# Patient Record
Sex: MALE | Race: WHITE | NOT HISPANIC OR LATINO | Employment: OTHER | URBAN - METROPOLITAN AREA
[De-identification: names, ages, dates, MRNs, and addresses within clinical notes are randomized per-mention and may not be internally consistent; named-entity substitution may affect disease eponyms.]

---

## 2023-03-08 DIAGNOSIS — R60.9 EDEMA, UNSPECIFIED TYPE: Primary | ICD-10-CM

## 2023-03-08 DIAGNOSIS — I10 HYPERTENSION, UNSPECIFIED TYPE: Primary | ICD-10-CM

## 2023-03-08 RX ORDER — LOSARTAN POTASSIUM 100 MG/1
TABLET ORAL
Qty: 90 TABLET | Refills: 3 | Status: SHIPPED | OUTPATIENT
Start: 2023-03-08 | End: 2023-12-04 | Stop reason: HOSPADM

## 2023-03-10 RX ORDER — TORSEMIDE 20 MG/1
TABLET ORAL
Qty: 90 TABLET | Refills: 3 | Status: SHIPPED | OUTPATIENT
Start: 2023-03-10 | End: 2023-05-30

## 2023-03-21 DIAGNOSIS — I10 HYPERTENSION, UNSPECIFIED TYPE: ICD-10-CM

## 2023-03-21 PROBLEM — N41.9 PROSTATITIS: Status: RESOLVED | Noted: 2023-03-21 | Resolved: 2023-03-21

## 2023-03-21 PROBLEM — I89.0 LYMPHEDEMA: Status: ACTIVE | Noted: 2023-03-21

## 2023-03-21 PROBLEM — R60.0 BILATERAL LEG EDEMA: Status: ACTIVE | Noted: 2023-03-21

## 2023-03-21 PROBLEM — R60.0 PEDAL EDEMA: Status: ACTIVE | Noted: 2023-03-21

## 2023-03-21 PROBLEM — I49.5 SINOATRIAL NODE DYSFUNCTION (MULTI): Status: ACTIVE | Noted: 2023-03-21

## 2023-03-21 PROBLEM — G47.00 INSOMNIA: Status: ACTIVE | Noted: 2023-03-21

## 2023-03-21 PROBLEM — E78.00 PURE HYPERCHOLESTEROLEMIA: Status: ACTIVE | Noted: 2023-03-21

## 2023-03-21 PROBLEM — J30.9 ALLERGIC RHINITIS: Status: ACTIVE | Noted: 2023-03-21

## 2023-03-21 PROBLEM — G51.0 BELL'S PALSY: Status: ACTIVE | Noted: 2023-03-21

## 2023-03-21 PROBLEM — R97.20 ELEVATED PROSTATE SPECIFIC ANTIGEN (PSA): Status: ACTIVE | Noted: 2023-03-21

## 2023-03-21 PROBLEM — Z95.0 PACEMAKER: Status: ACTIVE | Noted: 2023-03-21

## 2023-03-21 PROBLEM — M10.9 GOUT: Status: ACTIVE | Noted: 2023-03-21

## 2023-03-21 PROBLEM — N40.1 ENLARGED PROSTATE WITH LOWER URINARY TRACT SYMPTOMS (LUTS): Status: ACTIVE | Noted: 2023-03-21

## 2023-03-21 PROBLEM — I73.9 PVD (PERIPHERAL VASCULAR DISEASE) (CMS-HCC): Status: ACTIVE | Noted: 2023-03-21

## 2023-03-21 PROBLEM — B35.1 MYCOTIC TOENAILS: Status: ACTIVE | Noted: 2023-03-21

## 2023-03-21 PROBLEM — R63.2 POLYPHAGIA: Status: ACTIVE | Noted: 2023-03-21

## 2023-03-21 PROBLEM — E11.65 TYPE 2 DIABETES MELLITUS WITH HYPERGLYCEMIA, WITHOUT LONG-TERM CURRENT USE OF INSULIN (MULTI): Status: ACTIVE | Noted: 2023-03-21

## 2023-03-21 PROBLEM — M17.12 OSTEOARTHRITIS OF LEFT KNEE: Status: ACTIVE | Noted: 2023-03-21

## 2023-03-21 PROBLEM — R51.9 HEADACHE: Status: ACTIVE | Noted: 2023-03-21

## 2023-03-21 RX ORDER — FINASTERIDE 5 MG/1
5 TABLET, FILM COATED ORAL DAILY
COMMUNITY
End: 2024-02-11 | Stop reason: ALTCHOICE

## 2023-03-21 RX ORDER — TAMSULOSIN HYDROCHLORIDE 0.4 MG/1
0.8 CAPSULE ORAL NIGHTLY
COMMUNITY

## 2023-03-21 RX ORDER — ROSUVASTATIN CALCIUM 10 MG/1
10 TABLET, COATED ORAL NIGHTLY
COMMUNITY
End: 2023-04-14

## 2023-03-21 RX ORDER — COLCHICINE 0.6 MG/1
0.6 TABLET ORAL 2 TIMES DAILY PRN
COMMUNITY
Start: 2015-05-15 | End: 2023-10-07 | Stop reason: HOSPADM

## 2023-03-21 RX ORDER — METOPROLOL SUCCINATE 100 MG/1
100 TABLET, EXTENDED RELEASE ORAL DAILY
COMMUNITY
End: 2023-06-15 | Stop reason: ALTCHOICE

## 2023-03-21 RX ORDER — ALLOPURINOL 100 MG/1
1 TABLET ORAL DAILY
COMMUNITY
Start: 2016-11-01 | End: 2023-06-15 | Stop reason: SDUPTHER

## 2023-03-21 RX ORDER — FLUTICASONE PROPIONATE 50 MCG
1 SPRAY, SUSPENSION (ML) NASAL DAILY
COMMUNITY
Start: 2021-09-16 | End: 2023-10-07 | Stop reason: HOSPADM

## 2023-03-21 RX ORDER — METOPROLOL SUCCINATE 100 MG/1
TABLET, EXTENDED RELEASE ORAL
Qty: 90 TABLET | Refills: 3 | Status: ON HOLD | OUTPATIENT
Start: 2023-03-21 | End: 2023-12-03 | Stop reason: SDUPTHER

## 2023-04-13 DIAGNOSIS — E78.00 PURE HYPERCHOLESTEROLEMIA, UNSPECIFIED: ICD-10-CM

## 2023-04-14 RX ORDER — ROSUVASTATIN CALCIUM 10 MG/1
TABLET, COATED ORAL
Qty: 90 TABLET | Refills: 3 | Status: SHIPPED | OUTPATIENT
Start: 2023-04-14 | End: 2024-02-11 | Stop reason: ALTCHOICE

## 2023-05-01 LAB — PROSTATE SPECIFIC AG (NG/ML) IN SER/PLAS: 8.52 NG/ML (ref 0–4)

## 2023-05-27 DIAGNOSIS — R60.9 EDEMA, UNSPECIFIED TYPE: ICD-10-CM

## 2023-05-30 RX ORDER — FUROSEMIDE 40 MG/1
40 TABLET ORAL DAILY
Qty: 90 TABLET | Refills: 3 | Status: ON HOLD | OUTPATIENT
Start: 2023-05-30 | End: 2023-10-07 | Stop reason: SDUPTHER

## 2023-06-15 DIAGNOSIS — M10.9 GOUT, UNSPECIFIED CAUSE, UNSPECIFIED CHRONICITY, UNSPECIFIED SITE: ICD-10-CM

## 2023-06-15 PROBLEM — M25.512 LEFT SHOULDER PAIN: Status: ACTIVE | Noted: 2023-06-15

## 2023-06-15 PROBLEM — M62.830 BACK MUSCLE SPASM: Status: ACTIVE | Noted: 2023-06-15

## 2023-06-15 RX ORDER — ALLOPURINOL 100 MG/1
100 TABLET ORAL DAILY
Qty: 90 TABLET | Refills: 3 | Status: SHIPPED | OUTPATIENT
Start: 2023-06-15 | End: 2023-11-16 | Stop reason: HOSPADM

## 2023-08-11 LAB
ALANINE AMINOTRANSFERASE (SGPT) (U/L) IN SER/PLAS: 28 U/L (ref 10–52)
ALBUMIN (G/DL) IN SER/PLAS: 3.8 G/DL (ref 3.4–5)
ALKALINE PHOSPHATASE (U/L) IN SER/PLAS: 58 U/L (ref 33–136)
ANION GAP IN SER/PLAS: 11 MMOL/L (ref 10–20)
ASPARTATE AMINOTRANSFERASE (SGOT) (U/L) IN SER/PLAS: 33 U/L (ref 9–39)
BILIRUBIN TOTAL (MG/DL) IN SER/PLAS: 1 MG/DL (ref 0–1.2)
CALCIUM (MG/DL) IN SER/PLAS: 9.2 MG/DL (ref 8.6–10.3)
CARBON DIOXIDE, TOTAL (MMOL/L) IN SER/PLAS: 27 MMOL/L (ref 21–32)
CHLORIDE (MMOL/L) IN SER/PLAS: 99 MMOL/L (ref 98–107)
CHOLESTEROL (MG/DL) IN SER/PLAS: 109 MG/DL (ref 0–199)
CHOLESTEROL IN HDL (MG/DL) IN SER/PLAS: 27.7 MG/DL
CHOLESTEROL/HDL RATIO: 3.9
CREATININE (MG/DL) IN SER/PLAS: 1 MG/DL (ref 0.5–1.3)
ERYTHROCYTE DISTRIBUTION WIDTH (RATIO) BY AUTOMATED COUNT: 13.3 % (ref 11.5–14.5)
ERYTHROCYTE MEAN CORPUSCULAR HEMOGLOBIN CONCENTRATION (G/DL) BY AUTOMATED: 32.8 G/DL (ref 32–36)
ERYTHROCYTE MEAN CORPUSCULAR VOLUME (FL) BY AUTOMATED COUNT: 88 FL (ref 80–100)
ERYTHROCYTES (10*6/UL) IN BLOOD BY AUTOMATED COUNT: 5.03 X10E12/L (ref 4.5–5.9)
GFR MALE: 80 ML/MIN/1.73M2
GLUCOSE (MG/DL) IN SER/PLAS: 180 MG/DL (ref 74–99)
HEMATOCRIT (%) IN BLOOD BY AUTOMATED COUNT: 44.2 % (ref 41–52)
HEMOGLOBIN (G/DL) IN BLOOD: 14.5 G/DL (ref 13.5–17.5)
LDL: 25 MG/DL (ref 0–99)
LEUKOCYTES (10*3/UL) IN BLOOD BY AUTOMATED COUNT: 6.9 X10E9/L (ref 4.4–11.3)
NON HDL CHOLESTEROL: 81 MG/DL
PLATELETS (10*3/UL) IN BLOOD AUTOMATED COUNT: 123 X10E9/L (ref 150–450)
POTASSIUM (MMOL/L) IN SER/PLAS: 3.9 MMOL/L (ref 3.5–5.3)
PROTEIN TOTAL: 7.4 G/DL (ref 6.4–8.2)
SODIUM (MMOL/L) IN SER/PLAS: 133 MMOL/L (ref 136–145)
TRIGLYCERIDE (MG/DL) IN SER/PLAS: 283 MG/DL (ref 0–149)
URATE (MG/DL) IN SER/PLAS: 3.6 MG/DL (ref 4–7.5)
UREA NITROGEN (MG/DL) IN SER/PLAS: 13 MG/DL (ref 6–23)
VLDL: 57 MG/DL (ref 0–40)

## 2023-08-12 LAB
ESTIMATED AVERAGE GLUCOSE FOR HBA1C: 237 MG/DL
HEMOGLOBIN A1C/HEMOGLOBIN TOTAL IN BLOOD: 9.9 %
PROSTATE SPECIFIC ANTIGEN,SCREEN: 6.84 NG/ML (ref 0–4)

## 2023-08-16 ENCOUNTER — OFFICE VISIT (OUTPATIENT)
Dept: PRIMARY CARE | Facility: CLINIC | Age: 73
End: 2023-08-16
Payer: MEDICARE

## 2023-08-16 VITALS
OXYGEN SATURATION: 98 % | TEMPERATURE: 97.9 F | DIASTOLIC BLOOD PRESSURE: 80 MMHG | WEIGHT: 285.6 LBS | HEART RATE: 70 BPM | RESPIRATION RATE: 16 BRPM | SYSTOLIC BLOOD PRESSURE: 170 MMHG | BODY MASS INDEX: 36.65 KG/M2 | HEIGHT: 74 IN

## 2023-08-16 DIAGNOSIS — Z78.9 NEVER SMOKED CIGARETTES: ICD-10-CM

## 2023-08-16 DIAGNOSIS — E11.40 TYPE 2 DIABETES MELLITUS WITH DIABETIC NEUROPATHY, WITH LONG-TERM CURRENT USE OF INSULIN (MULTI): Primary | ICD-10-CM

## 2023-08-16 DIAGNOSIS — E11.65 TYPE 2 DIABETES MELLITUS WITH HYPERGLYCEMIA, WITHOUT LONG-TERM CURRENT USE OF INSULIN (MULTI): ICD-10-CM

## 2023-08-16 DIAGNOSIS — E66.9 CLASS 2 OBESITY WITH BODY MASS INDEX (BMI) OF 36.0 TO 36.9 IN ADULT, UNSPECIFIED OBESITY TYPE, UNSPECIFIED WHETHER SERIOUS COMORBIDITY PRESENT: ICD-10-CM

## 2023-08-16 DIAGNOSIS — E11.40 TYPE 2 DIABETES MELLITUS WITH DIABETIC NEUROPATHY, WITH LONG-TERM CURRENT USE OF INSULIN (MULTI): ICD-10-CM

## 2023-08-16 DIAGNOSIS — I89.0 LYMPHEDEMA: ICD-10-CM

## 2023-08-16 DIAGNOSIS — N40.1 BENIGN PROSTATIC HYPERPLASIA WITH LOWER URINARY TRACT SYMPTOMS, SYMPTOM DETAILS UNSPECIFIED: ICD-10-CM

## 2023-08-16 DIAGNOSIS — Z79.4 TYPE 2 DIABETES MELLITUS WITH DIABETIC NEUROPATHY, WITH LONG-TERM CURRENT USE OF INSULIN (MULTI): ICD-10-CM

## 2023-08-16 DIAGNOSIS — Z00.00 MEDICARE ANNUAL WELLNESS VISIT, SUBSEQUENT: ICD-10-CM

## 2023-08-16 DIAGNOSIS — Z79.4 TYPE 2 DIABETES MELLITUS WITH DIABETIC NEUROPATHY, WITH LONG-TERM CURRENT USE OF INSULIN (MULTI): Primary | ICD-10-CM

## 2023-08-16 PROBLEM — K63.5 COLON POLYP: Status: ACTIVE | Noted: 2023-08-16

## 2023-08-16 PROCEDURE — 1036F TOBACCO NON-USER: CPT | Performed by: FAMILY MEDICINE

## 2023-08-16 PROCEDURE — 3046F HEMOGLOBIN A1C LEVEL >9.0%: CPT | Performed by: FAMILY MEDICINE

## 2023-08-16 PROCEDURE — 3077F SYST BP >= 140 MM HG: CPT | Performed by: FAMILY MEDICINE

## 2023-08-16 PROCEDURE — 1159F MED LIST DOCD IN RCRD: CPT | Performed by: FAMILY MEDICINE

## 2023-08-16 PROCEDURE — 1160F RVW MEDS BY RX/DR IN RCRD: CPT | Performed by: FAMILY MEDICINE

## 2023-08-16 PROCEDURE — 4010F ACE/ARB THERAPY RXD/TAKEN: CPT | Performed by: FAMILY MEDICINE

## 2023-08-16 PROCEDURE — 1157F ADVNC CARE PLAN IN RCRD: CPT | Performed by: FAMILY MEDICINE

## 2023-08-16 PROCEDURE — 99214 OFFICE O/P EST MOD 30 MIN: CPT | Performed by: FAMILY MEDICINE

## 2023-08-16 PROCEDURE — G0439 PPPS, SUBSEQ VISIT: HCPCS | Performed by: FAMILY MEDICINE

## 2023-08-16 PROCEDURE — 1170F FXNL STATUS ASSESSED: CPT | Performed by: FAMILY MEDICINE

## 2023-08-16 PROCEDURE — 3079F DIAST BP 80-89 MM HG: CPT | Performed by: FAMILY MEDICINE

## 2023-08-16 PROCEDURE — G0446 INTENS BEHAVE THER CARDIO DX: HCPCS | Performed by: FAMILY MEDICINE

## 2023-08-16 PROCEDURE — 3008F BODY MASS INDEX DOCD: CPT | Performed by: FAMILY MEDICINE

## 2023-08-16 RX ORDER — INSULIN GLARGINE 100 [IU]/ML
10 INJECTION, SOLUTION SUBCUTANEOUS NIGHTLY
Qty: 10 ML | Refills: 3 | Status: SHIPPED | OUTPATIENT
Start: 2023-08-16 | End: 2023-08-16

## 2023-08-16 RX ORDER — INSULIN DEGLUDEC 100 U/ML
10 INJECTION, SOLUTION SUBCUTANEOUS NIGHTLY
Qty: 10 ML | Refills: 2 | Status: SHIPPED | OUTPATIENT
Start: 2023-08-16 | End: 2023-11-16 | Stop reason: HOSPADM

## 2023-08-16 ASSESSMENT — ENCOUNTER SYMPTOMS
OCCASIONAL FEELINGS OF UNSTEADINESS: 0
LOSS OF SENSATION IN FEET: 0
DEPRESSION: 0

## 2023-08-16 ASSESSMENT — PATIENT HEALTH QUESTIONNAIRE - PHQ9
1. LITTLE INTEREST OR PLEASURE IN DOING THINGS: NOT AT ALL
2. FEELING DOWN, DEPRESSED OR HOPELESS: NOT AT ALL
SUM OF ALL RESPONSES TO PHQ9 QUESTIONS 1 AND 2: 0

## 2023-08-16 ASSESSMENT — ACTIVITIES OF DAILY LIVING (ADL)
MANAGING_FINANCES: INDEPENDENT
DRESSING: INDEPENDENT
DOING_HOUSEWORK: INDEPENDENT
BATHING: INDEPENDENT
GROCERY_SHOPPING: INDEPENDENT
TAKING_MEDICATION: INDEPENDENT

## 2023-08-16 NOTE — PATIENT INSTRUCTIONS
For weight loss I recommend a healthy diet low in refined carbohydrates and saturated fats and regular exercise (150 minutes of cardio/week and resistance exercise at least twice a week).  MP

## 2023-08-16 NOTE — PROGRESS NOTES
"Subjective   Reason for Visit: Chester Verduzco is an 73 y.o. male here for a Medicare Wellness visit.  And recheck on DM,     Past Medical, Surgical, and Family History reviewed and updated in chart.    Reviewed all medications by prescribing practitioner or clinical pharmacist (such as prescriptions, OTCs, herbal therapies and supplements) and documented in the medical record.        Patient Care Team:  Jeffry De Leon MD as PCP - General       Objective   Vitals:  /80 (BP Location: Right arm, Patient Position: Sitting, BP Cuff Size: Adult)   Pulse 70   Temp 36.6 °C (97.9 °F) (Temporal)   Resp 16   Ht 1.88 m (6' 2\")   Wt 130 kg (285 lb 9.6 oz)   SpO2 98%   BMI 36.67 kg/m²     O: VSS AFEB Awake, Alert, NAD.  No intoxication, withdrawal, or sedation.  Chest CTA.  Heart RRR.  Ext no 2+ lymphedema.      Assessment/Plan   Problem List Items Addressed This Visit       Enlarged prostate with lower urinary tract symptoms (LUTS)    Overview     # BPH -- Proscar & Flomax per Dr Delaney -- planning cysto and surgical debulking (>10 cm prostate).               Lymphedema    Overview     # Edema with venous stasis -- continue seeing Dr. Mohseni. Stopped TZD 7/2021.    # Lymphedema, and varicose veins -- s/p vein therapy with Dr Salas. No longer pursuing this. Stable with maxzide. Doing well with treatment from Dr Mohseni.          Type 2 diabetes mellitus with diabetic neuropathy, with long-term current use of insulin (CMS/[facility]) - Primary    Overview     DX'd 12/2017   A1c up to 9.9% since off GLP-1 and Metformin.  STOPPED TZD due to edema 7/2021.   Off METFORMIN d/t diarrhea.   Cannot afford trulicity.  Sees podiatry for neuropathy  See opto Dr March at [facility] in [place].   Micral normal 8/2019, 8/19/2020.   Start Lantus 8/16/23.         Current Assessment & Plan     Start lantus 10 units daily.          Relevant Medications    insulin glargine (Lantus) 100 unit/mL injection    Medicare annual " wellness visit, subsequent    Overview     8/16/23 Subsequent Medicare wellness exam -- no dementia, depression, anxiety, incontinence, falls. Prevnar utd, pneumovax 6/19/18. Has Living Will/POA Roselyn Pardo. Colonoscopy ordered scheduled for 9/18/23 - due since 2018 with Dr Jauregui (polyp in 2015).      8/16/23  I spent 15 minutes face-to-face with this individual discussing their cardiovascular risk and behavioral therapies of nutritional choices, exercise, and elimination of habits contributing to risk. We agreed on a plan addressing reduction of their current cardiovascular risk. For patients with risk calculated >10%, aspirin use was discussed and encouraged unless known allergy or increased risk of bleeding contraindicates use. Patient's 10 year CV risk estimate calculates to: 17.3%       # Moderate cardiac risk for low risk ophtho procedures coming up -- clear for surgery.  Should hold lantus the day before surgery and can restart once able to eat.     # Eustachian tube dysfunction -- due to early sinusitis -- re-treat with augmentin 875 bid x 10 days. continue nasal lavage, mucinex, flonase spray and ear popping exercises. To ENT if no better.   # Acute viral hepatitis B infection 2015 -- resolved -- immune to Hep B per AB level 2017. US shows obesity NOT ascites.   # hx Ventral Hernia -- repaired per DR Capone 2015  # Morbid Obesity -- plans to work out independently-- no longer interested in bariatric -- rx diabetic diet. Encouraged with [facility]. Wellbutrin to help with cravings for sweets.           Other Visit Diagnoses       Class 2 obesity with body mass index (BMI) of 36.0 to 36.9 in adult, unspecified obesity type, unspecified whether serious comorbidity present        Never smoked cigarettes        Type 2 diabetes mellitus with hyperglycemia, without long-term current use of insulin (CMS/[facility])

## 2023-09-13 DIAGNOSIS — E66.01 CLASS 2 SEVERE OBESITY WITH SERIOUS COMORBIDITY AND BODY MASS INDEX (BMI) OF 36.0 TO 36.9 IN ADULT, UNSPECIFIED OBESITY TYPE (MULTI): ICD-10-CM

## 2023-09-13 DIAGNOSIS — Z79.4 TYPE 2 DIABETES MELLITUS WITH DIABETIC NEUROPATHY, WITH LONG-TERM CURRENT USE OF INSULIN (MULTI): ICD-10-CM

## 2023-09-13 DIAGNOSIS — E11.40 TYPE 2 DIABETES MELLITUS WITH DIABETIC NEUROPATHY, WITH LONG-TERM CURRENT USE OF INSULIN (MULTI): ICD-10-CM

## 2023-09-13 NOTE — TELEPHONE ENCOUNTER
Patient asking if he should receive the booster covid vaccine and what does Dr. De Leon think about the RSV vaccine-should he get it?     Also- he is having a terrible time with his sugar cravings-can not tolerate Wellbutrin-and is already on insulin (Tresiba flex U-100 10 units nightly)   Is there anything else he can take to help control the sugar cravings?

## 2023-09-14 RX ORDER — NALTREXONE HYDROCHLORIDE 50 MG/1
50 TABLET, FILM COATED ORAL DAILY
Qty: 30 TABLET | Refills: 11 | Status: ON HOLD | OUTPATIENT
Start: 2023-09-14 | End: 2023-10-07 | Stop reason: SDUPTHER

## 2023-09-14 NOTE — TELEPHONE ENCOUNTER
Spoke with patient and informed about vaccines.   He is not on any type of Opiods- Queued up Naltrexone script for sign off.

## 2023-09-25 ENCOUNTER — HOSPITAL ENCOUNTER (OUTPATIENT)
Facility: HOSPITAL | Age: 73
Setting detail: SURGERY ADMIT
End: 2023-09-25
Attending: STUDENT IN AN ORGANIZED HEALTH CARE EDUCATION/TRAINING PROGRAM | Admitting: STUDENT IN AN ORGANIZED HEALTH CARE EDUCATION/TRAINING PROGRAM
Payer: MEDICARE

## 2023-09-25 ENCOUNTER — TELEPHONE (OUTPATIENT)
Dept: PRIMARY CARE | Facility: CLINIC | Age: 73
End: 2023-09-25
Payer: COMMERCIAL

## 2023-09-25 NOTE — TELEPHONE ENCOUNTER
Chester Verduzco,  1950, called to speak to Dr. De Leon or his nurse to list everything that has happened in the past week. Per Chester, there have been too many items to try and write up, but to sum it up, he has been to the hospital twice. He is requesting for someone to please call him. He can be reached at [phone]. Thanks

## 2023-09-28 NOTE — TELEPHONE ENCOUNTER
Spoke with Mr Luba he has been feeling N/V/D but is not dizzy, he thinks it might be the Keflex and asked I put this on his allergy list, seeing none of this started until he started the Keflex, also per MP I called in Zofran 4mg odt q6h and advised the pt to push fluids. Pt understood.

## 2023-10-02 ENCOUNTER — APPOINTMENT (OUTPATIENT)
Dept: RADIOLOGY | Facility: HOSPITAL | Age: 73
DRG: 392 | End: 2023-10-02
Payer: MEDICARE

## 2023-10-02 ENCOUNTER — HOSPITAL ENCOUNTER (INPATIENT)
Facility: HOSPITAL | Age: 73
LOS: 5 days | Discharge: NURSING, REHAB OR LONG-TERM CARE | DRG: 392 | End: 2023-10-07
Attending: EMERGENCY MEDICINE | Admitting: INTERNAL MEDICINE
Payer: MEDICARE

## 2023-10-02 DIAGNOSIS — I10 PRIMARY HYPERTENSION: ICD-10-CM

## 2023-10-02 DIAGNOSIS — Z79.4 TYPE 2 DIABETES MELLITUS WITH DIABETIC NEUROPATHY, WITH LONG-TERM CURRENT USE OF INSULIN (MULTI): ICD-10-CM

## 2023-10-02 DIAGNOSIS — E11.40 TYPE 2 DIABETES MELLITUS WITH DIABETIC NEUROPATHY, WITH LONG-TERM CURRENT USE OF INSULIN (MULTI): ICD-10-CM

## 2023-10-02 DIAGNOSIS — N17.9 AKI (ACUTE KIDNEY INJURY) (CMS-HCC): ICD-10-CM

## 2023-10-02 DIAGNOSIS — M62.830 BACK MUSCLE SPASM: ICD-10-CM

## 2023-10-02 DIAGNOSIS — E66.01 CLASS 2 SEVERE OBESITY WITH SERIOUS COMORBIDITY AND BODY MASS INDEX (BMI) OF 36.0 TO 36.9 IN ADULT, UNSPECIFIED OBESITY TYPE (MULTI): ICD-10-CM

## 2023-10-02 DIAGNOSIS — R19.7 DIARRHEA, UNSPECIFIED TYPE: Primary | ICD-10-CM

## 2023-10-02 DIAGNOSIS — D50.9 IRON DEFICIENCY ANEMIA, UNSPECIFIED IRON DEFICIENCY ANEMIA TYPE: ICD-10-CM

## 2023-10-02 DIAGNOSIS — K59.1 FUNCTIONAL DIARRHEA: ICD-10-CM

## 2023-10-02 DIAGNOSIS — N39.0 UTI (URINARY TRACT INFECTION): ICD-10-CM

## 2023-10-02 DIAGNOSIS — R26.2 DIFFICULTY WALKING: ICD-10-CM

## 2023-10-02 DIAGNOSIS — R60.9 EDEMA, UNSPECIFIED TYPE: ICD-10-CM

## 2023-10-02 DIAGNOSIS — B35.1 MYCOTIC TOENAILS: ICD-10-CM

## 2023-10-02 LAB
ALBUMIN SERPL BCP-MCNC: 2.7 G/DL (ref 3.4–5)
ALP SERPL-CCNC: 156 U/L (ref 33–136)
ALT SERPL W P-5'-P-CCNC: 42 U/L (ref 10–52)
ANION GAP BLDV CALCULATED.4IONS-SCNC: 10 MMOL/L (ref 10–25)
ANION GAP SERPL CALC-SCNC: 17 MMOL/L (ref 10–20)
APPEARANCE UR: ABNORMAL
AST SERPL W P-5'-P-CCNC: 43 U/L (ref 9–39)
BACTERIA #/AREA URNS AUTO: ABNORMAL /HPF
BASE EXCESS BLDV CALC-SCNC: 0.1 MMOL/L (ref -2–3)
BASOPHILS # BLD AUTO: 0.01 X10*3/UL (ref 0–0.1)
BASOPHILS NFR BLD AUTO: 0.1 %
BILIRUB SERPL-MCNC: 1 MG/DL (ref 0–1.2)
BILIRUB UR STRIP.AUTO-MCNC: NEGATIVE MG/DL
BNP SERPL-MCNC: 97 PG/ML (ref 0–99)
BODY TEMPERATURE: 37 DEGREES CELSIUS
BUN SERPL-MCNC: 68 MG/DL (ref 6–23)
CA-I BLDV-SCNC: 1.03 MMOL/L (ref 1.1–1.33)
CALCIUM SERPL-MCNC: 7.7 MG/DL (ref 8.6–10.3)
CHLORIDE BLDV-SCNC: 91 MMOL/L (ref 98–107)
CHLORIDE SERPL-SCNC: 88 MMOL/L (ref 98–107)
CO2 SERPL-SCNC: 23 MMOL/L (ref 21–32)
COLOR UR: ABNORMAL
CREAT SERPL-MCNC: 2.25 MG/DL (ref 0.5–1.3)
EOSINOPHIL # BLD AUTO: 0.01 X10*3/UL (ref 0–0.4)
EOSINOPHIL NFR BLD AUTO: 0.1 %
ERYTHROCYTE [DISTWIDTH] IN BLOOD BY AUTOMATED COUNT: 13.8 % (ref 11.5–14.5)
FLUAV RNA RESP QL NAA+PROBE: NOT DETECTED
FLUBV RNA RESP QL NAA+PROBE: NOT DETECTED
GFR SERPL CREATININE-BSD FRML MDRD: 30 ML/MIN/1.73M*2
GLUCOSE BLDV-MCNC: 158 MG/DL (ref 74–99)
GLUCOSE SERPL-MCNC: 144 MG/DL (ref 74–99)
GLUCOSE UR STRIP.AUTO-MCNC: NEGATIVE MG/DL
HCO3 BLDV-SCNC: 23.3 MMOL/L (ref 22–26)
HCT VFR BLD AUTO: 33.4 % (ref 41–52)
HCT VFR BLD EST: 33 % (ref 41–52)
HGB BLD-MCNC: 11.1 G/DL (ref 13.5–17.5)
HGB BLDV-MCNC: 11.1 G/DL (ref 13.5–17.5)
IMM GRANULOCYTES # BLD AUTO: 0.44 X10*3/UL (ref 0–0.5)
IMM GRANULOCYTES NFR BLD AUTO: 3.7 % (ref 0–0.9)
KETONES UR STRIP.AUTO-MCNC: NEGATIVE MG/DL
LACTATE BLDV-SCNC: 1.6 MMOL/L (ref 0.4–2)
LEUKOCYTE ESTERASE UR QL STRIP.AUTO: ABNORMAL
LYMPHOCYTES # BLD AUTO: 0.63 X10*3/UL (ref 0.8–3)
LYMPHOCYTES NFR BLD AUTO: 5.3 %
MAGNESIUM SERPL-MCNC: 2.01 MG/DL (ref 1.6–2.4)
MCH RBC QN AUTO: 29.1 PG (ref 26–34)
MCHC RBC AUTO-ENTMCNC: 33.2 G/DL (ref 32–36)
MCV RBC AUTO: 87 FL (ref 80–100)
MONOCYTES # BLD AUTO: 1.4 X10*3/UL (ref 0.05–0.8)
MONOCYTES NFR BLD AUTO: 11.7 %
NEUTROPHILS # BLD AUTO: 9.44 X10*3/UL (ref 1.6–5.5)
NEUTROPHILS NFR BLD AUTO: 79.1 %
NITRITE UR QL STRIP.AUTO: NEGATIVE
NRBC BLD-RTO: 0 /100 WBCS (ref 0–0)
OXYHGB MFR BLDV: 38 % (ref 45–75)
PCO2 BLDV: 32 MM HG (ref 41–51)
PH BLDV: 7.47 PH (ref 7.33–7.43)
PH UR STRIP.AUTO: 5 [PH]
PLATELET # BLD AUTO: 169 X10*3/UL (ref 150–450)
PMV BLD AUTO: 11.1 FL (ref 7.5–11.5)
PO2 BLDV: 26 MM HG (ref 35–45)
POTASSIUM BLDV-SCNC: 3.2 MMOL/L (ref 3.5–5.3)
POTASSIUM SERPL-SCNC: 3.5 MMOL/L (ref 3.5–5.3)
PROT SERPL-MCNC: 6.7 G/DL (ref 6.4–8.2)
PROT UR STRIP.AUTO-MCNC: ABNORMAL MG/DL
RBC # BLD AUTO: 3.82 X10*6/UL (ref 4.5–5.9)
RBC # UR STRIP.AUTO: ABNORMAL /UL
RBC #/AREA URNS AUTO: >20 /HPF
SAO2 % BLDV: 39 % (ref 45–75)
SARS-COV-2 RNA RESP QL NAA+PROBE: NOT DETECTED
SODIUM BLDV-SCNC: 121 MMOL/L (ref 136–145)
SODIUM SERPL-SCNC: 124 MMOL/L (ref 136–145)
SP GR UR STRIP.AUTO: 1.01
UROBILINOGEN UR STRIP.AUTO-MCNC: 2 MG/DL
WBC # BLD AUTO: 11.9 X10*3/UL (ref 4.4–11.3)
WBC #/AREA URNS AUTO: >50 /HPF
WBC CLUMPS #/AREA URNS AUTO: ABNORMAL /HPF
YEAST HYPHAE #/AREA UR COMP ASSIST: PRESENT /HPF

## 2023-10-02 PROCEDURE — 2500000004 HC RX 250 GENERAL PHARMACY W/ HCPCS (ALT 636 FOR OP/ED): Performed by: NURSE PRACTITIONER

## 2023-10-02 PROCEDURE — 36415 COLL VENOUS BLD VENIPUNCTURE: CPT | Performed by: STUDENT IN AN ORGANIZED HEALTH CARE EDUCATION/TRAINING PROGRAM

## 2023-10-02 PROCEDURE — 96376 TX/PRO/DX INJ SAME DRUG ADON: CPT

## 2023-10-02 PROCEDURE — 2500000001 HC RX 250 WO HCPCS SELF ADMINISTERED DRUGS (ALT 637 FOR MEDICARE OP): Performed by: NURSE PRACTITIONER

## 2023-10-02 PROCEDURE — 2500000001 HC RX 250 WO HCPCS SELF ADMINISTERED DRUGS (ALT 637 FOR MEDICARE OP)

## 2023-10-02 PROCEDURE — 83735 ASSAY OF MAGNESIUM: CPT | Performed by: EMERGENCY MEDICINE

## 2023-10-02 PROCEDURE — 1100000001 HC PRIVATE ROOM DAILY

## 2023-10-02 PROCEDURE — 96365 THER/PROPH/DIAG IV INF INIT: CPT

## 2023-10-02 PROCEDURE — 84295 ASSAY OF SERUM SODIUM: CPT | Performed by: STUDENT IN AN ORGANIZED HEALTH CARE EDUCATION/TRAINING PROGRAM

## 2023-10-02 PROCEDURE — 81001 URINALYSIS AUTO W/SCOPE: CPT | Performed by: STUDENT IN AN ORGANIZED HEALTH CARE EDUCATION/TRAINING PROGRAM

## 2023-10-02 PROCEDURE — 87636 SARSCOV2 & INF A&B AMP PRB: CPT | Performed by: STUDENT IN AN ORGANIZED HEALTH CARE EDUCATION/TRAINING PROGRAM

## 2023-10-02 PROCEDURE — 2580000001 HC RX 258 IV SOLUTIONS: Performed by: STUDENT IN AN ORGANIZED HEALTH CARE EDUCATION/TRAINING PROGRAM

## 2023-10-02 PROCEDURE — 96375 TX/PRO/DX INJ NEW DRUG ADDON: CPT

## 2023-10-02 PROCEDURE — 85018 HEMOGLOBIN: CPT

## 2023-10-02 PROCEDURE — 96361 HYDRATE IV INFUSION ADD-ON: CPT

## 2023-10-02 PROCEDURE — 85025 COMPLETE CBC W/AUTO DIFF WBC: CPT | Performed by: STUDENT IN AN ORGANIZED HEALTH CARE EDUCATION/TRAINING PROGRAM

## 2023-10-02 PROCEDURE — 74176 CT ABD & PELVIS W/O CONTRAST: CPT | Mod: ME

## 2023-10-02 PROCEDURE — 2500000004 HC RX 250 GENERAL PHARMACY W/ HCPCS (ALT 636 FOR OP/ED): Performed by: STUDENT IN AN ORGANIZED HEALTH CARE EDUCATION/TRAINING PROGRAM

## 2023-10-02 PROCEDURE — 71045 X-RAY EXAM CHEST 1 VIEW: CPT | Performed by: STUDENT IN AN ORGANIZED HEALTH CARE EDUCATION/TRAINING PROGRAM

## 2023-10-02 PROCEDURE — 2500000004 HC RX 250 GENERAL PHARMACY W/ HCPCS (ALT 636 FOR OP/ED)

## 2023-10-02 PROCEDURE — 99222 1ST HOSP IP/OBS MODERATE 55: CPT | Performed by: NURSE PRACTITIONER

## 2023-10-02 PROCEDURE — 82435 ASSAY OF BLOOD CHLORIDE: CPT | Performed by: STUDENT IN AN ORGANIZED HEALTH CARE EDUCATION/TRAINING PROGRAM

## 2023-10-02 PROCEDURE — 71045 X-RAY EXAM CHEST 1 VIEW: CPT | Mod: FY

## 2023-10-02 PROCEDURE — 83880 ASSAY OF NATRIURETIC PEPTIDE: CPT | Performed by: STUDENT IN AN ORGANIZED HEALTH CARE EDUCATION/TRAINING PROGRAM

## 2023-10-02 PROCEDURE — 74176 CT ABD & PELVIS W/O CONTRAST: CPT | Performed by: RADIOLOGY

## 2023-10-02 PROCEDURE — 99285 EMERGENCY DEPT VISIT HI MDM: CPT | Performed by: EMERGENCY MEDICINE

## 2023-10-02 RX ORDER — PANTOPRAZOLE SODIUM 40 MG/1
40 TABLET, DELAYED RELEASE ORAL
Status: DISCONTINUED | OUTPATIENT
Start: 2023-10-03 | End: 2023-10-07 | Stop reason: HOSPADM

## 2023-10-02 RX ORDER — CYCLOBENZAPRINE HCL 5 MG
5 TABLET ORAL ONCE
Status: COMPLETED | OUTPATIENT
Start: 2023-10-02 | End: 2023-10-02

## 2023-10-02 RX ORDER — ACETAMINOPHEN 160 MG/5ML
650 SOLUTION ORAL EVERY 4 HOURS PRN
Status: DISCONTINUED | OUTPATIENT
Start: 2023-10-02 | End: 2023-10-07 | Stop reason: HOSPADM

## 2023-10-02 RX ORDER — ONDANSETRON 4 MG/1
4 TABLET, ORALLY DISINTEGRATING ORAL EVERY 8 HOURS PRN
Status: DISCONTINUED | OUTPATIENT
Start: 2023-10-02 | End: 2023-10-07 | Stop reason: HOSPADM

## 2023-10-02 RX ORDER — ACETAMINOPHEN 650 MG/1
650 SUPPOSITORY RECTAL EVERY 4 HOURS PRN
Status: DISCONTINUED | OUTPATIENT
Start: 2023-10-02 | End: 2023-10-07 | Stop reason: HOSPADM

## 2023-10-02 RX ORDER — CEFTRIAXONE 1 G/50ML
1 INJECTION, SOLUTION INTRAVENOUS ONCE
Status: COMPLETED | OUTPATIENT
Start: 2023-10-02 | End: 2023-10-02

## 2023-10-02 RX ORDER — TALC
3 POWDER (GRAM) TOPICAL DAILY
Status: DISCONTINUED | OUTPATIENT
Start: 2023-10-02 | End: 2023-10-07 | Stop reason: HOSPADM

## 2023-10-02 RX ORDER — SODIUM CHLORIDE 9 MG/ML
100 INJECTION, SOLUTION INTRAVENOUS CONTINUOUS
Status: DISCONTINUED | OUTPATIENT
Start: 2023-10-02 | End: 2023-10-04

## 2023-10-02 RX ORDER — CYCLOBENZAPRINE HCL 10 MG
TABLET ORAL
Status: COMPLETED
Start: 2023-10-02 | End: 2023-10-02

## 2023-10-02 RX ORDER — ONDANSETRON HYDROCHLORIDE 2 MG/ML
4 INJECTION, SOLUTION INTRAVENOUS EVERY 8 HOURS PRN
Status: DISCONTINUED | OUTPATIENT
Start: 2023-10-02 | End: 2023-10-07 | Stop reason: HOSPADM

## 2023-10-02 RX ORDER — ACETAMINOPHEN 325 MG/1
650 TABLET ORAL EVERY 4 HOURS PRN
Status: DISCONTINUED | OUTPATIENT
Start: 2023-10-02 | End: 2023-10-07 | Stop reason: HOSPADM

## 2023-10-02 RX ORDER — PANTOPRAZOLE SODIUM 40 MG/10ML
40 INJECTION, POWDER, LYOPHILIZED, FOR SOLUTION INTRAVENOUS
Status: DISCONTINUED | OUTPATIENT
Start: 2023-10-03 | End: 2023-10-07 | Stop reason: HOSPADM

## 2023-10-02 RX ADMIN — CYCLOBENZAPRINE 5 MG: 10 TABLET, FILM COATED ORAL at 16:02

## 2023-10-02 RX ADMIN — SODIUM CHLORIDE 100 ML/HR: 9 INJECTION, SOLUTION INTRAVENOUS at 18:25

## 2023-10-02 RX ADMIN — SODIUM CHLORIDE, POTASSIUM CHLORIDE, SODIUM LACTATE AND CALCIUM CHLORIDE 1000 ML: 600; 310; 30; 20 INJECTION, SOLUTION INTRAVENOUS at 13:03

## 2023-10-02 RX ADMIN — Medication 5 MG: at 16:02

## 2023-10-02 RX ADMIN — HYDROMORPHONE HYDROCHLORIDE 0.5 MG: 1 INJECTION, SOLUTION INTRAMUSCULAR; INTRAVENOUS; SUBCUTANEOUS at 16:05

## 2023-10-02 RX ADMIN — MELATONIN TAB 3 MG 3 MG: 3 TAB at 20:26

## 2023-10-02 RX ADMIN — CEFTRIAXONE SODIUM 1 G: 1 INJECTION, SOLUTION INTRAVENOUS at 15:10

## 2023-10-02 RX ADMIN — HYDROMORPHONE HYDROCHLORIDE 0.5 MG: 1 INJECTION, SOLUTION INTRAMUSCULAR; INTRAVENOUS; SUBCUTANEOUS at 13:04

## 2023-10-02 SDOH — SOCIAL STABILITY: SOCIAL INSECURITY: DO YOU FEEL UNSAFE GOING BACK TO THE PLACE WHERE YOU ARE LIVING?: NO

## 2023-10-02 SDOH — HEALTH STABILITY: PHYSICAL HEALTH: ON AVERAGE, HOW MANY DAYS PER WEEK DO YOU ENGAGE IN MODERATE TO STRENUOUS EXERCISE (LIKE A BRISK WALK)?: 0 DAYS

## 2023-10-02 SDOH — ECONOMIC STABILITY: HOUSING INSECURITY
IN THE LAST 12 MONTHS, WAS THERE A TIME WHEN YOU DID NOT HAVE A STEADY PLACE TO SLEEP OR SLEPT IN A SHELTER (INCLUDING NOW)?: NO

## 2023-10-02 SDOH — SOCIAL STABILITY: SOCIAL INSECURITY: HAS ANYONE EVER THREATENED TO HURT YOUR FAMILY OR YOUR PETS?: NO

## 2023-10-02 SDOH — ECONOMIC STABILITY: INCOME INSECURITY: IN THE LAST 12 MONTHS, WAS THERE A TIME WHEN YOU WERE NOT ABLE TO PAY THE MORTGAGE OR RENT ON TIME?: NO

## 2023-10-02 SDOH — ECONOMIC STABILITY: INCOME INSECURITY: HOW HARD IS IT FOR YOU TO PAY FOR THE VERY BASICS LIKE FOOD, HOUSING, MEDICAL CARE, AND HEATING?: SOMEWHAT HARD

## 2023-10-02 SDOH — SOCIAL STABILITY: SOCIAL INSECURITY: ARE YOU OR HAVE YOU BEEN THREATENED OR ABUSED PHYSICALLY, EMOTIONALLY, OR SEXUALLY BY ANYONE?: NO

## 2023-10-02 SDOH — SOCIAL STABILITY: SOCIAL INSECURITY: DOES ANYONE TRY TO KEEP YOU FROM HAVING/CONTACTING OTHER FRIENDS OR DOING THINGS OUTSIDE YOUR HOME?: NO

## 2023-10-02 SDOH — ECONOMIC STABILITY: TRANSPORTATION INSECURITY
IN THE PAST 12 MONTHS, HAS THE LACK OF TRANSPORTATION KEPT YOU FROM MEDICAL APPOINTMENTS OR FROM GETTING MEDICATIONS?: NO

## 2023-10-02 SDOH — SOCIAL STABILITY: SOCIAL INSECURITY: DO YOU FEEL ANYONE HAS EXPLOITED OR TAKEN ADVANTAGE OF YOU FINANCIALLY OR OF YOUR PERSONAL PROPERTY?: NO

## 2023-10-02 SDOH — HEALTH STABILITY: PHYSICAL HEALTH: ON AVERAGE, HOW MANY MINUTES DO YOU ENGAGE IN EXERCISE AT THIS LEVEL?: 0 MIN

## 2023-10-02 SDOH — ECONOMIC STABILITY: HOUSING INSECURITY: IN THE LAST 12 MONTHS, HOW MANY PLACES HAVE YOU LIVED?: 1

## 2023-10-02 SDOH — SOCIAL STABILITY: SOCIAL INSECURITY: HAVE YOU HAD THOUGHTS OF HARMING ANYONE ELSE?: NO

## 2023-10-02 SDOH — SOCIAL STABILITY: SOCIAL INSECURITY: ARE THERE ANY APPARENT SIGNS OF INJURIES/BEHAVIORS THAT COULD BE RELATED TO ABUSE/NEGLECT?: NO

## 2023-10-02 ASSESSMENT — PAIN - FUNCTIONAL ASSESSMENT: PAIN_FUNCTIONAL_ASSESSMENT: 0-10

## 2023-10-02 ASSESSMENT — COGNITIVE AND FUNCTIONAL STATUS - GENERAL
MOVING TO AND FROM BED TO CHAIR: A LITTLE
TOILETING: A LITTLE
DAILY ACTIVITIY SCORE: 9
MOBILITY SCORE: 16
DRESSING REGULAR LOWER BODY CLOTHING: A LITTLE
MOVING FROM LYING ON BACK TO SITTING ON SIDE OF FLAT BED WITH BEDRAILS: TOTAL
WALKING IN HOSPITAL ROOM: A LOT
HELP NEEDED FOR BATHING: TOTAL
EATING MEALS: A LITTLE
CLIMB 3 TO 5 STEPS WITH RAILING: A LOT
PERSONAL GROOMING: A LOT
TURNING FROM BACK TO SIDE WHILE IN FLAT BAD: A LITTLE
DRESSING REGULAR UPPER BODY CLOTHING: A LITTLE
MOVING TO AND FROM BED TO CHAIR: TOTAL
EATING MEALS: TOTAL
MOVING FROM LYING ON BACK TO SITTING ON SIDE OF FLAT BED WITH BEDRAILS: A LITTLE
HELP NEEDED FOR BATHING: A LITTLE
PERSONAL GROOMING: A LITTLE
TOILETING: A LITTLE
PATIENT BASELINE BEDBOUND: NO
WALKING IN HOSPITAL ROOM: TOTAL
DRESSING REGULAR UPPER BODY CLOTHING: TOTAL
CLIMB 3 TO 5 STEPS WITH RAILING: TOTAL
MOBILITY SCORE: 6
DRESSING REGULAR LOWER BODY CLOTHING: TOTAL
DAILY ACTIVITIY SCORE: 18
STANDING UP FROM CHAIR USING ARMS: TOTAL
TURNING FROM BACK TO SIDE WHILE IN FLAT BAD: TOTAL
STANDING UP FROM CHAIR USING ARMS: A LITTLE

## 2023-10-02 ASSESSMENT — ENCOUNTER SYMPTOMS
ENDOCRINE NEGATIVE: 1
RESPIRATORY NEGATIVE: 1
CARDIOVASCULAR NEGATIVE: 1
NEUROLOGICAL NEGATIVE: 1
DIARRHEA: 1
NAUSEA: 1
EYES NEGATIVE: 1
HEMATOLOGIC/LYMPHATIC NEGATIVE: 1
ALLERGIC/IMMUNOLOGIC NEGATIVE: 1
FATIGUE: 1
PSYCHIATRIC NEGATIVE: 1
ARTHRALGIAS: 1

## 2023-10-02 ASSESSMENT — PATIENT HEALTH QUESTIONNAIRE - PHQ9
SUM OF ALL RESPONSES TO PHQ9 QUESTIONS 1 & 2: 4
1. LITTLE INTEREST OR PLEASURE IN DOING THINGS: MORE THAN HALF THE DAYS
2. FEELING DOWN, DEPRESSED OR HOPELESS: MORE THAN HALF THE DAYS

## 2023-10-02 ASSESSMENT — PAIN SCALES - GENERAL
PAINLEVEL_OUTOF10: 8
PAINLEVEL_OUTOF10: 8

## 2023-10-02 ASSESSMENT — ACTIVITIES OF DAILY LIVING (ADL)
ADEQUATE_TO_COMPLETE_ADL: YES
JUDGMENT_ADEQUATE_SAFELY_COMPLETE_DAILY_ACTIVITIES: YES
ASSISTIVE_DEVICE: WALKER
WALKS IN HOME: INDEPENDENT
PATIENT'S MEMORY ADEQUATE TO SAFELY COMPLETE DAILY ACTIVITIES?: YES
BATHING: NEEDS ASSISTANCE
HEARING - RIGHT EAR: FUNCTIONAL
DRESSING YOURSELF: NEEDS ASSISTANCE
GROOMING: NEEDS ASSISTANCE
HEARING - LEFT EAR: FUNCTIONAL
FEEDING YOURSELF: NEEDS ASSISTANCE
TOILETING: NEEDS ASSISTANCE

## 2023-10-02 ASSESSMENT — COLUMBIA-SUICIDE SEVERITY RATING SCALE - C-SSRS
6. HAVE YOU EVER DONE ANYTHING, STARTED TO DO ANYTHING, OR PREPARED TO DO ANYTHING TO END YOUR LIFE?: NO
1. IN THE PAST MONTH, HAVE YOU WISHED YOU WERE DEAD OR WISHED YOU COULD GO TO SLEEP AND NOT WAKE UP?: NO
2. HAVE YOU ACTUALLY HAD ANY THOUGHTS OF KILLING YOURSELF?: NO

## 2023-10-02 ASSESSMENT — LIFESTYLE VARIABLES
EVER FELT BAD OR GUILTY ABOUT YOUR DRINKING: NO
HOW OFTEN DO YOU HAVE A DRINK CONTAINING ALCOHOL: MONTHLY OR LESS
HOW MANY STANDARD DRINKS CONTAINING ALCOHOL DO YOU HAVE ON A TYPICAL DAY: 1 OR 2
SKIP TO QUESTIONS 9-10: 0
EVER HAD A DRINK FIRST THING IN THE MORNING TO STEADY YOUR NERVES TO GET RID OF A HANGOVER: NO
HAVE YOU EVER FELT YOU SHOULD CUT DOWN ON YOUR DRINKING: NO
HAVE PEOPLE ANNOYED YOU BY CRITICIZING YOUR DRINKING: NO
HOW OFTEN DO YOU HAVE 6 OR MORE DRINKS ON ONE OCCASION: LESS THAN MONTHLY
AUDIT-C TOTAL SCORE: 2
AUDIT-C TOTAL SCORE: 2

## 2023-10-02 ASSESSMENT — PAIN DESCRIPTION - LOCATION: LOCATION: SHOULDER

## 2023-10-02 ASSESSMENT — PAIN DESCRIPTION - PAIN TYPE: TYPE: CHRONIC PAIN

## 2023-10-02 NOTE — H&P
History Of Present Illness  Chester Verduzco is a 73 y.o. male with history of arthritis, obesity, lymphedema, ventral hernia repair, hypertension, hyperlipidemia and BPH, who presents with a 3-week history of diarrhea.  The diarrhea is associated with intermittent nausea.  He adds that he has obese 3 loose stools daily.  The patient was treated with Keflex for UTI for 2 weeks ago.  He denies abdominal pain, urinary frequency, urinary retention and dysuria.  CT abdomen/pelvis shows no acute pathology.  The blood work shows hyponatremia with a sodium of 124, BUN 68/creatinine 2.25.     Past Medical History  He has a past medical history of Abnormal weight gain (10/27/2016), Achilles tendinitis, unspecified leg (08/16/2016), Acute upper respiratory infection, unspecified, Allergic contact dermatitis due to plants, except food (08/22/2013), Cellulitis of right lower limb (07/30/2021), Cough, unspecified (03/21/2016), Encounter for screening for human immunodeficiency virus (HIV) (06/14/2016), Hordeolum externum unspecified eye, unspecified eyelid (08/14/2019), Hyperglycemia, unspecified (12/13/2017), Incisional hernia without obstruction or gangrene (06/15/2015), Muscle spasm of calf (08/16/2016), Personal history of other diseases of the digestive system (06/30/2015), Personal history of other diseases of the digestive system (02/05/2016), Personal history of other diseases of the respiratory system (12/23/2014), Personal history of other diseases of the respiratory system (01/28/2016), Personal history of other infectious and parasitic diseases (12/02/2015), Personal history of other infectious and parasitic diseases, Personal history of other specified conditions (02/04/2015), Personal history of other specified conditions (08/16/2016), Personal history of other specified conditions (01/26/2015), Personal history of pneumonia (recurrent) (01/29/2016), Personal history of urinary (tract) infections (02/19/2013),  Prostatitis (03/21/2023), Pruritus, unspecified (02/10/2015), Strain of muscle, fascia and tendon of the posterior muscle group at thigh level, right thigh, initial encounter (05/10/2016), and Unspecified symptoms and signs involving the genitourinary system (12/20/2016).    Surgical History  He has a past surgical history that includes Varicose vein surgery (08/22/2013); Cardiac pacemaker placement (08/22/2013); Other surgical history (06/15/2015); Ventral hernia repair (06/15/2015); and Other surgical history (09/16/2019).     Social History  He reports that he has never smoked. He has never been exposed to tobacco smoke. He has never used smokeless tobacco. He reports that he does not currently use alcohol. He reports that he does not use drugs.    Family History  No family history on file.     Allergies  Keflex [cephalexin] and Bupropion    Review of Systems   Constitutional:  Positive for fatigue.   HENT: Negative.     Eyes: Negative.    Respiratory: Negative.     Cardiovascular: Negative.    Gastrointestinal:  Positive for diarrhea and nausea.   Endocrine: Negative.    Genitourinary: Negative.    Musculoskeletal:  Positive for arthralgias.   Skin: Negative.    Allergic/Immunologic: Negative.    Neurological: Negative.    Hematological: Negative.    Psychiatric/Behavioral: Negative.          Physical Exam  Constitutional:       Appearance: Normal appearance. He is obese.   HENT:      Head: Normocephalic and atraumatic.      Nose: Nose normal.      Mouth/Throat:      Mouth: Mucous membranes are moist.      Pharynx: Oropharynx is clear.   Eyes:      Extraocular Movements: Extraocular movements intact.      Conjunctiva/sclera: Conjunctivae normal.      Pupils: Pupils are equal, round, and reactive to light.      Comments: Right lid ptosis   Cardiovascular:      Rate and Rhythm: Normal rate and regular rhythm.      Pulses: Normal pulses.      Heart sounds: Normal heart sounds.   Pulmonary:      Effort: Pulmonary  effort is normal.      Breath sounds: Normal breath sounds.   Abdominal:      General: Abdomen is flat. Bowel sounds are normal.      Palpations: Abdomen is soft.      Comments: Softly distended   Musculoskeletal:         General: Normal range of motion.      Right lower leg: 3+ Edema present.      Left lower leg: 3+ Edema present.   Skin:     General: Skin is warm and dry.      Capillary Refill: Capillary refill takes less than 2 seconds.      Comments: Chronic venous changes BLE   Neurological:      General: No focal deficit present.      Mental Status: He is alert.   Psychiatric:         Mood and Affect: Mood normal.         Behavior: Behavior normal.         Thought Content: Thought content normal.         Judgment: Judgment normal.          Last Recorded Vitals  /62   Pulse 83   Temp 36.8 °C (98.2 °F)   Resp 16   Wt 128 kg (282 lb 6.6 oz)   SpO2 95%     No current facility-administered medications on file prior to encounter.     Current Outpatient Medications on File Prior to Encounter   Medication Sig Dispense Refill    allopurinol (Zyloprim) 100 mg tablet Take 1 tablet (100 mg) by mouth once daily. 90 tablet 3    colchicine 0.6 mg tablet Take 1 tablet (0.6 mg) by mouth 2 times a day as needed for muscle/joint pain (Gout flare).      finasteride (Proscar) 5 mg tablet Take 1 tablet (5 mg) by mouth once daily.      fluticasone (Flonase) 50 mcg/actuation nasal spray Administer 1 spray into each nostril once daily.      furosemide (Lasix) 40 mg tablet Take 1 tablet (40 mg) by mouth once daily. 90 tablet 3    insulin degludec (Tresiba FlexTouch U-100) 100 unit/mL (3 mL) injection Inject 10 Units under the skin once daily at bedtime. 10 mL 2    losartan (Cozaar) 100 mg tablet TAKE 1 TABLET BY MOUTH EVERY DAY (Patient taking differently: Take 1 tablet (100 mg) by mouth once daily.) 90 tablet 3    metoprolol succinate XL (Toprol-XL) 100 mg 24 hr tablet TAKE 1 TABLET BY MOUTH EVERY DAY (Patient taking  differently: Take 1 tablet (100 mg) by mouth once daily.) 90 tablet 3    naltrexone (Depade) 50 mg tablet Take 1 tablet (50 mg) by mouth once daily. (Patient taking differently: Take 1 tablet (50 mg) by mouth once daily. Pt has not started this medication yet) 30 tablet 11    rosuvastatin (Crestor) 10 mg tablet TAKE 1 TABLET BY MOUTH EVERYDAY AT BEDTIME (Patient taking differently: Take 1 tablet (10 mg) by mouth once daily.) 90 tablet 3    tamsulosin (Flomax) 0.4 mg 24 hr capsule Take 2 capsules (0.8 mg) by mouth once daily at bedtime.        Results for orders placed or performed during the hospital encounter of 10/02/23 (from the past 24 hour(s))   B-type natriuretic peptide   Result Value Ref Range    BNP 97 0 - 99 pg/mL   Sars-CoV-2 PCR, Symptomatic   Result Value Ref Range    Coronavirus 2019, PCR Not Detected Not Detected   Influenza A, and B PCR   Result Value Ref Range    Flu A Result Not Detected Not Detected    Flu B Result Not Detected Not Detected   Blood Gas Venous Full Panel Unsolicited   Result Value Ref Range    POCT pH, Venous 7.47 (H) 7.33 - 7.43 pH    POCT pCO2, Venous 32 (L) 41 - 51 mm Hg    POCT pO2, Venous 26 (L) 35 - 45 mm Hg    POCT SO2, Venous 39 (L) 45 - 75 %    POCT Oxy Hemoglobin, Venous 38.0 (L) 45.0 - 75.0 %    POCT Hematocrit Calculated, Venous 33.0 (L) 41.0 - 52.0 %    POCT Sodium, Venous 121 (L) 136 - 145 mmol/L    POCT Potassium, Venous 3.2 (L) 3.5 - 5.3 mmol/L    POCT Chloride, Venous 91 (L) 98 - 107 mmol/L    POCT Ionized Calicum, Venous 1.03 (L) 1.10 - 1.33 mmol/L    POCT Glucose, Venous 158 (H) 74 - 99 mg/dL    POCT Lactate, Venous 1.6 0.4 - 2.0 mmol/L    POCT Base Excess, Venous 0.1 -2.0 - 3.0 mmol/L    POCT HCO3 Calculated, Venous 23.3 22.0 - 26.0 mmol/L    POCT Hemoglobin, Venous 11.1 (L) 13.5 - 17.5 g/dL    POCT Anion Gap, Venous 10.0 10.0 - 25.0 mmol/L    Patient Temperature 37.0 degrees Celsius   CBC and Auto Differential   Result Value Ref Range    WBC 11.9 (H) 4.4 - 11.3  x10*3/uL    nRBC 0.0 0.0 - 0.0 /100 WBCs    RBC 3.82 (L) 4.50 - 5.90 x10*6/uL    Hemoglobin 11.1 (L) 13.5 - 17.5 g/dL    Hematocrit 33.4 (L) 41.0 - 52.0 %    MCV 87 80 - 100 fL    MCH 29.1 26.0 - 34.0 pg    MCHC 33.2 32.0 - 36.0 g/dL    RDW 13.8 11.5 - 14.5 %    Platelets 169 150 - 450 x10*3/uL    MPV 11.1 7.5 - 11.5 fL    Neutrophils % 79.1 40.0 - 80.0 %    Immature Granulocytes %, Automated 3.7 (H) 0.0 - 0.9 %    Lymphocytes % 5.3 13.0 - 44.0 %    Monocytes % 11.7 2.0 - 10.0 %    Eosinophils % 0.1 0.0 - 6.0 %    Basophils % 0.1 0.0 - 2.0 %    Neutrophils Absolute 9.44 (H) 1.60 - 5.50 x10*3/uL    Immature Granulocytes Absolute, Automated 0.44 0.00 - 0.50 x10*3/uL    Lymphocytes Absolute 0.63 (L) 0.80 - 3.00 x10*3/uL    Monocytes Absolute 1.40 (H) 0.05 - 0.80 x10*3/uL    Eosinophils Absolute 0.01 0.00 - 0.40 x10*3/uL    Basophils Absolute 0.01 0.00 - 0.10 x10*3/uL   Comprehensive metabolic panel   Result Value Ref Range    Glucose 144 (H) 74 - 99 mg/dL    Sodium 124 (L) 136 - 145 mmol/L    Potassium 3.5 3.5 - 5.3 mmol/L    Chloride 88 (L) 98 - 107 mmol/L    Bicarbonate 23 21 - 32 mmol/L    Anion Gap 17 10 - 20 mmol/L    Urea Nitrogen 68 (H) 6 - 23 mg/dL    Creatinine 2.25 (H) 0.50 - 1.30 mg/dL    eGFR 30 (L) >60 mL/min/1.73m*2    Calcium 7.7 (L) 8.6 - 10.3 mg/dL    Albumin 2.7 (L) 3.4 - 5.0 g/dL    Alkaline Phosphatase 156 (H) 33 - 136 U/L    Total Protein 6.7 6.4 - 8.2 g/dL    AST 43 (H) 9 - 39 U/L    Bilirubin, Total 1.0 0.0 - 1.2 mg/dL    ALT 42 10 - 52 U/L   Magnesium   Result Value Ref Range    Magnesium 2.01 1.60 - 2.40 mg/dL   Urinalysis with Reflex Microscopic and Culture   Result Value Ref Range    Color, Urine Brit (N) Straw, Yellow    Appearance, Urine Hazy (N) Clear    Specific Gravity, Urine 1.014 1.005 - 1.035    pH, Urine 5.0 5.0, 5.5, 6.0, 6.5, 7.0, 7.5, 8.0    Protein, Urine 30 (1+) (N) NEGATIVE mg/dL    Glucose, Urine NEGATIVE NEGATIVE mg/dL    Blood, Urine LARGE (3+) (A) NEGATIVE    Ketones,  Urine NEGATIVE NEGATIVE mg/dL    Bilirubin, Urine NEGATIVE NEGATIVE    Urobilinogen, Urine 2.0 (N) <2.0 mg/dL    Nitrite, Urine NEGATIVE NEGATIVE    Leukocyte Esterase, Urine SMALL (1+) (A) NEGATIVE   Urinalysis Microscopic Only   Result Value Ref Range    WBC, Urine >50 (A) 1-5, NONE /HPF    WBC Clumps, Urine MANY Reference range not established. /HPF    RBC, Urine >20 (A) NONE, 1-2, 3-5 /HPF    Bacteria, Urine 1+ (A) NONE SEEN /HPF    Yeast Hyphae, Urine PRESENT (A) NONE /HPF         Relevant Results             Assessment/Plan   Chester Verduzco is a 73 y.o. male with history of arthritis, obesity, lymphedema, ventral hernia repair, hypertension, hyperlipidemia and BPH, who presents with a 3-week history of diarrhea.     #Diarrhea  #Generalized weakness  - PCR, pathogens and C. Diff  -  ml/hr  -Isolation  -ID consult    #NOHELIA  -Likely pre renal  -Will treat with fluids and follow up with repeat RFP    #DVT prophylaxis   -Heparin subq       JOSE Pritchard-CNP

## 2023-10-02 NOTE — PROGRESS NOTES
Pharmacy Medication History Review    Chester Verduzco is a 73 y.o. male admitted for No Principal Problem: There is no principal problem currently on the Problem List. Please update the Problem List and refresh.. Pharmacy reviewed the patient's [id number] medications and allergies for accuracy.    The list below reflectives the updated PTA list. Please review each medication in order reconciliation for additional clarification and justification.  (Not in a hospital admission)       The list below reflectives the updated allergy list. Please review each documented allergy for additional clarification and justification.  Allergies  Reviewed by Erna Muñoz CPhT on 10/2/2023        Severity Reactions Comments    Keflex [cephalexin] Medium Headache, GI Upset, Nausea/vomiting     Bupropion Not Specified Other             Below are additional concerns with the patient's PTA list.      Erna Muñoz CPhT

## 2023-10-02 NOTE — ED NOTES
HPI:  Patient is a 73-year-old male with history of arthritis, obesity, Chronic bilateral lower extremity lymphedema and ventral hernia status postrepair x3, gout, hypertension, hyperlipidemia, GERD, CHF, BPH who presents with diarrhea and generalized weakness.  Patient states he was diagnosed with a UTI in the outpatient setting approximately 2 weeks ago but did not finish his course of Keflex secondary to GI upset.  He has had nonbloody diarrhea for the past 2 weeks and states he has had bowel incontinence secondary to inability to make it to the bathroom due to generalized weakness.  She denies recent falls.  Patient additionally reports worsening shortness of breath and orthopnea, having to sleep in the recliner.  He denies chest pain or cough.  No abdominal pain, however he does report increased urinary urgency/frequency.  No back pain.  No nausea or vomiting.    ROS: A 10-system ROS was performed and was negative except as documented in the HPI.    PMH/PSH: Reviewed in EMR. As above in HPI.  SH: Denies EtOH, tobacco or illicit drug use.  Allergies:   Allergies   Allergen Reactions    Keflex [Cephalexin] Headache, GI Upset and Nausea/vomiting    Bupropion Other      Medications: See prescription writer for full medication list.    General: no acute distress, appropriate conversation  HEENT:  No rhinorrhea. MMM.  Cardiac: regular rate rhythm, no murmurs  Pulm: mildly increased respiratory effort on room air, equal chest expansion, diminished but clear bilaterally, no wheeze or crackles  GI: soft, nontender, moderately distended, +BS  Extremities:  moves all extremities freely, chronic non-pitting BLE edema noted with pretibial venous stasis changes  Skin: warm, well-perfused, as above otherwise no lesions noted on exposed skin.  Neuro:  AOx3, moves all 4 extremities freely and independently     Assessment/Plan/MDM  Patient is a 73-year-old male with history of arthritis, obesity, Chronic bilateral lower  extremity lymphedema and ventral hernia status postrepair x3, gout, hypertension, hyperlipidemia, GERD, CHF, BPH who presents with diarrhea and generalized weakness.  Patient with evidence of UTI on UA. Magnesium within normal limits, patient has a significant NOHELIA and elevated BUN from baseline, most likely prerenal in the setting of GI losses.  Patient additionally with elevated alk phos, however he does not have right upper quadrant or epigastric tenderness to palpation on examination.  Leukocytosis improved from prior, hemoglobin stable.  COVID/influenza negative.  BNP within normal limits.  Stool studies pending.  Given NOHELIA, UTI 1 g ceftriaxone ordered.  Urine culture pending.  Patient accepted for admission to medicine additionally for PT/OT and possible SNF placement as he states he has been unable to care for himself at home independently.  CT without evidence of colitis Or bowel obstruction.    Clinical Impression: diarrhea, UTI, generalized weakness  Dispo: admit to medicine    Pt seen and discussed with attending physician, Dr. Sea Gray MD  PGY3, Emergency Medicine    Disclaimer: This note was dictated by speech recognition. An attempt at proof reading was made to minimize errors. Errors in transcription may be present.  Please call if questions.       Luciana Gray MD  Resident  10/02/23 [id number]

## 2023-10-02 NOTE — TELEPHONE ENCOUNTER
Spoke with patient, c/o not eating/drinking, urine dark brown, low grade fever. Wanting PT and OT therapy due to not being able to walk. Has had diarrhea past 2 weeks. Patient aware to go to ER.

## 2023-10-02 NOTE — TELEPHONE ENCOUNTER
Chester Verduzco,  1950, called and left a voice message to provide his weekend update with Dr. De Leon's nurse. He can be reached at [phone]. Thanks

## 2023-10-02 NOTE — CARE PLAN
The patient's goals for the shift include      The clinical goals for the shift include plan of care    Over the shift, the patient did not make progress toward the following goals. Barriers to progression include ***. Recommendations to address these barriers include ***.

## 2023-10-03 LAB
ANION GAP SERPL CALC-SCNC: 15 MMOL/L (ref 10–20)
BUN SERPL-MCNC: 72 MG/DL (ref 6–23)
CALCIUM SERPL-MCNC: 7.1 MG/DL (ref 8.6–10.3)
CHLORIDE SERPL-SCNC: 90 MMOL/L (ref 98–107)
CO2 SERPL-SCNC: 21 MMOL/L (ref 21–32)
CREAT SERPL-MCNC: 2.6 MG/DL (ref 0.5–1.3)
ERYTHROCYTE [DISTWIDTH] IN BLOOD BY AUTOMATED COUNT: 13.8 % (ref 11.5–14.5)
GFR SERPL CREATININE-BSD FRML MDRD: 25 ML/MIN/1.73M*2
GLUCOSE BLD MANUAL STRIP-MCNC: 170 MG/DL (ref 74–99)
GLUCOSE SERPL-MCNC: 160 MG/DL (ref 74–99)
HCT VFR BLD AUTO: 29.1 % (ref 41–52)
HGB BLD-MCNC: 9.7 G/DL (ref 13.5–17.5)
MCH RBC QN AUTO: 28.6 PG (ref 26–34)
MCHC RBC AUTO-ENTMCNC: 33.3 G/DL (ref 32–36)
MCV RBC AUTO: 86 FL (ref 80–100)
NRBC BLD-RTO: 0 /100 WBCS (ref 0–0)
PLATELET # BLD AUTO: 144 X10*3/UL (ref 150–450)
PMV BLD AUTO: 10.5 FL (ref 7.5–11.5)
POTASSIUM SERPL-SCNC: 3.4 MMOL/L (ref 3.5–5.3)
RBC # BLD AUTO: 3.39 X10*6/UL (ref 4.5–5.9)
SODIUM SERPL-SCNC: 123 MMOL/L (ref 136–145)
WBC # BLD AUTO: 9.7 X10*3/UL (ref 4.4–11.3)

## 2023-10-03 PROCEDURE — 82947 ASSAY GLUCOSE BLOOD QUANT: CPT

## 2023-10-03 PROCEDURE — 2500000004 HC RX 250 GENERAL PHARMACY W/ HCPCS (ALT 636 FOR OP/ED): Performed by: NURSE PRACTITIONER

## 2023-10-03 PROCEDURE — 85027 COMPLETE CBC AUTOMATED: CPT | Performed by: NURSE PRACTITIONER

## 2023-10-03 PROCEDURE — 97165 OT EVAL LOW COMPLEX 30 MIN: CPT | Mod: GO

## 2023-10-03 PROCEDURE — 2500000001 HC RX 250 WO HCPCS SELF ADMINISTERED DRUGS (ALT 637 FOR MEDICARE OP): Performed by: INTERNAL MEDICINE

## 2023-10-03 PROCEDURE — 80051 ELECTROLYTE PANEL: CPT | Performed by: NURSE PRACTITIONER

## 2023-10-03 PROCEDURE — 97530 THERAPEUTIC ACTIVITIES: CPT | Mod: GP

## 2023-10-03 PROCEDURE — 99232 SBSQ HOSP IP/OBS MODERATE 35: CPT | Performed by: NURSE PRACTITIONER

## 2023-10-03 PROCEDURE — 2500000001 HC RX 250 WO HCPCS SELF ADMINISTERED DRUGS (ALT 637 FOR MEDICARE OP): Performed by: NURSE PRACTITIONER

## 2023-10-03 PROCEDURE — 36415 COLL VENOUS BLD VENIPUNCTURE: CPT | Performed by: NURSE PRACTITIONER

## 2023-10-03 PROCEDURE — 97161 PT EVAL LOW COMPLEX 20 MIN: CPT | Mod: GP

## 2023-10-03 PROCEDURE — 97530 THERAPEUTIC ACTIVITIES: CPT | Mod: GO

## 2023-10-03 PROCEDURE — 2500000002 HC RX 250 W HCPCS SELF ADMINISTERED DRUGS (ALT 637 FOR MEDICARE OP, ALT 636 FOR OP/ED): Performed by: NURSE PRACTITIONER

## 2023-10-03 PROCEDURE — 1100000001 HC PRIVATE ROOM DAILY

## 2023-10-03 RX ORDER — METOPROLOL SUCCINATE 50 MG/1
100 TABLET, EXTENDED RELEASE ORAL DAILY
Status: DISCONTINUED | OUTPATIENT
Start: 2023-10-03 | End: 2023-10-07 | Stop reason: HOSPADM

## 2023-10-03 RX ORDER — POTASSIUM CHLORIDE 20 MEQ/1
20 TABLET, EXTENDED RELEASE ORAL DAILY
Status: DISCONTINUED | OUTPATIENT
Start: 2023-10-03 | End: 2023-10-07 | Stop reason: HOSPADM

## 2023-10-03 RX ORDER — DEXTROSE MONOHYDRATE 100 MG/ML
0.3 INJECTION, SOLUTION INTRAVENOUS ONCE AS NEEDED
Status: DISCONTINUED | OUTPATIENT
Start: 2023-10-03 | End: 2023-10-07 | Stop reason: HOSPADM

## 2023-10-03 RX ORDER — LOSARTAN POTASSIUM 50 MG/1
100 TABLET ORAL DAILY
Status: DISCONTINUED | OUTPATIENT
Start: 2023-10-03 | End: 2023-10-07 | Stop reason: HOSPADM

## 2023-10-03 RX ORDER — TAMSULOSIN HYDROCHLORIDE 0.4 MG/1
0.8 CAPSULE ORAL NIGHTLY
Status: DISCONTINUED | OUTPATIENT
Start: 2023-10-03 | End: 2023-10-07 | Stop reason: HOSPADM

## 2023-10-03 RX ORDER — ACETAMINOPHEN 325 MG/1
650 TABLET ORAL EVERY 6 HOURS PRN
Status: DISCONTINUED | OUTPATIENT
Start: 2023-10-03 | End: 2023-10-07 | Stop reason: HOSPADM

## 2023-10-03 RX ORDER — INSULIN DEGLUDEC 100 U/ML
10 INJECTION, SOLUTION SUBCUTANEOUS NIGHTLY
Status: DISCONTINUED | OUTPATIENT
Start: 2023-10-03 | End: 2023-10-07 | Stop reason: HOSPADM

## 2023-10-03 RX ORDER — FINASTERIDE 5 MG/1
5 TABLET, FILM COATED ORAL DAILY
Status: DISCONTINUED | OUTPATIENT
Start: 2023-10-04 | End: 2023-10-07 | Stop reason: HOSPADM

## 2023-10-03 RX ORDER — DEXTROSE 50 % IN WATER (D50W) INTRAVENOUS SYRINGE
25
Status: DISCONTINUED | OUTPATIENT
Start: 2023-10-03 | End: 2023-10-07 | Stop reason: HOSPADM

## 2023-10-03 RX ORDER — CYCLOBENZAPRINE HCL 5 MG
5 TABLET ORAL 3 TIMES DAILY
Status: DISCONTINUED | OUTPATIENT
Start: 2023-10-03 | End: 2023-10-07 | Stop reason: HOSPADM

## 2023-10-03 RX ORDER — ROSUVASTATIN CALCIUM 10 MG/1
10 TABLET, COATED ORAL NIGHTLY
Status: DISCONTINUED | OUTPATIENT
Start: 2023-10-03 | End: 2023-10-07 | Stop reason: HOSPADM

## 2023-10-03 RX ADMIN — TAMSULOSIN HYDROCHLORIDE 0.8 MG: 0.4 CAPSULE ORAL at 20:11

## 2023-10-03 RX ADMIN — MELATONIN TAB 3 MG 3 MG: 3 TAB at 20:02

## 2023-10-03 RX ADMIN — ROSUVASTATIN 10 MG: 10 TABLET, FILM COATED ORAL at 20:10

## 2023-10-03 RX ADMIN — CYCLOBENZAPRINE HYDROCHLORIDE 5 MG: 5 TABLET, FILM COATED ORAL at 20:02

## 2023-10-03 RX ADMIN — INSULIN DEGLUDEC INJECTION 10 UNITS: 100 INJECTION, SOLUTION SUBCUTANEOUS at 20:11

## 2023-10-03 RX ADMIN — CYCLOBENZAPRINE HYDROCHLORIDE 5 MG: 5 TABLET, FILM COATED ORAL at 16:30

## 2023-10-03 RX ADMIN — ACETAMINOPHEN 650 MG: 325 TABLET, FILM COATED ORAL at 23:55

## 2023-10-03 RX ADMIN — SODIUM CHLORIDE 100 ML/HR: 9 INJECTION, SOLUTION INTRAVENOUS at 16:43

## 2023-10-03 RX ADMIN — POTASSIUM CHLORIDE 20 MEQ: 1500 TABLET, EXTENDED RELEASE ORAL at 17:04

## 2023-10-03 RX ADMIN — SODIUM CHLORIDE 100 ML/HR: 9 INJECTION, SOLUTION INTRAVENOUS at 07:17

## 2023-10-03 RX ADMIN — ACETAMINOPHEN 650 MG: 325 TABLET, FILM COATED ORAL at 12:02

## 2023-10-03 RX ADMIN — PANTOPRAZOLE SODIUM 40 MG: 40 TABLET, DELAYED RELEASE ORAL at 08:25

## 2023-10-03 SDOH — ECONOMIC STABILITY: TRANSPORTATION INSECURITY
IN THE PAST 12 MONTHS, HAS LACK OF TRANSPORTATION KEPT YOU FROM MEETINGS, WORK, OR FROM GETTING THINGS NEEDED FOR DAILY LIVING?: NO

## 2023-10-03 SDOH — ECONOMIC STABILITY: INCOME INSECURITY: IN THE PAST 12 MONTHS, HAS THE ELECTRIC, GAS, OIL, OR WATER COMPANY THREATENED TO SHUT OFF SERVICE IN YOUR HOME?: NO

## 2023-10-03 SDOH — ECONOMIC STABILITY: INCOME INSECURITY: IN THE LAST 12 MONTHS, WAS THERE A TIME WHEN YOU WERE NOT ABLE TO PAY THE MORTGAGE OR RENT ON TIME?: NO

## 2023-10-03 SDOH — ECONOMIC STABILITY: FOOD INSECURITY: WITHIN THE PAST 12 MONTHS, YOU WORRIED THAT YOUR FOOD WOULD RUN OUT BEFORE YOU GOT MONEY TO BUY MORE.: NEVER TRUE

## 2023-10-03 SDOH — ECONOMIC STABILITY: FOOD INSECURITY: WITHIN THE PAST 12 MONTHS, THE FOOD YOU BOUGHT JUST DIDN'T LAST AND YOU DIDN'T HAVE MONEY TO GET MORE.: NEVER TRUE

## 2023-10-03 SDOH — ECONOMIC STABILITY: INCOME INSECURITY: HOW HARD IS IT FOR YOU TO PAY FOR THE VERY BASICS LIKE FOOD, HOUSING, MEDICAL CARE, AND HEATING?: NOT HARD AT ALL

## 2023-10-03 ASSESSMENT — COGNITIVE AND FUNCTIONAL STATUS - GENERAL
STANDING UP FROM CHAIR USING ARMS: A LOT
WALKING IN HOSPITAL ROOM: TOTAL
TOILETING: A LOT
DRESSING REGULAR UPPER BODY CLOTHING: A LOT
DAILY ACTIVITIY SCORE: 14
DAILY ACTIVITIY SCORE: 14
DRESSING REGULAR UPPER BODY CLOTHING: A LOT
EATING MEALS: A LITTLE
HELP NEEDED FOR BATHING: A LOT
DRESSING REGULAR LOWER BODY CLOTHING: A LOT
MOVING TO AND FROM BED TO CHAIR: A LOT
MOVING FROM LYING ON BACK TO SITTING ON SIDE OF FLAT BED WITH BEDRAILS: A LOT
TOILETING: A LOT
MOVING FROM LYING ON BACK TO SITTING ON SIDE OF FLAT BED WITH BEDRAILS: A LOT
PERSONAL GROOMING: A LITTLE
CLIMB 3 TO 5 STEPS WITH RAILING: TOTAL
WALKING IN HOSPITAL ROOM: TOTAL
STANDING UP FROM CHAIR USING ARMS: A LOT
TURNING FROM BACK TO SIDE WHILE IN FLAT BAD: A LOT
PERSONAL GROOMING: A LITTLE
TURNING FROM BACK TO SIDE WHILE IN FLAT BAD: A LOT
CLIMB 3 TO 5 STEPS WITH RAILING: TOTAL
MOVING TO AND FROM BED TO CHAIR: A LOT
EATING MEALS: A LITTLE
MOBILITY SCORE: 10
DRESSING REGULAR LOWER BODY CLOTHING: A LOT
MOBILITY SCORE: 10
HELP NEEDED FOR BATHING: A LOT

## 2023-10-03 ASSESSMENT — PAIN SCALES - GENERAL
PAINLEVEL_OUTOF10: 10 - WORST POSSIBLE PAIN
PAINLEVEL_OUTOF10: 9
PAINLEVEL_OUTOF10: 3
PAINLEVEL_OUTOF10: 9
PAINLEVEL_OUTOF10: 2

## 2023-10-03 ASSESSMENT — PAIN - FUNCTIONAL ASSESSMENT
PAIN_FUNCTIONAL_ASSESSMENT: 0-10
PAIN_FUNCTIONAL_ASSESSMENT: 0-10
PAIN_FUNCTIONAL_ASSESSMENT: WONG-BAKER FACES
PAIN_FUNCTIONAL_ASSESSMENT: 0-10

## 2023-10-03 ASSESSMENT — PAIN SCALES - WONG BAKER: WONGBAKER_NUMERICALRESPONSE: HURTS EVEN MORE

## 2023-10-03 NOTE — CONSULTS
Consults  Referred by Alonzo Morris MD: Jeffry De Leon MD    Reason For Consult  Diarrhea    History Of Present Illness  Chester Verduzco is a 73 y.o. male with a hx of obesity, hx of DM, hx of HTN, hx of BPH, hx of lymphedema with stasis, he was treated with oral antibiotics for UTI, he did not complete the antibiotics secondary to GI side effects, he was admitted for diarrhea for 2 weeks, no fever, no abdominal pain, no emesis, no bleeding, his Cr was elevated, WBC were slightly up, the ct without acute changes, the ua with pyuria     Past Medical History  He has a past medical history of Abnormal weight gain (10/27/2016), Achilles tendinitis, unspecified leg (08/16/2016), Acute upper respiratory infection, unspecified, Allergic contact dermatitis due to plants, except food (08/22/2013), Cellulitis of right lower limb (07/30/2021), Cough, unspecified (03/21/2016), Encounter for screening for human immunodeficiency virus (HIV) (06/14/2016), Hordeolum externum unspecified eye, unspecified eyelid (08/14/2019), Hyperglycemia, unspecified (12/13/2017), Incisional hernia without obstruction or gangrene (06/15/2015), Muscle spasm of calf (08/16/2016), Personal history of other diseases of the digestive system (06/30/2015), Personal history of other diseases of the digestive system (02/05/2016), Personal history of other diseases of the respiratory system (12/23/2014), Personal history of other diseases of the respiratory system (01/28/2016), Personal history of other infectious and parasitic diseases (12/02/2015), Personal history of other infectious and parasitic diseases, Personal history of other specified conditions (02/04/2015), Personal history of other specified conditions (08/16/2016), Personal history of other specified conditions (01/26/2015), Personal history of pneumonia (recurrent) (01/29/2016), Personal history of urinary (tract) infections (02/19/2013), Prostatitis (03/21/2023),  Pruritus, unspecified (02/10/2015), Strain of muscle, fascia and tendon of the posterior muscle group at thigh level, right thigh, initial encounter (05/10/2016), and Unspecified symptoms and signs involving the genitourinary system (12/20/2016).    Surgical History  He has a past surgical history that includes Varicose vein surgery (08/22/2013); Cardiac pacemaker placement (08/22/2013); Other surgical history (06/15/2015); Ventral hernia repair (06/15/2015); and Other surgical history (09/16/2019).     Social History     Occupational History    Not on file   Tobacco Use    Smoking status: Never     Passive exposure: Never    Smokeless tobacco: Never   Vaping Use    Vaping Use: Never used   Substance and Sexual Activity    Alcohol use: Not Currently    Drug use: Never    Sexual activity: Not Currently     Travel History   Travel since 09/02/23    No documented travel since 09/02/23         Family History  No family history on file.  Allergies  Keflex [cephalexin] and Bupropion     Immunization History   Administered Date(s) Administered    Flu vaccine, quadrivalent, high-dose, preservative free, age 65y+ (FLUZONE) 11/28/2020    Influenza, High Dose Seasonal, Preservative Free 10/01/2021    Influenza, seasonal, injectable 09/08/2023    Moderna COVID-19 vaccine, bivalent, blue cap/gray label *Check age/dose* 05/02/2023    Moderna SARS-CoV-2 Vaccination 01/04/2021, 02/01/2021, 10/23/2021, 08/13/2022    Novel influenza-H1N1-09, preservative-free 10/15/2009    Pneumococcal conjugate vaccine, 13-valent (PREVNAR 13) 10/27/2016    Pneumococcal conjugate vaccine, 20-valent (PREVNAR 20) 08/10/2022    Pneumococcal polysaccharide vaccine, 23-valent, age 2 years and older (PNEUMOVAX 23) 06/19/2018    Td vaccine, age 7 years and older (TDVAX) 09/16/2013    Zoster vaccine, recombinant, adult (SHINGRIX) 11/04/2022    Zoster, live 09/08/2023     Medications  Home medications:  Medications Prior to Admission   Medication Sig Dispense  Refill Last Dose    allopurinol (Zyloprim) 100 mg tablet Take 1 tablet (100 mg) by mouth once daily. 90 tablet 3 10/1/2023    colchicine 0.6 mg tablet Take 1 tablet (0.6 mg) by mouth 2 times a day as needed for muscle/joint pain (Gout flare).       finasteride (Proscar) 5 mg tablet Take 1 tablet (5 mg) by mouth once daily.   10/1/2023    fluticasone (Flonase) 50 mcg/actuation nasal spray Administer 1 spray into each nostril once daily.       furosemide (Lasix) 40 mg tablet Take 1 tablet (40 mg) by mouth once daily. 90 tablet 3 10/1/2023    insulin degludec (Tresiba FlexTouch U-100) 100 unit/mL (3 mL) injection Inject 10 Units under the skin once daily at bedtime. 10 mL 2 10/1/2023 at pm    losartan (Cozaar) 100 mg tablet TAKE 1 TABLET BY MOUTH EVERY DAY (Patient taking differently: Take 1 tablet (100 mg) by mouth once daily.) 90 tablet 3 10/1/2023    metoprolol succinate XL (Toprol-XL) 100 mg 24 hr tablet TAKE 1 TABLET BY MOUTH EVERY DAY (Patient taking differently: Take 1 tablet (100 mg) by mouth once daily.) 90 tablet 3 10/1/2023    naltrexone (Depade) 50 mg tablet Take 1 tablet (50 mg) by mouth once daily. (Patient taking differently: Take 1 tablet (50 mg) by mouth once daily. Pt has not started this medication yet) 30 tablet 11     rosuvastatin (Crestor) 10 mg tablet TAKE 1 TABLET BY MOUTH EVERYDAY AT BEDTIME (Patient taking differently: Take 1 tablet (10 mg) by mouth once daily.) 90 tablet 3 10/1/2023    tamsulosin (Flomax) 0.4 mg 24 hr capsule Take 2 capsules (0.8 mg) by mouth once daily at bedtime.   10/1/2023     Current medications:  Scheduled medications  melatonin, 3 mg, oral, Daily  [START ON 10/3/2023] pantoprazole, 40 mg, oral, Daily before breakfast   Or  [START ON 10/3/2023] pantoprazole, 40 mg, intravenous, Daily before breakfast      Continuous medications  sodium chloride 0.9%, 100 mL/hr, Last Rate: 100 mL/hr (10/02/23 6596)      PRN medications  PRN medications: acetaminophen **OR** acetaminophen  "**OR** acetaminophen, ondansetron ODT **OR** ondansetron    Review of Systems   All other systems reviewed and are negative.       Objective  Range of Vitals (last 24 hours)  Heart Rate:  [77-92]   Temp:  [36.8 °C (98.2 °F)]   Resp:  [15-25]   BP: (147-158)/(62-71)   Height:  [185.4 cm (6' 1\")]   Weight:  [128 kg (282 lb 6.6 oz)]   SpO2:  [90 %-100 %]   Daily Weight  10/02/23 : 128 kg (282 lb 6.6 oz)    Body mass index is 37.26 kg/m².     Physical Exam  Constitutional:       Appearance: Normal appearance.   HENT:      Head: Normocephalic and atraumatic.      Mouth/Throat:      Mouth: Mucous membranes are moist.      Pharynx: Oropharynx is clear.   Eyes:      Pupils: Pupils are equal, round, and reactive to light.   Cardiovascular:      Rate and Rhythm: Normal rate and regular rhythm.      Heart sounds: Normal heart sounds.   Pulmonary:      Effort: Pulmonary effort is normal.      Breath sounds: Normal breath sounds.   Abdominal:      General: Abdomen is flat. Bowel sounds are normal.      Palpations: Abdomen is soft.   Musculoskeletal:      Cervical back: Normal range of motion.      Comments: Lymphedema with stasis   Neurological:      Mental Status: He is alert.            Labs  Results from last 72 hours   Lab Units 10/02/23  1336   WBC AUTO x10*3/uL 11.9*   HEMOGLOBIN g/dL 11.1*   HEMATOCRIT % 33.4*   PLATELETS AUTO x10*3/uL 169   NEUTROS PCT AUTO % 79.1   LYMPHS PCT AUTO % 5.3   MONOS PCT AUTO % 11.7   EOS PCT AUTO % 0.1     Results from last 72 hours   Lab Units 10/02/23  1336   SODIUM mmol/L 124*   POTASSIUM mmol/L 3.5   CHLORIDE mmol/L 88*   CO2 mmol/L 23   BUN mg/dL 68*   CREATININE mg/dL 2.25*   GLUCOSE mg/dL 144*   CALCIUM mg/dL 7.7*   ANION GAP mmol/L 17   EGFR mL/min/1.73m*2 30*     Results from last 72 hours   Lab Units 10/02/23  1336   ALK PHOS U/L 156*   BILIRUBIN TOTAL mg/dL 1.0   PROTEIN TOTAL g/dL 6.7   ALT U/L 42   AST U/L 43*   ALBUMIN g/dL 2.7*     Estimated Creatinine Clearance: 41.6 mL/min " "(A) (by C-G formula based on SCr of 2.25 mg/dL (H)).  CRP   Date Value Ref Range Status   09/18/2023 22.55 (A) mg/dL Final     Comment:     REF VALUE  < 1.00     06/23/2021 0.50 mg/dL Final     Comment:     REF VALUE  < 1.00     06/16/2021 15.58 (A) mg/dL Final     Comment:     REF VALUE  < 1.00       Sedimentation Rate   Date Value Ref Range Status   09/18/2023 10 0 - 20 mm/h Final     No results found for: \"HIV1X2\", \"HIVCONF\", \"NVXZFQ5LX\"  No results found for: \"HEPCABINIT\", \"HEPCAB\", \"HCVPCRQUANT\"  Microbiology  No results found for the last 14 days.     Assessment/Plan     Diarrhea  Pyuria  Lymphedema / stasis    Recommendations :    Avoid antibiotics  Stool studies  Hydration  Follow the WBC and renal function    I spent  minutes in the professional and overall care of this patient.      Payton Raines MD  "

## 2023-10-03 NOTE — PROGRESS NOTES
Chester Verduzco is a 73 y.o. male with a medical history of arthritis, HTN, Ventral Hernia repair who presented with diarrhea and intermittent nausea.     Subjective   Seen and examined in his room this afternoon. Awake and alert. He has not had any diarrhea today. Still very weak. He denies chest pain, breathing difficulties, abdominal pain, N/V/C, fever, or chills.       Objective     Last Recorded Vitals  /62 (BP Location: Right arm, Patient Position: Lying)   Pulse 70   Temp 36.2 °C (97.2 °F) (Temporal)   Resp 16   Wt 130 kg (287 lb 3.2 oz)   SpO2 92%   Intake/Output last 3 Shifts:    Intake/Output Summary (Last 24 hours) at 10/3/2023 1837  Last data filed at 10/3/2023 1800  Gross per 24 hour   Intake 531.67 ml   Output 300 ml   Net 231.67 ml       Admission Weight  Weight: 128 kg (282 lb 6.6 oz) (10/02/23 1208)    Daily Weight  10/03/23 : 130 kg (287 lb 3.2 oz)    Image Results  CT abdomen pelvis wo IV contrast  Narrative: Interpreted By:  Javon El,   STUDY:  CT ABDOMEN PELVIS WO IV CONTRAST; 10/2/2023 2:48 pm      INDICATION:  Signs/Symptoms:2w diarrhea, abdominal distention, r/o SBO or colitis.      COMPARISON:  None.      ACCESSION NUMBER(S):  [id number]      ORDERING CLINICIAN:  GOVIND HOPKINS      TECHNIQUE:  Contiguous axial images were obtained at 3mm slice thickness through  the abdomen and pelvis without intravenous contrast administration.  Coronal and sagittal reconstructions at 3 mm slice thickness were  performed. Intravenous contrast was not given per the referring  physician's request.      FINDINGS:  The study is limited by the lack of intravenous contrast.      LOWER CHEST:  Evaluation of the visualized lung bases demonstrates mild scarring  and/or atelectasis bilaterally. The heart is within normal limits for  size.      ABDOMEN:      LIVER:  The liver is within normal limits for appearance, without evidence of  focal masses.      BILE DUCTS:  No definite intra or  extrahepatic biliary dilatation is identified.      GALLBLADDER:  The gallbladder is nondilated. No definite calcified gallstones are  seen.      PANCREAS:  The pancreas is within normal limits for appearance, without evidence  of focal masses.      SPLEEN:  The spleen is within normal limits for size. No focal splenic mass is  seen.      ADRENAL GLANDS:  No definite adrenal nodules or masses are seen bilaterally.      KIDNEYS AND URETERS:  There is no hydronephrosis, hydroureter or renal/ ureteral calculus  identified bilaterally. A rounded hypodensity is seen from the upper  pole of the left kidney, measuring at up to 7.9 cm in diameter. This  is incompletely evaluated without contrast, but most likely  represents a cyst.      PELVIS:      BLADDER:  The urinary bladder is grossly unremarkable for CT appearance.      REPRODUCTIVE ORGANS:  The prostate is enlarged, measuring at up to 8.0 x 7.6 cm in diameter.      BOWEL:  Multiple diverticuli are seen in the proximal sigmoid colon. The  colon and small bowel are within normal limits for course, caliber  and appearance, without evidence of wall thickening or obstruction.  The appendix is decompressed. No CT evidence of acute diverticulitis  or appendicitis is seen.      VESSELS:  Scattered atherosclerotic calcifications are seen throughout the  infrarenal abdominal aorta and iliac arteries. The abdominal aorta is  within normal limits for course, caliber and appearance, without  evidence of aneurysm.      PERITONEUM/RETROPERITONEUM/LYMPH NODES:  There is no free intraperitoneal air or free fluid identified.  Enlarged lymph nodes are seen in the left external iliac chain,  measuring at up to 2.4 x 2.2 cm in diameter. No additional mesenteric  or retroperitoneal lymphadenopathy is identified.      BONE AND SOFT TISSUE:  There is no evidence of acute fracture identified. Enlarged lymph  nodes are seen in the left groin, measuring at up to 6.0 x 3.5 cm in  diameter. A  prominent lymph node is seen in the right groin,  measuring at up to 2.7 x 1.6 cm no evidence of abdominal wall mass or  hernia is identified.      Impression: 1. Colonic diverticulosis, without evidence of acute diverticulitis.  2. Enlarged lymph nodes in the left external iliac chain, and  bilateral groin.  3. Enlargement of the prostate.  4. Hypodensity from the left kidney, incompletely evaluated without  contrast, but most likely representing is cyst.      MACRO:  None      Signed by: Javon El 10/2/2023 2:58 PM  Dictation workstation:   [id number]  XR chest 1 view  Narrative: Interpreted By:  Shakeel Rogers,   STUDY:  XR CHEST 1 VIEW; 10/2/2023 1:19 pm      INDICATION:  Signs/Symptoms:SOB, Hx of CHF, r/p worsening vascular congestion or  focal infiltrate      COMPARISON:  Radiographs 12/06/2022      ACCESSION NUMBER(S):  [id number]      ORDERING CLINICIAN:  GOVIND HOPKINS      TECHNIQUE:  Single frontal view of the chest performed.      FINDINGS:  LINES AND DEVICES:  Stable right cardiac pacer with leads terminating in the right atrium  and right ventricle.      LUNGS:  Patchy retrocardiac airspace opacities and haziness in the left lung  base, suggesting small left pleural effusion with adjacent  atelectasis. No additional focal consolidation, pulmonary edema or  pneumothorax is present.      CARDIOMEDIASTINAL SILHOUETTE:  The cardiomediastinal silhouette is within normal limits.      Impression: Suspected small left pleural effusion with adjacent atelectasis.  Superimposed infection is possible in the appropriate clinical  setting. No pulmonary edema.      MACRO  None      Signed by: Shakeel Rogers 10/2/2023 1:33 PM  Dictation workstation:   [id number]      Physical Exam  Constitutional: A&O x 3; NAD; calm and cooperative  Eyes: EOM's intact  HEENT: Normocephalic, Atraumatic. Oral mucosa moist.   Neck: Supple. No JVD, lymphadenopathy.   Lungs: CTAB with fair air movement. Diminished in the bases.  Respirations even and unlabored on room air.   Heart: RRR  Abdomen: Softly distended; nontender; + BS.   MS/Extremities: JOHNS equally x 4. Peripheral pulses intact bilaterally. BLE edema with stasis/vascular changes, R>L.   Neuro: A&O x3; no focal deficits; gross motor and sensation intact.   Skin: Warm and dry.   Psych: Normal affect.      Relevant Results    Scheduled medications  cyclobenzaprine, 5 mg, oral, TID  finasteride, 5 mg, oral, Daily  insulin degludec, 10 Units, subcutaneous, Nightly  losartan, 100 mg, oral, Daily  melatonin, 3 mg, oral, Daily  metoprolol succinate XL, 100 mg, oral, Daily  pantoprazole, 40 mg, oral, Daily before breakfast   Or  pantoprazole, 40 mg, intravenous, Daily before breakfast  potassium chloride CR, 20 mEq, oral, Daily  rosuvastatin, 10 mg, oral, Nightly  tamsulosin, 0.8 mg, oral, Nightly      Continuous medications  sodium chloride 0.9%, 100 mL/hr, Last Rate: 100 mL/hr (10/03/23 1643)      PRN medications  PRN medications: acetaminophen **OR** acetaminophen **OR** acetaminophen, acetaminophen, dextrose, dextrose, glucagon, ondansetron ODT **OR** ondansetron     Results for orders placed or performed during the hospital encounter of 10/02/23 (from the past 24 hour(s))   CBC   Result Value Ref Range    WBC 9.7 4.4 - 11.3 x10*3/uL    nRBC 0.0 0.0 - 0.0 /100 WBCs    RBC 3.39 (L) 4.50 - 5.90 x10*6/uL    Hemoglobin 9.7 (L) 13.5 - 17.5 g/dL    Hematocrit 29.1 (L) 41.0 - 52.0 %    MCV 86 80 - 100 fL    MCH 28.6 26.0 - 34.0 pg    MCHC 33.3 32.0 - 36.0 g/dL    RDW 13.8 11.5 - 14.5 %    Platelets 144 (L) 150 - 450 x10*3/uL    MPV 10.5 7.5 - 11.5 fL   Basic metabolic panel   Result Value Ref Range    Glucose 160 (H) 74 - 99 mg/dL    Sodium 123 (L) 136 - 145 mmol/L    Potassium 3.4 (L) 3.5 - 5.3 mmol/L    Chloride 90 (L) 98 - 107 mmol/L    Bicarbonate 21 21 - 32 mmol/L    Anion Gap 15 10 - 20 mmol/L    Urea Nitrogen 72 (H) 6 - 23 mg/dL    Creatinine 2.60 (H) 0.50 - 1.30 mg/dL    eGFR 25 (L)  >60 mL/min/1.73m*2    Calcium 7.1 (L) 8.6 - 10.3 mg/dL        Assessment/Plan    73 y.o. male with history of arthritis, obesity, lymphedema, ventral hernia repair, hypertension, hyperlipidemia and BPH, who presents with a 3-week history of diarrhea.     Generalized Weakness with Diarrhea  -CT A/P: No diverticulitis  -Recent antibiotic use (Keflex) for UTI  -ID is consulted  -Holding on antibiotics  -Stool studies, including PCR, C.Diff and pathogens pending collection  -No diarrhea since admission  -Will maintain contact precautions till C.Diff ruled out.   -On IVF  -Replete electrolytes  -PT/OT following     Acute Kidney Injury with Hyponatremia  -Sodium downtrendin>123  -On IVF with NS  -Creatinine: 2.25>2.60  -Holding nephrotoxic agents  -Nephrology consulted  -Urine studies are pending  -RFP in AM.     Diabetes Mellitus Type II  -Resumed Tresiba at bedtime  -Maintain hypoglycemic protocol.     Hypertension  -Medical therapy: Metoprolol  -Hold Lasix. Losartan due to NOHELIA     DVT prophylaxis   -Heparin subcutaneous    Fluids/Electrolytes/Nutrition  -Labs reviewed.   -Hypokalemia - replaced. Hyponatremia. NOHELIA.   -No nutritional concerns.     Disposition  -Plan of care discussed with attending.   -Home with spouse when medically cleared.        JOSE Pastrana-CNP

## 2023-10-03 NOTE — CARE PLAN
Problem: ADLs  Goal: Patient with complete lower body dressing with minimal assist  level of assistance donning and doffing all LE clothes  with PRN adaptive equipment.  Outcome: Progressing  Goal: Patient will complete daily grooming tasks with set-up and supervision level of assistance and PRN adaptive equipment.  Outcome: Progressing  Goal: Patient will complete toileting including hygiene clothing management/hygiene with minimal assist  level of assistance.  Outcome: Progressing     Problem: EXERCISE/STRENGTHENING  Goal: Pt will increase B shld AROM to WFL in order to increase functional task participation.  Outcome: Progressing  Goal: Pt will increase distal BUE strength to 4+/5 in order to increase functional task participation.  Outcome: Progressing     Problem: TRANSFERS  Goal: Patient will perform bed mobility contact guard assist level of assistance in order to improve safety and independence with mobility  Outcome: Progressing  Goal: Patient will complete functional transfers with least restrictive device with contact guard assist level of assistance.  Outcome: Progressing     Problem: BALANCE  Goal: Pt will maintain dynamic standing balance during ADL task with stand by assist level of assistance in order to demonstrate decreased risk of falling and improved postural control.  Outcome: Progressing

## 2023-10-03 NOTE — CARE PLAN
Problem: Balance  Goal: balance   Description: Pt will demo static/dynamic standing balance x5+minutes B to UUE support CGA to progress activity tolerance and functional strength  Outcome: Progressing     Problem: Mobility  Goal: Gait  Description: Pt will ambulate 3x25 feet with directional changes and use of DME Ryan necessary to initiate return to PLOF   Outcome: Progressing  Goal: bed  Description: Pt will complete bed mobility with supervision for return to PLOF   Outcome: Progressing  Goal: strength  Description: X15+ reps there ex to increase general strength needed for function  Outcome: Progressing     Problem: Transfers  Goal: transfer  Description: Pt will complete sit<>Stand, bed <> chair transfer with Ryan x1 to improve function   Outcome: Progressing

## 2023-10-03 NOTE — PROGRESS NOTES
"Chester Verduzco is a 73 y.o. male on day 1 of admission presenting with Diarrhea.    Subjective   Interval History:  no fever, feels week, no emesis, less diarrhea, no abdominal         Review of Systems    Objective   Range of Vitals (last 24 hours)  Heart Rate:  [77-92]   Temp:  [36.3 °C (97.3 °F)-36.8 °C (98.2 °F)]   Resp:  [15-25]   BP: (111-158)/(55-71)   Height:  [185.4 cm (6' 1\")]   Weight:  [128 kg (282 lb 6.6 oz)-130 kg (287 lb 3.2 oz)]   SpO2:  [90 %-100 %]   Daily Weight  10/03/23 : 130 kg (287 lb 3.2 oz)    Body mass index is 37.89 kg/m².    Physical Exam  Constitutional:       Appearance: Normal appearance.   HENT:      Head: Normocephalic and atraumatic.      Mouth/Throat:      Mouth: Mucous membranes are moist.      Pharynx: Oropharynx is clear.   Eyes:      Pupils: Pupils are equal, round, and reactive to light.   Cardiovascular:      Rate and Rhythm: Normal rate and regular rhythm.      Heart sounds: Normal heart sounds.   Pulmonary:      Effort: Pulmonary effort is normal.      Breath sounds: Normal breath sounds.   Abdominal:      General: Abdomen is flat. Bowel sounds are normal.      Palpations: Abdomen is soft.   Musculoskeletal:      Cervical back: Normal range of motion.      Comments: stasis   Neurological:      Mental Status: He is alert.         Antibiotics  lactated Ringer's bolus 1,000 mL  HYDROmorphone (Dilaudid) injection 0.5 mg  HYDROmorphone (Dilaudid) 0.5 mg/0.5 mL injection  - Omnicell Override Pull  cefTRIAXone (Rocephin) IVPB 1 g  HYDROmorphone (Dilaudid) injection 0.5 mg  cyclobenzaprine (Flexeril) tablet 5 mg  cyclobenzaprine (Flexeril) 10 mg tablet  - Omnicell Override Pull  sodium chloride 0.9% infusion  pantoprazole (ProtoNix) EC tablet 40 mg  pantoprazole (ProtoNix) injection 40 mg  acetaminophen (Tylenol) tablet 650 mg  acetaminophen (Tylenol) oral liquid 650 mg  acetaminophen (Tylenol) suppository 650 mg  ondansetron ODT (Zofran-ODT) disintegrating tablet 4 " mg  ondansetron (Zofran) injection 4 mg  melatonin tablet 3 mg  acetaminophen (Tylenol) tablet 650 mg      Relevant Results  Labs  Results from last 72 hours   Lab Units 10/03/23  0536 10/02/23  1336   WBC AUTO x10*3/uL 9.7 11.9*   HEMOGLOBIN g/dL 9.7* 11.1*   HEMATOCRIT % 29.1* 33.4*   PLATELETS AUTO x10*3/uL 144* 169   NEUTROS PCT AUTO %  --  79.1   LYMPHS PCT AUTO %  --  5.3   MONOS PCT AUTO %  --  11.7   EOS PCT AUTO %  --  0.1     Results from last 72 hours   Lab Units 10/03/23  0536 10/02/23  1336   SODIUM mmol/L 123* 124*   POTASSIUM mmol/L 3.4* 3.5   CHLORIDE mmol/L 90* 88*   CO2 mmol/L 21 23   BUN mg/dL 72* 68*   CREATININE mg/dL 2.60* 2.25*   GLUCOSE mg/dL 160* 144*   CALCIUM mg/dL 7.1* 7.7*   ANION GAP mmol/L 15 17   EGFR mL/min/1.73m*2 25* 30*     Results from last 72 hours   Lab Units 10/02/23  1336   ALK PHOS U/L 156*   BILIRUBIN TOTAL mg/dL 1.0   PROTEIN TOTAL g/dL 6.7   ALT U/L 42   AST U/L 43*   ALBUMIN g/dL 2.7*     Estimated Creatinine Clearance: 36.3 mL/min (A) (by C-G formula based on SCr of 2.6 mg/dL (H)).  CRP   Date Value Ref Range Status   09/18/2023 22.55 (A) mg/dL Final     Comment:     REF VALUE  < 1.00     06/23/2021 0.50 mg/dL Final     Comment:     REF VALUE  < 1.00     06/16/2021 15.58 (A) mg/dL Final     Comment:     REF VALUE  < 1.00       Microbiology  No results found for the last 14 days.        Assessment/Plan   Diarrhea, stool studies are pending  Pyuria  Lymphedema / stasis     Recommendations :     Avoid antibiotics     I spent  minutes in the professional and overall care of this patient.      Payton Raines MD

## 2023-10-03 NOTE — PROGRESS NOTES
10/03/23 1008   Discharge Planning   Living Arrangements Alone   Support Systems Family members;Friends/neighbors   Assistance Needed Patrient ambulates with a walker and uses a chair left in his home. Per patient he needs a right total knee replacement and has difficulty ambulating.   Type of Residence Private residence   Who is requesting discharge planning? Patient   Home or Post Acute Services Other (Comment)  (TBD by therapy evaluations HHC VS SNF)   Type of Post Acute Facility Services Other (Comment)  (TBD by therapy evaluations HHC VS SNF)   Patient expects to be discharged to: C VS SNF   Does the patient need discharge transport arranged? Yes   RoundTrip coordination needed? Yes   Has discharge transport been arranged? No   What day is the transport expected? 10/06/23

## 2023-10-03 NOTE — PROGRESS NOTES
Physical Therapy    Physical Therapy Evaluation & Treatment    Patient Name: Chester Verduzco  MRN: [id number]  Today's Date: 10/3/2023   Time Calculation  Start Time: 1032  Stop Time: 1109  Time Calculation (min): 37 min    Assessment/Plan   PT Assessment  PT Assessment Results: Decreased strength, Decreased range of motion, Decreased endurance, Impaired balance, Decreased mobility  Rehab Prognosis: Good  Evaluation/Treatment Tolerance: Patient limited by pain  Medical Staff Made Aware: Yes  End of Session Communication: Bedside nurse  End of Session Patient Position: Up in chair  IP OR SWING BED PT PLAN  Inpatient or Swing Bed: Inpatient  PT Plan  Treatment/Interventions: Bed mobility, Transfer training, Gait training, Balance training, Strengthening, Therapeutic exercise  PT Plan: Skilled PT  PT Frequency: 3 times per week  PT Discharge Recommendations: Moderate intensity level of continued care  PT Recommended Transfer Status: Assist x2      Subjective     General Visit Information:  General  Reason for Referral: 73 YOM admitted with weakness/diarrhea  Referred By: Shannan Alejandro CNP  Past Medical History Relevant to Rehab: PMH: achilles tendinitis, R LE cellulitis, pacemaker, lymphedema, HTN, Covid, DM  Family/Caregiver Present: No  Co-Treatment: OT  Co-Treatment Reason: necessary to maximize pt function and maintain overall safety  Prior to Session Communication: Bedside nurse  Patient Position Received: Bed, 3 rail up  Preferred Learning Style: verbal, visual  General Comment: pt supine in bed, contact precautions d/t Cdiff r/o. Cleared for session per RN, IV in place. Pt reports generalized complaints: R knee pain, LLE sciatica, B shoulder weakness with limited AROM. Reportx 3 week functional decline, requiring assist of friends. Independent prior.  Home Living:  Home Living  Type of Home: House  Lives With: Alone  Home Adaptive Equipment: Walker rolling or standard  Home Layout: One level (with basement; pt  reports friends have been assisting)  Home Access: Stairs to enter with rails  Entrance Stairs-Rails:  (one)  Entrance Stairs-Number of Steps: 3  Prior Level of Function:  Prior Function Per Pt/Caregiver Report  Level of [place]: Independent with ADLs and functional transfers, Independent with homemaking with ambulation (until 3 weeks prior; limited ambulation, needing assist for IADLs and self care/LENCHO  hose)  Receives Help From: Friends  Ambulatory Assistance:  (use of FWW)  Precautions:  Precautions  Medical Precautions: Fall precautions, Infection precautions (r/o Cdiff)  Vital Signs:       Objective   Pain:  Pain Assessment  Pain Assessment: 0-10  Pain Score: 10 - Worst possible pain (identifies when moving. Positioned for comfort at end of session with icepack applied to R knee)  Pain Type: Acute pain, Chronic pain (pt reports recent cortisone injection R knee, admits to needing TKA, scheduled for 1/2024)  Effect of Pain on Daily Activities: recent functional decline x3 weeks  Pain Interventions: Cold applied  Cognition:  Cognition  Overall Cognitive Status: Within Functional Limits    General Assessments:  General Observation  General Observation: pt demo's poor strength, functional independence, limited transfers and gait tolerance.         Activity Tolerance  Endurance: Other (Comment) (pain limits tolerance)         Strength  Strength Comments: gross generalized weakness as assessed through functional transfers/AROM B LEs. R LE 3 to 3-/5, LLE >=3+/5. B shoulders limited AROM/functional weakness.      Dynamic Sitting Balance  Dynamic Sitting-Balance Support: Bilateral upper extremity supported  Dynamic Sitting-Balance:  (LE AROM)  Dynamic Sitting-Comments: mild retrolean in sitting, SB to CGA to maintain stability    Static Standing Balance  Static Standing-Balance Support: Bilateral upper extremity supported  Static Standing-Level of Assistance: Minimum assistance  Static Standing-Comment/Number of  Minutes: 1 minut x2 trials  Dynamic Standing Balance  Dynamic Standing-Balance Support: Bilateral upper extremity supported  Dynamic Standing-Balance:  (lateral weight shift, heel raises and attempt to march R/L. Min to modA to maintain stability)  Functional Assessments:       Bed Mobility  Bed Mobility: Yes  Bed Mobility 1  Bed Mobility 1: Supine to sitting  Level of Assistance 1: Moderate assistance (x2)  Bed Mobility Comments 1: use of draw sheet and bed rail; HOB elevated    Transfers  Transfer: Yes  Transfer 1  Transfer From 1: Sit to  Transfer to 1: Stand  Technique 1: Sit to stand, Stand to sit  Transfer Level of Assistance 1:  (multiple trials completed from varying surface heights: modA (+) Ryan from elevated surface, modA x2 from low surface. Cues for hand placement and LE positioning)    Ambulation/Gait Training  Ambulation/Gait Training Performed: Yes  Ambulation/Gait Training 1  Surface 1: Level tile  Device 1: Rolling walker  Assistance 1: Moderate assistance (x1)  Quality of Gait 1: Inconsistent stride length (assist to weight shift and offload necessary to advance LLE. Decreased foot clearance LLE d/t pain. slides LLE on floor to advance with minimal advancement)  Comments/Distance (ft) 1: 3 feet bed to chair; 3 feet lateral along EOB (limited by significant pain)          Treatments:         Therapeutic Activity  Therapeutic Activity Performed: Yes  Therapeutic Activity 1: transfer trials, balance training/pre-gait tasks, activity tolerance as documented within note           Ambulation/Gait Training  Ambulation/Gait Training Performed: Yes  Ambulation/Gait Training 1  Surface 1: Level tile  Device 1: Rolling walker  Assistance 1: Moderate assistance (x1)  Quality of Gait 1: Inconsistent stride length (assist to weight shift and offload necessary to advance LLE. Decreased foot clearance LLE d/t pain. slides LLE on floor to advance with minimal advancement)  Comments/Distance (ft) 1: 3 feet bed to  chair; 3 feet lateral along EOB (limited by significant pain)  Transfers  Transfer: Yes  Transfer 1  Transfer From 1: Sit to  Transfer to 1: Stand  Technique 1: Sit to stand, Stand to sit  Transfer Level of Assistance 1:  (multiple trials completed from varying surface heights: modA (+) Ryan from elevated surface, modA x2 from low surface. Cues for hand placement and LE positioning)       Outcome Measures:  [facility] Basic Mobility  Turning from your back to your side while in a flat bed without using bedrails: A lot  Moving from lying on your back to sitting on the side of a flat bed without using bedrails: A lot  Moving to and from bed to chair (including a wheelchair): A lot  Standing up from a chair using your arms (e.g. wheelchair or bedside chair): A lot  To walk in hospital room: Total  Climbing 3-5 steps with railing: Total  Basic Mobility - Total Score: 10    Encounter Problems       Encounter Problems (Active)       Balance       balance  (Progressing)       Start:  10/03/23    Expected End:  10/17/23       Pt will demo static/dynamic standing balance x5+minutes B to UUE support CGA to progress activity tolerance and functional strength            Mobility       Gait (Progressing)       Start:  10/03/23    Expected End:  10/17/23       Pt will ambulate 3x25 feet with directional changes and use of DME Ryan necessary to initiate return to PLOF          bed (Progressing)       Start:  10/03/23    Expected End:  10/17/23       Pt will complete bed mobility with supervision for return to PLOF          strength (Progressing)       Start:  10/03/23       X15+ reps there ex to increase general strength needed for function            Transfers       transfer (Progressing)       Start:  10/03/23    Expected End:  10/17/23       Pt will complete sit<>Stand, bed <> chair transfer with Ryan x1 to improve function                 Education Documentation  Body Mechanics, taught by Sana Lilly, PT at 10/3/2023 11:35  AM.  Learner: Patient  Readiness: Acceptance  Method: Explanation  Response: Verbalizes Understanding    Mobility Training, taught by Sana Lilly PT at 10/3/2023 11:35 AM.  Learner: Patient  Readiness: Acceptance  Method: Explanation  Response: Verbalizes Understanding    Education Comments  No comments found.

## 2023-10-03 NOTE — PROGRESS NOTES
Occupational Therapy    Evaluation    Patient Name: Chester Verduzco  MRN: [id number]  Today's Date: 10/3/2023  Time Calculation  Start Time: 1033  Stop Time: 1107  Time Calculation (min): 34 min    Assessment  IP OT Assessment  OT Assessment: Decreased strength and endurance limiting Indep with functional task completion. Would benefit from skilled services to maximize functional potential.  Prognosis: Good  Evaluation/Treatment Tolerance: Patient limited by fatigue, Patient limited by pain  Medical Staff Made Aware: Yes  Plan:  Treatment Interventions: ADL retraining, Functional transfer training, UE strengthening/ROM, Endurance training  OT Frequency: 3 times per week  OT Discharge Recommendations: Moderate intensity level of continued care    Subjective   Current Problem:  1. UTI (urinary tract infection)        2. Difficulty walking          General:  General  Reason for Referral: 73 YOM admitted with weakness/diarrhea  Referred By: Dr. Singer  Past Medical History Relevant to Rehab: PMH: arthritis, obesity, lymphedema, ventral hernia repair, hypertension, hyperlipidemia and BPH  Co-Treatment: PT  Co-Treatment Reason: Co-evaluation with PT to maximize pt outcomes  Prior to Session Communication: Bedside nurse  Patient Position Received: Bed, 3 rail up, Alarm off, not on at start of session  General Comment: Pt reports pain and stiffness in BLE and B shlds. Needs increased assist for all functional mobility and self-care tasks.  Precautions:   fall          Pain:  Pain Assessment  Pain Assessment: Ayon-Baker FACES  Ayon-Baker FACES Pain Rating: Hurts even more  Pain Type: Chronic pain, Referred pain  Pain Location:  (R knee, L hip, B shlds)  Pain Frequency:  (Increasing with movement)  Pain Onset: Progressive    Objective   Cognition:  Overall Cognitive Status: Within Functional Limits           Home Living:  Type of Home: House  Lives With: Alone  Home Adaptive Equipment: Walker rolling or standard  Home  Layout: One level, Laundry in basement  Home Access: Stairs to enter without rails  Entrance Stairs-Number of Steps: 3  Home Living Comments: Reports narrow bathroom, FWW cannot fit through doorway.   Prior Function:  Level of [place]: Needs assistance with ADLs, Needs assistance with homemaking, Needs assistance with functional transfers (Reports Indep with all I/ADL tasks until a few weeks ago.)  Receives Help From: Friends (Friends have been assisting as needed over the past few weeks. He has needed increased assist fro self-care and assist for all IADLs.)  IADL History:     ADL:  ADL Comments: MAX A to manage socks while seated EOB. Anticipate MOD-MAX A at this time for LB ADLs and MOD A for UB.  Activity Tolerance:  Endurance: Tolerates 10 - 20 min exercise with multiple rests  Bed Mobility/Transfers: Bed Mobility  Bed Mobility: Yes  Bed Mobility 1  Bed Mobility Comments 1: Supine->sit w MOD A x2, HOB raised, and increased time.   and Transfer 1  Transfer From 1: Bed to  Transfer to 1: Stand  Technique 1: Sit to stand  Transfer Device 1: Walker  Transfer Level of Assistance 1: Moderate assistance, +2  Trials/Comments 1: Sit<>stand from chair with MOD A x2. Cues for hand placement and technique. Pt able to take a few steps to transfer bed<>chair using FWW with MOD A and slow pace.       Strength:  Strength Comments: B shld AROM limited to approx 30o, PROM to 90o. All other AROM WFL. B shld strength: 3-/5. All others: 3+/5.      Outcome Measures: [facility] Daily Activity  Putting on and taking off regular lower body clothing: A lot  Bathing (including washing, rinsing, drying): A lot  Putting on and taking off regular upper body clothing: A lot  Toileting, which includes using toilet, bedpan or urinal: A lot  Taking care of personal grooming such as brushing teeth: A little  Eating Meals: A little  Daily Activity - Total Score: 14      Education Documentation  Body Mechanics, taught by Rhina Romero, OT at  10/3/2023 11:46 AM.  Learner: Patient  Readiness: Acceptance  Method: Explanation, Demonstration  Response: Verbalizes Understanding, Needs Reinforcement    ADL Training, taught by Rhina Romero OT at 10/3/2023 11:46 AM.  Learner: Patient  Readiness: Acceptance  Method: Explanation, Demonstration  Response: Verbalizes Understanding, Needs Reinforcement    Education Comments  No comments found.      Goals:   Encounter Problems       Encounter Problems (Active)       ADLs       Patient with complete lower body dressing with minimal assist  level of assistance donning and doffing all LE clothes  with PRN adaptive equipment. (Progressing)       Start:  10/03/23    Expected End:  10/17/23            Patient will complete daily grooming tasks with set-up and supervision level of assistance and PRN adaptive equipment. (Progressing)       Start:  10/03/23    Expected End:  10/17/23            Patient will complete toileting including hygiene clothing management/hygiene with minimal assist  level of assistance. (Progressing)       Start:  10/03/23    Expected End:  10/17/23               BALANCE       Pt will maintain dynamic standing balance during ADL task with stand by assist level of assistance in order to demonstrate decreased risk of falling and improved postural control. (Progressing)       Start:  10/03/23    Expected End:  10/17/23               Balance       balance  (Progressing)       Start:  10/03/23    Expected End:  10/17/23       Pt will demo static/dynamic standing balance x5+minutes B to UUE support CGA to progress activity tolerance and functional strength            EXERCISE/STRENGTHENING       Pt will increase B shld AROM to WFL in order to increase functional task participation. (Progressing)       Start:  10/03/23    Expected End:  10/17/23            Pt will increase distal BUE strength to 4+/5 in order to increase functional task participation. (Progressing)       Start:  10/03/23    Expected End:   10/17/23               Mobility       Gait (Progressing)       Start:  10/03/23    Expected End:  10/17/23       Pt will ambulate 3x25 feet with directional changes and use of DME Ryan necessary to initiate return to PLOF          bed (Progressing)       Start:  10/03/23    Expected End:  10/17/23       Pt will complete bed mobility with supervision for return to PLOF          strength (Progressing)       Start:  10/03/23       X15+ reps there ex to increase general strength needed for function            TRANSFERS       Patient will perform bed mobility contact guard assist level of assistance in order to improve safety and independence with mobility (Progressing)       Start:  10/03/23    Expected End:  10/17/23            Patient will complete functional transfers with least restrictive device with contact guard assist level of assistance. (Progressing)       Start:  10/03/23    Expected End:  10/17/23               Transfers       transfer (Progressing)       Start:  10/03/23    Expected End:  10/17/23       Pt will complete sit<>Stand, bed <> chair transfer with Ryan x1 to improve function

## 2023-10-04 LAB
ALBUMIN SERPL BCP-MCNC: 2.1 G/DL (ref 3.4–5)
ANION GAP SERPL CALC-SCNC: 13 MMOL/L (ref 10–20)
BACTERIA UR CULT: NORMAL
BUN SERPL-MCNC: 81 MG/DL (ref 6–23)
CALCIUM SERPL-MCNC: 7.3 MG/DL (ref 8.6–10.3)
CHLORIDE SERPL-SCNC: 95 MMOL/L (ref 98–107)
CHLORIDE UR-SCNC: <15 MMOL/L
CHLORIDE/CREATININE (MMOL/G) IN URINE: NORMAL
CO2 SERPL-SCNC: 20 MMOL/L (ref 21–32)
CREAT SERPL-MCNC: 2.9 MG/DL (ref 0.5–1.3)
CREAT UR-MCNC: 112.4 MG/DL (ref 20–370)
ERYTHROCYTE [DISTWIDTH] IN BLOOD BY AUTOMATED COUNT: 13.9 % (ref 11.5–14.5)
GFR SERPL CREATININE-BSD FRML MDRD: 22 ML/MIN/1.73M*2
GLUCOSE BLD MANUAL STRIP-MCNC: 115 MG/DL (ref 74–99)
GLUCOSE BLD MANUAL STRIP-MCNC: 136 MG/DL (ref 74–99)
GLUCOSE BLD MANUAL STRIP-MCNC: 142 MG/DL (ref 74–99)
GLUCOSE BLD MANUAL STRIP-MCNC: 168 MG/DL (ref 74–99)
GLUCOSE SERPL-MCNC: 127 MG/DL (ref 74–99)
HCT VFR BLD AUTO: 29.5 % (ref 41–52)
HGB BLD-MCNC: 9.5 G/DL (ref 13.5–17.5)
IRON SATN MFR SERPL: 15 % (ref 25–45)
IRON SERPL-MCNC: 19 UG/DL (ref 35–150)
MCH RBC QN AUTO: 28.1 PG (ref 26–34)
MCHC RBC AUTO-ENTMCNC: 32.2 G/DL (ref 32–36)
MCV RBC AUTO: 87 FL (ref 80–100)
NRBC BLD-RTO: 0 /100 WBCS (ref 0–0)
OSMOLALITY SERPL: 289 MOSM/KG (ref 280–300)
PHOSPHATE SERPL-MCNC: 5 MG/DL (ref 2.5–4.9)
PLATELET # BLD AUTO: 153 X10*3/UL (ref 150–450)
PMV BLD AUTO: 10.1 FL (ref 7.5–11.5)
POTASSIUM SERPL-SCNC: 3.3 MMOL/L (ref 3.5–5.3)
POTASSIUM UR-SCNC: 18 MMOL/L
POTASSIUM/CREAT UR-RTO: 16 MMOL/G CREAT
RBC # BLD AUTO: 3.38 X10*6/UL (ref 4.5–5.9)
SODIUM SERPL-SCNC: 125 MMOL/L (ref 136–145)
SODIUM UR-SCNC: 11 MMOL/L
SODIUM/CREAT UR-RTO: 10 MMOL/G CREAT
TIBC SERPL-MCNC: 129 UG/DL (ref 240–445)
UIBC SERPL-MCNC: 110 UG/DL (ref 110–370)
WBC # BLD AUTO: 7.9 X10*3/UL (ref 4.4–11.3)

## 2023-10-04 PROCEDURE — 2500000002 HC RX 250 W HCPCS SELF ADMINISTERED DRUGS (ALT 637 FOR MEDICARE OP, ALT 636 FOR OP/ED): Performed by: NURSE PRACTITIONER

## 2023-10-04 PROCEDURE — 99232 SBSQ HOSP IP/OBS MODERATE 35: CPT | Performed by: NURSE PRACTITIONER

## 2023-10-04 PROCEDURE — 2500000001 HC RX 250 WO HCPCS SELF ADMINISTERED DRUGS (ALT 637 FOR MEDICARE OP): Performed by: NURSE PRACTITIONER

## 2023-10-04 PROCEDURE — 85027 COMPLETE CBC AUTOMATED: CPT | Performed by: NURSE PRACTITIONER

## 2023-10-04 PROCEDURE — 36415 COLL VENOUS BLD VENIPUNCTURE: CPT | Performed by: NURSE PRACTITIONER

## 2023-10-04 PROCEDURE — 83540 ASSAY OF IRON: CPT

## 2023-10-04 PROCEDURE — 80069 RENAL FUNCTION PANEL: CPT | Performed by: NURSE PRACTITIONER

## 2023-10-04 PROCEDURE — 2500000004 HC RX 250 GENERAL PHARMACY W/ HCPCS (ALT 636 FOR OP/ED): Performed by: NURSE PRACTITIONER

## 2023-10-04 PROCEDURE — 99255 IP/OBS CONSLTJ NEW/EST HI 80: CPT | Performed by: INTERNAL MEDICINE

## 2023-10-04 PROCEDURE — 83930 ASSAY OF BLOOD OSMOLALITY: CPT | Mod: CMCLAB,GEALAB | Performed by: INTERNAL MEDICINE

## 2023-10-04 PROCEDURE — C9113 INJ PANTOPRAZOLE SODIUM, VIA: HCPCS | Performed by: NURSE PRACTITIONER

## 2023-10-04 PROCEDURE — 1100000001 HC PRIVATE ROOM DAILY

## 2023-10-04 PROCEDURE — 2580000001 HC RX 258 IV SOLUTIONS

## 2023-10-04 PROCEDURE — 82570 ASSAY OF URINE CREATININE: CPT | Mod: CMCLAB,GEALAB

## 2023-10-04 PROCEDURE — 82947 ASSAY GLUCOSE BLOOD QUANT: CPT

## 2023-10-04 RX ORDER — SODIUM CHLORIDE, SODIUM LACTATE, POTASSIUM CHLORIDE, CALCIUM CHLORIDE 600; 310; 30; 20 MG/100ML; MG/100ML; MG/100ML; MG/100ML
100 INJECTION, SOLUTION INTRAVENOUS CONTINUOUS
Status: DISCONTINUED | OUTPATIENT
Start: 2023-10-04 | End: 2023-10-07 | Stop reason: HOSPADM

## 2023-10-04 RX ORDER — POTASSIUM CHLORIDE 20 MEQ/1
20 TABLET, EXTENDED RELEASE ORAL ONCE
Status: COMPLETED | OUTPATIENT
Start: 2023-10-04 | End: 2023-10-04

## 2023-10-04 RX ORDER — DOCUSATE SODIUM 100 MG/1
100 CAPSULE, LIQUID FILLED ORAL 2 TIMES DAILY
Status: DISCONTINUED | OUTPATIENT
Start: 2023-10-04 | End: 2023-10-07 | Stop reason: HOSPADM

## 2023-10-04 RX ADMIN — FINASTERIDE 5 MG: 5 TABLET, FILM COATED ORAL at 11:15

## 2023-10-04 RX ADMIN — MELATONIN TAB 3 MG 3 MG: 3 TAB at 21:31

## 2023-10-04 RX ADMIN — POTASSIUM CHLORIDE 20 MEQ: 1500 TABLET, EXTENDED RELEASE ORAL at 09:00

## 2023-10-04 RX ADMIN — INSULIN DEGLUDEC INJECTION 10 UNITS: 100 INJECTION, SOLUTION SUBCUTANEOUS at 21:38

## 2023-10-04 RX ADMIN — PANTOPRAZOLE SODIUM 40 MG: 40 INJECTION, POWDER, FOR SOLUTION INTRAVENOUS at 06:04

## 2023-10-04 RX ADMIN — SODIUM CHLORIDE 100 ML/HR: 9 INJECTION, SOLUTION INTRAVENOUS at 02:58

## 2023-10-04 RX ADMIN — TAMSULOSIN HYDROCHLORIDE 0.8 MG: 0.4 CAPSULE ORAL at 21:31

## 2023-10-04 RX ADMIN — METOPROLOL SUCCINATE 100 MG: 50 TABLET, EXTENDED RELEASE ORAL at 11:15

## 2023-10-04 RX ADMIN — ROSUVASTATIN 10 MG: 10 TABLET, FILM COATED ORAL at 21:31

## 2023-10-04 RX ADMIN — DOCUSATE SODIUM 100 MG: 100 CAPSULE, LIQUID FILLED ORAL at 15:30

## 2023-10-04 RX ADMIN — SODIUM CHLORIDE, POTASSIUM CHLORIDE, SODIUM LACTATE AND CALCIUM CHLORIDE 100 ML/HR: 600; 310; 30; 20 INJECTION, SOLUTION INTRAVENOUS at 11:17

## 2023-10-04 RX ADMIN — CYCLOBENZAPRINE HYDROCHLORIDE 5 MG: 5 TABLET, FILM COATED ORAL at 11:17

## 2023-10-04 RX ADMIN — SODIUM CHLORIDE, POTASSIUM CHLORIDE, SODIUM LACTATE AND CALCIUM CHLORIDE 100 ML/HR: 600; 310; 30; 20 INJECTION, SOLUTION INTRAVENOUS at 22:22

## 2023-10-04 RX ADMIN — CYCLOBENZAPRINE HYDROCHLORIDE 5 MG: 5 TABLET, FILM COATED ORAL at 21:31

## 2023-10-04 RX ADMIN — CYCLOBENZAPRINE HYDROCHLORIDE 5 MG: 5 TABLET, FILM COATED ORAL at 15:30

## 2023-10-04 RX ADMIN — POTASSIUM CHLORIDE 20 MEQ: 1500 TABLET, EXTENDED RELEASE ORAL at 11:16

## 2023-10-04 ASSESSMENT — COGNITIVE AND FUNCTIONAL STATUS - GENERAL
DRESSING REGULAR LOWER BODY CLOTHING: A LOT
DAILY ACTIVITIY SCORE: 14
CLIMB 3 TO 5 STEPS WITH RAILING: TOTAL
WALKING IN HOSPITAL ROOM: TOTAL
STANDING UP FROM CHAIR USING ARMS: A LOT
TOILETING: A LOT
MOVING FROM LYING ON BACK TO SITTING ON SIDE OF FLAT BED WITH BEDRAILS: A LOT
EATING MEALS: A LITTLE
MOVING TO AND FROM BED TO CHAIR: A LOT
TURNING FROM BACK TO SIDE WHILE IN FLAT BAD: A LOT
PERSONAL GROOMING: A LITTLE
MOBILITY SCORE: 10
DRESSING REGULAR UPPER BODY CLOTHING: A LOT
HELP NEEDED FOR BATHING: A LOT

## 2023-10-04 NOTE — CARE PLAN
The patient's goals for the shift include      The clinical goals for the shift include plan of care    Over the shift, the patient did not make progress toward the following goals. Barriers to progression include   Problem: Psychosocial Needs  Goal: Demonstrates ability to cope with hospitalization/illness  10/3/2023 2229 by Parminder Salas RN  Outcome: Progressing  10/3/2023 2228 by Parmidner Salas RN  Outcome: Progressing  10/3/2023 2217 by Parminder Salas RN  Outcome: Progressing   . Recommendations to address these barriers include   Problem: Skin  Goal: Participates in plan/prevention/treatment measures  10/3/2023 2229 by Parminder Salas RN  Outcome: Progressing  10/3/2023 2228 by Parminder Salas RN  Outcome: Progressing  10/3/2023 2217 by Parminder Salas RN  Outcome: Progressing   .

## 2023-10-04 NOTE — PROGRESS NOTES
Chester Verduzco is a 73 y.o. male on day 2 of admission presenting with Diarrhea.222      Subjective   Seen and examined in his room this AM. Awake and alert. Uncomfortable and having generalized discomfort. No recurrence of diarrhea. No BM since admission. Abdominal distention is normal for him. He denies chest pain, breathing difficulties, abdominal pain, N/V/D/C, fever, or chills.    Objective     Last Recorded Vitals  /72   Pulse 70   Temp 36.5 °C (97.7 °F)   Resp 16   Wt 130 kg (287 lb 3.2 oz)   SpO2 93%   Intake/Output last 3 Shifts:    Intake/Output Summary (Last 24 hours) at 10/4/2023 1436  Last data filed at 10/4/2023 1300  Gross per 24 hour   Intake 4151 ml   Output 850 ml   Net 3301 ml       Admission Weight  Weight: 128 kg (282 lb 6.6 oz) (10/02/23 1208)    Daily Weight  10/03/23 : 130 kg (287 lb 3.2 oz)    Image Results  CT abdomen pelvis wo IV contrast  Narrative: Interpreted By:  Javon El,   STUDY:  CT ABDOMEN PELVIS WO IV CONTRAST; 10/2/2023 2:48 pm      INDICATION:  Signs/Symptoms:2w diarrhea, abdominal distention, r/o SBO or colitis.      COMPARISON:  None.      ACCESSION NUMBER(S):  [id number]      ORDERING CLINICIAN:  GOVIND HOPKINS      TECHNIQUE:  Contiguous axial images were obtained at 3mm slice thickness through  the abdomen and pelvis without intravenous contrast administration.  Coronal and sagittal reconstructions at 3 mm slice thickness were  performed. Intravenous contrast was not given per the referring  physician's request.      FINDINGS:  The study is limited by the lack of intravenous contrast.      LOWER CHEST:  Evaluation of the visualized lung bases demonstrates mild scarring  and/or atelectasis bilaterally. The heart is within normal limits for  size.      ABDOMEN:      LIVER:  The liver is within normal limits for appearance, without evidence of  focal masses.      BILE DUCTS:  No definite intra or extrahepatic biliary dilatation is identified.       GALLBLADDER:  The gallbladder is nondilated. No definite calcified gallstones are  seen.      PANCREAS:  The pancreas is within normal limits for appearance, without evidence  of focal masses.      SPLEEN:  The spleen is within normal limits for size. No focal splenic mass is  seen.      ADRENAL GLANDS:  No definite adrenal nodules or masses are seen bilaterally.      KIDNEYS AND URETERS:  There is no hydronephrosis, hydroureter or renal/ ureteral calculus  identified bilaterally. A rounded hypodensity is seen from the upper  pole of the left kidney, measuring at up to 7.9 cm in diameter. This  is incompletely evaluated without contrast, but most likely  represents a cyst.      PELVIS:      BLADDER:  The urinary bladder is grossly unremarkable for CT appearance.      REPRODUCTIVE ORGANS:  The prostate is enlarged, measuring at up to 8.0 x 7.6 cm in diameter.      BOWEL:  Multiple diverticuli are seen in the proximal sigmoid colon. The  colon and small bowel are within normal limits for course, caliber  and appearance, without evidence of wall thickening or obstruction.  The appendix is decompressed. No CT evidence of acute diverticulitis  or appendicitis is seen.      VESSELS:  Scattered atherosclerotic calcifications are seen throughout the  infrarenal abdominal aorta and iliac arteries. The abdominal aorta is  within normal limits for course, caliber and appearance, without  evidence of aneurysm.      PERITONEUM/RETROPERITONEUM/LYMPH NODES:  There is no free intraperitoneal air or free fluid identified.  Enlarged lymph nodes are seen in the left external iliac chain,  measuring at up to 2.4 x 2.2 cm in diameter. No additional mesenteric  or retroperitoneal lymphadenopathy is identified.      BONE AND SOFT TISSUE:  There is no evidence of acute fracture identified. Enlarged lymph  nodes are seen in the left groin, measuring at up to 6.0 x 3.5 cm in  diameter. A prominent lymph node is seen in the right  groin,  measuring at up to 2.7 x 1.6 cm no evidence of abdominal wall mass or  hernia is identified.      Impression: 1. Colonic diverticulosis, without evidence of acute diverticulitis.  2. Enlarged lymph nodes in the left external iliac chain, and  bilateral groin.  3. Enlargement of the prostate.  4. Hypodensity from the left kidney, incompletely evaluated without  contrast, but most likely representing is cyst.      MACRO:  None      Signed by: Javon El 10/2/2023 2:58 PM  Dictation workstation:   [id number]  XR chest 1 view  Narrative: Interpreted By:  Shakeel Rogers,   STUDY:  XR CHEST 1 VIEW; 10/2/2023 1:19 pm      INDICATION:  Signs/Symptoms:SOB, Hx of CHF, r/p worsening vascular congestion or  focal infiltrate      COMPARISON:  Radiographs 12/06/2022      ACCESSION NUMBER(S):  [id number]      ORDERING CLINICIAN:  GOVIND HOPKINS      TECHNIQUE:  Single frontal view of the chest performed.      FINDINGS:  LINES AND DEVICES:  Stable right cardiac pacer with leads terminating in the right atrium  and right ventricle.      LUNGS:  Patchy retrocardiac airspace opacities and haziness in the left lung  base, suggesting small left pleural effusion with adjacent  atelectasis. No additional focal consolidation, pulmonary edema or  pneumothorax is present.      CARDIOMEDIASTINAL SILHOUETTE:  The cardiomediastinal silhouette is within normal limits.      Impression: Suspected small left pleural effusion with adjacent atelectasis.  Superimposed infection is possible in the appropriate clinical  setting. No pulmonary edema.      MACRO  None      Signed by: Shakeel Rogers 10/2/2023 1:33 PM  Dictation workstation:   [id number]      Physical Exam  Constitutional: A&O x 3; NAD; calm and cooperative  Eyes: EOM's intact  HEENT: Normocephalic, Atraumatic. Oral mucosa moist.   Neck: Supple. No JVD, lymphadenopathy.   Lungs: CTAB with fair air movement. Diminished in the bases. Respirations even and unlabored on room air.    Heart: RRR  Abdomen: Softly distended; nontender; + BS.   MS/Extremities: JOHNS equally x 4. Peripheral pulses intact bilaterally. BLE edema with stasis/vascular changes, R>L.   Neuro: A&O x3; no focal deficits; gross motor and sensation intact.   Skin: Warm and dry.   Psych: Normal affect.      Relevant Results  Scheduled medications  cyclobenzaprine, 5 mg, oral, TID  docusate sodium, 100 mg, oral, BID  finasteride, 5 mg, oral, Daily  insulin degludec, 10 Units, subcutaneous, Nightly  [Held by provider] losartan, 100 mg, oral, Daily  melatonin, 3 mg, oral, Daily  metoprolol succinate XL, 100 mg, oral, Daily  pantoprazole, 40 mg, oral, Daily before breakfast   Or  pantoprazole, 40 mg, intravenous, Daily before breakfast  potassium chloride CR, 20 mEq, oral, Daily  rosuvastatin, 10 mg, oral, Nightly  tamsulosin, 0.8 mg, oral, Nightly      Continuous medications  lactated Ringer's, 100 mL/hr, Last Rate: 100 mL/hr (10/04/23 1117)      PRN medications  PRN medications: acetaminophen **OR** acetaminophen **OR** acetaminophen, acetaminophen, dextrose 10 % in water (D10W), dextrose, glucagon, ondansetron ODT **OR** ondansetron     Results for orders placed or performed during the hospital encounter of 10/02/23 (from the past 24 hour(s))   Urine electrolytes   Result Value Ref Range    Sodium, Urine Random 11 mmol/L    Sodium/Creatinine Ratio 10 Not established. mmol/g Creat    Potassium, Urine Random 18 mmol/L    Potassium/Creatinine Ratio 16 Not established mmol/g Creat    Chloride, Urine Random <15 mmol/L    Chloride/Creatinine Ratio      Creatinine, Urine Random 112.4 20.0 - 370.0 mg/dL   POCT GLUCOSE   Result Value Ref Range    POCT Glucose 170 (H) 74 - 99 mg/dL   CBC   Result Value Ref Range    WBC 7.9 4.4 - 11.3 x10*3/uL    nRBC 0.0 0.0 - 0.0 /100 WBCs    RBC 3.38 (L) 4.50 - 5.90 x10*6/uL    Hemoglobin 9.5 (L) 13.5 - 17.5 g/dL    Hematocrit 29.5 (L) 41.0 - 52.0 %    MCV 87 80 - 100 fL    MCH 28.1 26.0 - 34.0 pg     MCHC 32.2 32.0 - 36.0 g/dL    RDW 13.9 11.5 - 14.5 %    Platelets 153 150 - 450 x10*3/uL    MPV 10.1 7.5 - 11.5 fL   Renal function panel   Result Value Ref Range    Glucose 127 (H) 74 - 99 mg/dL    Sodium 125 (L) 136 - 145 mmol/L    Potassium 3.3 (L) 3.5 - 5.3 mmol/L    Chloride 95 (L) 98 - 107 mmol/L    Bicarbonate 20 (L) 21 - 32 mmol/L    Anion Gap 13 10 - 20 mmol/L    Urea Nitrogen 81 (H) 6 - 23 mg/dL    Creatinine 2.90 (H) 0.50 - 1.30 mg/dL    eGFR 22 (L) >60 mL/min/1.73m*2    Calcium 7.3 (L) 8.6 - 10.3 mg/dL    Phosphorus 5.0 (H) 2.5 - 4.9 mg/dL    Albumin 2.1 (L) 3.4 - 5.0 g/dL   Iron and TIBC   Result Value Ref Range    Iron 19 (L) 35 - 150 ug/dL    UIBC 110 110 - 370 ug/dL    TIBC 129 (L) 240 - 445 ug/dL    % Saturation 15 (L) 25 - 45 %   POCT GLUCOSE   Result Value Ref Range    POCT Glucose 115 (H) 74 - 99 mg/dL   POCT GLUCOSE   Result Value Ref Range    POCT Glucose 168 (H) 74 - 99 mg/dL       Assessment/Plan   73 y.o. male with history of arthritis, obesity, lymphedema, ventral hernia repair, hypertension, hyperlipidemia and BPH, who presents with a 3-week history of diarrhea.      Generalized Weakness with Diarrhea  -CT A/P: No diverticulitis or acute process  -Recent antibiotic use (Keflex) for UTI  -ID is consulted  -Holding on antibiotics pending results of cultures, stool studies etc.   -Stool studies, including PCR, C.Diff and pathogens pending collection  -No diarrhea since admission  -Follow cultures.   -Will maintain contact precautions till C.Diff ruled out.   -On IVF as appears to be hypovolemic from diarrhea and poor oral intake.   -Replete electrolytes  -PT/OT following  -Afebrile. No leukocytosis.      Acute Kidney Injury with Hyponatremia  -Sodium downtrendin>123>125  -On IVF with LR  -Creatinine: 2.25>2.60>2.98  -Holding nephrotoxic agents - Losartan, Furosemide  -Nephrology consulted - feels this is prerenal in nature  -Urine studies are pending  -RFP in AM.      Diabetes Mellitus  Type II  -Resumed Tresiba at bedtime  -Maintain hypoglycemic protocol.      Hypertension  -Medical therapy: Metoprolol  -Hold Lasix and Losartan due to NOHELIA    Anemia  -Iron studies pending  -Monitor and trend Hgb  -Transfuse to keep Hgb>7  -CBC in AM     DVT prophylaxis   -Heparin subcutaneous     Fluids/Electrolytes/Nutrition  -Labs reviewed.   -Hypokalemia - replaced. Hyponatremia. NOHELIA.   -No nutritional concerns.      Disposition  -Plan of care discussed with attending and nephrology.   -Await PT/OT recommendations for discharge planning.      JOSE Pastrana-CNP

## 2023-10-04 NOTE — CARE PLAN
The patient's goals for the shift include      The clinical goals for the shift include plan of care    Over the shift, the patient did not make progress toward the following goals. Barriers to progression include   Problem: Fall/Injury  Goal: Use assistive devices by end of the shift  Outcome: Progressing   . Recommendations to address these barriers include   Problem: Skin  Goal: Prevent/minimize sheer/friction injuries  Outcome: Progressing   .

## 2023-10-04 NOTE — PROGRESS NOTES
Chester Verduzco is a 73 y.o. male on day 2 of admission presenting with Diarrhea.    Subjective   Interval History: no fever, less diarrhea, no emesis        Review of Systems    Objective   Range of Vitals (last 24 hours)  Heart Rate:  [70-72]   Temp:  [36.2 °C (97.2 °F)-36.4 °C (97.5 °F)]   Resp:  [16]   BP: (137-154)/(62-67)   SpO2:  [92 %-93 %]   Daily Weight  10/03/23 : 130 kg (287 lb 3.2 oz)    Body mass index is 37.89 kg/m².    Physical Exam  Constitutional:       Appearance: Normal appearance.   HENT:      Head: Normocephalic and atraumatic.      Mouth/Throat:      Mouth: Mucous membranes are moist.      Pharynx: Oropharynx is clear.   Eyes:      Pupils: Pupils are equal, round, and reactive to light.   Cardiovascular:      Rate and Rhythm: Normal rate and regular rhythm.      Heart sounds: Normal heart sounds.   Pulmonary:      Effort: Pulmonary effort is normal.      Breath sounds: Normal breath sounds.   Abdominal:      General: Abdomen is flat. Bowel sounds are normal.      Palpations: Abdomen is soft.   Musculoskeletal:      Cervical back: Normal range of motion.      Comments: stasis   Neurological:      Mental Status: He is alert.           Antibiotics  lactated Ringer's bolus 1,000 mL  HYDROmorphone (Dilaudid) injection 0.5 mg  HYDROmorphone (Dilaudid) 0.5 mg/0.5 mL injection  - Omnicell Override Pull  cefTRIAXone (Rocephin) IVPB 1 g  HYDROmorphone (Dilaudid) injection 0.5 mg  cyclobenzaprine (Flexeril) tablet 5 mg  cyclobenzaprine (Flexeril) 10 mg tablet  - Omnicell Override Pull  sodium chloride 0.9% infusion  pantoprazole (ProtoNix) EC tablet 40 mg  pantoprazole (ProtoNix) injection 40 mg  acetaminophen (Tylenol) tablet 650 mg  acetaminophen (Tylenol) oral liquid 650 mg  acetaminophen (Tylenol) suppository 650 mg  ondansetron ODT (Zofran-ODT) disintegrating tablet 4 mg  ondansetron (Zofran) injection 4 mg  melatonin tablet 3 mg  acetaminophen (Tylenol) tablet 650 mg  cyclobenzaprine (Flexeril)  tablet 5 mg  potassium chloride CR (Klor-Con M20) ER tablet 20 mEq  finasteride (Proscar) tablet 5 mg  losartan (Cozaar) tablet 100 mg  metoprolol succinate XL (Toprol-XL) 24 hr tablet 100 mg  rosuvastatin (Crestor) tablet 10 mg  tamsulosin (Flomax) 24 hr capsule 0.8 mg  dextrose 50 % injection 25 g  glucagon (Glucagen) injection 1 mg  dextrose 10 % infusion  insulin degludec (Tresiba) injection 10 Units  potassium chloride CR (Klor-Con M20) ER tablet 20 mEq  lactated Ringer's infusion      Relevant Results  Labs  Results from last 72 hours   Lab Units 10/04/23  0622 10/03/23  0536 10/02/23  1336   WBC AUTO x10*3/uL 7.9 9.7 11.9*   HEMOGLOBIN g/dL 9.5* 9.7* 11.1*   HEMATOCRIT % 29.5* 29.1* 33.4*   PLATELETS AUTO x10*3/uL 153 144* 169   NEUTROS PCT AUTO %  --   --  79.1   LYMPHS PCT AUTO %  --   --  5.3   MONOS PCT AUTO %  --   --  11.7   EOS PCT AUTO %  --   --  0.1     Results from last 72 hours   Lab Units 10/04/23  0622 10/03/23  0536 10/02/23  1336   SODIUM mmol/L 125* 123* 124*   POTASSIUM mmol/L 3.3* 3.4* 3.5   CHLORIDE mmol/L 95* 90* 88*   CO2 mmol/L 20* 21 23   BUN mg/dL 81* 72* 68*   CREATININE mg/dL 2.90* 2.60* 2.25*   GLUCOSE mg/dL 127* 160* 144*   CALCIUM mg/dL 7.3* 7.1* 7.7*   ANION GAP mmol/L 13 15 17   EGFR mL/min/1.73m*2 22* 25* 30*   PHOSPHORUS mg/dL 5.0*  --   --      Results from last 72 hours   Lab Units 10/04/23  0622 10/02/23  1336   ALK PHOS U/L  --  156*   BILIRUBIN TOTAL mg/dL  --  1.0   PROTEIN TOTAL g/dL  --  6.7   ALT U/L  --  42   AST U/L  --  43*   ALBUMIN g/dL 2.1* 2.7*     Estimated Creatinine Clearance: 32.5 mL/min (A) (by C-G formula based on SCr of 2.9 mg/dL (H)).  CRP   Date Value Ref Range Status   09/18/2023 22.55 (A) mg/dL Final     Comment:     REF VALUE  < 1.00     06/23/2021 0.50 mg/dL Final     Comment:     REF VALUE  < 1.00     06/16/2021 15.58 (A) mg/dL Final     Comment:     REF VALUE  < 1.00       Microbiology  No results found for the last 14 days.         Assessment/Plan   Diarrhea, stool studies are pending  Pyuria,  the culture with N joy  Lymphedema / stasis     Recommendations :     Avoid antibiotics     I spent  minutes in the professional and overall care of this patient.      Payton Raines MD

## 2023-10-04 NOTE — CARE PLAN
The patient's goals for the shift include      The clinical goals for the shift include control patients n/v throughout shift    Over the shift, the patient did not make progress toward the following goals. Barriers to progression include   Problem: Skin  Goal: Promote/optimize nutrition  10/3/2023 2229 by Parminder Salas RN  Outcome: Progressing  10/3/2023 2228 by Parminder Salas RN  Outcome: Progressing  10/3/2023 2217 by Parminder Salas RN  Outcome: Progressing   . Recommendations to address these barriers include   Problem: Skin  Goal: Prevent/minimize sheer/friction injuries  10/3/2023 2229 by Parminder Salas RN  Outcome: Progressing  10/3/2023 2228 by Parminder Salas RN  Outcome: Progressing  10/3/2023 2217 by Parminder Salas RN  Outcome: Progressing   .

## 2023-10-04 NOTE — PROGRESS NOTES
10/04/23 1043   Discharge Planning   Living Arrangements Family members   Support Systems Family members;Friends/neighbors   Assistance Needed walker   Type of Residence Private residence   Home or Post Acute Services Post acute facilities (Rehab/SNF/etc)   Type of Post Acute Facility Services Skilled nursing   Patient expects to be discharged to: new SNF   Does the patient need discharge transport arranged? Yes     10/04/2023 1043: TCC spoke with patient about SNF choices. Patient stated his friend is coming in today to assist him with SNF choices. TCC to follow up.     10/04/2023 1540: Followed up with patient for SNF choices. Patient's friend at the bedside and will review the list to assist the patient make choices. TCC to follow up,       10/06/23 1457   Discharge Planning   Living Arrangements Family members   Support Systems Family members;Friends/neighbors   Assistance Needed independent with a walker   Type of Residence Private residence   Home or Post Acute Services Post acute facilities (Rehab/SNF/etc)   Type of Post Acute Facility Services Skilled nursing   Patient expects to be discharged to: [facility]   Does the patient need discharge transport arranged? Yes   RoundTrip coordination needed? Yes   Has discharge transport been arranged? No   What day is the transport expected? 10/07/23     10/06/2023 1459: Anticipate discharge on 10/7 to [facility].

## 2023-10-04 NOTE — CONSULTS
Reason For Consult  Nephrology is being consulted for NOHELIA and hyponatremia.    History Of Present Illness  Chester Verduzco is a 73 y.o. male history of arthritis, obesity, Chronic bilateral lower extremity lymphedema and ventral hernia status postrepair x3, gout, hypertension, hyperlipidemia, GERD, CHF, BPH who presents with non-bloody diarrhea and generalized weakness for the past 2 weeks. Nephrology is being consulted for NOHELIA and hyponatremia.     Patient recently was diagnosed with a UTI that was being managed as outpatient with Keflex, however he was not able to complete his course d/t GI upset. He has not had any changes or decrease in urine output, but he does report decreased intake overall. He was having diarrhea 3-4 episodes a day over the past 2-3 weeks. He also had very low appetite. Denies fever, vomiting, urinary symptoms.      Past Medical History  He has a past medical history of Abnormal weight gain (10/27/2016), Achilles tendinitis, unspecified leg (08/16/2016), Acute upper respiratory infection, unspecified, Allergic contact dermatitis due to plants, except food (08/22/2013), Cellulitis of right lower limb (07/30/2021), Cough, unspecified (03/21/2016), Encounter for screening for human immunodeficiency virus (HIV) (06/14/2016), Hordeolum externum unspecified eye, unspecified eyelid (08/14/2019), Hyperglycemia, unspecified (12/13/2017), Incisional hernia without obstruction or gangrene (06/15/2015), Muscle spasm of calf (08/16/2016), Personal history of other diseases of the digestive system (06/30/2015), Personal history of other diseases of the digestive system (02/05/2016), Personal history of other diseases of the respiratory system (12/23/2014), Personal history of other diseases of the respiratory system (01/28/2016), Personal history of other infectious and parasitic diseases (12/02/2015), Personal history of other infectious and parasitic diseases, Personal history of other specified  conditions (02/04/2015), Personal history of other specified conditions (08/16/2016), Personal history of other specified conditions (01/26/2015), Personal history of pneumonia (recurrent) (01/29/2016), Personal history of urinary (tract) infections (02/19/2013), Prostatitis (03/21/2023), Pruritus, unspecified (02/10/2015), Strain of muscle, fascia and tendon of the posterior muscle group at thigh level, right thigh, initial encounter (05/10/2016), and Unspecified symptoms and signs involving the genitourinary system (12/20/2016).    Surgical History  He has a past surgical history that includes Varicose vein surgery (08/22/2013); Cardiac pacemaker placement (08/22/2013); Other surgical history (06/15/2015); Ventral hernia repair (06/15/2015); and Other surgical history (09/16/2019).     Social History  He reports that he has never smoked. He has never been exposed to tobacco smoke. He has never used smokeless tobacco. He reports that he does not currently use alcohol. He reports that he does not use drugs.    Family History  No family history on file.     Allergies  Keflex [cephalexin] and Bupropion    Review of Systems  - CONSTITUTIONAL: Denies weight loss, fever and chills.  - HEENT: Denies changes in vision and hearing.  - RESPIRATORY: Denies SOB and cough.  - CV: Denies palpitations and CP.   - GI: Denies abdominal pain, nausea, vomiting and diarrhea.  - : Denies dysuria and urinary frequency.  - MSK: Denies myalgia and  joint pain.  - SKIN: Denies rash and pruritus.  - NEUROLOGICAL: Denies headache and syncope.  - PSYCHIATRIC: Denies recent changes in mood. Denies anxiety and depression.      Physical Exam  Constitutional: patient is alert and cooperative with exam  skin: dry and warm  Eyes: EOMI and clear sclera  ENMT: moist mucous membranes  Head/Neck: normal neck, no JVD and trachea midline  Respiratory/Thorax: Clear to auscultation bilaterally, no wheezing or crackles appreciated  Cardiovascular: regular  rate and rhythm, S1 and S2 present, no murmurs heard  Gastrointestinal: distended abdomen, bowel sounds present, no pain or tenderness upon palpation, soft  Extremities: +2 radial, posterior tibial, and pedal pulse, no lower extremity edema noted  Neurological: A&Ox3 no focal deficit         I&O 24HR    Intake/Output Summary (Last 24 hours) at 10/4/2023 1047  Last data filed at 10/4/2023 0640  Gross per 24 hour   Intake 4151 ml   Output 900 ml   Net 3251 ml       Vitals 24HR  Heart Rate:  [70-72]   Temp:  [36.2 °C (97.2 °F)-36.4 °C (97.5 °F)]   Resp:  [16]   BP: (137-154)/(62-67)   SpO2:  [92 %-93 %]       Relevant Results  Results for orders placed or performed during the hospital encounter of 10/02/23 (from the past 96 hour(s))   B-type natriuretic peptide   Result Value Ref Range    BNP 97 0 - 99 pg/mL   Sars-CoV-2 PCR, Symptomatic   Result Value Ref Range    Coronavirus 2019, PCR Not Detected Not Detected   Influenza A, and B PCR   Result Value Ref Range    Flu A Result Not Detected Not Detected    Flu B Result Not Detected Not Detected   Blood Gas Venous Full Panel Unsolicited   Result Value Ref Range    POCT pH, Venous 7.47 (H) 7.33 - 7.43 pH    POCT pCO2, Venous 32 (L) 41 - 51 mm Hg    POCT pO2, Venous 26 (L) 35 - 45 mm Hg    POCT SO2, Venous 39 (L) 45 - 75 %    POCT Oxy Hemoglobin, Venous 38.0 (L) 45.0 - 75.0 %    POCT Hematocrit Calculated, Venous 33.0 (L) 41.0 - 52.0 %    POCT Sodium, Venous 121 (L) 136 - 145 mmol/L    POCT Potassium, Venous 3.2 (L) 3.5 - 5.3 mmol/L    POCT Chloride, Venous 91 (L) 98 - 107 mmol/L    POCT Ionized Calicum, Venous 1.03 (L) 1.10 - 1.33 mmol/L    POCT Glucose, Venous 158 (H) 74 - 99 mg/dL    POCT Lactate, Venous 1.6 0.4 - 2.0 mmol/L    POCT Base Excess, Venous 0.1 -2.0 - 3.0 mmol/L    POCT HCO3 Calculated, Venous 23.3 22.0 - 26.0 mmol/L    POCT Hemoglobin, Venous 11.1 (L) 13.5 - 17.5 g/dL    POCT Anion Gap, Venous 10.0 10.0 - 25.0 mmol/L    Patient Temperature 37.0 degrees  Celsius   CBC and Auto Differential   Result Value Ref Range    WBC 11.9 (H) 4.4 - 11.3 x10*3/uL    nRBC 0.0 0.0 - 0.0 /100 WBCs    RBC 3.82 (L) 4.50 - 5.90 x10*6/uL    Hemoglobin 11.1 (L) 13.5 - 17.5 g/dL    Hematocrit 33.4 (L) 41.0 - 52.0 %    MCV 87 80 - 100 fL    MCH 29.1 26.0 - 34.0 pg    MCHC 33.2 32.0 - 36.0 g/dL    RDW 13.8 11.5 - 14.5 %    Platelets 169 150 - 450 x10*3/uL    MPV 11.1 7.5 - 11.5 fL    Neutrophils % 79.1 40.0 - 80.0 %    Immature Granulocytes %, Automated 3.7 (H) 0.0 - 0.9 %    Lymphocytes % 5.3 13.0 - 44.0 %    Monocytes % 11.7 2.0 - 10.0 %    Eosinophils % 0.1 0.0 - 6.0 %    Basophils % 0.1 0.0 - 2.0 %    Neutrophils Absolute 9.44 (H) 1.60 - 5.50 x10*3/uL    Immature Granulocytes Absolute, Automated 0.44 0.00 - 0.50 x10*3/uL    Lymphocytes Absolute 0.63 (L) 0.80 - 3.00 x10*3/uL    Monocytes Absolute 1.40 (H) 0.05 - 0.80 x10*3/uL    Eosinophils Absolute 0.01 0.00 - 0.40 x10*3/uL    Basophils Absolute 0.01 0.00 - 0.10 x10*3/uL   Comprehensive metabolic panel   Result Value Ref Range    Glucose 144 (H) 74 - 99 mg/dL    Sodium 124 (L) 136 - 145 mmol/L    Potassium 3.5 3.5 - 5.3 mmol/L    Chloride 88 (L) 98 - 107 mmol/L    Bicarbonate 23 21 - 32 mmol/L    Anion Gap 17 10 - 20 mmol/L    Urea Nitrogen 68 (H) 6 - 23 mg/dL    Creatinine 2.25 (H) 0.50 - 1.30 mg/dL    eGFR 30 (L) >60 mL/min/1.73m*2    Calcium 7.7 (L) 8.6 - 10.3 mg/dL    Albumin 2.7 (L) 3.4 - 5.0 g/dL    Alkaline Phosphatase 156 (H) 33 - 136 U/L    Total Protein 6.7 6.4 - 8.2 g/dL    AST 43 (H) 9 - 39 U/L    Bilirubin, Total 1.0 0.0 - 1.2 mg/dL    ALT 42 10 - 52 U/L   Magnesium   Result Value Ref Range    Magnesium 2.01 1.60 - 2.40 mg/dL   Urinalysis with Reflex Microscopic and Culture   Result Value Ref Range    Color, Urine Brit (N) Straw, Yellow    Appearance, Urine Hazy (N) Clear    Specific Gravity, Urine 1.014 1.005 - 1.035    pH, Urine 5.0 5.0, 5.5, 6.0, 6.5, 7.0, 7.5, 8.0    Protein, Urine 30 (1+) (N) NEGATIVE mg/dL     Glucose, Urine NEGATIVE NEGATIVE mg/dL    Blood, Urine LARGE (3+) (A) NEGATIVE    Ketones, Urine NEGATIVE NEGATIVE mg/dL    Bilirubin, Urine NEGATIVE NEGATIVE    Urobilinogen, Urine 2.0 (N) <2.0 mg/dL    Nitrite, Urine NEGATIVE NEGATIVE    Leukocyte Esterase, Urine SMALL (1+) (A) NEGATIVE   Urinalysis Microscopic Only   Result Value Ref Range    WBC, Urine >50 (A) 1-5, NONE /HPF    WBC Clumps, Urine MANY Reference range not established. /HPF    RBC, Urine >20 (A) NONE, 1-2, 3-5 /HPF    Bacteria, Urine 1+ (A) NONE SEEN /HPF    Yeast Hyphae, Urine PRESENT (A) NONE /HPF   Urine Culture    Specimen: Clean Catch/Voided; Urine   Result Value Ref Range    Urine Culture Normal genitourinary joy    CBC   Result Value Ref Range    WBC 9.7 4.4 - 11.3 x10*3/uL    nRBC 0.0 0.0 - 0.0 /100 WBCs    RBC 3.39 (L) 4.50 - 5.90 x10*6/uL    Hemoglobin 9.7 (L) 13.5 - 17.5 g/dL    Hematocrit 29.1 (L) 41.0 - 52.0 %    MCV 86 80 - 100 fL    MCH 28.6 26.0 - 34.0 pg    MCHC 33.3 32.0 - 36.0 g/dL    RDW 13.8 11.5 - 14.5 %    Platelets 144 (L) 150 - 450 x10*3/uL    MPV 10.5 7.5 - 11.5 fL   Basic metabolic panel   Result Value Ref Range    Glucose 160 (H) 74 - 99 mg/dL    Sodium 123 (L) 136 - 145 mmol/L    Potassium 3.4 (L) 3.5 - 5.3 mmol/L    Chloride 90 (L) 98 - 107 mmol/L    Bicarbonate 21 21 - 32 mmol/L    Anion Gap 15 10 - 20 mmol/L    Urea Nitrogen 72 (H) 6 - 23 mg/dL    Creatinine 2.60 (H) 0.50 - 1.30 mg/dL    eGFR 25 (L) >60 mL/min/1.73m*2    Calcium 7.1 (L) 8.6 - 10.3 mg/dL   Urine electrolytes   Result Value Ref Range    Sodium, Urine Random 11 mmol/L    Sodium/Creatinine Ratio 10 Not established. mmol/g Creat    Potassium, Urine Random 18 mmol/L    Potassium/Creatinine Ratio 16 Not established mmol/g Creat    Chloride, Urine Random <15 mmol/L    Chloride/Creatinine Ratio      Creatinine, Urine Random 112.4 20.0 - 370.0 mg/dL   POCT GLUCOSE   Result Value Ref Range    POCT Glucose 170 (H) 74 - 99 mg/dL   CBC   Result Value Ref Range     WBC 7.9 4.4 - 11.3 x10*3/uL    nRBC 0.0 0.0 - 0.0 /100 WBCs    RBC 3.38 (L) 4.50 - 5.90 x10*6/uL    Hemoglobin 9.5 (L) 13.5 - 17.5 g/dL    Hematocrit 29.5 (L) 41.0 - 52.0 %    MCV 87 80 - 100 fL    MCH 28.1 26.0 - 34.0 pg    MCHC 32.2 32.0 - 36.0 g/dL    RDW 13.9 11.5 - 14.5 %    Platelets 153 150 - 450 x10*3/uL    MPV 10.1 7.5 - 11.5 fL   Renal function panel   Result Value Ref Range    Glucose 127 (H) 74 - 99 mg/dL    Sodium 125 (L) 136 - 145 mmol/L    Potassium 3.3 (L) 3.5 - 5.3 mmol/L    Chloride 95 (L) 98 - 107 mmol/L    Bicarbonate 20 (L) 21 - 32 mmol/L    Anion Gap 13 10 - 20 mmol/L    Urea Nitrogen 81 (H) 6 - 23 mg/dL    Creatinine 2.90 (H) 0.50 - 1.30 mg/dL    eGFR 22 (L) >60 mL/min/1.73m*2    Calcium 7.3 (L) 8.6 - 10.3 mg/dL    Phosphorus 5.0 (H) 2.5 - 4.9 mg/dL    Albumin 2.1 (L) 3.4 - 5.0 g/dL   Iron and TIBC   Result Value Ref Range    Iron 19 (L) 35 - 150 ug/dL    UIBC 110 110 - 370 ug/dL    TIBC 129 (L) 240 - 445 ug/dL    % Saturation 15 (L) 25 - 45 %   POCT GLUCOSE   Result Value Ref Range    POCT Glucose 115 (H) 74 - 99 mg/dL        CT abdomen pelvis wo IV contrast   Final Result   1. Colonic diverticulosis, without evidence of acute diverticulitis.   2. Enlarged lymph nodes in the left external iliac chain, and   bilateral groin.   3. Enlargement of the prostate.   4. Hypodensity from the left kidney, incompletely evaluated without   contrast, but most likely representing is cyst.        MACRO:   None        Signed by: Javon El 10/2/2023 2:58 PM   Dictation workstation:   [id number]      XR chest 1 view   Final Result   Suspected small left pleural effusion with adjacent atelectasis.   Superimposed infection is possible in the appropriate clinical   setting. No pulmonary edema.        MACRO   None        Signed by: Shakeel Rogers 10/2/2023 1:33 PM   Dictation workstation:   [id number]            Assessment and Plan   Chester Verduzco is a 73 y.o. male history of arthritis, obesity,  Chronic bilateral lower extremity lymphedema and ventral hernia status postrepair x3, gout, hypertension, hyperlipidemia, GERD, CHF, BPH who presents with non-bloody diarrhea and generalized weakness for the past 2 weeks. Nephrology is being consulted for NOHELIA and hyponatremia.     #non-oliguric NOHELIA  - upon chart review, unable to discern a documented prior h/o CKD   - CT abdomen pelvis shows likely uncomplicated renal cyst in left upper pole, post-renal cause was r/o  given no signs of obstruction  - Scr trending 2.25-> 2.6-> 2.9 and GFR 30-> 25-> 22  - Urine lytes: Arash 11, UCl <15, low FeNa of 0.2%; urine lytes are consistent with pre-renal NOHELIA  - most likely pre-renal injury given volume depletion in setting of decreased intake and diarrhea  - BNP within normal limits  - UA with +1 protein, lots of WBC & RBC, will check albumin spot test  - hold Losartan for GFR preservation  - no indications for dialysis at this time    #hypoosmotic hypovolemic asymptomatic hyponatremia  - baseline of low 130's in 02/23, 124 on admission  - latest Na of 124   - likely in the setting of volume dehydration  - received 1L LR bolus in ED, 36 hours of 100mL/hr continuous infusions of 0.9% NS  - continue gentle correction with IVF    #hypokalemia in the setting of diarrhea  - K of 3.5 on admission, baseline of 3.7-4.0  - latest K of 3.3  - continue PO potassium 20 mEq/day  - switch from NS to LR at 100cc/hr for additional potassium supplementation    #BP-stable  - continue holding Losartan in the setting of NOHELIA    #worsening anemia  - drop in Hgb from 11.1 on admission to 9.5, prior baseline of 13.6 in 09/18/23  - less likely dilutional given dehydration and low solutes in urine  - in the setting of high BUN, recommend considering r/o GI bleed if further worsening of anemia  - check serum iron and TIBC  - defer to primary     Recommendations:  - Continue aggressive fluids, switching from NS to LR at 100cc/hr  - Continue PO potassium    - Albumin spot test   - Check serum iron and TIBC    Nephrology will continue to follow     GRACE Santos-III

## 2023-10-04 NOTE — CARE PLAN
The patient's goals for the shift include      The clinical goals for the shift include control patients n/v throughout shift

## 2023-10-05 LAB
ANION GAP SERPL CALC-SCNC: 14 MMOL/L (ref 10–20)
BUN SERPL-MCNC: 76 MG/DL (ref 6–23)
C DIF TOX TCDA+TCDB STL QL NAA+PROBE: NOT DETECTED
CALCIUM SERPL-MCNC: 7.6 MG/DL (ref 8.6–10.3)
CHLORIDE SERPL-SCNC: 97 MMOL/L (ref 98–107)
CO2 SERPL-SCNC: 20 MMOL/L (ref 21–32)
CREAT SERPL-MCNC: 2.88 MG/DL (ref 0.5–1.3)
CREAT UR-MCNC: 73.8 MG/DL (ref 20–370)
ERYTHROCYTE [DISTWIDTH] IN BLOOD BY AUTOMATED COUNT: 14.1 % (ref 11.5–14.5)
GFR SERPL CREATININE-BSD FRML MDRD: 22 ML/MIN/1.73M*2
GLUCOSE BLD MANUAL STRIP-MCNC: 111 MG/DL (ref 74–99)
GLUCOSE BLD MANUAL STRIP-MCNC: 142 MG/DL (ref 74–99)
GLUCOSE BLD MANUAL STRIP-MCNC: 153 MG/DL (ref 74–99)
GLUCOSE BLD MANUAL STRIP-MCNC: 187 MG/DL (ref 74–99)
GLUCOSE SERPL-MCNC: 102 MG/DL (ref 74–99)
HCT VFR BLD AUTO: 31.3 % (ref 41–52)
HGB BLD-MCNC: 9.9 G/DL (ref 13.5–17.5)
MCH RBC QN AUTO: 28.2 PG (ref 26–34)
MCHC RBC AUTO-ENTMCNC: 31.6 G/DL (ref 32–36)
MCV RBC AUTO: 89 FL (ref 80–100)
MICROALBUMIN UR-MCNC: 105.8 MG/L
MICROALBUMIN/CREAT UR: 143.4 UG/MG CREAT
NRBC BLD-RTO: 0 /100 WBCS (ref 0–0)
PLATELET # BLD AUTO: 163 X10*3/UL (ref 150–450)
PMV BLD AUTO: 10.2 FL (ref 7.5–11.5)
POTASSIUM SERPL-SCNC: 3.8 MMOL/L (ref 3.5–5.3)
RBC # BLD AUTO: 3.51 X10*6/UL (ref 4.5–5.9)
SODIUM SERPL-SCNC: 127 MMOL/L (ref 136–145)
WBC # BLD AUTO: 7.6 X10*3/UL (ref 4.4–11.3)

## 2023-10-05 PROCEDURE — 2500000001 HC RX 250 WO HCPCS SELF ADMINISTERED DRUGS (ALT 637 FOR MEDICARE OP)

## 2023-10-05 PROCEDURE — 80048 BASIC METABOLIC PNL TOTAL CA: CPT | Performed by: NURSE PRACTITIONER

## 2023-10-05 PROCEDURE — 36415 COLL VENOUS BLD VENIPUNCTURE: CPT | Performed by: NURSE PRACTITIONER

## 2023-10-05 PROCEDURE — 2500000002 HC RX 250 W HCPCS SELF ADMINISTERED DRUGS (ALT 637 FOR MEDICARE OP, ALT 636 FOR OP/ED): Performed by: NURSE PRACTITIONER

## 2023-10-05 PROCEDURE — 97535 SELF CARE MNGMENT TRAINING: CPT | Mod: GO

## 2023-10-05 PROCEDURE — 99232 SBSQ HOSP IP/OBS MODERATE 35: CPT | Performed by: NURSE PRACTITIONER

## 2023-10-05 PROCEDURE — 87506 IADNA-DNA/RNA PROBE TQ 6-11: CPT | Mod: CMCLAB,GEALAB | Performed by: NURSE PRACTITIONER

## 2023-10-05 PROCEDURE — 2500000001 HC RX 250 WO HCPCS SELF ADMINISTERED DRUGS (ALT 637 FOR MEDICARE OP): Performed by: INTERNAL MEDICINE

## 2023-10-05 PROCEDURE — 2500000004 HC RX 250 GENERAL PHARMACY W/ HCPCS (ALT 636 FOR OP/ED): Performed by: NURSE PRACTITIONER

## 2023-10-05 PROCEDURE — 2500000001 HC RX 250 WO HCPCS SELF ADMINISTERED DRUGS (ALT 637 FOR MEDICARE OP): Performed by: NURSE PRACTITIONER

## 2023-10-05 PROCEDURE — 96372 THER/PROPH/DIAG INJ SC/IM: CPT | Performed by: NURSE PRACTITIONER

## 2023-10-05 PROCEDURE — 1100000001 HC PRIVATE ROOM DAILY

## 2023-10-05 PROCEDURE — 2580000001 HC RX 258 IV SOLUTIONS

## 2023-10-05 PROCEDURE — 82947 ASSAY GLUCOSE BLOOD QUANT: CPT

## 2023-10-05 PROCEDURE — 85027 COMPLETE CBC AUTOMATED: CPT | Performed by: NURSE PRACTITIONER

## 2023-10-05 PROCEDURE — 99233 SBSQ HOSP IP/OBS HIGH 50: CPT | Performed by: INTERNAL MEDICINE

## 2023-10-05 PROCEDURE — 97110 THERAPEUTIC EXERCISES: CPT | Mod: GO

## 2023-10-05 RX ORDER — AMLODIPINE BESYLATE 10 MG/1
10 TABLET ORAL EVERY EVENING
Status: DISCONTINUED | OUTPATIENT
Start: 2023-10-05 | End: 2023-10-07 | Stop reason: HOSPADM

## 2023-10-05 RX ORDER — FERROUS GLUCONATE 325 MG
38 TABLET ORAL
Status: DISCONTINUED | OUTPATIENT
Start: 2023-10-06 | End: 2023-10-07 | Stop reason: HOSPADM

## 2023-10-05 RX ADMIN — CYCLOBENZAPRINE HYDROCHLORIDE 5 MG: 5 TABLET, FILM COATED ORAL at 21:12

## 2023-10-05 RX ADMIN — TAMSULOSIN HYDROCHLORIDE 0.8 MG: 0.4 CAPSULE ORAL at 21:12

## 2023-10-05 RX ADMIN — CYCLOBENZAPRINE HYDROCHLORIDE 5 MG: 5 TABLET, FILM COATED ORAL at 15:06

## 2023-10-05 RX ADMIN — INSULIN DEGLUDEC INJECTION 10 UNITS: 100 INJECTION, SOLUTION SUBCUTANEOUS at 21:14

## 2023-10-05 RX ADMIN — AMLODIPINE BESYLATE 10 MG: 10 TABLET ORAL at 21:12

## 2023-10-05 RX ADMIN — ACETAMINOPHEN 650 MG: 325 TABLET, FILM COATED ORAL at 00:50

## 2023-10-05 RX ADMIN — MELATONIN TAB 3 MG 3 MG: 3 TAB at 21:12

## 2023-10-05 RX ADMIN — ACETAMINOPHEN 650 MG: 325 TABLET, FILM COATED ORAL at 15:06

## 2023-10-05 RX ADMIN — CYCLOBENZAPRINE HYDROCHLORIDE 5 MG: 5 TABLET, FILM COATED ORAL at 10:17

## 2023-10-05 RX ADMIN — SODIUM CHLORIDE, POTASSIUM CHLORIDE, SODIUM LACTATE AND CALCIUM CHLORIDE 100 ML/HR: 600; 310; 30; 20 INJECTION, SOLUTION INTRAVENOUS at 10:20

## 2023-10-05 RX ADMIN — POTASSIUM CHLORIDE 20 MEQ: 1500 TABLET, EXTENDED RELEASE ORAL at 10:16

## 2023-10-05 RX ADMIN — ROSUVASTATIN 10 MG: 10 TABLET, FILM COATED ORAL at 21:12

## 2023-10-05 RX ADMIN — PANTOPRAZOLE SODIUM 40 MG: 40 TABLET, DELAYED RELEASE ORAL at 06:53

## 2023-10-05 RX ADMIN — FINASTERIDE 5 MG: 5 TABLET, FILM COATED ORAL at 10:17

## 2023-10-05 RX ADMIN — METOPROLOL SUCCINATE 100 MG: 50 TABLET, EXTENDED RELEASE ORAL at 10:20

## 2023-10-05 RX ADMIN — SODIUM CHLORIDE, POTASSIUM CHLORIDE, SODIUM LACTATE AND CALCIUM CHLORIDE 100 ML/HR: 600; 310; 30; 20 INJECTION, SOLUTION INTRAVENOUS at 21:14

## 2023-10-05 RX ADMIN — INSULIN HUMAN 1 UNITS: 100 INJECTION, SOLUTION PARENTERAL at 16:48

## 2023-10-05 ASSESSMENT — COGNITIVE AND FUNCTIONAL STATUS - GENERAL
TOILETING: TOTAL
PERSONAL GROOMING: A LOT
MOVING TO AND FROM BED TO CHAIR: A LOT
DAILY ACTIVITIY SCORE: 13
HELP NEEDED FOR BATHING: A LOT
WALKING IN HOSPITAL ROOM: TOTAL
MOVING FROM LYING ON BACK TO SITTING ON SIDE OF FLAT BED WITH BEDRAILS: A LOT
TURNING FROM BACK TO SIDE WHILE IN FLAT BAD: A LOT
EATING MEALS: A LITTLE
HELP NEEDED FOR BATHING: A LOT
DRESSING REGULAR LOWER BODY CLOTHING: A LOT
DRESSING REGULAR LOWER BODY CLOTHING: A LOT
DAILY ACTIVITIY SCORE: 12
PERSONAL GROOMING: A LOT
EATING MEALS: A LITTLE
DRESSING REGULAR UPPER BODY CLOTHING: A LOT
STANDING UP FROM CHAIR USING ARMS: A LOT
MOBILITY SCORE: 10
CLIMB 3 TO 5 STEPS WITH RAILING: TOTAL
TOILETING: A LOT
DRESSING REGULAR UPPER BODY CLOTHING: A LOT

## 2023-10-05 ASSESSMENT — PAIN - FUNCTIONAL ASSESSMENT
PAIN_FUNCTIONAL_ASSESSMENT: WONG-BAKER FACES
PAIN_FUNCTIONAL_ASSESSMENT: 0-10

## 2023-10-05 ASSESSMENT — PAIN SCALES - GENERAL
PAINLEVEL_OUTOF10: 4
PAINLEVEL_OUTOF10: 3
PAINLEVEL_OUTOF10: 0 - NO PAIN

## 2023-10-05 ASSESSMENT — ACTIVITIES OF DAILY LIVING (ADL): HOME_MANAGEMENT_TIME_ENTRY: 20

## 2023-10-05 ASSESSMENT — PAIN SCALES - WONG BAKER: WONGBAKER_NUMERICALRESPONSE: HURTS LITTLE MORE

## 2023-10-05 NOTE — NURSING NOTE
0900: morning meds given, hygiene care done. Pt had loose/semi-formed stool and got stool pathogen panel sent to lab    1200: pt worked w/ OT, went on bedside commode, and sat in chair for awhile    1600: pt and friend of patient, notified me that their SNF of choice is [facility] and to let care coordinator know in the morning

## 2023-10-05 NOTE — PROGRESS NOTES
Occupational Therapy    Occupational Therapy Treatment    Name: Chester Verduzco  MRN: [id number]  : 1950  Date: 10/05/23  Time Calculation  Start Time: 1125  Stop Time: 1200  Time Calculation (min): 35 min    Assessment:  OT Assessment: Pt continue to demo overall decreased strnegth and endurance limiting safe and Indep completrion of ADLs and functional mobility tasks.  Prognosis: Good  Evaluation/Treatment Tolerance: Patient limited by fatigue, Patient limited by pain  Medical Staff Made Aware: Yes  Plan:  Treatment Interventions: ADL retraining, UE strengthening/ROM, Endurance training, Functional transfer training  OT Frequency: 3 times per week  OT Discharge Recommendations: Moderate intensity level of continued care    Subjective   General:  OT Last Visit  OT Received On: 10/05/23  General  Reason for Referral: Impaired ADL    Prior to Session Communication: Bedside nurse, PCT/NA/CTA  Patient Position Received: Bed, 3 rail up  Preferred Learning Style: verbal, visual  General Comment: Seated in chair at end of session with call light in reach.       Pain Assessment:  Pain Assessment  Pain Assessment: Ayon-Baker FACES  Ayon-Baker FACES Pain Rating: Hurts little more  Pain Type: Chronic pain  Pain Location:  (B knees, B shlds)  Pain Frequency:  (Increasing with movement)  Pain Onset: Progressive         Toileting  Toileting Adaptive Equipment:  (BSC)  Toileting Comments: Able to stand and was Dep for shireen care and clean up after toileting.    Functional Standing Tolerance:  Functional Standing Tolerance  Activity:  (Static stand with FWW)  Functional Standing Tolerance Comments: For approx 2min with CGA-MIN A for shireen care and gown change.  Bed Mobility/Transfers: Bed Mobility  Bed Mobility: Yes  Bed Mobility 1  Bed Mobility 1: Supine to sitting  Level of Assistance 1: Maximum assistance  Bed Mobility Comments 1: Use of bed rails and draw sheet.    Transfers  Transfer: Yes  Transfer 1  Transfer From 1:  Bed to  Transfer to 1: Stand  Technique 1: Sit to stand  Transfer Device 1: Walker  Transfer Level of Assistance 1: Minimum assistance  Trials/Comments 1: Bed height raised.  Transfers 2  Transfer From 2: Commode-standard to  Transfer to 2: Stand  Technique 2: Sit to stand  Transfer Device 2: Walker  Transfer Level of Assistance 2: Moderate assistance  Trials/Comments 2: Cues for hand placement and technique.  Transfers 3  Transfer From 3: Stand to  Transfer to 3: Chair with arms  Technique 3: Stand pivot  Transfer Device 3: Walker  Transfer Level of Assistance 3: Minimum assistance, Moderate assistance       Therapy/Activity: Therapeutic Exercise  Therapeutic Exercise Performed: Yes (B scap mobs; all motions x5. P/AAROM for B shld flex/ext, ABD, IR/ER x5reps each.)        Outcome Measures:  [facility] Daily Activity  Putting on and taking off regular lower body clothing: A lot  Bathing (including washing, rinsing, drying): A lot  Putting on and taking off regular upper body clothing: A lot  Toileting, which includes using toilet, bedpan or urinal: Total  Taking care of personal grooming such as brushing teeth: A lot  Eating Meals: A little  Daily Activity - Total Score: 12        Education Documentation  No documentation found.  Education Comments  No comments found.      Goals:  Encounter Problems       Encounter Problems (Active)       ADLs       Patient with complete lower body dressing with minimal assist  level of assistance donning and doffing all LE clothes  with PRN adaptive equipment. (Progressing)       Start:  10/03/23    Expected End:  10/17/23            Patient will complete daily grooming tasks with set-up and supervision level of assistance and PRN adaptive equipment. (Progressing)       Start:  10/03/23    Expected End:  10/17/23            Patient will complete toileting including hygiene clothing management/hygiene with minimal assist  level of assistance. (Progressing)       Start:  10/03/23     Expected End:  10/17/23               BALANCE       Pt will maintain dynamic standing balance during ADL task with stand by assist level of assistance in order to demonstrate decreased risk of falling and improved postural control. (Progressing)       Start:  10/03/23    Expected End:  10/17/23               Balance       balance  (Progressing)       Start:  10/03/23    Expected End:  10/17/23       Pt will demo static/dynamic standing balance x5+minutes B to UUE support CGA to progress activity tolerance and functional strength            EXERCISE/STRENGTHENING       Pt will increase B shld AROM to WFL in order to increase functional task participation. (Progressing)       Start:  10/03/23    Expected End:  10/17/23            Pt will increase distal BUE strength to 4+/5 in order to increase functional task participation. (Progressing)       Start:  10/03/23    Expected End:  10/17/23               Mobility       Gait (Progressing)       Start:  10/03/23    Expected End:  10/17/23       Pt will ambulate 3x25 feet with directional changes and use of DME Ryan necessary to initiate return to PLOF          bed (Progressing)       Start:  10/03/23    Expected End:  10/17/23       Pt will complete bed mobility with supervision for return to PLOF          strength (Progressing)       Start:  10/03/23       X15+ reps there ex to increase general strength needed for function            TRANSFERS       Patient will perform bed mobility contact guard assist level of assistance in order to improve safety and independence with mobility (Progressing)       Start:  10/03/23    Expected End:  10/17/23            Patient will complete functional transfers with least restrictive device with contact guard assist level of assistance. (Progressing)       Start:  10/03/23    Expected End:  10/17/23               Transfers       transfer (Progressing)       Start:  10/03/23    Expected End:  10/17/23       Pt will complete sit<>Stand,  bed <> chair transfer with Ryan x1 to improve function

## 2023-10-05 NOTE — PROGRESS NOTES
Physical Therapy                 Therapy Communication Note    Patient Name: Chester Verduzco  MRN: [id number]  Today's Date: 10/5/2023     Discipline: Physical Therapy    Missed Visit Reason: Missed Visit Reason: Patient refused (pt sitting up in bed, reports just returning to bed form chair/BSC and pollitely declines participation in session at this time. No tx completed)    Missed Time: Attempt    Comment: Pt declined session at 1526. No tx completed at this time at pt request.

## 2023-10-05 NOTE — PROGRESS NOTES
Chester Verduzco is a 73 y.o. male on day 3 of admission presenting with Diarrhea.      Subjective   Seen and examined in his room this AM. Awake and alert. Today has been having increased diarrhea again. Asking for BSC. He feels mildy improved today. He denies chest pain, breathing difficulties, abdominal pain, N/V/C, fever, or chills.         Objective     Last Recorded Vitals  /72   Pulse 86   Temp 36.2 °C (97.2 °F)   Resp 18   Wt 130 kg (287 lb 3.2 oz)   SpO2 91%   Intake/Output last 3 Shifts:    Intake/Output Summary (Last 24 hours) at 10/5/2023 1231  Last data filed at 10/5/2023 1000  Gross per 24 hour   Intake 1588.33 ml   Output 1559 ml   Net 29.33 ml       Admission Weight  Weight: 128 kg (282 lb 6.6 oz) (10/02/23 1208)    Daily Weight  10/03/23 : 130 kg (287 lb 3.2 oz)    Physical Exam  Constitutional: A&O x 3; NAD; calm and cooperative  Eyes: EOM's intact  HEENT: Normocephalic, Atraumatic. Oral mucosa moist.   Neck: Supple. No JVD, lymphadenopathy.   Lungs: CTAB with fair air movement. Diminished in the bases. Respirations even and unlabored on room air.   Heart: RRR  Abdomen: Softly distended; nontender; + BS.   MS/Extremities: JOHNS equally x 4. Peripheral pulses intact bilaterally. BLE edema with stasis/vascular changes, R>L.   Neuro: A&O x3; no focal deficits; gross motor and sensation intact.   Skin: Warm and dry.   Psych: Normal affect.      Relevant Results    Scheduled medications  amLODIPine, 10 mg, oral, q PM  cyclobenzaprine, 5 mg, oral, TID  docusate sodium, 100 mg, oral, BID  [START ON 10/6/2023] ferrous gluconate, 38 mg of iron, oral, Daily with breakfast  finasteride, 5 mg, oral, Daily  insulin degludec, 10 Units, subcutaneous, Nightly  [Held by provider] losartan, 100 mg, oral, Daily  melatonin, 3 mg, oral, Daily  metoprolol succinate XL, 100 mg, oral, Daily  pantoprazole, 40 mg, oral, Daily before breakfast   Or  pantoprazole, 40 mg, intravenous, Daily before  breakfast  potassium chloride CR, 20 mEq, oral, Daily  rosuvastatin, 10 mg, oral, Nightly  tamsulosin, 0.8 mg, oral, Nightly      Continuous medications  lactated Ringer's, 100 mL/hr, Last Rate: 100 mL/hr (10/05/23 1020)      PRN medications  PRN medications: acetaminophen **OR** acetaminophen **OR** acetaminophen, acetaminophen, dextrose 10 % in water (D10W), dextrose, glucagon, ondansetron ODT **OR** ondansetron    Results for orders placed or performed during the hospital encounter of 10/02/23 (from the past 24 hour(s))   Albumin, Urine Random   Result Value Ref Range    Albumin, Urine Random 105.8 Not established mg/L    Creatinine, Urine Random 73.8 20.0 - 370.0 mg/dL    Albumin/Creatine Ratio 143.4 (H) <30.0 ug/mg Creat   POCT GLUCOSE   Result Value Ref Range    POCT Glucose 136 (H) 74 - 99 mg/dL   POCT GLUCOSE   Result Value Ref Range    POCT Glucose 142 (H) 74 - 99 mg/dL   CBC   Result Value Ref Range    WBC 7.6 4.4 - 11.3 x10*3/uL    nRBC 0.0 0.0 - 0.0 /100 WBCs    RBC 3.51 (L) 4.50 - 5.90 x10*6/uL    Hemoglobin 9.9 (L) 13.5 - 17.5 g/dL    Hematocrit 31.3 (L) 41.0 - 52.0 %    MCV 89 80 - 100 fL    MCH 28.2 26.0 - 34.0 pg    MCHC 31.6 (L) 32.0 - 36.0 g/dL    RDW 14.1 11.5 - 14.5 %    Platelets 163 150 - 450 x10*3/uL    MPV 10.2 7.5 - 11.5 fL   Basic Metabolic Panel   Result Value Ref Range    Glucose 102 (H) 74 - 99 mg/dL    Sodium 127 (L) 136 - 145 mmol/L    Potassium 3.8 3.5 - 5.3 mmol/L    Chloride 97 (L) 98 - 107 mmol/L    Bicarbonate 20 (L) 21 - 32 mmol/L    Anion Gap 14 10 - 20 mmol/L    Urea Nitrogen 76 (H) 6 - 23 mg/dL    Creatinine 2.88 (H) 0.50 - 1.30 mg/dL    eGFR 22 (L) >60 mL/min/1.73m*2    Calcium 7.6 (L) 8.6 - 10.3 mg/dL   POCT GLUCOSE   Result Value Ref Range    POCT Glucose 111 (H) 74 - 99 mg/dL   POCT GLUCOSE   Result Value Ref Range    POCT Glucose 142 (H) 74 - 99 mg/dL     Assessment/Plan   73 y.o. male with history of arthritis, obesity, lymphedema, ventral hernia repair, hypertension,  hyperlipidemia and BPH, who presents with a 3-week history of diarrhea.      Generalized Weakness with Diarrhea  -CT A/P: No diverticulitis or acute process  -Recent antibiotic use (Keflex) for UTI  -Stool negative for C.Diff Colitis  -ID is consulted  -Holding on antibiotics pending results of cultures: Urine culture is negative.   -On IVF as appears to be hypovolemic from diarrhea and poor oral intake.   -Monitor and replete electrolytes  -PT/OT following  -Afebrile. No leukocytosis.      Acute Kidney Injury with Hyponatremia  -Sodium downtrendin>123>125>127  -On IVF with LR  -Creatinine: 2.25>2.60>2.98>2.88  -Holding nephrotoxic agents - Losartan, Furosemide  -Nephrology consulted - feels this is prerenal in nature  -RFP in AM.      Diabetes Mellitus Type II  -Resumed Tresiba at bedtime and added AC Insulin Sliding Scale.   -Maintain hypoglycemic protocol.      Hypertension  -Medical therapy: Metoprolol  -Hold Lasix and Losartan due to NOHELIA     Anemia  -Iron studies consistent with VIVI  -Monitor and trend Hgb. Hgb 9.9.   -Transfuse to keep Hgb>7  -Started on oral iron supplementation  -CBC in AM     DVT prophylaxis   -Heparin subcutaneous     Fluids/Electrolytes/Nutrition  -Labs reviewed.   -Electrolytes stable. Hyponatremia. NOHELIA.   -No nutritional concerns.      Disposition  -Plan of care discussed with attending and nephrology.   -Await PT/OT recommendations for discharge planning.        JOSE Pastrana-CNP

## 2023-10-05 NOTE — PROGRESS NOTES
Chester Verduzco is a 73 y.o. male on day 3 of admission presenting with Diarrhea.    Subjective   Interval History: no fever, no new complaints         Review of Systems    Objective   Range of Vitals (last 24 hours)  Heart Rate:  [66-86]   Temp:  [36.2 °C (97.2 °F)-36.5 °C (97.7 °F)]   Resp:  [16-18]   BP: (142-172)/(69-72)   SpO2:  [91 %-94 %]   Daily Weight  10/03/23 : 130 kg (287 lb 3.2 oz)    Body mass index is 37.89 kg/m².    Physical Exam  Constitutional:       Appearance: Normal appearance.   HENT:      Head: Normocephalic and atraumatic.      Mouth/Throat:      Mouth: Mucous membranes are moist.      Pharynx: Oropharynx is clear.   Eyes:      Pupils: Pupils are equal, round, and reactive to light.   Cardiovascular:      Rate and Rhythm: Normal rate and regular rhythm.      Heart sounds: Normal heart sounds.   Pulmonary:      Effort: Pulmonary effort is normal.      Breath sounds: Normal breath sounds.   Abdominal:      General: Abdomen is flat. Bowel sounds are normal.      Palpations: Abdomen is soft.   Musculoskeletal:      Cervical back: Normal range of motion.   Neurological:      Mental Status: He is alert.         Antibiotics  lactated Ringer's bolus 1,000 mL  HYDROmorphone (Dilaudid) injection 0.5 mg  HYDROmorphone (Dilaudid) 0.5 mg/0.5 mL injection  - Omnicell Override Pull  cefTRIAXone (Rocephin) IVPB 1 g  HYDROmorphone (Dilaudid) injection 0.5 mg  cyclobenzaprine (Flexeril) tablet 5 mg  cyclobenzaprine (Flexeril) 10 mg tablet  - Omnicell Override Pull  sodium chloride 0.9% infusion  pantoprazole (ProtoNix) EC tablet 40 mg  pantoprazole (ProtoNix) injection 40 mg  acetaminophen (Tylenol) tablet 650 mg  acetaminophen (Tylenol) oral liquid 650 mg  acetaminophen (Tylenol) suppository 650 mg  ondansetron ODT (Zofran-ODT) disintegrating tablet 4 mg  ondansetron (Zofran) injection 4 mg  melatonin tablet 3 mg  acetaminophen (Tylenol) tablet 650 mg  cyclobenzaprine (Flexeril) tablet 5 mg  potassium  chloride CR (Klor-Con M20) ER tablet 20 mEq  finasteride (Proscar) tablet 5 mg  losartan (Cozaar) tablet 100 mg  metoprolol succinate XL (Toprol-XL) 24 hr tablet 100 mg  rosuvastatin (Crestor) tablet 10 mg  tamsulosin (Flomax) 24 hr capsule 0.8 mg  dextrose 50 % injection 25 g  glucagon (Glucagen) injection 1 mg  dextrose 10 % infusion  insulin degludec (Tresiba) injection 10 Units  potassium chloride CR (Klor-Con M20) ER tablet 20 mEq  lactated Ringer's infusion  docusate sodium (Colace) capsule 100 mg  amLODIPine (Norvasc) tablet 10 mg  ferrous gluconate (Fergon) 324 (38 Fe) mg tablet 38 mg of iron      Relevant Results  Labs  Results from last 72 hours   Lab Units 10/05/23  0558 10/04/23  0622 10/03/23  0536 10/02/23  1336   WBC AUTO x10*3/uL 7.6 7.9 9.7 11.9*   HEMOGLOBIN g/dL 9.9* 9.5* 9.7* 11.1*   HEMATOCRIT % 31.3* 29.5* 29.1* 33.4*   PLATELETS AUTO x10*3/uL 163 153 144* 169   NEUTROS PCT AUTO %  --   --   --  79.1   LYMPHS PCT AUTO %  --   --   --  5.3   MONOS PCT AUTO %  --   --   --  11.7   EOS PCT AUTO %  --   --   --  0.1     Results from last 72 hours   Lab Units 10/05/23  0558 10/04/23  0622 10/03/23  0536   SODIUM mmol/L 127* 125* 123*   POTASSIUM mmol/L 3.8 3.3* 3.4*   CHLORIDE mmol/L 97* 95* 90*   CO2 mmol/L 20* 20* 21   BUN mg/dL 76* 81* 72*   CREATININE mg/dL 2.88* 2.90* 2.60*   GLUCOSE mg/dL 102* 127* 160*   CALCIUM mg/dL 7.6* 7.3* 7.1*   ANION GAP mmol/L 14 13 15   EGFR mL/min/1.73m*2 22* 22* 25*   PHOSPHORUS mg/dL  --  5.0*  --      Results from last 72 hours   Lab Units 10/04/23  0622 10/02/23  1336   ALK PHOS U/L  --  156*   BILIRUBIN TOTAL mg/dL  --  1.0   PROTEIN TOTAL g/dL  --  6.7   ALT U/L  --  42   AST U/L  --  43*   ALBUMIN g/dL 2.1* 2.7*     Estimated Creatinine Clearance: 32.8 mL/min (A) (by C-G formula based on SCr of 2.88 mg/dL (H)).  CRP   Date Value Ref Range Status   09/18/2023 22.55 (A) mg/dL Final     Comment:     REF VALUE  < 1.00     06/23/2021 0.50 mg/dL Final     Comment:      REF VALUE  < 1.00     06/16/2021 15.58 (A) mg/dL Final     Comment:     REF VALUE  < 1.00       Microbiology  No results found for the last 14 days.        Assessment/Plan   Diarrhea, C. Diff is negative  Pyuria,  the culture with N joy  Lymphedema / stasis     Recommendations :     No need for antibiotics     I spent  minutes in the professional and overall care of this patient.      Payton Raines MD

## 2023-10-05 NOTE — PROGRESS NOTES
Reason For Consult  Chester Verduzco is a 73 y.o. male history of arthritis, obesity, Chronic bilateral lower extremity lymphedema and ventral hernia status postrepair x3, gout, hypertension, hyperlipidemia, GERD, CHF, BPH who presents with non-bloody diarrhea and generalized weakness for the past 2 weeks. Nephrology is being consulted for NOHELIA and hyponatremia.     Subjective  Patient has had additional bowel movements but denies any significant kidney related symptoms or problems urinating.     Past Medical History  He has a past medical history of Abnormal weight gain (10/27/2016), Achilles tendinitis, unspecified leg (08/16/2016), Acute upper respiratory infection, unspecified, Allergic contact dermatitis due to plants, except food (08/22/2013), Cellulitis of right lower limb (07/30/2021), Cough, unspecified (03/21/2016), Encounter for screening for human immunodeficiency virus (HIV) (06/14/2016), Hordeolum externum unspecified eye, unspecified eyelid (08/14/2019), Hyperglycemia, unspecified (12/13/2017), Incisional hernia without obstruction or gangrene (06/15/2015), Muscle spasm of calf (08/16/2016), Personal history of other diseases of the digestive system (06/30/2015), Personal history of other diseases of the digestive system (02/05/2016), Personal history of other diseases of the respiratory system (12/23/2014), Personal history of other diseases of the respiratory system (01/28/2016), Personal history of other infectious and parasitic diseases (12/02/2015), Personal history of other infectious and parasitic diseases, Personal history of other specified conditions (02/04/2015), Personal history of other specified conditions (08/16/2016), Personal history of other specified conditions (01/26/2015), Personal history of pneumonia (recurrent) (01/29/2016), Personal history of urinary (tract) infections (02/19/2013), Prostatitis (03/21/2023), Pruritus, unspecified (02/10/2015), Strain of muscle, fascia and  tendon of the posterior muscle group at thigh level, right thigh, initial encounter (05/10/2016), and Unspecified symptoms and signs involving the genitourinary system (12/20/2016).    Surgical History  He has a past surgical history that includes Varicose vein surgery (08/22/2013); Cardiac pacemaker placement (08/22/2013); Other surgical history (06/15/2015); Ventral hernia repair (06/15/2015); and Other surgical history (09/16/2019).     Social History  He reports that he has never smoked. He has never been exposed to tobacco smoke. He has never used smokeless tobacco. He reports that he does not currently use alcohol. He reports that he does not use drugs.    Family History  No family history on file.     Allergies  Keflex [cephalexin] and Bupropion    Review of Systems  - CONSTITUTIONAL: Denies weight loss, fever and chills.  - HEENT: Denies changes in vision and hearing.  - RESPIRATORY: Denies SOB and cough.  - CV: Denies palpitations and CP.   - GI: Denies abdominal pain, nausea, vomiting and diarrhea.  - : Denies dysuria and urinary frequency.  - MSK: Denies myalgia and  joint pain.  - SKIN: Denies rash and pruritus.  - NEUROLOGICAL: Denies headache and syncope.  - PSYCHIATRIC: Denies recent changes in mood. Denies anxiety and depression.      Physical Exam  Constitutional: patient is sitting upright in chair, alert and cooperative with exam  skin: dry and warm  Eyes: EOMI and clear sclera  ENMT: moist mucous membranes  Head/Neck: normal neck, no JVD and trachea midline  Respiratory/Thorax: Clear to auscultation bilaterally, no wheezing or crackles appreciated  Cardiovascular: regular rate and rhythm, S1 and S2 present, no murmurs heard  Gastrointestinal: distended abdomen, bowel sounds present, no pain or tenderness upon palpation, soft  Extremities: +2 radial, posterior tibial, and pedal pulse, no lower extremity edema noted  Neurological: A&Ox3 no focal deficit         I&O 24HR    Intake/Output Summary  (Last 24 hours) at 10/5/2023 1252  Last data filed at 10/5/2023 1000  Gross per 24 hour   Intake 1588.33 ml   Output 1559 ml   Net 29.33 ml         Vitals 24HR  Heart Rate:  [66-86]   Temp:  [36.2 °C (97.2 °F)-36.5 °C (97.7 °F)]   Resp:  [16-18]   BP: (142-172)/(69-72)   SpO2:  [91 %-94 %]       Relevant Results  Results for orders placed or performed during the hospital encounter of 10/02/23 (from the past 96 hour(s))   B-type natriuretic peptide   Result Value Ref Range    BNP 97 0 - 99 pg/mL   Sars-CoV-2 PCR, Symptomatic   Result Value Ref Range    Coronavirus 2019, PCR Not Detected Not Detected   Influenza A, and B PCR   Result Value Ref Range    Flu A Result Not Detected Not Detected    Flu B Result Not Detected Not Detected   Blood Gas Venous Full Panel Unsolicited   Result Value Ref Range    POCT pH, Venous 7.47 (H) 7.33 - 7.43 pH    POCT pCO2, Venous 32 (L) 41 - 51 mm Hg    POCT pO2, Venous 26 (L) 35 - 45 mm Hg    POCT SO2, Venous 39 (L) 45 - 75 %    POCT Oxy Hemoglobin, Venous 38.0 (L) 45.0 - 75.0 %    POCT Hematocrit Calculated, Venous 33.0 (L) 41.0 - 52.0 %    POCT Sodium, Venous 121 (L) 136 - 145 mmol/L    POCT Potassium, Venous 3.2 (L) 3.5 - 5.3 mmol/L    POCT Chloride, Venous 91 (L) 98 - 107 mmol/L    POCT Ionized Calicum, Venous 1.03 (L) 1.10 - 1.33 mmol/L    POCT Glucose, Venous 158 (H) 74 - 99 mg/dL    POCT Lactate, Venous 1.6 0.4 - 2.0 mmol/L    POCT Base Excess, Venous 0.1 -2.0 - 3.0 mmol/L    POCT HCO3 Calculated, Venous 23.3 22.0 - 26.0 mmol/L    POCT Hemoglobin, Venous 11.1 (L) 13.5 - 17.5 g/dL    POCT Anion Gap, Venous 10.0 10.0 - 25.0 mmol/L    Patient Temperature 37.0 degrees Celsius   CBC and Auto Differential   Result Value Ref Range    WBC 11.9 (H) 4.4 - 11.3 x10*3/uL    nRBC 0.0 0.0 - 0.0 /100 WBCs    RBC 3.82 (L) 4.50 - 5.90 x10*6/uL    Hemoglobin 11.1 (L) 13.5 - 17.5 g/dL    Hematocrit 33.4 (L) 41.0 - 52.0 %    MCV 87 80 - 100 fL    MCH 29.1 26.0 - 34.0 pg    MCHC 33.2 32.0 - 36.0 g/dL     RDW 13.8 11.5 - 14.5 %    Platelets 169 150 - 450 x10*3/uL    MPV 11.1 7.5 - 11.5 fL    Neutrophils % 79.1 40.0 - 80.0 %    Immature Granulocytes %, Automated 3.7 (H) 0.0 - 0.9 %    Lymphocytes % 5.3 13.0 - 44.0 %    Monocytes % 11.7 2.0 - 10.0 %    Eosinophils % 0.1 0.0 - 6.0 %    Basophils % 0.1 0.0 - 2.0 %    Neutrophils Absolute 9.44 (H) 1.60 - 5.50 x10*3/uL    Immature Granulocytes Absolute, Automated 0.44 0.00 - 0.50 x10*3/uL    Lymphocytes Absolute 0.63 (L) 0.80 - 3.00 x10*3/uL    Monocytes Absolute 1.40 (H) 0.05 - 0.80 x10*3/uL    Eosinophils Absolute 0.01 0.00 - 0.40 x10*3/uL    Basophils Absolute 0.01 0.00 - 0.10 x10*3/uL   Comprehensive metabolic panel   Result Value Ref Range    Glucose 144 (H) 74 - 99 mg/dL    Sodium 124 (L) 136 - 145 mmol/L    Potassium 3.5 3.5 - 5.3 mmol/L    Chloride 88 (L) 98 - 107 mmol/L    Bicarbonate 23 21 - 32 mmol/L    Anion Gap 17 10 - 20 mmol/L    Urea Nitrogen 68 (H) 6 - 23 mg/dL    Creatinine 2.25 (H) 0.50 - 1.30 mg/dL    eGFR 30 (L) >60 mL/min/1.73m*2    Calcium 7.7 (L) 8.6 - 10.3 mg/dL    Albumin 2.7 (L) 3.4 - 5.0 g/dL    Alkaline Phosphatase 156 (H) 33 - 136 U/L    Total Protein 6.7 6.4 - 8.2 g/dL    AST 43 (H) 9 - 39 U/L    Bilirubin, Total 1.0 0.0 - 1.2 mg/dL    ALT 42 10 - 52 U/L   Magnesium   Result Value Ref Range    Magnesium 2.01 1.60 - 2.40 mg/dL   Urinalysis with Reflex Microscopic and Culture   Result Value Ref Range    Color, Urine Brit (N) Straw, Yellow    Appearance, Urine Hazy (N) Clear    Specific Gravity, Urine 1.014 1.005 - 1.035    pH, Urine 5.0 5.0, 5.5, 6.0, 6.5, 7.0, 7.5, 8.0    Protein, Urine 30 (1+) (N) NEGATIVE mg/dL    Glucose, Urine NEGATIVE NEGATIVE mg/dL    Blood, Urine LARGE (3+) (A) NEGATIVE    Ketones, Urine NEGATIVE NEGATIVE mg/dL    Bilirubin, Urine NEGATIVE NEGATIVE    Urobilinogen, Urine 2.0 (N) <2.0 mg/dL    Nitrite, Urine NEGATIVE NEGATIVE    Leukocyte Esterase, Urine SMALL (1+) (A) NEGATIVE   Urinalysis Microscopic Only   Result  Value Ref Range    WBC, Urine >50 (A) 1-5, NONE /HPF    WBC Clumps, Urine MANY Reference range not established. /HPF    RBC, Urine >20 (A) NONE, 1-2, 3-5 /HPF    Bacteria, Urine 1+ (A) NONE SEEN /HPF    Yeast Hyphae, Urine PRESENT (A) NONE /HPF   Urine Culture    Specimen: Clean Catch/Voided; Urine   Result Value Ref Range    Urine Culture Normal genitourinary joy    CBC   Result Value Ref Range    WBC 9.7 4.4 - 11.3 x10*3/uL    nRBC 0.0 0.0 - 0.0 /100 WBCs    RBC 3.39 (L) 4.50 - 5.90 x10*6/uL    Hemoglobin 9.7 (L) 13.5 - 17.5 g/dL    Hematocrit 29.1 (L) 41.0 - 52.0 %    MCV 86 80 - 100 fL    MCH 28.6 26.0 - 34.0 pg    MCHC 33.3 32.0 - 36.0 g/dL    RDW 13.8 11.5 - 14.5 %    Platelets 144 (L) 150 - 450 x10*3/uL    MPV 10.5 7.5 - 11.5 fL   Basic metabolic panel   Result Value Ref Range    Glucose 160 (H) 74 - 99 mg/dL    Sodium 123 (L) 136 - 145 mmol/L    Potassium 3.4 (L) 3.5 - 5.3 mmol/L    Chloride 90 (L) 98 - 107 mmol/L    Bicarbonate 21 21 - 32 mmol/L    Anion Gap 15 10 - 20 mmol/L    Urea Nitrogen 72 (H) 6 - 23 mg/dL    Creatinine 2.60 (H) 0.50 - 1.30 mg/dL    eGFR 25 (L) >60 mL/min/1.73m*2    Calcium 7.1 (L) 8.6 - 10.3 mg/dL   Urine electrolytes   Result Value Ref Range    Sodium, Urine Random 11 mmol/L    Sodium/Creatinine Ratio 10 Not established. mmol/g Creat    Potassium, Urine Random 18 mmol/L    Potassium/Creatinine Ratio 16 Not established mmol/g Creat    Chloride, Urine Random <15 mmol/L    Chloride/Creatinine Ratio      Creatinine, Urine Random 112.4 20.0 - 370.0 mg/dL   POCT GLUCOSE   Result Value Ref Range    POCT Glucose 170 (H) 74 - 99 mg/dL   Osmolality   Result Value Ref Range    Osmolality, Serum 289 280 - 300 mOsm/kg   CBC   Result Value Ref Range    WBC 7.9 4.4 - 11.3 x10*3/uL    nRBC 0.0 0.0 - 0.0 /100 WBCs    RBC 3.38 (L) 4.50 - 5.90 x10*6/uL    Hemoglobin 9.5 (L) 13.5 - 17.5 g/dL    Hematocrit 29.5 (L) 41.0 - 52.0 %    MCV 87 80 - 100 fL    MCH 28.1 26.0 - 34.0 pg    MCHC 32.2 32.0 - 36.0  g/dL    RDW 13.9 11.5 - 14.5 %    Platelets 153 150 - 450 x10*3/uL    MPV 10.1 7.5 - 11.5 fL   Renal function panel   Result Value Ref Range    Glucose 127 (H) 74 - 99 mg/dL    Sodium 125 (L) 136 - 145 mmol/L    Potassium 3.3 (L) 3.5 - 5.3 mmol/L    Chloride 95 (L) 98 - 107 mmol/L    Bicarbonate 20 (L) 21 - 32 mmol/L    Anion Gap 13 10 - 20 mmol/L    Urea Nitrogen 81 (H) 6 - 23 mg/dL    Creatinine 2.90 (H) 0.50 - 1.30 mg/dL    eGFR 22 (L) >60 mL/min/1.73m*2    Calcium 7.3 (L) 8.6 - 10.3 mg/dL    Phosphorus 5.0 (H) 2.5 - 4.9 mg/dL    Albumin 2.1 (L) 3.4 - 5.0 g/dL   Iron and TIBC   Result Value Ref Range    Iron 19 (L) 35 - 150 ug/dL    UIBC 110 110 - 370 ug/dL    TIBC 129 (L) 240 - 445 ug/dL    % Saturation 15 (L) 25 - 45 %   POCT GLUCOSE   Result Value Ref Range    POCT Glucose 115 (H) 74 - 99 mg/dL   C. difficile, PCR    Specimen: Stool   Result Value Ref Range    C. difficile, PCR Not Detected Not Detected   POCT GLUCOSE   Result Value Ref Range    POCT Glucose 168 (H) 74 - 99 mg/dL   Albumin, Urine Random   Result Value Ref Range    Albumin, Urine Random 105.8 Not established mg/L    Creatinine, Urine Random 73.8 20.0 - 370.0 mg/dL    Albumin/Creatine Ratio 143.4 (H) <30.0 ug/mg Creat   POCT GLUCOSE   Result Value Ref Range    POCT Glucose 136 (H) 74 - 99 mg/dL   POCT GLUCOSE   Result Value Ref Range    POCT Glucose 142 (H) 74 - 99 mg/dL   CBC   Result Value Ref Range    WBC 7.6 4.4 - 11.3 x10*3/uL    nRBC 0.0 0.0 - 0.0 /100 WBCs    RBC 3.51 (L) 4.50 - 5.90 x10*6/uL    Hemoglobin 9.9 (L) 13.5 - 17.5 g/dL    Hematocrit 31.3 (L) 41.0 - 52.0 %    MCV 89 80 - 100 fL    MCH 28.2 26.0 - 34.0 pg    MCHC 31.6 (L) 32.0 - 36.0 g/dL    RDW 14.1 11.5 - 14.5 %    Platelets 163 150 - 450 x10*3/uL    MPV 10.2 7.5 - 11.5 fL   Basic Metabolic Panel   Result Value Ref Range    Glucose 102 (H) 74 - 99 mg/dL    Sodium 127 (L) 136 - 145 mmol/L    Potassium 3.8 3.5 - 5.3 mmol/L    Chloride 97 (L) 98 - 107 mmol/L    Bicarbonate 20 (L)  21 - 32 mmol/L    Anion Gap 14 10 - 20 mmol/L    Urea Nitrogen 76 (H) 6 - 23 mg/dL    Creatinine 2.88 (H) 0.50 - 1.30 mg/dL    eGFR 22 (L) >60 mL/min/1.73m*2    Calcium 7.6 (L) 8.6 - 10.3 mg/dL   POCT GLUCOSE   Result Value Ref Range    POCT Glucose 111 (H) 74 - 99 mg/dL   POCT GLUCOSE   Result Value Ref Range    POCT Glucose 142 (H) 74 - 99 mg/dL        CT abdomen pelvis wo IV contrast   Final Result   1. Colonic diverticulosis, without evidence of acute diverticulitis.   2. Enlarged lymph nodes in the left external iliac chain, and   bilateral groin.   3. Enlargement of the prostate.   4. Hypodensity from the left kidney, incompletely evaluated without   contrast, but most likely representing is cyst.        MACRO:   None        Signed by: Javon El 10/2/2023 2:58 PM   Dictation workstation:   [id number]      XR chest 1 view   Final Result   Suspected small left pleural effusion with adjacent atelectasis.   Superimposed infection is possible in the appropriate clinical   setting. No pulmonary edema.        MACRO   None        Signed by: Shakeel Rogers 10/2/2023 1:33 PM   Dictation workstation:   [id number]            Assessment and Plan   Chester Verduzco is a 73 y.o. male history of arthritis, obesity, Chronic bilateral lower extremity lymphedema and ventral hernia status postrepair x3, gout, hypertension, hyperlipidemia, GERD, CHF, BPH who presents with non-bloody diarrhea and generalized weakness for the past 2 weeks. Nephrology is being consulted for NOHELIA and hyponatremia.     #non-oliguric NOHELIA  - upon chart review, unable to discern a documented prior h/o CKD   - CT abdomen pelvis shows likely uncomplicated renal cyst in left upper pole, post-renal cause was r/o given no signs of obstruction  - Scr trending 2.25-> 2.6-> 2.9 and GFR 30-> 25-> 22  - latest GFR 22 and Scr 2.88  - Urine lytes: Arash 11, UCl <15, low FeNa of 0.2%; urine lytes are consistent with pre-renal NOHELIA  - most likely pre-renal injury  given volume depletion in setting of decreased intake and diarrhea  - BNP within normal limits  - UA with +1 protein, lots of WBC & RBC  - albumin spot test shows 143 alb/cr   - continue to monitor  - no indications for dialysis at this time    #hypoosmotic hypovolemic asymptomatic hyponatremia-improving  - baseline of low 130's in 02/23, 124 on admission  - latest Na improved to 127  - likely in the setting of volume dehydration, improving with IVF  - continue gentle correction with LR at 100cc/hr    #hypokalemia in the setting of diarrhea  - K of 3.5 on admission, baseline of 3.7-4.0  - latest K of 3.8  - continue PO potassium 20 mEq/day  - continue LR at 100cc/hr for additional potassium supplementation    #BP-stable  - BP is in 150's systolic and spiked to 172/72   - ok to restart Losartan given stable kidney function  - start 10 mg Amlodipine for additional BP management    #worsening anemia  - drop in Hgb from 11.1 on admission to 9.5, prior baseline of 13.6 in 09/18/23  - less likely dilutional given dehydration and low solutes in urine  - in the setting of high BUN, recommend considering r/o GI bleed if further worsening of anemia  - serum iron is 19, Tsat is 15%, and TIBC is 129  - start PO iron and continue to monitor    Recommendations:  - Continue LR at 100cc/hr  - Continue PO potassium   - Restart home Losartan dose  - Start 10mg Amlodipine daily  - Start PO iron     Nephrology will continue to follow     Ania Roberts OMS-III

## 2023-10-06 LAB
ANION GAP SERPL CALC-SCNC: 14 MMOL/L (ref 10–20)
BUN SERPL-MCNC: 63 MG/DL (ref 6–23)
C COLI+JEJ+UPSA DNA STL QL NAA+PROBE: NOT DETECTED
CALCIUM SERPL-MCNC: 7.7 MG/DL (ref 8.6–10.3)
CHLORIDE SERPL-SCNC: 98 MMOL/L (ref 98–107)
CO2 SERPL-SCNC: 20 MMOL/L (ref 21–32)
CREAT SERPL-MCNC: 2.54 MG/DL (ref 0.5–1.3)
EC STX1 GENE STL QL NAA+PROBE: NOT DETECTED
EC STX2 GENE STL QL NAA+PROBE: NOT DETECTED
ERYTHROCYTE [DISTWIDTH] IN BLOOD BY AUTOMATED COUNT: 14.1 % (ref 11.5–14.5)
GFR SERPL CREATININE-BSD FRML MDRD: 26 ML/MIN/1.73M*2
GLUCOSE BLD MANUAL STRIP-MCNC: 115 MG/DL (ref 74–99)
GLUCOSE BLD MANUAL STRIP-MCNC: 116 MG/DL (ref 74–99)
GLUCOSE BLD MANUAL STRIP-MCNC: 157 MG/DL (ref 74–99)
GLUCOSE BLD MANUAL STRIP-MCNC: 193 MG/DL (ref 74–99)
GLUCOSE SERPL-MCNC: 132 MG/DL (ref 74–99)
HCT VFR BLD AUTO: 33.5 % (ref 41–52)
HGB BLD-MCNC: 10.3 G/DL (ref 13.5–17.5)
MCH RBC QN AUTO: 28.2 PG (ref 26–34)
MCHC RBC AUTO-ENTMCNC: 30.7 G/DL (ref 32–36)
MCV RBC AUTO: 92 FL (ref 80–100)
NOROVIRUS GI + GII RNA STL NAA+PROBE: NOT DETECTED
NRBC BLD-RTO: 0 /100 WBCS (ref 0–0)
PLATELET # BLD AUTO: 155 X10*3/UL (ref 150–450)
PMV BLD AUTO: 9.7 FL (ref 7.5–11.5)
POTASSIUM SERPL-SCNC: 4 MMOL/L (ref 3.5–5.3)
RBC # BLD AUTO: 3.65 X10*6/UL (ref 4.5–5.9)
RV RNA STL NAA+PROBE: NOT DETECTED
SALMONELLA DNA STL QL NAA+PROBE: NOT DETECTED
SHIGELLA DNA SPEC QL NAA+PROBE: NOT DETECTED
SODIUM SERPL-SCNC: 128 MMOL/L (ref 136–145)
V CHOLERAE DNA STL QL NAA+PROBE: NOT DETECTED
WBC # BLD AUTO: 7.9 X10*3/UL (ref 4.4–11.3)
Y ENTEROCOL DNA STL QL NAA+PROBE: NOT DETECTED

## 2023-10-06 PROCEDURE — 2580000001 HC RX 258 IV SOLUTIONS

## 2023-10-06 PROCEDURE — 2500000001 HC RX 250 WO HCPCS SELF ADMINISTERED DRUGS (ALT 637 FOR MEDICARE OP): Performed by: NURSE PRACTITIONER

## 2023-10-06 PROCEDURE — 99232 SBSQ HOSP IP/OBS MODERATE 35: CPT | Performed by: NURSE PRACTITIONER

## 2023-10-06 PROCEDURE — 1100000001 HC PRIVATE ROOM DAILY

## 2023-10-06 PROCEDURE — 2500000004 HC RX 250 GENERAL PHARMACY W/ HCPCS (ALT 636 FOR OP/ED): Performed by: NURSE PRACTITIONER

## 2023-10-06 PROCEDURE — 2500000001 HC RX 250 WO HCPCS SELF ADMINISTERED DRUGS (ALT 637 FOR MEDICARE OP): Performed by: INTERNAL MEDICINE

## 2023-10-06 PROCEDURE — 97110 THERAPEUTIC EXERCISES: CPT | Mod: GP

## 2023-10-06 PROCEDURE — 2500000002 HC RX 250 W HCPCS SELF ADMINISTERED DRUGS (ALT 637 FOR MEDICARE OP, ALT 636 FOR OP/ED): Performed by: NURSE PRACTITIONER

## 2023-10-06 PROCEDURE — 99233 SBSQ HOSP IP/OBS HIGH 50: CPT | Performed by: INTERNAL MEDICINE

## 2023-10-06 PROCEDURE — 36415 COLL VENOUS BLD VENIPUNCTURE: CPT | Performed by: NURSE PRACTITIONER

## 2023-10-06 PROCEDURE — 80048 BASIC METABOLIC PNL TOTAL CA: CPT | Performed by: NURSE PRACTITIONER

## 2023-10-06 PROCEDURE — 2500000001 HC RX 250 WO HCPCS SELF ADMINISTERED DRUGS (ALT 637 FOR MEDICARE OP)

## 2023-10-06 PROCEDURE — 85027 COMPLETE CBC AUTOMATED: CPT | Performed by: NURSE PRACTITIONER

## 2023-10-06 PROCEDURE — 96372 THER/PROPH/DIAG INJ SC/IM: CPT | Performed by: NURSE PRACTITIONER

## 2023-10-06 PROCEDURE — 82947 ASSAY GLUCOSE BLOOD QUANT: CPT

## 2023-10-06 PROCEDURE — 97530 THERAPEUTIC ACTIVITIES: CPT | Mod: GP

## 2023-10-06 RX ADMIN — FINASTERIDE 5 MG: 5 TABLET, FILM COATED ORAL at 08:41

## 2023-10-06 RX ADMIN — SODIUM CHLORIDE, POTASSIUM CHLORIDE, SODIUM LACTATE AND CALCIUM CHLORIDE 100 ML/HR: 600; 310; 30; 20 INJECTION, SOLUTION INTRAVENOUS at 06:44

## 2023-10-06 RX ADMIN — CYCLOBENZAPRINE HYDROCHLORIDE 5 MG: 5 TABLET, FILM COATED ORAL at 08:42

## 2023-10-06 RX ADMIN — ACETAMINOPHEN 650 MG: 325 TABLET, FILM COATED ORAL at 00:32

## 2023-10-06 RX ADMIN — ROSUVASTATIN 10 MG: 10 TABLET, FILM COATED ORAL at 20:43

## 2023-10-06 RX ADMIN — PANTOPRAZOLE SODIUM 40 MG: 40 TABLET, DELAYED RELEASE ORAL at 06:44

## 2023-10-06 RX ADMIN — SODIUM CHLORIDE, POTASSIUM CHLORIDE, SODIUM LACTATE AND CALCIUM CHLORIDE 100 ML/HR: 600; 310; 30; 20 INJECTION, SOLUTION INTRAVENOUS at 17:17

## 2023-10-06 RX ADMIN — MELATONIN TAB 3 MG 3 MG: 3 TAB at 20:42

## 2023-10-06 RX ADMIN — CYCLOBENZAPRINE HYDROCHLORIDE 5 MG: 5 TABLET, FILM COATED ORAL at 20:43

## 2023-10-06 RX ADMIN — CYCLOBENZAPRINE HYDROCHLORIDE 5 MG: 5 TABLET, FILM COATED ORAL at 15:41

## 2023-10-06 RX ADMIN — INSULIN DEGLUDEC INJECTION 10 UNITS: 100 INJECTION, SOLUTION SUBCUTANEOUS at 20:41

## 2023-10-06 RX ADMIN — METOPROLOL SUCCINATE 100 MG: 50 TABLET, EXTENDED RELEASE ORAL at 08:42

## 2023-10-06 RX ADMIN — DOCUSATE SODIUM 100 MG: 100 CAPSULE, LIQUID FILLED ORAL at 20:42

## 2023-10-06 RX ADMIN — FERROUS GLUCONATE 38 MG OF IRON: 324 TABLET ORAL at 08:42

## 2023-10-06 RX ADMIN — INSULIN HUMAN 1 UNITS: 100 INJECTION, SOLUTION PARENTERAL at 12:07

## 2023-10-06 RX ADMIN — POTASSIUM CHLORIDE 20 MEQ: 1500 TABLET, EXTENDED RELEASE ORAL at 08:41

## 2023-10-06 RX ADMIN — AMLODIPINE BESYLATE 10 MG: 10 TABLET ORAL at 20:43

## 2023-10-06 RX ADMIN — DOCUSATE SODIUM 100 MG: 100 CAPSULE, LIQUID FILLED ORAL at 08:42

## 2023-10-06 RX ADMIN — TAMSULOSIN HYDROCHLORIDE 0.8 MG: 0.4 CAPSULE ORAL at 20:43

## 2023-10-06 ASSESSMENT — COGNITIVE AND FUNCTIONAL STATUS - GENERAL
TOILETING: A LOT
MOVING FROM LYING ON BACK TO SITTING ON SIDE OF FLAT BED WITH BEDRAILS: A LOT
STANDING UP FROM CHAIR USING ARMS: A LOT
WALKING IN HOSPITAL ROOM: A LOT
MOVING FROM LYING ON BACK TO SITTING ON SIDE OF FLAT BED WITH BEDRAILS: A LOT
MOBILITY SCORE: 9
DAILY ACTIVITIY SCORE: 13
DRESSING REGULAR UPPER BODY CLOTHING: A LOT
STANDING UP FROM CHAIR USING ARMS: A LOT
TURNING FROM BACK TO SIDE WHILE IN FLAT BAD: A LOT
DRESSING REGULAR LOWER BODY CLOTHING: TOTAL
TOILETING: A LOT
WALKING IN HOSPITAL ROOM: A LOT
MOBILITY SCORE: 12
EATING MEALS: A LITTLE
MOVING TO AND FROM BED TO CHAIR: A LOT
CLIMB 3 TO 5 STEPS WITH RAILING: TOTAL
PERSONAL GROOMING: A LOT
DRESSING REGULAR LOWER BODY CLOTHING: A LOT
DAILY ACTIVITIY SCORE: 12
STANDING UP FROM CHAIR USING ARMS: A LOT
HELP NEEDED FOR BATHING: A LOT
TURNING FROM BACK TO SIDE WHILE IN FLAT BAD: A LOT
WALKING IN HOSPITAL ROOM: TOTAL
EATING MEALS: A LITTLE
CLIMB 3 TO 5 STEPS WITH RAILING: A LOT
DRESSING REGULAR UPPER BODY CLOTHING: A LOT
MOVING TO AND FROM BED TO CHAIR: TOTAL
TURNING FROM BACK TO SIDE WHILE IN FLAT BAD: A LOT
MOBILITY SCORE: 12
MOVING TO AND FROM BED TO CHAIR: A LOT
MOVING FROM LYING ON BACK TO SITTING ON SIDE OF FLAT BED WITH BEDRAILS: A LITTLE
HELP NEEDED FOR BATHING: A LOT
PERSONAL GROOMING: A LOT
CLIMB 3 TO 5 STEPS WITH RAILING: TOTAL

## 2023-10-06 ASSESSMENT — PAIN SCALES - GENERAL
PAINLEVEL_OUTOF10: 6
PAINLEVEL_OUTOF10: 3
PAINLEVEL_OUTOF10: 5 - MODERATE PAIN
PAINLEVEL_OUTOF10: 4

## 2023-10-06 ASSESSMENT — PAIN - FUNCTIONAL ASSESSMENT
PAIN_FUNCTIONAL_ASSESSMENT: 0-10
PAIN_FUNCTIONAL_ASSESSMENT: 0-10

## 2023-10-06 ASSESSMENT — PAIN DESCRIPTION - DESCRIPTORS: DESCRIPTORS: ACHING

## 2023-10-06 NOTE — PROGRESS NOTES
Reason For Consult  Chester Verduzco is a 73 y.o. male history of arthritis, obesity, Chronic bilateral lower extremity lymphedema and ventral hernia status postrepair x3, gout, hypertension, hyperlipidemia, GERD, CHF, BPH who presents with non-bloody diarrhea and generalized weakness for the past 2 weeks. Nephrology is being consulted for NOHELIA and hyponatremia.     Subjective  Patient reports feeling a lot better. He is able to keep fluids and soft foods down.    Past Medical History  He has a past medical history of Abnormal weight gain (10/27/2016), Achilles tendinitis, unspecified leg (08/16/2016), Acute upper respiratory infection, unspecified, Allergic contact dermatitis due to plants, except food (08/22/2013), Cellulitis of right lower limb (07/30/2021), Cough, unspecified (03/21/2016), Encounter for screening for human immunodeficiency virus (HIV) (06/14/2016), Hordeolum externum unspecified eye, unspecified eyelid (08/14/2019), Hyperglycemia, unspecified (12/13/2017), Incisional hernia without obstruction or gangrene (06/15/2015), Muscle spasm of calf (08/16/2016), Personal history of other diseases of the digestive system (06/30/2015), Personal history of other diseases of the digestive system (02/05/2016), Personal history of other diseases of the respiratory system (12/23/2014), Personal history of other diseases of the respiratory system (01/28/2016), Personal history of other infectious and parasitic diseases (12/02/2015), Personal history of other infectious and parasitic diseases, Personal history of other specified conditions (02/04/2015), Personal history of other specified conditions (08/16/2016), Personal history of other specified conditions (01/26/2015), Personal history of pneumonia (recurrent) (01/29/2016), Personal history of urinary (tract) infections (02/19/2013), Prostatitis (03/21/2023), Pruritus, unspecified (02/10/2015), Strain of muscle, fascia and tendon of the posterior muscle  group at thigh level, right thigh, initial encounter (05/10/2016), and Unspecified symptoms and signs involving the genitourinary system (12/20/2016).    Surgical History  He has a past surgical history that includes Varicose vein surgery (08/22/2013); Cardiac pacemaker placement (08/22/2013); Other surgical history (06/15/2015); Ventral hernia repair (06/15/2015); and Other surgical history (09/16/2019).     Social History  He reports that he has never smoked. He has never been exposed to tobacco smoke. He has never used smokeless tobacco. He reports that he does not currently use alcohol. He reports that he does not use drugs.    Family History  No family history on file.     Allergies  Keflex [cephalexin] and Bupropion    Review of Systems  - CONSTITUTIONAL: Denies weight loss, fever and chills.  - HEENT: Denies changes in vision and hearing.  - RESPIRATORY: Denies SOB and cough.  - CV: Denies palpitations and CP.   - GI: Denies abdominal pain, nausea, vomiting and diarrhea.  - : Denies dysuria and urinary frequency.  - MSK: Denies myalgia and  joint pain.  - SKIN: Denies rash and pruritus.  - NEUROLOGICAL: Denies headache and syncope.  - PSYCHIATRIC: Denies recent changes in mood. Denies anxiety and depression.      Physical Exam  Constitutional: patient is sitting upright in chair, alert and cooperative with exam  skin: dry and warm  Eyes: EOMI and clear sclera  ENMT: moist mucous membranes  Head/Neck: normal neck, no JVD and trachea midline  Respiratory/Thorax: Clear to auscultation bilaterally, no wheezing or crackles appreciated  Cardiovascular: regular rate and rhythm, S1 and S2 present, no murmurs heard  Gastrointestinal: distended abdomen, bowel sounds present, no pain or tenderness upon palpation, soft  Extremities: +2 radial, posterior tibial, and pedal pulse, no lower extremity edema noted  Neurological: A&Ox3 no focal deficit         I&O 24HR    Intake/Output Summary (Last 24 hours) at 10/6/2023  1049  Last data filed at 10/6/2023 0035  Gross per 24 hour   Intake 2861.67 ml   Output 500 ml   Net 2361.67 ml       Vitals 24HR  Heart Rate:  [76]   Temp:  [35.4 °C (95.8 °F)-36.5 °C (97.7 °F)]   Resp:  [18]   BP: (150-162)/(61-73)   SpO2:  [93 %-97 %]       Relevant Results  Results for orders placed or performed during the hospital encounter of 10/02/23 (from the past 96 hour(s))   B-type natriuretic peptide   Result Value Ref Range    BNP 97 0 - 99 pg/mL   Sars-CoV-2 PCR, Symptomatic   Result Value Ref Range    Coronavirus 2019, PCR Not Detected Not Detected   Influenza A, and B PCR   Result Value Ref Range    Flu A Result Not Detected Not Detected    Flu B Result Not Detected Not Detected   Blood Gas Venous Full Panel Unsolicited   Result Value Ref Range    POCT pH, Venous 7.47 (H) 7.33 - 7.43 pH    POCT pCO2, Venous 32 (L) 41 - 51 mm Hg    POCT pO2, Venous 26 (L) 35 - 45 mm Hg    POCT SO2, Venous 39 (L) 45 - 75 %    POCT Oxy Hemoglobin, Venous 38.0 (L) 45.0 - 75.0 %    POCT Hematocrit Calculated, Venous 33.0 (L) 41.0 - 52.0 %    POCT Sodium, Venous 121 (L) 136 - 145 mmol/L    POCT Potassium, Venous 3.2 (L) 3.5 - 5.3 mmol/L    POCT Chloride, Venous 91 (L) 98 - 107 mmol/L    POCT Ionized Calicum, Venous 1.03 (L) 1.10 - 1.33 mmol/L    POCT Glucose, Venous 158 (H) 74 - 99 mg/dL    POCT Lactate, Venous 1.6 0.4 - 2.0 mmol/L    POCT Base Excess, Venous 0.1 -2.0 - 3.0 mmol/L    POCT HCO3 Calculated, Venous 23.3 22.0 - 26.0 mmol/L    POCT Hemoglobin, Venous 11.1 (L) 13.5 - 17.5 g/dL    POCT Anion Gap, Venous 10.0 10.0 - 25.0 mmol/L    Patient Temperature 37.0 degrees Celsius   CBC and Auto Differential   Result Value Ref Range    WBC 11.9 (H) 4.4 - 11.3 x10*3/uL    nRBC 0.0 0.0 - 0.0 /100 WBCs    RBC 3.82 (L) 4.50 - 5.90 x10*6/uL    Hemoglobin 11.1 (L) 13.5 - 17.5 g/dL    Hematocrit 33.4 (L) 41.0 - 52.0 %    MCV 87 80 - 100 fL    MCH 29.1 26.0 - 34.0 pg    MCHC 33.2 32.0 - 36.0 g/dL    RDW 13.8 11.5 - 14.5 %     Platelets 169 150 - 450 x10*3/uL    MPV 11.1 7.5 - 11.5 fL    Neutrophils % 79.1 40.0 - 80.0 %    Immature Granulocytes %, Automated 3.7 (H) 0.0 - 0.9 %    Lymphocytes % 5.3 13.0 - 44.0 %    Monocytes % 11.7 2.0 - 10.0 %    Eosinophils % 0.1 0.0 - 6.0 %    Basophils % 0.1 0.0 - 2.0 %    Neutrophils Absolute 9.44 (H) 1.60 - 5.50 x10*3/uL    Immature Granulocytes Absolute, Automated 0.44 0.00 - 0.50 x10*3/uL    Lymphocytes Absolute 0.63 (L) 0.80 - 3.00 x10*3/uL    Monocytes Absolute 1.40 (H) 0.05 - 0.80 x10*3/uL    Eosinophils Absolute 0.01 0.00 - 0.40 x10*3/uL    Basophils Absolute 0.01 0.00 - 0.10 x10*3/uL   Comprehensive metabolic panel   Result Value Ref Range    Glucose 144 (H) 74 - 99 mg/dL    Sodium 124 (L) 136 - 145 mmol/L    Potassium 3.5 3.5 - 5.3 mmol/L    Chloride 88 (L) 98 - 107 mmol/L    Bicarbonate 23 21 - 32 mmol/L    Anion Gap 17 10 - 20 mmol/L    Urea Nitrogen 68 (H) 6 - 23 mg/dL    Creatinine 2.25 (H) 0.50 - 1.30 mg/dL    eGFR 30 (L) >60 mL/min/1.73m*2    Calcium 7.7 (L) 8.6 - 10.3 mg/dL    Albumin 2.7 (L) 3.4 - 5.0 g/dL    Alkaline Phosphatase 156 (H) 33 - 136 U/L    Total Protein 6.7 6.4 - 8.2 g/dL    AST 43 (H) 9 - 39 U/L    Bilirubin, Total 1.0 0.0 - 1.2 mg/dL    ALT 42 10 - 52 U/L   Magnesium   Result Value Ref Range    Magnesium 2.01 1.60 - 2.40 mg/dL   Urinalysis with Reflex Microscopic and Culture   Result Value Ref Range    Color, Urine Brit (N) Straw, Yellow    Appearance, Urine Hazy (N) Clear    Specific Gravity, Urine 1.014 1.005 - 1.035    pH, Urine 5.0 5.0, 5.5, 6.0, 6.5, 7.0, 7.5, 8.0    Protein, Urine 30 (1+) (N) NEGATIVE mg/dL    Glucose, Urine NEGATIVE NEGATIVE mg/dL    Blood, Urine LARGE (3+) (A) NEGATIVE    Ketones, Urine NEGATIVE NEGATIVE mg/dL    Bilirubin, Urine NEGATIVE NEGATIVE    Urobilinogen, Urine 2.0 (N) <2.0 mg/dL    Nitrite, Urine NEGATIVE NEGATIVE    Leukocyte Esterase, Urine SMALL (1+) (A) NEGATIVE   Urinalysis Microscopic Only   Result Value Ref Range    WBC, Urine  >50 (A) 1-5, NONE /HPF    WBC Clumps, Urine MANY Reference range not established. /HPF    RBC, Urine >20 (A) NONE, 1-2, 3-5 /HPF    Bacteria, Urine 1+ (A) NONE SEEN /HPF    Yeast Hyphae, Urine PRESENT (A) NONE /HPF   Urine Culture    Specimen: Clean Catch/Voided; Urine   Result Value Ref Range    Urine Culture Normal genitourinary joy    CBC   Result Value Ref Range    WBC 9.7 4.4 - 11.3 x10*3/uL    nRBC 0.0 0.0 - 0.0 /100 WBCs    RBC 3.39 (L) 4.50 - 5.90 x10*6/uL    Hemoglobin 9.7 (L) 13.5 - 17.5 g/dL    Hematocrit 29.1 (L) 41.0 - 52.0 %    MCV 86 80 - 100 fL    MCH 28.6 26.0 - 34.0 pg    MCHC 33.3 32.0 - 36.0 g/dL    RDW 13.8 11.5 - 14.5 %    Platelets 144 (L) 150 - 450 x10*3/uL    MPV 10.5 7.5 - 11.5 fL   Basic metabolic panel   Result Value Ref Range    Glucose 160 (H) 74 - 99 mg/dL    Sodium 123 (L) 136 - 145 mmol/L    Potassium 3.4 (L) 3.5 - 5.3 mmol/L    Chloride 90 (L) 98 - 107 mmol/L    Bicarbonate 21 21 - 32 mmol/L    Anion Gap 15 10 - 20 mmol/L    Urea Nitrogen 72 (H) 6 - 23 mg/dL    Creatinine 2.60 (H) 0.50 - 1.30 mg/dL    eGFR 25 (L) >60 mL/min/1.73m*2    Calcium 7.1 (L) 8.6 - 10.3 mg/dL   Urine electrolytes   Result Value Ref Range    Sodium, Urine Random 11 mmol/L    Sodium/Creatinine Ratio 10 Not established. mmol/g Creat    Potassium, Urine Random 18 mmol/L    Potassium/Creatinine Ratio 16 Not established mmol/g Creat    Chloride, Urine Random <15 mmol/L    Chloride/Creatinine Ratio      Creatinine, Urine Random 112.4 20.0 - 370.0 mg/dL   POCT GLUCOSE   Result Value Ref Range    POCT Glucose 170 (H) 74 - 99 mg/dL   Osmolality   Result Value Ref Range    Osmolality, Serum 289 280 - 300 mOsm/kg   CBC   Result Value Ref Range    WBC 7.9 4.4 - 11.3 x10*3/uL    nRBC 0.0 0.0 - 0.0 /100 WBCs    RBC 3.38 (L) 4.50 - 5.90 x10*6/uL    Hemoglobin 9.5 (L) 13.5 - 17.5 g/dL    Hematocrit 29.5 (L) 41.0 - 52.0 %    MCV 87 80 - 100 fL    MCH 28.1 26.0 - 34.0 pg    MCHC 32.2 32.0 - 36.0 g/dL    RDW 13.9 11.5 - 14.5  %    Platelets 153 150 - 450 x10*3/uL    MPV 10.1 7.5 - 11.5 fL   Renal function panel   Result Value Ref Range    Glucose 127 (H) 74 - 99 mg/dL    Sodium 125 (L) 136 - 145 mmol/L    Potassium 3.3 (L) 3.5 - 5.3 mmol/L    Chloride 95 (L) 98 - 107 mmol/L    Bicarbonate 20 (L) 21 - 32 mmol/L    Anion Gap 13 10 - 20 mmol/L    Urea Nitrogen 81 (H) 6 - 23 mg/dL    Creatinine 2.90 (H) 0.50 - 1.30 mg/dL    eGFR 22 (L) >60 mL/min/1.73m*2    Calcium 7.3 (L) 8.6 - 10.3 mg/dL    Phosphorus 5.0 (H) 2.5 - 4.9 mg/dL    Albumin 2.1 (L) 3.4 - 5.0 g/dL   Iron and TIBC   Result Value Ref Range    Iron 19 (L) 35 - 150 ug/dL    UIBC 110 110 - 370 ug/dL    TIBC 129 (L) 240 - 445 ug/dL    % Saturation 15 (L) 25 - 45 %   POCT GLUCOSE   Result Value Ref Range    POCT Glucose 115 (H) 74 - 99 mg/dL   C. difficile, PCR    Specimen: Stool   Result Value Ref Range    C. difficile, PCR Not Detected Not Detected   POCT GLUCOSE   Result Value Ref Range    POCT Glucose 168 (H) 74 - 99 mg/dL   Albumin, Urine Random   Result Value Ref Range    Albumin, Urine Random 105.8 Not established mg/L    Creatinine, Urine Random 73.8 20.0 - 370.0 mg/dL    Albumin/Creatine Ratio 143.4 (H) <30.0 ug/mg Creat   POCT GLUCOSE   Result Value Ref Range    POCT Glucose 136 (H) 74 - 99 mg/dL   POCT GLUCOSE   Result Value Ref Range    POCT Glucose 142 (H) 74 - 99 mg/dL   CBC   Result Value Ref Range    WBC 7.6 4.4 - 11.3 x10*3/uL    nRBC 0.0 0.0 - 0.0 /100 WBCs    RBC 3.51 (L) 4.50 - 5.90 x10*6/uL    Hemoglobin 9.9 (L) 13.5 - 17.5 g/dL    Hematocrit 31.3 (L) 41.0 - 52.0 %    MCV 89 80 - 100 fL    MCH 28.2 26.0 - 34.0 pg    MCHC 31.6 (L) 32.0 - 36.0 g/dL    RDW 14.1 11.5 - 14.5 %    Platelets 163 150 - 450 x10*3/uL    MPV 10.2 7.5 - 11.5 fL   Basic Metabolic Panel   Result Value Ref Range    Glucose 102 (H) 74 - 99 mg/dL    Sodium 127 (L) 136 - 145 mmol/L    Potassium 3.8 3.5 - 5.3 mmol/L    Chloride 97 (L) 98 - 107 mmol/L    Bicarbonate 20 (L) 21 - 32 mmol/L    Anion Gap  14 10 - 20 mmol/L    Urea Nitrogen 76 (H) 6 - 23 mg/dL    Creatinine 2.88 (H) 0.50 - 1.30 mg/dL    eGFR 22 (L) >60 mL/min/1.73m*2    Calcium 7.6 (L) 8.6 - 10.3 mg/dL   POCT GLUCOSE   Result Value Ref Range    POCT Glucose 111 (H) 74 - 99 mg/dL   Stool Pathogen Panel, PCR    Specimen: Stool   Result Value Ref Range    Campylobacter Group Not Detected Not Detected    Salmonella species Not Detected Not Detected    Shigella species Not Detected Not Detected    Vibrio Group Not Detected Not Detected    Yersinia Enterocolitica Not Detected Not Detected    Shiga Toxin 1 Not Detected Not Detected    Shiga Toxin 2 Not Detected Not Detected    Norovirus GI/GII Not Detected Not Detected    Rotavirus A Not Detected Not Detected   POCT GLUCOSE   Result Value Ref Range    POCT Glucose 142 (H) 74 - 99 mg/dL   POCT GLUCOSE   Result Value Ref Range    POCT Glucose 187 (H) 74 - 99 mg/dL   POCT GLUCOSE   Result Value Ref Range    POCT Glucose 153 (H) 74 - 99 mg/dL   CBC   Result Value Ref Range    WBC 7.9 4.4 - 11.3 x10*3/uL    nRBC 0.0 0.0 - 0.0 /100 WBCs    RBC 3.65 (L) 4.50 - 5.90 x10*6/uL    Hemoglobin 10.3 (L) 13.5 - 17.5 g/dL    Hematocrit 33.5 (L) 41.0 - 52.0 %    MCV 92 80 - 100 fL    MCH 28.2 26.0 - 34.0 pg    MCHC 30.7 (L) 32.0 - 36.0 g/dL    RDW 14.1 11.5 - 14.5 %    Platelets 155 150 - 450 x10*3/uL    MPV 9.7 7.5 - 11.5 fL   Basic Metabolic Panel   Result Value Ref Range    Glucose 132 (H) 74 - 99 mg/dL    Sodium 128 (L) 136 - 145 mmol/L    Potassium 4.0 3.5 - 5.3 mmol/L    Chloride 98 98 - 107 mmol/L    Bicarbonate 20 (L) 21 - 32 mmol/L    Anion Gap 14 10 - 20 mmol/L    Urea Nitrogen 63 (H) 6 - 23 mg/dL    Creatinine 2.54 (H) 0.50 - 1.30 mg/dL    eGFR 26 (L) >60 mL/min/1.73m*2    Calcium 7.7 (L) 8.6 - 10.3 mg/dL   POCT GLUCOSE   Result Value Ref Range    POCT Glucose 116 (H) 74 - 99 mg/dL        CT abdomen pelvis wo IV contrast   Final Result   1. Colonic diverticulosis, without evidence of acute diverticulitis.   2.  Enlarged lymph nodes in the left external iliac chain, and   bilateral groin.   3. Enlargement of the prostate.   4. Hypodensity from the left kidney, incompletely evaluated without   contrast, but most likely representing is cyst.        MACRO:   None        Signed by: Javon El 10/2/2023 2:58 PM   Dictation workstation:   [id number]      XR chest 1 view   Final Result   Suspected small left pleural effusion with adjacent atelectasis.   Superimposed infection is possible in the appropriate clinical   setting. No pulmonary edema.        MACRO   None        Signed by: Shakeel Rogers 10/2/2023 1:33 PM   Dictation workstation:   [id number]            Assessment and Plan   Chester Verduzco is a 73 y.o. male history of arthritis, obesity, Chronic bilateral lower extremity lymphedema and ventral hernia status postrepair x3, gout, hypertension, hyperlipidemia, GERD, CHF, BPH who presents with non-bloody diarrhea and generalized weakness for the past 2 weeks. Nephrology is being consulted for NOHELIA and hyponatremia.     #non-oliguric NOHELIA  - upon chart review, unable to discern a documented prior h/o CKD   - baseline BUN/Cr of 20/1.2  - CT abdomen pelvis shows likely uncomplicated renal cyst in left upper pole, post-renal cause was r/o given no signs of obstruction  - Scr trending 2.25-> 2.6-> 2.9 and GFR 30-> 25-> 22  - latest GFR 26 and Scr 2.54- improving   - Urine lytes: Arash 11, UCl <15, low FeNa of 0.2%; urine lytes are consistent with pre-renal NOHELIA  - most likely pre-renal injury given volume depletion in setting of decreased intake and diarrhea  - BNP within normal limits  - UA with +1 protein, lots of WBC & RBC  - albumin spot test shows 143 alb/cr   - continue to monitor for renal recovery  - no indications for dialysis at this time    #hypoosmotic hypovolemic asymptomatic hyponatremia-improving  - baseline of low 130's in 02/23, 124 on admission  - latest Na improved to 128  - likely in the setting of volume  dehydration, improving with IVF  - continue gentle correction with LR at 100cc/hr  - consider holding IV fluids to transition to PO fluids as tolerated     #hypokalemia in the setting of diarrhea  - K of 3.5 on admission, baseline of 3.7-4.0  - latest K of 4.0  - continue PO potassium 20 mEq/day  - continue LR at 100cc/hr for additional potassium supplementation  - consider holding IV fluids to transition to PO fluid intake as tolerated     #BP-stable  - BP is in 150's systolic and spiked to 172/72   - restart Losartan given stable kidney function  - continue 10 mg Amlodipine    #worsening anemia  - drop in Hgb from 11.1 on admission to 9.5, prior baseline of 13.6 in 09/18/23  - less likely dilutional given dehydration and low solutes in urine  - in the setting of high BUN, recommend considering GI bleed r/o if further worsening of anemia  - serum iron is 19, Tsat is 15%, and TIBC is 129  - Hgb improved to 10.3  - continue PO iron and continue to monitor    Recommendations:  - Continue PO potassium   - Continue LR at 100cc/hr, transition to oral fluid intake as tolerated   - Restart Losartan 100 mg  - Continue 10mg Amlodipine daily  - Continue PO iron   - Defer to PCP's discretion to determine follow up with nephrology as outpatient    Ok to DC from renal standpoint. Nephrology will continue to follow     Ania Roberts OMS-III

## 2023-10-06 NOTE — PROGRESS NOTES
Chester Verduzco is a 73 y.o. male on day 4 of admission presenting with Diarrhea.      Subjective   Seen and examined in his room today. Awake and alert. No new complaints. Feels better. Still concerned about his weakness and diarrhea. He denies chest pain, breathing difficulties, abdominal pain, N/V/C, fever, or chills.     Objective     Last Recorded Vitals  /76 (BP Location: Right arm, Patient Position: Lying)   Pulse 76   Temp 36.4 °C (97.5 °F) (Temporal)   Resp 20   Wt 130 kg (287 lb 3.2 oz)   SpO2 97%   Intake/Output last 3 Shifts:    Intake/Output Summary (Last 24 hours) at 10/6/2023 1628  Last data filed at 10/6/2023 0035  Gross per 24 hour   Intake 2621.67 ml   Output 200 ml   Net 2421.67 ml       Admission Weight  Weight: 128 kg (282 lb 6.6 oz) (10/02/23 1208)    Daily Weight  10/03/23 : 130 kg (287 lb 3.2 oz)    Image Results      Physical Exam  Constitutional: A&O x 3; NAD; calm and cooperative  Eyes: EOM's intact  HEENT: Normocephalic, Atraumatic. Oral mucosa moist.   Neck: Supple. No JVD, lymphadenopathy.   Lungs: CTAB with fair air movement. Diminished in the bases. Respirations even and unlabored on room air.   Heart: RRR  Abdomen: Softly distended; nontender; + BS.   MS/Extremities: JOHNS equally x 4. Peripheral pulses intact bilaterally. BLE edema with stasis/vascular changes, R>L.   Neuro: A&O x3; no focal deficits; gross motor and sensation intact.   Skin: Warm and dry.   Psych: Normal affect.      Relevant Results  Scheduled medications  amLODIPine, 10 mg, oral, q PM  cyclobenzaprine, 5 mg, oral, TID  docusate sodium, 100 mg, oral, BID  ferrous gluconate, 38 mg of iron, oral, Daily with breakfast  finasteride, 5 mg, oral, Daily  insulin degludec, 10 Units, subcutaneous, Nightly  insulin regular, 0-5 Units, subcutaneous, TID with meals  [Held by provider] losartan, 100 mg, oral, Daily  melatonin, 3 mg, oral, Daily  metoprolol succinate XL, 100 mg, oral, Daily  pantoprazole, 40 mg,  oral, Daily before breakfast   Or  pantoprazole, 40 mg, intravenous, Daily before breakfast  potassium chloride CR, 20 mEq, oral, Daily  rosuvastatin, 10 mg, oral, Nightly  tamsulosin, 0.8 mg, oral, Nightly      Continuous medications  lactated Ringer's, 100 mL/hr, Last Rate: 100 mL/hr (10/06/23 0644)      PRN medications  PRN medications: acetaminophen **OR** acetaminophen **OR** acetaminophen, acetaminophen, dextrose 10 % in water (D10W), dextrose, glucagon, ondansetron ODT **OR** ondansetron   Results for orders placed or performed during the hospital encounter of 10/02/23 (from the past 24 hour(s))   POCT GLUCOSE   Result Value Ref Range    POCT Glucose 153 (H) 74 - 99 mg/dL   CBC   Result Value Ref Range    WBC 7.9 4.4 - 11.3 x10*3/uL    nRBC 0.0 0.0 - 0.0 /100 WBCs    RBC 3.65 (L) 4.50 - 5.90 x10*6/uL    Hemoglobin 10.3 (L) 13.5 - 17.5 g/dL    Hematocrit 33.5 (L) 41.0 - 52.0 %    MCV 92 80 - 100 fL    MCH 28.2 26.0 - 34.0 pg    MCHC 30.7 (L) 32.0 - 36.0 g/dL    RDW 14.1 11.5 - 14.5 %    Platelets 155 150 - 450 x10*3/uL    MPV 9.7 7.5 - 11.5 fL   Basic Metabolic Panel   Result Value Ref Range    Glucose 132 (H) 74 - 99 mg/dL    Sodium 128 (L) 136 - 145 mmol/L    Potassium 4.0 3.5 - 5.3 mmol/L    Chloride 98 98 - 107 mmol/L    Bicarbonate 20 (L) 21 - 32 mmol/L    Anion Gap 14 10 - 20 mmol/L    Urea Nitrogen 63 (H) 6 - 23 mg/dL    Creatinine 2.54 (H) 0.50 - 1.30 mg/dL    eGFR 26 (L) >60 mL/min/1.73m*2    Calcium 7.7 (L) 8.6 - 10.3 mg/dL   POCT GLUCOSE   Result Value Ref Range    POCT Glucose 116 (H) 74 - 99 mg/dL   POCT GLUCOSE   Result Value Ref Range    POCT Glucose 157 (H) 74 - 99 mg/dL   POCT GLUCOSE   Result Value Ref Range    POCT Glucose 115 (H) 74 - 99 mg/dL       Assessment/Plan   73 y.o. male with history of arthritis, obesity, lymphedema, ventral hernia repair, hypertension, hyperlipidemia and BPH, who presents with a 3-week history of diarrhea.      Generalized Weakness with Diarrhea  -CT A/P: No  diverticulitis or acute process  -Recent antibiotic use (Keflex) for UTI  -Stool negative for C.Diff Colitis, PCR noninfectious  -ID is consulted  -Holding on antibiotics pending results of cultures: Urine culture is negative.   -On IVF as appears to be hypovolemic from diarrhea and poor oral intake.   -Monitor and replete electrolytes  -PT/OT following  -Afebrile. No leukocytosis.   -Today is still having loose stools, frequently incontinent     Acute Kidney Injury with Hyponatremia  -Sodium downtrendin>123>125>127>129  -On IVF with LR  -Creatinine: 2.25>2.60>2.98>2.88>2.54  -Holding nephrotoxic agents - Furosemide  -Nephrology consulted - feels this is prerenal in nature  -RFP in AM.      Diabetes Mellitus Type II  -Resumed Tresiba at bedtime and added AC Insulin Sliding Scale.   -Maintain hypoglycemic protocol.      Hypertension  -Medical therapy: Metoprolol, Amlodipine  -Hold Lasix due to NOHELIA  -Resumed Losartan today     Anemia  -Iron studies consistent with VIVI  -Monitor and trend Hgb. Hgb 9.9>10.3.   -Transfuse to keep Hgb>7  -Started on oral iron supplementation  -CBC in AM     DVT prophylaxis   -Heparin subcutaneous     Fluids/Electrolytes/Nutrition  -Labs reviewed.   -Electrolytes stable. Hyponatremia. NOHELIA.   -No nutritional concerns.      Disposition  -Plan of care discussed with attending and nephrology.   -Planning discharge to [facility] ([place] in next 1-2 days if stable.     JOSE Pastrana-CNP

## 2023-10-06 NOTE — PROGRESS NOTES
Physical Therapy    Physical Therapy Treatment    Patient Name: Chester Verduzco  MRN: [id number]  Today's Date: 10/6/2023  Time Calculation  Start Time: 1430  Stop Time: 1531  Time Calculation (min): 61 min       Assessment/Plan   PT Assessment  PT Assessment Results: Decreased strength, Decreased range of motion, Decreased endurance, Impaired balance, Decreased mobility  Rehab Prognosis: Good  Barriers to Discharge:  (Focused on knee pain.Self limiting.)  Evaluation/Treatment Tolerance: Patient tolerated treatment well  Medical Staff Made Aware: Yes  End of Session Communication: Bedside nurse  End of Session Patient Position: Bed, 3 rail up (Nurse present at EOS)     PT Plan  Treatment/Interventions: Bed mobility, Transfer training, Balance training, Strengthening, Endurance training  PT Plan: Skilled PT  PT Frequency: 3 times per week  PT Discharge Recommendations: Moderate intensity level of continued care  PT Recommended Transfer Status: Assist x2      General Visit Information:   PT  Visit  PT Received On: 10/06/23  General  Reason for Referral: Impaired mobility  Prior to Session Communication: Bedside nurse  Patient Position Received: Bed, 3 rail up, Alarm on  Preferred Learning Style: auditory  General Comment: Fatigue (Patient cooperative and motivated. Patient c/o R side knee pain which limits patient abilty to ambulate)    Subjective   Precautions:  Precautions  Medical Precautions: Fall precautions, Infection precautions (r/o Cdiff)  Vital Signs:       Objective   Pain:  Pain Assessment  Pain Score: 5 - Moderate pain  Pain Type: Chronic pain  Pain Location: Knee  Pain Orientation: Right  Pain Frequency: Constant/continuous  Effect of Pain on Daily Activities:  (Pain limits patient's activity tolerance)  Cognition:  Cognition  Overall Cognitive Status: Within Functional Limits  Orientation Level: Oriented X4  Postural Control:     Extremity/Trunk Assessments:    Activity Tolerance:  Activity  Tolerance  Endurance: Tolerates 10 - 20 min exercise with multiple rests  Activity Tolerance Comments:  (Patient tolerated tx well but requires many seated and static standing rest breaks.)  Treatments:  Therapeutic Exercise  Therapeutic Exercise Performed: Yes  Therapeutic Exercise Activity 1:  (R LE only: Hip flex, LAQ, Abd x 10 each    L side only x 5 each with standing rest breaks b/t each trial.)  Therapeutic Exercise Activity 2: Seated ther ex x 10-15 each B LE (HR/TR, HS, Abd/add, Hip flex, LAQ,  Rest breaks b/t sets.Extended time to complete all exercises. VC/TC to keep patient on task.)    Therapeutic Activity  Therapeutic Activity Performed: Yes  Therapeutic Activity 1: Sit <> Stand trials MODA x1 (multiple sit<>stand trials. Patient with static standing rest breaks using FWW. Extended time required to complete each trial.)         Outcome Measures:  [facility] Basic Mobility  Turning from your back to your side while in a flat bed without using bedrails: A lot  Moving from lying on your back to sitting on the side of a flat bed without using bedrails: A lot  Moving to and from bed to chair (including a wheelchair): Total  Standing up from a chair using your arms (e.g. wheelchair or bedside chair): A lot  To walk in hospital room: Total  Climbing 3-5 steps with railing: Total  Basic Mobility - Total Score: 9    Education Documentation  No documentation found.  Education Comments  No comments found.        OP EDUCATION:  Education  Individual(s) Educated: Patient  Education Provided: Body Mechanics, Fall Risk, Home Exercise Program  Risk and Benefits Discussed with Patient/Caregiver/Other: yes  Plan of Care Discussed and Agreed Upon: yes  Patient Response to Education: Patient/Caregiver Verbalized Understanding of Information  Education Comment:  (Much education provided for benefits of participatin in therapy and techniques used to decrease fear of pain.)    Encounter Problems       Encounter Problems (Active)        Balance       balance  (Progressing)       Start:  10/03/23    Expected End:  10/17/23       Pt will demo static/dynamic standing balance x5+minutes B to UUE support CGA to progress activity tolerance and functional strength            Mobility       Gait (Progressing)       Start:  10/03/23    Expected End:  10/17/23       Pt will ambulate 3x25 feet with directional changes and use of DME Ryan necessary to initiate return to PLOF          bed (Progressing)       Start:  10/03/23    Expected End:  10/17/23       Pt will complete bed mobility with supervision for return to PLOF          strength (Progressing)       Start:  10/03/23       X15+ reps there ex to increase general strength needed for function            Transfers       transfer (Progressing)       Start:  10/03/23    Expected End:  10/17/23       Pt will complete sit<>Stand, bed <> chair transfer with Ryan x1 to improve function

## 2023-10-06 NOTE — PROGRESS NOTES
Chester Verduzco is a 73 y.o. male on day 4 of admission presenting with Diarrhea.    Subjective   Interval History: no fever, no new complaints        Review of Systems    Objective   Range of Vitals (last 24 hours)  Heart Rate:  [76]   Temp:  [35.4 °C (95.8 °F)-36.5 °C (97.7 °F)]   Resp:  [18]   BP: (150-162)/(61-73)   SpO2:  [93 %-97 %]   Daily Weight  10/03/23 : 130 kg (287 lb 3.2 oz)    Body mass index is 37.89 kg/m².    Physical Exam  Constitutional:       Appearance: Normal appearance.   HENT:      Head: Normocephalic and atraumatic.      Mouth/Throat:      Mouth: Mucous membranes are moist.      Pharynx: Oropharynx is clear.   Eyes:      Pupils: Pupils are equal, round, and reactive to light.   Cardiovascular:      Rate and Rhythm: Normal rate and regular rhythm.      Heart sounds: Normal heart sounds.   Pulmonary:      Effort: Pulmonary effort is normal.      Breath sounds: Normal breath sounds.   Abdominal:      General: Abdomen is flat. Bowel sounds are normal.      Palpations: Abdomen is soft.   Musculoskeletal:      Cervical back: Normal range of motion.      Comments: stasis   Neurological:      Mental Status: He is alert.         Antibiotics  lactated Ringer's bolus 1,000 mL  HYDROmorphone (Dilaudid) injection 0.5 mg  HYDROmorphone (Dilaudid) 0.5 mg/0.5 mL injection  - Omnicell Override Pull  cefTRIAXone (Rocephin) IVPB 1 g  HYDROmorphone (Dilaudid) injection 0.5 mg  cyclobenzaprine (Flexeril) tablet 5 mg  cyclobenzaprine (Flexeril) 10 mg tablet  - Omnicell Override Pull  sodium chloride 0.9% infusion  pantoprazole (ProtoNix) EC tablet 40 mg  pantoprazole (ProtoNix) injection 40 mg  acetaminophen (Tylenol) tablet 650 mg  acetaminophen (Tylenol) oral liquid 650 mg  acetaminophen (Tylenol) suppository 650 mg  ondansetron ODT (Zofran-ODT) disintegrating tablet 4 mg  ondansetron (Zofran) injection 4 mg  melatonin tablet 3 mg  acetaminophen (Tylenol) tablet 650 mg  cyclobenzaprine (Flexeril) tablet 5  mg  potassium chloride CR (Klor-Con M20) ER tablet 20 mEq  finasteride (Proscar) tablet 5 mg  losartan (Cozaar) tablet 100 mg  metoprolol succinate XL (Toprol-XL) 24 hr tablet 100 mg  rosuvastatin (Crestor) tablet 10 mg  tamsulosin (Flomax) 24 hr capsule 0.8 mg  dextrose 50 % injection 25 g  glucagon (Glucagen) injection 1 mg  dextrose 10 % infusion  insulin degludec (Tresiba) injection 10 Units  potassium chloride CR (Klor-Con M20) ER tablet 20 mEq  lactated Ringer's infusion  docusate sodium (Colace) capsule 100 mg  amLODIPine (Norvasc) tablet 10 mg  ferrous gluconate (Fergon) 324 (38 Fe) mg tablet 38 mg of iron  insulin regular (HumuLIN R) injection 0-5 Units      Relevant Results  Labs  Results from last 72 hours   Lab Units 10/06/23  0547 10/05/23  0558 10/04/23  0622   WBC AUTO x10*3/uL 7.9 7.6 7.9   HEMOGLOBIN g/dL 10.3* 9.9* 9.5*   HEMATOCRIT % 33.5* 31.3* 29.5*   PLATELETS AUTO x10*3/uL 155 163 153     Results from last 72 hours   Lab Units 10/06/23  0547 10/05/23  0558 10/04/23  0622   SODIUM mmol/L 128* 127* 125*   POTASSIUM mmol/L 4.0 3.8 3.3*   CHLORIDE mmol/L 98 97* 95*   CO2 mmol/L 20* 20* 20*   BUN mg/dL 63* 76* 81*   CREATININE mg/dL 2.54* 2.88* 2.90*   GLUCOSE mg/dL 132* 102* 127*   CALCIUM mg/dL 7.7* 7.6* 7.3*   ANION GAP mmol/L 14 14 13   EGFR mL/min/1.73m*2 26* 22* 22*   PHOSPHORUS mg/dL  --   --  5.0*     Results from last 72 hours   Lab Units 10/04/23  0622   ALBUMIN g/dL 2.1*     Estimated Creatinine Clearance: 37.1 mL/min (A) (by C-G formula based on SCr of 2.54 mg/dL (H)).  CRP   Date Value Ref Range Status   09/18/2023 22.55 (A) mg/dL Final     Comment:     REF VALUE  < 1.00     06/23/2021 0.50 mg/dL Final     Comment:     REF VALUE  < 1.00     06/16/2021 15.58 (A) mg/dL Final     Comment:     REF VALUE  < 1.00       Microbiology  Reviewed  Imaging  reviewed      Assessment/Plan      Diarrhea, C. Diff is negative, pathogen PCR negative  Pyuria,  the culture with N joy  Lymphedema /  stasis     Recommendations :     No need for antibiotics  Discussed with the medical team    I spent  minutes in the professional and overall care of this patient.      Payton Raines MD

## 2023-10-07 VITALS
SYSTOLIC BLOOD PRESSURE: 150 MMHG | BODY MASS INDEX: 38.06 KG/M2 | HEIGHT: 73 IN | RESPIRATION RATE: 16 BRPM | HEART RATE: 75 BPM | TEMPERATURE: 97.3 F | WEIGHT: 287.2 LBS | OXYGEN SATURATION: 97 % | DIASTOLIC BLOOD PRESSURE: 67 MMHG

## 2023-10-07 LAB
ANION GAP SERPL CALC-SCNC: 12 MMOL/L (ref 10–20)
BUN SERPL-MCNC: 48 MG/DL (ref 6–23)
CALCIUM SERPL-MCNC: 7.8 MG/DL (ref 8.6–10.3)
CHLORIDE SERPL-SCNC: 98 MMOL/L (ref 98–107)
CO2 SERPL-SCNC: 24 MMOL/L (ref 21–32)
CREAT SERPL-MCNC: 2.24 MG/DL (ref 0.5–1.3)
ERYTHROCYTE [DISTWIDTH] IN BLOOD BY AUTOMATED COUNT: 13.9 % (ref 11.5–14.5)
GFR SERPL CREATININE-BSD FRML MDRD: 30 ML/MIN/1.73M*2
GLUCOSE BLD MANUAL STRIP-MCNC: 129 MG/DL (ref 74–99)
GLUCOSE BLD MANUAL STRIP-MCNC: 157 MG/DL (ref 74–99)
GLUCOSE SERPL-MCNC: 121 MG/DL (ref 74–99)
HCT VFR BLD AUTO: 34 % (ref 41–52)
HGB BLD-MCNC: 10.9 G/DL (ref 13.5–17.5)
MAGNESIUM SERPL-MCNC: 2.08 MG/DL (ref 1.6–2.4)
MCH RBC QN AUTO: 29.1 PG (ref 26–34)
MCHC RBC AUTO-ENTMCNC: 32.1 G/DL (ref 32–36)
MCV RBC AUTO: 91 FL (ref 80–100)
NRBC BLD-RTO: 0 /100 WBCS (ref 0–0)
PLATELET # BLD AUTO: 176 X10*3/UL (ref 150–450)
PMV BLD AUTO: 9.2 FL (ref 7.5–11.5)
POTASSIUM SERPL-SCNC: 4 MMOL/L (ref 3.5–5.3)
RBC # BLD AUTO: 3.75 X10*6/UL (ref 4.5–5.9)
SODIUM SERPL-SCNC: 130 MMOL/L (ref 136–145)
WBC # BLD AUTO: 9.5 X10*3/UL (ref 4.4–11.3)

## 2023-10-07 PROCEDURE — 82947 ASSAY GLUCOSE BLOOD QUANT: CPT

## 2023-10-07 PROCEDURE — 85027 COMPLETE CBC AUTOMATED: CPT | Performed by: NURSE PRACTITIONER

## 2023-10-07 PROCEDURE — 2500000004 HC RX 250 GENERAL PHARMACY W/ HCPCS (ALT 636 FOR OP/ED): Performed by: NURSE PRACTITIONER

## 2023-10-07 PROCEDURE — 83735 ASSAY OF MAGNESIUM: CPT | Performed by: NURSE PRACTITIONER

## 2023-10-07 PROCEDURE — 36415 COLL VENOUS BLD VENIPUNCTURE: CPT | Performed by: NURSE PRACTITIONER

## 2023-10-07 PROCEDURE — 2500000002 HC RX 250 W HCPCS SELF ADMINISTERED DRUGS (ALT 637 FOR MEDICARE OP, ALT 636 FOR OP/ED): Performed by: NURSE PRACTITIONER

## 2023-10-07 PROCEDURE — 99239 HOSP IP/OBS DSCHRG MGMT >30: CPT | Performed by: NURSE PRACTITIONER

## 2023-10-07 PROCEDURE — 2500000001 HC RX 250 WO HCPCS SELF ADMINISTERED DRUGS (ALT 637 FOR MEDICARE OP): Performed by: NURSE PRACTITIONER

## 2023-10-07 PROCEDURE — 96372 THER/PROPH/DIAG INJ SC/IM: CPT | Performed by: NURSE PRACTITIONER

## 2023-10-07 PROCEDURE — 2500000001 HC RX 250 WO HCPCS SELF ADMINISTERED DRUGS (ALT 637 FOR MEDICARE OP)

## 2023-10-07 PROCEDURE — 2580000001 HC RX 258 IV SOLUTIONS

## 2023-10-07 PROCEDURE — 80048 BASIC METABOLIC PNL TOTAL CA: CPT | Performed by: NURSE PRACTITIONER

## 2023-10-07 RX ORDER — NALTREXONE HYDROCHLORIDE 50 MG/1
50 TABLET, FILM COATED ORAL DAILY
Start: 2023-10-07 | End: 2023-11-16 | Stop reason: HOSPADM

## 2023-10-07 RX ORDER — POTASSIUM CHLORIDE 20 MEQ/1
20 TABLET, EXTENDED RELEASE ORAL DAILY
Start: 2023-10-08 | End: 2023-11-16 | Stop reason: HOSPADM

## 2023-10-07 RX ORDER — FERROUS GLUCONATE 325 MG
38 TABLET ORAL
Start: 2023-10-08 | End: 2023-11-16 | Stop reason: HOSPADM

## 2023-10-07 RX ORDER — CYCLOBENZAPRINE HCL 5 MG
5 TABLET ORAL 3 TIMES DAILY PRN
Start: 2023-10-07 | End: 2023-11-07 | Stop reason: SINTOL

## 2023-10-07 RX ORDER — PANTOPRAZOLE SODIUM 40 MG/1
40 TABLET, DELAYED RELEASE ORAL
Start: 2023-10-08 | End: 2023-11-16 | Stop reason: HOSPADM

## 2023-10-07 RX ORDER — AMLODIPINE BESYLATE 10 MG/1
10 TABLET ORAL EVERY EVENING
Start: 2023-10-07 | End: 2024-03-05

## 2023-10-07 RX ORDER — FUROSEMIDE 40 MG/1
40 TABLET ORAL DAILY
Start: 2023-10-09 | End: 2023-11-16 | Stop reason: HOSPADM

## 2023-10-07 RX ADMIN — FINASTERIDE 5 MG: 5 TABLET, FILM COATED ORAL at 08:24

## 2023-10-07 RX ADMIN — CYCLOBENZAPRINE HYDROCHLORIDE 5 MG: 5 TABLET, FILM COATED ORAL at 08:24

## 2023-10-07 RX ADMIN — FERROUS GLUCONATE 38 MG OF IRON: 324 TABLET ORAL at 08:24

## 2023-10-07 RX ADMIN — INSULIN HUMAN 1 UNITS: 100 INJECTION, SOLUTION PARENTERAL at 17:16

## 2023-10-07 RX ADMIN — SODIUM CHLORIDE, POTASSIUM CHLORIDE, SODIUM LACTATE AND CALCIUM CHLORIDE 100 ML/HR: 600; 310; 30; 20 INJECTION, SOLUTION INTRAVENOUS at 02:38

## 2023-10-07 RX ADMIN — DOCUSATE SODIUM 100 MG: 100 CAPSULE, LIQUID FILLED ORAL at 08:24

## 2023-10-07 RX ADMIN — CYCLOBENZAPRINE HYDROCHLORIDE 5 MG: 5 TABLET, FILM COATED ORAL at 15:25

## 2023-10-07 RX ADMIN — PANTOPRAZOLE SODIUM 40 MG: 40 TABLET, DELAYED RELEASE ORAL at 08:25

## 2023-10-07 RX ADMIN — METOPROLOL SUCCINATE 100 MG: 50 TABLET, EXTENDED RELEASE ORAL at 08:24

## 2023-10-07 RX ADMIN — POTASSIUM CHLORIDE 20 MEQ: 1500 TABLET, EXTENDED RELEASE ORAL at 08:24

## 2023-10-07 ASSESSMENT — COGNITIVE AND FUNCTIONAL STATUS - GENERAL
MOVING TO AND FROM BED TO CHAIR: A LOT
PERSONAL GROOMING: A LITTLE
MOVING FROM LYING ON BACK TO SITTING ON SIDE OF FLAT BED WITH BEDRAILS: A LITTLE
CLIMB 3 TO 5 STEPS WITH RAILING: A LOT
DRESSING REGULAR UPPER BODY CLOTHING: A LITTLE
TOILETING: A LITTLE
STANDING UP FROM CHAIR USING ARMS: A LOT
WALKING IN HOSPITAL ROOM: A LOT
DAILY ACTIVITIY SCORE: 18
MOBILITY SCORE: 14
TURNING FROM BACK TO SIDE WHILE IN FLAT BAD: A LITTLE
HELP NEEDED FOR BATHING: A LITTLE
EATING MEALS: A LITTLE
DRESSING REGULAR LOWER BODY CLOTHING: A LITTLE

## 2023-10-07 ASSESSMENT — PAIN - FUNCTIONAL ASSESSMENT: PAIN_FUNCTIONAL_ASSESSMENT: 0-10

## 2023-10-07 ASSESSMENT — PAIN SCALES - GENERAL: PAINLEVEL_OUTOF10: 5 - MODERATE PAIN

## 2023-10-07 NOTE — DISCHARGE SUMMARY
Discharge Diagnosis  Diarrhea    Issues Requiring Follow-Up  NOHELIA/Hyponatremia    Discharge Meds     Your medication list        START taking these medications        Instructions Last Dose Given Next Dose Due   amLODIPine 10 mg tablet  Commonly known as: Norvasc      Take 1 tablet (10 mg) by mouth once daily in the evening.       cyclobenzaprine 5 mg tablet  Commonly known as: Flexeril      Take 1 tablet (5 mg) by mouth 3 times a day as needed for muscle spasms.       ferrous gluconate 324 (38 Fe) mg tablet  Commonly known as: Fergon  Start taking on: October 8, 2023      Take 1 tablet (38 mg of iron) by mouth once daily with a meal. Do not start before October 8, 2023.       insulin regular 100 unit/mL injection  Commonly known as: HumuLIN R      Inject 0-5 Units under the skin 3 times a day with meals. Take as directed per insulin instructions.       pantoprazole 40 mg EC tablet  Commonly known as: ProtoNix  Start taking on: October 8, 2023      Take 1 tablet (40 mg) by mouth once daily in the morning. Take before meals. Do not crush, chew, or split. Do not start before October 8, 2023.       potassium chloride CR 20 mEq ER tablet  Commonly known as: Klor-Con M20  Start taking on: October 8, 2023      Take 1 tablet (20 mEq) by mouth once daily. Do not crush or chew. Do not start before October 8, 2023.              CHANGE how you take these medications        Instructions Last Dose Given Next Dose Due   furosemide 40 mg tablet  Commonly known as: Lasix  Start taking on: October 9, 2023  What changed: These instructions start on October 9, 2023. If you are unsure what to do until then, ask your doctor or other care provider.      Take 1 tablet (40 mg) by mouth once daily. Do not start before October 9, 2023.       rosuvastatin 10 mg tablet  Commonly known as: Crestor  What changed: when to take this      TAKE 1 TABLET BY MOUTH EVERYDAY AT BEDTIME              CONTINUE taking these medications        Instructions  Last Dose Given Next Dose Due   allopurinol 100 mg tablet  Commonly known as: Zyloprim      Take 1 tablet (100 mg) by mouth once daily.       finasteride 5 mg tablet  Commonly known as: Proscar           insulin degludec 100 unit/mL (3 mL) injection  Commonly known as: Tresiba FlexTouch U-100      Inject 10 Units under the skin once daily at bedtime.       losartan 100 mg tablet  Commonly known as: Cozaar      TAKE 1 TABLET BY MOUTH EVERY DAY       metoprolol succinate  mg 24 hr tablet  Commonly known as: Toprol-XL      TAKE 1 TABLET BY MOUTH EVERY DAY       naltrexone 50 mg tablet  Commonly known as: Depade      Take 1 tablet (50 mg) by mouth once daily. Pt has not started this medication yet       tamsulosin 0.4 mg 24 hr capsule  Commonly known as: Flomax                  STOP taking these medications      colchicine (gout) 0.6 mg tablet        fluticasone 50 mcg/actuation nasal spray  Commonly known as: Flonase                  Where to Get Your Medications        Information about where to get these medications is not yet available    Ask your nurse or doctor about these medications  amLODIPine 10 mg tablet  cyclobenzaprine 5 mg tablet  ferrous gluconate 324 (38 Fe) mg tablet  furosemide 40 mg tablet  insulin regular 100 unit/mL injection  naltrexone 50 mg tablet  pantoprazole 40 mg EC tablet  potassium chloride CR 20 mEq ER tablet         Test Results Pending At Discharge  Pending Labs       Order Current Status    BLOOD GAS VENOUS FULL PANEL In process    Extra Tubes In process    Extra Urine Gray Tube In process    Light Blue Top In process    SST TOP In process    Urinalysis with Reflex Microscopic and Culture In process            Hospital Course   73 y.o. male with history of arthritis, obesity, lymphedema, ventral hernia repair, hypertension, hyperlipidemia and BPH, who presents with a 3-week history of diarrhea with generalized weakness. CT A/P: No acute diverticulitis or acute process. Recent  antibiotic use (Keflex) for UTI. Stool negative for C.Diff Colitis, PCR noninfectious. ID is consulted - no need for antibiotics. Urine culture is negative.  Given IVF for hypovolemia from diarrhea and poor oral intake. Monitored and replete electrolytes as needed. PT/OT following. Afebrile. No leukocytosis. Stools more soft and less frequent or watery. Nephrology consulted for NOHELIA with hyponatremia. Sodium uptrended: 123>130. On IVF with LR as likely pre-renal in nature. Nephrotoxic agents held to allow for recovery. Urine output satisfactory. Hemodynamically stable for discharge to SNF. Very weak and debilitated and could benefit from intensive therapy.     Medical Management:      Diabetes Mellitus Type II  -Resumed Tresiba at bedtime and added AC Insulin Sliding Scale.   -Maintained hypoglycemic protocol.      Hypertension  -Medical therapy: Metoprolol, Amlodipine  -Held Lasix due to NOHELIA  -Resumed Losartan but recommend to hold on Lasix until diarrhea resolves.      Anemia  -Iron studies consistent with VIVI  -Monitor and trend Hgb. Hgb 10.9   -Started on oral iron supplementation     DVT prophylaxis   -Heparin subcutaneous     Fluids/Electrolytes/Nutrition  -Labs reviewed daily   -Electrolytes stable. Hyponatremia. NOHELIA.   -No nutritional concerns.      Disposition  Plan of care discussed with attending, Dr. Chairez. Seen and examined in his room this AM. Doing better. Stools soft, not watery. He denies chest pain, breathing difficulties, abdominal pain, N/V/C, fever, or chills.  Medications reviewed and reconciled. Greater than 35 minutes spent in coordination of care, including physical examination, medication reconciliation, and collaboration with staff.      Pertinent Physical Exam At Time of Discharge  Physical Exam  Constitutional: A&O x 3; NAD; calm and cooperative  Eyes: EOM's intact  HEENT: Normocephalic, Atraumatic. Oral mucosa moist.   Neck: Supple. No JVD, lymphadenopathy.   Lungs: CTAB with fair air  movement. Diminished in the bases. Respirations even and unlabored on room air.   Heart: RRR  Abdomen: Softly distended; nontender; + BS. Obese.   MS/Extremities: JOHNS equally x 4. Peripheral pulses intact bilaterally. BLE edema with stasis/vascular changes, R>L.   Neuro: A&O x3; no focal deficits; gross motor and sensation intact.   Skin: Warm and dry.   Psych: Normal affect.    Outpatient Follow-Up  Future Appointments   Date Time Provider Department Center   11/22/2023 11:20 AM MD Keenan Tran [facility]   12/6/2023  9:00 AM GEA FLUORO 5 GEADR [facility]    12/6/2023 10:40 AM MD Keenan Tran [facility]   12/13/2023  2:20 PM MD Erinn [id number] [facility]   2/14/2024 10:20 AM Jeffry De Leon MD [id number] [facility]   8/30/2024 10:40 AM Jeffry De Leon MD [id number] [facility]   9/23/2024  9:20 AM Yaz Salinas, APRN-CNP [id number] [facility]         JOSE Pastrana-CNP

## 2023-10-07 NOTE — ED PROVIDER NOTES
Luciana Gray MD  Resident  Specialty: Emergency Medicine     ED Notes     Cosign Needed     Date of Service: 10/2/2023 11:46 AM     Cosign Needed         HPI:  Patient is a 73-year-old male with history of arthritis, obesity, Chronic bilateral lower extremity lymphedema and ventral hernia status postrepair x3, gout, hypertension, hyperlipidemia, GERD, CHF, BPH who presents with diarrhea and generalized weakness.  Patient states he was diagnosed with a UTI in the outpatient setting approximately 2 weeks ago but did not finish his course of Keflex secondary to GI upset.  He has had nonbloody diarrhea for the past 2 weeks and states he has had bowel incontinence secondary to inability to make it to the bathroom due to generalized weakness.  She denies recent falls.  Patient additionally reports worsening shortness of breath and orthopnea, having to sleep in the recliner.  He denies chest pain or cough.  No abdominal pain, however he does report increased urinary urgency/frequency.  No back pain.  No nausea or vomiting.     ROS: A 10-system ROS was performed and was negative except as documented in the HPI.     PMH/PSH: Reviewed in EMR. As above in HPI.  SH: Denies EtOH, tobacco or illicit drug use.  Allergies:        Allergies   Allergen Reactions    Keflex [Cephalexin] Headache, GI Upset and Nausea/vomiting    Bupropion Other      Medications: See prescription writer for full medication list.     General: no acute distress, appropriate conversation  HEENT:  No rhinorrhea. MMM.  Cardiac: regular rate rhythm, no murmurs  Pulm: mildly increased respiratory effort on room air, equal chest expansion, diminished but clear bilaterally, no wheeze or crackles  GI: soft, nontender, moderately distended, +BS  Extremities:  moves all extremities freely, chronic non-pitting BLE edema noted with pretibial venous stasis changes  Skin: warm, well-perfused, as above otherwise no lesions noted on exposed skin.  Neuro:  AOx3,  moves all 4 extremities freely and independently      Assessment/Plan/MDM  Patient is a 73-year-old male with history of arthritis, obesity, Chronic bilateral lower extremity lymphedema and ventral hernia status postrepair x3, gout, hypertension, hyperlipidemia, GERD, CHF, BPH who presents with diarrhea and generalized weakness.  Patient with evidence of UTI on UA. Magnesium within normal limits, patient has a significant NOHELIA and elevated BUN from baseline, most likely prerenal in the setting of GI losses.  Patient additionally with elevated alk phos, however he does not have right upper quadrant or epigastric tenderness to palpation on examination.  Leukocytosis improved from prior, hemoglobin stable.  COVID/influenza negative.  BNP within normal limits.  Stool studies pending.  Given NOHELIA, UTI 1 g ceftriaxone ordered.  Urine culture pending.  Patient accepted for admission to medicine additionally for PT/OT and possible SNF placement as he states he has been unable to care for himself at home independently.  CT without evidence of colitis Or bowel obstruction.     Clinical Impression: diarrhea, UTI, generalized weakness  Dispo: admit to medicine     Pt seen and discussed with attending physician, Dr. Sea Gray MD  PGY3, Emergency Medicine     Disclaimer: This note was dictated by speech recognition. An attempt at proof reading was made to minimize errors. Errors in transcription may be present.  Please call if questions.        Luciana Gray MD  Resident  10/02/23 1532      -------------------------------------------  This patient was seen by the resident physician.  I have seen and examined the patient, agree with the workup, evaluation, management and diagnosis. I reviewed and edited the above documentation where necessary.     Bill Osei MD  EM Attending Physician                 Electronically signed by Luciana Gray MD at 10/2/2023  3:32 PM              Luciana Gray MD  Resident  10/07/23  1333       Bill Osei MD MPH  10/07/23 2412

## 2023-10-07 NOTE — CARE PLAN
The patient's goals for the shift include  patient will utilize the call light when needing assistance.

## 2023-10-07 NOTE — PROGRESS NOTES
10/07/23 1308   Discharge Planning   Living Arrangements Alone   Support Systems Friends/neighbors   Assistance Needed see previous   Type of Residence Private residence   Home or Post Acute Services Post acute facilities (Rehab/SNF/etc)   Type of Post Acute Facility Services Skilled nursing   Patient expects to be discharged to: d/c today to [facility] at 1630, patient, friend Janine (voicemail left), Rn notified of d/c time. Number for nurse to nurse report provided to Ralph PÉREZ   Does the patient need discharge transport arranged? Yes   RoundTrip coordination needed? Yes   Has discharge transport been arranged? Yes   What day is the transport expected? 10/07/23   What time is the transport expected? 1630

## 2023-10-07 NOTE — PROGRESS NOTES
Chester Verduzco is a 73 y.o. male on day 5 of admission presenting with Diarrhea.    Subjective   Interval History: no fever, no new complaints         Review of Systems    Objective   Range of Vitals (last 24 hours)  Heart Rate:  [76-82]   Temp:  [36.4 °C (97.5 °F)-37.5 °C (99.5 °F)]   Resp:  [16-20]   BP: (149-169)/(70-76)   SpO2:  [94 %-97 %]   Daily Weight  10/03/23 : 130 kg (287 lb 3.2 oz)    Body mass index is 37.89 kg/m².    Physical Exam  Constitutional:       Appearance: Normal appearance.   HENT:      Head: Normocephalic and atraumatic.      Mouth/Throat:      Mouth: Mucous membranes are moist.      Pharynx: Oropharynx is clear.   Eyes:      Pupils: Pupils are equal, round, and reactive to light.   Cardiovascular:      Rate and Rhythm: Normal rate and regular rhythm.      Heart sounds: Normal heart sounds.   Pulmonary:      Effort: Pulmonary effort is normal.      Breath sounds: Normal breath sounds.   Abdominal:      General: Abdomen is flat. Bowel sounds are normal.      Palpations: Abdomen is soft.   Musculoskeletal:      Cervical back: Normal range of motion.      Comments: stasis   Neurological:      Mental Status: He is alert.         Antibiotics  lactated Ringer's bolus 1,000 mL  HYDROmorphone (Dilaudid) injection 0.5 mg  HYDROmorphone (Dilaudid) 0.5 mg/0.5 mL injection  - Omnicell Override Pull  cefTRIAXone (Rocephin) IVPB 1 g  HYDROmorphone (Dilaudid) injection 0.5 mg  cyclobenzaprine (Flexeril) tablet 5 mg  cyclobenzaprine (Flexeril) 10 mg tablet  - Omnicell Override Pull  sodium chloride 0.9% infusion  pantoprazole (ProtoNix) EC tablet 40 mg  pantoprazole (ProtoNix) injection 40 mg  acetaminophen (Tylenol) tablet 650 mg  acetaminophen (Tylenol) oral liquid 650 mg  acetaminophen (Tylenol) suppository 650 mg  ondansetron ODT (Zofran-ODT) disintegrating tablet 4 mg  ondansetron (Zofran) injection 4 mg  melatonin tablet 3 mg  acetaminophen (Tylenol) tablet 650 mg  cyclobenzaprine (Flexeril)  tablet 5 mg  potassium chloride CR (Klor-Con M20) ER tablet 20 mEq  finasteride (Proscar) tablet 5 mg  losartan (Cozaar) tablet 100 mg  metoprolol succinate XL (Toprol-XL) 24 hr tablet 100 mg  rosuvastatin (Crestor) tablet 10 mg  tamsulosin (Flomax) 24 hr capsule 0.8 mg  dextrose 50 % injection 25 g  glucagon (Glucagen) injection 1 mg  dextrose 10 % infusion  insulin degludec (Tresiba) injection 10 Units  potassium chloride CR (Klor-Con M20) ER tablet 20 mEq  lactated Ringer's infusion  docusate sodium (Colace) capsule 100 mg  amLODIPine (Norvasc) tablet 10 mg  ferrous gluconate (Fergon) 324 (38 Fe) mg tablet 38 mg of iron  insulin regular (HumuLIN R) injection 0-5 Units  amLODIPine (Norvasc) tablet  cyclobenzaprine (Flexeril) tablet      pantoprazole (ProtoNix) EC tablet  potassium chloride SA (Klor-Con M20) ER tablet  naltrexone (Depade) tablet  furosemide (Lasix) tablet      Relevant Results  Labs  Results from last 72 hours   Lab Units 10/07/23  0538 10/06/23  0547 10/05/23  0558   WBC AUTO x10*3/uL 9.5 7.9 7.6   HEMOGLOBIN g/dL 10.9* 10.3* 9.9*   HEMATOCRIT % 34.0* 33.5* 31.3*   PLATELETS AUTO x10*3/uL 176 155 163     Results from last 72 hours   Lab Units 10/07/23  0538 10/06/23  0547 10/05/23  0558   SODIUM mmol/L 130* 128* 127*   POTASSIUM mmol/L 4.0 4.0 3.8   CHLORIDE mmol/L 98 98 97*   CO2 mmol/L 24 20* 20*   BUN mg/dL 48* 63* 76*   CREATININE mg/dL 2.24* 2.54* 2.88*   GLUCOSE mg/dL 121* 132* 102*   CALCIUM mg/dL 7.8* 7.7* 7.6*   ANION GAP mmol/L 12 14 14   EGFR mL/min/1.73m*2 30* 26* 22*         Estimated Creatinine Clearance: 42.1 mL/min (A) (by C-G formula based on SCr of 2.24 mg/dL (H)).  CRP   Date Value Ref Range Status   09/18/2023 22.55 (A) mg/dL Final     Comment:     REF VALUE  < 1.00     06/23/2021 0.50 mg/dL Final     Comment:     REF VALUE  < 1.00     06/16/2021 15.58 (A) mg/dL Final     Comment:     REF VALUE  < 1.00       Microbiology  Reviewed  Imaging  reviewed      Assessment/Plan       Diarrhea, C. Diff is negative, pathogen PCR negative  Pyuria,  the culture with N joy  Lymphedema / stasis     Recommendations :     No need for antibiotics    I spent  minutes in the professional and overall care of this patient.      Payton Raines MD

## 2023-10-08 LAB — GLUCOSE BLD MANUAL STRIP-MCNC: 149 MG/DL (ref 74–99)

## 2023-10-09 ENCOUNTER — NURSING HOME VISIT (OUTPATIENT)
Dept: POST ACUTE CARE | Facility: EXTERNAL LOCATION | Age: 73
End: 2023-10-09
Payer: MEDICARE

## 2023-10-09 DIAGNOSIS — Z79.4 TYPE 2 DIABETES MELLITUS WITH DIABETIC NEUROPATHY, WITH LONG-TERM CURRENT USE OF INSULIN (MULTI): ICD-10-CM

## 2023-10-09 DIAGNOSIS — N40.1 BENIGN PROSTATIC HYPERPLASIA WITH LOWER URINARY TRACT SYMPTOMS, SYMPTOM DETAILS UNSPECIFIED: ICD-10-CM

## 2023-10-09 DIAGNOSIS — E11.40 TYPE 2 DIABETES MELLITUS WITH DIABETIC NEUROPATHY, WITH LONG-TERM CURRENT USE OF INSULIN (MULTI): ICD-10-CM

## 2023-10-09 DIAGNOSIS — R50.9 FEVER, UNSPECIFIED FEVER CAUSE: ICD-10-CM

## 2023-10-09 PROCEDURE — 99305 1ST NF CARE MODERATE MDM 35: CPT | Performed by: FAMILY MEDICINE

## 2023-10-09 NOTE — LETTER
Patient: Chester Verduzco  : 1950    Encounter Date: 10/09/2023    Chester Verduzco is a 73 y.o. 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 Improved with IVF but left with profound weakness and inability to walk. Recent Hung placed for 900 cc PVR/obstructive uropathy. Over night with rigors and fever.    O: VSS AFEB Tmax 102.6 (10/9/23 0400) Awake, alert, NAD. Chest cta. Heart rrr. HUNG draining clear. Ext no c/c/e.    LAB (10/9/23) Hgb 9.1, Plt 132, Alb 2.2, Alk phos 214, AST 58, Cr 2.92, BUN 44, GFR 22    A/P:  # Weakness: SNF PT/OT  # Fever: UA and blood cx pending. Start levaquin 500 mg daily x 7 days.  # BPH w/ LUTS & Obstructive Uropathy: Flomax 0.8 mg qhs. Finasteride 5 mg. Hung for 900 cc PVR 10/8/23.  # OA: norco prn pain  # HTN: amlodpine 10, Losartan 100, Toprol 100.  # DM: Tresiba 10  # Gout: allopurinol  # Anemia: Ferrous sulfate  # GERD: PPI    Past Surgical History:   Procedure Laterality Date   • CARDIAC PACEMAKER PLACEMENT  2013    Pacemaker Permanent Placement   • OTHER SURGICAL HISTORY  06/15/2015    Ventral Hernia Repair - Recurrent   • OTHER SURGICAL HISTORY  2019    Prostate biopsy   • VARICOSE VEIN SURGERY  2013    Varicose Vein Ligation   • VENTRAL HERNIA REPAIR  06/15/2015    Ventral Hernia Repair      Social History     Socioeconomic History   • Marital status: Single     Spouse name: Not on file   • Number of children: Not on file   • Years of education: Not on file   • Highest education level: Not on file   Occupational History   • Not on file   Tobacco Use   • Smoking status: Never     Passive exposure: Never   • Smokeless tobacco: Never    Vaping Use   • Vaping Use: Never used   Substance and Sexual Activity   • Alcohol use: Not Currently   • Drug use: Never   • Sexual activity: Not Currently   Other Topics Concern   • Not on file   Social History Narrative   • Not on file     Social Determinants of Health     Financial Resource Strain: Low Risk  (10/3/2023)    Overall Financial Resource Strain (CARDIA)    • Difficulty of Paying Living Expenses: Not hard at all   Recent Concern: Financial Resource Strain - Medium Risk (10/2/2023)    Overall Financial Resource Strain (CARDIA)    • Difficulty of Paying Living Expenses: Somewhat hard   Food Insecurity: No Food Insecurity (10/3/2023)    Hunger Vital Sign    • Worried About Running Out of Food in the Last Year: Never true    • Ran Out of Food in the Last Year: Never true   Transportation Needs: No Transportation Needs (10/3/2023)    PRAPARE - Transportation    • Lack of Transportation (Medical): No    • Lack of Transportation (Non-Medical): No   Physical Activity: Inactive (10/2/2023)    Exercise Vital Sign    • Days of Exercise per Week: 0 days    • Minutes of Exercise per Session: 0 min   Stress: Not on file   Social Connections: Not on file   Intimate Partner Violence: Not on file   Housing Stability: Low Risk  (10/3/2023)    Housing Stability Vital Sign    • Unable to Pay for Housing in the Last Year: No    • Number of Places Lived in the Last Year: 1    • Unstable Housing in the Last Year: No     Past Medical History:   Diagnosis Date   • Abnormal weight gain 10/27/2016    Abnormal weight gain   • Achilles tendinitis, unspecified leg 08/16/2016    Achilles tendinitis   • Acute upper respiratory infection, unspecified     Acute URI   • Allergic contact dermatitis due to plants, except food 08/22/2013    Contact dermatitis due to poison ivy   • Cellulitis of right lower limb 07/30/2021    Cellulitis of right lower extremity without foot   • Cough, unspecified 03/21/2016    Cough   • Encounter for  screening for human immunodeficiency virus (HIV) 06/14/2016    Screening for HIV (human immunodeficiency virus)   • Hordeolum externum unspecified eye, unspecified eyelid 08/14/2019    Stye   • Hyperglycemia, unspecified 12/13/2017    Chronic hyperglycemia   • Incisional hernia without obstruction or gangrene 06/15/2015    Recurrent ventral hernia   • Muscle spasm of calf 08/16/2016    Muscle spasm of calf   • Personal history of other diseases of the digestive system 06/30/2015    History of umbilical hernia   • Personal history of other diseases of the digestive system 02/05/2016    History of ventral hernia   • Personal history of other diseases of the respiratory system 12/23/2014    History of acute sinusitis   • Personal history of other diseases of the respiratory system 01/28/2016    History of upper respiratory infection   • Personal history of other infectious and parasitic diseases 12/02/2015    History of hepatitis B virus infection   • Personal history of other infectious and parasitic diseases     History of hepatitis B virus infection   • Personal history of other specified conditions 02/04/2015    History of nausea and vomiting   • Personal history of other specified conditions 08/16/2016    History of edema   • Personal history of other specified conditions 01/26/2015    History of jaundice   • Personal history of pneumonia (recurrent) 01/29/2016    History of pneumonia   • Personal history of urinary (tract) infections 02/19/2013    History of urinary tract infection   • Prostatitis 03/21/2023   • Pruritus, unspecified 02/10/2015    Pruritus   • Strain of muscle, fascia and tendon of the posterior muscle group at thigh level, right thigh, initial encounter 05/10/2016    Tear of right hamstring   • Unspecified symptoms and signs involving the genitourinary system 12/20/2016    UTI symptoms      No family history on file.     Review of Systems   Constitutional:  Negative for chills, fatigue and  fever.   HENT:  Negative for rhinorrhea and sore throat.    Eyes:  Negative for pain and redness.   Respiratory:  Negative for cough and shortness of breath.    Cardiovascular:  Negative for chest pain and palpitations.   Gastrointestinal:  Negative for abdominal pain and blood in stool.   Endocrine: Negative for polydipsia and polyuria.   Genitourinary:  Negative for dysuria and hematuria.   Musculoskeletal:  Negative for back pain and neck stiffness.   Skin:  Negative for rash and wound.   Allergic/Immunologic: Negative for environmental allergies and food allergies.   Neurological:  Positive for weakness. Negative for headaches.   Hematological:  Negative for adenopathy. Does not bruise/bleed easily.   Psychiatric/Behavioral:  Negative for hallucinations and suicidal ideas.       There were no vitals taken for this visit.  Physical Exam  Vitals reviewed.   Constitutional:       General: He is not in acute distress.     Appearance: He is not ill-appearing.   HENT:      Head: Normocephalic and atraumatic.      Right Ear: Tympanic membrane normal.      Left Ear: Tympanic membrane normal.      Nose: No congestion or rhinorrhea.      Mouth/Throat:      Pharynx: No oropharyngeal exudate or posterior oropharyngeal erythema.   Eyes:      Extraocular Movements: Extraocular movements intact.      Conjunctiva/sclera: Conjunctivae normal.      Pupils: Pupils are equal, round, and reactive to light.   Cardiovascular:      Rate and Rhythm: Normal rate and regular rhythm.      Heart sounds: No murmur heard.     No friction rub. No gallop.   Pulmonary:      Effort: Pulmonary effort is normal.      Breath sounds: Normal breath sounds. No wheezing, rhonchi or rales.   Abdominal:      General: There is no distension.      Palpations: Abdomen is soft.      Tenderness: There is no abdominal tenderness. There is no guarding or rebound.   Genitourinary:     Comments: ANABELL  Musculoskeletal:         General: No swelling or deformity.     "  Cervical back: Normal range of motion and neck supple.      Right lower leg: No edema.      Left lower leg: No edema.   Skin:     Capillary Refill: Capillary refill takes less than 2 seconds.      Coloration: Skin is not jaundiced.      Findings: No rash.   Neurological:      General: No focal deficit present.      Mental Status: He is alert.      Motor: Weakness present.   Psychiatric:         Mood and Affect: Mood normal.         Behavior: Behavior normal.       Lab Results   Component Value Date    WBC 9.5 10/07/2023    HGB 10.9 (L) 10/07/2023    HCT 34.0 (L) 10/07/2023    MCV 91 10/07/2023     10/07/2023     Lab Results   Component Value Date    CHOL 109 08/11/2023    CHOL 120 08/10/2022    CHOL 202 (H) 08/19/2020     Lab Results   Component Value Date    HDL 27.7 (A) 08/11/2023    HDL 30.7 (A) 08/10/2022    HDL 33.7 (A) 08/19/2020     No results found for: \"LDLCALC\"  Lab Results   Component Value Date    TRIG 283 (H) 08/11/2023    TRIG 163 (H) 08/10/2022    TRIG 311 (H) 08/19/2020     No components found for: \"CHOLHDL\"  Lab Results   Component Value Date    HGBA1C 9.9 (A) 08/11/2023       Assessment/Plan  Problem List Items Addressed This Visit       Enlarged prostate with lower urinary tract symptoms (LUTS)    Overview     Proscar & Flomax per Dr Delaeny  planning cysto and surgical debulking (>10 cm prostate).    HUNG placed 10/8/23 for obstructive uropathy (PVR 900cc)               Type 2 diabetes mellitus with diabetic neuropathy, with long-term current use of insulin (CMS/[facility])    Overview     DX'd 12/2017   A1c up to 9.9% since off GLP-1 and Metformin.  STOPPED TZD due to edema 7/2021.   Off METFORMIN d/t diarrhea.   Cannot afford trulicity.  Sees podiatry for neuropathy  See opto Dr March at hospitals in [place].   Micral normal 8/2019, 8/19/2020.   Start Lantus 8/16/23.         Current Assessment & Plan     On Tresiba 10 units daily.          Fever         Electronically Signed By: Jeffry" LELIA De Leon MD   10/21/23  1:44 PM

## 2023-10-10 ENCOUNTER — TELEPHONE (OUTPATIENT)
Dept: PRIMARY CARE | Facility: CLINIC | Age: 73
End: 2023-10-10
Payer: COMMERCIAL

## 2023-10-10 NOTE — TELEPHONE ENCOUNTER
[facility] Gle[facility]- Blood cultures came back as Gram positive Cocci and clusters-   She is supposed to fax over the results-gave her our new fax number- nothing has come over yet.

## 2023-10-12 ENCOUNTER — NURSING HOME VISIT (OUTPATIENT)
Dept: POST ACUTE CARE | Facility: EXTERNAL LOCATION | Age: 73
End: 2023-10-12
Payer: MEDICARE

## 2023-10-12 DIAGNOSIS — D64.9 ANEMIA, UNSPECIFIED TYPE: ICD-10-CM

## 2023-10-12 DIAGNOSIS — N13.8 BPH WITH OBSTRUCTION/LOWER URINARY TRACT SYMPTOMS: ICD-10-CM

## 2023-10-12 DIAGNOSIS — N17.9 AKI (ACUTE KIDNEY INJURY) (CMS-HCC): ICD-10-CM

## 2023-10-12 DIAGNOSIS — M10.9 GOUT, UNSPECIFIED CAUSE, UNSPECIFIED CHRONICITY, UNSPECIFIED SITE: ICD-10-CM

## 2023-10-12 DIAGNOSIS — I10 ESSENTIAL HYPERTENSION: ICD-10-CM

## 2023-10-12 DIAGNOSIS — M62.81 MUSCLE WEAKNESS (GENERALIZED): Primary | ICD-10-CM

## 2023-10-12 DIAGNOSIS — N30.00 ACUTE CYSTITIS WITHOUT HEMATURIA: ICD-10-CM

## 2023-10-12 DIAGNOSIS — R78.81 BACTEREMIA: ICD-10-CM

## 2023-10-12 DIAGNOSIS — E11.9 TYPE 2 DIABETES MELLITUS WITHOUT COMPLICATION, WITH LONG-TERM CURRENT USE OF INSULIN (MULTI): ICD-10-CM

## 2023-10-12 DIAGNOSIS — E78.5 DYSLIPIDEMIA: ICD-10-CM

## 2023-10-12 DIAGNOSIS — Z79.4 TYPE 2 DIABETES MELLITUS WITHOUT COMPLICATION, WITH LONG-TERM CURRENT USE OF INSULIN (MULTI): ICD-10-CM

## 2023-10-12 DIAGNOSIS — K21.9 GASTROESOPHAGEAL REFLUX DISEASE, UNSPECIFIED WHETHER ESOPHAGITIS PRESENT: ICD-10-CM

## 2023-10-12 DIAGNOSIS — E87.1 HYPONATREMIA: ICD-10-CM

## 2023-10-12 DIAGNOSIS — N40.1 BPH WITH OBSTRUCTION/LOWER URINARY TRACT SYMPTOMS: ICD-10-CM

## 2023-10-12 PROCEDURE — 99310 SBSQ NF CARE HIGH MDM 45: CPT | Performed by: NURSE PRACTITIONER

## 2023-10-16 ENCOUNTER — NURSING HOME VISIT (OUTPATIENT)
Dept: POST ACUTE CARE | Facility: EXTERNAL LOCATION | Age: 73
End: 2023-10-16
Payer: MEDICARE

## 2023-10-16 DIAGNOSIS — R78.81 MSSA BACTEREMIA: ICD-10-CM

## 2023-10-16 DIAGNOSIS — B95.61 MSSA BACTEREMIA: ICD-10-CM

## 2023-10-16 DIAGNOSIS — N17.9 AKI (ACUTE KIDNEY INJURY) (CMS-HCC): ICD-10-CM

## 2023-10-16 PROCEDURE — 99309 SBSQ NF CARE MODERATE MDM 30: CPT | Performed by: FAMILY MEDICINE

## 2023-10-16 NOTE — LETTER
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 Feels good today. -800 cc/q8h.    O: VSS AFEB 284# Awake, alert, NAD. Chest cta. Heart rrr. HUNG draining clear. Ext no c/c/e.    LAB (10/16/23) Hgb 8.5, Alb 2.5, Plt 166, Na 139, Cr 4.71->4.43, k 4.3, alk phos 155, CRP 3.8  (10/14/23) UCr 34, UUrea 261, Arash 90    A/P:  # Weakness: continue SNF PT/OT. He requires wheeled walker, chair lift mechanism, and raised toilet seat to allow him independence with ADLs.  # MSSA Bacteremia: off vanco/zosyn. Continue oral doxycycline.  # BPH w/ LUTS & Obstructive Uropathy: Flomax 0.8 mg qhs. Finasteride 5 mg. HUNG in for 900 cc PVR 10/8/23.  # NOHELIA: slowly improving off lasix and arb. Continue to follow. Appreciate Dr Isaac's help.  # OA: norco prn pain  # HTN: amlodpine 10, Toprol 100. OFF ARB.  # DM: Tresiba 10  # Gout: DC allopurinol  # Anemia: Ferrous sulfate  # GERD: OFF PPI d/t NOHELIA.      Electronically Signed By: Jeffry De Leon MD   10/21/23  1:46 PM

## 2023-10-16 NOTE — PROGRESS NOTES
*Provider Impression*    Patient is a 73 year old male who is seen today for management of multiple medical problems     #Weakness / Gout - PT/OT, allopurinol 100mg daily, acetaminphen 650mg q4h PRN, Norco 5/325mg q6h PRN, flexeril 5mg q8h PRN  #NOHELIA / Hyponatremia - NS @ 125mL/hr - finish 2nd liter, check renal panel, urine sodium and creatinine; add torsemide 40mg x1, check renal US,   #Bacteremia / UTI - diflucan 100mg daily until 10/25, d/c vanc, zosyn 2.25mg q6h until 10/18, add doxycycline 100mg BID until 10/22  #T2DM - Humalog SSI, Tresiba 10units daily  #HTN / HLD - amlodipine 10mg daily, metoprolol XL 100mg daily, atorvastatin 20mg daily, hold losartan until 10/17, hold lasix until 10/17, hold KCl until 10/17  #BPH w/ obstruction - maldonado, finasteride 5mg daily, tamsulosin 0.8mg daily  #GERD - pantoprazole 40mg daily  #Anemia - ferrous sulfate 324mg daily  #ACP - Full Code  Follow up as needed      *Chief Complaint*       *History of Present Illness*    Patient is a 74 y/o male w/ PMH as below who presents with a 3-week history of diarrhea with generalized weakness. CT A/P: No acute diverticulitis or acute process. Recent antibiotic use (Keflex) for UTI. Stool negative for C.Diff Colitis, PCR noninfectious. ID was consulted - no need for antibiotics. Urine culture was negative. Given IVF for hypovolemia from diarrhea and poor oral intake. Monitored and repleted electrolytes as needed. Nephrology consulted for NOHELIA with hyponatremia. Sodium uptrended: 123>130. Nephrotoxic agents held to allow for recovery. Urine output satisfactory. He was seen by PT/OT who recommended SNF and was d/c to [facility] [place].     At [place] he has had worsening renal function. Had urinary retention over the weekend w/ approx 900mL PVR, maldonado placed, initially w/ some hematuria which cleared spontaneously, likely 2/2 trauma given his prostatomegaly. Blood cultures drawn early this week were positive 2/2 GPC in clusters, PICC placed,  started vanc 500mg x1 and zosyn. He received 1 dose of vanc after labs were drawn, showing Cr worsening from 2.95 on 10/9 to 4.25 on 10/11. Vanc was d/c. Zosyn continued renally dosed. Held lasix, losartan and Kcl, but not responsive to 2L NS w/ Cr 4.63 BUN 52 on 10/12. Consulted w/ Dr. De Leon in agreement w/ 1x dose of torsemide given fluid overload and edema.     He is seen today visiting with a couple of friends. He reports feeling better overall, some abd tenderness and gas, no more diarrhea, no flank pain, CP, SOB, or any other new c/o presently.     [place] - buPROPion, Cephalexin   PMH - arthritis, obesity, lymphedema, ventral hernia repair, hypertension, hyperlipidemia, BPH, PVD, T2DM, Bell's palsy  PSH - pacemaker, ventral hernia repair, prostate biopsy, varicose vein ligation  FH - Brother had heart disease; Mother had lung cancer  SocHx - Never smoker, Social EtOH    *Review of Systems*  All other systems reviewed are negative except as noted in the HPI     *Vital Signs*   Date: 10/12/23  - T: 97.8  P: 84  R: 16  BP: 131/78  SpO2: 97% on RA     *Results / Data*  CBC - Date: 10/11/23  WBC: 10.64  HGB: 8.6  HCT: 26.0  PLT: 120  ;   BMP - Date: 10/12/23  Na: 133  K: 4.8  Cl: 102  CO2: 21  BUN: 52  Cr: 4.63  Glu: 93  Ca: 7.5  ;   LFT - Date: 10/11/23  AST: 42  ALT: 34  ALP: 178  TBili: 0.6  ;   Coags - Date:   INR:   PT:    *Physical Exam*  Gen: (+) NAD, (+) well-appearing  HEENT: (+) normocephalic, (+) MMM  Neck: (+) supple  Lungs: (+) CTAB, (-) wheezes, (-) rales, (-) rhonchi  Heart: (+) RRR, (+) S1 S2, (-) murmurs  Pulses: (+) palpable  Abd: (+) soft, (+) NT, (+) ND, (+) BS+  : (+) maldonado  Ext: (+) edema, (-) deformity  MSK: (-) joint swelling  Skin: (+) warm, (+) dry, (-) rash  Neuro: (+) follows commands, (-) tremor, (+) alert

## 2023-10-19 ENCOUNTER — NURSING HOME VISIT (OUTPATIENT)
Dept: POST ACUTE CARE | Facility: EXTERNAL LOCATION | Age: 73
End: 2023-10-19
Payer: MEDICARE

## 2023-10-19 DIAGNOSIS — E78.5 DYSLIPIDEMIA: ICD-10-CM

## 2023-10-19 DIAGNOSIS — M10.9 GOUT, UNSPECIFIED CAUSE, UNSPECIFIED CHRONICITY, UNSPECIFIED SITE: ICD-10-CM

## 2023-10-19 DIAGNOSIS — Z79.4 TYPE 2 DIABETES MELLITUS WITHOUT COMPLICATION, WITH LONG-TERM CURRENT USE OF INSULIN (MULTI): ICD-10-CM

## 2023-10-19 DIAGNOSIS — K21.9 GASTROESOPHAGEAL REFLUX DISEASE, UNSPECIFIED WHETHER ESOPHAGITIS PRESENT: ICD-10-CM

## 2023-10-19 DIAGNOSIS — D64.9 ANEMIA, UNSPECIFIED TYPE: ICD-10-CM

## 2023-10-19 DIAGNOSIS — E87.1 HYPONATREMIA: ICD-10-CM

## 2023-10-19 DIAGNOSIS — E11.9 TYPE 2 DIABETES MELLITUS WITHOUT COMPLICATION, WITH LONG-TERM CURRENT USE OF INSULIN (MULTI): ICD-10-CM

## 2023-10-19 DIAGNOSIS — M13.0 POLYARTHRITIS: ICD-10-CM

## 2023-10-19 DIAGNOSIS — N13.8 BPH WITH OBSTRUCTION/LOWER URINARY TRACT SYMPTOMS: ICD-10-CM

## 2023-10-19 DIAGNOSIS — M62.81 MUSCLE WEAKNESS (GENERALIZED): Primary | ICD-10-CM

## 2023-10-19 DIAGNOSIS — N17.9 AKI (ACUTE KIDNEY INJURY) (CMS-HCC): ICD-10-CM

## 2023-10-19 DIAGNOSIS — N30.00 ACUTE CYSTITIS WITHOUT HEMATURIA: ICD-10-CM

## 2023-10-19 DIAGNOSIS — I10 ESSENTIAL HYPERTENSION: ICD-10-CM

## 2023-10-19 DIAGNOSIS — R78.81 MSSA BACTEREMIA: ICD-10-CM

## 2023-10-19 DIAGNOSIS — N40.1 BPH WITH OBSTRUCTION/LOWER URINARY TRACT SYMPTOMS: ICD-10-CM

## 2023-10-19 DIAGNOSIS — B95.61 MSSA BACTEREMIA: ICD-10-CM

## 2023-10-19 PROBLEM — R50.9 FEVER: Status: ACTIVE | Noted: 2023-10-19

## 2023-10-19 PROCEDURE — 99309 SBSQ NF CARE MODERATE MDM 30: CPT | Performed by: NURSE PRACTITIONER

## 2023-10-19 NOTE — PROGRESS NOTES
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 Feels good today. -800 cc/q8h.    O: VSS AFEB 284# Awake, alert, NAD. Chest cta. Heart rrr. HUNG draining clear. Ext no c/c/e.    LAB (10/16/23) Hgb 8.5, Alb 2.5, Plt 166, Na 139, Cr 4.71->4.43, k 4.3, alk phos 155, CRP 3.8  (10/14/23) UCr 34, UUrea 261, Arash 90    A/P:  # Weakness: continue SNF PT/OT. He requires wheeled walker, chair lift mechanism, and raised toilet seat to allow him independence with ADLs.  # MSSA Bacteremia: off vanco/zosyn. Continue oral doxycycline.  # BPH w/ LUTS & Obstructive Uropathy: Flomax 0.8 mg qhs. Finasteride 5 mg. HUNG in for 900 cc PVR 10/8/23.  # NOHELIA: slowly improving off lasix and arb. Continue to follow. Appreciate Dr Isaac's help.  # OA: norco prn pain  # HTN: amlodpine 10, Toprol 100. OFF ARB.  # DM: Tresiba 10  # Gout: DC allopurinol  # Anemia: Ferrous sulfate  # GERD: OFF PPI d/t NOHELIA.

## 2023-10-19 NOTE — PROGRESS NOTES
Chester Verduzco is a 73 y.o. 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 Improved with IVF but left with profound weakness and inability to walk. Recent Hung placed for 900 cc PVR/obstructive uropathy. Over night with rigors and fever.    O: VSS AFEB Tmax 102.6 (10/9/23 0400) Awake, alert, NAD. Chest cta. Heart rrr. HUNG draining clear. Ext no c/c/e.    LAB (10/9/23) Hgb 9.1, Plt 132, Alb 2.2, Alk phos 214, AST 58, Cr 2.92, BUN 44, GFR 22    A/P:  # Weakness: SNF PT/OT  # Fever: UA and blood cx pending. Start levaquin 500 mg daily x 7 days.  # BPH w/ LUTS & Obstructive Uropathy: Flomax 0.8 mg qhs. Finasteride 5 mg. Hung for 900 cc PVR 10/8/23.  # OA: norco prn pain  # HTN: amlodpine 10, Losartan 100, Toprol 100.  # DM: Tresiba 10  # Gout: allopurinol  # Anemia: Ferrous sulfate  # GERD: PPI    Past Surgical History:   Procedure Laterality Date    CARDIAC PACEMAKER PLACEMENT  2013    Pacemaker Permanent Placement    OTHER SURGICAL HISTORY  06/15/2015    Ventral Hernia Repair - Recurrent    OTHER SURGICAL HISTORY  2019    Prostate biopsy    VARICOSE VEIN SURGERY  2013    Varicose Vein Ligation    VENTRAL HERNIA REPAIR  06/15/2015    Ventral Hernia Repair      Social History     Socioeconomic History    Marital status: Single     Spouse name: Not on file    Number of children: Not on file    Years of education: Not on file    Highest education level: Not on file   Occupational History    Not on file   Tobacco Use    Smoking status: Never     Passive exposure: Never    Smokeless tobacco: Never   Vaping Use    Vaping Use: Never used   Substance and Sexual Activity    Alcohol use: Not  Currently    Drug use: Never    Sexual activity: Not Currently   Other Topics Concern    Not on file   Social History Narrative    Not on file     Social Determinants of Health     Financial Resource Strain: Low Risk  (10/3/2023)    Overall Financial Resource Strain (CARDIA)     Difficulty of Paying Living Expenses: Not hard at all   Recent Concern: Financial Resource Strain - Medium Risk (10/2/2023)    Overall Financial Resource Strain (CARDIA)     Difficulty of Paying Living Expenses: Somewhat hard   Food Insecurity: No Food Insecurity (10/3/2023)    Hunger Vital Sign     Worried About Running Out of Food in the Last Year: Never true     Ran Out of Food in the Last Year: Never true   Transportation Needs: No Transportation Needs (10/3/2023)    PRAPARE - Transportation     Lack of Transportation (Medical): No     Lack of Transportation (Non-Medical): No   Physical Activity: Inactive (10/2/2023)    Exercise Vital Sign     Days of Exercise per Week: 0 days     Minutes of Exercise per Session: 0 min   Stress: Not on file   Social Connections: Not on file   Intimate Partner Violence: Not on file   Housing Stability: Low Risk  (10/3/2023)    Housing Stability Vital Sign     Unable to Pay for Housing in the Last Year: No     Number of Places Lived in the Last Year: 1     Unstable Housing in the Last Year: No     Past Medical History:   Diagnosis Date    Abnormal weight gain 10/27/2016    Abnormal weight gain    Achilles tendinitis, unspecified leg 08/16/2016    Achilles tendinitis    Acute upper respiratory infection, unspecified     Acute URI    Allergic contact dermatitis due to plants, except food 08/22/2013    Contact dermatitis due to poison ivy    Cellulitis of right lower limb 07/30/2021    Cellulitis of right lower extremity without foot    Cough, unspecified 03/21/2016    Cough    Encounter for screening for human immunodeficiency virus (HIV) 06/14/2016    Screening for HIV (human immunodeficiency virus)     Hordeolum externum unspecified eye, unspecified eyelid 08/14/2019    Stye    Hyperglycemia, unspecified 12/13/2017    Chronic hyperglycemia    Incisional hernia without obstruction or gangrene 06/15/2015    Recurrent ventral hernia    Muscle spasm of calf 08/16/2016    Muscle spasm of calf    Personal history of other diseases of the digestive system 06/30/2015    History of umbilical hernia    Personal history of other diseases of the digestive system 02/05/2016    History of ventral hernia    Personal history of other diseases of the respiratory system 12/23/2014    History of acute sinusitis    Personal history of other diseases of the respiratory system 01/28/2016    History of upper respiratory infection    Personal history of other infectious and parasitic diseases 12/02/2015    History of hepatitis B virus infection    Personal history of other infectious and parasitic diseases     History of hepatitis B virus infection    Personal history of other specified conditions 02/04/2015    History of nausea and vomiting    Personal history of other specified conditions 08/16/2016    History of edema    Personal history of other specified conditions 01/26/2015    History of jaundice    Personal history of pneumonia (recurrent) 01/29/2016    History of pneumonia    Personal history of urinary (tract) infections 02/19/2013    History of urinary tract infection    Prostatitis 03/21/2023    Pruritus, unspecified 02/10/2015    Pruritus    Strain of muscle, fascia and tendon of the posterior muscle group at thigh level, right thigh, initial encounter 05/10/2016    Tear of right hamstring    Unspecified symptoms and signs involving the genitourinary system 12/20/2016    UTI symptoms      No family history on file.     Review of Systems   Constitutional:  Negative for chills, fatigue and fever.   HENT:  Negative for rhinorrhea and sore throat.    Eyes:  Negative for pain and redness.   Respiratory:  Negative for cough and  shortness of breath.    Cardiovascular:  Negative for chest pain and palpitations.   Gastrointestinal:  Negative for abdominal pain and blood in stool.   Endocrine: Negative for polydipsia and polyuria.   Genitourinary:  Negative for dysuria and hematuria.   Musculoskeletal:  Negative for back pain and neck stiffness.   Skin:  Negative for rash and wound.   Allergic/Immunologic: Negative for environmental allergies and food allergies.   Neurological:  Positive for weakness. Negative for headaches.   Hematological:  Negative for adenopathy. Does not bruise/bleed easily.   Psychiatric/Behavioral:  Negative for hallucinations and suicidal ideas.       There were no vitals taken for this visit.  Physical Exam  Vitals reviewed.   Constitutional:       General: He is not in acute distress.     Appearance: He is not ill-appearing.   HENT:      Head: Normocephalic and atraumatic.      Right Ear: Tympanic membrane normal.      Left Ear: Tympanic membrane normal.      Nose: No congestion or rhinorrhea.      Mouth/Throat:      Pharynx: No oropharyngeal exudate or posterior oropharyngeal erythema.   Eyes:      Extraocular Movements: Extraocular movements intact.      Conjunctiva/sclera: Conjunctivae normal.      Pupils: Pupils are equal, round, and reactive to light.   Cardiovascular:      Rate and Rhythm: Normal rate and regular rhythm.      Heart sounds: No murmur heard.     No friction rub. No gallop.   Pulmonary:      Effort: Pulmonary effort is normal.      Breath sounds: Normal breath sounds. No wheezing, rhonchi or rales.   Abdominal:      General: There is no distension.      Palpations: Abdomen is soft.      Tenderness: There is no abdominal tenderness. There is no guarding or rebound.   Genitourinary:     Comments: ANABELL  Musculoskeletal:         General: No swelling or deformity.      Cervical back: Normal range of motion and neck supple.      Right lower leg: No edema.      Left lower leg: No edema.   Skin:      "Capillary Refill: Capillary refill takes less than 2 seconds.      Coloration: Skin is not jaundiced.      Findings: No rash.   Neurological:      General: No focal deficit present.      Mental Status: He is alert.      Motor: Weakness present.   Psychiatric:         Mood and Affect: Mood normal.         Behavior: Behavior normal.       Lab Results   Component Value Date    WBC 9.5 10/07/2023    HGB 10.9 (L) 10/07/2023    HCT 34.0 (L) 10/07/2023    MCV 91 10/07/2023     10/07/2023     Lab Results   Component Value Date    CHOL 109 08/11/2023    CHOL 120 08/10/2022    CHOL 202 (H) 08/19/2020     Lab Results   Component Value Date    HDL 27.7 (A) 08/11/2023    HDL 30.7 (A) 08/10/2022    HDL 33.7 (A) 08/19/2020     No results found for: \"LDLCALC\"  Lab Results   Component Value Date    TRIG 283 (H) 08/11/2023    TRIG 163 (H) 08/10/2022    TRIG 311 (H) 08/19/2020     No components found for: \"CHOLHDL\"  Lab Results   Component Value Date    HGBA1C 9.9 (A) 08/11/2023       Assessment/Plan   Problem List Items Addressed This Visit       Enlarged prostate with lower urinary tract symptoms (LUTS)    Overview     Proscar & Flomax per Dr Delaney  planning cysto and surgical debulking (>10 cm prostate).    HUNG placed 10/8/23 for obstructive uropathy (PVR 900cc)               Type 2 diabetes mellitus with diabetic neuropathy, with long-term current use of insulin (CMS/[facility])    Overview     DX'd 12/2017   A1c up to 9.9% since off GLP-1 and Metformin.  STOPPED TZD due to edema 7/2021.   Off METFORMIN d/t diarrhea.   Cannot afford trulicity.  Sees podiatry for neuropathy  See opto Dr Marhc at [facility] in [place].   Micral normal 8/2019, 8/19/2020.   Start Lantus 8/16/23.         Current Assessment & Plan     On Tresiba 10 units daily.          Fever       "

## 2023-10-21 ASSESSMENT — ENCOUNTER SYMPTOMS
BLOOD IN STOOL: 0
NECK STIFFNESS: 0
BACK PAIN: 0
FEVER: 0
HALLUCINATIONS: 0
FATIGUE: 0
COUGH: 0
BRUISES/BLEEDS EASILY: 0
RHINORRHEA: 0
HEADACHES: 0
WOUND: 0
PALPITATIONS: 0
DYSURIA: 0
SHORTNESS OF BREATH: 0
POLYDIPSIA: 0
CHILLS: 0
EYE PAIN: 0
ADENOPATHY: 0
SORE THROAT: 0
HEMATURIA: 0
ABDOMINAL PAIN: 0
EYE REDNESS: 0
WEAKNESS: 1

## 2023-10-21 NOTE — PROGRESS NOTES
*Provider Impression*    Patient is a 73 year old male who is seen today for management of multiple medical problems     #Weakness / OA / Gout - PT/OT, allopurinol 100mg daily, acetaminphen 650mg q4h PRN, Norco 5/325mg q6h PRN, flexeril 5mg q8h PRN  #NOHELIA / Hyponatremia - improving, adequate UOP based on available records  #Bacteremia / UTI - diflucan 100mg daily until 10/25, add doxycycline 100mg BID until 10/22  #T2DM - Humalog SSI, Tresiba 10units daily  #HTN / HLD - amlodipine 10mg daily, metoprolol XL 100mg daily, atorvastatin 20mg daily  #BPH w/ obstruction - maldonado, finasteride 5mg daily, tamsulosin 0.8mg daily  #GERD - pantoprazole 40mg daily  #Anemia - ferrous sulfate 324mg daily  #ACP - Full Code  Follow up as needed      *Chief Complaint*     NOHELIA, bacteremia    *History of Present Illness*    Patient is a 74 y/o male w/ PMH as below who presents with a 3-week history of diarrhea with generalized weakness. CT A/P: No acute diverticulitis or acute process. Recent antibiotic use (Keflex) for UTI. Stool negative for C.Diff Colitis, PCR noninfectious. ID was consulted - no need for antibiotics. Urine culture was negative. Given IVF for hypovolemia from diarrhea and poor oral intake. Monitored and repleted electrolytes as needed. Nephrology consulted for NOHEILA with hyponatremia. Sodium uptrended: 123>130. Nephrotoxic agents held to allow for recovery. Urine output satisfactory. He was seen by PT/OT who recommended SNF and was d/c to [facility].     His losartan, lasix and Kcl all discontinued. Antibiotic therapy narrowed. He has had adequate urine output based on available records, although unable to locate records of UOP for the past few days.    He is seen sitting up in his room today and reports some R knee pain, did see Dr. Kothari and has surgery dates. He reports the norco helps. His urine output is better, going to therapy, same neuropathy, up to 8/10, down to 5/10, using Norco about 3 times a day, no f/c,  sweats, CP, SOB, cough, n/v, using the IS, has maldonado w/ excellent output noted clear yellow, no new edema, or any other new c/o presently.     Alleriges - buPROPion, Cephalexin   PMH - arthritis, obesity, lymphedema, ventral hernia repair, hypertension, hyperlipidemia, BPH, PVD, T2DM, Bell's palsy  PSH - pacemaker, ventral hernia repair, prostate biopsy, varicose vein ligation  FH - Brother had heart disease; Mother had lung cancer  SocHx - Never smoker, Social EtOH    *Review of Systems*  All other systems reviewed are negative except as noted in the HPI     *Vital Signs*   Date: 10/19/23  - T: 97.8  P: 74  R: 18  BP: 147/66  SpO2: 96% on RA     *Results / Data*  CBC - Date: 10/16/23  WBC: 6.48  HGB: 8.5  HCT: 27.1  PLT: 166  ;   BMP - Date: 10/18/23  Na: 139  K: 4.2  Cl: 104  CO2: 22  BUN: 32  Cr: 3.94  Glu: 80  Ca: 8.1  ;   LFT - Date: 10/11/23  AST: 42  ALT: 34  ALP: 178  TBili: 0.6  ;   Coags - Date:   INR:   PT:  Other - Date: 10/16/23  CRP: 3.8  ESR: 69    *Physical Exam*  Gen: (+) NAD, (+) well-appearing  HEENT: (+) normocephalic, (+) MMM  Neck: (+) supple  Lungs: (+) CTAB, (-) wheezes, (-) rales, (-) rhonchi  Heart: (+) RRR, (+) S1 S2, (-) murmurs  Pulses: (+) palpable  Abd: (+) soft, (+) NT, (+) ND, (+) BS+  : (+) maldonado  Ext: (+) edema, (-) deformity  MSK: (-) joint swelling  Skin: (+) warm, (+) dry, (-) rash  Neuro: (+) follows commands, (-) tremor, (+) alert

## 2023-10-23 ENCOUNTER — NURSING HOME VISIT (OUTPATIENT)
Dept: POST ACUTE CARE | Facility: EXTERNAL LOCATION | Age: 73
End: 2023-10-23
Payer: MEDICARE

## 2023-10-23 DIAGNOSIS — E11.40 TYPE 2 DIABETES MELLITUS WITH DIABETIC NEUROPATHY, WITH LONG-TERM CURRENT USE OF INSULIN (MULTI): ICD-10-CM

## 2023-10-23 DIAGNOSIS — N17.9 AKI (ACUTE KIDNEY INJURY) (CMS-HCC): ICD-10-CM

## 2023-10-23 DIAGNOSIS — Z79.4 TYPE 2 DIABETES MELLITUS WITH DIABETIC NEUROPATHY, WITH LONG-TERM CURRENT USE OF INSULIN (MULTI): ICD-10-CM

## 2023-10-23 DIAGNOSIS — N40.1 BENIGN PROSTATIC HYPERPLASIA WITH LOWER URINARY TRACT SYMPTOMS, SYMPTOM DETAILS UNSPECIFIED: ICD-10-CM

## 2023-10-23 PROCEDURE — 99309 SBSQ NF CARE MODERATE MDM 30: CPT | Performed by: FAMILY MEDICINE

## 2023-10-23 NOTE — LETTER
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 Feels better day to day.    O: VSS AFEB 271# (down 13#) Awake, alert, NAD. Chest cta. Heart rrr. HUNG draining clear. Ext no c/c/e.    LAB (10/23/23) Hgb 8.4, Alb 2.8, Plt 141, Na 137, Cr 4.71->4.43->3.74->3.55, k 4.3, alk phos 155->120, CRP 4.5->2.9, ESR 69->98  (10/14/23) UCr 34, UUrea 261, Arash 90    A/P:  # Weakness: continues to make progress with SNF PT/OT. He requires wheeled walker, chair lift mechanism, and raised toilet seat to allow him independence with ADLs.  # Hx MSSA Bacteremia: off vanco/zosyn. Completed oral doxycycline.  # BPH w/ LUTS & Obstructive Uropathy: Flomax 0.8 mg qhs. Finasteride 5 mg. HUNG in for 900 cc PVR 10/8/23. Encouraged to keep plan for prostate surgery with Dr Delaney for November.  # NOHELIA: slowly improving off lasix and arb. Continue to follow. Appreciate Dr Isaac's help.  # OA: norco prn pain. Scheduled for Rt TKA Dr Kothari 2024.  # HTN: amlodipine 10, Toprol 100. OFF ARB.  # DM w nephropathy: Tresiba 10. Sticking to new diabetic diet.  # Gout: stable OFF allopurinol  # Anemia: Ferrous sulfate  # GERD: OFF PPI d/t NOHELIA.      Electronically Signed By: Jeffry De Leon MD   10/29/23  2:41 PM

## 2023-10-24 NOTE — PROGRESS NOTES
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 Feels better day to day.    O: VSS AFEB 271# (down 13#) Awake, alert, NAD. Chest cta. Heart rrr. HUNG draining clear. Ext no c/c/e.    LAB (10/23/23) Hgb 8.4, Alb 2.8, Plt 141, Na 137, Cr 4.71->4.43->3.74->3.55, k 4.3, alk phos 155->120, CRP 4.5->2.9, ESR 69->98  (10/14/23) UCr 34, UUrea 261, Arash 90    A/P:  # Weakness: continues to make progress with SNF PT/OT. He requires wheeled walker, chair lift mechanism, and raised toilet seat to allow him independence with ADLs.  # Hx MSSA Bacteremia: off vanco/zosyn. Completed oral doxycycline.  # BPH w/ LUTS & Obstructive Uropathy: Flomax 0.8 mg qhs. Finasteride 5 mg. HUNG in for 900 cc PVR 10/8/23. Encouraged to keep plan for prostate surgery with Dr Delaney for November.  # NOHELIA: slowly improving off lasix and arb. Continue to follow. Appreciate Dr Isaac's help.  # OA: norco prn pain. Scheduled for Rt TKA Dr Kothari 2024.  # HTN: amlodipine 10, Toprol 100. OFF ARB.  # DM w nephropathy: Tresiba 10. Sticking to new diabetic diet.  # Gout: stable OFF allopurinol  # Anemia: Ferrous sulfate  # GERD: OFF PPI d/t NOHELIA.

## 2023-10-26 ENCOUNTER — NURSING HOME VISIT (OUTPATIENT)
Dept: POST ACUTE CARE | Facility: EXTERNAL LOCATION | Age: 73
End: 2023-10-26
Payer: MEDICARE

## 2023-10-26 DIAGNOSIS — I10 ESSENTIAL HYPERTENSION: ICD-10-CM

## 2023-10-26 DIAGNOSIS — N17.9 AKI (ACUTE KIDNEY INJURY) (CMS-HCC): ICD-10-CM

## 2023-10-26 DIAGNOSIS — N30.00 ACUTE CYSTITIS WITHOUT HEMATURIA: ICD-10-CM

## 2023-10-26 DIAGNOSIS — N40.1 BPH WITH OBSTRUCTION/LOWER URINARY TRACT SYMPTOMS: ICD-10-CM

## 2023-10-26 DIAGNOSIS — K59.00 CONSTIPATION, UNSPECIFIED CONSTIPATION TYPE: ICD-10-CM

## 2023-10-26 DIAGNOSIS — M13.0 POLYARTHRITIS: ICD-10-CM

## 2023-10-26 DIAGNOSIS — Z79.4 TYPE 2 DIABETES MELLITUS WITHOUT COMPLICATION, WITH LONG-TERM CURRENT USE OF INSULIN (MULTI): ICD-10-CM

## 2023-10-26 DIAGNOSIS — M62.81 MUSCLE WEAKNESS (GENERALIZED): Primary | ICD-10-CM

## 2023-10-26 DIAGNOSIS — E78.5 DYSLIPIDEMIA: ICD-10-CM

## 2023-10-26 DIAGNOSIS — D64.9 ANEMIA, UNSPECIFIED TYPE: ICD-10-CM

## 2023-10-26 DIAGNOSIS — R78.81 MSSA BACTEREMIA: ICD-10-CM

## 2023-10-26 DIAGNOSIS — E87.1 HYPONATREMIA: ICD-10-CM

## 2023-10-26 DIAGNOSIS — E11.9 TYPE 2 DIABETES MELLITUS WITHOUT COMPLICATION, WITH LONG-TERM CURRENT USE OF INSULIN (MULTI): ICD-10-CM

## 2023-10-26 DIAGNOSIS — N13.8 BPH WITH OBSTRUCTION/LOWER URINARY TRACT SYMPTOMS: ICD-10-CM

## 2023-10-26 DIAGNOSIS — B95.61 MSSA BACTEREMIA: ICD-10-CM

## 2023-10-26 DIAGNOSIS — M10.9 GOUT, UNSPECIFIED CAUSE, UNSPECIFIED CHRONICITY, UNSPECIFIED SITE: ICD-10-CM

## 2023-10-26 DIAGNOSIS — K21.9 GASTROESOPHAGEAL REFLUX DISEASE, UNSPECIFIED WHETHER ESOPHAGITIS PRESENT: ICD-10-CM

## 2023-10-26 LAB — HOLD SPECIMEN: NORMAL

## 2023-10-26 PROCEDURE — 99309 SBSQ NF CARE MODERATE MDM 30: CPT | Performed by: NURSE PRACTITIONER

## 2023-10-29 NOTE — PROGRESS NOTES
*Provider Impression*    Patient is a 73 year old male who is seen today for management of multiple medical problems     #Weakness / OA / Gout - PT/OT, allopurinol 100mg daily, acetaminphen 650mg q4h PRN, Norco 5/325mg q6h PRN, flexeril 5mg q8h PRN, biofreeze 4x/day PRN  #NOHELIA - improving, adequate UOP based on available records  #Bacteremia / UTI - completed diflucan and doxycycline  #GERD / Constipation - pantoprazole 40mg daily, zofran 4mg q6h PRN, dulcolax 10mg daily PRN - add PO route, senna-S 8.6/50mg 2 tabs BID, miralax 17grams daily PRN  #T2DM - Humalog SSI, Tresiba 10units daily  #HTN / HLD - amlodipine 10mg daily, metoprolol XL 100mg daily, atorvastatin 20mg daily, hydralazine 10mg TID  #BPH w/ obstruction - maldonado, finasteride 5mg daily, tamsulosin 0.8mg daily  #Anemia - ferrous sulfate 324mg daily  #ACP - Full Code  Follow up as needed      *Chief Complaint*     constipation    *History of Present Illness*    Patient is a 72 y/o male w/ PMH as below who presents with a 3-week history of diarrhea with generalized weakness. CT A/P: No acute diverticulitis or acute process. Recent antibiotic use (Keflex) for UTI. Stool negative for C.Diff Colitis, PCR noninfectious. ID was consulted - no need for antibiotics. Urine culture was negative. Given IVF for hypovolemia from diarrhea and poor oral intake. Monitored and repleted electrolytes as needed. Nephrology consulted for NOHELIA with hyponatremia. Sodium uptrended: 123>130. Nephrotoxic agents held to allow for recovery. Urine output satisfactory. He was seen by PT/OT who recommended SNF and was d/c to [facility].     Nursing staff report he had some constipation, minimal response to enema, KUB showed non obstructive pattern w/ minimal stool. Labs appreciated as below w/ renal function improving.     He is seen sitting up in his room today and denies any f/c, sweats, CP, SOB, cough, n/v, still constipation, no abd pain, but feels full, making progress w/ therapy,  his knee pain has been good today. No other new c/o presently.     Alleriges - buPROPion, Cephalexin   PMH - arthritis, obesity, lymphedema, ventral hernia repair, hypertension, hyperlipidemia, BPH, PVD, T2DM, Bell's palsy  PSH - pacemaker, ventral hernia repair, prostate biopsy, varicose vein ligation  FH - Brother had heart disease; Mother had lung cancer  SocHx - Never smoker, Social EtOH    *Review of Systems*  All other systems reviewed are negative except as noted in the HPI     *Vital Signs*   Date: 10/25/23  - T: 98.0  P: 78  R: 14  BP: 140/80  SpO2: 98% on RA     *Results / Data*  CBC - Date: 10/23/23  WBC: 6.07  HGB: 8.4  HCT: 26.6  PLT: 141 ;   BMP - Date: 10/23/23  Na: 137  K: 4.3  Cl: 102  CO2: 24  BUN: 26  Cr: 3.55  Glu: 72  Ca: 8.4  ;   LFT - Date: 10/23/23  AST: 21  ALT: 9  ALP: 113  TBili: 0.4  ;   Coags - Date:   INR:   PT:  Other - Date: 10/16/23  CRP: 3.8  ESR: 69  ; 10/23/23  CRP: 2.9  ESR: 79    *Physical Exam*  Gen: (+) NAD, (+) well-appearing  HEENT: (+) normocephalic, (+) MMM  Neck: (+) supple  Lungs: (+) CTAB, (-) wheezes, (-) rales, (-) rhonchi  Heart: (+) RRR, (+) S1 S2, (-) murmurs  Pulses: (+) palpable  Abd: (+) soft, (+) NT, (+) ND, (+) BS+  : (+) maldonado  Ext: (+) edema, (-) deformity  MSK: (-) joint swelling  Skin: (+) warm, (+) dry, (-) rash  Neuro: (+) follows commands, (-) tremor, (+) alert

## 2023-10-30 ENCOUNTER — NURSING HOME VISIT (OUTPATIENT)
Dept: POST ACUTE CARE | Facility: EXTERNAL LOCATION | Age: 73
End: 2023-10-30
Payer: MEDICARE

## 2023-10-30 DIAGNOSIS — E11.40 TYPE 2 DIABETES MELLITUS WITH DIABETIC NEUROPATHY, WITH LONG-TERM CURRENT USE OF INSULIN (MULTI): ICD-10-CM

## 2023-10-30 DIAGNOSIS — I10 PRIMARY HYPERTENSION: ICD-10-CM

## 2023-10-30 DIAGNOSIS — Z79.4 TYPE 2 DIABETES MELLITUS WITH DIABETIC NEUROPATHY, WITH LONG-TERM CURRENT USE OF INSULIN (MULTI): ICD-10-CM

## 2023-10-30 DIAGNOSIS — N40.1 BENIGN PROSTATIC HYPERPLASIA WITH LOWER URINARY TRACT SYMPTOMS, SYMPTOM DETAILS UNSPECIFIED: ICD-10-CM

## 2023-10-30 PROCEDURE — 99309 SBSQ NF CARE MODERATE MDM 30: CPT | Performed by: FAMILY MEDICINE

## 2023-10-30 NOTE — LETTER
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 Feels better day to day. No CP/SOB.    O: VSS AFEB 271->256# (down 15# in 1 week, 23# in last 2 weeks) Awake, alert, NAD. Chest cta. Heart rrr. HUNG draining clear. Ext no c/c/e.    LAB (10/30/23) PENDING (10/23/23) Hgb 8.4, Alb 2.8, Plt 141, Na 137, Cr 4.71->4.43->3.74->3.55, k 4.3, alk phos 155->120, CRP 4.5->2.9, ESR 69->98  (10/14/23) UCr 34, UUrea 261, Arash 90    A/P:  # Weakness: continues to make progress with SNF PT/OT. He requires wheeled walker, chair lift mechanism, and raised toilet seat to allow him independence with ADLs.  # Hx MSSA Bacteremia: off vanco/zosyn. Completed oral doxycycline.  # BPH w/ LUTS & Obstructive Uropathy: Flomax 0.8 mg qhs. Finasteride 5 mg. HUNG in for 900 cc PVR 10/8/23. Encouraged to keep plan for prostate surgery with Dr Delaney for November.  # NOHELIA: slowly improving off lasix and arb. Continue to follow. Appreciate Dr Isaac's help.  # OA: norco prn pain. Scheduled for Rt TKA Dr Kothari 2024.  # HTN: amlodipine 10, Toprol 100. OFF ARB.  # DM w nephropathy: BS running low on Tresiba 10 -- cut down to 5 units daily and if still under 120 in mornings can discontinue completely. Sticking to new diabetic diet.  # Gout: stable OFF allopurinol  # Anemia: Ferrous sulfate  # GERD: OFF PPI d/t NOHELIA.      Electronically Signed By: Jeffry De Leon MD   23  7:55 PM

## 2023-10-30 NOTE — PROGRESS NOTES
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 Feels better day to day. No CP/SOB.    O: VSS AFEB 271->256# (down 15# in 1 week, 23# in last 2 weeks) Awake, alert, NAD. Chest cta. Heart rrr. HUNG draining clear. Ext no c/c/e.    LAB (10/30/23) PENDING (10/23/23) Hgb 8.4, Alb 2.8, Plt 141, Na 137, Cr 4.71->4.43->3.74->3.55, k 4.3, alk phos 155->120, CRP 4.5->2.9, ESR 69->98  (10/14/23) UCr 34, UUrea 261, Arash 90    A/P:  # Weakness: continues to make progress with SNF PT/OT. He requires wheeled walker, chair lift mechanism, and raised toilet seat to allow him independence with ADLs.  # Hx MSSA Bacteremia: off vanco/zosyn. Completed oral doxycycline.  # BPH w/ LUTS & Obstructive Uropathy: Flomax 0.8 mg qhs. Finasteride 5 mg. HUNG in for 900 cc PVR 10/8/23. Encouraged to keep plan for prostate surgery with Dr Delaney for November.  # NOHELIA: slowly improving off lasix and arb. Continue to follow. Appreciate Dr Isaac's help.  # OA: norco prn pain. Scheduled for Rt TKA Dr Kothari 2024.  # HTN: amlodipine 10, Toprol 100. OFF ARB.  # DM w nephropathy: BS running low on Tresiba 10 -- cut down to 5 units daily and if still under 120 in mornings can discontinue completely. Sticking to new diabetic diet.  # Gout: stable OFF allopurinol  # Anemia: Ferrous sulfate  # GERD: OFF PPI d/t NOHELIA.

## 2023-11-02 ENCOUNTER — NURSING HOME VISIT (OUTPATIENT)
Dept: POST ACUTE CARE | Facility: EXTERNAL LOCATION | Age: 73
End: 2023-11-02
Payer: MEDICARE

## 2023-11-02 DIAGNOSIS — N17.9 AKI (ACUTE KIDNEY INJURY) (CMS-HCC): ICD-10-CM

## 2023-11-02 DIAGNOSIS — M62.81 MUSCLE WEAKNESS (GENERALIZED): Primary | ICD-10-CM

## 2023-11-02 DIAGNOSIS — M13.0 POLYARTHRITIS: ICD-10-CM

## 2023-11-02 DIAGNOSIS — N40.1 BPH WITH OBSTRUCTION/LOWER URINARY TRACT SYMPTOMS: ICD-10-CM

## 2023-11-02 DIAGNOSIS — M10.9 GOUT, UNSPECIFIED CAUSE, UNSPECIFIED CHRONICITY, UNSPECIFIED SITE: ICD-10-CM

## 2023-11-02 DIAGNOSIS — K59.00 CONSTIPATION, UNSPECIFIED CONSTIPATION TYPE: ICD-10-CM

## 2023-11-02 DIAGNOSIS — N13.8 BPH WITH OBSTRUCTION/LOWER URINARY TRACT SYMPTOMS: ICD-10-CM

## 2023-11-02 DIAGNOSIS — K21.9 GASTROESOPHAGEAL REFLUX DISEASE, UNSPECIFIED WHETHER ESOPHAGITIS PRESENT: ICD-10-CM

## 2023-11-02 DIAGNOSIS — Z79.4 TYPE 2 DIABETES MELLITUS WITHOUT COMPLICATION, WITH LONG-TERM CURRENT USE OF INSULIN (MULTI): ICD-10-CM

## 2023-11-02 DIAGNOSIS — E11.9 TYPE 2 DIABETES MELLITUS WITHOUT COMPLICATION, WITH LONG-TERM CURRENT USE OF INSULIN (MULTI): ICD-10-CM

## 2023-11-02 DIAGNOSIS — I10 ESSENTIAL HYPERTENSION: ICD-10-CM

## 2023-11-02 DIAGNOSIS — E78.5 DYSLIPIDEMIA: ICD-10-CM

## 2023-11-02 DIAGNOSIS — H11.32 SUBCONJUNCTIVAL HEMORRHAGE OF LEFT EYE: ICD-10-CM

## 2023-11-02 DIAGNOSIS — D64.9 ANEMIA, UNSPECIFIED TYPE: ICD-10-CM

## 2023-11-02 PROCEDURE — 99309 SBSQ NF CARE MODERATE MDM 30: CPT | Performed by: NURSE PRACTITIONER

## 2023-11-06 ENCOUNTER — NURSING HOME VISIT (OUTPATIENT)
Dept: POST ACUTE CARE | Facility: EXTERNAL LOCATION | Age: 73
End: 2023-11-06
Payer: MEDICARE

## 2023-11-06 DIAGNOSIS — N18.4 CKD (CHRONIC KIDNEY DISEASE) STAGE 4, GFR 15-29 ML/MIN (MULTI): ICD-10-CM

## 2023-11-06 DIAGNOSIS — D64.9 ANEMIA, UNSPECIFIED TYPE: ICD-10-CM

## 2023-11-06 DIAGNOSIS — E11.21: ICD-10-CM

## 2023-11-06 DIAGNOSIS — I10 PRIMARY HYPERTENSION: ICD-10-CM

## 2023-11-06 PROCEDURE — 99309 SBSQ NF CARE MODERATE MDM 30: CPT | Performed by: FAMILY MEDICINE

## 2023-11-06 NOTE — LETTER
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 Overnight with rhinorrhea, sinus congestion, PND and low grade fever Tmax 100.6. No CP/SOB. COVID tested neg x2.    O: VSS AFEB 271->256#->284#? (down 15# previous week, now up 28# rechecking wt.) Awake, alert, NAD. Chest cta. Heart rrr. HUNG draining clear. Ext no c/c/e.    LAB (23) PENDING (10/30/23) CRP 1.9 Alb 2.8->3.1, Na 137, Cr 4.71->3.03, k 4.1,  (10/23/23) Hgb 8.4, Plt 141,  (10/14/23) UCr 34, UUrea 261, Arash 90    A/P:  # Weakness: continues to make progress with SNF PT/OT. He requires wheeled walker, chair lift mechanism, and raised toilet seat to allow him independence with ADLs.  # Viral URI: continue to monitor for covid. Start mucinex and flonase spray.  # BPH w/ LUTS & Obstructive Uropathy: Flomax 0.8 mg qhs. Finasteride 5 mg. HUNG in for 900 cc PVR 10/8/23. Encouraged to keep plan for prostate surgery with Dr Delaney for November.  # NOHELIA: slowly improving off lasix and arb. Continue to follow. Appreciate Dr Isaac's help.  # OA: norco prn pain. Scheduled for Rt TKA Dr Kothari 2024. Order new right knee brace due to feeling of patellar instability with PT.  # HTN: amlodipine 10, Toprol 100. Consider resume ARB as renal function improves.  # DM w nephropathy: am BS running low on Tresiba 5  under 100 in past week -- DC completely.  When renal function improves can start farxiga. Sticking to new diabetic diet -- doubt accuracy of recent weight gain -- recheck weight.  # Gout: stable OFF allopurinol  # Anemia: stop ferrous gluconate d/t n/v. Recheck labs/indices pending 23.  # GERD: OFF PPI d/t  NOHELIA.  # Hx MSSA Bacteremia: off vanco/zosyn. Completed oral doxycycline.      Electronically Signed By: Jeffry De Leon MD   11/12/23  7:44 PM

## 2023-11-06 NOTE — PROGRESS NOTES
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 Overnight with rhinorrhea, sinus congestion, PND and low grade fever Tmax 100.6. No CP/SOB. COVID tested neg x2.    O: VSS AFEB 271->256#->284#? (down 15# previous week, now up 28# rechecking wt.) Awake, alert, NAD. Chest cta. Heart rrr. HUNG draining clear. Ext no c/c/e.    LAB (23) PENDING (10/30/23) CRP 1.9 Alb 2.8->3.1, Na 137, Cr 4.71->3.03, k 4.1,  (10/23/23) Hgb 8.4, Plt 141,  (10/14/23) UCr 34, UUrea 261, Arash 90    A/P:  # Weakness: continues to make progress with SNF PT/OT. He requires wheeled walker, chair lift mechanism, and raised toilet seat to allow him independence with ADLs.  # Viral URI: continue to monitor for covid. Start mucinex and flonase spray.  # BPH w/ LUTS & Obstructive Uropathy: Flomax 0.8 mg qhs. Finasteride 5 mg. HUNG in for 900 cc PVR 10/8/23. Encouraged to keep plan for prostate surgery with Dr Delaney for November.  # NOHELIA: slowly improving off lasix and arb. Continue to follow. Appreciate Dr Isaac's help.  # OA: norco prn pain. Scheduled for Rt TKA Dr Kothari 2024. Order new right knee brace due to feeling of patellar instability with PT.  # HTN: amlodipine 10, Toprol 100. Consider resume ARB as renal function improves.  # DM w nephropathy: am BS running low on Tresiba 5 4/7 under 100 in past week -- DC completely.  When renal function improves can start farxiga. Sticking to new diabetic diet -- doubt accuracy of recent weight gain -- recheck weight.  # Gout: stable OFF allopurinol  # Anemia: stop ferrous gluconate d/t n/v. Recheck labs/indices pending 23.  # GERD: OFF PPI d/t NOHELIA.  # Hx MSSA Bacteremia: off vanco/zosyn. Completed oral doxycycline.

## 2023-11-07 NOTE — PROGRESS NOTES
*Provider Impression*    Patient is a 73 year old male who is seen today for management of multiple medical problems     #Weakness / OA / Gout - PT/OT, allopurinol 100mg daily, acetaminophen 650mg q4h PRN, Norco 5/325mg q6h PRN, flexeril 5mg q8h PRN, biofreeze 4x/day PRN  #NOHELIA - improving, adequate UOP based on available records  #Subconjunctival hemorrhage - add artificial tears 4x/day PRN, cool compresses PRN  #GERD / Constipation - pantoprazole 40mg daily, zofran 4mg q6h PRN, dulcolax 10mg daily PRN - add PO route, senna-S 8.6/50mg 2 tabs BID, miralax 17grams daily PRN  #T2DM - Humalog SSI, Tresiba 5units daily  #HTN / HLD - amlodipine 10mg daily, metoprolol XL 100mg daily, atorvastatin 20mg daily, hydralazine 10mg TID  #BPH w/ obstruction - maldonado, finasteride 5mg daily, tamsulosin 0.8mg daily  #Anemia - ferrous sulfate 324mg daily  #ACP - Full Code  Follow up as needed      *Chief Complaint*     Subconjunctival hemorrhage    *History of Present Illness*    Patient is a 72 y/o male w/ PMH as below who presents with a 3-week history of diarrhea with generalized weakness. CT A/P: No acute diverticulitis or acute process. Recent antibiotic use (Keflex) for UTI. Stool negative for C.Diff Colitis, PCR noninfectious. ID was consulted - no need for antibiotics. Urine culture was negative. Given IVF for hypovolemia from diarrhea and poor oral intake. Monitored and repleted electrolytes as needed. Nephrology consulted for NOHELIA with hyponatremia. Sodium uptrended: 123>130. Nephrotoxic agents held to allow for recovery. Urine output satisfactory. He was seen by PT/OT who recommended SNF and was d/c to [facility] @ [facility].     His tresiba was decreased, has subconjunctival hemorrhage. There was a question about a void trial.    He is seen sitting up in his room. Based on records, maldonado is to be kept in place until f/u w/ Dr. El. He reports he still gets fatigued, bowels are moving normally now, some knee pain, no CP, SOB,  cough, n/v, or any other new c/o presently.     Alleriges - buPROPion, Cephalexin   PMH - arthritis, obesity, lymphedema, ventral hernia repair, hypertension, hyperlipidemia, BPH, PVD, T2DM, Bell's palsy  PSH - pacemaker, ventral hernia repair, prostate biopsy, varicose vein ligation  FH - Brother had heart disease; Mother had lung cancer  SocHx - Never smoker, Social EtOH    *Review of Systems*  All other systems reviewed are negative except as noted in the HPI     *Vital Signs*   Date: 11/2/23  - T: 97.7  P: 78  R: 16  BP: 158/75  SpO2: 95% on RA ; Date: 11/2/23  Wt: 250.6    *Results / Data*  CBC - Date: 10/27/23  WBC: 6.07  HGB: 8.7  HCT: 27.1  PLT: 154 ;   BMP - Date: 10/30/23  Na: 137  K: 4.1  Cl: 102  CO2: 23  BUN: 22  Cr: 3.03  Glu: 86  Ca: 8.7  ;   LFT - Date: 10/30/23  AST: 16  ALT: 5  ALP: 109  TBili: 0.5  ;   Coags - Date:   INR:   PT:  Other - Date: 10/16/23  CRP: 3.8  ESR: 69  ; 10/23/23  CRP: 2.9  ESR: 79 ; 10/30/23  CRP: 1.9    *Physical Exam*  Gen: (+) NAD, (+) well-appearing  HEENT: (+) normocephalic, (+) MMM  Neck: (+) supple  Lungs: (+) CTAB, (-) wheezes, (-) rales, (-) rhonchi  Heart: (+) RRR, (+) S1 S2, (-) murmurs  Pulses: (+) palpable  Abd: (+) soft, (+) NT, (+) ND, (+) BS+  : (+) maldonado  Ext: (+) edema, (-) deformity  MSK: (-) joint swelling  Skin: (+) warm, (+) dry, (-) rash  Neuro: (+) follows commands, (-) tremor, (+) alert

## 2023-11-07 NOTE — PREPROCEDURE INSTRUCTIONS
Current Medications   Medication Instructions    allopurinol (Zyloprim) 100 mg tablet Take morning of surgery with sip of water, no other fluids    amLODIPine (Norvasc) 10 mg tablet Take morning of surgery with sip of water, no other fluids    ferrous gluconate (Fergon) 324 (38 Fe) mg tablet Stop 7 days before surgery    finasteride (Proscar) 5 mg tablet Take morning of surgery with sip of water, no other fluids    furosemide (Lasix) 40 mg tablet Stop 1 day before surgery    insulin degludec (Tresiba FlexTouch U-100) 100 unit/mL (3 mL) injection Consult with prescribing physician re: dose 11/27    losartan (Cozaar) 100 mg tablet Stop 1 day before surgery    metoprolol succinate XL (Toprol-XL) 100 mg 24 hr tablet Take morning of surgery with sip of water, no other fluids    naltrexone (Depade) 50 mg tablet Stop 7 days before surgery    pantoprazole (ProtoNix) 40 mg EC tablet Take morning of surgery with sip of water, no other fluids    potassium chloride CR (Klor-Con M20) 20 mEq ER tablet Stop 1 day before surgery    rosuvastatin (Crestor) 10 mg tablet Continue until night before surgery    tamsulosin (Flomax) 0.4 mg 24 hr capsule Continue until night before surgery                       Instructions:    SURGERY PRE-OPERATIVE INSTRUCTIONS    *You will receive a phone call the day before your procedure  after 2pm, (or the Friday before your surgery if scheduled on a Monday.) Generally the hospital will be calling you with this information after that time.    *You are not to eat after midnight the night before the surgery. You may have 8oz of a clear liquid up until 2 hours prior to arriving to the hospital. The exception is with medications you were instructed to take day of surgery.    *You may take tylenol for pain/discomfort as needed.     *Stop taking all aspirins, ibuprofen (motrin/advil), naproxen (aleve/naprosyn) for one week prior to surgery.    *Stop taking all vitamins and supplements one week prior to  surgery.     *You should not have alcoholic beverages for 24 hours before surgery.     *You should not smoke 24 hours prior to surgery.     *To help prevent surgical infections bathe/shower with dial soap the evening before surgery.    *You can wear deodorant but no lotion, powder, or perfume/cologne. You should remove all make-up and nail polish at home.    *If you wear glasses, please bring a case for the glasses with you.    *You will be asked to remove dentures and contacts.     *Please leave all valuables at home.    *You should wear loose, comfortable clothing that will accommodate bandages and/or casts.    *You should notify your doctor of any change in your condition (fever, cold, rash, etc). Surgery may need to be re-scheduled until a time you are in better health.    *A responsible adult is required to accompany you to and from the hospital if you are receiving anesthesia or a sedative. Patients are not permitted to drive for 24 hours after anesthesia.     *You can use the [facility] parking which is free for our patients.      *If you have any further questions please call [phone].

## 2023-11-09 ENCOUNTER — NURSING HOME VISIT (OUTPATIENT)
Dept: POST ACUTE CARE | Facility: EXTERNAL LOCATION | Age: 73
End: 2023-11-09
Payer: MEDICARE

## 2023-11-09 DIAGNOSIS — M13.0 POLYARTHRITIS: ICD-10-CM

## 2023-11-09 DIAGNOSIS — M62.81 MUSCLE WEAKNESS (GENERALIZED): Primary | ICD-10-CM

## 2023-11-09 DIAGNOSIS — D64.9 ANEMIA, UNSPECIFIED TYPE: ICD-10-CM

## 2023-11-09 DIAGNOSIS — K59.00 CONSTIPATION, UNSPECIFIED CONSTIPATION TYPE: ICD-10-CM

## 2023-11-09 DIAGNOSIS — K21.9 GASTROESOPHAGEAL REFLUX DISEASE, UNSPECIFIED WHETHER ESOPHAGITIS PRESENT: ICD-10-CM

## 2023-11-09 DIAGNOSIS — E11.21 TYPE 2 DIABETES MELLITUS WITH DIABETIC NEPHROPATHY, WITHOUT LONG-TERM CURRENT USE OF INSULIN (MULTI): ICD-10-CM

## 2023-11-09 DIAGNOSIS — E78.5 DYSLIPIDEMIA: ICD-10-CM

## 2023-11-09 DIAGNOSIS — N17.9 AKI (ACUTE KIDNEY INJURY) (CMS-HCC): ICD-10-CM

## 2023-11-09 DIAGNOSIS — N13.8 BPH WITH OBSTRUCTION/LOWER URINARY TRACT SYMPTOMS: ICD-10-CM

## 2023-11-09 DIAGNOSIS — N40.1 BPH WITH OBSTRUCTION/LOWER URINARY TRACT SYMPTOMS: ICD-10-CM

## 2023-11-09 DIAGNOSIS — M10.9 GOUT, UNSPECIFIED CAUSE, UNSPECIFIED CHRONICITY, UNSPECIFIED SITE: ICD-10-CM

## 2023-11-09 DIAGNOSIS — I10 ESSENTIAL HYPERTENSION: ICD-10-CM

## 2023-11-09 PROCEDURE — 99309 SBSQ NF CARE MODERATE MDM 30: CPT | Performed by: NURSE PRACTITIONER

## 2023-11-13 ENCOUNTER — NURSING HOME VISIT (OUTPATIENT)
Dept: POST ACUTE CARE | Facility: EXTERNAL LOCATION | Age: 73
End: 2023-11-13
Payer: MEDICARE

## 2023-11-13 ENCOUNTER — ANCILLARY PROCEDURE (OUTPATIENT)
Dept: RADIOLOGY | Facility: HOSPITAL | Age: 73
DRG: 314 | End: 2023-11-13
Payer: MEDICARE

## 2023-11-13 ENCOUNTER — HOSPITAL ENCOUNTER (INPATIENT)
Facility: HOSPITAL | Age: 73
LOS: 3 days | Discharge: HOSPICE | DRG: 314 | End: 2023-11-16
Attending: STUDENT IN AN ORGANIZED HEALTH CARE EDUCATION/TRAINING PROGRAM | Admitting: STUDENT IN AN ORGANIZED HEALTH CARE EDUCATION/TRAINING PROGRAM
Payer: MEDICARE

## 2023-11-13 ENCOUNTER — APPOINTMENT (OUTPATIENT)
Dept: RADIOLOGY | Facility: HOSPITAL | Age: 73
DRG: 314 | End: 2023-11-13
Payer: MEDICARE

## 2023-11-13 DIAGNOSIS — Z95.0 PACEMAKER: ICD-10-CM

## 2023-11-13 DIAGNOSIS — I38 ENDOCARDITIS, VALVE UNSPECIFIED: ICD-10-CM

## 2023-11-13 DIAGNOSIS — N17.9 ACUTE RENAL FAILURE, UNSPECIFIED ACUTE RENAL FAILURE TYPE (CMS-HCC): Primary | ICD-10-CM

## 2023-11-13 DIAGNOSIS — B95.61 MSSA BACTEREMIA: ICD-10-CM

## 2023-11-13 DIAGNOSIS — R78.81 MSSA BACTEREMIA: ICD-10-CM

## 2023-11-13 DIAGNOSIS — E11.21: ICD-10-CM

## 2023-11-13 DIAGNOSIS — I33.0 ACUTE BACTERIAL ENDOCARDITIS (HHS-HCC): ICD-10-CM

## 2023-11-13 DIAGNOSIS — E11.21 DIABETIC NEPHROPATHY ASSOCIATED WITH TYPE 2 DIABETES MELLITUS (MULTI): ICD-10-CM

## 2023-11-13 DIAGNOSIS — N40.1 BENIGN PROSTATIC HYPERPLASIA WITH LOWER URINARY TRACT SYMPTOMS, SYMPTOM DETAILS UNSPECIFIED: ICD-10-CM

## 2023-11-13 LAB
ALBUMIN SERPL BCP-MCNC: 2.7 G/DL (ref 3.4–5)
ALP SERPL-CCNC: 117 U/L (ref 33–136)
ALT SERPL W P-5'-P-CCNC: 31 U/L (ref 10–52)
ANION GAP SERPL CALC-SCNC: 18 MMOL/L (ref 10–20)
APPEARANCE UR: ABNORMAL
AST SERPL W P-5'-P-CCNC: 51 U/L (ref 9–39)
BACTERIA #/AREA URNS AUTO: ABNORMAL /HPF
BASOPHILS # BLD AUTO: 0.01 X10*3/UL (ref 0–0.1)
BASOPHILS NFR BLD AUTO: 0.1 %
BILIRUB SERPL-MCNC: 0.5 MG/DL (ref 0–1.2)
BILIRUB UR STRIP.AUTO-MCNC: NEGATIVE MG/DL
BUN SERPL-MCNC: 55 MG/DL (ref 6–23)
CALCIUM SERPL-MCNC: 7.6 MG/DL (ref 8.6–10.3)
CAOX CRY #/AREA UR COMP ASSIST: ABNORMAL /HPF
CHLORIDE SERPL-SCNC: 98 MMOL/L (ref 98–107)
CO2 SERPL-SCNC: 20 MMOL/L (ref 21–32)
COLOR UR: YELLOW
CREAT SERPL-MCNC: 5.43 MG/DL (ref 0.5–1.3)
EOSINOPHIL # BLD AUTO: 0.25 X10*3/UL (ref 0–0.4)
EOSINOPHIL NFR BLD AUTO: 2.6 %
ERYTHROCYTE [DISTWIDTH] IN BLOOD BY AUTOMATED COUNT: 14.6 % (ref 11.5–14.5)
GFR SERPL CREATININE-BSD FRML MDRD: 10 ML/MIN/1.73M*2
GIANT PLATELETS BLD QL SMEAR: NORMAL
GLUCOSE BLD MANUAL STRIP-MCNC: 95 MG/DL (ref 74–99)
GLUCOSE SERPL-MCNC: 94 MG/DL (ref 74–99)
GLUCOSE UR STRIP.AUTO-MCNC: NEGATIVE MG/DL
HCT VFR BLD AUTO: 26 % (ref 41–52)
HGB BLD-MCNC: 8.4 G/DL (ref 13.5–17.5)
HOLD SPECIMEN: NORMAL
IMM GRANULOCYTES # BLD AUTO: 0.18 X10*3/UL (ref 0–0.5)
IMM GRANULOCYTES NFR BLD AUTO: 1.8 % (ref 0–0.9)
KETONES UR STRIP.AUTO-MCNC: NEGATIVE MG/DL
LEUKOCYTE ESTERASE UR QL STRIP.AUTO: ABNORMAL
LYMPHOCYTES # BLD AUTO: 0.99 X10*3/UL (ref 0.8–3)
LYMPHOCYTES NFR BLD AUTO: 10.1 %
MCH RBC QN AUTO: 28 PG (ref 26–34)
MCHC RBC AUTO-ENTMCNC: 32.3 G/DL (ref 32–36)
MCV RBC AUTO: 87 FL (ref 80–100)
MONOCYTES # BLD AUTO: 0.63 X10*3/UL (ref 0.05–0.8)
MONOCYTES NFR BLD AUTO: 6.4 %
NEUTROPHILS # BLD AUTO: 7.74 X10*3/UL (ref 1.6–5.5)
NEUTROPHILS NFR BLD AUTO: 79 %
NITRITE UR QL STRIP.AUTO: NEGATIVE
NRBC BLD-RTO: 0 /100 WBCS (ref 0–0)
PH UR STRIP.AUTO: 5 [PH]
PLATELET # BLD AUTO: 91 X10*3/UL (ref 150–450)
POTASSIUM SERPL-SCNC: 4 MMOL/L (ref 3.5–5.3)
PROT SERPL-MCNC: 6 G/DL (ref 6.4–8.2)
PROT UR STRIP.AUTO-MCNC: ABNORMAL MG/DL
RBC # BLD AUTO: 3 X10*6/UL (ref 4.5–5.9)
RBC # UR STRIP.AUTO: ABNORMAL /UL
RBC #/AREA URNS AUTO: >20 /HPF
RBC MORPH BLD: NORMAL
SODIUM SERPL-SCNC: 132 MMOL/L (ref 136–145)
SP GR UR STRIP.AUTO: 1.01
UROBILINOGEN UR STRIP.AUTO-MCNC: <2 MG/DL
WBC # BLD AUTO: 9.8 X10*3/UL (ref 4.4–11.3)
WBC #/AREA URNS AUTO: ABNORMAL /HPF
YEAST BUDDING #/AREA UR COMP ASSIST: PRESENT /HPF

## 2023-11-13 PROCEDURE — 99285 EMERGENCY DEPT VISIT HI MDM: CPT | Mod: 25 | Performed by: STUDENT IN AN ORGANIZED HEALTH CARE EDUCATION/TRAINING PROGRAM

## 2023-11-13 PROCEDURE — 84075 ASSAY ALKALINE PHOSPHATASE: CPT | Performed by: PHYSICIAN ASSISTANT

## 2023-11-13 PROCEDURE — 81003 URINALYSIS AUTO W/O SCOPE: CPT

## 2023-11-13 PROCEDURE — 2500000004 HC RX 250 GENERAL PHARMACY W/ HCPCS (ALT 636 FOR OP/ED): Performed by: PHYSICIAN ASSISTANT

## 2023-11-13 PROCEDURE — 87086 URINE CULTURE/COLONY COUNT: CPT | Mod: GEALAB

## 2023-11-13 PROCEDURE — 82436 ASSAY OF URINE CHLORIDE: CPT | Mod: GEALAB

## 2023-11-13 PROCEDURE — 83935 ASSAY OF URINE OSMOLALITY: CPT | Mod: GEALAB

## 2023-11-13 PROCEDURE — 76770 US EXAM ABDO BACK WALL COMP: CPT | Mod: FOREIGN READ | Performed by: RADIOLOGY

## 2023-11-13 PROCEDURE — 96360 HYDRATION IV INFUSION INIT: CPT

## 2023-11-13 PROCEDURE — 73562 X-RAY EXAM OF KNEE 3: CPT | Mod: RIGHT SIDE | Performed by: RADIOLOGY

## 2023-11-13 PROCEDURE — 82947 ASSAY GLUCOSE BLOOD QUANT: CPT

## 2023-11-13 PROCEDURE — 85025 COMPLETE CBC W/AUTO DIFF WBC: CPT | Performed by: PHYSICIAN ASSISTANT

## 2023-11-13 PROCEDURE — 99309 SBSQ NF CARE MODERATE MDM 30: CPT | Performed by: FAMILY MEDICINE

## 2023-11-13 PROCEDURE — 2500000004 HC RX 250 GENERAL PHARMACY W/ HCPCS (ALT 636 FOR OP/ED)

## 2023-11-13 PROCEDURE — 76770 US EXAM ABDO BACK WALL COMP: CPT

## 2023-11-13 PROCEDURE — 73562 X-RAY EXAM OF KNEE 3: CPT | Mod: RT,FR

## 2023-11-13 PROCEDURE — 2500000001 HC RX 250 WO HCPCS SELF ADMINISTERED DRUGS (ALT 637 FOR MEDICARE OP)

## 2023-11-13 PROCEDURE — 1200000002 HC GENERAL ROOM WITH TELEMETRY DAILY

## 2023-11-13 RX ORDER — INSULIN LISPRO 100 [IU]/ML
0-5 INJECTION, SOLUTION INTRAVENOUS; SUBCUTANEOUS
Status: DISCONTINUED | OUTPATIENT
Start: 2023-11-14 | End: 2023-11-16 | Stop reason: HOSPADM

## 2023-11-13 RX ORDER — TAMSULOSIN HYDROCHLORIDE 0.4 MG/1
0.8 CAPSULE ORAL NIGHTLY
Status: DISCONTINUED | OUTPATIENT
Start: 2023-11-13 | End: 2023-11-16 | Stop reason: HOSPADM

## 2023-11-13 RX ORDER — SODIUM CHLORIDE 0.9 % (FLUSH) 0.9 %
10 SYRINGE (ML) INJECTION 2 TIMES DAILY
COMMUNITY
End: 2023-11-16 | Stop reason: HOSPADM

## 2023-11-13 RX ORDER — CYCLOBENZAPRINE HCL 5 MG
5 TABLET ORAL EVERY 8 HOURS PRN
Status: ON HOLD | COMMUNITY
End: 2023-11-13 | Stop reason: SINTOL

## 2023-11-13 RX ORDER — ONDANSETRON 4 MG/1
4 TABLET, FILM COATED ORAL EVERY 6 HOURS PRN
COMMUNITY
End: 2024-02-11 | Stop reason: ALTCHOICE

## 2023-11-13 RX ORDER — ACETAMINOPHEN 325 MG/1
650 TABLET ORAL EVERY 4 HOURS PRN
COMMUNITY
End: 2023-11-16 | Stop reason: HOSPADM

## 2023-11-13 RX ORDER — ATORVASTATIN CALCIUM 20 MG/1
20 TABLET, FILM COATED ORAL DAILY
Status: ON HOLD | COMMUNITY
End: 2023-11-13

## 2023-11-13 RX ORDER — INSULIN GLARGINE 100 [IU]/ML
10 INJECTION, SOLUTION SUBCUTANEOUS EVERY 24 HOURS
Status: DISCONTINUED | OUTPATIENT
Start: 2023-11-13 | End: 2023-11-16 | Stop reason: HOSPADM

## 2023-11-13 RX ORDER — MENTHOL 40 MG/ML
1 GEL TOPICAL EVERY 6 HOURS PRN
COMMUNITY
End: 2023-12-14 | Stop reason: ALTCHOICE

## 2023-11-13 RX ORDER — BISACODYL 10 MG/1
10 SUPPOSITORY RECTAL DAILY PRN
COMMUNITY
End: 2023-11-16 | Stop reason: HOSPADM

## 2023-11-13 RX ORDER — GUAIFENESIN 600 MG/1
1200 TABLET, EXTENDED RELEASE ORAL 2 TIMES DAILY
Status: ON HOLD | COMMUNITY
End: 2023-11-13

## 2023-11-13 RX ORDER — BISACODYL 5 MG
10 TABLET, DELAYED RELEASE (ENTERIC COATED) ORAL DAILY PRN
COMMUNITY
End: 2023-11-16 | Stop reason: HOSPADM

## 2023-11-13 RX ORDER — FINASTERIDE 5 MG/1
5 TABLET, FILM COATED ORAL DAILY
Status: DISCONTINUED | OUTPATIENT
Start: 2023-11-13 | End: 2023-11-16 | Stop reason: HOSPADM

## 2023-11-13 RX ORDER — POLYETHYLENE GLYCOL 3350 17 G/17G
17 POWDER, FOR SOLUTION ORAL DAILY PRN
Status: ON HOLD | COMMUNITY
End: 2023-11-13

## 2023-11-13 RX ORDER — AMLODIPINE BESYLATE 10 MG/1
10 TABLET ORAL EVERY EVENING
Status: DISCONTINUED | OUTPATIENT
Start: 2023-11-13 | End: 2023-11-16 | Stop reason: HOSPADM

## 2023-11-13 RX ORDER — LINEZOLID 2 MG/ML
600 INJECTION, SOLUTION INTRAVENOUS EVERY 12 HOURS
Status: DISCONTINUED | OUTPATIENT
Start: 2023-11-13 | End: 2023-11-14

## 2023-11-13 RX ORDER — DEXTROMETHORPHAN POLISTIREX 30 MG/5 ML
1 SUSPENSION, EXTENDED RELEASE 12 HR ORAL DAILY PRN
COMMUNITY
End: 2023-11-16 | Stop reason: HOSPADM

## 2023-11-13 RX ORDER — SENNOSIDES 8.6 MG/1
2 TABLET ORAL DAILY PRN
Status: ON HOLD | COMMUNITY
End: 2023-11-13

## 2023-11-13 RX ORDER — AMOXICILLIN 250 MG
2 CAPSULE ORAL 2 TIMES DAILY
COMMUNITY
End: 2023-11-16 | Stop reason: HOSPADM

## 2023-11-13 RX ORDER — HYDRALAZINE HYDROCHLORIDE 10 MG/1
10 TABLET, FILM COATED ORAL 3 TIMES DAILY
COMMUNITY
End: 2024-03-11

## 2023-11-13 RX ORDER — HYDROCODONE BITARTRATE AND ACETAMINOPHEN 5; 325 MG/1; MG/1
1 TABLET ORAL EVERY 6 HOURS PRN
COMMUNITY
End: 2023-12-04 | Stop reason: HOSPADM

## 2023-11-13 RX ORDER — METOPROLOL SUCCINATE 50 MG/1
100 TABLET, EXTENDED RELEASE ORAL DAILY
Status: DISCONTINUED | OUTPATIENT
Start: 2023-11-13 | End: 2023-11-16 | Stop reason: HOSPADM

## 2023-11-13 RX ORDER — DEXTROSE MONOHYDRATE 100 MG/ML
0.3 INJECTION, SOLUTION INTRAVENOUS ONCE AS NEEDED
Status: DISCONTINUED | OUTPATIENT
Start: 2023-11-13 | End: 2023-11-16 | Stop reason: HOSPADM

## 2023-11-13 RX ORDER — FLUTICASONE PROPIONATE 50 MCG
1 SPRAY, SUSPENSION (ML) NASAL EVERY 12 HOURS PRN
COMMUNITY
End: 2023-11-16 | Stop reason: HOSPADM

## 2023-11-13 RX ORDER — ROSUVASTATIN CALCIUM 10 MG/1
10 TABLET, COATED ORAL NIGHTLY
Status: DISCONTINUED | OUTPATIENT
Start: 2023-11-13 | End: 2023-11-16 | Stop reason: HOSPADM

## 2023-11-13 RX ORDER — DEXTROSE 50 % IN WATER (D50W) INTRAVENOUS SYRINGE
25
Status: DISCONTINUED | OUTPATIENT
Start: 2023-11-13 | End: 2023-11-16 | Stop reason: HOSPADM

## 2023-11-13 RX ORDER — HEPARIN SODIUM 5000 [USP'U]/ML
5000 INJECTION, SOLUTION INTRAVENOUS; SUBCUTANEOUS EVERY 8 HOURS
Status: DISCONTINUED | OUTPATIENT
Start: 2023-11-13 | End: 2023-11-16 | Stop reason: HOSPADM

## 2023-11-13 RX ADMIN — TAMSULOSIN HYDROCHLORIDE 0.8 MG: 0.4 CAPSULE ORAL at 22:42

## 2023-11-13 RX ADMIN — AMLODIPINE BESYLATE 10 MG: 10 TABLET ORAL at 22:42

## 2023-11-13 RX ADMIN — ROSUVASTATIN 10 MG: 10 TABLET, FILM COATED ORAL at 22:43

## 2023-11-13 RX ADMIN — SODIUM CHLORIDE 1000 ML: 9 INJECTION, SOLUTION INTRAVENOUS at 17:51

## 2023-11-13 SDOH — SOCIAL STABILITY: SOCIAL INSECURITY: ABUSE: ADULT

## 2023-11-13 SDOH — SOCIAL STABILITY: SOCIAL INSECURITY: DOES ANYONE TRY TO KEEP YOU FROM HAVING/CONTACTING OTHER FRIENDS OR DOING THINGS OUTSIDE YOUR HOME?: NO

## 2023-11-13 SDOH — SOCIAL STABILITY: SOCIAL INSECURITY: DO YOU FEEL UNSAFE GOING BACK TO THE PLACE WHERE YOU ARE LIVING?: NO

## 2023-11-13 SDOH — SOCIAL STABILITY: SOCIAL INSECURITY: WERE YOU ABLE TO COMPLETE ALL THE BEHAVIORAL HEALTH SCREENINGS?: YES

## 2023-11-13 SDOH — SOCIAL STABILITY: SOCIAL INSECURITY: HAVE YOU HAD THOUGHTS OF HARMING ANYONE ELSE?: NO

## 2023-11-13 SDOH — SOCIAL STABILITY: SOCIAL INSECURITY: ARE YOU OR HAVE YOU BEEN THREATENED OR ABUSED PHYSICALLY, EMOTIONALLY, OR SEXUALLY BY ANYONE?: NO

## 2023-11-13 SDOH — SOCIAL STABILITY: SOCIAL INSECURITY: ARE THERE ANY APPARENT SIGNS OF INJURIES/BEHAVIORS THAT COULD BE RELATED TO ABUSE/NEGLECT?: NO

## 2023-11-13 SDOH — SOCIAL STABILITY: SOCIAL INSECURITY: DO YOU FEEL ANYONE HAS EXPLOITED OR TAKEN ADVANTAGE OF YOU FINANCIALLY OR OF YOUR PERSONAL PROPERTY?: NO

## 2023-11-13 SDOH — SOCIAL STABILITY: SOCIAL INSECURITY: HAS ANYONE EVER THREATENED TO HURT YOUR FAMILY OR YOUR PETS?: NO

## 2023-11-13 ASSESSMENT — PAIN - FUNCTIONAL ASSESSMENT
PAIN_FUNCTIONAL_ASSESSMENT: 0-10
PAIN_FUNCTIONAL_ASSESSMENT: 0-10

## 2023-11-13 ASSESSMENT — COGNITIVE AND FUNCTIONAL STATUS - GENERAL
MOVING FROM LYING ON BACK TO SITTING ON SIDE OF FLAT BED WITH BEDRAILS: A LOT
DRESSING REGULAR LOWER BODY CLOTHING: A LOT
STANDING UP FROM CHAIR USING ARMS: A LOT
MOBILITY SCORE: 12
DRESSING REGULAR UPPER BODY CLOTHING: A LOT
PERSONAL GROOMING: A LITTLE
TOILETING: A LOT
TURNING FROM BACK TO SIDE WHILE IN FLAT BAD: A LOT
EATING MEALS: A LITTLE
HELP NEEDED FOR BATHING: A LOT
DAILY ACTIVITIY SCORE: 14
MOVING TO AND FROM BED TO CHAIR: A LOT
PATIENT BASELINE BEDBOUND: NO
WALKING IN HOSPITAL ROOM: A LOT
CLIMB 3 TO 5 STEPS WITH RAILING: A LOT

## 2023-11-13 ASSESSMENT — LIFESTYLE VARIABLES
HAVE YOU EVER FELT YOU SHOULD CUT DOWN ON YOUR DRINKING: NO
EVER FELT BAD OR GUILTY ABOUT YOUR DRINKING: NO
HOW OFTEN DO YOU HAVE 6 OR MORE DRINKS ON ONE OCCASION: NEVER
SKIP TO QUESTIONS 9-10: 1
REASON UNABLE TO ASSESS: YES
AUDIT-C TOTAL SCORE: 0
EVER HAD A DRINK FIRST THING IN THE MORNING TO STEADY YOUR NERVES TO GET RID OF A HANGOVER: NO
HAVE PEOPLE ANNOYED YOU BY CRITICIZING YOUR DRINKING: NO
HOW OFTEN DO YOU HAVE A DRINK CONTAINING ALCOHOL: NEVER
HOW MANY STANDARD DRINKS CONTAINING ALCOHOL DO YOU HAVE ON A TYPICAL DAY: PATIENT DOES NOT DRINK
AUDIT-C TOTAL SCORE: 0

## 2023-11-13 ASSESSMENT — PAIN SCALES - GENERAL
PAINLEVEL_OUTOF10: 0 - NO PAIN
PAINLEVEL_OUTOF10: 0 - NO PAIN

## 2023-11-13 ASSESSMENT — ACTIVITIES OF DAILY LIVING (ADL)
GROOMING: NEEDS ASSISTANCE
ADEQUATE_TO_COMPLETE_ADL: YES
ASSISTIVE_DEVICE: WALKER
LACK_OF_TRANSPORTATION: NO
TOILETING: NEEDS ASSISTANCE
WALKS IN HOME: NEEDS ASSISTANCE
BATHING: NEEDS ASSISTANCE
HEARING - LEFT EAR: FUNCTIONAL
HEARING - RIGHT EAR: FUNCTIONAL
JUDGMENT_ADEQUATE_SAFELY_COMPLETE_DAILY_ACTIVITIES: YES
FEEDING YOURSELF: NEEDS ASSISTANCE
DRESSING YOURSELF: NEEDS ASSISTANCE
PATIENT'S MEMORY ADEQUATE TO SAFELY COMPLETE DAILY ACTIVITIES?: YES

## 2023-11-13 ASSESSMENT — COLUMBIA-SUICIDE SEVERITY RATING SCALE - C-SSRS
2. HAVE YOU ACTUALLY HAD ANY THOUGHTS OF KILLING YOURSELF?: NO
6. HAVE YOU EVER DONE ANYTHING, STARTED TO DO ANYTHING, OR PREPARED TO DO ANYTHING TO END YOUR LIFE?: NO
1. IN THE PAST MONTH, HAVE YOU WISHED YOU WERE DEAD OR WISHED YOU COULD GO TO SLEEP AND NOT WAKE UP?: NO

## 2023-11-13 ASSESSMENT — PATIENT HEALTH QUESTIONNAIRE - PHQ9
2. FEELING DOWN, DEPRESSED OR HOPELESS: NOT AT ALL
SUM OF ALL RESPONSES TO PHQ9 QUESTIONS 1 & 2: 0
1. LITTLE INTEREST OR PLEASURE IN DOING THINGS: NOT AT ALL

## 2023-11-13 NOTE — ED TRIAGE NOTES
Pt presented to the ED with c/o abnormal labs. Per lab work today pt creatinine and BUN was elevated. Pt has a hx of kidney failure but todays lab work showed significant increase. Pt denies pain at this time. Pt has a maldonado and PICC line. From [facility].

## 2023-11-13 NOTE — H&P
History Of Present Illness  Chester Verduzco is a 73 y.o. male presenting with with PMH lymphedema, ventral hernia repair, HTN, HLD, BPH, obesity, recent hospitalization for generalized weakness, NOHELIA with hyponatremia discharged to SNF presenting for evaluation of uptrending BUN/creatinine and positive blood cultures for Staph aureus (11/10, also previously 10/9). Patient endorses generalized weakness, however it is not new since being placed in the SNF in middle of October. Denies any fever, chills, night sweats, changes in vision, dysuria, hematuria, abdominal pain, chest pain, shortness of breath. Reports that he has a healthy intake of water, noting it's about 40-60 oz per day, though notes that the SNF told him for the past two days he's had low urine output and was given IV fluids yesterday (unknown how much). Also states this past Thursday or Friday they put in a maldonado as they were concerned he was retaining urine even prior. Patient follows with Dr. Rivers.      Past Medical History  He has a past medical history of Abnormal weight gain (10/27/2016), Achilles tendinitis, unspecified leg (08/16/2016), Acute upper respiratory infection, unspecified, Allergic contact dermatitis due to plants, except food (08/22/2013), BPH with obstruction/lower urinary tract symptoms, Cellulitis of right lower limb (07/30/2021), Cough, unspecified (03/21/2016), Diabetes (CMS/[facility]), Encounter for screening for human immunodeficiency virus (HIV) (06/14/2016), Hordeolum externum unspecified eye, unspecified eyelid (08/14/2019), Hyperglycemia, unspecified (12/13/2017), Incisional hernia without obstruction or gangrene (06/15/2015), Knee joint pain, MSSA bacteremia (10/2023), Muscle spasm of calf (08/16/2016), Obstructive uropathy, Personal history of other diseases of the digestive system (06/30/2015), Personal history of other diseases of the digestive system (02/05/2016), Personal history of other diseases of the respiratory  system (12/23/2014), Personal history of other diseases of the respiratory system (01/28/2016), Personal history of other infectious and parasitic diseases (12/02/2015), Personal history of other infectious and parasitic diseases, Personal history of other specified conditions (02/04/2015), Personal history of other specified conditions (08/16/2016), Personal history of other specified conditions (01/26/2015), Personal history of pneumonia (recurrent) (01/29/2016), Personal history of urinary (tract) infections (02/19/2013), Prostatitis (03/21/2023), Pruritus, unspecified (02/10/2015), Sinoatrial node dysfunction (CMS/HCC), Strain of muscle, fascia and tendon of the posterior muscle group at thigh level, right thigh, initial encounter (05/10/2016), and Unspecified symptoms and signs involving the genitourinary system (12/20/2016).    Surgical History  He has a past surgical history that includes Varicose vein surgery (08/22/2013); Cardiac pacemaker placement (08/22/2013); Other surgical history (06/15/2015); Ventral hernia repair (06/15/2015); Other surgical history (09/16/2019); and Peripherally inserted central catheter insertion.     Social History  He reports that he has never smoked. He has never been exposed to tobacco smoke. He has never used smokeless tobacco. He reports that he does not currently use alcohol. He reports that he does not use drugs.    Family History  No family history on file.     Allergies  Keflex [cephalexin] and Bupropion    Review of Systems   All other systems reviewed and are negative.       Physical Exam  Constitutional:       General: He is not in acute distress.  HENT:      Head: Atraumatic.   Cardiovascular:      Rate and Rhythm: Normal rate and regular rhythm.      Heart sounds: No murmur heard.     No friction rub. No gallop.   Pulmonary:      Effort: Pulmonary effort is normal. No respiratory distress.      Breath sounds: No wheezing, rhonchi or rales.   Abdominal:      General:  There is distension.      Tenderness: There is abdominal tenderness (diffuse tenderness 2/2 history of hernia repair / recent weight loss per patient).   Musculoskeletal:         General: Tenderness (R. knee tenderness (due for knee replacement)) present.      Comments: Chronic venous stasis bilaterally in LEs     Skin:     General: Skin is warm and dry.   Neurological:      General: No focal deficit present.      Mental Status: He is alert and oriented to person, place, and time.   Psychiatric:         Mood and Affect: Mood normal.         Behavior: Behavior normal.          Last Recorded Vitals  /66 (Patient Position: Sitting)   Pulse 68   Temp 36 °C (96.8 °F) (Temporal)   Resp 18   Wt 115 kg (253 lb 4.9 oz)   SpO2 99%     Relevant Results    Scheduled medications  heparin (porcine), 5,000 Units, subcutaneous, q8h      Continuous medications     PRN medications     Results for orders placed or performed during the hospital encounter of 11/13/23 (from the past 24 hour(s))   CBC and Auto Differential   Result Value Ref Range    WBC 9.8 4.4 - 11.3 x10*3/uL    nRBC 0.0 0.0 - 0.0 /100 WBCs    RBC 3.00 (L) 4.50 - 5.90 x10*6/uL    Hemoglobin 8.4 (L) 13.5 - 17.5 g/dL    Hematocrit 26.0 (L) 41.0 - 52.0 %    MCV 87 80 - 100 fL    MCH 28.0 26.0 - 34.0 pg    MCHC 32.3 32.0 - 36.0 g/dL    RDW 14.6 (H) 11.5 - 14.5 %    Platelets 91 (L) 150 - 450 x10*3/uL    Neutrophils % 79.0 40.0 - 80.0 %    Immature Granulocytes %, Automated 1.8 (H) 0.0 - 0.9 %    Lymphocytes % 10.1 13.0 - 44.0 %    Monocytes % 6.4 2.0 - 10.0 %    Eosinophils % 2.6 0.0 - 6.0 %    Basophils % 0.1 0.0 - 2.0 %    Neutrophils Absolute 7.74 (H) 1.60 - 5.50 x10*3/uL    Immature Granulocytes Absolute, Automated 0.18 0.00 - 0.50 x10*3/uL    Lymphocytes Absolute 0.99 0.80 - 3.00 x10*3/uL    Monocytes Absolute 0.63 0.05 - 0.80 x10*3/uL    Eosinophils Absolute 0.25 0.00 - 0.40 x10*3/uL    Basophils Absolute 0.01 0.00 - 0.10 x10*3/uL   Comprehensive metabolic  panel   Result Value Ref Range    Glucose 94 74 - 99 mg/dL    Sodium 132 (L) 136 - 145 mmol/L    Potassium 4.0 3.5 - 5.3 mmol/L    Chloride 98 98 - 107 mmol/L    Bicarbonate 20 (L) 21 - 32 mmol/L    Anion Gap 18 10 - 20 mmol/L    Urea Nitrogen 55 (H) 6 - 23 mg/dL    Creatinine 5.43 (H) 0.50 - 1.30 mg/dL    eGFR 10 (L) >60 mL/min/1.73m*2    Calcium 7.6 (L) 8.6 - 10.3 mg/dL    Albumin 2.7 (L) 3.4 - 5.0 g/dL    Alkaline Phosphatase 117 33 - 136 U/L    Total Protein 6.0 (L) 6.4 - 8.2 g/dL    AST 51 (H) 9 - 39 U/L    Bilirubin, Total 0.5 0.0 - 1.2 mg/dL    ALT 31 10 - 52 U/L   Morphology   Result Value Ref Range    RBC Morphology See Below     Giant Platelets Few              Assessment/Plan   Principal Problem:    Acute renal failure, unspecified acute renal failure type (CMS/HCC)    Acute Medical Conditions     #NOHELIA  - Patient's Cr 5.43 in ED (3.46 on 11/9)  - Received 1 L NS in ED   - Patient states he has had good PO fluid intake at [facility] and even received IV fluids yesterday as there was concern for low urine output   Plan  - Urinalysis, Urine electrolytes, Osmolality, Urine Osmolality   - Given that presentation is not prerenal and already recevied 1L fluids in ED, hold off on more fluids   - Bladder scans qshift ordered   - Nephrology consulted     #Concern for Endocarditis in setting of Pacemaker    #Positive Blood Culture for Staph aureus   - Patient had positive blood culture for Staph aureus on 11/10  - Has a history of pacemaker and was planned to have TTE per his PCP   Plan  - TTE ordered   - repeat blood cultures ordered   - started Linezolid (holding Vanc given poor kidney function and past history on it and subsequent increased Cr)  - Rheumatoid factor, CRP ordered   - Infectious Disease consulted     Chronic Medical Conditions   #Diabetes - sliding scale + home Lantus 10 units  #HTN - c/w Amlodipine, Metoprolol, hold Losartan   #BPH - c/w Finasteride, Tamulosin   #HLD - c/w Crestor         Joe Foreman,  MD  Transitional Year, PGY-1

## 2023-11-13 NOTE — ED PROVIDER NOTES
HPI   Chief Complaint   Patient presents with    Abnormal labs       This is a 73-year-old male with PMH lymphedema, ventral hernia repair, HTN, HLD, BPH, obesity, recent hospitalization for generalized weakness, NOHELIA with hyponatremia discharged to SNF presenting for evaluation of uptrending BUN/creatinine.  Patient reports generalized weakness which is largely unchanged since being placed in the SNF.  Denies any other complaints.                          [place]son Coma Scale Score: 15                  Patient History   Past Medical History:   Diagnosis Date    Abnormal weight gain 10/27/2016    Abnormal weight gain    Achilles tendinitis, unspecified leg 08/16/2016    Achilles tendinitis    Acute upper respiratory infection, unspecified     Acute URI    Allergic contact dermatitis due to plants, except food 08/22/2013    Contact dermatitis due to poison ivy    BPH with obstruction/lower urinary tract symptoms     Cellulitis of right lower limb 07/30/2021    Cellulitis of right lower extremity without foot    Cough, unspecified 03/21/2016    Cough    Diabetes (CMS/[facility])     Encounter for screening for human immunodeficiency virus (HIV) 06/14/2016    Screening for HIV (human immunodeficiency virus)    Hordeolum externum unspecified eye, unspecified eyelid 08/14/2019    Stye    Hyperglycemia, unspecified 12/13/2017    Chronic hyperglycemia    Incisional hernia without obstruction or gangrene 06/15/2015    Recurrent ventral hernia    Knee joint pain     Right knee- bone on bone    MSSA bacteremia 10/2023    Muscle spasm of calf 08/16/2016    Muscle spasm of calf    Obstructive uropathy     Personal history of other diseases of the digestive system 06/30/2015    History of umbilical hernia    Personal history of other diseases of the digestive system 02/05/2016    History of ventral hernia    Personal history of other diseases of the respiratory system 12/23/2014    History of acute sinusitis    Personal history of other  diseases of the respiratory system 01/28/2016    History of upper respiratory infection    Personal history of other infectious and parasitic diseases 12/02/2015    History of hepatitis B virus infection    Personal history of other infectious and parasitic diseases     History of hepatitis B virus infection    Personal history of other specified conditions 02/04/2015    History of nausea and vomiting    Personal history of other specified conditions 08/16/2016    History of edema    Personal history of other specified conditions 01/26/2015    History of jaundice    Personal history of pneumonia (recurrent) 01/29/2016    History of pneumonia    Personal history of urinary (tract) infections 02/19/2013    History of urinary tract infection    Prostatitis 03/21/2023    Pruritus, unspecified 02/10/2015    Pruritus    Sinoatrial node dysfunction (CMS/HCC)     Strain of muscle, fascia and tendon of the posterior muscle group at thigh level, right thigh, initial encounter 05/10/2016    Tear of right hamstring    Unspecified symptoms and signs involving the genitourinary system 12/20/2016    UTI symptoms     Past Surgical History:   Procedure Laterality Date    CARDIAC PACEMAKER PLACEMENT  08/22/2013    Pacemaker Permanent Placement    OTHER SURGICAL HISTORY  06/15/2015    Ventral Hernia Repair - Recurrent    OTHER SURGICAL HISTORY  09/16/2019    Prostate biopsy    PERIPHERALLY INSERTED CENTRAL CATHETER INSERTION      VARICOSE VEIN SURGERY  08/22/2013    Varicose Vein Ligation    VENTRAL HERNIA REPAIR  06/15/2015    Ventral Hernia Repair     No family history on file.  Social History     Tobacco Use    Smoking status: Never     Passive exposure: Never    Smokeless tobacco: Never   Vaping Use    Vaping Use: Never used   Substance Use Topics    Alcohol use: Not Currently    Drug use: Never       Physical Exam   ED Triage Vitals [11/13/23 1554]   Temp Heart Rate Resp BP   36 °C (96.8 °F) 68 18 125/66      SpO2 Temp Source  Heart Rate Source Patient Position   99 % Temporal Monitor Sitting      BP Location FiO2 (%)     -- --       Physical Exam  Constitutional:       Appearance: Normal appearance.   HENT:      Mouth/Throat:      Mouth: Mucous membranes are moist.      Pharynx: Oropharynx is clear.   Cardiovascular:      Rate and Rhythm: Normal rate and regular rhythm.      Pulses: Normal pulses.      Heart sounds: Normal heart sounds.   Pulmonary:      Effort: Pulmonary effort is normal.      Breath sounds: Normal breath sounds.   Abdominal:      General: There is distension.      Palpations: Abdomen is soft.      Tenderness: There is no abdominal tenderness. There is no guarding.      Comments: Patient states current level of distention is baseline for him   Genitourinary:     Comments: Rojo in place with gross hematuria, cloudy  Musculoskeletal:      Cervical back: Normal range of motion and neck supple.   Skin:     General: Skin is warm and dry.   Neurological:      General: No focal deficit present.      Mental Status: He is alert and oriented to person, place, and time.         ED Course & MDM   Diagnoses as of 11/13/23 1822   Acute renal failure, unspecified acute renal failure type (CMS/HCC)       Medical Decision Making  Patient is stable hemodynamically and essentially has no complaints.  Laboratory evaluation reveals that the patient's creatinine has doubled since previously.  Baseline is around 2.5 and today is at about 5.5 with baseline BUN.  Potassium normal.  Patient was given IV normal saline.  He has been maintaining his usual level of fluid intake per the patient.  Could be obstructive.  Patient will require hospitalization for further care.  Discussed with the hospitalist team who accepted.  This visit was staffed with the attending physician Dr. Kulkarni.      Disclaimer: This note was dictated using speech recognition software. An attempt at proofreading was made to minimize errors. Minor errors in transcription may  be present. Please call if questions.        Procedure  Procedures     Agustin Sherman PA-C  11/13/23 1130

## 2023-11-13 NOTE — LETTER
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 On zosyn for recurrent MSSA bacteremia, with worsening oliguric kidney function over the weekend -- 2 L NS given with no sign of fluid overload. No CP/SOB.    O: VSS AFEB 271->256#->250# (23)? Awake, alert, NAD. Chest cta. Heart rrr. Indurated/non-fluctuant, non-tender [id number] left inguinal mass. HUNG draining clear. Ext no c/c/e.    LAB (23) Urine (Cl 20, K 15, Na 31, neg nitrates) WBC 9.5, Hgb 8.1->7.3, Cr 3.46->5.51, Alb 2.8, Na 132->128, K 3.5, Prealbumin 10  (23) Iron 21, TIBC 134, Ferritin 849, Transferrin sat 15.7%  (10/30/23) CRP 1.9 K 4.1,    (10/14/23) UCr 34, UUrea 261, Arash 90    (11/10/23) Renal US: no obstruction, 8 cm Left renal cyst. 180 cc Prostate. HUNG.    A/P:  # Weakness: continues to make progress with SNF PT/OT. He requires wheeled walker, chair lift mechanism, and raised toilet seat to allow him independence with ADLs.  # BPH w/ LUTS & Obstructive Uropathy: Flomax 0.8 mg qhs. Finasteride 5 mg. HUNG in for 900 cc PVR 10/8/23. Will need to delay plan for prostate surgery with Dr Delaney for November due to recurrent MSSA bacteremia.  # NOHELIA: acute worsening following initially improving off lasix and arb. Remains oliguric despite 2L NS over weekend. Sent to ER for prepping for HD. Appreciate Dr Isaac's help.  # OA: norco prn pain. Scheduled for Rt TKA Dr Kothari 2024. Order new right knee brace due to feeling of patellar instability with PT -- to see Dr Kothari for this.  # HTN: amlodipine 10, Toprol 100. Consider resume ARB as renal function improves.  # DM w nephropathy: OFF  Tresiba.  When renal function improves can start farxiga. Sticking to new diabetic diet.  # Gout: stable OFF allopurinol  # Anemia: OFF ferrous gluconate d/t n/v. Iron replete with normal Ferritin and low TIBC 11/6/23. Consider EPO if no improvement by 11/13/23.  # GERD: OFF PPI d/t NOHELIA.  # Recurrent MSSA Bacteremia: Resumed zoxyn x 2 weeks. Inadequate response to initial course of oral doxycycline. Echocardiogram pending to rule out endocarditis.  # Left groin mass -- abscess vs lymph node. US pending.      Electronically Signed By: Jeffry De Leon MD   12/17/23  2:41 PM

## 2023-11-14 ENCOUNTER — APPOINTMENT (OUTPATIENT)
Dept: CARDIOLOGY | Facility: HOSPITAL | Age: 73
DRG: 314 | End: 2023-11-14
Payer: MEDICARE

## 2023-11-14 LAB
ALBUMIN SERPL BCP-MCNC: 2.5 G/DL (ref 3.4–5)
ANION GAP SERPL CALC-SCNC: 15 MMOL/L (ref 10–20)
BACTERIA UR CULT: NO GROWTH
BUN SERPL-MCNC: 52 MG/DL (ref 6–23)
CALCIUM SERPL-MCNC: 7.3 MG/DL (ref 8.6–10.3)
CHLORIDE SERPL-SCNC: 101 MMOL/L (ref 98–107)
CHLORIDE UR-SCNC: 32 MMOL/L
CHLORIDE/CREATININE (MMOL/G) IN URINE: 51 MMOL/G CREAT (ref 23–275)
CO2 SERPL-SCNC: 19 MMOL/L (ref 21–32)
CREAT SERPL-MCNC: 5.35 MG/DL (ref 0.5–1.3)
CREAT UR-MCNC: 63.1 MG/DL (ref 20–370)
CRP SERPL-MCNC: 9.21 MG/DL
ERYTHROCYTE [DISTWIDTH] IN BLOOD BY AUTOMATED COUNT: 14.6 % (ref 11.5–14.5)
GFR SERPL CREATININE-BSD FRML MDRD: 11 ML/MIN/1.73M*2
GLUCOSE BLD MANUAL STRIP-MCNC: 112 MG/DL (ref 74–99)
GLUCOSE BLD MANUAL STRIP-MCNC: 139 MG/DL (ref 74–99)
GLUCOSE BLD MANUAL STRIP-MCNC: 140 MG/DL (ref 74–99)
GLUCOSE BLD MANUAL STRIP-MCNC: 157 MG/DL (ref 74–99)
GLUCOSE SERPL-MCNC: 111 MG/DL (ref 74–99)
HCT VFR BLD AUTO: 24.4 % (ref 41–52)
HGB BLD-MCNC: 7.9 G/DL (ref 13.5–17.5)
MAGNESIUM SERPL-MCNC: 1.71 MG/DL (ref 1.6–2.4)
MCH RBC QN AUTO: 27.8 PG (ref 26–34)
MCHC RBC AUTO-ENTMCNC: 32.4 G/DL (ref 32–36)
MCV RBC AUTO: 86 FL (ref 80–100)
NRBC BLD-RTO: 0 /100 WBCS (ref 0–0)
OSMOLALITY SERPL: 291 MOSM/KG (ref 280–300)
OSMOLALITY UR: 271 MOSM/KG (ref 200–1200)
PHOSPHATE SERPL-MCNC: 4.7 MG/DL (ref 2.5–4.9)
PLATELET # BLD AUTO: 91 X10*3/UL (ref 150–450)
POTASSIUM SERPL-SCNC: 3.4 MMOL/L (ref 3.5–5.3)
POTASSIUM UR-SCNC: 18 MMOL/L
POTASSIUM/CREAT UR-RTO: 29 MMOL/G CREAT
RBC # BLD AUTO: 2.84 X10*6/UL (ref 4.5–5.9)
RHEUMATOID FACT SER NEPH-ACNC: <10 IU/ML (ref 0–15)
SODIUM SERPL-SCNC: 132 MMOL/L (ref 136–145)
SODIUM UR-SCNC: 38 MMOL/L
SODIUM/CREAT UR-RTO: 60 MMOL/G CREAT
WBC # BLD AUTO: 8.7 X10*3/UL (ref 4.4–11.3)

## 2023-11-14 PROCEDURE — 1200000002 HC GENERAL ROOM WITH TELEMETRY DAILY

## 2023-11-14 PROCEDURE — 2500000004 HC RX 250 GENERAL PHARMACY W/ HCPCS (ALT 636 FOR OP/ED)

## 2023-11-14 PROCEDURE — 93306 TTE W/DOPPLER COMPLETE: CPT

## 2023-11-14 PROCEDURE — 86140 C-REACTIVE PROTEIN: CPT

## 2023-11-14 PROCEDURE — 86431 RHEUMATOID FACTOR QUANT: CPT | Mod: GEALAB

## 2023-11-14 PROCEDURE — 85027 COMPLETE CBC AUTOMATED: CPT

## 2023-11-14 PROCEDURE — 82947 ASSAY GLUCOSE BLOOD QUANT: CPT

## 2023-11-14 PROCEDURE — 86160 COMPLEMENT ANTIGEN: CPT | Mod: GEALAB

## 2023-11-14 PROCEDURE — 83930 ASSAY OF BLOOD OSMOLALITY: CPT | Mod: GEALAB

## 2023-11-14 PROCEDURE — 87081 CULTURE SCREEN ONLY: CPT | Mod: GEALAB

## 2023-11-14 PROCEDURE — 99223 1ST HOSP IP/OBS HIGH 75: CPT

## 2023-11-14 PROCEDURE — 80069 RENAL FUNCTION PANEL: CPT

## 2023-11-14 PROCEDURE — 2500000001 HC RX 250 WO HCPCS SELF ADMINISTERED DRUGS (ALT 637 FOR MEDICARE OP)

## 2023-11-14 PROCEDURE — 36415 COLL VENOUS BLD VENIPUNCTURE: CPT | Mod: GEALAB

## 2023-11-14 PROCEDURE — 93306 TTE W/DOPPLER COMPLETE: CPT | Performed by: INTERNAL MEDICINE

## 2023-11-14 PROCEDURE — 83735 ASSAY OF MAGNESIUM: CPT

## 2023-11-14 PROCEDURE — 36415 COLL VENOUS BLD VENIPUNCTURE: CPT

## 2023-11-14 PROCEDURE — 2500000002 HC RX 250 W HCPCS SELF ADMINISTERED DRUGS (ALT 637 FOR MEDICARE OP, ALT 636 FOR OP/ED)

## 2023-11-14 PROCEDURE — 87040 BLOOD CULTURE FOR BACTERIA: CPT | Mod: GEALAB

## 2023-11-14 PROCEDURE — 87077 CULTURE AEROBIC IDENTIFY: CPT | Mod: GEALAB

## 2023-11-14 RX ORDER — POTASSIUM CHLORIDE 20 MEQ/1
20 TABLET, EXTENDED RELEASE ORAL ONCE
Status: COMPLETED | OUTPATIENT
Start: 2023-11-14 | End: 2023-11-14

## 2023-11-14 RX ORDER — HYDROCODONE BITARTRATE AND ACETAMINOPHEN 5; 325 MG/1; MG/1
1 TABLET ORAL EVERY 6 HOURS PRN
Status: DISCONTINUED | OUTPATIENT
Start: 2023-11-14 | End: 2023-11-16 | Stop reason: HOSPADM

## 2023-11-14 RX ORDER — CEFAZOLIN SODIUM 2 G/50ML
2 SOLUTION INTRAVENOUS EVERY 12 HOURS
Status: DISCONTINUED | OUTPATIENT
Start: 2023-11-14 | End: 2023-11-14

## 2023-11-14 RX ORDER — HYDROCODONE BITARTRATE AND ACETAMINOPHEN 5; 325 MG/1; MG/1
0.5 TABLET ORAL EVERY 6 HOURS PRN
Status: DISCONTINUED | OUTPATIENT
Start: 2023-11-14 | End: 2023-11-16 | Stop reason: HOSPADM

## 2023-11-14 RX ORDER — ACETAMINOPHEN 325 MG/1
650 TABLET ORAL EVERY 6 HOURS PRN
Status: DISCONTINUED | OUTPATIENT
Start: 2023-11-14 | End: 2023-11-15 | Stop reason: ENTERED-IN-ERROR

## 2023-11-14 RX ORDER — ACETAMINOPHEN 325 MG/1
650 TABLET ORAL EVERY 6 HOURS
Status: DISCONTINUED | OUTPATIENT
Start: 2023-11-14 | End: 2023-11-14

## 2023-11-14 RX ORDER — CEFAZOLIN SODIUM 2 G/100ML
2 INJECTION, SOLUTION INTRAVENOUS EVERY 12 HOURS
Status: DISCONTINUED | OUTPATIENT
Start: 2023-11-14 | End: 2023-11-16 | Stop reason: HOSPADM

## 2023-11-14 RX ADMIN — PERFLUTREN 2.5 ML OF DILUTION: 6.52 INJECTION, SUSPENSION INTRAVENOUS at 15:05

## 2023-11-14 RX ADMIN — POTASSIUM CHLORIDE 20 MEQ: 1500 TABLET, EXTENDED RELEASE ORAL at 12:50

## 2023-11-14 RX ADMIN — ROSUVASTATIN 10 MG: 10 TABLET, FILM COATED ORAL at 20:34

## 2023-11-14 RX ADMIN — LINEZOLID 600 MG: 600 INJECTION, SOLUTION INTRAVENOUS at 05:10

## 2023-11-14 RX ADMIN — FINASTERIDE 5 MG: 5 TABLET, FILM COATED ORAL at 09:09

## 2023-11-14 RX ADMIN — CEFAZOLIN SODIUM 2 G: 2 INJECTION, SOLUTION INTRAVENOUS at 15:23

## 2023-11-14 RX ADMIN — HYDROMORPHONE HYDROCHLORIDE 0.2 MG: 1 INJECTION, SOLUTION INTRAMUSCULAR; INTRAVENOUS; SUBCUTANEOUS at 05:10

## 2023-11-14 RX ADMIN — ACETAMINOPHEN 650 MG: 325 TABLET ORAL at 12:50

## 2023-11-14 RX ADMIN — TAMSULOSIN HYDROCHLORIDE 0.8 MG: 0.4 CAPSULE ORAL at 20:31

## 2023-11-14 RX ADMIN — HYDROCODONE BITARTRATE AND ACETAMINOPHEN 1 TABLET: 5; 325 TABLET ORAL at 20:31

## 2023-11-14 RX ADMIN — AMLODIPINE BESYLATE 10 MG: 10 TABLET ORAL at 20:32

## 2023-11-14 RX ADMIN — METOPROLOL SUCCINATE 100 MG: 50 TABLET, EXTENDED RELEASE ORAL at 09:09

## 2023-11-14 RX ADMIN — INSULIN GLARGINE 10 UNITS: 100 INJECTION, SOLUTION SUBCUTANEOUS at 20:30

## 2023-11-14 ASSESSMENT — COGNITIVE AND FUNCTIONAL STATUS - GENERAL
CLIMB 3 TO 5 STEPS WITH RAILING: A LOT
DAILY ACTIVITIY SCORE: 18
PERSONAL GROOMING: A LITTLE
CLIMB 3 TO 5 STEPS WITH RAILING: A LOT
STANDING UP FROM CHAIR USING ARMS: A LOT
TURNING FROM BACK TO SIDE WHILE IN FLAT BAD: A LITTLE
TOILETING: A LITTLE
STANDING UP FROM CHAIR USING ARMS: A LITTLE
WALKING IN HOSPITAL ROOM: A LOT
HELP NEEDED FOR BATHING: A LOT
MOVING FROM LYING ON BACK TO SITTING ON SIDE OF FLAT BED WITH BEDRAILS: A LITTLE
DRESSING REGULAR LOWER BODY CLOTHING: A LOT
TURNING FROM BACK TO SIDE WHILE IN FLAT BAD: A LITTLE
MOVING TO AND FROM BED TO CHAIR: A LITTLE
DAILY ACTIVITIY SCORE: 16
MOBILITY SCORE: 17
DRESSING REGULAR UPPER BODY CLOTHING: A LOT
DRESSING REGULAR UPPER BODY CLOTHING: A LITTLE
WALKING IN HOSPITAL ROOM: A LITTLE
TOILETING: A LITTLE
MOBILITY SCORE: 15
DRESSING REGULAR LOWER BODY CLOTHING: A LOT
MOVING TO AND FROM BED TO CHAIR: A LITTLE
HELP NEEDED FOR BATHING: A LOT
MOVING FROM LYING ON BACK TO SITTING ON SIDE OF FLAT BED WITH BEDRAILS: A LITTLE

## 2023-11-14 ASSESSMENT — PAIN SCALES - GENERAL
PAINLEVEL_OUTOF10: 6
PAINLEVEL_OUTOF10: 7
PAINLEVEL_OUTOF10: 0 - NO PAIN

## 2023-11-14 ASSESSMENT — PAIN - FUNCTIONAL ASSESSMENT
PAIN_FUNCTIONAL_ASSESSMENT: 0-10
PAIN_FUNCTIONAL_ASSESSMENT: 0-10

## 2023-11-14 ASSESSMENT — ACTIVITIES OF DAILY LIVING (ADL): LACK_OF_TRANSPORTATION: NO

## 2023-11-14 NOTE — CARE PLAN
The clinical goals for the shift include Kidney function will improve by end of shift    Patient kidney function still abnormal. ECHO performed. Antibiotic switched to ancef. BG remained WDL. Continue to monitor urine output. Labs in AM.

## 2023-11-14 NOTE — H&P (VIEW-ONLY)
Consults  Referred by CONNIE Whelan MD: Jeffry De Leon MD    Reason For Consult  bacteremia    History Of Present Illness  Chester Verduzco is a 73 y.o. male with a hx of obesity, hx of DM, hx of HTN, hx of BPH, hx of lymphedema with stasis, hx of a Rojo, [place] [facility] [place], he has been started on antibiotics for UTI, he had BC done that has been positive for MSSA, he was sent to the hospital for renal failure, no fever, no abdominal pain, no emesis, no diarrhea, no cough or sob, no dysuria, has Rt knee pain and swelling     Past Medical History  He has a past medical history of Abnormal weight gain (10/27/2016), Achilles tendinitis, unspecified leg (08/16/2016), Acute upper respiratory infection, unspecified, Allergic contact dermatitis due to plants, except food (08/22/2013), BPH with obstruction/lower urinary tract symptoms, Cellulitis of right lower limb (07/30/2021), Cough, unspecified (03/21/2016), Diabetes (CMS/[facility]), Encounter for screening for human immunodeficiency virus (HIV) (06/14/2016), Hordeolum externum unspecified eye, unspecified eyelid (08/14/2019), Hyperglycemia, unspecified (12/13/2017), Incisional hernia without obstruction or gangrene (06/15/2015), Knee joint pain, MSSA bacteremia (10/2023), Muscle spasm of calf (08/16/2016), Obstructive uropathy, Personal history of other diseases of the digestive system (06/30/2015), Personal history of other diseases of the digestive system (02/05/2016), Personal history of other diseases of the respiratory system (12/23/2014), Personal history of other diseases of the respiratory system (01/28/2016), Personal history of other infectious and parasitic diseases (12/02/2015), Personal history of other infectious and parasitic diseases, Personal history of other specified conditions (02/04/2015), Personal history of other specified conditions (08/16/2016), Personal history of other specified conditions (01/26/2015),  Personal history of pneumonia (recurrent) (01/29/2016), Personal history of urinary (tract) infections (02/19/2013), Prostatitis (03/21/2023), Pruritus, unspecified (02/10/2015), Sinoatrial node dysfunction (CMS/HCC), Strain of muscle, fascia and tendon of the posterior muscle group at thigh level, right thigh, initial encounter (05/10/2016), and Unspecified symptoms and signs involving the genitourinary system (12/20/2016).    Surgical History  He has a past surgical history that includes Varicose vein surgery (08/22/2013); Cardiac pacemaker placement (08/22/2013); Other surgical history (06/15/2015); Ventral hernia repair (06/15/2015); Other surgical history (09/16/2019); and Peripherally inserted central catheter insertion.     Social History     Occupational History    Not on file   Tobacco Use    Smoking status: Never     Passive exposure: Never    Smokeless tobacco: Never   Vaping Use    Vaping Use: Never used   Substance and Sexual Activity    Alcohol use: Not Currently    Drug use: Never    Sexual activity: Not Currently     Travel History   Travel since 10/14/23    No documented travel since 10/14/23         Family History  No family history on file.  Allergies  Keflex [cephalexin] and Bupropion     Immunization History   Administered Date(s) Administered    Flu vaccine, quadrivalent, high-dose, preservative free, age 65y+ (FLUZONE) 11/28/2020    Influenza, High Dose Seasonal, Preservative Free 10/01/2021    Influenza, seasonal, injectable 09/08/2023    Moderna COVID-19 vaccine, bivalent, blue cap/gray label *Check age/dose* 05/02/2023    Moderna SARS-CoV-2 Vaccination 01/04/2021, 02/01/2021, 10/23/2021, 08/13/2022    Novel influenza-H1N1-09, preservative-free 10/15/2009    Pneumococcal conjugate vaccine, 13-valent (PREVNAR 13) 10/27/2016    Pneumococcal conjugate vaccine, 20-valent (PREVNAR 20) 08/10/2022    Pneumococcal polysaccharide vaccine, 23-valent, age 2 years and older (PNEUMOVAX 23) 06/19/2018     Td vaccine, age 7 years and older (TDVAX) 09/16/2013    Zoster vaccine, recombinant, adult (SHINGRIX) 11/04/2022    Zoster, live 09/08/2023   Pneumonia and influenza vaccines are up to date  Vera fall risk 75, preventive protocol was implemented  Depression screen is negative    Medications  Home medications:  Medications Prior to Admission   Medication Sig Dispense Refill Last Dose    amLODIPine (Norvasc) 10 mg tablet Take 1 tablet (10 mg) by mouth once daily in the evening.   11/12/2023    ferrous gluconate (Fergon) 324 (38 Fe) mg tablet Take 1 tablet (38 mg of iron) by mouth once daily with a meal. Do not start before October 8, 2023.   11/13/2023    finasteride (Proscar) 5 mg tablet Take 1 tablet (5 mg) by mouth once daily.   11/13/2023    hydrALAZINE (Apresoline) 10 mg tablet Take 1 tablet (10 mg) by mouth 3 times a day. Hold for SBP <120   11/12/2023    HYDROcodone-acetaminophen (Norco) 5-325 mg tablet Take 1 tablet by mouth every 6 hours if needed for severe pain (7 - 10).   11/11/2023    lactobacillus acidophilus & bulgar (Lactinex) 1 million cell chewable tablet Chew 1 tablet once daily. For 28 days   11/12/2023    losartan (Cozaar) 100 mg tablet TAKE 1 TABLET BY MOUTH EVERY DAY (Patient taking differently: Take 1 tablet (100 mg) by mouth once daily.) 90 tablet 3 11/13/2023    lubricating eye drops ophthalmic solution Administer 1 drop into the left eye every 8 hours if needed for dry eyes.   Past Week    menthol (Biofreeze, menthol,) 4 % gel Apply 1 Application topically every 6 hours if needed. To right knee   Past Week    metoprolol succinate XL (Toprol-XL) 100 mg 24 hr tablet TAKE 1 TABLET BY MOUTH EVERY DAY (Patient taking differently: Take 1 tablet (100 mg) by mouth once daily.) 90 tablet 3 11/13/2023    ondansetron (Zofran) 4 mg tablet Take 1 tablet (4 mg) by mouth every 6 hours if needed for nausea or vomiting.   11/13/2023    potassium chloride CR (Klor-Con M20) 20 mEq ER tablet Take 1 tablet (20  mEq) by mouth once daily. Do not crush or chew. Do not start before October 8, 2023.   11/13/2023    rosuvastatin (Crestor) 10 mg tablet TAKE 1 TABLET BY MOUTH EVERYDAY AT BEDTIME 90 tablet 3 11/12/2023    acetaminophen (Tylenol) 325 mg tablet Take 2 tablets (650 mg) by mouth every 4 hours if needed for mild pain (1 - 3).   More than a month    allopurinol (Zyloprim) 100 mg tablet Take 1 tablet (100 mg) by mouth once daily. (Patient not taking: Reported on 11/13/2023) 90 tablet 3 Unknown    bisacodyl (C-Lax Laxative, bisacodyl,) 5 mg EC tablet Take 2 tablets (10 mg) by mouth once daily as needed for constipation. Do not crush, chew, or split. For no results from MOM and/or no BM in 3 days   Unknown    bisacodyl (Dulcolax, bisacodyl,) 10 mg suppository Insert 1 suppository (10 mg) into the rectum once daily as needed for constipation.   Unknown    fluticasone (Flonase) 50 mcg/actuation nasal spray Administer 1 spray into each nostril every 12 hours if needed for rhinitis. Shake gently. Before first use, prime pump. After use, clean tip and replace cap.   Unknown    furosemide (Lasix) 40 mg tablet Take 1 tablet (40 mg) by mouth once daily. Do not start before October 9, 2023. (Patient not taking: Reported on 11/13/2023)   Not Taking    insulin degludec (Tresiba FlexTouch U-100) 100 unit/mL (3 mL) injection Inject 10 Units under the skin once daily at bedtime. (Patient not taking: Reported on 11/13/2023) 10 mL 2 Not Taking    insulin regular (HumuLIN R) 100 unit/mL injection Inject 0-5 Units under the skin 3 times a day with meals. Take as directed per insulin instructions. (Patient not taking: Reported on 11/7/2023)       mineral oil enema Insert 133 mL (1 enema) into the rectum once daily as needed for constipation. If no BM or results from MOM in 3 days   Unknown    naltrexone (Depade) 50 mg tablet Take 1 tablet (50 mg) by mouth once daily. Pt has not started this medication yet (Patient not taking: Reported on  "11/13/2023)   Not Taking    pantoprazole (ProtoNix) 40 mg EC tablet Take 1 tablet (40 mg) by mouth once daily in the morning. Take before meals. Do not crush, chew, or split. Do not start before October 8, 2023. (Patient not taking: Reported on 11/13/2023)   Not Taking    sennosides-docusate sodium (Kita-Colace) 8.6-50 mg tablet Take 2 tablets by mouth 2 times a day.       sodium chloride 0.9% (sodium chloride) flush Infuse 10 mL into a venous catheter 2 times a day. Every shift       tamsulosin (Flomax) 0.4 mg 24 hr capsule Take 2 capsules (0.8 mg) by mouth once daily at bedtime.        Current medications:  Scheduled medications  amLODIPine, 10 mg, oral, q PM  finasteride, 5 mg, oral, Daily  heparin (porcine), 5,000 Units, subcutaneous, q8h  insulin glargine, 10 Units, subcutaneous, q24h  insulin lispro, 0-5 Units, subcutaneous, TID with meals  linezolid, 600 mg, intravenous, q12h  metoprolol succinate XL, 100 mg, oral, Daily  rosuvastatin, 10 mg, oral, Nightly  tamsulosin, 0.8 mg, oral, Nightly      Continuous medications       PRN medications  PRN medications: dextrose 10 % in water (D10W), dextrose, glucagon, HYDROmorphone    Review of Systems   All other systems reviewed and are negative.       Objective  Range of Vitals (last 24 hours)  Heart Rate:  [67-69]   Temp:  [36 °C (96.8 °F)-37.1 °C (98.8 °F)]   Resp:  [16-18]   BP: (118-143)/(66-70)   Height:  [188 cm (6' 2\")]   Weight:  [115 kg (253 lb 4.9 oz)-120 kg (263 lb 7.2 oz)]   SpO2:  [94 %-99 %]   Daily Weight  11/13/23 : 120 kg (263 lb 7.2 oz)    Body mass index is 33.82 kg/m².   Nutritional consult     Physical Exam  Constitutional:       Appearance: Normal appearance.   HENT:      Head: Normocephalic and atraumatic.      Mouth/Throat:      Mouth: Mucous membranes are moist.      Pharynx: Oropharynx is clear.   Eyes:      Pupils: Pupils are equal, round, and reactive to light.   Cardiovascular:      Rate and Rhythm: Normal rate and regular rhythm.      " "Heart sounds: Normal heart sounds.   Pulmonary:      Effort: Pulmonary effort is normal.      Breath sounds: Normal breath sounds.   Abdominal:      General: Abdomen is flat. Bowel sounds are normal.      Palpations: Abdomen is soft.   Musculoskeletal:      Cervical back: Normal range of motion.      Comments: Lymphedema with stasis  Rt knee effusion, no cellulitis    Neurological:      Mental Status: He is alert.         Labs  Results from last 72 hours   Lab Units 11/14/23  0702 11/13/23  1637   WBC AUTO x10*3/uL 8.7 9.8   HEMOGLOBIN g/dL 7.9* 8.4*   HEMATOCRIT % 24.4* 26.0*   PLATELETS AUTO x10*3/uL 91* 91*   NEUTROS PCT AUTO %  --  79.0   LYMPHS PCT AUTO %  --  10.1   MONOS PCT AUTO %  --  6.4   EOS PCT AUTO %  --  2.6       Results from last 72 hours   Lab Units 11/14/23  0702 11/13/23  1637   SODIUM mmol/L 132* 132*   POTASSIUM mmol/L 3.4* 4.0   CHLORIDE mmol/L 101 98   CO2 mmol/L 19* 20*   BUN mg/dL 52* 55*   CREATININE mg/dL 5.35* 5.43*   GLUCOSE mg/dL 111* 94   CALCIUM mg/dL 7.3* 7.6*   ANION GAP mmol/L 15 18   EGFR mL/min/1.73m*2 11* 10*   PHOSPHORUS mg/dL 4.7  --        Results from last 72 hours   Lab Units 11/14/23  0702 11/13/23  1637   ALK PHOS U/L  --  117   BILIRUBIN TOTAL mg/dL  --  0.5   PROTEIN TOTAL g/dL  --  6.0*   ALT U/L  --  31   AST U/L  --  51*   ALBUMIN g/dL 2.5* 2.7*       Estimated Creatinine Clearance: 17.2 mL/min (A) (by C-G formula based on SCr of 5.35 mg/dL (H)).  C-Reactive Protein   Date Value Ref Range Status   11/14/2023 9.21 (H) <1.00 mg/dL Final     CRP   Date Value Ref Range Status   09/18/2023 22.55 (A) mg/dL Final     Comment:     REF VALUE  < 1.00     06/23/2021 0.50 mg/dL Final     Comment:     REF VALUE  < 1.00       Sedimentation Rate   Date Value Ref Range Status   09/18/2023 10 0 - 20 mm/h Final     No results found for: \"HIV1X2\", \"HIVCONF\", \"EKAQRO7KC\"  No results found for: \"HEPCABINIT\", \"HEPCAB\", \"HCVPCRQUANT\"  Microbiology  Reviewed  Xray reviewed     "   Assessment/Plan     MSSA bacteremia  Right kne pain  Pyuria  Lymphedema / stasis    Recommendations :  Cefazolin 2 gm iv once daily   Repeat the BC  Echo  Knee sampling    I spent  minutes in the professional and overall care of this patient.      Payton Raines MD

## 2023-11-14 NOTE — PROGRESS NOTES
*Provider Impression*    Patient is a 73 year old male who is seen today for management of multiple medical problems     #Weakness / OA / Gout - PT/OT, allopurinol 100mg daily, acetaminophen 650mg q4h PRN, Norco 5/325mg q6h PRN, flexeril 5mg q8h PRN, biofreeze 4x/day PRN  #NOHELIA - check blood cultures x2, KCl 40mEq x1, check renal US, check renal prealbumin tsh in am  #GERD / Constipation - pantoprazole 40mg daily, zofran 4mg q6h PRN, dulcolax 10mg daily PRN - add PO route, miralax 17grams daily PRN, change senna-S to 2 tabs qhs and 2 tabs daily prn  #T2DM - Humalog SSI, Tresiba 5units daily  #HTN / HLD - amlodipine 10mg daily, metoprolol XL 100mg daily, atorvastatin 20mg daily, hydralazine 10mg TID  #BPH w/ obstruction - maldonado, finasteride 5mg daily, tamsulosin 0.8mg daily  #Anemia - ferrous sulfate 324mg daily  #ACP - Full Code  Follow up as needed      *Chief Complaint*     NOHELIA    *History of Present Illness*    Patient is a 72 y/o male w/ PMH as below who presents with a 3-week history of diarrhea with generalized weakness. CT A/P: No acute diverticulitis or acute process. Recent antibiotic use (Keflex) for UTI. Stool negative for C.Diff Colitis, PCR noninfectious. ID was consulted - no need for antibiotics. Urine culture was negative. Given IVF for hypovolemia from diarrhea and poor oral intake. Monitored and repleted electrolytes as needed. Nephrology consulted for NOHELIA with hyponatremia. Sodium uptrended: 123>130. Nephrotoxic agents held to allow for recovery. Urine output satisfactory. He was seen by PT/OT who recommended SNF and was d/c to [facility].     Labs appreciated today as below w/ worsening renal function.     He reportedly had several large bowel movementthis this morning. Reports having some dipahoresis, concenred about his catheter possibly being kinked. He was having some dry heaves this morning, no CP, SOB, cough, or any other new c/o presently.     Alleriges - buPROPion, Cephalexin   PMH -  arthritis, obesity, lymphedema, ventral hernia repair, hypertension, hyperlipidemia, BPH, PVD, T2DM, Bell's palsy  PSH - pacemaker, ventral hernia repair, prostate biopsy, varicose vein ligation  FH - Brother had heart disease; Mother had lung cancer  SocHx - Never smoker, Social EtOH    *Review of Systems*  All other systems reviewed are negative except as noted in the HPI     *Vital Signs*   Date: 11/9/23  - T: 98.0  P: 91  R: 18  BP: 122/61  SpO2: 97% on RA ; Date: 11/9/23  Wt: 246.0    *Results / Data*  CBC - Date: 11/9/23  WBC: 9.51  HGB: 8.1  HCT: 24.4  PLT: 89 ;   BMP - Date: 11/9/23  Na: 132  K: 3.4  Cl: 98  CO2: 21  BUN: 28  Cr: 3.46  Glu: 134  Ca: 8.1  ;   LFT - Date: 11/9/23  AST: 19  ALT: 12  ALP: 120  TBili: 0.5  ;   Coags - Date:   INR:   PT:  Other - Date: 10/16/23  CRP: 3.8  ESR: 69  ; 10/23/23  CRP: 2.9  ESR: 79 ; 10/30/23  CRP: 1.9    *Physical Exam*  Gen: (+) NAD, (+) well-appearing  HEENT: (+) normocephalic, (+) MMM  Neck: (+) supple  Lungs: (+) CTAB, (-) wheezes, (-) rales, (-) rhonchi  Heart: (+) RRR, (+) S1 S2, (-) murmurs  Pulses: (+) palpable  Abd: (+) soft, (+) NT, (+) ND, (+) BS+  : (+) maldonado  Ext: (+) edema, (-) deformity  MSK: (-) joint swelling  Skin: (+) warm, (+) dry, (-) rash  Neuro: (+) follows commands, (-) tremor, (+) alert

## 2023-11-14 NOTE — PROGRESS NOTES
Chester Verduzco is a 73 y.o. male on day 1 of admission presenting with Acute renal failure, unspecified acute renal failure type (CMS/HCC).      Subjective   States he is feeling improved after starting antibiotics overnight/IV fluids. Denies f/c, n/v, abdominal pain. Mild pain in R knee but states this is chronic with his osteoarthritis. Denies erythema of the R knee. Still able to move the joint without considerable pain. Pt has a newly placed PICC line (placed on Saturday) for long term IV antibiotics due to MSSA bacteremia of unknown source. Recently on Zosyn.       Objective     Last Recorded Vitals  /68   Pulse 69   Temp 37.1 °C (98.8 °F)   Resp 16   Wt 120 kg (263 lb 7.2 oz)   SpO2 94%   Intake/Output last 3 Shifts:    Intake/Output Summary (Last 24 hours) at 11/14/2023 1410  Last data filed at 11/14/2023 0910  Gross per 24 hour   Intake 960 ml   Output 850 ml   Net 110 ml       Admission Weight  Weight: 115 kg (253 lb 4.9 oz) (11/13/23 1554)    Daily Weight  11/13/23 : 120 kg (263 lb 7.2 oz)    Image Results  XR knee right 3 views  Narrative: STUDY:  Knee Radiographs; 11/13/2023 7:19 PM   INDICATION:  Right knee as possible source of infection in setting of  Staphylococcus-positive blood culture.  COMPARISON:  None available.  ACCESSION NUMBER(S):  [id number]  ORDERING CLINICIAN:  YFN YOUNG  TECHNIQUE:  Three view(s) of the right knee.  FINDINGS:    Degenerative changes are present with joint space narrowing and  osteophytes along the medial and lateral compartment.  Moderate knee  joint effusion and anterior knee soft tissue swelling.  Joint space  narrowing along the patellofemoral articulation.  The notch view shows  osteophytes along the medial and lateral tibial spines.  Impression: Anterior knee soft tissue swelling with moderate knee joint effusion.  Tricompartmental osteoarthritis.  Signed by Max Marroquin MD  US renal complete  Narrative: STUDY:  Renal Ultrasound; Completed  Time: 11/13/2023 7:27 PM   INDICATION:  Urinary retention.  COMPARISON:  CT A&P 10/2/2023.  ACCESSION NUMBER(S):  [id number]  ORDERING CLINICIAN:  YFN YOUNG  FINDINGS:   The right kidney measures 13.4 cm in length.  Renal cortical  echotexture is normal.  There is no hydronephrosis.  There are no  stones.  There are no cysts.  The left kidney measures 16.8 cm in length.  Renal cortical  echotexture is normal.  There is no hydronephrosis.  There are no  stones.  There is a simple cyst within the upper pole measuring 6.8 x  7.9 x 6.8 cm.  The bladder is decompressed by a Rojo catheter   Prostate volume is 269 ml.   Impression: Left simple cyst measuring 6.8 x 7.9 x 6.8 cm.  No hydronephrosis bilaterally.   Enlarged prostate.  Signed by Efra Benito MD      Physical Exam  Constitutional:       Appearance: Normal appearance. He is obese. He is not ill-appearing.   HENT:      Head: Normocephalic and atraumatic.   Eyes:      Extraocular Movements: Extraocular movements intact.      Comments: L eye with a conjunctival hemorrhage   Cardiovascular:      Rate and Rhythm: Normal rate and regular rhythm.      Heart sounds: Normal heart sounds. No murmur heard.     No friction rub. No gallop.   Pulmonary:      Effort: Pulmonary effort is normal. No respiratory distress.      Breath sounds: No wheezing, rhonchi or rales.   Abdominal:      General: Abdomen is flat. There is distension.      Palpations: There is no mass.      Tenderness: There is no abdominal tenderness. There is no guarding.      Hernia: A hernia is present.   Musculoskeletal:         General: No tenderness, deformity or signs of injury. Normal range of motion.      Comments: Mild swelling of R knee. No erythema or pain with palpation. Intact ROM of bilateral knees.   Skin:     General: Skin is warm and dry.      Findings: No rash.      Comments: No splinter hemorrhages or Osler/Janeway lesions   Neurological:      General: No focal deficit present.       Mental Status: He is alert. Mental status is at baseline.   Psychiatric:         Mood and Affect: Mood normal.         Relevant Results  Scheduled medications  amLODIPine, 10 mg, oral, q PM  ceFAZolin in dextrose (iso-os), 2 g, intravenous, q12h  finasteride, 5 mg, oral, Daily  heparin (porcine), 5,000 Units, subcutaneous, q8h  insulin glargine, 10 Units, subcutaneous, q24h  insulin lispro, 0-5 Units, subcutaneous, TID with meals  metoprolol succinate XL, 100 mg, oral, Daily  rosuvastatin, 10 mg, oral, Nightly  tamsulosin, 0.8 mg, oral, Nightly      Continuous medications     PRN medications  PRN medications: acetaminophen, dextrose 10 % in water (D10W), dextrose, glucagon, HYDROcodone-acetaminophen, HYDROcodone-acetaminophen    Results for orders placed or performed during the hospital encounter of 11/13/23 (from the past 24 hour(s))   CBC and Auto Differential   Result Value Ref Range    WBC 9.8 4.4 - 11.3 x10*3/uL    nRBC 0.0 0.0 - 0.0 /100 WBCs    RBC 3.00 (L) 4.50 - 5.90 x10*6/uL    Hemoglobin 8.4 (L) 13.5 - 17.5 g/dL    Hematocrit 26.0 (L) 41.0 - 52.0 %    MCV 87 80 - 100 fL    MCH 28.0 26.0 - 34.0 pg    MCHC 32.3 32.0 - 36.0 g/dL    RDW 14.6 (H) 11.5 - 14.5 %    Platelets 91 (L) 150 - 450 x10*3/uL    Neutrophils % 79.0 40.0 - 80.0 %    Immature Granulocytes %, Automated 1.8 (H) 0.0 - 0.9 %    Lymphocytes % 10.1 13.0 - 44.0 %    Monocytes % 6.4 2.0 - 10.0 %    Eosinophils % 2.6 0.0 - 6.0 %    Basophils % 0.1 0.0 - 2.0 %    Neutrophils Absolute 7.74 (H) 1.60 - 5.50 x10*3/uL    Immature Granulocytes Absolute, Automated 0.18 0.00 - 0.50 x10*3/uL    Lymphocytes Absolute 0.99 0.80 - 3.00 x10*3/uL    Monocytes Absolute 0.63 0.05 - 0.80 x10*3/uL    Eosinophils Absolute 0.25 0.00 - 0.40 x10*3/uL    Basophils Absolute 0.01 0.00 - 0.10 x10*3/uL   Comprehensive metabolic panel   Result Value Ref Range    Glucose 94 74 - 99 mg/dL    Sodium 132 (L) 136 - 145 mmol/L    Potassium 4.0 3.5 - 5.3 mmol/L    Chloride 98 98 - 107  mmol/L    Bicarbonate 20 (L) 21 - 32 mmol/L    Anion Gap 18 10 - 20 mmol/L    Urea Nitrogen 55 (H) 6 - 23 mg/dL    Creatinine 5.43 (H) 0.50 - 1.30 mg/dL    eGFR 10 (L) >60 mL/min/1.73m*2    Calcium 7.6 (L) 8.6 - 10.3 mg/dL    Albumin 2.7 (L) 3.4 - 5.0 g/dL    Alkaline Phosphatase 117 33 - 136 U/L    Total Protein 6.0 (L) 6.4 - 8.2 g/dL    AST 51 (H) 9 - 39 U/L    Bilirubin, Total 0.5 0.0 - 1.2 mg/dL    ALT 31 10 - 52 U/L   Morphology   Result Value Ref Range    RBC Morphology See Below     Giant Platelets Few    Urine electrolytes   Result Value Ref Range    Sodium, Urine Random 38 mmol/L    Sodium/Creatinine Ratio 60 Not established. mmol/g Creat    Potassium, Urine Random 18 mmol/L    Potassium/Creatinine Ratio 29 Not established mmol/g Creat    Chloride, Urine Random 32 mmol/L    Chloride/Creatinine Ratio 51 23 - 275 mmol/g creat    Creatinine, Urine Random 63.1 20.0 - 370.0 mg/dL   Osmolality, urine   Result Value Ref Range    Osmolality, Urine Random 271 200 - 1,200 mOsm/kg   Urinalysis with Reflex Microscopic and Culture   Result Value Ref Range    Color, Urine Yellow Straw, Yellow    Appearance, Urine Hazy (N) Clear    Specific Gravity, Urine 1.011 1.005 - 1.035    pH, Urine 5.0 5.0, 5.5, 6.0, 6.5, 7.0, 7.5, 8.0    Protein, Urine 100 (2+) (N) NEGATIVE mg/dL    Glucose, Urine NEGATIVE NEGATIVE mg/dL    Blood, Urine LARGE (3+) (A) NEGATIVE    Ketones, Urine NEGATIVE NEGATIVE mg/dL    Bilirubin, Urine NEGATIVE NEGATIVE    Urobilinogen, Urine <2.0 <2.0 mg/dL    Nitrite, Urine NEGATIVE NEGATIVE    Leukocyte Esterase, Urine MODERATE (2+) (A) NEGATIVE   Extra Urine Gray Tube   Result Value Ref Range    Extra Tube Hold for add-ons.    Microscopic Only, Urine   Result Value Ref Range    WBC, Urine 11-20 (A) 1-5, NONE /HPF    RBC, Urine >20 (A) NONE, 1-2, 3-5 /HPF    Bacteria, Urine 1+ (A) NONE SEEN /HPF    Budding Yeast, Urine PRESENT (A) NONE /HPF    Calcium Oxalate Crystals, Urine 1+ NONE, 1+ /HPF   POCT GLUCOSE    Result Value Ref Range    POCT Glucose 95 74 - 99 mg/dL   CBC   Result Value Ref Range    WBC 8.7 4.4 - 11.3 x10*3/uL    nRBC 0.0 0.0 - 0.0 /100 WBCs    RBC 2.84 (L) 4.50 - 5.90 x10*6/uL    Hemoglobin 7.9 (L) 13.5 - 17.5 g/dL    Hematocrit 24.4 (L) 41.0 - 52.0 %    MCV 86 80 - 100 fL    MCH 27.8 26.0 - 34.0 pg    MCHC 32.4 32.0 - 36.0 g/dL    RDW 14.6 (H) 11.5 - 14.5 %    Platelets 91 (L) 150 - 450 x10*3/uL   Magnesium   Result Value Ref Range    Magnesium 1.71 1.60 - 2.40 mg/dL   Renal Function Panel   Result Value Ref Range    Glucose 111 (H) 74 - 99 mg/dL    Sodium 132 (L) 136 - 145 mmol/L    Potassium 3.4 (L) 3.5 - 5.3 mmol/L    Chloride 101 98 - 107 mmol/L    Bicarbonate 19 (L) 21 - 32 mmol/L    Anion Gap 15 10 - 20 mmol/L    Urea Nitrogen 52 (H) 6 - 23 mg/dL    Creatinine 5.35 (H) 0.50 - 1.30 mg/dL    eGFR 11 (L) >60 mL/min/1.73m*2    Calcium 7.3 (L) 8.6 - 10.3 mg/dL    Phosphorus 4.7 2.5 - 4.9 mg/dL    Albumin 2.5 (L) 3.4 - 5.0 g/dL   C-reactive protein   Result Value Ref Range    C-Reactive Protein 9.21 (H) <1.00 mg/dL   POCT GLUCOSE   Result Value Ref Range    POCT Glucose 112 (H) 74 - 99 mg/dL   POCT GLUCOSE   Result Value Ref Range    POCT Glucose 139 (H) 74 - 99 mg/dL         Assessment/Plan        This patient has a central line   Reason for the central line remaining today? Long-term abx therapy    This patient has a urinary catheter   Reason for the urinary catheter remaining today? urinary retention/bladder outlet obstruction, acute or chronic      Principal Problem:    Acute renal failure, unspecified acute renal failure type (CMS/HCC)    73 y.o. male with PMHx of lymphedema, ventral hernia repair, HTN, HLD, BPH, obesity, recent hospitalization for generalized weakness, NOHELIA with hyponatremia discharged to SNF presenting for evaluation of uptrending BUN/creatinine and positive blood cultures for MSSA (11/10, also previously 10/9). Worsening renal function is concerning for glomerulonephritis in the  setting of possible subacute endocarditis. Pt has had persistent MSSA bacteremia, subjective hx of fevers, a new conjunctival hemorrhage and decline in renal function since 10/2023 which is concerning for an indolent endocarditis. Possible source for original MSSA is unclear, but pt has a PM which if infected could be causing persistent infections.    Acute Medical Conditions      #NOHELIA  Possibly secondary to glomerulonephritis  - Patient's Cr 5.43 in ED (3.46 on 11/9)  - Received 1 L NS in ED   - Patient states he has had good PO fluid intake at SNF and even received IV fluids yesterday as there was concern for low urine output   -UA, urine chemistry obtained  Plan  - Given that presentation is not prerenal and already recevied 1L fluids in ED, hold off on more fluids   - Bladder scans qshift ordered   - Nephrology consulted, appreciate recs  - Concern for possible glomerulonephritis in setting of MSSA bacteremia/endocarditis  - C3, C4 and RF pending  - Will dose medications for GFR of 15  - Supportive care, will manage infection     #Concern for Endocarditis in setting of Pacemaker    #Positive Blood Culture for Staph aureus   - Patient had positive blood culture for Staph aureus on 11/10 and 10/9  - PICC line placed 11/10  - Has a history of pacemaker and was planned to have TTE per his PCP   - low suspicion for septic arthritis in R knee due to known hx of OA, limited swelling and no pain with palpation, no erythema, and intact ROM. Also a native joint.  -Previously on Vanc at OSH but stopped due to worsening Cr after 1 dosage, then changed to Zosyn    Plan  - TTE ordered   - repeat blood cultures ordered   - Started Cefazolin  - Infectious Disease consulted, appreciate recs         Chronic Medical Conditions   #Diabetes - sliding scale + home Lantus 10 units  #HTN - c/w Amlodipine, Metoprolol, hold Losartan   #BPH - c/w Finasteride, Tamulosin, has chronic maldonado (last changed a few days ago per pt)  #HLD - c/w  Crestor   #Osteoarthritis R Knee  -pain control with tylenol 650mg [id number] mild, norco 0.5 tablet for moderate pain, norco 1 tablet for severe pain       Courtney Campbell MD

## 2023-11-14 NOTE — PROGRESS NOTES
Pharmacy Medication History Review    Chester Verduzco is a 73 y.o. male admitted for Acute renal failure, unspecified acute renal failure type (CMS/[facility]). Pharmacy reviewed the patient's [id number] medications and allergies for accuracy.    The list below reflectives the updated PTA list. Please review each medication in order reconciliation for additional clarification and justification.  (Not in a hospital admission)       The list below reflectives the updated allergy list. Please review each documented allergy for additional clarification and justification.  Allergies  Reviewed by Katina Dickson RN on 11/13/2023        Severity Reactions Comments    Keflex [cephalexin] Medium Headache, GI Upset, Nausea/vomiting     Bupropion Not Specified Other             Below are additional concerns with the patient's PTA list.      April Andersen CPhT

## 2023-11-14 NOTE — CONSULTS
Consults  Referred by CONNIE Whelan MD: Jeffry De Leon MD    Reason For Consult  bacteremia    History Of Present Illness  Chester Verduzco is a 73 y.o. male with a hx of obesity, hx of DM, hx of HTN, hx of BPH, hx of lymphedema with stasis, hx of a Rojo, he was recently [place] [facility] [place]F, he has been started on antibiotics for UTI, he had BC done that has been positive for MSSA, he was sent to the hospital for renal failure, no fever, no abdominal pain, no emesis, no diarrhea, no cough or sob, no dysuria, has Rt knee pain and swelling     Past Medical History  He has a past medical history of Abnormal weight gain (10/27/2016), Achilles tendinitis, unspecified leg (08/16/2016), Acute upper respiratory infection, unspecified, Allergic contact dermatitis due to plants, except food (08/22/2013), BPH with obstruction/lower urinary tract symptoms, Cellulitis of right lower limb (07/30/2021), Cough, unspecified (03/21/2016), Diabetes (CMS/[facility], [facility]), Encounter for screening for human immunodeficiency virus (HIV) (06/14/2016), Hordeolum externum unspecified eye, unspecified eyelid (08/14/2019), Hyperglycemia, unspecified (12/13/2017), Incisional hernia without obstruction or gangrene (06/15/2015), Knee joint pain, MSSA bacteremia (10/2023), Muscle spasm of calf (08/16/2016), Obstructive uropathy, Personal history of other diseases of the digestive system (06/30/2015), Personal history of other diseases of the digestive system (02/05/2016), Personal history of other diseases of the respiratory system (12/23/2014), Personal history of other diseases of the respiratory system (01/28/2016), Personal history of other infectious and parasitic diseases (12/02/2015), Personal history of other infectious and parasitic diseases, Personal history of other specified conditions (02/04/2015), Personal history of other specified conditions (08/16/2016), Personal history of other specified conditions (01/26/2015),  Personal history of pneumonia (recurrent) (01/29/2016), Personal history of urinary (tract) infections (02/19/2013), Prostatitis (03/21/2023), Pruritus, unspecified (02/10/2015), Sinoatrial node dysfunction (CMS/HCC), Strain of muscle, fascia and tendon of the posterior muscle group at thigh level, right thigh, initial encounter (05/10/2016), and Unspecified symptoms and signs involving the genitourinary system (12/20/2016).    Surgical History  He has a past surgical history that includes Varicose vein surgery (08/22/2013); Cardiac pacemaker placement (08/22/2013); Other surgical history (06/15/2015); Ventral hernia repair (06/15/2015); Other surgical history (09/16/2019); and Peripherally inserted central catheter insertion.     Social History     Occupational History    Not on file   Tobacco Use    Smoking status: Never     Passive exposure: Never    Smokeless tobacco: Never   Vaping Use    Vaping Use: Never used   Substance and Sexual Activity    Alcohol use: Not Currently    Drug use: Never    Sexual activity: Not Currently     Travel History   Travel since 10/14/23    No documented travel since 10/14/23         Family History  No family history on file.  Allergies  Keflex [cephalexin] and Bupropion     Immunization History   Administered Date(s) Administered    Flu vaccine, quadrivalent, high-dose, preservative free, age 65y+ (FLUZONE) 11/28/2020    Influenza, High Dose Seasonal, Preservative Free 10/01/2021    Influenza, seasonal, injectable 09/08/2023    Moderna COVID-19 vaccine, bivalent, blue cap/gray label *Check age/dose* 05/02/2023    Moderna SARS-CoV-2 Vaccination 01/04/2021, 02/01/2021, 10/23/2021, 08/13/2022    Novel influenza-H1N1-09, preservative-free 10/15/2009    Pneumococcal conjugate vaccine, 13-valent (PREVNAR 13) 10/27/2016    Pneumococcal conjugate vaccine, 20-valent (PREVNAR 20) 08/10/2022    Pneumococcal polysaccharide vaccine, 23-valent, age 2 years and older (PNEUMOVAX 23) 06/19/2018     Td vaccine, age 7 years and older (TDVAX) 09/16/2013    Zoster vaccine, recombinant, adult (SHINGRIX) 11/04/2022    Zoster, live 09/08/2023   Pneumonia and influenza vaccines are up to date  Vera fall risk 75, preventive protocol was implemented  Depression screen is negative    Medications  Home medications:  Medications Prior to Admission   Medication Sig Dispense Refill Last Dose    amLODIPine (Norvasc) 10 mg tablet Take 1 tablet (10 mg) by mouth once daily in the evening.   11/12/2023    ferrous gluconate (Fergon) 324 (38 Fe) mg tablet Take 1 tablet (38 mg of iron) by mouth once daily with a meal. Do not start before October 8, 2023.   11/13/2023    finasteride (Proscar) 5 mg tablet Take 1 tablet (5 mg) by mouth once daily.   11/13/2023    hydrALAZINE (Apresoline) 10 mg tablet Take 1 tablet (10 mg) by mouth 3 times a day. Hold for SBP <120   11/12/2023    HYDROcodone-acetaminophen (Norco) 5-325 mg tablet Take 1 tablet by mouth every 6 hours if needed for severe pain (7 - 10).   11/11/2023    lactobacillus acidophilus & bulgar (Lactinex) 1 million cell chewable tablet Chew 1 tablet once daily. For 28 days   11/12/2023    losartan (Cozaar) 100 mg tablet TAKE 1 TABLET BY MOUTH EVERY DAY (Patient taking differently: Take 1 tablet (100 mg) by mouth once daily.) 90 tablet 3 11/13/2023    lubricating eye drops ophthalmic solution Administer 1 drop into the left eye every 8 hours if needed for dry eyes.   Past Week    menthol (Biofreeze, menthol,) 4 % gel Apply 1 Application topically every 6 hours if needed. To right knee   Past Week    metoprolol succinate XL (Toprol-XL) 100 mg 24 hr tablet TAKE 1 TABLET BY MOUTH EVERY DAY (Patient taking differently: Take 1 tablet (100 mg) by mouth once daily.) 90 tablet 3 11/13/2023    ondansetron (Zofran) 4 mg tablet Take 1 tablet (4 mg) by mouth every 6 hours if needed for nausea or vomiting.   11/13/2023    potassium chloride CR (Klor-Con M20) 20 mEq ER tablet Take 1 tablet (20  mEq) by mouth once daily. Do not crush or chew. Do not start before October 8, 2023.   11/13/2023    rosuvastatin (Crestor) 10 mg tablet TAKE 1 TABLET BY MOUTH EVERYDAY AT BEDTIME 90 tablet 3 11/12/2023    acetaminophen (Tylenol) 325 mg tablet Take 2 tablets (650 mg) by mouth every 4 hours if needed for mild pain (1 - 3).   More than a month    allopurinol (Zyloprim) 100 mg tablet Take 1 tablet (100 mg) by mouth once daily. (Patient not taking: Reported on 11/13/2023) 90 tablet 3 Unknown    bisacodyl (C-Lax Laxative, bisacodyl,) 5 mg EC tablet Take 2 tablets (10 mg) by mouth once daily as needed for constipation. Do not crush, chew, or split. For no results from MOM and/or no BM in 3 days   Unknown    bisacodyl (Dulcolax, bisacodyl,) 10 mg suppository Insert 1 suppository (10 mg) into the rectum once daily as needed for constipation.   Unknown    fluticasone (Flonase) 50 mcg/actuation nasal spray Administer 1 spray into each nostril every 12 hours if needed for rhinitis. Shake gently. Before first use, prime pump. After use, clean tip and replace cap.   Unknown    furosemide (Lasix) 40 mg tablet Take 1 tablet (40 mg) by mouth once daily. Do not start before October 9, 2023. (Patient not taking: Reported on 11/13/2023)   Not Taking    insulin degludec (Tresiba FlexTouch U-100) 100 unit/mL (3 mL) injection Inject 10 Units under the skin once daily at bedtime. (Patient not taking: Reported on 11/13/2023) 10 mL 2 Not Taking    insulin regular (HumuLIN R) 100 unit/mL injection Inject 0-5 Units under the skin 3 times a day with meals. Take as directed per insulin instructions. (Patient not taking: Reported on 11/7/2023)       mineral oil enema Insert 133 mL (1 enema) into the rectum once daily as needed for constipation. If no BM or results from MOM in 3 days   Unknown    naltrexone (Depade) 50 mg tablet Take 1 tablet (50 mg) by mouth once daily. Pt has not started this medication yet (Patient not taking: Reported on  "11/13/2023)   Not Taking    pantoprazole (ProtoNix) 40 mg EC tablet Take 1 tablet (40 mg) by mouth once daily in the morning. Take before meals. Do not crush, chew, or split. Do not start before October 8, 2023. (Patient not taking: Reported on 11/13/2023)   Not Taking    sennosides-docusate sodium (Kita-Colace) 8.6-50 mg tablet Take 2 tablets by mouth 2 times a day.       sodium chloride 0.9% (sodium chloride) flush Infuse 10 mL into a venous catheter 2 times a day. Every shift       tamsulosin (Flomax) 0.4 mg 24 hr capsule Take 2 capsules (0.8 mg) by mouth once daily at bedtime.        Current medications:  Scheduled medications  amLODIPine, 10 mg, oral, q PM  finasteride, 5 mg, oral, Daily  heparin (porcine), 5,000 Units, subcutaneous, q8h  insulin glargine, 10 Units, subcutaneous, q24h  insulin lispro, 0-5 Units, subcutaneous, TID with meals  linezolid, 600 mg, intravenous, q12h  metoprolol succinate XL, 100 mg, oral, Daily  rosuvastatin, 10 mg, oral, Nightly  tamsulosin, 0.8 mg, oral, Nightly      Continuous medications       PRN medications  PRN medications: dextrose 10 % in water (D10W), dextrose, glucagon, HYDROmorphone    Review of Systems   All other systems reviewed and are negative.       Objective  Range of Vitals (last 24 hours)  Heart Rate:  [67-69]   Temp:  [36 °C (96.8 °F)-37.1 °C (98.8 °F)]   Resp:  [16-18]   BP: (118-143)/(66-70)   Height:  [188 cm (6' 2\")]   Weight:  [115 kg (253 lb 4.9 oz)-120 kg (263 lb 7.2 oz)]   SpO2:  [94 %-99 %]   Daily Weight  11/13/23 : 120 kg (263 lb 7.2 oz)    Body mass index is 33.82 kg/m².   Nutritional consult     Physical Exam  Constitutional:       Appearance: Normal appearance.   HENT:      Head: Normocephalic and atraumatic.      Mouth/Throat:      Mouth: Mucous membranes are moist.      Pharynx: Oropharynx is clear.   Eyes:      Pupils: Pupils are equal, round, and reactive to light.   Cardiovascular:      Rate and Rhythm: Normal rate and regular rhythm.      " "Heart sounds: Normal heart sounds.   Pulmonary:      Effort: Pulmonary effort is normal.      Breath sounds: Normal breath sounds.   Abdominal:      General: Abdomen is flat. Bowel sounds are normal.      Palpations: Abdomen is soft.   Musculoskeletal:      Cervical back: Normal range of motion.      Comments: Lymphedema with stasis  Rt knee effusion, no cellulitis    Neurological:      Mental Status: He is alert.         Labs  Results from last 72 hours   Lab Units 11/14/23  0702 11/13/23  1637   WBC AUTO x10*3/uL 8.7 9.8   HEMOGLOBIN g/dL 7.9* 8.4*   HEMATOCRIT % 24.4* 26.0*   PLATELETS AUTO x10*3/uL 91* 91*   NEUTROS PCT AUTO %  --  79.0   LYMPHS PCT AUTO %  --  10.1   MONOS PCT AUTO %  --  6.4   EOS PCT AUTO %  --  2.6       Results from last 72 hours   Lab Units 11/14/23  0702 11/13/23  1637   SODIUM mmol/L 132* 132*   POTASSIUM mmol/L 3.4* 4.0   CHLORIDE mmol/L 101 98   CO2 mmol/L 19* 20*   BUN mg/dL 52* 55*   CREATININE mg/dL 5.35* 5.43*   GLUCOSE mg/dL 111* 94   CALCIUM mg/dL 7.3* 7.6*   ANION GAP mmol/L 15 18   EGFR mL/min/1.73m*2 11* 10*   PHOSPHORUS mg/dL 4.7  --        Results from last 72 hours   Lab Units 11/14/23  0702 11/13/23  1637   ALK PHOS U/L  --  117   BILIRUBIN TOTAL mg/dL  --  0.5   PROTEIN TOTAL g/dL  --  6.0*   ALT U/L  --  31   AST U/L  --  51*   ALBUMIN g/dL 2.5* 2.7*       Estimated Creatinine Clearance: 17.2 mL/min (A) (by C-G formula based on SCr of 5.35 mg/dL (H)).  C-Reactive Protein   Date Value Ref Range Status   11/14/2023 9.21 (H) <1.00 mg/dL Final     CRP   Date Value Ref Range Status   09/18/2023 22.55 (A) mg/dL Final     Comment:     REF VALUE  < 1.00     06/23/2021 0.50 mg/dL Final     Comment:     REF VALUE  < 1.00       Sedimentation Rate   Date Value Ref Range Status   09/18/2023 10 0 - 20 mm/h Final     No results found for: \"HIV1X2\", \"HIVCONF\", \"FQAHOM0MX\"  No results found for: \"HEPCABINIT\", \"HEPCAB\", \"HCVPCRQUANT\"  Microbiology  Reviewed  Xray reviewed     "   Assessment/Plan     MSSA bacteremia  Right kne pain  Pyuria  Lymphedema / stasis    Recommendations :  Cefazolin 2 gm iv once daily   Repeat the BC  Echo  Knee sampling    I spent  minutes in the professional and overall care of this patient.      Payton Raines MD

## 2023-11-14 NOTE — CONSULTS
Reason For Consult  NOHELIA    History Of Present Illness  Chester Verduzco is a 73 y.o. male presenting with positive blood culture.  Patient has been at a skilled nursing facility for rehab following recent hospitalization for NOHELIA with urinary tract infection.  Apparently blood cultures were drawn there that after abnormal so he was referred to the emergency room.  Also notices rising BUN and creatinine for which we are asked to see him.  Review of EMR shows fairly steady decline in renal parameters over the past couple of months with a serum creatinine of 1.3 in September and 2.2 in October and now greater than 5.  Renal ultrasound shows cyst but no other structural issues.  Urinalysis showed blood and protein.  The H&P notes a positive blood culture but I am unable to find that    Chronic medical issues include diabetes and hypertension     Past Medical History  He has a past medical history of Abnormal weight gain (10/27/2016), Achilles tendinitis, unspecified leg (08/16/2016), Acute upper respiratory infection, unspecified, Allergic contact dermatitis due to plants, except food (08/22/2013), BPH with obstruction/lower urinary tract symptoms, Cellulitis of right lower limb (07/30/2021), Cough, unspecified (03/21/2016), Diabetes (CMS/[facility]), Encounter for screening for human immunodeficiency virus (HIV) (06/14/2016), Hordeolum externum unspecified eye, unspecified eyelid (08/14/2019), Hyperglycemia, unspecified (12/13/2017), Incisional hernia without obstruction or gangrene (06/15/2015), Knee joint pain, MSSA bacteremia (10/2023), Muscle spasm of calf (08/16/2016), Obstructive uropathy, Personal history of other diseases of the digestive system (06/30/2015), Personal history of other diseases of the digestive system (02/05/2016), Personal history of other diseases of the respiratory system (12/23/2014), Personal history of other diseases of the respiratory system (01/28/2016), Personal history of other infectious  "and parasitic diseases (12/02/2015), Personal history of other infectious and parasitic diseases, Personal history of other specified conditions (02/04/2015), Personal history of other specified conditions (08/16/2016), Personal history of other specified conditions (01/26/2015), Personal history of pneumonia (recurrent) (01/29/2016), Personal history of urinary (tract) infections (02/19/2013), Prostatitis (03/21/2023), Pruritus, unspecified (02/10/2015), Sinoatrial node dysfunction (CMS/HCC), Strain of muscle, fascia and tendon of the posterior muscle group at thigh level, right thigh, initial encounter (05/10/2016), and Unspecified symptoms and signs involving the genitourinary system (12/20/2016).    Surgical History  He has a past surgical history that includes Varicose vein surgery (08/22/2013); Cardiac pacemaker placement (08/22/2013); Other surgical history (06/15/2015); Ventral hernia repair (06/15/2015); Other surgical history (09/16/2019); and Peripherally inserted central catheter insertion.     Social History  He reports that he has never smoked. He has never been exposed to tobacco smoke. He has never used smokeless tobacco. He reports that he does not currently use alcohol. He reports that he does not use drugs.    Family History  No family history on file.     Allergies  Keflex [cephalexin] and Bupropion    Physical Exam  NAD  Alert and oriented  No rashes  Normal respiratory effort         I&O 24HR    Intake/Output Summary (Last 24 hours) at 11/14/2023 1244  Last data filed at 11/14/2023 0910  Gross per 24 hour   Intake 960 ml   Output 850 ml   Net 110 ml       Vitals 24HR  Heart Rate:  [67-69]   Temp:  [36 °C (96.8 °F)-37.1 °C (98.8 °F)]   Resp:  [16-18]   BP: (118-143)/(66-70)   Height:  [188 cm (6' 2\")]   Weight:  [115 kg (253 lb 4.9 oz)-120 kg (263 lb 7.2 oz)]   SpO2:  [94 %-99 %]        Assessment/Plan   73-year-old with progressive decline in renal function over the past few months.  Most " recent issue is bacteremia.  Reportedly has a positive blood culture in October but I am unable to find that.  NOHELIA.  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  The recurrent bacteremia suggested as an intravascular infection  Staph aureus bacteremia  Hypertension with controlled blood pressure    -For now supportive care.  In all likelihood he has a GN but at this point the main concern is good to be that this is infection related.  -Check C3-C4  -Dose medications for GFR 15  -We will continue to follow  -Discussed with Dr. Whelan and primary team        Meenu Edmond MD

## 2023-11-15 LAB
ALBUMIN SERPL BCP-MCNC: 2.6 G/DL (ref 3.4–5)
ANION GAP SERPL CALC-SCNC: 13 MMOL/L (ref 10–20)
AORTIC VALVE MEAN GRADIENT: 2.8
AORTIC VALVE PEAK VELOCITY: 1.12
AV PEAK GRADIENT: 5
AVA (PEAK VEL): 2.66
AVA (VTI): 2.81
BUN SERPL-MCNC: 51 MG/DL (ref 6–23)
C3 SERPL-MCNC: 137 MG/DL (ref 87–200)
C4 SERPL-MCNC: 45 MG/DL (ref 10–50)
CALCIUM SERPL-MCNC: 7.1 MG/DL (ref 8.6–10.3)
CHLORIDE SERPL-SCNC: 103 MMOL/L (ref 98–107)
CO2 SERPL-SCNC: 20 MMOL/L (ref 21–32)
CREAT SERPL-MCNC: 5.2 MG/DL (ref 0.5–1.3)
EJECTION FRACTION APICAL 4 CHAMBER: 64.6
EJECTION FRACTION: 63
ERYTHROCYTE [DISTWIDTH] IN BLOOD BY AUTOMATED COUNT: 14.5 % (ref 11.5–14.5)
GFR SERPL CREATININE-BSD FRML MDRD: 11 ML/MIN/1.73M*2
GLUCOSE BLD MANUAL STRIP-MCNC: 127 MG/DL (ref 74–99)
GLUCOSE BLD MANUAL STRIP-MCNC: 129 MG/DL (ref 74–99)
GLUCOSE BLD MANUAL STRIP-MCNC: 130 MG/DL (ref 74–99)
GLUCOSE BLD MANUAL STRIP-MCNC: 144 MG/DL (ref 74–99)
GLUCOSE SERPL-MCNC: 133 MG/DL (ref 74–99)
HCT VFR BLD AUTO: 24.7 % (ref 41–52)
HGB BLD-MCNC: 7.8 G/DL (ref 13.5–17.5)
LEFT ATRIUM VOLUME AREA LENGTH INDEX BSA: 26.7
LEFT VENTRICLE INTERNAL DIMENSION DIASTOLE: 4.56 (ref 3.5–6)
LEFT VENTRICULAR OUTFLOW TRACT DIAMETER: 2.06
MAGNESIUM SERPL-MCNC: 1.57 MG/DL (ref 1.6–2.4)
MCH RBC QN AUTO: 27.5 PG (ref 26–34)
MCHC RBC AUTO-ENTMCNC: 31.6 G/DL (ref 32–36)
MCV RBC AUTO: 87 FL (ref 80–100)
MITRAL VALVE E/A RATIO: 0.71
MITRAL VALVE E/E' RATIO: 6.48
NRBC BLD-RTO: 0 /100 WBCS (ref 0–0)
PHOSPHATE SERPL-MCNC: 4.1 MG/DL (ref 2.5–4.9)
PLATELET # BLD AUTO: 102 X10*3/UL (ref 150–450)
POTASSIUM SERPL-SCNC: 3.3 MMOL/L (ref 3.5–5.3)
RBC # BLD AUTO: 2.84 X10*6/UL (ref 4.5–5.9)
RHEUMATOID FACT SER NEPH-ACNC: <10 IU/ML (ref 0–15)
RIGHT VENTRICLE FREE WALL PEAK S': 8
RIGHT VENTRICLE PEAK SYSTOLIC PRESSURE: 39.2
SODIUM SERPL-SCNC: 133 MMOL/L (ref 136–145)
TRICUSPID ANNULAR PLANE SYSTOLIC EXCURSION: 2.1
WBC # BLD AUTO: 7.5 X10*3/UL (ref 4.4–11.3)

## 2023-11-15 PROCEDURE — 96372 THER/PROPH/DIAG INJ SC/IM: CPT

## 2023-11-15 PROCEDURE — 82947 ASSAY GLUCOSE BLOOD QUANT: CPT

## 2023-11-15 PROCEDURE — 1200000002 HC GENERAL ROOM WITH TELEMETRY DAILY

## 2023-11-15 PROCEDURE — 80069 RENAL FUNCTION PANEL: CPT

## 2023-11-15 PROCEDURE — 2500000004 HC RX 250 GENERAL PHARMACY W/ HCPCS (ALT 636 FOR OP/ED)

## 2023-11-15 PROCEDURE — 85027 COMPLETE CBC AUTOMATED: CPT

## 2023-11-15 PROCEDURE — 2500000002 HC RX 250 W HCPCS SELF ADMINISTERED DRUGS (ALT 637 FOR MEDICARE OP, ALT 636 FOR OP/ED)

## 2023-11-15 PROCEDURE — 2500000001 HC RX 250 WO HCPCS SELF ADMINISTERED DRUGS (ALT 637 FOR MEDICARE OP)

## 2023-11-15 PROCEDURE — 83735 ASSAY OF MAGNESIUM: CPT

## 2023-11-15 PROCEDURE — 99233 SBSQ HOSP IP/OBS HIGH 50: CPT

## 2023-11-15 RX ORDER — DIPHENHYDRAMINE HYDROCHLORIDE 50 MG/ML
50 INJECTION INTRAMUSCULAR; INTRAVENOUS DAILY
Status: DISCONTINUED | OUTPATIENT
Start: 2023-11-15 | End: 2023-11-15 | Stop reason: ENTERED-IN-ERROR

## 2023-11-15 RX ORDER — ACETAMINOPHEN 500 MG
5 TABLET ORAL NIGHTLY
Status: DISCONTINUED | OUTPATIENT
Start: 2023-11-15 | End: 2023-11-16 | Stop reason: HOSPADM

## 2023-11-15 RX ORDER — ACETAMINOPHEN 325 MG/1
TABLET ORAL
Status: COMPLETED
Start: 2023-11-15 | End: 2023-11-15

## 2023-11-15 RX ORDER — ACETAMINOPHEN 650 MG/1
650 SUPPOSITORY RECTAL EVERY 4 HOURS PRN
Status: DISCONTINUED | OUTPATIENT
Start: 2023-11-15 | End: 2023-11-16 | Stop reason: HOSPADM

## 2023-11-15 RX ORDER — ACETAMINOPHEN 325 MG/1
650 TABLET ORAL DAILY
Status: DISCONTINUED | OUTPATIENT
Start: 2023-11-15 | End: 2023-11-15 | Stop reason: ENTERED-IN-ERROR

## 2023-11-15 RX ORDER — ACETAMINOPHEN 160 MG/5ML
650 SOLUTION ORAL EVERY 4 HOURS PRN
Status: DISCONTINUED | OUTPATIENT
Start: 2023-11-15 | End: 2023-11-16 | Stop reason: HOSPADM

## 2023-11-15 RX ORDER — ACETAMINOPHEN 325 MG/1
650 TABLET ORAL EVERY 4 HOURS PRN
Status: DISCONTINUED | OUTPATIENT
Start: 2023-11-15 | End: 2023-11-16 | Stop reason: HOSPADM

## 2023-11-15 RX ADMIN — CEFAZOLIN SODIUM 2 G: 2 INJECTION, SOLUTION INTRAVENOUS at 13:45

## 2023-11-15 RX ADMIN — AMLODIPINE BESYLATE 10 MG: 10 TABLET ORAL at 21:25

## 2023-11-15 RX ADMIN — Medication 5 MG: at 21:45

## 2023-11-15 RX ADMIN — HYDROCODONE BITARTRATE AND ACETAMINOPHEN 1 TABLET: 5; 325 TABLET ORAL at 08:18

## 2023-11-15 RX ADMIN — FINASTERIDE 5 MG: 5 TABLET, FILM COATED ORAL at 08:14

## 2023-11-15 RX ADMIN — METOPROLOL SUCCINATE 100 MG: 50 TABLET, EXTENDED RELEASE ORAL at 08:14

## 2023-11-15 RX ADMIN — HEPARIN SODIUM 5000 UNITS: 5000 INJECTION INTRAVENOUS; SUBCUTANEOUS at 10:14

## 2023-11-15 RX ADMIN — ACETAMINOPHEN 650 MG: 325 TABLET ORAL at 14:54

## 2023-11-15 RX ADMIN — HEPARIN SODIUM 5000 UNITS: 5000 INJECTION INTRAVENOUS; SUBCUTANEOUS at 18:13

## 2023-11-15 RX ADMIN — INSULIN GLARGINE 10 UNITS: 100 INJECTION, SOLUTION SUBCUTANEOUS at 21:26

## 2023-11-15 RX ADMIN — CEFAZOLIN SODIUM 2 G: 2 INJECTION, SOLUTION INTRAVENOUS at 03:41

## 2023-11-15 RX ADMIN — TAMSULOSIN HYDROCHLORIDE 0.8 MG: 0.4 CAPSULE ORAL at 21:26

## 2023-11-15 RX ADMIN — ROSUVASTATIN 10 MG: 10 TABLET, FILM COATED ORAL at 21:25

## 2023-11-15 ASSESSMENT — COGNITIVE AND FUNCTIONAL STATUS - GENERAL
DRESSING REGULAR UPPER BODY CLOTHING: A LOT
WALKING IN HOSPITAL ROOM: A LITTLE
DAILY ACTIVITIY SCORE: 16
TOILETING: A LITTLE
DRESSING REGULAR LOWER BODY CLOTHING: A LOT
STANDING UP FROM CHAIR USING ARMS: A LITTLE
DAILY ACTIVITIY SCORE: 16
WALKING IN HOSPITAL ROOM: A LITTLE
DRESSING REGULAR LOWER BODY CLOTHING: A LOT
MOVING TO AND FROM BED TO CHAIR: A LITTLE
TOILETING: A LITTLE
MOVING TO AND FROM BED TO CHAIR: A LITTLE
DRESSING REGULAR UPPER BODY CLOTHING: A LOT
CLIMB 3 TO 5 STEPS WITH RAILING: A LOT
MOVING FROM LYING ON BACK TO SITTING ON SIDE OF FLAT BED WITH BEDRAILS: A LITTLE
PERSONAL GROOMING: A LITTLE
TURNING FROM BACK TO SIDE WHILE IN FLAT BAD: A LITTLE
STANDING UP FROM CHAIR USING ARMS: A LITTLE
HELP NEEDED FOR BATHING: A LOT
CLIMB 3 TO 5 STEPS WITH RAILING: A LOT
MOBILITY SCORE: 17
MOVING FROM LYING ON BACK TO SITTING ON SIDE OF FLAT BED WITH BEDRAILS: A LITTLE
PERSONAL GROOMING: A LITTLE
HELP NEEDED FOR BATHING: A LOT
MOBILITY SCORE: 17
TURNING FROM BACK TO SIDE WHILE IN FLAT BAD: A LITTLE

## 2023-11-15 ASSESSMENT — PAIN SCALES - GENERAL
PAINLEVEL_OUTOF10: 3
PAINLEVEL_OUTOF10: 4
PAINLEVEL_OUTOF10: 0 - NO PAIN
PAINLEVEL_OUTOF10: 7

## 2023-11-15 ASSESSMENT — PAIN DESCRIPTION - DESCRIPTORS: DESCRIPTORS: ACHING;BURNING

## 2023-11-15 ASSESSMENT — PAIN DESCRIPTION - LOCATION
LOCATION: BUTTOCKS
LOCATION: KNEE

## 2023-11-15 ASSESSMENT — PAIN DESCRIPTION - ORIENTATION: ORIENTATION: RIGHT

## 2023-11-15 ASSESSMENT — PAIN SCALES - WONG BAKER: WONGBAKER_NUMERICALRESPONSE: NO HURT

## 2023-11-15 ASSESSMENT — PAIN - FUNCTIONAL ASSESSMENT
PAIN_FUNCTIONAL_ASSESSMENT: 0-10

## 2023-11-15 NOTE — CARE PLAN
The patient's goals for the shift include      The clinical goals for the shift include Kidney function will improve by end of shift    Over the shift, the patient did not make progress toward the following goals. Barriers to progression include BUN monitoring. Recommendations to address these barriers include fluid .

## 2023-11-15 NOTE — PROGRESS NOTES
"S. No new issues. Cr stable. C3/C4 nl. TTTE neg for veggies    O  /62   Pulse 68   Temp 36.1 °C (97 °F)   Resp 20   Ht 1.88 m (6' 2\")   Wt 120 kg (263 lb 7.2 oz)   SpO2 96%   BMI 33.82 kg/m²   Exam unchanged    A/P 72 yo w/ progressive decline in renal function past few months of unclear etiology. Initial concern for infection related. C3/C4 nl and the MSSA noted previously was colonization not active infection  NOHELIA - Infectious GN remains main concern but need to broaden the net a bit I think  HTN - BP low  MSSA bacteremia - line related?    - Check ANCA, SPEP/UPEP with IF. Free kappa/lambda  - Cut amlodipine to 5mg  - Does meds for GFR ~15  "

## 2023-11-15 NOTE — PROGRESS NOTES
Moe Ho is a 73 y.o. male on day 2 of admission presenting with Acute renal failure, unspecified acute renal failure type (CMS/HCC).      Subjective     States he feels improved from yesterday. Denies fever, chills, nausea, vomiting, abdominal pain. Continues to endorse R knee pain, but this is chronic c/w his pmhx of osteoarthritis. Able to move the joint with full ROM. Patient indicates he is anxious to find answers to the cause of his bacteremia.        Objective     Last Recorded Vitals  /62   Pulse 68   Temp 36.1 °C (97 °F)   Resp 20   Wt 120 kg (263 lb 7.2 oz)   SpO2 96%   Intake/Output last 3 Shifts:    Intake/Output Summary (Last 24 hours) at 11/15/2023 1451  Last data filed at 11/15/2023 1000  Gross per 24 hour   Intake 1800 ml   Output 2400 ml   Net -600 ml         Admission Weight  Weight: 115 kg (253 lb 4.9 oz) (11/13/23 1554)    Daily Weight  11/13/23 : 120 kg (263 lb 7.2 oz)    Image Results  Transthoracic Echo (TTE) [facility], [address] [place] [address]                Tel [phone] and Fax [phone]    TRANSTHORACIC ECHOCARDIOGRAM REPORT       Patient Name:      MOE HO   Reading Physician:    [id number] Joshua Donis MD  Study Date:        11/14/2023           Ordering Provider:    [id number] JENNIFER ZURITA  MRN/PID:           [id number]             Fellow:  Accession#:        [id number]         Nurse:                Rhina Henao RN  Date of Birth/Age: 1950 / 73 years Sonographer:          Laurence Trujillo RDCS  Gender:            M                    Additional Staff:  Height:            187.96 cm            Admit Date:           11/13/2023  Weight:            119.29 kg            Admission Status:     Inpatient -                                                                 Routine  BSA:               2.44 m2              Encounter#:           [id number]                                          Department Location:  [facility] Non                                                                Invasive  Blood Pressure: 118 /68 mmHg    Study Type:    TRANSTHORACIC ECHO (TTE) COMPLETE  Diagnosis/ICD: Endocarditis, valve unspecified-I38  Indication:    Endocarditis  CPT Code:      Echo Complete w Full Doppler-72811    Patient History:  Pacer/Defib:       Permanent pacemaker  Pertinent History: HTN and Hyperlipidemia.    Study Detail: The following Echo studies were performed: 2D, M-Mode, Doppler and                color flow. Technically challenging study due to body habitus.                Definity used as a contrast agent for endocardial border                definition. Total contrast used for this procedure was 2.5 mL via                IV push. The patient was awake.       PHYSICIAN INTERPRETATION:  Left Ventricle: The left ventricular systolic function is normal, with an estimated ejection fraction of 55-60%. There are no regional wall motion abnormalities. The left ventricular cavity size is normal. Spectral Doppler shows an impaired relaxation pattern of left ventricular diastolic filling.  Left Atrium: The left atrium is mildly dilated.  Right Ventricle: The right ventricle is normal in size. There is normal right ventricular global systolic function. A device is visualized in the right ventricle.  Right Atrium: The right atrium is mildly dilated.  Aortic Valve: The aortic valve appears structurally normal. There is no evidence of aortic valve stenosis.  There is no evidence of aortic valve regurgitation. The peak instantaneous gradient of the aortic valve is 5.0 mmHg. The mean gradient of the aortic valve is 2.8 mmHg.  Mitral Valve: The mitral valve is normal in structure. There is mild mitral valve regurgitation.  Tricuspid Valve: The  tricuspid valve is structurally normal. There is mild tricuspid regurgitation.  Pulmonic Valve: The pulmonic valve is structurally normal. There is no indication of pulmonic valve regurgitation.  Pericardium: There is no pericardial effusion noted.  Aorta: The aortic root is normal.  Pulmonary Artery: The tricuspid regurgitant velocity is 2.70 m/s, and with an estimated right atrial pressure of 10 mmHg, the estimated pulmonary artery pressure is mildly elevated with the RVSP at 39.2 mmHg.  Systemic Veins: The inferior vena cava appears to be of normal size.       CONCLUSIONS:   1. Left ventricular systolic function is normal with a 55-60% estimated ejection fraction.   2. Spectral Doppler shows an impaired relaxation pattern of left ventricular diastolic filling.   3. There is mild mitral and tricuspid regurgitation.   4. The estimated pulmonary artery pressure is mildly elevated with the RVSP at 39.2 mmHg.   5. No definite valvular vegetations were visualized.    QUANTITATIVE DATA SUMMARY:  2D MEASUREMENTS:                           Normal Ranges:  IVSd:          0.84 cm   (0.6-1.1cm)  LVPWd:         0.90 cm   (0.6-1.1cm)  LVIDd:         4.56 cm   (3.9-5.9cm)  LVIDs:         3.09 cm  LV Mass Index: 53.2 g/m2  LV % FS        32.3 %    LA VOLUME:                                Normal Ranges:  LA Vol A4C:        67.7 ml    (22+/-6mL/m2)  LA Vol A2C:        62.9 ml  LA Vol BP:         65.3 ml  LA Vol Index A4C:  27.7 ml/m2  LA Vol Index A2C:  25.8 ml/m2  LA Vol Index BP:   26.7 ml/m2  LA Area A4C:       22.4 cm2  LA Area A2C:       21.6 cm2  LA Major Axis A4C: 6.3 cm  LA Major Axis A2C: 6.3 cm  LA Volume Index:   26.2 ml/m2  LA Vol A4C:        63.3 ml  LA Vol A2C:        61.9 ml    RA VOLUME BY A/L METHOD:                        Normal Ranges:  RA Area A4C: 24.4 cm2    M-MODE MEASUREMENTS:                   Normal Ranges:  Ao Root: 3.60 cm (2.0-3.7cm)  LAs:     3.54 cm (2.7-4.0cm)    AORTA MEASUREMENTS:                        Normal Ranges:  Ao Sinus, d: 3.50 cm (2.1-3.5cm)  Asc Ao, d:   3.20 cm (2.1-3.4cm)    LV SYSTOLIC FUNCTION BY 2D PLANIMETRY (MOD):                      Normal Ranges:  EF-A4C View: 64.6 % (>=55%)  EF-A2C View: 60.8 %  EF-Biplane:  63.3 %    LV DIASTOLIC FUNCTION:                                Normal Ranges:  MV Peak E:        0.39 m/s    (0.7-1.2 m/s)  MV Peak A:        0.55 m/s    (0.42-0.7 m/s)  E/A Ratio:        0.71        (1.0-2.2)  MV e'             0.06 m/s    (>8.0)  MV lateral e'     0.06 m/s  MV medial e'      0.06 m/s  MV A Dur:         163.78 msec  E/e' Ratio:       6.48        (<8.0)  PulmV Sys Altaf:    51.01 cm/s  PulmV Rhodes Altaf:   36.45 cm/s  PulmV S/D Altaf:    1.40  PulmV A Revs Altaf: 26.64 cm/s  PulmV A Revs Dur: 122.26 msec    MITRAL VALVE:                  Normal Ranges:  MV DT: 186 msec (150-240msec)    AORTIC VALVE:                                    Normal Ranges:  AoV Vmax:                1.12 m/s (<=1.7m/s)  AoV Peak P.0 mmHg (<20mmHg)  AoV Mean P.8 mmHg (1.7-11.5mmHg)  LVOT Max Altaf:            0.89 m/s (<=1.1m/s)  AoV VTI:                 21.14 cm (18-25cm)  LVOT VTI:                17.86 cm  LVOT Diameter:           2.06 cm  (1.8-2.4cm)  AoV Area, VTI:           2.81 cm2 (2.5-5.5cm2)  AoV Area,Vmax:           2.66 cm2 (2.5-4.5cm2)  AoV Dimensionless Index: 0.85       RIGHT VENTRICLE:  RV Basal 3.90 cm  RV Mid   2.50 cm  RV Major 8.6 cm  TAPSE:   21.0 mm  RV s'    0.08 m/s    TRICUSPID VALVE/RVSP:                              Normal Ranges:  Peak TR Velocity: 2.70 m/s  RV Syst Pressure: 39.2 mmHg (< 30mmHg)  IVC Diam:         2.14 cm    PULMONIC VALVE:                       Normal Ranges:  PV Max Altaf: 0.8 m/s  (0.6-0.9m/s)  PV Max P.4 mmHg    Pulmonary Veins:  PulmV A Revs Dur: 122.26 msec  PulmV A Revs Altaf: 26.64 cm/s  PulmV Rhodes Altaf:   36.45 cm/s  PulmV S/D Altaf:    1.40  PulmV Sys Altaf:    51.01 cm/s    AORTA:  Asc Ao Diam 3.15 cm       [id number] Joshua  Gagandeep CARRASCO  Electronically signed on 11/15/2023 at 11:30:18 AM       ** Final **      Physical Exam  Constitutional:       Appearance: Normal appearance. He is obese. He is not ill-appearing.   HENT:      Head: Normocephalic and atraumatic.   Eyes:      Extraocular Movements: Extraocular movements intact.   Cardiovascular:      Rate and Rhythm: Normal rate and regular rhythm.      Heart sounds: Normal heart sounds. No murmur heard.     No friction rub. No gallop.   Pulmonary:      Effort: Pulmonary effort is normal. No respiratory distress.      Breath sounds: No wheezing, rhonchi or rales.   Abdominal:      General: Abdomen is flat. There is distension.      Palpations: There is no mass.      Tenderness: There is no abdominal tenderness. There is no guarding.      Hernia: A hernia is present.   Musculoskeletal:         General: No tenderness, deformity or signs of injury. Normal range of motion.      Comments: Mild swelling of R knee. No erythema or pain with palpation. Intact ROM of bilateral knees.   Skin:     General: Skin is warm and dry.      Findings: No rash.      Comments: No splinter hemorrhages or Osler/Janeway lesions   Neurological:      General: No focal deficit present.      Mental Status: He is alert. Mental status is at baseline.   Psychiatric:         Mood and Affect: Mood normal.         Relevant Results  Scheduled medications  amLODIPine, 10 mg, oral, q PM  ceFAZolin in dextrose (iso-os), 2 g, intravenous, q12h  finasteride, 5 mg, oral, Daily  heparin (porcine), 5,000 Units, subcutaneous, q8h  insulin glargine, 10 Units, subcutaneous, q24h  insulin lispro, 0-5 Units, subcutaneous, TID with meals  metoprolol succinate XL, 100 mg, oral, Daily  rosuvastatin, 10 mg, oral, Nightly  tamsulosin, 0.8 mg, oral, Nightly      Continuous medications     PRN medications  PRN medications: dextrose 10 % in water (D10W), dextrose, glucagon, HYDROcodone-acetaminophen, HYDROcodone-acetaminophen    Results for  orders placed or performed during the hospital encounter of 11/13/23 (from the past 24 hour(s))   Transthoracic Echo (TTE) Complete   Result Value Ref Range    AV pk alec 1.12     AV mn grad 2.8     LVOT diam 2.06     LV biplane EF 63     MV avg E/e' ratio 6.48     MV E/A ratio 0.71     LA vol index A/L 26.7     Tricuspid annular plane systolic excursion 2.1     RV free wall pk S' 8.00     LVIDd 4.56     RVSP 39.2     Aortic Valve Area by Continuity of VTI 2.81     Aortic Valve Area by Continuity of Peak Velocity 2.66     AV pk grad 5.0     LV A4C EF 64.6    C3 complement   Result Value Ref Range    C3 Complement 137 87 - 200 mg/dL   C4 complement   Result Value Ref Range    C4 Complement 45 10 - 50 mg/dL   POCT GLUCOSE   Result Value Ref Range    POCT Glucose 140 (H) 74 - 99 mg/dL   POCT GLUCOSE   Result Value Ref Range    POCT Glucose 157 (H) 74 - 99 mg/dL   CBC   Result Value Ref Range    WBC 7.5 4.4 - 11.3 x10*3/uL    nRBC 0.0 0.0 - 0.0 /100 WBCs    RBC 2.84 (L) 4.50 - 5.90 x10*6/uL    Hemoglobin 7.8 (L) 13.5 - 17.5 g/dL    Hematocrit 24.7 (L) 41.0 - 52.0 %    MCV 87 80 - 100 fL    MCH 27.5 26.0 - 34.0 pg    MCHC 31.6 (L) 32.0 - 36.0 g/dL    RDW 14.5 11.5 - 14.5 %    Platelets 102 (L) 150 - 450 x10*3/uL   Renal Function Panel   Result Value Ref Range    Glucose 133 (H) 74 - 99 mg/dL    Sodium 133 (L) 136 - 145 mmol/L    Potassium 3.3 (L) 3.5 - 5.3 mmol/L    Chloride 103 98 - 107 mmol/L    Bicarbonate 20 (L) 21 - 32 mmol/L    Anion Gap 13 10 - 20 mmol/L    Urea Nitrogen 51 (H) 6 - 23 mg/dL    Creatinine 5.20 (H) 0.50 - 1.30 mg/dL    eGFR 11 (L) >60 mL/min/1.73m*2    Calcium 7.1 (L) 8.6 - 10.3 mg/dL    Phosphorus 4.1 2.5 - 4.9 mg/dL    Albumin 2.6 (L) 3.4 - 5.0 g/dL   Magnesium   Result Value Ref Range    Magnesium 1.57 (L) 1.60 - 2.40 mg/dL   POCT GLUCOSE   Result Value Ref Range    POCT Glucose 129 (H) 74 - 99 mg/dL   POCT GLUCOSE   Result Value Ref Range    POCT Glucose 130 (H) 74 - 99 mg/dL          Assessment/Plan        This patient has a central line   Reason for the central line remaining today? Long-term abx therapy    This patient has a urinary catheter   Reason for the urinary catheter remaining today? urinary retention/bladder outlet obstruction, acute or chronic      Principal Problem:    Acute renal failure, unspecified acute renal failure type (CMS/[facility])  Active Problems:    Enlarged prostate with lower urinary tract symptoms (LUTS)    HTN (hypertension)    Lymphedema    Type 2 diabetes mellitus with diabetic neuropathy, with long-term current use of insulin (CMS/[facility])    MSSA bacteremia    Diabetic glomerulopathy (CMS/[facility])    CKD (chronic kidney disease) stage 4, GFR 15-29 ml/min (CMS/[facility])    73 y.o. male with PMHx of lymphedema, ventral hernia repair, HTN, HLD, BPH, obesity, recent hospitalization for generalized weakness, NOHELIA with hyponatremia discharged to SNF presenting for evaluation of uptrending BUN/creatinine and positive blood cultures for MSSA (11/10, also previously 10/9). Worsening renal function is concerning for glomerulonephritis in the setting of possible subacute endocarditis. Pt has had persistent MSSA bacteremia, subjective hx of fevers, a new conjunctival hemorrhage and decline in renal function since 10/2023 which is concerning for an indolent endocarditis. Possible source for original MSSA is unclear, but pt has a PM which if infected could be causing persistent infections.    Acute Medical Conditions      #NOHELIA  Possibly secondary to glomerulonephritis  - Patient's Cr 5.43 in ED (3.46 on 11/9)  - Received 1 L NS in ED   - Patient states he has had good PO fluid intake at SNF and even received IV fluids yesterday as there was concern for low urine output   Plan  - Given that presentation is not prerenal and already recevied 1L fluids in ED, hold off on more fluids   - Bladder scans qshift ordered   - Nephrology consulted, appreciate recs  - Concern for possible  glomerulonephritis in setting of MSSA bacteremia/endocarditis  - C3, C4 and RF negative  - Will dose medications for GFR of 15  - Supportive care, will manage infection     #Concern for Endocarditis in setting of Pacemaker    #Positive Blood Culture for Staph aureus   - Patient had positive blood culture for Staph aureus on 11/10 and 10/9  - PICC line placed 11/10  - Has a history of pacemaker and was planned to have TTE per his PCP   - low suspicion for septic arthritis in R knee due to known hx of OA, limited swelling and no pain with palpation, no erythema, and intact ROM. Also a native joint.  -Previously on Vanc at OSH but stopped due to worsening Cr after 1 dosage, then changed to Zosyn  Plan  - TTE inconclusive for vegetations  - Cardiology consulted for possible FILIPE given TTE    - repeat blood cultures to be ordered 11/16   - C/w Cefazolin  - Infectious Disease consulted, appreciate recs      Chronic Medical Conditions   #Diabetes - sliding scale + home Lantus 10 units  #HTN - c/w Amlodipine, Metoprolol, hold Losartan   #BPH - c/w Finasteride, Tamulosin, has chronic maldonado (last changed a few days ago per pt)  #HLD - c/w Crestor   #Osteoarthritis R Knee  -pain control with tylenol 650mg [id number] mild, norco 0.5 tablet for moderate pain, norco 1 tablet for severe pain       Joe Foreman MD

## 2023-11-15 NOTE — PROGRESS NOTES
Chester Verduzco is a 73 y.o. male on day 2 of admission presenting with Acute renal failure, unspecified acute renal failure type (CMS/HCC).    Subjective   Interval History: no fever, no new complaints        Review of Systems    Objective   Range of Vitals (last 24 hours)  Heart Rate:  [66-69]   Temp:  [35.9 °C (96.6 °F)-36.5 °C (97.7 °F)]   Resp:  [18]   BP: (115-120)/(60-64)   SpO2:  [96 %-97 %]   Daily Weight  11/13/23 : 120 kg (263 lb 7.2 oz)    Body mass index is 33.82 kg/m².    Physical Exam  Constitutional:       Appearance: Normal appearance.   HENT:      Head: Normocephalic and atraumatic.      Mouth/Throat:      Mouth: Mucous membranes are moist.      Pharynx: Oropharynx is clear.   Eyes:      Pupils: Pupils are equal, round, and reactive to light.   Cardiovascular:      Rate and Rhythm: Normal rate and regular rhythm.      Heart sounds: Normal heart sounds.   Pulmonary:      Effort: Pulmonary effort is normal.      Breath sounds: Normal breath sounds.   Abdominal:      General: Abdomen is flat. Bowel sounds are normal.      Palpations: Abdomen is soft.   Musculoskeletal:      Cervical back: Normal range of motion.      Comments: Rt knee effusion, no cellulitis   Neurological:      Mental Status: He is alert.         Antibiotics  sodium chloride 0.9 % bolus 1,000 mL  heparin (porcine) injection 5,000 Units  perflutren lipid microspheres (Definity) injection 0.5-10 mL of dilution  sulfur hexafluoride microsphr (Lumason) injection 24.28 mg  perflutren protein A microsphere (Optison) injection 0.5 mL  linezolid (Zyvox)  mg in 300 mL  amLODIPine (Norvasc) tablet 10 mg  finasteride (Proscar) tablet 5 mg  metoprolol succinate XL (Toprol-XL) 24 hr tablet 100 mg  rosuvastatin (Crestor) tablet 10 mg  tamsulosin (Flomax) 24 hr capsule 0.8 mg  dextrose 50 % injection 25 g  glucagon (Glucagen) injection 1 mg  dextrose 10 % in water (D10W) infusion  insulin lispro (HumaLOG) injection 0-5 Units  insulin  glargine (Lantus) injection 10 Units      atorvastatin (Lipitor) tablet    bisacodyl (Dulcolax) EC tablet  cyclobenzaprine (Flexeril) tablet        guaiFENesin (Mucinex) 12 hr tablet  hydrALAZINE (Apresoline) tablet  polyethylene glycol (Glycolax, Miralax) packet      ondansetron (Zofran) tablet        acetaminophen (Tylenol) tablet  HYDROmorphone (Dilaudid) injection 0.2 mg  ceFAZolin (Ancef) 2 g IV in dextrose 5% 50 mL  ceFAZolin (Ancef) 2 g in dextrose 5 % in water (D5W) 100 mL IV  ceFAZolin in dextrose (iso-os) (Ancef) IVPB 2 g  acetaminophen (Tylenol) tablet 650 mg  HYDROcodone-acetaminophen (Norco) 5-325 mg per tablet 1 tablet  HYDROcodone-acetaminophen (Norco) 5-325 mg per tablet 0.5 tablet  acetaminophen (Tylenol) tablet 650 mg  potassium chloride CR (Klor-Con M20) ER tablet 20 mEq  immune globulin (human) (Gammagard Liquid 10%) infusion 30 g  acetaminophen (Tylenol) tablet 650 mg  diphenhydrAMINE (BENADryl) injection 50 mg      Relevant Results  Labs  Results from last 72 hours   Lab Units 11/15/23  0658 11/14/23  0702 11/13/23  1637   WBC AUTO x10*3/uL 7.5 8.7 9.8   HEMOGLOBIN g/dL 7.8* 7.9* 8.4*   HEMATOCRIT % 24.7* 24.4* 26.0*   PLATELETS AUTO x10*3/uL 102* 91* 91*   NEUTROS PCT AUTO %  --   --  79.0   LYMPHS PCT AUTO %  --   --  10.1   MONOS PCT AUTO %  --   --  6.4   EOS PCT AUTO %  --   --  2.6     Results from last 72 hours   Lab Units 11/15/23  0658 11/14/23  0702 11/13/23  1637   SODIUM mmol/L 133* 132* 132*   POTASSIUM mmol/L 3.3* 3.4* 4.0   CHLORIDE mmol/L 103 101 98   CO2 mmol/L 20* 19* 20*   BUN mg/dL 51* 52* 55*   CREATININE mg/dL 5.20* 5.35* 5.43*   GLUCOSE mg/dL 133* 111* 94   CALCIUM mg/dL 7.1* 7.3* 7.6*   ANION GAP mmol/L 13 15 18   EGFR mL/min/1.73m*2 11* 11* 10*   PHOSPHORUS mg/dL 4.1 4.7  --      Results from last 72 hours   Lab Units 11/15/23  0658 11/14/23  0702 11/13/23  1637   ALK PHOS U/L  --   --  117   BILIRUBIN TOTAL mg/dL  --   --  0.5   PROTEIN TOTAL g/dL  --   --  6.0*   ALT  U/L  --   --  31   AST U/L  --   --  51*   ALBUMIN g/dL 2.6* 2.5* 2.7*     Estimated Creatinine Clearance: 17.7 mL/min (A) (by C-G formula based on SCr of 5.2 mg/dL (H)).  C-Reactive Protein   Date Value Ref Range Status   11/14/2023 9.21 (H) <1.00 mg/dL Final     CRP   Date Value Ref Range Status   09/18/2023 22.55 (A) mg/dL Final     Comment:     REF VALUE  < 1.00     06/23/2021 0.50 mg/dL Final     Comment:     REF VALUE  < 1.00       Microbiology  Reviewed  Imaging  reviewed        Assessment/Plan   MSSA bacteremia, the repeat BC is positive  Right kne pain  Pyuria  Lymphedema / stasis     Recommendations :  Continue Cefazolin   May need the line removed     I spent minutes in the professional and overall care of this patient.      Payton Raines MD

## 2023-11-15 NOTE — NURSING NOTE
0700 Care assumed vss.  Patient resting in bed NAD.  VSS  patient reports being promised a privite bed yestarday and inquiring as to status of that.  Charge nurse notified plan to move to room 230 once clean.

## 2023-11-16 ENCOUNTER — APPOINTMENT (OUTPATIENT)
Dept: CARDIOLOGY | Facility: HOSPITAL | Age: 73
DRG: 314 | End: 2023-11-16
Payer: MEDICARE

## 2023-11-16 ENCOUNTER — HOSPITAL ENCOUNTER (INPATIENT)
Facility: HOSPITAL | Age: 73
LOS: 18 days | Discharge: NURSING, REHAB OR LONG-TERM CARE | DRG: 260 | End: 2023-12-04
Attending: INTERNAL MEDICINE | Admitting: INTERNAL MEDICINE
Payer: MEDICARE

## 2023-11-16 VITALS
DIASTOLIC BLOOD PRESSURE: 70 MMHG | RESPIRATION RATE: 18 BRPM | SYSTOLIC BLOOD PRESSURE: 138 MMHG | OXYGEN SATURATION: 99 % | HEART RATE: 70 BPM | HEIGHT: 74 IN | BODY MASS INDEX: 33.81 KG/M2 | WEIGHT: 263.45 LBS | TEMPERATURE: 97.2 F

## 2023-11-16 DIAGNOSIS — M62.830 BACK MUSCLE SPASM: ICD-10-CM

## 2023-11-16 DIAGNOSIS — I38 ENDOCARDITIS: ICD-10-CM

## 2023-11-16 DIAGNOSIS — R63.2 POLYPHAGIA: ICD-10-CM

## 2023-11-16 DIAGNOSIS — I49.5 SICK SINUS SYNDROME (MULTI): ICD-10-CM

## 2023-11-16 DIAGNOSIS — M10.9 ACUTE GOUT INVOLVING TOE OF RIGHT FOOT, UNSPECIFIED CAUSE: ICD-10-CM

## 2023-11-16 DIAGNOSIS — I49.5 SINOATRIAL NODE DYSFUNCTION (MULTI): ICD-10-CM

## 2023-11-16 DIAGNOSIS — N40.1 BENIGN PROSTATIC HYPERPLASIA WITH URINARY RETENTION: ICD-10-CM

## 2023-11-16 DIAGNOSIS — N17.9 AKI (ACUTE KIDNEY INJURY) (CMS-HCC): ICD-10-CM

## 2023-11-16 DIAGNOSIS — Z95.0 PACEMAKER: ICD-10-CM

## 2023-11-16 DIAGNOSIS — I44.2 INTERMITTENT COMPLETE ATRIOVENTRICULAR BLOCK (MULTI): ICD-10-CM

## 2023-11-16 DIAGNOSIS — N18.4 CKD (CHRONIC KIDNEY DISEASE) STAGE 4, GFR 15-29 ML/MIN (MULTI): ICD-10-CM

## 2023-11-16 DIAGNOSIS — K59.00 CONSTIPATION, UNSPECIFIED CONSTIPATION TYPE: ICD-10-CM

## 2023-11-16 DIAGNOSIS — Z95.0 PRESENCE OF CARDIAC PACEMAKER: ICD-10-CM

## 2023-11-16 DIAGNOSIS — R78.81 MSSA BACTEREMIA: ICD-10-CM

## 2023-11-16 DIAGNOSIS — N17.9 ACUTE RENAL FAILURE, UNSPECIFIED ACUTE RENAL FAILURE TYPE (CMS-HCC): ICD-10-CM

## 2023-11-16 DIAGNOSIS — E11.40 TYPE 2 DIABETES MELLITUS WITH DIABETIC NEUROPATHY, WITH LONG-TERM CURRENT USE OF INSULIN (MULTI): ICD-10-CM

## 2023-11-16 DIAGNOSIS — I89.0 LYMPHEDEMA: ICD-10-CM

## 2023-11-16 DIAGNOSIS — B95.61 MSSA BACTEREMIA: ICD-10-CM

## 2023-11-16 DIAGNOSIS — I10 HYPERTENSION, UNSPECIFIED TYPE: ICD-10-CM

## 2023-11-16 DIAGNOSIS — I44.2 COMPLETE ATRIOVENTRICULAR BLOCK (MULTI): ICD-10-CM

## 2023-11-16 DIAGNOSIS — R33.8 BENIGN PROSTATIC HYPERPLASIA WITH URINARY RETENTION: ICD-10-CM

## 2023-11-16 DIAGNOSIS — Z79.4 TYPE 2 DIABETES MELLITUS WITH DIABETIC NEUROPATHY, WITH LONG-TERM CURRENT USE OF INSULIN (MULTI): ICD-10-CM

## 2023-11-16 DIAGNOSIS — R59.0 LYMPHADENOPATHY, INGUINAL: Primary | ICD-10-CM

## 2023-11-16 DIAGNOSIS — Z86.79: ICD-10-CM

## 2023-11-16 DIAGNOSIS — B35.1 MYCOTIC TOENAILS: ICD-10-CM

## 2023-11-16 LAB
ALBUMIN SERPL BCP-MCNC: 2.6 G/DL (ref 3.4–5)
ANION GAP SERPL CALC-SCNC: 14 MMOL/L (ref 10–20)
BUN SERPL-MCNC: 46 MG/DL (ref 6–23)
CALCIUM SERPL-MCNC: 7.3 MG/DL (ref 8.6–10.3)
CHLORIDE SERPL-SCNC: 106 MMOL/L (ref 98–107)
CO2 SERPL-SCNC: 19 MMOL/L (ref 21–32)
CREAT SERPL-MCNC: 4.92 MG/DL (ref 0.5–1.3)
ERYTHROCYTE [DISTWIDTH] IN BLOOD BY AUTOMATED COUNT: 14.5 % (ref 11.5–14.5)
GFR SERPL CREATININE-BSD FRML MDRD: 12 ML/MIN/1.73M*2
GLUCOSE BLD MANUAL STRIP-MCNC: 86 MG/DL (ref 74–99)
GLUCOSE BLD MANUAL STRIP-MCNC: 94 MG/DL (ref 74–99)
GLUCOSE BLD MANUAL STRIP-MCNC: 94 MG/DL (ref 74–99)
GLUCOSE SERPL-MCNC: 92 MG/DL (ref 74–99)
HCT VFR BLD AUTO: 23.8 % (ref 41–52)
HGB BLD-MCNC: 7.6 G/DL (ref 13.5–17.5)
MAGNESIUM SERPL-MCNC: 1.63 MG/DL (ref 1.6–2.4)
MCH RBC QN AUTO: 27.4 PG (ref 26–34)
MCHC RBC AUTO-ENTMCNC: 31.9 G/DL (ref 32–36)
MCV RBC AUTO: 86 FL (ref 80–100)
NRBC BLD-RTO: 0 /100 WBCS (ref 0–0)
PHOSPHATE SERPL-MCNC: 4.4 MG/DL (ref 2.5–4.9)
PLATELET # BLD AUTO: 99 X10*3/UL (ref 150–450)
POTASSIUM SERPL-SCNC: 3.4 MMOL/L (ref 3.5–5.3)
RBC # BLD AUTO: 2.77 X10*6/UL (ref 4.5–5.9)
SODIUM SERPL-SCNC: 136 MMOL/L (ref 136–145)
STAPHYLOCOCCUS SPEC CULT: ABNORMAL
WBC # BLD AUTO: 8.2 X10*3/UL (ref 4.4–11.3)

## 2023-11-16 PROCEDURE — 7100000009 HC PHASE TWO TIME - INITIAL BASE CHARGE: Performed by: INTERNAL MEDICINE

## 2023-11-16 PROCEDURE — 80069 RENAL FUNCTION PANEL: CPT

## 2023-11-16 PROCEDURE — 1200000002 HC GENERAL ROOM WITH TELEMETRY DAILY

## 2023-11-16 PROCEDURE — 87040 BLOOD CULTURE FOR BACTERIA: CPT | Mod: GEALAB

## 2023-11-16 PROCEDURE — 2500000001 HC RX 250 WO HCPCS SELF ADMINISTERED DRUGS (ALT 637 FOR MEDICARE OP): Performed by: INTERNAL MEDICINE

## 2023-11-16 PROCEDURE — 36415 COLL VENOUS BLD VENIPUNCTURE: CPT

## 2023-11-16 PROCEDURE — 2500000002 HC RX 250 W HCPCS SELF ADMINISTERED DRUGS (ALT 637 FOR MEDICARE OP, ALT 636 FOR OP/ED): Performed by: INTERNAL MEDICINE

## 2023-11-16 PROCEDURE — 93312 ECHO TRANSESOPHAGEAL: CPT | Performed by: INTERNAL MEDICINE

## 2023-11-16 PROCEDURE — 2500000001 HC RX 250 WO HCPCS SELF ADMINISTERED DRUGS (ALT 637 FOR MEDICARE OP)

## 2023-11-16 PROCEDURE — 93320 DOPPLER ECHO COMPLETE: CPT

## 2023-11-16 PROCEDURE — 2500000004 HC RX 250 GENERAL PHARMACY W/ HCPCS (ALT 636 FOR OP/ED)

## 2023-11-16 PROCEDURE — 96372 THER/PROPH/DIAG INJ SC/IM: CPT | Performed by: INTERNAL MEDICINE

## 2023-11-16 PROCEDURE — 82947 ASSAY GLUCOSE BLOOD QUANT: CPT

## 2023-11-16 PROCEDURE — 36415 COLL VENOUS BLD VENIPUNCTURE: CPT | Mod: GEALAB

## 2023-11-16 PROCEDURE — 99223 1ST HOSP IP/OBS HIGH 75: CPT | Performed by: STUDENT IN AN ORGANIZED HEALTH CARE EDUCATION/TRAINING PROGRAM

## 2023-11-16 PROCEDURE — B24BZZ4 ULTRASONOGRAPHY OF HEART WITH AORTA, TRANSESOPHAGEAL: ICD-10-PCS | Performed by: INTERNAL MEDICINE

## 2023-11-16 PROCEDURE — 2500000004 HC RX 250 GENERAL PHARMACY W/ HCPCS (ALT 636 FOR OP/ED): Performed by: INTERNAL MEDICINE

## 2023-11-16 PROCEDURE — 99233 SBSQ HOSP IP/OBS HIGH 50: CPT

## 2023-11-16 PROCEDURE — 99222 1ST HOSP IP/OBS MODERATE 55: CPT | Performed by: NURSE PRACTITIONER

## 2023-11-16 PROCEDURE — 83735 ASSAY OF MAGNESIUM: CPT

## 2023-11-16 PROCEDURE — 93320 DOPPLER ECHO COMPLETE: CPT | Performed by: INTERNAL MEDICINE

## 2023-11-16 PROCEDURE — 85027 COMPLETE CBC AUTOMATED: CPT

## 2023-11-16 PROCEDURE — 7100000010 HC PHASE TWO TIME - EACH INCREMENTAL 1 MINUTE: Performed by: INTERNAL MEDICINE

## 2023-11-16 PROCEDURE — 93325 DOPPLER ECHO COLOR FLOW MAPG: CPT | Performed by: INTERNAL MEDICINE

## 2023-11-16 PROCEDURE — 99223 1ST HOSP IP/OBS HIGH 75: CPT | Performed by: NURSE PRACTITIONER

## 2023-11-16 PROCEDURE — 96372 THER/PROPH/DIAG INJ SC/IM: CPT

## 2023-11-16 RX ORDER — DEXTROSE 50 % IN WATER (D50W) INTRAVENOUS SYRINGE
25
Status: DISCONTINUED | OUTPATIENT
Start: 2023-11-16 | End: 2023-11-17

## 2023-11-16 RX ORDER — INSULIN LISPRO 100 [IU]/ML
0-5 INJECTION, SOLUTION INTRAVENOUS; SUBCUTANEOUS
Status: DISCONTINUED | OUTPATIENT
Start: 2023-11-17 | End: 2023-11-17

## 2023-11-16 RX ORDER — MUPIROCIN 20 MG/G
OINTMENT TOPICAL 2 TIMES DAILY
Status: DISCONTINUED | OUTPATIENT
Start: 2023-11-16 | End: 2023-11-16 | Stop reason: HOSPADM

## 2023-11-16 RX ORDER — CEFAZOLIN SODIUM 2 G/100ML
2 INJECTION, SOLUTION INTRAVENOUS EVERY 12 HOURS
Status: DISCONTINUED | OUTPATIENT
Start: 2023-11-17 | End: 2023-12-04 | Stop reason: HOSPADM

## 2023-11-16 RX ORDER — DEXTROSE MONOHYDRATE 100 MG/ML
0.3 INJECTION, SOLUTION INTRAVENOUS ONCE AS NEEDED
Status: DISCONTINUED | OUTPATIENT
Start: 2023-11-16 | End: 2023-11-17

## 2023-11-16 RX ORDER — FENTANYL CITRATE 50 UG/ML
INJECTION, SOLUTION INTRAMUSCULAR; INTRAVENOUS AS NEEDED
Status: DISCONTINUED | OUTPATIENT
Start: 2023-11-16 | End: 2023-11-16 | Stop reason: HOSPADM

## 2023-11-16 RX ORDER — ACETAMINOPHEN 325 MG/1
650 TABLET ORAL EVERY 4 HOURS PRN
Status: DISCONTINUED | OUTPATIENT
Start: 2023-11-16 | End: 2023-11-17

## 2023-11-16 RX ORDER — LIDOCAINE HYDROCHLORIDE 20 MG/ML
SOLUTION OROPHARYNGEAL AS NEEDED
Status: DISCONTINUED | OUTPATIENT
Start: 2023-11-16 | End: 2023-11-16 | Stop reason: HOSPADM

## 2023-11-16 RX ORDER — MUPIROCIN 20 MG/G
OINTMENT TOPICAL 2 TIMES DAILY
Status: COMPLETED | OUTPATIENT
Start: 2023-11-17 | End: 2023-11-20

## 2023-11-16 RX ORDER — ROSUVASTATIN CALCIUM 10 MG/1
10 TABLET, COATED ORAL NIGHTLY
Status: DISCONTINUED | OUTPATIENT
Start: 2023-11-17 | End: 2023-12-04 | Stop reason: HOSPADM

## 2023-11-16 RX ORDER — HEPARIN SODIUM 5000 [USP'U]/ML
5000 INJECTION, SOLUTION INTRAVENOUS; SUBCUTANEOUS EVERY 8 HOURS
Qty: 5 ML | Refills: 0 | Status: SHIPPED | OUTPATIENT
Start: 2023-11-17 | End: 2023-12-04 | Stop reason: HOSPADM

## 2023-11-16 RX ORDER — ACETAMINOPHEN 160 MG/5ML
650 SOLUTION ORAL EVERY 4 HOURS PRN
Status: DISCONTINUED | OUTPATIENT
Start: 2023-11-16 | End: 2023-11-17

## 2023-11-16 RX ORDER — DEXTROSE MONOHYDRATE 100 MG/ML
0.3 INJECTION, SOLUTION INTRAVENOUS ONCE AS NEEDED
Qty: 500 ML | Refills: 1 | Status: SHIPPED | OUTPATIENT
Start: 2023-11-16 | End: 2023-12-04 | Stop reason: HOSPADM

## 2023-11-16 RX ORDER — TAMSULOSIN HYDROCHLORIDE 0.4 MG/1
0.8 CAPSULE ORAL NIGHTLY
Status: DISCONTINUED | OUTPATIENT
Start: 2023-11-17 | End: 2023-12-04 | Stop reason: HOSPADM

## 2023-11-16 RX ORDER — HEPARIN SODIUM 5000 [USP'U]/ML
5000 INJECTION, SOLUTION INTRAVENOUS; SUBCUTANEOUS EVERY 8 HOURS
Status: DISCONTINUED | OUTPATIENT
Start: 2023-11-17 | End: 2023-12-04 | Stop reason: HOSPADM

## 2023-11-16 RX ORDER — FINASTERIDE 5 MG/1
5 TABLET, FILM COATED ORAL DAILY
Status: DISCONTINUED | OUTPATIENT
Start: 2023-11-17 | End: 2023-12-04 | Stop reason: HOSPADM

## 2023-11-16 RX ORDER — ACETAMINOPHEN 500 MG
5 TABLET ORAL NIGHTLY
Status: DISCONTINUED | OUTPATIENT
Start: 2023-11-17 | End: 2023-12-04 | Stop reason: HOSPADM

## 2023-11-16 RX ORDER — INSULIN GLARGINE 100 [IU]/ML
10 INJECTION, SOLUTION SUBCUTANEOUS EVERY 24 HOURS
Status: DISCONTINUED | OUTPATIENT
Start: 2023-11-17 | End: 2023-11-17

## 2023-11-16 RX ORDER — MIDAZOLAM HYDROCHLORIDE 1 MG/ML
INJECTION, SOLUTION INTRAMUSCULAR; INTRAVENOUS AS NEEDED
Status: DISCONTINUED | OUTPATIENT
Start: 2023-11-16 | End: 2023-11-16 | Stop reason: HOSPADM

## 2023-11-16 RX ORDER — METOPROLOL SUCCINATE 100 MG/1
100 TABLET, EXTENDED RELEASE ORAL DAILY
Status: DISCONTINUED | OUTPATIENT
Start: 2023-11-17 | End: 2023-11-24

## 2023-11-16 RX ORDER — ACETAMINOPHEN 650 MG/1
650 SUPPOSITORY RECTAL EVERY 4 HOURS PRN
Status: DISCONTINUED | OUTPATIENT
Start: 2023-11-16 | End: 2023-11-17

## 2023-11-16 RX ORDER — AMLODIPINE BESYLATE 2.5 MG/1
10 TABLET ORAL EVERY EVENING
Status: DISCONTINUED | OUTPATIENT
Start: 2023-11-17 | End: 2023-12-04 | Stop reason: HOSPADM

## 2023-11-16 RX ORDER — HEPARIN SODIUM 5000 [USP'U]/ML
5000 INJECTION, SOLUTION INTRAVENOUS; SUBCUTANEOUS EVERY 8 HOURS
Status: DISCONTINUED | OUTPATIENT
Start: 2023-11-17 | End: 2023-11-16

## 2023-11-16 RX ADMIN — TAMSULOSIN HYDROCHLORIDE 0.8 MG: 0.4 CAPSULE ORAL at 20:21

## 2023-11-16 RX ADMIN — HYDROCODONE BITARTRATE AND ACETAMINOPHEN 1 TABLET: 5; 325 TABLET ORAL at 08:30

## 2023-11-16 RX ADMIN — Medication 5 MG: at 20:21

## 2023-11-16 RX ADMIN — MUPIROCIN: 20 OINTMENT TOPICAL at 12:07

## 2023-11-16 RX ADMIN — MIDAZOLAM 2 MG: 1 INJECTION INTRAMUSCULAR; INTRAVENOUS at 14:02

## 2023-11-16 RX ADMIN — INSULIN GLARGINE 10 UNITS: 100 INJECTION, SOLUTION SUBCUTANEOUS at 20:22

## 2023-11-16 RX ADMIN — HEPARIN SODIUM 5000 UNITS: 5000 INJECTION INTRAVENOUS; SUBCUTANEOUS at 01:50

## 2023-11-16 RX ADMIN — CEFAZOLIN SODIUM 2 G: 2 INJECTION, SOLUTION INTRAVENOUS at 01:43

## 2023-11-16 RX ADMIN — FENTANYL CITRATE 50 MCG: 50 INJECTION, SOLUTION INTRAMUSCULAR; INTRAVENOUS at 14:00

## 2023-11-16 RX ADMIN — METOPROLOL SUCCINATE 100 MG: 50 TABLET, EXTENDED RELEASE ORAL at 08:30

## 2023-11-16 RX ADMIN — MIDAZOLAM 2 MG: 1 INJECTION INTRAMUSCULAR; INTRAVENOUS at 13:59

## 2023-11-16 RX ADMIN — CEFAZOLIN SODIUM 2 G: 2 INJECTION, SOLUTION INTRAVENOUS at 16:04

## 2023-11-16 RX ADMIN — HEPARIN SODIUM 5000 UNITS: 5000 INJECTION INTRAVENOUS; SUBCUTANEOUS at 18:25

## 2023-11-16 RX ADMIN — HEPARIN SODIUM 5000 UNITS: 5000 INJECTION INTRAVENOUS; SUBCUTANEOUS at 10:41

## 2023-11-16 RX ADMIN — FINASTERIDE 5 MG: 5 TABLET, FILM COATED ORAL at 08:30

## 2023-11-16 RX ADMIN — ROSUVASTATIN 10 MG: 10 TABLET, FILM COATED ORAL at 20:21

## 2023-11-16 RX ADMIN — AMLODIPINE BESYLATE 10 MG: 10 TABLET ORAL at 20:21

## 2023-11-16 RX ADMIN — ACETAMINOPHEN 650 MG: 325 TABLET ORAL at 01:48

## 2023-11-16 RX ADMIN — LIDOCAINE HYDROCHLORIDE 15 ML: 20 SOLUTION ORAL at 13:57

## 2023-11-16 RX ADMIN — MUPIROCIN: 20 OINTMENT TOPICAL at 20:22

## 2023-11-16 ASSESSMENT — COGNITIVE AND FUNCTIONAL STATUS - GENERAL
CLIMB 3 TO 5 STEPS WITH RAILING: A LOT
DAILY ACTIVITIY SCORE: 19
HELP NEEDED FOR BATHING: A LITTLE
MOVING TO AND FROM BED TO CHAIR: A LITTLE
WALKING IN HOSPITAL ROOM: A LITTLE
DRESSING REGULAR UPPER BODY CLOTHING: A LITTLE
TOILETING: A LITTLE
PERSONAL GROOMING: A LITTLE
TURNING FROM BACK TO SIDE WHILE IN FLAT BAD: A LITTLE
STANDING UP FROM CHAIR USING ARMS: A LITTLE
MOBILITY SCORE: 18
DRESSING REGULAR LOWER BODY CLOTHING: A LITTLE

## 2023-11-16 ASSESSMENT — PAIN SCALES - GENERAL
PAINLEVEL_OUTOF10: 0 - NO PAIN
PAINLEVEL_OUTOF10: 3
PAINLEVEL_OUTOF10: 7
PAINLEVEL_OUTOF10: 0 - NO PAIN
PAINLEVEL_OUTOF10: 0 - NO PAIN
PAINLEVEL_OUTOF10: 3
PAINLEVEL_OUTOF10: 0 - NO PAIN

## 2023-11-16 ASSESSMENT — PAIN - FUNCTIONAL ASSESSMENT
PAIN_FUNCTIONAL_ASSESSMENT: 0-10

## 2023-11-16 ASSESSMENT — PAIN DESCRIPTION - LOCATION: LOCATION: HEAD

## 2023-11-16 NOTE — Clinical Note
Sheath was inserted in the left femoral vein. Arrowg+sammi Blue MAC two-lumen central venous access kit inserted for anesthesia access

## 2023-11-16 NOTE — CARE PLAN
The clinical goals for the shift include remain afebrile.    Pt had FILIPE today. Pt remains on ATB. Pt to be transferred to [facility] when bed available.

## 2023-11-16 NOTE — CONSULTS
Inpatient consult to Cardiology  Consult performed by: Sharyn Nixon MD  Consult ordered by: Jimmie Hall MD      History Of Present Illness:    Chester Verduzco is a 73 y.o. male with PMH of dual chamber pacemaker implanted on 11/26/1996 for SSS (RV is passive) with most recent generator replacement on 11/03/2020 ([facility]), type 2 DM, hernia repair 2015, HTN, gout, and dyslipidemia. Blood culture +MSSA bacteremia. Today, BUN/creat 46/4.92 (creat 5.3 on admit with baseline creat 1-1.1 in 8/2023). Had recent hospitalization for generalized weakness, NOHELIA with hyponatremia discharged to SNF. Came in with uptrending BUN/creatinine and positive blood cultures for MSSA (11/10, also previously 10/9). Pt has had persistent MSSA bacteremia, subjective hx of fevers, a new conjunctival hemorrhage and decline in renal function since 10/2023. FILIPE today is pending full report, however, reviewed with Dr. Hall-- shows tricuspid valve vegetation (0.9 cm circumferential) as well as right atrial lead (0.9 cm linear) vegetation. TTE (11/13/2023) showing normal LVEF, mild MR and mild TR.      Last Recorded Vitals:  Vitals:    11/16/23 1442 11/16/23 1445 11/16/23 1500 11/16/23 1507   BP: 122/62 120/64 122/68 128/66   BP Location:    Right arm   Patient Position:    Lying   Pulse: 66 65 66 62   Resp: 18 18 17 16   Temp:    36.2 °C (97.2 °F)   TempSrc:    Temporal   SpO2: 96% 98% 97% 95%   Weight:       Height:           Last Labs:  CBC - 11/16/2023:  6:30 AM  8.2 7.6 99    23.8      CMP - 11/16/2023:  6:30 AM  7.3 6.0 51 --- 0.5   4.4 2.6 31 117      PTT - No results in last year.  _   _ _     Troponin I   Date/Time Value Ref Range Status   09/18/2023 05:52 PM 15 0 - 20 ng/L Final     Comment:     .  Less than 99th percentile of normal range cutoff-  Female and children under 18 years old <14 ng/L; Male <21 ng/L: Negative  Repeat testing should be performed if clinically indicated.   .  Female and children under  18 years old 14-50 ng/L; Male 21-50 ng/L:  Consistent with possible cardiac damage and possible increased clinical   risk. Serial measurements may help to assess extent of myocardial damage.   .  >50 ng/L: Consistent with cardiac damage, increased clinical risk and  myocardial infarction. Serial measurements may help assess extent of   myocardial damage.   .   NOTE: Children less than 1 year old may have higher baseline troponin   levels and results should be interpreted in conjunction with the overall   clinical context.   .  NOTE: Troponin I testing is performed using a different   testing methodology at [facility] than at other   [facility]. Direct result comparisons should only   be made within the same method.       BNP   Date/Time Value Ref Range Status   10/02/2023 01:07 PM 97 0 - 99 pg/mL Final     Hemoglobin A1C   Date/Time Value Ref Range Status   08/11/2023 12:59 PM 9.9 (A) % Final     Comment:          Diagnosis of Diabetes-Adults   Non-Diabetic: < or = 5.6%   Increased risk for developing diabetes: 5.7-6.4%   Diagnostic of diabetes: > or = 6.5%  .       Monitoring of Diabetes                Age (y)     Therapeutic Goal (%)   Adults:          >18           <7.0   Pediatrics:    13-18           <7.5                   7-12           <8.0                   0- 6            7.5-8.5   American Diabetes Association. Diabetes Care 33(S1), Jan 2010.     02/07/2023 09:07 AM 8.4 (A) % Final     Comment:          Diagnosis of Diabetes-Adults   Non-Diabetic: < or = 5.6%   Increased risk for developing diabetes: 5.7-6.4%   Diagnostic of diabetes: > or = 6.5%  .       Monitoring of Diabetes                Age (y)     Therapeutic Goal (%)   Adults:          >18           <7.0   Pediatrics:    13-18           <7.5                   7-12           <8.0                   0- 6            7.5-8.5   American Diabetes Association. Diabetes Care 33(S1), Jan 2010.       VLDL   Date/Time Value Ref Range Status    08/11/2023 12:59 PM 57 (H) 0 - 40 mg/dL Final   08/10/2022 11:09 AM 33 0 - 40 mg/dL Final   08/19/2020 12:17 PM 62 (H) 0 - 40 mg/dL Final      Last I/O:  I/O last 3 completed shifts:  In: 2660 (22.3 mL/kg) [P.O.:2460; IV Piggyback:200]  Out: 3025 (25.3 mL/kg) [Urine:3025 (0.7 mL/kg/hr)]  Weight: 119.5 kg     Past Cardiology Tests (Last 3 Years):    Echo:  Transthoracic Echo (TTE) Complete 11/15/2023  CONCLUSIONS:   1. Left ventricular systolic function is normal with a 55-60% estimated ejection fraction.   2. Spectral Doppler shows an impaired relaxation pattern of left ventricular diastolic filling.   3. There is mild mitral and tricuspid regurgitation.   4. The estimated pulmonary artery pressure is mildly elevated with the RVSP at 39.2 mmHg.   5. No definite valvular vegetations were visualized.      Past Medical History:  He has a past medical history of Abnormal weight gain (10/27/2016), Achilles tendinitis, unspecified leg (08/16/2016), Acute upper respiratory infection, unspecified, Allergic contact dermatitis due to plants, except food (08/22/2013), BPH with obstruction/lower urinary tract symptoms, Cellulitis of right lower limb (07/30/2021), Cough, unspecified (03/21/2016), Diabetes (CMS/[facility]), Encounter for screening for human immunodeficiency virus (HIV) (06/14/2016), Hordeolum externum unspecified eye, unspecified eyelid (08/14/2019), Hyperglycemia, unspecified (12/13/2017), Incisional hernia without obstruction or gangrene (06/15/2015), Knee joint pain, MSSA bacteremia (10/2023), Muscle spasm of calf (08/16/2016), Obstructive uropathy, Personal history of other diseases of the digestive system (06/30/2015), Personal history of other diseases of the digestive system (02/05/2016), Personal history of other diseases of the respiratory system (12/23/2014), Personal history of other diseases of the respiratory system (01/28/2016), Personal history of other infectious and parasitic diseases (12/02/2015),  Personal history of other infectious and parasitic diseases, Personal history of other specified conditions (02/04/2015), Personal history of other specified conditions (08/16/2016), Personal history of other specified conditions (01/26/2015), Personal history of pneumonia (recurrent) (01/29/2016), Personal history of urinary (tract) infections (02/19/2013), Prostatitis (03/21/2023), Pruritus, unspecified (02/10/2015), Sinoatrial node dysfunction (CMS/HCC), Strain of muscle, fascia and tendon of the posterior muscle group at thigh level, right thigh, initial encounter (05/10/2016), and Unspecified symptoms and signs involving the genitourinary system (12/20/2016).    Past Surgical History:  He has a past surgical history that includes Varicose vein surgery (08/22/2013); Cardiac pacemaker placement (08/22/2013); Other surgical history (06/15/2015); Ventral hernia repair (06/15/2015); Other surgical history (09/16/2019); and Peripherally inserted central catheter insertion.      Social History:  He reports that he has never smoked. He has never been exposed to tobacco smoke. He has never used smokeless tobacco. He reports that he does not currently use alcohol. He reports that he does not use drugs.    Family History:  No family history on file.     Allergies:  Keflex [cephalexin] and Bupropion    Inpatient Medications:  Scheduled medications   Medication Dose Route Frequency    amLODIPine  10 mg oral q PM    ceFAZolin in dextrose (iso-os)  2 g intravenous q12h    finasteride  5 mg oral Daily    heparin (porcine)  5,000 Units subcutaneous q8h    insulin glargine  10 Units subcutaneous q24h    insulin lispro  0-5 Units subcutaneous TID with meals    melatonin  5 mg oral Nightly    metoprolol succinate XL  100 mg oral Daily    mupirocin   Topical BID    perflutren protein A microsphere  0.5 mL intravenous Once in imaging    rosuvastatin  10 mg oral Nightly    sulfur hexafluoride microsphr  2 mL intravenous Once in  imaging    tamsulosin  0.8 mg oral Nightly     PRN medications   Medication    acetaminophen    Or    acetaminophen    Or    acetaminophen    dextrose 10 % in water (D10W)    dextrose    fentaNYL PF    glucagon    HYDROcodone-acetaminophen    HYDROcodone-acetaminophen    lidocaine    midazolam     Continuous Medications   Medication Dose Last Rate     Outpatient Medications:  Current Outpatient Medications   Medication Instructions    acetaminophen (TYLENOL) 650 mg, oral, Every 4 hours PRN    allopurinol (ZYLOPRIM) 100 mg, oral, Daily    amLODIPine (NORVASC) 10 mg, oral, Every evening    bisacodyl (C-LAX LAXATIVE (BISACODYL)) 10 mg, oral, Daily PRN, Do not crush, chew, or split. For no results from MOM and/or no BM in 3 days    bisacodyl (DULCOLAX (BISACODYL)) 10 mg, rectal, Daily PRN    ferrous gluconate (Fergon) 324 (38 Fe) mg tablet 38 mg of iron, oral, Daily with breakfast    finasteride (PROSCAR) 5 mg, oral, Daily    fluticasone (Flonase) 50 mcg/actuation nasal spray 1 spray, Each Nostril, Every 12 hours PRN, Shake gently. Before first use, prime pump. After use, clean tip and replace cap.    furosemide (LASIX) 40 mg, oral, Daily    hydrALAZINE (APRESOLINE) 10 mg, oral, 3 times daily, Hold for SBP <120    HYDROcodone-acetaminophen (Norco) 5-325 mg tablet 1 tablet, oral, Every 6 hours PRN    insulin degludec (TRESIBA FLEXTOUCH U-100) 10 Units, subcutaneous, Nightly    insulin regular (HUMULIN R) 0-5 Units, subcutaneous, 3 times daily with meals, Take as directed per insulin instructions.    lactobacillus acidophilus & bulgar (Lactinex) 1 million cell chewable tablet 1 tablet, oral, Daily, For 28 days    losartan (Cozaar) 100 mg tablet TAKE 1 TABLET BY MOUTH EVERY DAY    lubricating eye drops ophthalmic solution 1 drop, Left Eye, Every 8 hours PRN    menthol (Biofreeze, menthol,) 4 % gel 1 Application, topical (top), Every 6 hours PRN, To right knee    metoprolol succinate XL (Toprol-XL) 100 mg 24 hr tablet TAKE  1 TABLET BY MOUTH EVERY DAY    mineral oil enema 1 enema, rectal, Daily PRN, If no BM or results from MOM in 3 days    naltrexone (DEPADE) 50 mg, oral, Daily, Pt has not started this medication yet    ondansetron (ZOFRAN) 4 mg, oral, Every 6 hours PRN    pantoprazole (PROTONIX) 40 mg, oral, Daily before breakfast, Do not crush, chew, or split.    potassium chloride CR (Klor-Con M20) 20 mEq ER tablet 20 mEq, oral, Daily, Do not crush or chew.    rosuvastatin (Crestor) 10 mg tablet TAKE 1 TABLET BY MOUTH EVERYDAY AT BEDTIME    sennosides-docusate sodium (Kita-Colace) 8.6-50 mg tablet 2 tablets, oral, 2 times daily    sodium chloride 0.9% (sodium chloride) flush 10 mL, intravenous, 2 times daily, Every shift    tamsulosin (FLOMAX) 0.8 mg, oral, Nightly       Physical Exam:  Constitutional: Pleasant, Awake/Alert/Oriented to person place and time. No distress  Head: Atraumatic, Normocephalic  Eyes: EOMI. JENIFFER  Neck: Enlarged neck circumference, No JVD  Cardiovascular: Regular rate and rhythm, S1, S2. No extra heart sounds or murmurs  Respiratory: Clear to auscultation bilaterally. No wheezing, rales or rhonchi. Good chest wall expansion  Abdomen: Soft, Nontender, Obese. Bowel sounds appreciated  Musculoskeletal: ROM intact. Muscle strength grossly intact upper and lower extremities 5/5.   Neurological: CNII-XII intact. Sensation grossly intact  Extremities: Warm and dry. No acute rashes and lesions  Psychiatric: Appropriate mood and affect       Assessment/Plan   Patient with MSSA bacteremia and pacemaker RA lead vegetation. I discussed the case with Dr. Dallin Stallworth, director of the lead extraction program at , who oriented transferring the patient to [facility]. He will arrange for lead extraction.    PICC - Adult 11/13/23 Single lumen Left Brachial vein (Active)   Line Necessity Intravenous medication therapy 11/16/23 0800   Site Assessment Clean;Dry;Intact 11/16/23 0800   Proximal Lumen Status No blood return 11/16/23 0800    Dressing Type Transparent 11/16/23 0800   Dressing Status Clean;Dry 11/16/23 0800   Number of days: 3       Peripheral IV 11/13/23 20 G Right;Lateral;Distal Wrist (Active)   Site Assessment Clean;Dry;Intact 11/16/23 0800   Dressing Type Transparent 11/16/23 0800   Line Status Capped;Saline locked 11/16/23 0800   Dressing Status Clean;Dry 11/16/23 0800   Number of days: 3       Urethral Catheter (Active)   Site Assessment Clean;Skin intact 11/16/23 0802   Collection Container Standard drainage bag 11/16/23 0802   Securement Method Securing device (Describe) 11/16/23 0802   Reason for Continuing Urinary Catheterization chronic urinary retention 11/16/23 0802   Number of days: 3       Code Status:  Full Code        Sharyn Crystal MD

## 2023-11-16 NOTE — CONSULTS
Reason For Consult  Rule out right knee septic joint     History Of Present Illness  Chester Verduzco is a 73 y.o. male with PMH lymphedema, ventral hernia repair, HTN, HLD, BPH, obesity, recent hospitalization for generalized weakness, NOHELIA with hyponatremia, osteoarthritis of right knee who was admitted in October for Staph Bacteremia, discharged to SNF in mid October.  He then represented on 11/13/23 for evaluation of uptrending BUN/creatinine and positive blood cultures for Staph aureus (11/14).  Patient endorses generalized weakness, however it is not new.  He is also complaining of right knee pain and difficulty ambulating.  He does follow with Dr. Kothari and is scheduled for right total knee arthroplasty in Jan.  He recently had Cortisone injection in ortho office end of Sept per patient.  He denies fever/chills.  Denies chest pain shortness of breath.      Past Medical History  He has a past medical history of Abnormal weight gain (10/27/2016), Achilles tendinitis, unspecified leg (08/16/2016), Acute upper respiratory infection, unspecified, Allergic contact dermatitis due to plants, except food (08/22/2013), BPH with obstruction/lower urinary tract symptoms, Cellulitis of right lower limb (07/30/2021), Cough, unspecified (03/21/2016), Diabetes (CMS/[facility]), Encounter for screening for human immunodeficiency virus (HIV) (06/14/2016), Hordeolum externum unspecified eye, unspecified eyelid (08/14/2019), Hyperglycemia, unspecified (12/13/2017), Incisional hernia without obstruction or gangrene (06/15/2015), Knee joint pain, MSSA bacteremia (10/2023), Muscle spasm of calf (08/16/2016), Obstructive uropathy, Personal history of other diseases of the digestive system (06/30/2015), Personal history of other diseases of the digestive system (02/05/2016), Personal history of other diseases of the respiratory system (12/23/2014), Personal history of other diseases of the respiratory system (01/28/2016), Personal history  of other infectious and parasitic diseases (12/02/2015), Personal history of other infectious and parasitic diseases, Personal history of other specified conditions (02/04/2015), Personal history of other specified conditions (08/16/2016), Personal history of other specified conditions (01/26/2015), Personal history of pneumonia (recurrent) (01/29/2016), Personal history of urinary (tract) infections (02/19/2013), Prostatitis (03/21/2023), Pruritus, unspecified (02/10/2015), Sinoatrial node dysfunction (CMS/HCC), Strain of muscle, fascia and tendon of the posterior muscle group at thigh level, right thigh, initial encounter (05/10/2016), and Unspecified symptoms and signs involving the genitourinary system (12/20/2016).    Surgical History  He has a past surgical history that includes Varicose vein surgery (08/22/2013); Cardiac pacemaker placement (08/22/2013); Other surgical history (06/15/2015); Ventral hernia repair (06/15/2015); Other surgical history (09/16/2019); and Peripherally inserted central catheter insertion.     Social History  He reports that he has never smoked. He has never been exposed to tobacco smoke. He has never used smokeless tobacco. He reports that he does not currently use alcohol. He reports that he does not use drugs.    Family History  No family history on file.     Allergies  Keflex [cephalexin] and Bupropion    Review of Systems  12 point ROS completed and no additional findings noted aside from what is listed in the HPI.     Physical Exam  Physical Exam  Vitals reviewed.   Constitutional:       Appearance: Normal appearance.   HENT:      Head: Normocephalic and atraumatic.   Eyes:      Extraocular Movements: Extraocular movements intact.      Conjunctiva/sclera: Conjunctivae normal.      Pupils: Pupils are equal, round, and reactive to light.   Cardiovascular:      Rate and Rhythm: Normal rate and regular rhythm.      Pulses: Normal pulses.   Pulmonary:      Breath sounds: Normal  "breath sounds.   Abdominal:      General: Bowel sounds are normal.      Palpations: Abdomen is soft.   Musculoskeletal:      Comments: Moderate swelling right knee, TTP over right medial joint line, extension 0 deg, flexion 110 and painful, negative A/P drawer test, + crepitus with ROM   Skin:     General: Skin is warm and dry.      Capillary Refill: Capillary refill takes less than 2 seconds.   Neurological:      General: No focal deficit present.      Mental Status: He is alert and oriented to person, place, and time.       Last Recorded Vitals  Blood pressure 120/64, pulse 71, temperature 36.8 °C (98.2 °F), resp. rate 16, height 1.88 m (6' 2\"), weight 120 kg (263 lb 7.2 oz), SpO2 97 %.    Relevant Results  Transthoracic Echo (TTE) Complete    Result Date: 11/15/2023   [facility], [address] [place] [address]               Tel [phone] and Fax [phone] TRANSTHORACIC ECHOCARDIOGRAM REPORT  Patient Name:      MOE HO   Reading Physician:    [id number] Joshua Donis MD Study Date:        11/14/2023           Ordering Provider:    [id number] JENNIFER ZURITA MRN/PID:           [id number]             Fellow: Accession#:        [id number]         Nurse:                Rhina Henao RN Date of Birth/Age: 1950 / 73 years Sonographer:          Laurence Trujillo RDCS Gender:            M                    Additional Staff: Height:            187.96 cm            Admit Date:           11/13/2023 Weight:            119.29 kg            Admission Status:     Inpatient -                                                               Routine BSA:               2.44 m2              Encounter#:           [id number]                                         Department Location:  [facility]                    "                                            Invasive Blood Pressure: 118 /68 mmHg Study Type:    TRANSTHORACIC ECHO (TTE) COMPLETE Diagnosis/ICD: Endocarditis, valve unspecified-I38 Indication:    Endocarditis CPT Code:      Echo Complete w Full Doppler-[id number] Patient History: Pacer/Defib:       Permanent pacemaker Pertinent History: HTN and Hyperlipidemia. Study Detail: The following Echo studies were performed: 2D, M-Mode, Doppler and               color flow. Technically challenging study due to body habitus.               Definity used as a contrast agent for endocardial border               definition. Total contrast used for this procedure was 2.5 mL via               IV push. The patient was awake.  PHYSICIAN INTERPRETATION: Left Ventricle: The left ventricular systolic function is normal, with an estimated ejection fraction of 55-60%. There are no regional wall motion abnormalities. The left ventricular cavity size is normal. Spectral Doppler shows an impaired relaxation pattern of left ventricular diastolic filling. Left Atrium: The left atrium is mildly dilated. Right Ventricle: The right ventricle is normal in size. There is normal right ventricular global systolic function. A device is visualized in the right ventricle. Right Atrium: The right atrium is mildly dilated. Aortic Valve: The aortic valve appears structurally normal. There is no evidence of aortic valve stenosis. There is no evidence of aortic valve regurgitation. The peak instantaneous gradient of the aortic valve is 5.0 mmHg. The mean gradient of the aortic valve is 2.8 mmHg. Mitral Valve: The mitral valve is normal in structure. There is mild mitral valve regurgitation. Tricuspid Valve: The tricuspid valve is structurally normal. There is mild tricuspid regurgitation. Pulmonic Valve: The pulmonic valve is structurally normal. There is no indication of pulmonic valve regurgitation. Pericardium: There is no pericardial effusion noted. Aorta:  The aortic root is normal. Pulmonary Artery: The tricuspid regurgitant velocity is 2.70 m/s, and with an estimated right atrial pressure of 10 mmHg, the estimated pulmonary artery pressure is mildly elevated with the RVSP at 39.2 mmHg. Systemic Veins: The inferior vena cava appears to be of normal size.  CONCLUSIONS:  1. Left ventricular systolic function is normal with a 55-60% estimated ejection fraction.  2. Spectral Doppler shows an impaired relaxation pattern of left ventricular diastolic filling.  3. There is mild mitral and tricuspid regurgitation.  4. The estimated pulmonary artery pressure is mildly elevated with the RVSP at 39.2 mmHg.  5. No definite valvular vegetations were visualized. QUANTITATIVE DATA SUMMARY: 2D MEASUREMENTS:                          Normal Ranges: IVSd:          0.84 cm   (0.6-1.1cm) LVPWd:         0.90 cm   (0.6-1.1cm) LVIDd:         4.56 cm   (3.9-5.9cm) LVIDs:         3.09 cm LV Mass Index: 53.2 g/m2 LV % FS        32.3 % LA VOLUME:                               Normal Ranges: LA Vol A4C:        67.7 ml    (22+/-6mL/m2) LA Vol A2C:        62.9 ml LA Vol BP:         65.3 ml LA Vol Index A4C:  27.7 ml/m2 LA Vol Index A2C:  25.8 ml/m2 LA Vol Index BP:   26.7 ml/m2 LA Area A4C:       22.4 cm2 LA Area A2C:       21.6 cm2 LA Major Axis A4C: 6.3 cm LA Major Axis A2C: 6.3 cm LA Volume Index:   26.2 ml/m2 LA Vol A4C:        63.3 ml LA Vol A2C:        61.9 ml RA VOLUME BY A/L METHOD:                       Normal Ranges: RA Area A4C: 24.4 cm2 M-MODE MEASUREMENTS:                  Normal Ranges: Ao Root: 3.60 cm (2.0-3.7cm) LAs:     3.54 cm (2.7-4.0cm) AORTA MEASUREMENTS:                      Normal Ranges: Ao Sinus, d: 3.50 cm (2.1-3.5cm) Asc Ao, d:   3.20 cm (2.1-3.4cm) LV SYSTOLIC FUNCTION BY 2D PLANIMETRY (MOD):                     Normal Ranges: EF-A4C View: 64.6 % (>=55%) EF-A2C View: 60.8 % EF-Biplane:  63.3 % LV DIASTOLIC FUNCTION:                               Normal Ranges: MV Peak  E:        0.39 m/s    (0.7-1.2 m/s) MV Peak A:        0.55 m/s    (0.42-0.7 m/s) E/A Ratio:        0.71        (1.0-2.2) MV e'             0.06 m/s    (>8.0) MV lateral e'     0.06 m/s MV medial e'      0.06 m/s MV A Dur:         163.78 msec E/e' Ratio:       6.48        (<8.0) PulmV Sys Altaf:    51.01 cm/s PulmV Rhodes Altaf:   36.45 cm/s PulmV S/D Altaf:    1.40 PulmV A Revs Altaf: 26.64 cm/s PulmV A Revs Dur: 122.26 msec MITRAL VALVE:                 Normal Ranges: MV DT: 186 msec (150-240msec) AORTIC VALVE:                                   Normal Ranges: AoV Vmax:                1.12 m/s (<=1.7m/s) AoV Peak P.0 mmHg (<20mmHg) AoV Mean P.8 mmHg (1.7-11.5mmHg) LVOT Max Altaf:            0.89 m/s (<=1.1m/s) AoV VTI:                 21.14 cm (18-25cm) LVOT VTI:                17.86 cm LVOT Diameter:           2.06 cm  (1.8-2.4cm) AoV Area, VTI:           2.81 cm2 (2.5-5.5cm2) AoV Area,Vmax:           2.66 cm2 (2.5-4.5cm2) AoV Dimensionless Index: 0.85  RIGHT VENTRICLE: RV Basal 3.90 cm RV Mid   2.50 cm RV Major 8.6 cm TAPSE:   21.0 mm RV s'    0.08 m/s TRICUSPID VALVE/RVSP:                             Normal Ranges: Peak TR Velocity: 2.70 m/s RV Syst Pressure: 39.2 mmHg (< 30mmHg) IVC Diam:         2.14 cm PULMONIC VALVE:                      Normal Ranges: PV Max Altaf: 0.8 m/s  (0.6-0.9m/s) PV Max P.4 mmHg Pulmonary Veins: PulmV A Revs Dur: 122.26 msec PulmV A Revs Altaf: 26.64 cm/s PulmV Rhodes Altaf:   36.45 cm/s PulmV S/D Altaf:    1.40 PulmV Sys Altaf:    51.01 cm/s AORTA: Asc Ao Diam 3.15 cm  [id number] Joshua Donis MD Electronically signed on 11/15/2023 at 11:30:18 AM  ** Final **     XR knee right 3 views    Result Date: 2023  STUDY: Knee Radiographs; 2023 7:19 PM INDICATION: Right knee as possible source of infection in setting of Staphylococcus-positive blood culture. COMPARISON: None available. ACCESSION NUMBER(S): [id number] ORDERING CLINICIAN: YFN YOUNG TECHNIQUE:  Three  view(s) of the right knee. FINDINGS:  Degenerative changes are present with joint space narrowing and osteophytes along the medial and lateral compartment.  Moderate knee joint effusion and anterior knee soft tissue swelling.  Joint space narrowing along the patellofemoral articulation.  The notch view shows osteophytes along the medial and lateral tibial spines.    Anterior knee soft tissue swelling with moderate knee joint effusion. Tricompartmental osteoarthritis. Signed by Max Marroquin MD    US renal complete    Result Date: 11/13/2023  STUDY: Renal Ultrasound; Completed Time: 11/13/2023 7:27 PM INDICATION: Urinary retention. COMPARISON: CT A&P 10/2/2023. ACCESSION NUMBER(S): [id number] ORDERING CLINICIAN: YFN YOUNG FINDINGS: The right kidney measures 13.4 cm in length.  Renal cortical echotexture is normal.  There is no hydronephrosis.  There are no stones.  There are no cysts. The left kidney measures 16.8 cm in length.  Renal cortical echotexture is normal.  There is no hydronephrosis.  There are no stones.  There is a simple cyst within the upper pole measuring 6.8 x 7.9 x 6.8 cm. The bladder is decompressed by a Rojo catheter Prostate volume is 269 ml.     Left simple cyst measuring 6.8 x 7.9 x 6.8 cm. No hydronephrosis bilaterally. Enlarged prostate. Signed by Efra Benito MD    Scheduled medications  amLODIPine, 10 mg, oral, q PM  ceFAZolin in dextrose (iso-os), 2 g, intravenous, q12h  finasteride, 5 mg, oral, Daily  heparin (porcine), 5,000 Units, subcutaneous, q8h  insulin glargine, 10 Units, subcutaneous, q24h  insulin lispro, 0-5 Units, subcutaneous, TID with meals  melatonin, 5 mg, oral, Nightly  metoprolol succinate XL, 100 mg, oral, Daily  mupirocin, , Topical, BID  perflutren protein A microsphere, 0.5 mL, intravenous, Once in imaging  rosuvastatin, 10 mg, oral, Nightly  sulfur hexafluoride microsphr, 2 mL, intravenous, Once in imaging  tamsulosin, 0.8 mg, oral, Nightly      Continuous  medications     PRN medications  PRN medications: acetaminophen **OR** acetaminophen **OR** acetaminophen, dextrose 10 % in water (D10W), dextrose, glucagon, HYDROcodone-acetaminophen, HYDROcodone-acetaminophen  Results for orders placed or performed during the hospital encounter of 11/13/23 (from the past 24 hour(s))   POCT GLUCOSE   Result Value Ref Range    POCT Glucose 127 (H) 74 - 99 mg/dL   POCT GLUCOSE   Result Value Ref Range    POCT Glucose 144 (H) 74 - 99 mg/dL   CBC   Result Value Ref Range    WBC 8.2 4.4 - 11.3 x10*3/uL    nRBC 0.0 0.0 - 0.0 /100 WBCs    RBC 2.77 (L) 4.50 - 5.90 x10*6/uL    Hemoglobin 7.6 (L) 13.5 - 17.5 g/dL    Hematocrit 23.8 (L) 41.0 - 52.0 %    MCV 86 80 - 100 fL    MCH 27.4 26.0 - 34.0 pg    MCHC 31.9 (L) 32.0 - 36.0 g/dL    RDW 14.5 11.5 - 14.5 %    Platelets 99 (L) 150 - 450 x10*3/uL   Magnesium   Result Value Ref Range    Magnesium 1.63 1.60 - 2.40 mg/dL   Renal Function Panel   Result Value Ref Range    Glucose 92 74 - 99 mg/dL    Sodium 136 136 - 145 mmol/L    Potassium 3.4 (L) 3.5 - 5.3 mmol/L    Chloride 106 98 - 107 mmol/L    Bicarbonate 19 (L) 21 - 32 mmol/L    Anion Gap 14 10 - 20 mmol/L    Urea Nitrogen 46 (H) 6 - 23 mg/dL    Creatinine 4.92 (H) 0.50 - 1.30 mg/dL    eGFR 12 (L) >60 mL/min/1.73m*2    Calcium 7.3 (L) 8.6 - 10.3 mg/dL    Phosphorus 4.4 2.5 - 4.9 mg/dL    Albumin 2.6 (L) 3.4 - 5.0 g/dL   POCT GLUCOSE   Result Value Ref Range    POCT Glucose 94 74 - 99 mg/dL   POCT GLUCOSE   Result Value Ref Range    POCT Glucose 94 74 - 99 mg/dL        Assessment/Plan   73 year old male, admitted for Staph bacteremia, ortho consulted to rule out septic right knee.    - will plan to drain effusion today and send for cultures once back from procedure  - continue IV ABX per ID recs  - remainder of care per primary team    Discussed with Dr. Suzanne Valiente, JOSE-CNP

## 2023-11-16 NOTE — CARE PLAN
The patient's goals for the shift include  to rest comfortably this shift    The clinical goals for the shift include patient will remain HDS this shift      Problem: Fall/Injury  Goal: Not fall by end of shift  Outcome: Progressing     Problem: Fall/Injury  Goal: Be free from injury by end of the shift  Outcome: Progressing     Problem: Fall/Injury  Goal: Verbalize understanding of personal risk factors for fall in the hospital  Outcome: Progressing     Problem: Fall/Injury  Goal: Verbalize understanding of risk factor reduction measures to prevent injury from fall in the home  Outcome: Progressing     Problem: Fall/Injury  Goal: Use assistive devices by end of the shift  Outcome: Progressing     Problem: Fall/Injury  Goal: Pace activities to prevent fatigue by end of the shift  Outcome: Progressing     Problem: Skin  Goal: Participates in plan/prevention/treatment measures  Outcome: Progressing

## 2023-11-16 NOTE — CONSULTS
Consults  History Of Present Illness:    Chester Verduzco is a 73 y.o. male with h/o pacemaker implanted ~1996 for SSS with most recent change out 2020, type 2 DM, hernia repair 2015, htn, gout, and dyslipidemia from home, but currently at [facility] after recent hospitalization for generalized weakness with difficulty ambulating d/t persistent right knee pain who was sent back to the ER for worsening renal function.  Continues to report feeling generally weak and is unable to stand for more than a few minutes on his right leg d/t right knee pain.  Blood culture +MSSA bacteremia.  BUN/creat 46/4.92 today (creat 5.3 on admit with baseline creat 1-1.1 in 8/2023). H+H 7.6+23.8 today (BL hg 10-11), plt 99.      Last Recorded Vitals:  Vitals:    11/16/23 1442 11/16/23 1445 11/16/23 1500 11/16/23 1507   BP: 122/62 120/64 122/68 128/66   BP Location:    Right arm   Patient Position:    Lying   Pulse: 66 65 66 62   Resp: 18 18 17 16   Temp:    36.2 °C (97.2 °F)   TempSrc:    Temporal   SpO2: 96% 98% 97% 95%   Weight:       Height:           Last Labs:  CBC - 11/16/2023:  6:30 AM  8.2 7.6 99    23.8      CMP - 11/16/2023:  6:30 AM  7.3 6.0 51 --- 0.5   4.4 2.6 31 117      PTT - No results in last year.  _   _ _     Troponin I   Date/Time Value Ref Range Status   09/18/2023 05:52 PM 15 0 - 20 ng/L Final     Comment:     .  Less than 99th percentile of normal range cutoff-  Female and children under 18 years old <14 ng/L; Male <21 ng/L: Negative  Repeat testing should be performed if clinically indicated.   .  Female and children under 18 years old 14-50 ng/L; Male 21-50 ng/L:  Consistent with possible cardiac damage and possible increased clinical   risk. Serial measurements may help to assess extent of myocardial damage.   .  >50 ng/L: Consistent with cardiac damage, increased clinical risk and  myocardial infarction. Serial measurements may help assess extent of   myocardial damage.   .   NOTE: Children less than 1  year old may have higher baseline troponin   levels and results should be interpreted in conjunction with the overall   clinical context.   .  NOTE: Troponin I testing is performed using a different   testing methodology at [facility] than at other   [facility] hospitals. Direct result comparisons should only   be made within the same method.       BNP   Date/Time Value Ref Range Status   10/02/2023 01:07 PM 97 0 - 99 pg/mL Final     Hemoglobin A1C   Date/Time Value Ref Range Status   08/11/2023 12:59 PM 9.9 (A) % Final     Comment:          Diagnosis of Diabetes-Adults   Non-Diabetic: < or = 5.6%   Increased risk for developing diabetes: 5.7-6.4%   Diagnostic of diabetes: > or = 6.5%  .       Monitoring of Diabetes                Age (y)     Therapeutic Goal (%)   Adults:          >18           <7.0   Pediatrics:    13-18           <7.5                   7-12           <8.0                   0- 6            7.5-8.5   American Diabetes Association. Diabetes Care 33(S1), Jan 2010.     02/07/2023 09:07 AM 8.4 (A) % Final     Comment:          Diagnosis of Diabetes-Adults   Non-Diabetic: < or = 5.6%   Increased risk for developing diabetes: 5.7-6.4%   Diagnostic of diabetes: > or = 6.5%  .       Monitoring of Diabetes                Age (y)     Therapeutic Goal (%)   Adults:          >18           <7.0   Pediatrics:    13-18           <7.5                   7-12           <8.0                   0- 6            7.5-8.5   American Diabetes Association. Diabetes Care 33(S1), Jan 2010.       VLDL   Date/Time Value Ref Range Status   08/11/2023 12:59 PM 57 (H) 0 - 40 mg/dL Final   08/10/2022 11:09 AM 33 0 - 40 mg/dL Final   08/19/2020 12:17 PM 62 (H) 0 - 40 mg/dL Final      Last I/O:  I/O last 3 completed shifts:  In: 2660 (22.3 mL/kg) [P.O.:2460; IV Piggyback:200]  Out: 3025 (25.3 mL/kg) [Urine:3025 (0.7 mL/kg/hr)]  Weight: 119.5 kg     Past Cardiology Tests (Last 3 Years):  EKG:  10/2/2023 NSR with 1AVB      Echo:  Transthoracic Echo (TTE) Complete 11/15/2023  CONCLUSIONS:   1. Left ventricular systolic function is normal with a 55-60% estimated ejection fraction.   2. Spectral Doppler shows an impaired relaxation pattern of left ventricular diastolic filling.   3. There is mild mitral and tricuspid regurgitation.   4. The estimated pulmonary artery pressure is mildly elevated with the RVSP at 39.2 mmHg.   5. No definite valvular vegetations were visualized.      Past Medical History:  He has a past medical history of Abnormal weight gain (10/27/2016), Achilles tendinitis, unspecified leg (08/16/2016), Acute upper respiratory infection, unspecified, Allergic contact dermatitis due to plants, except food (08/22/2013), BPH with obstruction/lower urinary tract symptoms, Cellulitis of right lower limb (07/30/2021), Cough, unspecified (03/21/2016), Diabetes (CMS/[facility]), Encounter for screening for human immunodeficiency virus (HIV) (06/14/2016), Hordeolum externum unspecified eye, unspecified eyelid (08/14/2019), Hyperglycemia, unspecified (12/13/2017), Incisional hernia without obstruction or gangrene (06/15/2015), Knee joint pain, MSSA bacteremia (10/2023), Muscle spasm of calf (08/16/2016), Obstructive uropathy, Personal history of other diseases of the digestive system (06/30/2015), Personal history of other diseases of the digestive system (02/05/2016), Personal history of other diseases of the respiratory system (12/23/2014), Personal history of other diseases of the respiratory system (01/28/2016), Personal history of other infectious and parasitic diseases (12/02/2015), Personal history of other infectious and parasitic diseases, Personal history of other specified conditions (02/04/2015), Personal history of other specified conditions (08/16/2016), Personal history of other specified conditions (01/26/2015), Personal history of pneumonia (recurrent) (01/29/2016), Personal history of urinary (tract) infections  (02/19/2013), Prostatitis (03/21/2023), Pruritus, unspecified (02/10/2015), Sinoatrial node dysfunction (CMS/HCC), Strain of muscle, fascia and tendon of the posterior muscle group at thigh level, right thigh, initial encounter (05/10/2016), and Unspecified symptoms and signs involving the genitourinary system (12/20/2016).    Past Surgical History:  He has a past surgical history that includes Varicose vein surgery (08/22/2013); Cardiac pacemaker placement (08/22/2013); Other surgical history (06/15/2015); Ventral hernia repair (06/15/2015); Other surgical history (09/16/2019); and Peripherally inserted central catheter insertion.      Social History:  He reports that he has never smoked. He has never been exposed to tobacco smoke. He has never used smokeless tobacco. He reports that he does not currently use alcohol. He reports that he does not use drugs.    Family History:  No family history on file.     Allergies:  Keflex [cephalexin] and Bupropion    Inpatient Medications:  Scheduled medications   Medication Dose Route Frequency    amLODIPine  10 mg oral q PM    ceFAZolin in dextrose (iso-os)  2 g intravenous q12h    finasteride  5 mg oral Daily    heparin (porcine)  5,000 Units subcutaneous q8h    insulin glargine  10 Units subcutaneous q24h    insulin lispro  0-5 Units subcutaneous TID with meals    melatonin  5 mg oral Nightly    metoprolol succinate XL  100 mg oral Daily    mupirocin   Topical BID    perflutren protein A microsphere  0.5 mL intravenous Once in imaging    rosuvastatin  10 mg oral Nightly    sulfur hexafluoride microsphr  2 mL intravenous Once in imaging    tamsulosin  0.8 mg oral Nightly     PRN medications   Medication    acetaminophen    Or    acetaminophen    Or    acetaminophen    dextrose 10 % in water (D10W)    dextrose    fentaNYL PF    glucagon    HYDROcodone-acetaminophen    HYDROcodone-acetaminophen    lidocaine    midazolam     Continuous Medications   Medication Dose Last Rate      Outpatient Medications:  Current Outpatient Medications   Medication Instructions    acetaminophen (TYLENOL) 650 mg, oral, Every 4 hours PRN    allopurinol (ZYLOPRIM) 100 mg, oral, Daily    amLODIPine (NORVASC) 10 mg, oral, Every evening    bisacodyl (C-LAX LAXATIVE (BISACODYL)) 10 mg, oral, Daily PRN, Do not crush, chew, or split. For no results from MOM and/or no BM in 3 days    bisacodyl (DULCOLAX (BISACODYL)) 10 mg, rectal, Daily PRN    ferrous gluconate (Fergon) 324 (38 Fe) mg tablet 38 mg of iron, oral, Daily with breakfast    finasteride (PROSCAR) 5 mg, oral, Daily    fluticasone (Flonase) 50 mcg/actuation nasal spray 1 spray, Each Nostril, Every 12 hours PRN, Shake gently. Before first use, prime pump. After use, clean tip and replace cap.    furosemide (LASIX) 40 mg, oral, Daily    hydrALAZINE (APRESOLINE) 10 mg, oral, 3 times daily, Hold for SBP <120    HYDROcodone-acetaminophen (Norco) 5-325 mg tablet 1 tablet, oral, Every 6 hours PRN    insulin degludec (TRESIBA FLEXTOUCH U-100) 10 Units, subcutaneous, Nightly    insulin regular (HUMULIN R) 0-5 Units, subcutaneous, 3 times daily with meals, Take as directed per insulin instructions.    lactobacillus acidophilus & bulgar (Lactinex) 1 million cell chewable tablet 1 tablet, oral, Daily, For 28 days    losartan (Cozaar) 100 mg tablet TAKE 1 TABLET BY MOUTH EVERY DAY    lubricating eye drops ophthalmic solution 1 drop, Left Eye, Every 8 hours PRN    menthol (Biofreeze, menthol,) 4 % gel 1 Application, topical (top), Every 6 hours PRN, To right knee    metoprolol succinate XL (Toprol-XL) 100 mg 24 hr tablet TAKE 1 TABLET BY MOUTH EVERY DAY    mineral oil enema 1 enema, rectal, Daily PRN, If no BM or results from MOM in 3 days    naltrexone (DEPADE) 50 mg, oral, Daily, Pt has not started this medication yet    ondansetron (ZOFRAN) 4 mg, oral, Every 6 hours PRN    pantoprazole (PROTONIX) 40 mg, oral, Daily before breakfast, Do not crush, chew, or split.     potassium chloride CR (Klor-Con M20) 20 mEq ER tablet 20 mEq, oral, Daily, Do not crush or chew.    rosuvastatin (Crestor) 10 mg tablet TAKE 1 TABLET BY MOUTH EVERYDAY AT BEDTIME    sennosides-docusate sodium (Kita-Colace) 8.6-50 mg tablet 2 tablets, oral, 2 times daily    sodium chloride 0.9% (sodium chloride) flush 10 mL, intravenous, 2 times daily, Every shift    tamsulosin (FLOMAX) 0.8 mg, oral, Nightly       Physical Exam:  Constitutional: Pleasant, Awake/Alert/Oriented to person place and time. No distress  Head: Atraumatic, Normocephalic  Eyes: EOMI. JENIFFER  Neck: Enlarged neck circumference, No JVD  Cardiovascular: Regular rate and rhythm, S1, S2. No extra heart sounds or murmurs  Respiratory: Clear to auscultation bilaterally. No wheezing, rales or rhonchi. Good chest wall expansion  Abdomen: Soft, Nontender, Obese. Bowel sounds appreciated  Musculoskeletal: ROM intact. Muscle strength grossly intact upper and lower extremities 5/5.   Neurological: CNII-XII intact. Sensation grossly intact  Extremities: Warm and dry. No acute rashes and lesions  Psychiatric: Appropriate mood and affect       Assessment/Plan   Very pleasant 74 yo male with h/o pacemaker implanted ~1996 for SSS with most recent change out 2020, type 2 DM, hernia repair 2015, htn, gout, and dyslipidemia from home, but currently at [facility] after recent hospitalization for generalized weakness with difficulty ambulating d/t persistent right knee pain who was sent back to the ER for worsening renal function (creat 5.3 on admit, with baseline creat 1-1.1).     Assessment:  Tricuspid valve endocarditis/RA lead vegetation  MSSA bacteremia  NOHELIA  Acute on chronic anemia  Thrombocytopenia    Plan:  FILIPE today is pending full report, however, reviewed with Dr. Hall-- shows tricuspid valve vegetation as well as likely right atrial lead vegetation.  Case discussed with EP, Dr. Abarca who has recommended transfer to [facility] (Discussed with   Federica) for device lead extraction.  Antibiotic treatment per ID.       PICC - Adult 11/13/23 Single lumen Left Brachial vein (Active)   Line Necessity Intravenous medication therapy 11/16/23 0800   Site Assessment Clean;Dry;Intact 11/16/23 0800   Proximal Lumen Status No blood return 11/16/23 0800   Dressing Type Transparent 11/16/23 0800   Dressing Status Clean;Dry 11/16/23 0800   Number of days: 3       Peripheral IV 11/13/23 20 G Right;Lateral;Distal Wrist (Active)   Site Assessment Clean;Dry;Intact 11/16/23 0800   Dressing Type Transparent 11/16/23 0800   Line Status Capped;Saline locked 11/16/23 0800   Dressing Status Clean;Dry 11/16/23 0800   Number of days: 3       Urethral Catheter (Active)   Site Assessment Clean;Skin intact 11/16/23 0802   Collection Container Standard drainage bag 11/16/23 0802   Securement Method Securing device (Describe) 11/16/23 0802   Reason for Continuing Urinary Catheterization chronic urinary retention 11/16/23 0802   Number of days: 3       Code Status:  Full Code      JOSE Rogers-CNP

## 2023-11-16 NOTE — Clinical Note
Patient Clipped and Prepped: right groin. Prepped with ChloraPrep, a minimum of 3 minute dry time, longer if needed, no pooling noted, patient draped in sterile fashion. We will call you if the urine culture shows a bacteria which is resistant to the antibiotic prescribed.     Call your primary MD if you do not notice any improvement in 2-3 days.    Drink plenty of fluids. You can also take cranberry juice.     Seek medical attention as soon as possible if you experience any vomiting, fever, abdominal pain or back pain/side pain, or no improvement.

## 2023-11-16 NOTE — HOSPITAL COURSE
Chester Verduzco is a 73 y.o. male with PMH of dual chamber pacemaker implanted on 11/26/1996 for SSS (RV is passive) with most recent generator replacement on 11/03/2020 ([facility]), type 2 DM, hernia repair 2015, HTN, gout, and dyslipidemia. Blood culture +MSSA bacteremia. Today, BUN/creat 46/4.92 (creat 5.3 on admit with baseline creat 1-1.1 in 8/2023). Had recent hospitalization for generalized weakness, NOHELIA with hyponatremia discharged to SNF. Came in with uptrending BUN/creatinine and positive blood cultures for MSSA (11/10, also previously 10/9). Pt has had persistent MSSA bacteremia, subjective hx of fevers, a new conjunctival hemorrhage and decline in renal function since 10/2023. FILIPE today is pending full report, however, reviewed with Dr. Hall-- shows tricuspid valve vegetation (0.9 cm circumferential) as well as right atrial lead (0.9 cm linear) vegetation. TTE (11/13/2023) showing normal LVEF, mild MR and mild TR.

## 2023-11-16 NOTE — POST-PROCEDURE NOTE
Physician Transition of Care Summary  Invasive Cardiovascular Lab    Procedure Date: 11/16/2023  Attending:    * Jimmie Hall  Resident/Fellow/Other Assistant: Surgeon(s) and Role:     * Jimmie Hall MD    Indications:   * No Diagnosis Codes entered *    Post-procedure diagnosis:   * No Diagnosis Codes entered *    Procedure(s):   * No procedures documented on diagnosis form *      Procedure Findings:   Two masses suspicious for vegetation attached to the right atrial lead and tricuspid valve    Description of the Procedure:   Transesophageal echocardiogram    Complications:   None    Stents/Implants:   Cardiovascular Implants       Pacemaker    Cardiac Pacemaker - Implanted        As of 11/6/2023       Status: Implanted                              Anticoagulation/Antiplatelet Plan:   None    Estimated Blood Loss:   * No values recorded between 11/16/2023  1:56 PM and 11/16/2023  2:45 PM *    Anesthesia: * No anesthesia type entered * Anesthesia Staff: No anesthesia staff entered.    Any Specimen(s) Removed:   No specimens collected during this procedure.    Disposition:   Post FILIPE recovery. Findings messaged to Sherwin Whelan and Yanna.      Electronically signed by: Jimmie Hall MD, 11/16/2023 2:45 PM

## 2023-11-16 NOTE — Clinical Note
Patient Clipped and Prepped: groin, chest and abdomen. Prepped with ChloraPrep, a minimum of 3 minute dry time, longer if needed, no pooling noted, patient draped in sterile fashion.

## 2023-11-16 NOTE — INTERVAL H&P NOTE
H&P reviewed. The patient was examined and there are no changes to the H&P.    Risks, benefits, and alternatives discussed in full. Written informed consent obtained and placed in the chart. Proceed with FILIPE for MSSA bacteremia.

## 2023-11-16 NOTE — PROGRESS NOTES
Chester Verduzco is a 73 y.o. male on day 3 of admission presenting with Acute renal failure, unspecified acute renal failure type (CMS/HCC).    Subjective   Interval History: no fever, no new complaints        Review of Systems    Objective   Range of Vitals (last 24 hours)  Heart Rate:  [68-71]   Temp:  [36.1 °C (97 °F)-37.1 °C (98.8 °F)]   Resp:  [16-20]   BP: (105-120)/(62-64)   SpO2:  [96 %-97 %]   Daily Weight  11/13/23 : 120 kg (263 lb 7.2 oz)    Body mass index is 33.82 kg/m².    Physical Exam  Constitutional:       Appearance: Normal appearance.   HENT:      Head: Normocephalic and atraumatic.      Mouth/Throat:      Mouth: Mucous membranes are moist.      Pharynx: Oropharynx is clear.   Eyes:      Pupils: Pupils are equal, round, and reactive to light.   Cardiovascular:      Rate and Rhythm: Normal rate and regular rhythm.      Heart sounds: Normal heart sounds.   Pulmonary:      Effort: Pulmonary effort is normal.      Breath sounds: Normal breath sounds.   Abdominal:      General: Abdomen is flat. Bowel sounds are normal.      Palpations: Abdomen is soft.   Musculoskeletal:      Cervical back: Normal range of motion.      Comments: Rt knee effusion, no cellulitis   Neurological:      Mental Status: He is alert.         Antibiotics  sodium chloride 0.9 % bolus 1,000 mL  heparin (porcine) injection 5,000 Units  perflutren lipid microspheres (Definity) injection 0.5-10 mL of dilution  sulfur hexafluoride microsphr (Lumason) injection 24.28 mg  perflutren protein A microsphere (Optison) injection 0.5 mL  linezolid (Zyvox)  mg in 300 mL  amLODIPine (Norvasc) tablet 10 mg  finasteride (Proscar) tablet 5 mg  metoprolol succinate XL (Toprol-XL) 24 hr tablet 100 mg  rosuvastatin (Crestor) tablet 10 mg  tamsulosin (Flomax) 24 hr capsule 0.8 mg  dextrose 50 % injection 25 g  glucagon (Glucagen) injection 1 mg  dextrose 10 % in water (D10W) infusion  insulin lispro (HumaLOG) injection 0-5 Units  insulin  glargine (Lantus) injection 10 Units      atorvastatin (Lipitor) tablet    bisacodyl (Dulcolax) EC tablet  cyclobenzaprine (Flexeril) tablet        guaiFENesin (Mucinex) 12 hr tablet  hydrALAZINE (Apresoline) tablet  polyethylene glycol (Glycolax, Miralax) packet      ondansetron (Zofran) tablet        acetaminophen (Tylenol) tablet  HYDROmorphone (Dilaudid) injection 0.2 mg  ceFAZolin (Ancef) 2 g IV in dextrose 5% 50 mL  ceFAZolin (Ancef) 2 g in dextrose 5 % in water (D5W) 100 mL IV  ceFAZolin in dextrose (iso-os) (Ancef) IVPB 2 g  acetaminophen (Tylenol) tablet 650 mg  HYDROcodone-acetaminophen (Norco) 5-325 mg per tablet 1 tablet  HYDROcodone-acetaminophen (Norco) 5-325 mg per tablet 0.5 tablet  acetaminophen (Tylenol) tablet 650 mg  potassium chloride CR (Klor-Con M20) ER tablet 20 mEq  immune globulin (human) (Gammagard Liquid 10%) infusion 30 g  acetaminophen (Tylenol) tablet 650 mg  diphenhydrAMINE (BENADryl) injection 50 mg      Relevant Results  Labs  Results from last 72 hours   Lab Units 11/16/23 0630 11/15/23  0658 11/14/23  0702 11/13/23  1637   WBC AUTO x10*3/uL 8.2 7.5 8.7 9.8   HEMOGLOBIN g/dL 7.6* 7.8* 7.9* 8.4*   HEMATOCRIT % 23.8* 24.7* 24.4* 26.0*   PLATELETS AUTO x10*3/uL 99* 102* 91* 91*   NEUTROS PCT AUTO %  --   --   --  79.0   LYMPHS PCT AUTO %  --   --   --  10.1   MONOS PCT AUTO %  --   --   --  6.4   EOS PCT AUTO %  --   --   --  2.6       Results from last 72 hours   Lab Units 11/16/23  0630 11/15/23  0658 11/14/23  0702   SODIUM mmol/L 136 133* 132*   POTASSIUM mmol/L 3.4* 3.3* 3.4*   CHLORIDE mmol/L 106 103 101   CO2 mmol/L 19* 20* 19*   BUN mg/dL 46* 51* 52*   CREATININE mg/dL 4.92* 5.20* 5.35*   GLUCOSE mg/dL 92 133* 111*   CALCIUM mg/dL 7.3* 7.1* 7.3*   ANION GAP mmol/L 14 13 15   EGFR mL/min/1.73m*2 12* 11* 11*   PHOSPHORUS mg/dL 4.4 4.1 4.7       Results from last 72 hours   Lab Units 11/16/23  0630 11/15/23  0658 11/14/23  0702 11/13/23  1637   ALK PHOS U/L  --   --   --  117    BILIRUBIN TOTAL mg/dL  --   --   --  0.5   PROTEIN TOTAL g/dL  --   --   --  6.0*   ALT U/L  --   --   --  31   AST U/L  --   --   --  51*   ALBUMIN g/dL 2.6* 2.6* 2.5* 2.7*       Estimated Creatinine Clearance: 18.7 mL/min (A) (by C-G formula based on SCr of 4.92 mg/dL (H)).  C-Reactive Protein   Date Value Ref Range Status   11/14/2023 9.21 (H) <1.00 mg/dL Final     CRP   Date Value Ref Range Status   09/18/2023 22.55 (A) mg/dL Final     Comment:     REF VALUE  < 1.00     06/23/2021 0.50 mg/dL Final     Comment:     REF VALUE  < 1.00       Microbiology  Reviewed  Imaging  reviewed        Assessment/Plan   MSSA bacteremia, the repeat BC is positive, for FILIPE  Right kne pain  Pyuria, urine culture is negative  Lymphedema / stasis     Recommendations :  Continue Cefazolin   Discussed with the medical team     I spent minutes in the professional and overall care of this patient.      Payton Raines MD

## 2023-11-16 NOTE — PROGRESS NOTES
Moe Ho is a 73 y.o. male on day 3 of admission presenting with Acute renal failure, unspecified acute renal failure type (CMS/HCC).      Subjective     States he feels improved from yesterday. Denies fever, chills, nausea, vomiting, chest pain, or abdominal pain. Continues to endorse R knee pain, but this is chronic c/w his pmhx of osteoarthritis. Overall feels pain is under control. Has not had dysuria or hematuria.       Objective     Last Recorded Vitals  /64   Pulse 71   Temp 36.8 °C (98.2 °F)   Resp 16   Wt 120 kg (263 lb 7.2 oz)   SpO2 97%   Intake/Output last 3 Shifts:    Intake/Output Summary (Last 24 hours) at 11/16/2023 0934  Last data filed at 11/16/2023 0429  Gross per 24 hour   Intake 1060 ml   Output 1775 ml   Net -715 ml         Admission Weight  Weight: 115 kg (253 lb 4.9 oz) (11/13/23 1554)    Daily Weight  11/13/23 : 120 kg (263 lb 7.2 oz)    Image Results  Transthoracic Echo (TTE) [facility], [address] [place] [address]                Tel [phone] and Fax [phone]    TRANSTHORACIC ECHOCARDIOGRAM REPORT       Patient Name:      MOE HO   Reading Physician:    [id number] Joshua Donis MD  Study Date:        11/14/2023           Ordering Provider:    [id number] JENNIFER ZURITA  MRN/PID:           [id number]             Fellow:  Accession#:        [id number]         Nurse:                Rhina Henao RN  Date of Birth/Age: 1950 / 73 years Sonographer:          Laurence Trujillo RDCS  Gender:            M                    Additional Staff:  Height:            187.96 cm            Admit Date:           11/13/2023  Weight:            119.29 kg            Admission Status:     Inpatient -                                                                 Routine  BSA:               2.44 m2              Encounter#:           [id number]                                          Department Location:  [facility] Non                                                                Invasive  Blood Pressure: 118 /68 mmHg    Study Type:    TRANSTHORACIC ECHO (TTE) COMPLETE  Diagnosis/ICD: Endocarditis, valve unspecified-I38  Indication:    Endocarditis  CPT Code:      Echo Complete w Full Doppler-[id number]    Patient History:  Pacer/Defib:       Permanent pacemaker  Pertinent History: HTN and Hyperlipidemia.    Study Detail: The following Echo studies were performed: 2D, M-Mode, Doppler and                color flow. Technically challenging study due to body habitus.                Definity used as a contrast agent for endocardial border                definition. Total contrast used for this procedure was 2.5 mL via                IV push. The patient was awake.       PHYSICIAN INTERPRETATION:  Left Ventricle: The left ventricular systolic function is normal, with an estimated ejection fraction of 55-60%. There are no regional wall motion abnormalities. The left ventricular cavity size is normal. Spectral Doppler shows an impaired relaxation pattern of left ventricular diastolic filling.  Left Atrium: The left atrium is mildly dilated.  Right Ventricle: The right ventricle is normal in size. There is normal right ventricular global systolic function. A device is visualized in the right ventricle.  Right Atrium: The right atrium is mildly dilated.  Aortic Valve: The aortic valve appears structurally normal. There is no evidence of aortic valve stenosis.  There is no evidence of aortic valve regurgitation. The peak instantaneous gradient of the aortic valve is 5.0 mmHg. The mean gradient of the aortic valve is 2.8 mmHg.  Mitral Valve: The mitral valve is normal in structure. There is mild mitral valve regurgitation.  Tricuspid Valve: The tricuspid valve is structurally  normal. There is mild tricuspid regurgitation.  Pulmonic Valve: The pulmonic valve is structurally normal. There is no indication of pulmonic valve regurgitation.  Pericardium: There is no pericardial effusion noted.  Aorta: The aortic root is normal.  Pulmonary Artery: The tricuspid regurgitant velocity is 2.70 m/s, and with an estimated right atrial pressure of 10 mmHg, the estimated pulmonary artery pressure is mildly elevated with the RVSP at 39.2 mmHg.  Systemic Veins: The inferior vena cava appears to be of normal size.       CONCLUSIONS:   1. Left ventricular systolic function is normal with a 55-60% estimated ejection fraction.   2. Spectral Doppler shows an impaired relaxation pattern of left ventricular diastolic filling.   3. There is mild mitral and tricuspid regurgitation.   4. The estimated pulmonary artery pressure is mildly elevated with the RVSP at 39.2 mmHg.   5. No definite valvular vegetations were visualized.    QUANTITATIVE DATA SUMMARY:  2D MEASUREMENTS:                           Normal Ranges:  IVSd:          0.84 cm   (0.6-1.1cm)  LVPWd:         0.90 cm   (0.6-1.1cm)  LVIDd:         4.56 cm   (3.9-5.9cm)  LVIDs:         3.09 cm  LV Mass Index: 53.2 g/m2  LV % FS        32.3 %    LA VOLUME:                                Normal Ranges:  LA Vol A4C:        67.7 ml    (22+/-6mL/m2)  LA Vol A2C:        62.9 ml  LA Vol BP:         65.3 ml  LA Vol Index A4C:  27.7 ml/m2  LA Vol Index A2C:  25.8 ml/m2  LA Vol Index BP:   26.7 ml/m2  LA Area A4C:       22.4 cm2  LA Area A2C:       21.6 cm2  LA Major Axis A4C: 6.3 cm  LA Major Axis A2C: 6.3 cm  LA Volume Index:   26.2 ml/m2  LA Vol A4C:        63.3 ml  LA Vol A2C:        61.9 ml    RA VOLUME BY A/L METHOD:                        Normal Ranges:  RA Area A4C: 24.4 cm2    M-MODE MEASUREMENTS:                   Normal Ranges:  Ao Root: 3.60 cm (2.0-3.7cm)  LAs:     3.54 cm (2.7-4.0cm)    AORTA MEASUREMENTS:                       Normal Ranges:  Ao Sinus,  d: 3.50 cm (2.1-3.5cm)  Asc Ao, d:   3.20 cm (2.1-3.4cm)    LV SYSTOLIC FUNCTION BY 2D PLANIMETRY (MOD):                      Normal Ranges:  EF-A4C View: 64.6 % (>=55%)  EF-A2C View: 60.8 %  EF-Biplane:  63.3 %    LV DIASTOLIC FUNCTION:                                Normal Ranges:  MV Peak E:        0.39 m/s    (0.7-1.2 m/s)  MV Peak A:        0.55 m/s    (0.42-0.7 m/s)  E/A Ratio:        0.71        (1.0-2.2)  MV e'             0.06 m/s    (>8.0)  MV lateral e'     0.06 m/s  MV medial e'      0.06 m/s  MV A Dur:         163.78 msec  E/e' Ratio:       6.48        (<8.0)  PulmV Sys Altaf:    51.01 cm/s  PulmV Rhodes Altaf:   36.45 cm/s  PulmV S/D Altaf:    1.40  PulmV A Revs Altaf: 26.64 cm/s  PulmV A Revs Dur: 122.26 msec    MITRAL VALVE:                  Normal Ranges:  MV DT: 186 msec (150-240msec)    AORTIC VALVE:                                    Normal Ranges:  AoV Vmax:                1.12 m/s (<=1.7m/s)  AoV Peak P.0 mmHg (<20mmHg)  AoV Mean P.8 mmHg (1.7-11.5mmHg)  LVOT Max Altaf:            0.89 m/s (<=1.1m/s)  AoV VTI:                 21.14 cm (18-25cm)  LVOT VTI:                17.86 cm  LVOT Diameter:           2.06 cm  (1.8-2.4cm)  AoV Area, VTI:           2.81 cm2 (2.5-5.5cm2)  AoV Area,Vmax:           2.66 cm2 (2.5-4.5cm2)  AoV Dimensionless Index: 0.85       RIGHT VENTRICLE:  RV Basal 3.90 cm  RV Mid   2.50 cm  RV Major 8.6 cm  TAPSE:   21.0 mm  RV s'    0.08 m/s    TRICUSPID VALVE/RVSP:                              Normal Ranges:  Peak TR Velocity: 2.70 m/s  RV Syst Pressure: 39.2 mmHg (< 30mmHg)  IVC Diam:         2.14 cm    PULMONIC VALVE:                       Normal Ranges:  PV Max Altaf: 0.8 m/s  (0.6-0.9m/s)  PV Max P.4 mmHg    Pulmonary Veins:  PulmV A Revs Dur: 122.26 msec  PulmV A Revs Altaf: 26.64 cm/s  PulmV Rhodes Altaf:   36.45 cm/s  PulmV S/D Altaf:    1.40  PulmV Sys Altaf:    51.01 cm/s    AORTA:  Asc Ao Diam 3.15 cm       [id number] Joshua Donis MD  Electronically  signed on 11/15/2023 at 11:30:18 AM       ** Final **      Physical Exam  Constitutional:       Appearance: Normal appearance. He is obese. He is not ill-appearing.   HENT:      Head: Normocephalic and atraumatic.   Eyes:      Extraocular Movements: Extraocular movements intact.   Cardiovascular:      Rate and Rhythm: Normal rate and regular rhythm.      Heart sounds: Normal heart sounds. No murmur heard.     No friction rub. No gallop.   Pulmonary:      Effort: Pulmonary effort is normal. No respiratory distress.      Breath sounds: No wheezing, rhonchi or rales.   Abdominal:      General: Abdomen is flat. There is distension.      Palpations: There is no mass.      Tenderness: There is no abdominal tenderness. There is no guarding.      Hernia: A hernia is present.   Musculoskeletal:         General: No tenderness, deformity or signs of injury. Normal range of motion.      Comments: Mild swelling of R knee. No erythema or pain with palpation. Intact ROM of bilateral knees.   Skin:     General: Skin is warm and dry.      Findings: No rash.      Comments: No splinter hemorrhages or Osler/Janeway lesions   Neurological:      General: No focal deficit present.      Mental Status: He is alert. Mental status is at baseline.   Psychiatric:         Mood and Affect: Mood normal.         Relevant Results  Scheduled medications  amLODIPine, 10 mg, oral, q PM  ceFAZolin in dextrose (iso-os), 2 g, intravenous, q12h  finasteride, 5 mg, oral, Daily  heparin (porcine), 5,000 Units, subcutaneous, q8h  insulin glargine, 10 Units, subcutaneous, q24h  insulin lispro, 0-5 Units, subcutaneous, TID with meals  melatonin, 5 mg, oral, Nightly  metoprolol succinate XL, 100 mg, oral, Daily  mupirocin, , Topical, BID  perflutren protein A microsphere, 0.5 mL, intravenous, Once in imaging  rosuvastatin, 10 mg, oral, Nightly  sulfur hexafluoride microsphr, 2 mL, intravenous, Once in imaging  tamsulosin, 0.8 mg, oral, Nightly      Continuous  medications     PRN medications  PRN medications: acetaminophen **OR** acetaminophen **OR** acetaminophen, dextrose 10 % in water (D10W), dextrose, glucagon, HYDROcodone-acetaminophen, HYDROcodone-acetaminophen    Results for orders placed or performed during the hospital encounter of 11/13/23 (from the past 24 hour(s))   POCT GLUCOSE   Result Value Ref Range    POCT Glucose 130 (H) 74 - 99 mg/dL   POCT GLUCOSE   Result Value Ref Range    POCT Glucose 127 (H) 74 - 99 mg/dL   POCT GLUCOSE   Result Value Ref Range    POCT Glucose 144 (H) 74 - 99 mg/dL   CBC   Result Value Ref Range    WBC 8.2 4.4 - 11.3 x10*3/uL    nRBC 0.0 0.0 - 0.0 /100 WBCs    RBC 2.77 (L) 4.50 - 5.90 x10*6/uL    Hemoglobin 7.6 (L) 13.5 - 17.5 g/dL    Hematocrit 23.8 (L) 41.0 - 52.0 %    MCV 86 80 - 100 fL    MCH 27.4 26.0 - 34.0 pg    MCHC 31.9 (L) 32.0 - 36.0 g/dL    RDW 14.5 11.5 - 14.5 %    Platelets 99 (L) 150 - 450 x10*3/uL   Magnesium   Result Value Ref Range    Magnesium 1.63 1.60 - 2.40 mg/dL   Renal Function Panel   Result Value Ref Range    Glucose 92 74 - 99 mg/dL    Sodium 136 136 - 145 mmol/L    Potassium 3.4 (L) 3.5 - 5.3 mmol/L    Chloride 106 98 - 107 mmol/L    Bicarbonate 19 (L) 21 - 32 mmol/L    Anion Gap 14 10 - 20 mmol/L    Urea Nitrogen 46 (H) 6 - 23 mg/dL    Creatinine 4.92 (H) 0.50 - 1.30 mg/dL    eGFR 12 (L) >60 mL/min/1.73m*2    Calcium 7.3 (L) 8.6 - 10.3 mg/dL    Phosphorus 4.4 2.5 - 4.9 mg/dL    Albumin 2.6 (L) 3.4 - 5.0 g/dL   POCT GLUCOSE   Result Value Ref Range    POCT Glucose 94 74 - 99 mg/dL         Assessment/Plan        This patient has a central line   Reason for the central line remaining today? Long-term abx therapy    This patient has a urinary catheter   Reason for the urinary catheter remaining today? urinary retention/bladder outlet obstruction, acute or chronic      Principal Problem:    Acute renal failure, unspecified acute renal failure type (CMS/HCC)  Active Problems:    Enlarged prostate with lower  urinary tract symptoms (LUTS)    HTN (hypertension)    Lymphedema    Type 2 diabetes mellitus with diabetic neuropathy, with long-term current use of insulin (CMS/[facility])    MSSA bacteremia    Diabetic glomerulopathy (CMS/[facility])    CKD (chronic kidney disease) stage 4, GFR 15-29 ml/min (CMS/[facility])    73 y.o. male with PMHx of lymphedema, ventral hernia repair, HTN, HLD, BPH, obesity, recent hospitalization for generalized weakness, NOHELIA with hyponatremia discharged to SNF presenting for evaluation of uptrending BUN/creatinine and positive blood cultures for MSSA (11/10, also previously 10/9). Worsening renal function is concerning for glomerulonephritis in the setting of possible subacute endocarditis. Pt has had persistent MSSA bacteremia, subjective hx of fevers, a new conjunctival hemorrhage and decline in renal function since 10/2023 which is concerning for an indolent endocarditis. Possible source for original MSSA is unclear, but pt has a PM which if infected could be causing persistent infections.    Acute Medical Conditions      #NOHELIA  Possibly secondary to glomerulonephritis  - Patient's Cr 5.43 in ED (3.46 on 11/9)  - Received 1 L NS in ED   - Patient's Cr continues to be trending down, 4.92 on 11/16 AM (prev 5.2)  Plan  - Given that presentation is not prerenal and already recevied 1L fluids in ED, hold off on more fluids   - Bladder scans qshift ordered   - Nephrology consulted, appreciate recs  - Concern for possible glomerulonephritis in setting of MSSA bacteremia/endocarditis  - C3, C4 and RF negative  - Will dose medications for GFR of 15  - Supportive care, will manage infection     #Concern for Endocarditis in setting of Pacemaker    #Positive Blood Culture for Staph aureus   #Right knee effusion  - Patient had positive blood culture for Staph aureus on 11/10 and 10/9  - PICC line placed 11/10  - Has a history of pacemaker and was planned to have TTE per his PCP   - low suspicion for septic arthritis in R  knee due to known hx of OA, limited swelling and no pain with palpation, no erythema, and intact ROM. Also a native joint.  -Previously on Vanc at OSH but stopped due to worsening Cr after 1 dosage, then changed to Zosyn  Plan  - TTE inconclusive for vegetations  - Cardiology consulted, planning for FILIPE  - Repeat blood cultures ordered 11/16   - C/w Cefazolin  - Infectious Disease consulted, appreciate recs  - ID recommends aspiration of patient's right knee to r/o another source of infection  - Orthopedic surgery consulted       Chronic Medical Conditions   #Diabetes - sliding scale + home Lantus 10 units  #HTN - c/w Amlodipine, Metoprolol, hold Losartan   #BPH - c/w Finasteride, Tamulosin, has chronic maldonado (last changed a few days ago per pt)  #HLD - c/w Crestor   #Osteoarthritis R Knee  -pain control with tylenol 650mg H5yivhr mild, norco 0.5 tablet for moderate pain, norco 1 tablet for severe pain       Joe Foreman MD

## 2023-11-17 ENCOUNTER — APPOINTMENT (OUTPATIENT)
Dept: CARDIOLOGY | Facility: HOSPITAL | Age: 73
DRG: 260 | End: 2023-11-17
Payer: MEDICARE

## 2023-11-17 LAB
ABO GROUP (TYPE) IN BLOOD: NORMAL
ALBUMIN SERPL BCP-MCNC: 2.7 G/DL (ref 3.4–5)
ALP SERPL-CCNC: 94 U/L (ref 33–136)
ALT SERPL W P-5'-P-CCNC: <3 U/L (ref 10–52)
ANION GAP SERPL CALC-SCNC: 16 MMOL/L (ref 10–20)
ANTIBODY SCREEN: NORMAL
APTT PPP: 36 SECONDS (ref 27–38)
AST SERPL W P-5'-P-CCNC: 11 U/L (ref 9–39)
ATRIAL RATE: 67 BPM
BASOPHILS # BLD AUTO: 0.02 X10*3/UL (ref 0–0.1)
BASOPHILS # BLD AUTO: 0.02 X10*3/UL (ref 0–0.1)
BASOPHILS NFR BLD AUTO: 0.1 %
BASOPHILS NFR BLD AUTO: 0.2 %
BILIRUB SERPL-MCNC: 0.3 MG/DL (ref 0–1.2)
BUN SERPL-MCNC: 44 MG/DL (ref 6–23)
CALCIUM SERPL-MCNC: 7.7 MG/DL (ref 8.6–10.6)
CHLORIDE SERPL-SCNC: 107 MMOL/L (ref 98–107)
CO2 SERPL-SCNC: 19 MMOL/L (ref 21–32)
CREAT SERPL-MCNC: 4.75 MG/DL (ref 0.5–1.3)
EOSINOPHIL # BLD AUTO: 0.11 X10*3/UL (ref 0–0.4)
EOSINOPHIL # BLD AUTO: 0.12 X10*3/UL (ref 0–0.4)
EOSINOPHIL NFR BLD AUTO: 0.8 %
EOSINOPHIL NFR BLD AUTO: 1.3 %
ERYTHROCYTE [DISTWIDTH] IN BLOOD BY AUTOMATED COUNT: 14.6 % (ref 11.5–14.5)
ERYTHROCYTE [DISTWIDTH] IN BLOOD BY AUTOMATED COUNT: 14.6 % (ref 11.5–14.5)
EST. AVERAGE GLUCOSE BLD GHB EST-MCNC: 143 MG/DL
GFR SERPL CREATININE-BSD FRML MDRD: 12 ML/MIN/1.73M*2
GLUCOSE BLD MANUAL STRIP-MCNC: 111 MG/DL (ref 74–99)
GLUCOSE BLD MANUAL STRIP-MCNC: 111 MG/DL (ref 74–99)
GLUCOSE BLD MANUAL STRIP-MCNC: 123 MG/DL (ref 74–99)
GLUCOSE BLD MANUAL STRIP-MCNC: 83 MG/DL (ref 74–99)
GLUCOSE SERPL-MCNC: 76 MG/DL (ref 74–99)
HBA1C MFR BLD: 6.6 %
HCT VFR BLD AUTO: 23.3 % (ref 41–52)
HCT VFR BLD AUTO: 23.7 % (ref 41–52)
HGB BLD-MCNC: 7.7 G/DL (ref 13.5–17.5)
HGB BLD-MCNC: 7.8 G/DL (ref 13.5–17.5)
IMM GRANULOCYTES # BLD AUTO: 0.21 X10*3/UL (ref 0–0.5)
IMM GRANULOCYTES # BLD AUTO: 0.21 X10*3/UL (ref 0–0.5)
IMM GRANULOCYTES NFR BLD AUTO: 1.6 % (ref 0–0.9)
IMM GRANULOCYTES NFR BLD AUTO: 2.2 % (ref 0–0.9)
INR PPP: 1.2 (ref 0.9–1.1)
KAPPA LC SERPL-MCNC: 11.54 MG/DL (ref 0.33–1.94)
KAPPA LC/LAMBDA SER: 1.61 {RATIO} (ref 0.26–1.65)
LAMBDA LC SERPL-MCNC: 7.15 MG/DL (ref 0.57–2.63)
LYMPHOCYTES # BLD AUTO: 0.84 X10*3/UL (ref 0.8–3)
LYMPHOCYTES # BLD AUTO: 1.15 X10*3/UL (ref 0.8–3)
LYMPHOCYTES NFR BLD AUTO: 12.2 %
LYMPHOCYTES NFR BLD AUTO: 6.2 %
MAGNESIUM SERPL-MCNC: 1.68 MG/DL (ref 1.6–2.4)
MCH RBC QN AUTO: 29.1 PG (ref 26–34)
MCH RBC QN AUTO: 29.1 PG (ref 26–34)
MCHC RBC AUTO-ENTMCNC: 32.9 G/DL (ref 32–36)
MCHC RBC AUTO-ENTMCNC: 33 G/DL (ref 32–36)
MCV RBC AUTO: 88 FL (ref 80–100)
MCV RBC AUTO: 88 FL (ref 80–100)
MONOCYTES # BLD AUTO: 0.43 X10*3/UL (ref 0.05–0.8)
MONOCYTES # BLD AUTO: 0.52 X10*3/UL (ref 0.05–0.8)
MONOCYTES NFR BLD AUTO: 3.9 %
MONOCYTES NFR BLD AUTO: 4.6 %
NEUTROPHILS # BLD AUTO: 11.8 X10*3/UL (ref 1.6–5.5)
NEUTROPHILS # BLD AUTO: 7.51 X10*3/UL (ref 1.6–5.5)
NEUTROPHILS NFR BLD AUTO: 79.5 %
NEUTROPHILS NFR BLD AUTO: 87.4 %
NRBC BLD-RTO: 0 /100 WBCS (ref 0–0)
NRBC BLD-RTO: 0 /100 WBCS (ref 0–0)
P AXIS: 21 DEGREES
P OFFSET: 131 MS
P ONSET: 74 MS
PLATELET # BLD AUTO: 106 X10*3/UL (ref 150–450)
PLATELET # BLD AUTO: 110 X10*3/UL (ref 150–450)
POTASSIUM SERPL-SCNC: 3.2 MMOL/L (ref 3.5–5.3)
PR INTERVAL: 284 MS
PROT SERPL-MCNC: 5.6 G/DL (ref 6.4–8.2)
PROT SERPL-MCNC: 5.6 G/DL (ref 6.4–8.2)
PROT UR-ACNC: 65 MG/DL (ref 5–25)
PROTHROMBIN TIME: 13.8 SECONDS (ref 9.8–12.8)
Q ONSET: 216 MS
QRS COUNT: 11 BEATS
QRS DURATION: 104 MS
QT INTERVAL: 468 MS
QTC CALCULATION(BAZETT): 494 MS
QTC FREDERICIA: 485 MS
R AXIS: 32 DEGREES
RBC # BLD AUTO: 2.65 X10*6/UL (ref 4.5–5.9)
RBC # BLD AUTO: 2.68 X10*6/UL (ref 4.5–5.9)
RH FACTOR (ANTIGEN D): NORMAL
SODIUM SERPL-SCNC: 139 MMOL/L (ref 136–145)
T AXIS: 28 DEGREES
T OFFSET: 450 MS
VENTRICULAR RATE: 67 BPM
WBC # BLD AUTO: 13.5 X10*3/UL (ref 4.4–11.3)
WBC # BLD AUTO: 9.4 X10*3/UL (ref 4.4–11.3)

## 2023-11-17 PROCEDURE — 85025 COMPLETE CBC W/AUTO DIFF WBC: CPT

## 2023-11-17 PROCEDURE — 86320 SERUM IMMUNOELECTROPHORESIS: CPT

## 2023-11-17 PROCEDURE — 83516 IMMUNOASSAY NONANTIBODY: CPT

## 2023-11-17 PROCEDURE — 93290 INTERROG DEV EVAL ICPMS IP: CPT

## 2023-11-17 PROCEDURE — 84166 PROTEIN E-PHORESIS/URINE/CSF: CPT

## 2023-11-17 PROCEDURE — 84165 PROTEIN E-PHORESIS SERUM: CPT

## 2023-11-17 PROCEDURE — 93010 ELECTROCARDIOGRAM REPORT: CPT | Performed by: INTERNAL MEDICINE

## 2023-11-17 PROCEDURE — 99222 1ST HOSP IP/OBS MODERATE 55: CPT | Performed by: INTERNAL MEDICINE

## 2023-11-17 PROCEDURE — 4B02XSZ MEASUREMENT OF CARDIAC PACEMAKER, EXTERNAL APPROACH: ICD-10-PCS | Performed by: INTERNAL MEDICINE

## 2023-11-17 PROCEDURE — 36415 COLL VENOUS BLD VENIPUNCTURE: CPT

## 2023-11-17 PROCEDURE — 86036 ANCA SCREEN EACH ANTIBODY: CPT

## 2023-11-17 PROCEDURE — 93280 PM DEVICE PROGR EVAL DUAL: CPT

## 2023-11-17 PROCEDURE — 83036 HEMOGLOBIN GLYCOSYLATED A1C: CPT

## 2023-11-17 PROCEDURE — 93280 PM DEVICE PROGR EVAL DUAL: CPT | Performed by: INTERNAL MEDICINE

## 2023-11-17 PROCEDURE — 97161 PT EVAL LOW COMPLEX 20 MIN: CPT | Mod: GP | Performed by: STUDENT IN AN ORGANIZED HEALTH CARE EDUCATION/TRAINING PROGRAM

## 2023-11-17 PROCEDURE — 1200000002 HC GENERAL ROOM WITH TELEMETRY DAILY

## 2023-11-17 PROCEDURE — 2500000004 HC RX 250 GENERAL PHARMACY W/ HCPCS (ALT 636 FOR OP/ED)

## 2023-11-17 PROCEDURE — 86901 BLOOD TYPING SEROLOGIC RH(D): CPT

## 2023-11-17 PROCEDURE — 86334 IMMUNOFIX E-PHORESIS SERUM: CPT

## 2023-11-17 PROCEDURE — 86900 BLOOD TYPING SEROLOGIC ABO: CPT

## 2023-11-17 PROCEDURE — 2500000005 HC RX 250 GENERAL PHARMACY W/O HCPCS

## 2023-11-17 PROCEDURE — 93005 ELECTROCARDIOGRAM TRACING: CPT

## 2023-11-17 PROCEDURE — 83735 ASSAY OF MAGNESIUM: CPT

## 2023-11-17 PROCEDURE — 85730 THROMBOPLASTIN TIME PARTIAL: CPT

## 2023-11-17 PROCEDURE — 80053 COMPREHEN METABOLIC PANEL: CPT

## 2023-11-17 PROCEDURE — 2500000001 HC RX 250 WO HCPCS SELF ADMINISTERED DRUGS (ALT 637 FOR MEDICARE OP)

## 2023-11-17 PROCEDURE — 99223 1ST HOSP IP/OBS HIGH 75: CPT

## 2023-11-17 PROCEDURE — 82947 ASSAY GLUCOSE BLOOD QUANT: CPT

## 2023-11-17 PROCEDURE — 93290 INTERROG DEV EVAL ICPMS IP: CPT | Performed by: INTERNAL MEDICINE

## 2023-11-17 PROCEDURE — 83521 IG LIGHT CHAINS FREE EACH: CPT

## 2023-11-17 PROCEDURE — 96372 THER/PROPH/DIAG INJ SC/IM: CPT

## 2023-11-17 PROCEDURE — 84156 ASSAY OF PROTEIN URINE: CPT

## 2023-11-17 PROCEDURE — 84155 ASSAY OF PROTEIN SERUM: CPT

## 2023-11-17 PROCEDURE — 85610 PROTHROMBIN TIME: CPT

## 2023-11-17 RX ORDER — DEXTROSE MONOHYDRATE 100 MG/ML
0.3 INJECTION, SOLUTION INTRAVENOUS ONCE AS NEEDED
Status: DISCONTINUED | OUTPATIENT
Start: 2023-11-17 | End: 2023-12-04 | Stop reason: HOSPADM

## 2023-11-17 RX ORDER — INSULIN GLARGINE 100 [IU]/ML
10 INJECTION, SOLUTION SUBCUTANEOUS NIGHTLY
Status: DISCONTINUED | OUTPATIENT
Start: 2023-11-17 | End: 2023-11-17

## 2023-11-17 RX ORDER — INSULIN GLARGINE 100 [IU]/ML
10 INJECTION, SOLUTION SUBCUTANEOUS NIGHTLY
Status: DISCONTINUED | OUTPATIENT
Start: 2023-11-18 | End: 2023-11-21

## 2023-11-17 RX ORDER — ALLOPURINOL 100 MG/1
100 TABLET ORAL DAILY
COMMUNITY
End: 2024-04-04 | Stop reason: SDUPTHER

## 2023-11-17 RX ORDER — DEXTROSE 50 % IN WATER (D50W) INTRAVENOUS SYRINGE
25
Status: DISCONTINUED | OUTPATIENT
Start: 2023-11-17 | End: 2023-12-04 | Stop reason: HOSPADM

## 2023-11-17 RX ORDER — INSULIN DEGLUDEC 100 U/ML
10 INJECTION, SOLUTION SUBCUTANEOUS NIGHTLY
COMMUNITY
End: 2024-02-11 | Stop reason: ALTCHOICE

## 2023-11-17 RX ORDER — ONDANSETRON 4 MG/1
4 TABLET, FILM COATED ORAL EVERY 8 HOURS PRN
Status: DISCONTINUED | OUTPATIENT
Start: 2023-11-17 | End: 2023-12-01

## 2023-11-17 RX ORDER — OXYCODONE HYDROCHLORIDE 5 MG/1
5 TABLET ORAL EVERY 6 HOURS PRN
Status: DISCONTINUED | OUTPATIENT
Start: 2023-11-17 | End: 2023-12-04 | Stop reason: HOSPADM

## 2023-11-17 RX ORDER — INSULIN LISPRO 100 [IU]/ML
0-5 INJECTION, SOLUTION INTRAVENOUS; SUBCUTANEOUS
Status: DISCONTINUED | OUTPATIENT
Start: 2023-11-17 | End: 2023-12-04 | Stop reason: HOSPADM

## 2023-11-17 RX ORDER — ACETAMINOPHEN 325 MG/1
650 TABLET ORAL EVERY 6 HOURS PRN
Status: DISCONTINUED | OUTPATIENT
Start: 2023-11-17 | End: 2023-12-04 | Stop reason: HOSPADM

## 2023-11-17 RX ADMIN — HEPARIN SODIUM 5000 UNITS: 5000 INJECTION INTRAVENOUS; SUBCUTANEOUS at 18:21

## 2023-11-17 RX ADMIN — MUPIROCIN: 20 OINTMENT TOPICAL at 12:22

## 2023-11-17 RX ADMIN — MUPIROCIN: 20 OINTMENT TOPICAL at 21:30

## 2023-11-17 RX ADMIN — HEPARIN SODIUM 5000 UNITS: 5000 INJECTION INTRAVENOUS; SUBCUTANEOUS at 03:00

## 2023-11-17 RX ADMIN — ROSUVASTATIN CALCIUM 10 MG: 10 TABLET, FILM COATED ORAL at 21:32

## 2023-11-17 RX ADMIN — TAMSULOSIN HYDROCHLORIDE 0.8 MG: 0.4 CAPSULE ORAL at 21:27

## 2023-11-17 RX ADMIN — CEFAZOLIN SODIUM 2 G: 2 INJECTION, SOLUTION INTRAVENOUS at 04:44

## 2023-11-17 RX ADMIN — METOPROLOL SUCCINATE 100 MG: 100 TABLET, EXTENDED RELEASE ORAL at 09:17

## 2023-11-17 RX ADMIN — ONDANSETRON HYDROCHLORIDE 4 MG: 4 TABLET, FILM COATED ORAL at 22:36

## 2023-11-17 RX ADMIN — CEFAZOLIN SODIUM 2 G: 2 INJECTION, SOLUTION INTRAVENOUS at 16:27

## 2023-11-17 RX ADMIN — AMLODIPINE BESYLATE 10 MG: 10 TABLET ORAL at 21:27

## 2023-11-17 RX ADMIN — FINASTERIDE 5 MG: 5 TABLET, FILM COATED ORAL at 09:17

## 2023-11-17 RX ADMIN — ACETAMINOPHEN 650 MG: 325 TABLET ORAL at 10:02

## 2023-11-17 RX ADMIN — OXYCODONE HYDROCHLORIDE 5 MG: 5 TABLET ORAL at 01:36

## 2023-11-17 RX ADMIN — OXYCODONE HYDROCHLORIDE 5 MG: 5 TABLET ORAL at 12:21

## 2023-11-17 RX ADMIN — HEPARIN SODIUM 5000 UNITS: 5000 INJECTION INTRAVENOUS; SUBCUTANEOUS at 10:03

## 2023-11-17 SDOH — SOCIAL STABILITY: SOCIAL INSECURITY: HAS ANYONE EVER THREATENED TO HURT YOUR FAMILY OR YOUR PETS?: NO

## 2023-11-17 SDOH — SOCIAL STABILITY: SOCIAL INSECURITY: DOES ANYONE TRY TO KEEP YOU FROM HAVING/CONTACTING OTHER FRIENDS OR DOING THINGS OUTSIDE YOUR HOME?: NO

## 2023-11-17 SDOH — SOCIAL STABILITY: SOCIAL INSECURITY: HAVE YOU HAD THOUGHTS OF HARMING ANYONE ELSE?: NO

## 2023-11-17 SDOH — SOCIAL STABILITY: SOCIAL INSECURITY: DO YOU FEEL ANYONE HAS EXPLOITED OR TAKEN ADVANTAGE OF YOU FINANCIALLY OR OF YOUR PERSONAL PROPERTY?: NO

## 2023-11-17 SDOH — SOCIAL STABILITY: SOCIAL INSECURITY: DO YOU FEEL UNSAFE GOING BACK TO THE PLACE WHERE YOU ARE LIVING?: NO

## 2023-11-17 SDOH — SOCIAL STABILITY: SOCIAL INSECURITY: ARE YOU OR HAVE YOU BEEN THREATENED OR ABUSED PHYSICALLY, EMOTIONALLY, OR SEXUALLY BY ANYONE?: NO

## 2023-11-17 SDOH — SOCIAL STABILITY: SOCIAL INSECURITY: ABUSE: ADULT

## 2023-11-17 SDOH — SOCIAL STABILITY: SOCIAL INSECURITY: ARE THERE ANY APPARENT SIGNS OF INJURIES/BEHAVIORS THAT COULD BE RELATED TO ABUSE/NEGLECT?: NO

## 2023-11-17 ASSESSMENT — PAIN SCALES - GENERAL
PAINLEVEL_OUTOF10: 8
PAINLEVEL_OUTOF10: 3
PAINLEVEL_OUTOF10: 0 - NO PAIN
PAINLEVEL_OUTOF10: 5 - MODERATE PAIN
PAINLEVEL_OUTOF10: 3
PAINLEVEL_OUTOF10: 7
PAINLEVEL_OUTOF10: 0 - NO PAIN

## 2023-11-17 ASSESSMENT — COGNITIVE AND FUNCTIONAL STATUS - GENERAL
PERSONAL GROOMING: A LITTLE
CLIMB 3 TO 5 STEPS WITH RAILING: A LOT
MOVING TO AND FROM BED TO CHAIR: A LITTLE
TOILETING: A LITTLE
HELP NEEDED FOR BATHING: A LITTLE
WALKING IN HOSPITAL ROOM: A LITTLE
CLIMB 3 TO 5 STEPS WITH RAILING: TOTAL
DAILY ACTIVITIY SCORE: 19
DRESSING REGULAR UPPER BODY CLOTHING: A LITTLE
MOBILITY SCORE: 16
STANDING UP FROM CHAIR USING ARMS: A LITTLE
MOVING FROM LYING ON BACK TO SITTING ON SIDE OF FLAT BED WITH BEDRAILS: A LITTLE
WALKING IN HOSPITAL ROOM: A LITTLE
STANDING UP FROM CHAIR USING ARMS: A LITTLE
MOBILITY SCORE: 18
TURNING FROM BACK TO SIDE WHILE IN FLAT BAD: A LITTLE
DRESSING REGULAR LOWER BODY CLOTHING: A LITTLE
MOVING TO AND FROM BED TO CHAIR: A LITTLE
TURNING FROM BACK TO SIDE WHILE IN FLAT BAD: A LITTLE

## 2023-11-17 ASSESSMENT — LIFESTYLE VARIABLES
HOW OFTEN DO YOU HAVE 6 OR MORE DRINKS ON ONE OCCASION: NEVER
HOW OFTEN DO YOU HAVE A DRINK CONTAINING ALCOHOL: MONTHLY OR LESS
SUBSTANCE_ABUSE_PAST_12_MONTHS: NO
SKIP TO QUESTIONS 9-10: 1
AUDIT-C TOTAL SCORE: 1
AUDIT-C TOTAL SCORE: 1
HOW MANY STANDARD DRINKS CONTAINING ALCOHOL DO YOU HAVE ON A TYPICAL DAY: 1 OR 2
PRESCIPTION_ABUSE_PAST_12_MONTHS: NO

## 2023-11-17 ASSESSMENT — ACTIVITIES OF DAILY LIVING (ADL)
LACK_OF_TRANSPORTATION: NO
DRESSING YOURSELF: NEEDS ASSISTANCE
FEEDING YOURSELF: INDEPENDENT
HEARING - RIGHT EAR: FUNCTIONAL
HEARING - LEFT EAR: FUNCTIONAL
WALKS IN HOME: NEEDS ASSISTANCE
JUDGMENT_ADEQUATE_SAFELY_COMPLETE_DAILY_ACTIVITIES: YES
ASSISTIVE_DEVICE: HEARING AID - LEFT
BATHING: NEEDS ASSISTANCE
GROOMING: NEEDS ASSISTANCE
TOILETING: NEEDS ASSISTANCE
PATIENT'S MEMORY ADEQUATE TO SAFELY COMPLETE DAILY ACTIVITIES?: YES
ADEQUATE_TO_COMPLETE_ADL: YES

## 2023-11-17 ASSESSMENT — PAIN SCALES - WONG BAKER: WONGBAKER_NUMERICALRESPONSE: NO HURT

## 2023-11-17 ASSESSMENT — PATIENT HEALTH QUESTIONNAIRE - PHQ9
1. LITTLE INTEREST OR PLEASURE IN DOING THINGS: SEVERAL DAYS
2. FEELING DOWN, DEPRESSED OR HOPELESS: NOT AT ALL
SUM OF ALL RESPONSES TO PHQ9 QUESTIONS 1 & 2: 1

## 2023-11-17 ASSESSMENT — PAIN DESCRIPTION - LOCATION
LOCATION: KNEE
LOCATION: KNEE

## 2023-11-17 ASSESSMENT — PAIN - FUNCTIONAL ASSESSMENT
PAIN_FUNCTIONAL_ASSESSMENT: 0-10

## 2023-11-17 ASSESSMENT — PAIN DESCRIPTION - ORIENTATION: ORIENTATION: RIGHT

## 2023-11-17 ASSESSMENT — COLUMBIA-SUICIDE SEVERITY RATING SCALE - C-SSRS
1. IN THE PAST MONTH, HAVE YOU WISHED YOU WERE DEAD OR WISHED YOU COULD GO TO SLEEP AND NOT WAKE UP?: NO
6. HAVE YOU EVER DONE ANYTHING, STARTED TO DO ANYTHING, OR PREPARED TO DO ANYTHING TO END YOUR LIFE?: NO
2. HAVE YOU ACTUALLY HAD ANY THOUGHTS OF KILLING YOURSELF?: NO

## 2023-11-17 NOTE — PROGRESS NOTES
Physical Therapy    Physical Therapy Evaluation    Patient Name: Chester Verduzco  MRN: [id number]  Today's Date: 11/17/2023   Time Calculation  Start Time: 1020  Stop Time: 1040  Time Calculation (min): 20 min    Assessment/Plan   PT Assessment  PT Assessment Results: Decreased strength, Decreased endurance, Decreased range of motion, Impaired balance, Decreased mobility, Pain  Rehab Prognosis: Excellent  End of Session Communication: Bedside nurse  End of Session Patient Position: Up in chair  IP OR SWING BED PT PLAN  Inpatient or Swing Bed: Inpatient  PT Plan  Treatment/Interventions: Bed mobility, Transfer training, Gait training, Stair training, Balance training, Strengthening, Therapeutic exercise, Endurance training, Range of motion, Therapeutic activity, Home exercise program  PT Plan: Skilled PT  PT Frequency: 5 times per week  PT Discharge Recommendations: Moderate intensity level of continued care  PT Recommended Transfer Status: Assist x1, Assistive device  PT - OK to Discharge: Yes    Subjective     General Visit Information:  Subjective: Patient is alert, agreeable to PT.  States he was making good progress at SNF but was fairly fatigued with all activity, wondering if the lead issue could be related to his fatigue.  In good spirits and very motivated.  Reason for Referral: From SNF 2/2 NOHELIA c infected leads  Past Medical History Relevant to Rehab: PMHX HTN, HLD, Dual chamber PPM implanted in 11/1996 for SSS (RV is passive) with most recent generator replacement in 11/2020 ([employer] - [employer] MRI), Type 2 DM, gout, and recent hospitalization (10/2-10/7) for Staph bacteremia, diarrhea, NOHELIA , and hyponatremia 2/2 hypovolemia  Prior to Session Communication: Bedside nurse  Patient Position Received: Bed, 3 rail up  Family/Caregiver Present: No   Home Living:  Home Living  Type of Home: House  Lives With: Alone  Home Adaptive Equipment: None  Home Layout: One level  Home Access: Stairs to enter without  rails  Entrance Stairs-Number of Steps: 3  Prior Level of Function:  Prior Function Per Pt/Caregiver Report  Level of [place]:  (Independent at baseline, has progressed to 70' ambulation and 3-4 stairs c assist)  Precautions:  Precautions  Medical Precautions: Fall precautions, Cardiac precautions  Vital Signs:  Vital Signs  Heart Rate: 70 (post 72)  Medical Gas Therapy: None (Room air)    Lines/Tubes/Drains:  PICC - Adult 11/13/23 Single lumen Left Brachial vein (Active)   Number of days: 3       Urethral Catheter (Active)   Number of days: 3     Continuous Medications/Drips:       Objective   Pain:  Pain Assessment  Pain Score: 3 (3 post)  Pain Type: Acute pain  Pain Location: Knee    Cognition:  Cognition  Overall Cognitive Status: Within Functional Limits    General Assessments:  Extremity/Trunk Assessments:  Tone: No abnormalities noted  Sensation  Light Touch: No apparent deficits  Coordination  Movements are Fluid and Coordinated: Yes  Upper Extremity  ROM: WFL  Strength:WFL  Lower Extremity  ROM: Impaired: R: Limited by pain, able to flex 5-80 AROM  Strength: Not WFL RLE: Hip flexion 3/5, Knee flexion 3/5, Knee extension 3/5, PF 4/5, DF 4/5  LLE: Hip flexion 5/5, Knee flexion 5/5, Knee extension 5/5, PF 5/5, DF 5/5   Sitting Static Balance Normal  Sitting Dynamic Balance Normal  Standing Static Balance Not NormalUE Support Two UE support and Assist Level Contact Guard  Standing Dynamic Balance Not NormalUE Support Two UE support and Assist Level Min Assist    Functional Assessments:  Bed Mobility  Bed Mobility: Yes  Bed Mobility 1  Bed Mobility 1: Supine to sitting  Level of Assistance 1: Close supervision  Bed Mobility Comments 1: HOB 45 c rail    Transfers  Transfer: Yes  Transfer 1  Transfer From 1: Sit to  Transfer to 1: Stand  Transfer Device 1: Walker  Transfer Level of Assistance 1: Minimum assistance  Transfers 2  Transfer From 2: Stand to  Transfer to 2: Sit  Transfer Device 2: Walker  Transfer  Level of Assistance 2: Minimum assistance  Transfers 3  Transfer From 3: Bed to  Transfer to 3: Chair with arms  Transfer Device 3: Walker  Transfer Level of Assistance 3: Minimum assistance    Ambulation/Gait Training  Ambulation/Gait Training Performed: Yes  Ambulation/Gait Training 1  Surface 1: Level tile  Device 1: Rolling walker  Assistance 1: Contact guard  Quality of Gait 1:  (antalgic, decreased foot clearance R, decreased knee flex R)  Comments/Distance (ft) 1: 3' (limited by knee pain)              Outcome Measures:  [facility] Basic Mobility  Turning from your back to your side while in a flat bed without using bedrails: A little  Moving from lying on your back to sitting on the side of a flat bed without using bedrails: A little  Moving to and from bed to chair (including a wheelchair): A little  Standing up from a chair using your arms (e.g. wheelchair or bedside chair): A little  To walk in hospital room: A little  Climbing 3-5 steps with railing: Total  Basic Mobility - Total Score: 16                            Encounter Problems       Encounter Problems (Active)       PT Problem       Patient will complete supine to sit and sit to supine Independent  (Progressing)       Start:  11/17/23    Expected End:  12/01/23            Patient will perform sit<>stand transfer with Rolling Walker, and Supervision  (Progressing)       Start:  11/17/23    Expected End:  12/01/23            Patient will ambulate >100' with Rolling Walker and Supervision  (Progressing)       Start:  11/17/23    Expected End:  12/01/23            Patient will ascend/descend 3 steps with Bilateral Rails and SBA  (Progressing)       Start:  11/17/23    Expected End:  12/01/23                 Assessment: Patient currently min A for mobility with pain, ROM, strength, and mobility deficits.  Patient would benefit from further therapy to increase functional independence and safety.  Will continue to follow during acute stay.    Education  Documentation  Precautions, taught by Art Rose PT at 11/17/2023 11:07 AM.  Learner: Patient  Readiness: Acceptance  Method: Explanation  Response: Needs Reinforcement    Body Mechanics, taught by Art Rose PT at 11/17/2023 11:07 AM.  Learner: Patient  Readiness: Acceptance  Method: Explanation  Response: Needs Reinforcement    Mobility Training, taught by Art Rose PT at 11/17/2023 11:07 AM.  Learner: Patient  Readiness: Acceptance  Method: Explanation  Response: Needs Reinforcement    Education Comments  No comments found.          11/17/23 at 11:08 AM   Art Rose PT   Rehab Office: [phone]

## 2023-11-17 NOTE — H&P
History Of Present Illness  Chester Verduzco is a 73 y.o. male with a PMXH of HTN, HLD, Dual chamber PPM implanted in 11/1996 for SSS (RV is passive) with most recent generator replacement in 11/2020 ([employer] - [employer] MRI), Type 2 DM, gout, and recent hospitalization (10/2-10/7) for Staph bacteremia, diarrhea, NOHELIA and hyponatremia 2/2 hypovolemia who presented to [facility] ED from a SNF on 11/13 for NOHELIA on labs from the SNF and oliguria. At the time he was just reporting generalized weakness. Blood cultures were positive for MSSA and he was admitted for further workup of NOHELIA and MSSA bacteremia. ID and nephrology were consulted and followed him during his stay. He was started Cefazolin 2 gram q12 and underwent TTE on 11/13 that did not show any discreet vegetation's. Repeat blood cultures were collected and he underwent a FILIPE on 11/16 that reportedly showed Tricuspid valve vegetation as well as right atrial lead vegetation. He was transferred to [facility] for further management and evaluation for removal of PPM in the setting of persistent MSSA bacteremia and endocarditis. The patient also described persistent right knee pain and orthopedics were consulted to rule out septic joint. The plan was to tap the knee effusion today but did not happen prior to transfer to [facility]    On arrival to [facility] he states he is feeling ok and just complaining of chronic right knee pain as well as a headache. Denies any fever, chills, chest pain, SOB, nausea, vomiting, diarrhea, abdominal pain, constipation, edema in LE, heart palpitations, lightheadedness, dizziness, blurry vision, numbness, weakness, or any other acute concerns at this time.     Labs on admission from 11/13   Sodium 132, K 4.0, Cl 98, Bicarb 20, BUN 55, Cr 5.43, Glu 94, calcium, 7.6, Albumin 2.1, AST 51, ALT 31, Alk phos 117, T bili 0.5 TP 6.0  WBC 9.8, Hemoglobin 8.4, HCT 26.0, plts 91  CRP 9.21,  RF <10    Blood cultures: Had reportedly positive blood cultures from  10.9 and 11/10 for MSSA. 11/14 Staph aureus 1/2, redrawn 11/16 pending. MRSA nares positive     Imaging:   TTE 11/13:   CONCLUSIONS:   1. Left ventricular systolic function is normal with a 55-60% estimated ejection fraction.   2. Spectral Doppler shows an impaired relaxation pattern of left ventricular diastolic filling.   3. There is mild mitral and tricuspid regurgitation.   4. The estimated pulmonary artery pressure is mildly elevated with the RVSP at 39.2 mmHg.   5. No definite valvar vegetation's were visualized.    FILIPE 11/16/23: No official report but “ shows tricuspid valve vegetation (0.9 cm circumferential) as well as right atrial lead (0.9 cm linear) vegetation”  XR Right Knee: Anterior knee soft tissue swelling with moderate knee joint effusion. Tri compartmental osteoarthritis.  Renal U/S: Left simple cyst measuring 6.8 x 7.9 x 6.8 cm. No hydronephrosis bilaterally. Enlarged prostate.    Meds at OSH: amlodipine 10 mg daily, Cefazolin 2 g q12, finasteride 5 mg daily, Heparin sq 5k q8h, Lantus 10 units q24, SSI, melatonin, metop succinate 100 qd, rosuvastatin 10 WHS, Flomax 0.8 QHS     Home meds being held: allopurinol, losartan 100 mg daily, naltrexone 50 mg daily, furosemide 40 mg daily, pantoprazole 40, flexeril, ferrous gluconate     Past Medical History  Past Medical History:   Diagnosis Date    Abnormal weight gain 10/27/2016    Abnormal weight gain    Achilles tendinitis, unspecified leg 08/16/2016    Achilles tendinitis    Acute upper respiratory infection, unspecified     Acute URI    Allergic contact dermatitis due to plants, except food 08/22/2013    Contact dermatitis due to poison ivy    BPH with obstruction/lower urinary tract symptoms     Cellulitis of right lower limb 07/30/2021    Cellulitis of right lower extremity without foot    Cough, unspecified 03/21/2016    Cough    Diabetes (CMS/[facility])     Encounter for screening for human immunodeficiency virus (HIV) 06/14/2016    Screening for HIV  (human immunodeficiency virus)    Hordeolum externum unspecified eye, unspecified eyelid 08/14/2019    Stye    Hyperglycemia, unspecified 12/13/2017    Chronic hyperglycemia    Incisional hernia without obstruction or gangrene 06/15/2015    Recurrent ventral hernia    Knee joint pain     Right knee- bone on bone    MSSA bacteremia 10/2023    Muscle spasm of calf 08/16/2016    Muscle spasm of calf    Obstructive uropathy     Personal history of other diseases of the digestive system 06/30/2015    History of umbilical hernia    Personal history of other diseases of the digestive system 02/05/2016    History of ventral hernia    Personal history of other diseases of the respiratory system 12/23/2014    History of acute sinusitis    Personal history of other diseases of the respiratory system 01/28/2016    History of upper respiratory infection    Personal history of other infectious and parasitic diseases 12/02/2015    History of hepatitis B virus infection    Personal history of other infectious and parasitic diseases     History of hepatitis B virus infection    Personal history of other specified conditions 02/04/2015    History of nausea and vomiting    Personal history of other specified conditions 08/16/2016    History of edema    Personal history of other specified conditions 01/26/2015    History of jaundice    Personal history of pneumonia (recurrent) 01/29/2016    History of pneumonia    Personal history of urinary (tract) infections 02/19/2013    History of urinary tract infection    Prostatitis 03/21/2023    Pruritus, unspecified 02/10/2015    Pruritus    Sinoatrial node dysfunction (CMS/HCC)     Strain of muscle, fascia and tendon of the posterior muscle group at thigh level, right thigh, initial encounter 05/10/2016    Tear of right hamstring    Unspecified symptoms and signs involving the genitourinary system 12/20/2016    UTI symptoms       Surgical History  Past Surgical History:   Procedure Laterality  "Date    CARDIAC PACEMAKER PLACEMENT  08/22/2013    Pacemaker Permanent Placement    OTHER SURGICAL HISTORY  06/15/2015    Ventral Hernia Repair - Recurrent    OTHER SURGICAL HISTORY  09/16/2019    Prostate biopsy    PERIPHERALLY INSERTED CENTRAL CATHETER INSERTION      VARICOSE VEIN SURGERY  08/22/2013    Varicose Vein Ligation    VENTRAL HERNIA REPAIR  06/15/2015    Ventral Hernia Repair        Social History  He reports that he has never smoked. He has never been exposed to tobacco smoke. He has never used smokeless tobacco. He reports that he does not currently use alcohol. He reports that he does not use drugs.    Family History  No family history on file.     Allergies  Bupropion and Keflex [cephalexin]         Physical Exam  GENERAL:  In no acute distress  NEUROLOGIC:  A and O x 3. Cranial nerves 2 through 12 grossly intact with no gait disturbances. Intact motor and sensory systems, with normal reflexes and coordination.    HEENT:  Pupils are equal, round, and reactive to light and accommodation. Extraocular motion intact.    MOUTH: MMM, no lesions observed  CARDIAC: S1 + S2, RRR, no M/R/G.    LUNGS: CTA BL.  No wheezes or coarse breath sounds. Symmetric respirations. No increased work of breathing.   ABDOMEN: Soft, non-tender to palpation. No organomegaly. Normal BS in 4Q, No rebound or guarding.  EXTREMITIES:  Moving x4 equally and spontaneously. Right knee with mild effusion present but no erythema or warmth. Chronic limited ROM but not worse than baseline. No BLE edema   SKIN: Clean, warm, dry, and intact with normal skin turgor.  PSYCH: Appropriate mood and behavior.       Last Recorded Vitals  Blood pressure 146/67, pulse 71, temperature 36.2 °C (97.1 °F), temperature source Temporal, resp. rate 18, height 1.88 m (6' 2.02\"), weight 115 kg (254 lb 10.1 oz), SpO2 97 %.    Relevant Results         Assessment/Plan   Active Problems:    Endocarditis    Assessment: Mr. Chester Verduzco is a 73 year old male " with PMHX HTN, HLD, Dual chamber PPM implanted in 11/1996 for SSS (RV is passive) with most recent generator replacement in 11/2020 ([employer] [facility] [employer] MRI), Type 2 DM, gout, and recent hospitalization (10/2-10/7) for Staph bacteremia, diarrhea, NOHELIA , and hyponatremia 2/2 hypovolemia who presented to [facility] ED from a SNF on 11/13 for NOHELIA on labs from the SNF and oliguria. Blood cultures were positive for MSSA and previously had been positive 10/8 and 11/10 for MSSA as well. FILIPE 11/16 showed tricuspid valve vegetation as well as right atrial lead vegetation. Transferred to [facility] for likely removal of PPM leads and endocarditis.      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)   #Tricuspid Valve Endocarditis   #Possible concern for septic Joint  - Blood cultures from 10/9, 11/10, and 11/14 were positive for MSSA   - Repeat blood cx collected 11/16 and pending  - Continue Cefazolin 2 g q12 for now  - Patient does have LUE PICC that had been previously placed at [facility], will likely need to be removed in am   - Will plan to consult EP in the am for PPM lead extraction due to persistent MSSA bacteremia  - Will also plan to consult ID in am for abx recommendations and duration pending source control   - Can consider Ortho consult for arthrocentesis for septic joint rule out but exam is not concerning for septic joint     #NOHELIA   - Baseline prior to October 2023 hospitalization ~1.0-1.1. 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   - Will plan to consult nephrology in am  - Rojo in place, strict I&Os, No indication for HD at this  time    #Type 2 DM  - Checking A1c  - On Lantus 10 units QHS and cover with SSI  - hypoglycemia protocol    #HTN  #HLD  - Holding home losartan and lasix in setting of NOHELIA  - Continue Amlodipine 10, metop succinate 100 daily, and rosuvastatin 10 daily     #BPH  - Was initially planned for outpatient robotic prostatectomy end of November that has since been canceled and rescheduled for December  - Continue with Flomax 0.8 mg QHS and finasteride 5 md daily  - Rojo in place     F: As needed  E: AS needed  N: Currently NPO for possible EP procedure   A:  LUE PICC, PIV, Rojo   DVT ppx: Heparin SQ  GI: N/A  Code Status: DNR, ok for elective intubation   NOK: Charlene Pardo (friend) [phone]     Recommendations not final until seen and staffed with attending in the am     José Luis Dias MD

## 2023-11-17 NOTE — CONSULTS
NEPHROLOGY NEW CONSULT NOTE   Chester Verduzco   73 y.o.      MRN/Room: [id number]    Reason for consult: NOHELIA, unclear etiology     HPI:  Chester Verduzco is a 73 y.o. male with a past medical hx of HTN, HLD, IDDMT2, gout, OA  of the right knee, BPH, and SSS s/p dual chamber PPM placement (1996).    Patient was recently admitted to [facility] from 10/2/23-10/5/23 for 2 weeks of watery diarrhea (work-up noninfectious). Hospital course was complicated by hypovolemic hyponatremia, hypokalemia, and pre-renal NOHELIA in the setting GI volume loss. Patient was stabilized and discharged to SNF or significant weakness/general de-conditioning. At time of admission on 10/2, creatinine was 1.0, and trended between 2.4-2.9 during hospitalization. Creatinine 2.24 at time of discharge to .    Blood cultures were collected at  on 10/9 after an episode of rigors and fever, positive for MSSA bacteremia. Patient received single dose of vancomycin (stopped due to rise in creatinine), treated with Zosyn 10/9-10/18, followed by doxycycline from 10/18-10/22. Repeat blood cultures drawn 11/10 in  showed persistent MSSA.    Noted to have worsening renal function at  with urinary retention requiring Rojo placement. Creatinine appears to have trended between 3.3-4.9 while at .     Re-presented to [facility] on 11/13 given persistent bacteremia and unimproved NOHELIA. Creatinine of 5.43 at time of presentation. Started on Cefazolin 2 gram q12, underwent FILIPE on 11/16 that  showed tricuspid valve vegetation as well as right atrial lead vegetation on preliminary report. He was transferred to [facility] for further management and evaluation for removal of PPM in the setting of persistent MSSA bacteremia and endocarditis.    Nephrology consulted for NOHELIA of unclear etiology.     On evaluation, patient denies current fever/chills, N/V, chest pain, SOB, leg swelling, abdominal pain, rash, diarrhea, or hematuria.     Relevant renal  "history:   -Baseline creatinine is 1.0. Patient denies any prior kidney disease  -Creatinine at the time of admission was 5.43, has improved to 4.92   -Blood pressure has remained stable during hospital course (130s/60s)  -Urine output was 525 mL last 24hrs  -Recent nephrotoxic medication use: vancomycin x1 dose on 10/9, Zosyn 10/9-10/18. Currently receiving cefazolin since 11/13. No recent NSAID use, has been receiving norco for pain at OSH  -Recent ACE/ARB/diuretic use: home Lasix and losartan held at [facility] and on admission 11/13  -No recent contrast studies  -No peripheral eosinophilia on CBC  -Patient denies any history of rash  -Notes having right knee pain but has known OA of that knee. Denies any other joint points  -BPH history: history of LUTS, on flomax 0.8 mg at bedtime and finasteride 5 mg daily. Following with Dr. Delaney, had been scheduled for simple prostatectomy at end November   -Patient denies significant past NSAID history  -Not on PPI  -Never smoker, no drug use  -No personal or family history of autoimmune disease  -No personal history of malignancy    In The ER: /64 (BP Location: Right arm, Patient Position: Lying)   Pulse 70 Comment: post 72  Temp 36.4 °C (97.5 °F) (Temporal)   Resp 18   Ht 1.88 m (6' 2.02\")   Wt 115 kg (254 lb 10.1 oz)   SpO2 96%   BMI 32.68 kg/m²      Past Medical History:   Diagnosis Date    Abnormal weight gain 10/27/2016    Abnormal weight gain    Achilles tendinitis, unspecified leg 08/16/2016    Achilles tendinitis    Acute upper respiratory infection, unspecified     Acute URI    Allergic contact dermatitis due to plants, except food 08/22/2013    Contact dermatitis due to poison ivy    BPH with obstruction/lower urinary tract symptoms     Cellulitis of right lower limb 07/30/2021    Cellulitis of right lower extremity without foot    Cough, unspecified 03/21/2016    Cough    Diabetes (CMS/[facility])     Encounter for screening for human immunodeficiency virus " (HIV) 06/14/2016    Screening for HIV (human immunodeficiency virus)    Hordeolum externum unspecified eye, unspecified eyelid 08/14/2019    Stye    Hyperglycemia, unspecified 12/13/2017    Chronic hyperglycemia    Incisional hernia without obstruction or gangrene 06/15/2015    Recurrent ventral hernia    Knee joint pain     Right knee- bone on bone    MSSA bacteremia 10/2023    Muscle spasm of calf 08/16/2016    Muscle spasm of calf    Obstructive uropathy     Personal history of other diseases of the digestive system 06/30/2015    History of umbilical hernia    Personal history of other diseases of the digestive system 02/05/2016    History of ventral hernia    Personal history of other diseases of the respiratory system 12/23/2014    History of acute sinusitis    Personal history of other diseases of the respiratory system 01/28/2016    History of upper respiratory infection    Personal history of other infectious and parasitic diseases 12/02/2015    History of hepatitis B virus infection    Personal history of other infectious and parasitic diseases     History of hepatitis B virus infection    Personal history of other specified conditions 02/04/2015    History of nausea and vomiting    Personal history of other specified conditions 08/16/2016    History of edema    Personal history of other specified conditions 01/26/2015    History of jaundice    Personal history of pneumonia (recurrent) 01/29/2016    History of pneumonia    Personal history of urinary (tract) infections 02/19/2013    History of urinary tract infection    Prostatitis 03/21/2023    Pruritus, unspecified 02/10/2015    Pruritus    Sinoatrial node dysfunction (CMS/HCC)     Strain of muscle, fascia and tendon of the posterior muscle group at thigh level, right thigh, initial encounter 05/10/2016    Tear of right hamstring    Unspecified symptoms and signs involving the genitourinary system 12/20/2016    UTI symptoms      Past Surgical History:    Procedure Laterality Date    CARDIAC PACEMAKER PLACEMENT  08/22/2013    Pacemaker Permanent Placement    OTHER SURGICAL HISTORY  06/15/2015    Ventral Hernia Repair - Recurrent    OTHER SURGICAL HISTORY  09/16/2019    Prostate biopsy    PERIPHERALLY INSERTED CENTRAL CATHETER INSERTION      VARICOSE VEIN SURGERY  08/22/2013    Varicose Vein Ligation    VENTRAL HERNIA REPAIR  06/15/2015    Ventral Hernia Repair      No family history on file.  Social History     Socioeconomic History    Marital status: Single     Spouse name: Not on file    Number of children: Not on file    Years of education: Not on file    Highest education level: Not on file   Occupational History    Not on file   Tobacco Use    Smoking status: Never     Passive exposure: Never    Smokeless tobacco: Never   Vaping Use    Vaping Use: Never used   Substance and Sexual Activity    Alcohol use: Not Currently    Drug use: Never    Sexual activity: Not Currently   Other Topics Concern    Not on file   Social History Narrative    Not on file     Social Determinants of Health     Financial Resource Strain: Low Risk  (11/14/2023)    Overall Financial Resource Strain (CARDIA)     Difficulty of Paying Living Expenses: Not hard at all   Recent Concern: Financial Resource Strain - Medium Risk (10/2/2023)    Overall Financial Resource Strain (CARDIA)     Difficulty of Paying Living Expenses: Somewhat hard   Food Insecurity: No Food Insecurity (10/3/2023)    Hunger Vital Sign     Worried About Running Out of Food in the Last Year: Never true     Ran Out of Food in the Last Year: Never true   Transportation Needs: No Transportation Needs (11/14/2023)    PRAPARE - Transportation     Lack of Transportation (Medical): No     Lack of Transportation (Non-Medical): No   Physical Activity: Inactive (10/2/2023)    Exercise Vital Sign     Days of Exercise per Week: 0 days     Minutes of Exercise per Session: 0 min   Stress: Not on file   Social Connections: Not on file    Intimate Partner Violence: Not on file   Housing Stability: Low Risk  (11/14/2023)    Housing Stability Vital Sign     Unable to Pay for Housing in the Last Year: No     Number of Places Lived in the Last Year: 1     Unstable Housing in the Last Year: No       Allergies   Allergen Reactions    Bupropion Other     Patient stated medication made him  very irritable and violent    Keflex [Cephalexin] Headache, GI Upset and Nausea/vomiting        Medications Prior to Admission   Medication Sig Dispense Refill Last Dose    amLODIPine (Norvasc) 10 mg tablet Take 1 tablet (10 mg) by mouth once daily in the evening.   11/16/2023    dextrose 10 % in water, D10W, 10 % infusion Infuse 34.5 g/hr at 345 mL/hr into a venous catheter 1 time if needed (For blood glucose less than 70 mg/dL after 30 minutes of intervention.  Discontinue once blood glucose reaches 100 mg/dL.) for up to 1 dose. 500 mL 1 11/16/2023    finasteride (Proscar) 5 mg tablet Take 1 tablet (5 mg) by mouth once daily.   11/16/2023    heparin sodium,porcine (heparin, porcine,) 5,000 unit/mL injection Inject 1 mL (5,000 Units) under the skin every 8 hours. Do not start before November 17, 2023. 5 mL 0 11/16/2023    hydrALAZINE (Apresoline) 10 mg tablet Take 1 tablet (10 mg) by mouth 3 times a day. Hold for SBP <120   11/16/2023    HYDROcodone-acetaminophen (Norco) 5-325 mg tablet Take 1 tablet by mouth every 6 hours if needed for severe pain (7 - 10).   11/16/2023    lactobacillus acidophilus & bulgar (Lactinex) 1 million cell chewable tablet Chew 1 tablet once daily. For 28 days   11/16/2023    losartan (Cozaar) 100 mg tablet TAKE 1 TABLET BY MOUTH EVERY DAY (Patient taking differently: Take 1 tablet (100 mg) by mouth once daily.) 90 tablet 3 Unknown    lubricating eye drops ophthalmic solution Administer 1 drop into the left eye every 8 hours if needed for dry eyes.   11/16/2023    menthol (Biofreeze, menthol,) 4 % gel Apply 1 Application topically every 6  hours if needed. To right knee   11/16/2023    metoprolol succinate XL (Toprol-XL) 100 mg 24 hr tablet TAKE 1 TABLET BY MOUTH EVERY DAY (Patient taking differently: Take 1 tablet (100 mg) by mouth once daily.) 90 tablet 3 11/16/2023    ondansetron (Zofran) 4 mg tablet Take 1 tablet (4 mg) by mouth every 6 hours if needed for nausea or vomiting.   11/16/2023    rosuvastatin (Crestor) 10 mg tablet TAKE 1 TABLET BY MOUTH EVERYDAY AT BEDTIME 90 tablet 3 11/16/2023    tamsulosin (Flomax) 0.4 mg 24 hr capsule Take 2 capsules (0.8 mg) by mouth once daily at bedtime.   11/16/2023    allopurinol (Zyloprim) 100 mg tablet Take 1 tablet (100 mg) by mouth once daily.       insulin degludec (Tresiba FlexTouch U-100) 100 unit/mL (3 mL) injection Inject 10 Units under the skin once daily at bedtime. Take as directed per insulin instructions.           Meds:   amLODIPine, 10 mg, q PM  ceFAZolin in dextrose (iso-os), 2 g, q12h  finasteride, 5 mg, Daily  heparin (porcine), 5,000 Units, q8h  [START ON 11/18/2023] insulin glargine, 10 Units, Nightly  insulin lispro, 0-5 Units, TID with meals  melatonin, 5 mg, Nightly  metoprolol succinate XL, 100 mg, Daily  mupirocin, , BID  rosuvastatin, 10 mg, Nightly  tamsulosin, 0.8 mg, Nightly         acetaminophen, 650 mg, q6h PRN  dextrose 10 % in water (D10W), 0.3 g/kg/hr, Once PRN  dextrose, 25 g, q15 min PRN  glucagon, 1 mg, q15 min PRN  oxyCODONE, 5 mg, q6h PRN        Vitals:    11/17/23 1020   BP:    Pulse: 70   Resp:    Temp:    SpO2:         11/15 1900 - 11/17 0659  In: -   Out: 525 [Urine:525]     General appearance: AAOx3. No distress  Eyes: non-icteric  Skin: no apparent rash  Heart: Regular rate and rhythm. No murmurs appreciated  Lungs: CTA bilat.  No wheezing/crackles  Abdomen: soft, nt/nd  Extremities: No edema bilat  :  Rojo in place, draining small amount of light yellow urine  Neuro: No FND, no asterixis   ACCESS: pIV    Blood Labs:  Results for orders placed or performed  during the hospital encounter of 11/16/23 (from the past 24 hour(s))   Protein, Urine Random   Result Value Ref Range    Total Protein, Urine Random 65 (H) 5 - 25 mg/dL   CBC and Auto Differential   Result Value Ref Range    WBC 9.4 4.4 - 11.3 x10*3/uL    nRBC 0.0 0.0 - 0.0 /100 WBCs    RBC 2.65 (L) 4.50 - 5.90 x10*6/uL    Hemoglobin 7.7 (L) 13.5 - 17.5 g/dL    Hematocrit 23.3 (L) 41.0 - 52.0 %    MCV 88 80 - 100 fL    MCH 29.1 26.0 - 34.0 pg    MCHC 33.0 32.0 - 36.0 g/dL    RDW 14.6 (H) 11.5 - 14.5 %    Platelets 106 (L) 150 - 450 x10*3/uL    Neutrophils % 79.5 40.0 - 80.0 %    Immature Granulocytes %, Automated 2.2 (H) 0.0 - 0.9 %    Lymphocytes % 12.2 13.0 - 44.0 %    Monocytes % 4.6 2.0 - 10.0 %    Eosinophils % 1.3 0.0 - 6.0 %    Basophils % 0.2 0.0 - 2.0 %    Neutrophils Absolute 7.51 (H) 1.60 - 5.50 x10*3/uL    Immature Granulocytes Absolute, Automated 0.21 0.00 - 0.50 x10*3/uL    Lymphocytes Absolute 1.15 0.80 - 3.00 x10*3/uL    Monocytes Absolute 0.43 0.05 - 0.80 x10*3/uL    Eosinophils Absolute 0.12 0.00 - 0.40 x10*3/uL    Basophils Absolute 0.02 0.00 - 0.10 x10*3/uL   Comprehensive metabolic panel   Result Value Ref Range    Glucose 76 74 - 99 mg/dL    Sodium 139 136 - 145 mmol/L    Potassium 3.2 (L) 3.5 - 5.3 mmol/L    Chloride 107 98 - 107 mmol/L    Bicarbonate 19 (L) 21 - 32 mmol/L    Anion Gap 16 10 - 20 mmol/L    Urea Nitrogen 44 (H) 6 - 23 mg/dL    Creatinine 4.75 (H) 0.50 - 1.30 mg/dL    eGFR 12 (L) >60 mL/min/1.73m*2    Calcium 7.7 (L) 8.6 - 10.6 mg/dL    Albumin 2.7 (L) 3.4 - 5.0 g/dL    Alkaline Phosphatase 94 33 - 136 U/L    Total Protein 5.6 (L) 6.4 - 8.2 g/dL    AST 11 9 - 39 U/L    Bilirubin, Total 0.3 0.0 - 1.2 mg/dL    ALT <3 (L) 10 - 52 U/L   Magnesium   Result Value Ref Range    Magnesium 1.68 1.60 - 2.40 mg/dL   Type and screen   Result Value Ref Range    ABO TYPE O     Rh TYPE POS     ANTIBODY SCREEN NEG    Coagulation Screen   Result Value Ref Range    Protime 13.8 (H) 9.8 - 12.8  seconds    INR 1.2 (H) 0.9 - 1.1    aPTT 36 27 - 38 seconds   Hemoglobin A1c   Result Value Ref Range    Hemoglobin A1C 6.6 (H) see below %    Estimated Average Glucose 143 Not Established mg/dL   Immunoglobulin free LT chains blood   Result Value Ref Range    Ig Kappa Free Light Chain 11.54 (H) 0.33 - 1.94 mg/dL    Ig Lambda Free Light Chain 7.15 (H) 0.57 - 2.63 mg/dL    Kappa/Lambda Ratio 1.61 0.26 - 1.65   Protein, Total   Result Value Ref Range    Total Protein 5.6 (L) 6.4 - 8.2 g/dL   POCT GLUCOSE   Result Value Ref Range    POCT Glucose 83 74 - 99 mg/dL         ASSESSMENT:  Chester Verduzco is a 73 y.o. male with a past medical hx of HTN, HLD, IDDMT2, gout, OA  of the right knee, BPH, and SSS s/p dual chamber PPM placement (1996) admitted for persistent MSSA bacteremia in the context of tricuspid valve and RA lead vegetation. Nephrology consulted for NOHELIA of unclear etiology.     Underwent work-up at [facility] revealing FeNa of 2.5%, UA with significant amount of blood and protein. Initial concern for possible ATN vs AIN (in setting of antibiotic exposure) vs possible infectious/post-infectious GN. C3 and C4 WNL.     Differential diagnosis remains broad, but given gradual increase in patient's creatinine over the past month, beginning with admission for significant diarrhea/GI fluid losses, suspect initial pre-renal NOHELIA that has progressed to ischemic ATN vs nephrotoxic ATN 2/2 antibiotic exposure. AIN possible but less likely in the absence of rash or peripheral eosinophilia. Current NOHELIA could reflect infectious glomerulonephritis in the setting of subacute endocarditis and Staph bacteremia. However, would expect decreased complement levels in that case.     Other potential causes include ANCA vasculitis, atheroembolic disease, multiple myeloma, and IgA nephropathy. Patient has had a new anemia beginning with his hospitalization back in October, with Hgb of 13 just two months ago. However, denies any history of  bone pain and calcium is relatively normal (when corrected for albumin). Aside from this, nothing in patient's medical history or HPI to suggest these rarer causes of intrinsic NOHELIA.    #NOHELIA Stage III  - Baseline creatinine: 1.0.   -Creatine elevated on admission 10/2, trending between 2.4-2.9 during that hospitalization. Continued to rise (3.3-4.9) while at SNF, 5.43 on admission 11/13.  - Etiology: most likely represents ATN (less likely AIN vs infectious GN)  - UA (11/13) showed: 2+ protein, 3+ blood, moderate LE   -Urine cx NGTD  -Renal U/S: no hydronephrosis, no stones  - Urine electrolyes showed FeNA of 2.5%, consistent with intrarenal pathology  - Clinical volume status: euvolemic  - Electrolytes (Na, K, Ca, Phos): K 3.5 (recommend repletion). Electrolytes otherwise stable  -Acid/base status: non-GAP metabolic acidosis- unclear etiology, may be 2/2 recent episode of prolonged diarrhea. Will continue to monitor    Recommendations:  -Largely suspect ATN given gradual rise in creatinine over past month and history of recent diarrhea and nephrotoxic antibiotic exposure. -Will follow-up results of ANCA and MM work-up  -No further studies recommended at this time. If renal function continues to worsen, may consider advanced testing/biopsy  -Please continue holding home Lasix and losartan at this time  -Daily RFP   - Indication for dialysis:  No   - Avoid nephrotoxins, contrast if possible  - strict Is/Os  - Renal dosing for medications for latest eGFR, follow medication trough as appropriate  - Avoid hypotension/rapid fluctuations in BPs    Gala Moeller MD  Nephrology Resident  24 hour Renal Pager - [phone]    Discussed with attending nephrologist

## 2023-11-17 NOTE — H&P (VIEW-ONLY)
"Inpatient consult to electrophysiology  Consult performed by: Ava Gonzalez MD  Consult ordered by: Dominga Edmond MD        History Of Present Illness:    Chester Verduzco is a 73 y.o. male with a PMXH of HTN, HLD, Dual chamber PPM implanted in 11/1996 for SSS (RV is passive) with most recent generator replacement in 11/2020 ([facility]), Type 2 DM, gout, and recent hospitalization (10/2-10/7) for Staph bacteremia.  The patient presented to [facility] with generalized weakness and was admitted with NOHELIA however blood cultures were positive for MSSA and the patient underwent a FILIPE on 11/16 which showed tricuspid valve vegetation as well as right atrial lead vegetation.  He was transferred for lead extraction at [facility].  The patient reports feeling fine without complaints of chest pain, shortness of breath, palpitations or lightheadedness.     Last Recorded Vitals:  Vitals:    11/17/23 0008 11/17/23 0400 11/17/23 0915 11/17/23 1020   BP: 146/67 132/70 134/64    BP Location: Right arm Right arm Right arm    Patient Position: Lying Lying Lying    Pulse: 71 69 70 70   Resp: 18 18 18    Temp: 36.2 °C (97.1 °F) 35.9 °C (96.7 °F) 36.4 °C (97.5 °F)    TempSrc: Temporal Temporal Temporal    SpO2: 97% 96%     Weight: 115 kg (254 lb 10.1 oz)      Height: 1.88 m (6' 2.02\")          Last Labs:  CBC - 11/17/2023: 10:14 AM  9.4 7.7 106    23.3      CMP - 11/17/2023: 10:14 AM  7.7 5.6; 5.6 11 --- 0.3   4.4 2.7 <3 94      PTT - 11/17/2023: 10:14 AM  1.2   13.8 36     Troponin I   Date/Time Value Ref Range Status   09/18/2023 05:52 PM 15 0 - 20 ng/L Final     Comment:     .  Less than 99th percentile of normal range cutoff-  Female and children under 18 years old <14 ng/L; Male <21 ng/L: Negative  Repeat testing should be performed if clinically indicated.   .  Female and children under 18 years old 14-50 ng/L; Male 21-50 ng/L:  Consistent with possible cardiac damage and possible increased clinical   risk. Serial " measurements may help to assess extent of myocardial damage.   .  >50 ng/L: Consistent with cardiac damage, increased clinical risk and  myocardial infarction. Serial measurements may help assess extent of   myocardial damage.   .   NOTE: Children less than 1 year old may have higher baseline troponin   levels and results should be interpreted in conjunction with the overall   clinical context.   .  NOTE: Troponin I testing is performed using a different   testing methodology at [facility] than at other   [facility] hospitals. Direct result comparisons should only   be made within the same method.       BNP   Date/Time Value Ref Range Status   10/02/2023 01:07 PM 97 0 - 99 pg/mL Final     Hemoglobin A1C   Date/Time Value Ref Range Status   11/17/2023 10:14 AM 6.6 (H) see below % Final   08/11/2023 12:59 PM 9.9 (A) % Final     Comment:          Diagnosis of Diabetes-Adults   Non-Diabetic: < or = 5.6%   Increased risk for developing diabetes: 5.7-6.4%   Diagnostic of diabetes: > or = 6.5%  .       Monitoring of Diabetes                Age (y)     Therapeutic Goal (%)   Adults:          >18           <7.0   Pediatrics:    13-18           <7.5                   7-12           <8.0                   0- 6            7.5-8.5   American Diabetes Association. Diabetes Care 33(S1), Jan 2010.     02/07/2023 09:07 AM 8.4 (A) % Final     Comment:          Diagnosis of Diabetes-Adults   Non-Diabetic: < or = 5.6%   Increased risk for developing diabetes: 5.7-6.4%   Diagnostic of diabetes: > or = 6.5%  .       Monitoring of Diabetes                Age (y)     Therapeutic Goal (%)   Adults:          >18           <7.0   Pediatrics:    13-18           <7.5                   7-12           <8.0                   0- 6            7.5-8.5   American Diabetes Association. Diabetes Care 33(S1), Jan 2010.       VLDL   Date/Time Value Ref Range Status   08/11/2023 12:59 PM 57 (H) 0 - 40 mg/dL Final   08/10/2022 11:09 AM 33 0 - 40  mg/dL Final   08/19/2020 12:17 PM 62 (H) 0 - 40 mg/dL Final      Last I/O:  I/O last 3 completed shifts:  In: - (0 mL/kg)   Out: 525 (4.5 mL/kg) [Urine:525 (0.1 mL/kg/hr)]  Weight: 115.5 kg     Past Cardiology Tests (Last 3 Years):  EKG:  ECG 12 Lead 10/9/2023    Echo:  Transthoracic Echo (TTE) Complete 11/15/2023    Ejection Fractions:  EF   Date/Time Value Ref Range Status   11/14/2023 03:22 PM 63       Cath:  No results found for this or any previous visit from the past 1095 days.    Stress Test:  No results found for this or any previous visit from the past 1095 days.    Cardiac Imaging:  No results found for this or any previous visit from the past 1095 days.      Past Medical History:  He has a past medical history of Abnormal weight gain (10/27/2016), Achilles tendinitis, unspecified leg (08/16/2016), Acute upper respiratory infection, unspecified, Allergic contact dermatitis due to plants, except food (08/22/2013), BPH with obstruction/lower urinary tract symptoms, Cellulitis of right lower limb (07/30/2021), Cough, unspecified (03/21/2016), Diabetes (CMS/[facility]), Encounter for screening for human immunodeficiency virus (HIV) (06/14/2016), Hordeolum externum unspecified eye, unspecified eyelid (08/14/2019), Hyperglycemia, unspecified (12/13/2017), Incisional hernia without obstruction or gangrene (06/15/2015), Knee joint pain, MSSA bacteremia (10/2023), Muscle spasm of calf (08/16/2016), Obstructive uropathy, Personal history of other diseases of the digestive system (06/30/2015), Personal history of other diseases of the digestive system (02/05/2016), Personal history of other diseases of the respiratory system (12/23/2014), Personal history of other diseases of the respiratory system (01/28/2016), Personal history of other infectious and parasitic diseases (12/02/2015), Personal history of other infectious and parasitic diseases, Personal history of other specified conditions (02/04/2015), Personal history of  other specified conditions (08/16/2016), Personal history of other specified conditions (01/26/2015), Personal history of pneumonia (recurrent) (01/29/2016), Personal history of urinary (tract) infections (02/19/2013), Prostatitis (03/21/2023), Pruritus, unspecified (02/10/2015), Sinoatrial node dysfunction (CMS/HCC), Strain of muscle, fascia and tendon of the posterior muscle group at thigh level, right thigh, initial encounter (05/10/2016), and Unspecified symptoms and signs involving the genitourinary system (12/20/2016).    Past Surgical History:  He has a past surgical history that includes Varicose vein surgery (08/22/2013); Cardiac pacemaker placement (08/22/2013); Other surgical history (06/15/2015); Ventral hernia repair (06/15/2015); Other surgical history (09/16/2019); and Peripherally inserted central catheter insertion.      Social History:  He reports that he has never smoked. He has never been exposed to tobacco smoke. He has never used smokeless tobacco. He reports that he does not currently use alcohol. He reports that he does not use drugs.    Family History:  No family history on file.     Allergies:  Bupropion and Keflex [cephalexin]    Inpatient Medications:  Scheduled medications   Medication Dose Route Frequency    amLODIPine  10 mg oral q PM    ceFAZolin in dextrose (iso-os)  2 g intravenous q12h    finasteride  5 mg oral Daily    heparin (porcine)  5,000 Units subcutaneous q8h    [START ON 11/18/2023] insulin glargine  10 Units subcutaneous Nightly    insulin lispro  0-5 Units subcutaneous TID with meals    melatonin  5 mg oral Nightly    metoprolol succinate XL  100 mg oral Daily    mupirocin   Topical BID    rosuvastatin  10 mg oral Nightly    tamsulosin  0.8 mg oral Nightly     PRN medications   Medication    acetaminophen    dextrose 10 % in water (D10W)    dextrose    glucagon    oxyCODONE     Continuous Medications   Medication Dose Last Rate     Outpatient Medications:  Current  Outpatient Medications   Medication Instructions    allopurinol (ZYLOPRIM) 100 mg, oral, Daily    amLODIPine (NORVASC) 10 mg, oral, Every evening    dextrose 10 % in water, D10W, 10 % infusion 0.3 g/kg/hr, intravenous, Once as needed    finasteride (PROSCAR) 5 mg, oral, Daily    heparin (porcine) 5,000 Units, subcutaneous, Every 8 hours    hydrALAZINE (APRESOLINE) 10 mg, oral, 3 times daily, Hold for SBP <120    HYDROcodone-acetaminophen (Norco) 5-325 mg tablet 1 tablet, oral, Every 6 hours PRN    insulin degludec (TRESIBA FLEXTOUCH U-100) 10 Units, subcutaneous, Nightly, Take as directed per insulin instructions.    lactobacillus acidophilus & bulgar (Lactinex) 1 million cell chewable tablet 1 tablet, oral, Daily, For 28 days    losartan (Cozaar) 100 mg tablet TAKE 1 TABLET BY MOUTH EVERY DAY    lubricating eye drops ophthalmic solution 1 drop, Left Eye, Every 8 hours PRN    menthol (Biofreeze, menthol,) 4 % gel 1 Application, topical (top), Every 6 hours PRN, To right knee    metoprolol succinate XL (Toprol-XL) 100 mg 24 hr tablet TAKE 1 TABLET BY MOUTH EVERY DAY    ondansetron (ZOFRAN) 4 mg, oral, Every 6 hours PRN    rosuvastatin (Crestor) 10 mg tablet TAKE 1 TABLET BY MOUTH EVERYDAY AT BEDTIME    tamsulosin (FLOMAX) 0.8 mg, oral, Nightly       Physical Exam:  General Appearance:    Alert, cooperative, no distress, appears stated age  Lungs:   Clear to auscultation bilaterally, respirations unlabored  Heart:    Regular rate and rhythm, S1 and S2 normal, no murmur, rub  or gallop.  Abdomen:  Soft,   Extremities: No edema        Assessment/Plan   Chester Verduzco is a 73 y.o. male with a PMXH of HTN, HLD, Dual chamber PPM implanted in 11/1996 for SSS (RV is passive) with most recent generator replacement in 11/2020 ([facility]), Type 2 DM, gout, and recent hospitalization (10/2-10/7) for Staph bacteremia.  Admitted with persistent staph bacteremia in the setting of RA lead vegetation and TV lead  vegetation.    #Plan:  -Lead extraction on Friday 11/24/2023.    Peripheral IV 11/13/23 20 G Right;Lateral;Distal Wrist (Active)   Site Assessment Clean;Dry;Intact 11/17/23 0915   Dressing Type Transparent 11/17/23 0915   Number of days: 4       Urethral Catheter (Active)   Site Assessment Clean;Skin intact 11/17/23 1406   Number of days: 0       Code Status:  DNR    This case was discussed with Dr. Medina and > 30 minutes was spent in the professional and overall care of this patient.        Ava Gonzalez MD  Cardiology fellow PGY 4.

## 2023-11-17 NOTE — ED NOTES
Pharmacy Medication History Review    Chester Verduzco is a 73 y.o. male admitted for No Principal Problem: There is no principal problem currently on the Problem List. Please update the Problem List and refresh.. Pharmacy reviewed the patient's [id number] medications and allergies for accuracy.    The list below reflects the updated PTA list. Comments regarding how patient may be taking medications differently can be found in the Admit Orders Activity  Prior to Admission Medications   Prescriptions Last Dose Informant Patient Reported? Taking?   HYDROcodone-acetaminophen (Norco) 5-325 mg tablet 11/16/2023 Other Yes No   Sig: Take 1 tablet by mouth every 6 hours if needed for severe pain (7 - 10).   allopurinol (Zyloprim) 100 mg tablet   Yes Yes   Sig: Take 1 tablet (100 mg) by mouth once daily.   amLODIPine (Norvasc) 10 mg tablet 11/16/2023 Other No Yes   Sig: Take 1 tablet (10 mg) by mouth once daily in the evening.   dextrose 10 % in water, D10W, 10 % infusion 11/16/2023  No Yes   Sig: Infuse 34.5 g/hr at 345 mL/hr into a venous catheter 1 time if needed (For blood glucose less than 70 mg/dL after 30 minutes of intervention.  Discontinue once blood glucose reaches 100 mg/dL.) for up to 1 dose.   finasteride (Proscar) 5 mg tablet 11/16/2023 Other Yes Yes   Sig: Take 1 tablet (5 mg) by mouth once daily.   heparin sodium,porcine (heparin, porcine,) 5,000 unit/mL injection 11/16/2023  No Yes   Sig: Inject 1 mL (5,000 Units) under the skin every 8 hours. Do not start before November 17, 2023.   hydrALAZINE (Apresoline) 10 mg tablet 11/16/2023 Other Yes Yes   Sig: Take 1 tablet (10 mg) by mouth 3 times a day. Hold for SBP <120   insulin degludec (Tresiba FlexTouch U-100) 100 unit/mL (3 mL) injection   Yes Yes   Sig: Inject 10 Units under the skin once daily at bedtime. Take as directed per insulin instructions.   lactobacillus acidophilus & bulgar (Lactinex) 1 million cell chewable tablet 11/16/2023 Other Yes  Yes   Sig: Chew 1 tablet once daily. For 28 days   losartan (Cozaar) 100 mg tablet Unknown Self No Yes   Sig: TAKE 1 TABLET BY MOUTH EVERY DAY   Patient taking differently: Take 1 tablet (100 mg) by mouth once daily.   lubricating eye drops ophthalmic solution 11/16/2023 Other Yes Yes   Sig: Administer 1 drop into the left eye every 8 hours if needed for dry eyes.   menthol (Biofreeze, menthol,) 4 % gel 11/16/2023 Other Yes Yes   Sig: Apply 1 Application topically every 6 hours if needed. To right knee   metoprolol succinate XL (Toprol-XL) 100 mg 24 hr tablet 11/16/2023 Other No Yes   Sig: TAKE 1 TABLET BY MOUTH EVERY DAY   Patient taking differently: Take 1 tablet (100 mg) by mouth once daily.   ondansetron (Zofran) 4 mg tablet 11/16/2023 Other Yes Yes   Sig: Take 1 tablet (4 mg) by mouth every 6 hours if needed for nausea or vomiting.   rosuvastatin (Crestor) 10 mg tablet 11/16/2023 Self No Yes   Sig: TAKE 1 TABLET BY MOUTH EVERYDAY AT BEDTIME   tamsulosin (Flomax) 0.4 mg 24 hr capsule 11/16/2023 Other Yes Yes   Sig: Take 2 capsules (0.8 mg) by mouth once daily at bedtime.      Facility-Administered Medications: None        The list below reflects the updated allergy list. Please review each documented allergy for additional clarification and justification.  Allergies  Reviewed by José Luis Dias MD on 11/17/2023        Severity Reactions Comments    Bupropion Medium Other Patient stated medication made him  very irritable and violent    Keflex [cephalexin] Medium Headache, GI Upset, Nausea/vomiting             Patient declines M2B at discharge. Pharmacy has been updated to [facility].    Sources used to complete the med history include medication dispense history, care everywhere, OARRS, and patient interview. Patient was a fair historian of their medications.    Below are additional concerns with the patient's PTA list.  None to note.    Joby TrevinoD   Meds PGY1 Pharmacy Resident    Meds  Ambulatory and Retail Services  Please reach out via [employer] Secure Chat for questions, or if no response call [place] or [place] [facility] “[facility]”

## 2023-11-17 NOTE — CARE PLAN
The patient's goals for the shift include rest and pain control.    The clinical goals for the shift include pt will be transferred to [facility] this shift in stable condition

## 2023-11-17 NOTE — CONSULTS
Consults  Referred by Dr. Dominga Ortega MD: Jeffry De Leon MD    Reason For Consult  Patient with endocarditis and persistent MSSA bacteremia and decreased kidney function; antibiotic recommendations    History Of Present Illness  Chester Verduzco is a 73 y.o. male with a PMHx of HTN, HLD, dual chamber PPM implanted in 11/1996 for SSS, T2DM, gout, and recent hospitalization from 10/2-10/7 for diarrhea, NOHELIA, hyponatremia secondary to hypovolemia presenting to [facility] ED from a SNF for NOHELIA on labs (Creatinine of 5.3 with baseline of 1-1.1 in 8/2023), oliguria, and generalized weakness. 1/2 blood cultures were positive for MSSA. At [facility], he was started on cefazolin 2g q12hrs by Dr. Raines and underwent TTE on 11/13 that did not show vegetations. A FILIPE was then conducted on 11/16 that showed tricuspid valve vegetation and right atrial lead vegetation according to Dr. Daily for which he was transferred to [facility] for further management and evaluation of removal of PPM in the setting of MSSA bacteremia and endocarditis. The patient also is experiencing right knee pain with no erythema, swelling or point tenderness for which orthopedics was consulted to rule out septic joint.    On October 9th, blood cultures were drawn due to the patient experiencing fever that was MSSA+. A PICC was placed and the patient received vanc 500mg x1 and zosyn. He received 1 dose of vanc after labs were drawn, showing Cr worsening from 2.95 on 10/9 to 4.25 on 10/11. He continued the zosyn 2.25 mg q6h until (10/9 - 10/18) and was transitioned to oral doxycycline 100 mg BID until (10/18-10/22).    On arrival to [facility], he states that he has been feeling fatigued  and had right knee pain. Additionally, he prescribes to 50 pounds of unintentional weight loss since the start of October. He also notes having his PICC replaced on 11/11. He denies all other symptoms including recent fevers, confusion, shortness of breath, or  chest pain.    Labs:  CBC: WBC: 8.2, Hb: 7.6, Plt: 99  RFP: Na: 136, K: 3.4, Cl: 106, Bicarbonate: 19, BUN: 46, Cr: 4.92, eGFR: 12, Calcium: 7.3, Albumin: 2.6  Imaging:  FILIPE 11/16: not officially read but reviewed with Dr. Hall who indicated a tricuspid valve vegetation and right atrial lead vegetation    TTE 11/14:   1. Left ventricular systolic function is normal with a 55-60% estimated ejection fraction.   2. Spectral Doppler shows an impaired relaxation pattern of left ventricular diastolic filling.   3. There is mild mitral and tricuspid regurgitation.   4. The estimated pulmonary artery pressure is mildly elevated with the RVSP at 39.2 mmHg.   5. No definite valvular vegetations were visualized.    Micro:  Positive blood cultures from 10/9, 11/10 for Staph Aureus grown on 1/2 bottles in aerobic bottle; redrawn 11/16   MRSA nares +  Antibiotics:  11/13: Linezolid  11/14-: Cefazolin    Past Medical History  He has a past medical history of Abnormal weight gain (10/27/2016), Achilles tendinitis, unspecified leg (08/16/2016), Acute upper respiratory infection, unspecified, Allergic contact dermatitis due to plants, except food (08/22/2013), BPH with obstruction/lower urinary tract symptoms, Cellulitis of right lower limb (07/30/2021), Cough, unspecified (03/21/2016), Diabetes (CMS/[facility]), Encounter for screening for human immunodeficiency virus (HIV) (06/14/2016), Hordeolum externum unspecified eye, unspecified eyelid (08/14/2019), Hyperglycemia, unspecified (12/13/2017), Incisional hernia without obstruction or gangrene (06/15/2015), Knee joint pain, MSSA bacteremia (10/2023), Muscle spasm of calf (08/16/2016), Obstructive uropathy, Personal history of other diseases of the digestive system (06/30/2015), Personal history of other diseases of the digestive system (02/05/2016), Personal history of other diseases of the respiratory system (12/23/2014), Personal history of other diseases of the respiratory system  (01/28/2016), Personal history of other infectious and parasitic diseases (12/02/2015), Personal history of other infectious and parasitic diseases, Personal history of other specified conditions (02/04/2015), Personal history of other specified conditions (08/16/2016), Personal history of other specified conditions (01/26/2015), Personal history of pneumonia (recurrent) (01/29/2016), Personal history of urinary (tract) infections (02/19/2013), Prostatitis (03/21/2023), Pruritus, unspecified (02/10/2015), Sinoatrial node dysfunction (CMS/HCC), Strain of muscle, fascia and tendon of the posterior muscle group at thigh level, right thigh, initial encounter (05/10/2016), and Unspecified symptoms and signs involving the genitourinary system (12/20/2016).    Surgical History  He has a past surgical history that includes Varicose vein surgery (08/22/2013); Cardiac pacemaker placement (08/22/2013); Other surgical history (06/15/2015); Ventral hernia repair (06/15/2015); Other surgical history (09/16/2019); and Peripherally inserted central catheter insertion.     Social History     Occupational History    Not on file   Tobacco Use    Smoking status: Never     Passive exposure: Never    Smokeless tobacco: Never   Vaping Use    Vaping Use: Never used   Substance and Sexual Activity    Alcohol use: Not Currently    Drug use: Never    Sexual activity: Not Currently     Travel History   Travel since 10/17/23    No documented travel since 10/17/23        Service:  Branch Years Served Period          Comments:      Family History  No family history on file.  Allergies  Bupropion and Keflex [cephalexin]     Immunization History   Administered Date(s) Administered    Flu vaccine, quadrivalent, high-dose, preservative free, age 65y+ (FLUZONE) 11/28/2020    Influenza, High Dose Seasonal, Preservative Free 10/01/2021    Influenza, seasonal, injectable 09/08/2023    Moderna COVID-19 vaccine, bivalent, blue cap/gray label *Check  age/dose* 05/02/2023    Moderna SARS-CoV-2 Vaccination 01/04/2021, 02/01/2021, 10/23/2021, 08/13/2022    Novel influenza-H1N1-09, preservative-free 10/15/2009    Pneumococcal conjugate vaccine, 13-valent (PREVNAR 13) 10/27/2016    Pneumococcal conjugate vaccine, 20-valent (PREVNAR 20) 08/10/2022    Pneumococcal polysaccharide vaccine, 23-valent, age 2 years and older (PNEUMOVAX 23) 06/19/2018    Td vaccine, age 7 years and older (TDVAX) 09/16/2013    Zoster vaccine, recombinant, adult (SHINGRIX) 11/04/2022    Zoster, live 09/08/2023     Medications  Home medications:  Medications Prior to Admission   Medication Sig Dispense Refill Last Dose    amLODIPine (Norvasc) 10 mg tablet Take 1 tablet (10 mg) by mouth once daily in the evening.   11/16/2023    dextrose 10 % in water, D10W, 10 % infusion Infuse 34.5 g/hr at 345 mL/hr into a venous catheter 1 time if needed (For blood glucose less than 70 mg/dL after 30 minutes of intervention.  Discontinue once blood glucose reaches 100 mg/dL.) for up to 1 dose. 500 mL 1 11/16/2023    finasteride (Proscar) 5 mg tablet Take 1 tablet (5 mg) by mouth once daily.   11/16/2023    heparin sodium,porcine (heparin, porcine,) 5,000 unit/mL injection Inject 1 mL (5,000 Units) under the skin every 8 hours. Do not start before November 17, 2023. 5 mL 0 11/16/2023    hydrALAZINE (Apresoline) 10 mg tablet Take 1 tablet (10 mg) by mouth 3 times a day. Hold for SBP <120   11/16/2023    HYDROcodone-acetaminophen (Norco) 5-325 mg tablet Take 1 tablet by mouth every 6 hours if needed for severe pain (7 - 10).   11/16/2023    lactobacillus acidophilus & bulgar (Lactinex) 1 million cell chewable tablet Chew 1 tablet once daily. For 28 days   11/16/2023    losartan (Cozaar) 100 mg tablet TAKE 1 TABLET BY MOUTH EVERY DAY (Patient taking differently: Take 1 tablet (100 mg) by mouth once daily.) 90 tablet 3 Unknown    lubricating eye drops ophthalmic solution Administer 1 drop into the left eye every 8  "hours if needed for dry eyes.   11/16/2023    menthol (Biofreeze, menthol,) 4 % gel Apply 1 Application topically every 6 hours if needed. To right knee   11/16/2023    metoprolol succinate XL (Toprol-XL) 100 mg 24 hr tablet TAKE 1 TABLET BY MOUTH EVERY DAY (Patient taking differently: Take 1 tablet (100 mg) by mouth once daily.) 90 tablet 3 11/16/2023    ondansetron (Zofran) 4 mg tablet Take 1 tablet (4 mg) by mouth every 6 hours if needed for nausea or vomiting.   11/16/2023    rosuvastatin (Crestor) 10 mg tablet TAKE 1 TABLET BY MOUTH EVERYDAY AT BEDTIME 90 tablet 3 11/16/2023    tamsulosin (Flomax) 0.4 mg 24 hr capsule Take 2 capsules (0.8 mg) by mouth once daily at bedtime.   11/16/2023     Current medications:  Scheduled medications  amLODIPine, 10 mg, oral, q PM  ceFAZolin in dextrose (iso-os), 2 g, intravenous, q12h  finasteride, 5 mg, oral, Daily  heparin (porcine), 5,000 Units, subcutaneous, q8h  [START ON 11/18/2023] insulin glargine, 10 Units, subcutaneous, Nightly  insulin lispro, 0-5 Units, subcutaneous, TID with meals  melatonin, 5 mg, oral, Nightly  metoprolol succinate XL, 100 mg, oral, Daily  mupirocin, , Topical, BID  rosuvastatin, 10 mg, oral, Nightly  tamsulosin, 0.8 mg, oral, Nightly      Continuous medications     PRN medications  PRN medications: acetaminophen, dextrose 10 % in water (D10W), dextrose, glucagon, oxyCODONE    Review of Systems negative other than as listed in HPI     Objective  Range of Vitals (last 24 hours)  Heart Rate:  [62-76]   Temp:  [35.9 °C (96.7 °F)-36.2 °C (97.2 °F)]   Resp:  [12-23]   BP: (109-146)/(54-70)   Height:  [188 cm (6' 2.02\")]   Weight:  [115 kg (254 lb 10.1 oz)]   SpO2:  [93 %-99 %]   Daily Weight  11/17/23 : 115 kg (254 lb 10.1 oz)    Body mass index is 32.68 kg/m².     Physical Exam  Constitutional: Patient is in no acute distress, sitting in chair comfortably  HEENT: Normocephalic and atraumatic. EOM intact, anicteric sclera, left subconjunctival " hemorrhage   Cardiovascular: Normal rate and regular rhythm, S1 and S2 present, no murmurs, rubs, or gallops  Pulmonary: Pulmonary effort is normal. No respiratory distress, CTAB, No wheezing, rhonchi or rales. No chest wall tenderness  Abdominal: Distended abdomen. No tenderness to palpation.  Musculoskeletal: No swelling or tenderness.   Skin: Skin is warm and dry. Skin is not jaundiced. Darkening of lower extremity skin bilaterally  Neurological: AxO x3. No focal deficit present.   Psychiatric: Mood and Affect: Mood normal. Behavior: Behavior normal.      Relevant Results  Labs  Results from last 72 hours   Lab Units 11/16/23  0630 11/15/23  0658   WBC AUTO x10*3/uL 8.2 7.5   HEMOGLOBIN g/dL 7.6* 7.8*   HEMATOCRIT % 23.8* 24.7*   PLATELETS AUTO x10*3/uL 99* 102*     Results from last 72 hours   Lab Units 11/16/23  0630 11/15/23  0658   SODIUM mmol/L 136 133*   POTASSIUM mmol/L 3.4* 3.3*   CHLORIDE mmol/L 106 103   CO2 mmol/L 19* 20*   BUN mg/dL 46* 51*   CREATININE mg/dL 4.92* 5.20*   GLUCOSE mg/dL 92 133*   CALCIUM mg/dL 7.3* 7.1*   ANION GAP mmol/L 14 13   EGFR mL/min/1.73m*2 12* 11*   PHOSPHORUS mg/dL 4.4 4.1     Results from last 72 hours   Lab Units 11/16/23  0630 11/15/23  0658   ALBUMIN g/dL 2.6* 2.6*     Estimated Creatinine Clearance: 18.4 mL/min (A) (by C-G formula based on SCr of 4.92 mg/dL (H)).  C-Reactive Protein   Date Value Ref Range Status   11/14/2023 9.21 (H) <1.00 mg/dL Final     CRP   Date Value Ref Range Status   09/18/2023 22.55 (A) mg/dL Final     Comment:     REF VALUE  < 1.00     06/23/2021 0.50 mg/dL Final     Comment:     REF VALUE  < 1.00       Sedimentation Rate   Date Value Ref Range Status   09/18/2023 10 0 - 20 mm/h Final     Microbiology  Susceptibility data from last 90 days.  Collected Specimen Info Organism   11/14/23 Swab from Anterior Nares Methicillin Resistant Staphylococcus aureus (MRSA)   11/14/23 Blood culture from Peripheral Venipuncture Staphylococcus aureus         Assessment/Plan   Chester Verduzco is a 73 y.o. male with a PMHx of HTN, HLD, dual chamber PPM implanted in 11/1996 for SSS, T2DM, gout, and recent hospitalization from 10/2-10/7 for diarrhea, NOHELIA, hyponatremia secondary to hypovolemia presenting to [facility] ED from a SNF for NOHELIA on labs (Creatinine of 5.3 with baseline of 1-1.1 in 8/2023), oliguria, and generalized weakness. Of note, the patient also had positive blood cultures for MSSA in the outpatient setting for which he received  vancomycin (discontinued due to NOHELIA), 10  days of zosyn,  and 5 days of oral doxycycline. At the OSH, 1/2 blood cultures were positive for MSSA and the patient was started on cefazolin 2g IV q12hrs. A FILIPE was then conducted on 11/16 with concerns for tricuspid valve vegetation and right atrial lead vegetation for which he was transferred to [facility] for further management and evaluation of removal of PPM in the setting of MSSA bacteremia and endocarditis.     Recommendations  - Removal of pacemaker lead per EP's recommendation with vegetation removal as able  - Remove PICC line  - 4 weeks of IV cefazolin since day of vegetation removal   - Daily blood cultures  - Appreciate recommendations from nephrology to evaluate source of NOHELIA    Jen Bull, MS4  Discussed with Dr. Gonzalez  For any questions or concerns, please page ID Team [phone] Pager: [phone]

## 2023-11-17 NOTE — DISCHARGE SUMMARY
Discharge Diagnosis  Acute renal failure, unspecified acute renal failure type (CMS/[facility])    Issues Requiring Follow-Up  Possible Pacemaker lead extraction    Discharge Meds     Your medication list        START taking these medications        Instructions Last Dose Given Next Dose Due   dextrose 10 % in water (D10W) 10 % infusion      Infuse 34.5 g/hr at 345 mL/hr into a venous catheter 1 time if needed (For blood glucose less than 70 mg/dL after 30 minutes of intervention.  Discontinue once blood glucose reaches 100 mg/dL.) for up to 1 dose.       heparin (porcine) 5,000 unit/mL injection  Start taking on: November 17, 2023      Inject 1 mL (5,000 Units) under the skin every 8 hours. Do not start before November 17, 2023.              CONTINUE taking these medications        Instructions Last Dose Given Next Dose Due   amLODIPine 10 mg tablet  Commonly known as: Norvasc      Take 1 tablet (10 mg) by mouth once daily in the evening.       Biofreeze (menthol) 4 % gel  Generic drug: menthol           finasteride 5 mg tablet  Commonly known as: Proscar           hydrALAZINE 10 mg tablet  Commonly known as: Apresoline           HYDROcodone-acetaminophen 5-325 mg tablet  Commonly known as: Norco           lactobacillus acidophilus & bulgar 1 million cell chewable tablet  Commonly known as: Lactinex           losartan 100 mg tablet  Commonly known as: Cozaar      TAKE 1 TABLET BY MOUTH EVERY DAY       lubricating eye drops ophthalmic solution           metoprolol succinate  mg 24 hr tablet  Commonly known as: Toprol-XL      TAKE 1 TABLET BY MOUTH EVERY DAY       ondansetron 4 mg tablet  Commonly known as: Zofran           rosuvastatin 10 mg tablet  Commonly known as: Crestor      TAKE 1 TABLET BY MOUTH EVERYDAY AT BEDTIME       tamsulosin 0.4 mg 24 hr capsule  Commonly known as: Flomax                  STOP taking these medications      acetaminophen 325 mg tablet  Commonly known as: Tylenol        allopurinol 100  mg tablet  Commonly known as: Zyloprim        C-Lax Laxative (bisacodyl) 5 mg EC tablet  Generic drug: bisacodyl        Dulcolax (bisacodyl) 10 mg suppository  Generic drug: bisacodyl        ferrous gluconate 324 (38 Fe) mg tablet  Commonly known as: Fergon        fluticasone 50 mcg/actuation nasal spray  Commonly known as: Flonase        furosemide 40 mg tablet  Commonly known as: Lasix        insulin degludec 100 unit/mL (3 mL) injection  Commonly known as: Tresiba FlexTouch U-100        insulin regular 100 unit/mL injection  Commonly known as: HumuLIN R        mineral oil enema        naltrexone 50 mg tablet  Commonly known as: Depade        pantoprazole 40 mg EC tablet  Commonly known as: ProtoNix        potassium chloride CR 20 mEq ER tablet  Commonly known as: Klor-Con M20        sennosides-docusate sodium 8.6-50 mg tablet  Commonly known as: Kita-Colace        sodium chloride 0.9% flush                  Where to Get Your Medications        These medications were sent to [facility]/pharmacy #[id number] [place] [address]      Phone: [phone]   dextrose 10 % in water (D10W) 10 % infusion  heparin (porcine) 5,000 unit/mL injection         Test Results Pending At Discharge  Pending Labs       Order Current Status    Blood Culture Preliminary result    Blood Culture Preliminary result    Blood Culture Preliminary result    Blood Culture Preliminary result            Hospital Course  Chester Verduzco is a 73 y.o. male with PMH of dual chamber pacemaker implanted on 11/26/1996 for SSS (RV is passive) with most recent generator replacement on 11/03/2020 ([employer] [facility] [employer] MRI), type 2 DM, hernia repair 2015, HTN, gout, and dyslipidemia. Blood culture +MSSA bacteremia. Today, BUN/creat 46/4.92 (creat 5.3 on admit with baseline creat 1-1.1 in 8/2023). Had recent hospitalization for generalized weakness, NOHELIA with hyponatremia discharged to SNF. Came in with uptrending BUN/creatinine  and positive blood cultures for MSSA (11/10, also previously 10/9). Pt has had persistent MSSA bacteremia, subjective hx of fevers, a new conjunctival hemorrhage and decline in renal function since 10/2023. FILIPE today is pending full report, however, reviewed with Dr. Hall-- shows tricuspid valve vegetation (0.9 cm circumferential) as well as right atrial lead (0.9 cm linear) vegetation. TTE (11/13/2023) showing normal LVEF, mild MR and mild TR.  The patient is being transferred to [facility] for a possible pacemaker lead extraction.     Pertinent Physical Exam At Time of Discharge  Physical Exam  Constitutional:       Appearance: Normal appearance. He is obese. He is not ill-appearing.   HENT:      Head: Normocephalic and atraumatic.   Eyes:      Extraocular Movements: Extraocular movements intact.   Cardiovascular:      Rate and Rhythm: Normal rate and regular rhythm.      Heart sounds: Normal heart sounds. No murmur heard.     No friction rub. No gallop.   Pulmonary:      Effort: Pulmonary effort is normal. No respiratory distress.      Breath sounds: No wheezing, rhonchi or rales.   Abdominal:      General: Abdomen is flat. There is distension.      Palpations: There is no mass.      Tenderness: There is no abdominal tenderness. There is no guarding.      Hernia: A hernia is present.   Musculoskeletal:         General: No tenderness, deformity or signs of injury. Normal range of motion.      Comments: Mild swelling of R knee. No erythema or pain with palpation. Intact ROM of bilateral knees.   Skin:     General: Skin is warm and dry.      Findings: No rash.      Comments: No splinter hemorrhages or Osler/Janeway lesions   Neurological:      General: No focal deficit present.      Mental Status: He is alert. Mental status is at baseline.   Psychiatric:         Mood and Affect: Mood normal.        Outpatient Follow-Up  Future Appointments   Date Time Provider Department Center   11/22/2023 11:20 AM Cabrera Delaney MD  [id number] [facility]   12/6/2023  9:00 AM GEA FLUORO 5 GEADR [facility] [place]    12/6/2023 10:40 AM MD ARABELLA [id number] [facility]   12/13/2023  2:20 PM Cabrera Delaney MD [id number] [facility]   2/14/2024 10:20 AM eJffry De Leon MD [id number] [facility]   8/30/2024 10:40 AM Jeffry De Leon MD [id number] [facility]   9/23/2024  9:20 AM Yaz Salinas, APRN-CNP [id number] [facility]         Zhen Panda MD

## 2023-11-17 NOTE — HOSPITAL COURSE
Mr. Chester Verduzco is a 73 year old male  who presented to an outside hospital from a SNF on 11/13 for NOHELIA on labs from the SNF, oliguria and blood cultures that were persistently positive for MSSA. He was subsequently transferred to . Blood cultures with NGTD as of 11/16.  FILIPE 11/16 showed tricuspid valve vegetation as well as right atrial lead vegetation. Patient remained afebrile without leukocytosis and hemodynamically stable. Device Interrogated 11/17 with AP 29% and  30%.  Lead extraction complete 11/22. After lead removal, on telemetry the patient had several episodes of intermittent asymptomatic high-grade AV block, NSVT,and bradycardia. As a result weaned his metoprolol. Device re-implant (micra leadless pacemaker) on 11/30. He has been on IV Ancef.  ID Recommends continuation of Cefazolin 4 weeks after lead removal,  until (12/20). PICC placed 12/2.  Reimplant was done in setting of tricuspid vegetation with the assumption that patient was sterilized given Negative BC, lack of growth in vegetation size and clinical picture. Patient has outpatient follow up scheduled further and antibiotic use will be determined then. Per ID, please collect CBC, BMP, ESR and CRP weekly and fax to [phone] Attn Dr Gonzalez     While inpatient he received a unit of packed red blood cells for low hemoglobin (11/25). Hemoglobin has remained stable with no signs or symptoms of bleeding. Home lasix and losartan were being held in setting of NOHELIA secondary to nephrotoxic antibiotic exposure which is down trending but not at baseline with adequate urine output. Patient has maldonado  in setting of severe BPH and outpatient follow up with urology.     Of note, patient noticed left groin lymphadenopathy and U/S (11/23) showed Enlarged left inguinal/external iliac lymph nodes. PET scan deferred at this time d/t active infectious process. Underwent core needle biopsy on 11/29. Patient to follow up biopsy results with surgical  oncology.

## 2023-11-17 NOTE — PROGRESS NOTES
11/17/23 PCN note:   referral was sent to [facility] [place] where pt was skilled, they will accept back once ready for dc, no precert required.    Juana Can

## 2023-11-17 NOTE — NURSING NOTE
[facility] Pt [facility] via stretcher in stable condition. All belongings sent with pt.

## 2023-11-17 NOTE — NURSING NOTE
Nurse to nurse report given to RN on Artis 5. Pt en route to their facility with [facility]. 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

## 2023-11-17 NOTE — CONSULTS
"Inpatient consult to electrophysiology  Consult performed by: Ava Gonzalez MD  Consult ordered by: Dominga Edmond MD        History Of Present Illness:    Chester Verduzco is a 73 y.o. male with a PMXH of HTN, HLD, Dual chamber PPM implanted in 11/1996 for SSS (RV is passive) with most recent generator replacement in 11/2020 ([facility]), Type 2 DM, gout, and recent hospitalization (10/2-10/7) for Staph bacteremia.  The patient presented to [facility] with generalized weakness and was admitted with NOHELIA however blood cultures were positive for MSSA and the patient underwent a FILIPE on 11/16 which showed tricuspid valve vegetation as well as right atrial lead vegetation.  He was transferred for lead extraction at [facility].  The patient reports feeling fine without complaints of chest pain, shortness of breath, palpitations or lightheadedness.     Last Recorded Vitals:  Vitals:    11/17/23 0008 11/17/23 0400 11/17/23 0915 11/17/23 1020   BP: 146/67 132/70 134/64    BP Location: Right arm Right arm Right arm    Patient Position: Lying Lying Lying    Pulse: 71 69 70 70   Resp: 18 18 18    Temp: 36.2 °C (97.1 °F) 35.9 °C (96.7 °F) 36.4 °C (97.5 °F)    TempSrc: Temporal Temporal Temporal    SpO2: 97% 96%     Weight: 115 kg (254 lb 10.1 oz)      Height: 1.88 m (6' 2.02\")          Last Labs:  CBC - 11/17/2023: 10:14 AM  9.4 7.7 106    23.3      CMP - 11/17/2023: 10:14 AM  7.7 5.6; 5.6 11 --- 0.3   4.4 2.7 <3 94      PTT - 11/17/2023: 10:14 AM  1.2   13.8 36     Troponin I   Date/Time Value Ref Range Status   09/18/2023 05:52 PM 15 0 - 20 ng/L Final     Comment:     .  Less than 99th percentile of normal range cutoff-  Female and children under 18 years old <14 ng/L; Male <21 ng/L: Negative  Repeat testing should be performed if clinically indicated.   .  Female and children under 18 years old 14-50 ng/L; Male 21-50 ng/L:  Consistent with possible cardiac damage and possible increased clinical   risk. Serial " measurements may help to assess extent of myocardial damage.   .  >50 ng/L: Consistent with cardiac damage, increased clinical risk and  myocardial infarction. Serial measurements may help assess extent of   myocardial damage.   .   NOTE: Children less than 1 year old may have higher baseline troponin   levels and results should be interpreted in conjunction with the overall   clinical context.   .  NOTE: Troponin I testing is performed using a different   testing methodology at [facility] than at other   [facility] hospitals. Direct result comparisons should only   be made within the same method.       BNP   Date/Time Value Ref Range Status   10/02/2023 01:07 PM 97 0 - 99 pg/mL Final     Hemoglobin A1C   Date/Time Value Ref Range Status   11/17/2023 10:14 AM 6.6 (H) see below % Final   08/11/2023 12:59 PM 9.9 (A) % Final     Comment:          Diagnosis of Diabetes-Adults   Non-Diabetic: < or = 5.6%   Increased risk for developing diabetes: 5.7-6.4%   Diagnostic of diabetes: > or = 6.5%  .       Monitoring of Diabetes                Age (y)     Therapeutic Goal (%)   Adults:          >18           <7.0   Pediatrics:    13-18           <7.5                   7-12           <8.0                   0- 6            7.5-8.5   American Diabetes Association. Diabetes Care 33(S1), Jan 2010.     02/07/2023 09:07 AM 8.4 (A) % Final     Comment:          Diagnosis of Diabetes-Adults   Non-Diabetic: < or = 5.6%   Increased risk for developing diabetes: 5.7-6.4%   Diagnostic of diabetes: > or = 6.5%  .       Monitoring of Diabetes                Age (y)     Therapeutic Goal (%)   Adults:          >18           <7.0   Pediatrics:    13-18           <7.5                   7-12           <8.0                   0- 6            7.5-8.5   American Diabetes Association. Diabetes Care 33(S1), Jan 2010.       VLDL   Date/Time Value Ref Range Status   08/11/2023 12:59 PM 57 (H) 0 - 40 mg/dL Final   08/10/2022 11:09 AM 33 0 - 40  mg/dL Final   08/19/2020 12:17 PM 62 (H) 0 - 40 mg/dL Final      Last I/O:  I/O last 3 completed shifts:  In: - (0 mL/kg)   Out: 525 (4.5 mL/kg) [Urine:525 (0.1 mL/kg/hr)]  Weight: 115.5 kg     Past Cardiology Tests (Last 3 Years):  EKG:  ECG 12 Lead 10/9/2023    Echo:  Transthoracic Echo (TTE) Complete 11/15/2023    Ejection Fractions:  EF   Date/Time Value Ref Range Status   11/14/2023 03:22 PM 63       Cath:  No results found for this or any previous visit from the past 1095 days.    Stress Test:  No results found for this or any previous visit from the past 1095 days.    Cardiac Imaging:  No results found for this or any previous visit from the past 1095 days.      Past Medical History:  He has a past medical history of Abnormal weight gain (10/27/2016), Achilles tendinitis, unspecified leg (08/16/2016), Acute upper respiratory infection, unspecified, Allergic contact dermatitis due to plants, except food (08/22/2013), BPH with obstruction/lower urinary tract symptoms, Cellulitis of right lower limb (07/30/2021), Cough, unspecified (03/21/2016), Diabetes (CMS/[facility]), Encounter for screening for human immunodeficiency virus (HIV) (06/14/2016), Hordeolum externum unspecified eye, unspecified eyelid (08/14/2019), Hyperglycemia, unspecified (12/13/2017), Incisional hernia without obstruction or gangrene (06/15/2015), Knee joint pain, MSSA bacteremia (10/2023), Muscle spasm of calf (08/16/2016), Obstructive uropathy, Personal history of other diseases of the digestive system (06/30/2015), Personal history of other diseases of the digestive system (02/05/2016), Personal history of other diseases of the respiratory system (12/23/2014), Personal history of other diseases of the respiratory system (01/28/2016), Personal history of other infectious and parasitic diseases (12/02/2015), Personal history of other infectious and parasitic diseases, Personal history of other specified conditions (02/04/2015), Personal history of  other specified conditions (08/16/2016), Personal history of other specified conditions (01/26/2015), Personal history of pneumonia (recurrent) (01/29/2016), Personal history of urinary (tract) infections (02/19/2013), Prostatitis (03/21/2023), Pruritus, unspecified (02/10/2015), Sinoatrial node dysfunction (CMS/HCC), Strain of muscle, fascia and tendon of the posterior muscle group at thigh level, right thigh, initial encounter (05/10/2016), and Unspecified symptoms and signs involving the genitourinary system (12/20/2016).    Past Surgical History:  He has a past surgical history that includes Varicose vein surgery (08/22/2013); Cardiac pacemaker placement (08/22/2013); Other surgical history (06/15/2015); Ventral hernia repair (06/15/2015); Other surgical history (09/16/2019); and Peripherally inserted central catheter insertion.      Social History:  He reports that he has never smoked. He has never been exposed to tobacco smoke. He has never used smokeless tobacco. He reports that he does not currently use alcohol. He reports that he does not use drugs.    Family History:  No family history on file.     Allergies:  Bupropion and Keflex [cephalexin]    Inpatient Medications:  Scheduled medications   Medication Dose Route Frequency    amLODIPine  10 mg oral q PM    ceFAZolin in dextrose (iso-os)  2 g intravenous q12h    finasteride  5 mg oral Daily    heparin (porcine)  5,000 Units subcutaneous q8h    [START ON 11/18/2023] insulin glargine  10 Units subcutaneous Nightly    insulin lispro  0-5 Units subcutaneous TID with meals    melatonin  5 mg oral Nightly    metoprolol succinate XL  100 mg oral Daily    mupirocin   Topical BID    rosuvastatin  10 mg oral Nightly    tamsulosin  0.8 mg oral Nightly     PRN medications   Medication    acetaminophen    dextrose 10 % in water (D10W)    dextrose    glucagon    oxyCODONE     Continuous Medications   Medication Dose Last Rate     Outpatient Medications:  Current  Outpatient Medications   Medication Instructions    allopurinol (ZYLOPRIM) 100 mg, oral, Daily    amLODIPine (NORVASC) 10 mg, oral, Every evening    dextrose 10 % in water, D10W, 10 % infusion 0.3 g/kg/hr, intravenous, Once as needed    finasteride (PROSCAR) 5 mg, oral, Daily    heparin (porcine) 5,000 Units, subcutaneous, Every 8 hours    hydrALAZINE (APRESOLINE) 10 mg, oral, 3 times daily, Hold for SBP <120    HYDROcodone-acetaminophen (Norco) 5-325 mg tablet 1 tablet, oral, Every 6 hours PRN    insulin degludec (TRESIBA FLEXTOUCH U-100) 10 Units, subcutaneous, Nightly, Take as directed per insulin instructions.    lactobacillus acidophilus & bulgar (Lactinex) 1 million cell chewable tablet 1 tablet, oral, Daily, For 28 days    losartan (Cozaar) 100 mg tablet TAKE 1 TABLET BY MOUTH EVERY DAY    lubricating eye drops ophthalmic solution 1 drop, Left Eye, Every 8 hours PRN    menthol (Biofreeze, menthol,) 4 % gel 1 Application, topical (top), Every 6 hours PRN, To right knee    metoprolol succinate XL (Toprol-XL) 100 mg 24 hr tablet TAKE 1 TABLET BY MOUTH EVERY DAY    ondansetron (ZOFRAN) 4 mg, oral, Every 6 hours PRN    rosuvastatin (Crestor) 10 mg tablet TAKE 1 TABLET BY MOUTH EVERYDAY AT BEDTIME    tamsulosin (FLOMAX) 0.8 mg, oral, Nightly       Physical Exam:  General Appearance:    Alert, cooperative, no distress, appears stated age  Lungs:   Clear to auscultation bilaterally, respirations unlabored  Heart:    Regular rate and rhythm, S1 and S2 normal, no murmur, rub  or gallop.  Abdomen:  Soft,   Extremities: No edema        Assessment/Plan   Chester Verduzco is a 73 y.o. male with a PMXH of HTN, HLD, Dual chamber PPM implanted in 11/1996 for SSS (RV is passive) with most recent generator replacement in 11/2020 ([facility] - [facility] MRI), Type 2 DM, gout, and recent hospitalization (10/2-10/7) for Staph bacteremia.  Admitted with persistent staph bacteremia in the setting of RA lead vegetation and TV lead  vegetation.    #Plan:  -Lead extraction on Friday 11/24/2023.    Peripheral IV 11/13/23 20 G Right;Lateral;Distal Wrist (Active)   Site Assessment Clean;Dry;Intact 11/17/23 0915   Dressing Type Transparent 11/17/23 0915   Number of days: 4       Urethral Catheter (Active)   Site Assessment Clean;Skin intact 11/17/23 1406   Number of days: 0       Code Status:  DNR    This case was discussed with Dr. Medina and > 30 minutes was spent in the professional and overall care of this patient.        Ava Gonzalez MD  Cardiology fellow PGY 4.

## 2023-11-17 NOTE — PROGRESS NOTES
Transitional Care Coordination Progress Note:  Patient discussed during interdisciplinary rounds.   Team members present: Hector Momin RN TCC, [facility] Team, Nurse Manager  Plan per Medical/Surgical team: EP consult, Lead removal, PICC removal, remove maldonado, ID recs, IV antibiotics, PT/OT, Nephrology, NOHELIA, possible Endocarditis  [facility]  Status: inpatient  Discharge disposition: Return to [facility]  Potential Barriers: None  ADOD: TBD pending lead removal date  This TCC spoke with patient regarding discharge needs. Patient states he was at [facility] and plans to return to complete therapy and IV antibiotics, PCN aware. This TCC will continue to follow for discharge needs.   Hector Momin RN Transitional Care Coordinator [phone]

## 2023-11-17 NOTE — CONSULTS
"Nutrition Screen:   Nutrition Assessment    Reason for Assessment: Admission nursing screening    Pt states that his appetite is good and it was very good when he was at the last hospital because \"their food was really good.\" He says he has recently lost 50lbs after he started making healthier food choices. He went from drinking a 12 pack of full sugar pop a day to only a couple of diet sodas. He started cutting back on desserts and now doesn't feel the urge to always eat sweets. He was not taking any nutrition supplements at home. Asked if he has ever drank Boost or Ensure and he said \"Gross. No. I would gag on that. I am a gagger.\" He had no questions or concerns for RD at time of visit. Weight loss intentional. Appetite is appropriate. No nutrition intervention indicated at this time. Please consult RD if nutrition concerns arise.     Time Spent/Follow-up Reminder:   Follow Up  Time Spent (min): 15 minutes  Last Date of Nutrition Visit: 11/17/23  Follow up Comment: Weight loss intentinal. No nutrition intervention indicated.  "

## 2023-11-17 NOTE — PROGRESS NOTES
" Subjective     Overnight events:  Pt was seen and examined at bedside. No acute overnight events. Pt denies any chest pain, SOB, dyspnea, new cough, palpitations, nausea, vomiting, diarrhea, constipation, dizziness, changes in visions, headaches, or worsening lower extremity swelling. Pt is comfortable in bed and in no acute distress.    Vital signs:  Blood pressure 132/70, pulse 69, temperature 35.9 °C (96.7 °F), temperature source Temporal, resp. rate 18, height 1.88 m (6' 2.02\"), weight 115 kg (254 lb 10.1 oz), SpO2 96 %.    I/O last 3 completed shifts:  In: - (0 mL/kg)   Out: 525 (4.5 mL/kg) [Urine:525 (0.1 mL/kg/hr)]  Weight: 115.5 kg     Physical Exam  GENERAL:  In no acute distress  NEUROLOGIC:  A and O x 3. Cranial nerves 2 through 12 grossly intact with no gait disturbances. Intact motor and sensory systems, with normal reflexes and coordination.    HEENT:  Pupils are equal, round, and reactive to light and accommodation. Extraocular motion intact.    MOUTH: MMM, no lesions observed  CARDIAC: S1 + S2, RRR, no M/R/G.    LUNGS: CTA BL.  No wheezes or coarse breath sounds. Symmetric respirations. No increased work of breathing.   ABDOMEN: Soft, non-tender to palpation. No organomegaly. Normal BS in 4Q, No rebound or guarding.  EXTREMITIES:  Moving x4 equally and spontaneously. Right knee with mild effusion present but no erythema or warmth. Chronic limited ROM but not worse than baseline. No BLE edema   SKIN: Clean, warm, dry, and intact with normal skin turgor.  PSYCH: Appropriate mood and behavior.     Relevant Results  Scheduled medications  amLODIPine, 10 mg, oral, q PM  ceFAZolin in dextrose (iso-os), 2 g, intravenous, q12h  finasteride, 5 mg, oral, Daily  heparin (porcine), 5,000 Units, subcutaneous, q8h  [START ON 11/18/2023] insulin glargine, 10 Units, subcutaneous, Nightly  insulin lispro, 0-5 Units, subcutaneous, TID with meals  melatonin, 5 mg, oral, Nightly  metoprolol succinate XL, 100 mg, oral, " Daily  mupirocin, , Topical, BID  rosuvastatin, 10 mg, oral, Nightly  tamsulosin, 0.8 mg, oral, Nightly      Continuous medications     PRN medications  PRN medications: acetaminophen, dextrose 10 % in water (D10W), dextrose, glucagon, oxyCODONE      Labs:  Last CBC:  Lab Results   Component Value Date    WBC 8.2 11/16/2023    HGB 7.6 (L) 11/16/2023    HCT 23.8 (L) 11/16/2023    MCV 86 11/16/2023    PLT 99 (L) 11/16/2023       Last RFP:  Lab Results   Component Value Date    GLUCOSE 92 11/16/2023    CALCIUM 7.3 (L) 11/16/2023     11/16/2023    K 3.4 (L) 11/16/2023    CO2 19 (L) 11/16/2023     11/16/2023    BUN 46 (H) 11/16/2023    CREATININE 4.92 (H) 11/16/2023       Last LFTs:  Lab Results   Component Value Date    ALT 31 11/13/2023    AST 51 (H) 11/13/2023    ALKPHOS 117 11/13/2023    BILITOT 0.5 11/13/2023       Last coags:  Lab Results   Component Value Date    INR 1.1 06/14/2021       Micro/culture data:  Susceptibility data from last 90 days.  Collected Specimen Info Organism   11/14/23 Swab from Anterior Nares Methicillin Resistant Staphylococcus aureus (MRSA)   11/14/23 Blood culture from Peripheral Venipuncture Staphylococcus aureus       Imaging:  Transthoracic Echo (TTE) [facility], [address] [place] [address]                Tel [phone] and Fax [phone]    TRANSTHORACIC ECHOCARDIOGRAM REPORT       Patient Name:      MOE Godoy Physician:    [id number] Joshua Donis MD  Study Date:        11/14/2023           Ordering Provider:    [id number] JENNIFER ZURITA  MRN/PID:           [id number]             Fellow:  Accession#:        [id number]         Nurse:                Rhina Henao RN  Date of Birth/Age: 1950 / 73 years Sonographer:           Laurence Trujillo [facility]  Gender:            M                    Additional Staff:  Height:            187.96 cm            Admit Date:           11/13/2023  Weight:            119.29 kg            Admission Status:     Inpatient -                                                                Routine  BSA:               2.44 m2              Encounter#:           [id number]                                          Department Location:  [facility] Non                                                                Invasive  Blood Pressure: 118 /68 mmHg    Study Type:    TRANSTHORACIC ECHO (TTE) COMPLETE  Diagnosis/ICD: Endocarditis, valve unspecified-I38  Indication:    Endocarditis  CPT Code:      Echo Complete w Full Doppler-66567    Patient History:  Pacer/Defib:       Permanent pacemaker  Pertinent History: HTN and Hyperlipidemia.    Study Detail: The following Echo studies were performed: 2D, M-Mode, Doppler and                color flow. Technically challenging study due to body habitus.                Definity used as a contrast agent for endocardial border                definition. Total contrast used for this procedure was 2.5 mL via                IV push. The patient was awake.       PHYSICIAN INTERPRETATION:  Left Ventricle: The left ventricular systolic function is normal, with an estimated ejection fraction of 55-60%. There are no regional wall motion abnormalities. The left ventricular cavity size is normal. Spectral Doppler shows an impaired relaxation pattern of left ventricular diastolic filling.  Left Atrium: The left atrium is mildly dilated.  Right Ventricle: The right ventricle is normal in size. There is normal right ventricular global systolic function. A device is visualized in the right ventricle.  Right Atrium: The right atrium is mildly dilated.  Aortic Valve: The aortic valve appears structurally normal. There is no evidence of aortic valve stenosis.  There is no evidence of aortic valve  regurgitation. The peak instantaneous gradient of the aortic valve is 5.0 mmHg. The mean gradient of the aortic valve is 2.8 mmHg.  Mitral Valve: The mitral valve is normal in structure. There is mild mitral valve regurgitation.  Tricuspid Valve: The tricuspid valve is structurally normal. There is mild tricuspid regurgitation.  Pulmonic Valve: The pulmonic valve is structurally normal. There is no indication of pulmonic valve regurgitation.  Pericardium: There is no pericardial effusion noted.  Aorta: The aortic root is normal.  Pulmonary Artery: The tricuspid regurgitant velocity is 2.70 m/s, and with an estimated right atrial pressure of 10 mmHg, the estimated pulmonary artery pressure is mildly elevated with the RVSP at 39.2 mmHg.  Systemic Veins: The inferior vena cava appears to be of normal size.       CONCLUSIONS:   1. Left ventricular systolic function is normal with a 55-60% estimated ejection fraction.   2. Spectral Doppler shows an impaired relaxation pattern of left ventricular diastolic filling.   3. There is mild mitral and tricuspid regurgitation.   4. The estimated pulmonary artery pressure is mildly elevated with the RVSP at 39.2 mmHg.   5. No definite valvular vegetations were visualized.    QUANTITATIVE DATA SUMMARY:  2D MEASUREMENTS:                           Normal Ranges:  IVSd:          0.84 cm   (0.6-1.1cm)  LVPWd:         0.90 cm   (0.6-1.1cm)  LVIDd:         4.56 cm   (3.9-5.9cm)  LVIDs:         3.09 cm  LV Mass Index: 53.2 g/m2  LV % FS        32.3 %    LA VOLUME:                                Normal Ranges:  LA Vol A4C:        67.7 ml    (22+/-6mL/m2)  LA Vol A2C:        62.9 ml  LA Vol BP:         65.3 ml  LA Vol Index A4C:  27.7 ml/m2  LA Vol Index A2C:  25.8 ml/m2  LA Vol Index BP:   26.7 ml/m2  LA Area A4C:       22.4 cm2  LA Area A2C:       21.6 cm2  LA Major Axis A4C: 6.3 cm  LA Major Axis A2C: 6.3 cm  LA Volume Index:   26.2 ml/m2  LA Vol A4C:        63.3 ml  LA Vol A2C:         61.9 ml    RA VOLUME BY A/L METHOD:                        Normal Ranges:  RA Area A4C: 24.4 cm2    M-MODE MEASUREMENTS:                   Normal Ranges:  Ao Root: 3.60 cm (2.0-3.7cm)  LAs:     3.54 cm (2.7-4.0cm)    AORTA MEASUREMENTS:                       Normal Ranges:  Ao Sinus, d: 3.50 cm (2.1-3.5cm)  Asc Ao, d:   3.20 cm (2.1-3.4cm)    LV SYSTOLIC FUNCTION BY 2D PLANIMETRY (MOD):                      Normal Ranges:  EF-A4C View: 64.6 % (>=55%)  EF-A2C View: 60.8 %  EF-Biplane:  63.3 %    LV DIASTOLIC FUNCTION:                                Normal Ranges:  MV Peak E:        0.39 m/s    (0.7-1.2 m/s)  MV Peak A:        0.55 m/s    (0.42-0.7 m/s)  E/A Ratio:        0.71        (1.0-2.2)  MV e'             0.06 m/s    (>8.0)  MV lateral e'     0.06 m/s  MV medial e'      0.06 m/s  MV A Dur:         163.78 msec  E/e' Ratio:       6.48        (<8.0)  PulmV Sys Altaf:    51.01 cm/s  PulmV Rhodes Altaf:   36.45 cm/s  PulmV S/D Altaf:    1.40  PulmV A Revs Altaf: 26.64 cm/s  PulmV A Revs Dur: 122.26 msec    MITRAL VALVE:                  Normal Ranges:  MV DT: 186 msec (150-240msec)    AORTIC VALVE:                                    Normal Ranges:  AoV Vmax:                1.12 m/s (<=1.7m/s)  AoV Peak P.0 mmHg (<20mmHg)  AoV Mean P.8 mmHg (1.7-11.5mmHg)  LVOT Max Altaf:            0.89 m/s (<=1.1m/s)  AoV VTI:                 21.14 cm (18-25cm)  LVOT VTI:                17.86 cm  LVOT Diameter:           2.06 cm  (1.8-2.4cm)  AoV Area, VTI:           2.81 cm2 (2.5-5.5cm2)  AoV Area,Vmax:           2.66 cm2 (2.5-4.5cm2)  AoV Dimensionless Index: 0.85       RIGHT VENTRICLE:  RV Basal 3.90 cm  RV Mid   2.50 cm  RV Major 8.6 cm  TAPSE:   21.0 mm  RV s'    0.08 m/s    TRICUSPID VALVE/RVSP:                              Normal Ranges:  Peak TR Velocity: 2.70 m/s  RV Syst Pressure: 39.2 mmHg (< 30mmHg)  IVC Diam:         2.14 cm    PULMONIC VALVE:                       Normal Ranges:  PV Max Altaf: 0.8  m/s  (0.6-0.9m/s)  PV Max P.4 mmHg    Pulmonary Veins:  PulmV A Revs Dur: 122.26 msec  PulmV A Revs Altaf: 26.64 cm/s  PulmV Rhodes Altaf:   36.45 cm/s  PulmV S/D Altaf:    1.40  PulmV Sys Altaf:    51.01 cm/s    AORTA:  Asc Ao Diam 3.15 cm       [id number] Joshua Donis MD  Electronically signed on 11/15/2023 at 11:30:18 AM       ** Final **         Assessment/Plan     Assessment: Mr. Chester Verduzco is a 73 year old male with PMHX HTN, HLD, Dual chamber PPM implanted in 1996 for SSS (RV is passive) with most recent generator replacement in 2020 ([employer] - [employer]urity MRI), Type 2 DM, gout, and recent hospitalization (10/2-10/7) for Staph bacteremia, diarrhea, NOHELIA , and hyponatremia 2/2 hypovolemia who presented to [facility] ED from a SNF on  for NOHELIA on labs from the SNF and oliguria. Blood cultures were positive for MSSA and previously had been positive 10/8 and 11/10 for MSSA as well. FILIPE  showed tricuspid valve vegetation as well as right atrial lead vegetation. Transferred to [facility] for likely removal of PPM leads and endocarditis.  Patient has been afebrile without leukocytosis and hemodynamically stable.     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   -  Elevated to 5.43 on arrival to [facility] on 11/13,  today 4.92  - Initial urine electrolytes showed Fena 2.5% indicating intrinsic etiology   - Renal U/S did not show any hydronephrosis   - Normal C3 and C4  - Nephrology from OSH though [facility]  - Follow up  ANCA, SPEP/UPEP with IF, free kappa robert  - Nephrology consulted   - Replace Rojo, strict I&Os, No indication for HD at this time  -Replete lytes as needed    #Type 2 DM  - Checking A1c  - On Lantus 10 units QHS and cover with SSI  - hypoglycemia protocol    #HTN  #HLD  - Holding home losartan and lasix in setting of NOHELIA  - Continue Amlodipine 10, metop succinate 100 daily, and rosuvastatin 10 daily      #BPH  - Was initially planned for outpatient robotic prostatectomy end of November that has since been canceled and rescheduled for December  - Continue with Flomax 0.8 mg QHS and finasteride 5 md daily  - Replace Rojo      F: As needed  E: AS needed  N: Currently NPO for possible EP procedure   A:  LUE PICC, PIV, Rojo   DVT ppx: Heparin SQ  GI: N/A  Code Status: DNR, ok for elective intubation   NOK: Charlene Edvin (friend) [phone]      Active Problems:    Endocarditis         Gonzalo Henley MD

## 2023-11-18 LAB
ALBUMIN SERPL BCP-MCNC: 2.6 G/DL (ref 3.4–5)
ANION GAP SERPL CALC-SCNC: 15 MMOL/L (ref 10–20)
BACTERIA BLD CULT: NORMAL
BASOPHILS # BLD AUTO: 0.01 X10*3/UL (ref 0–0.1)
BASOPHILS NFR BLD AUTO: 0.1 %
BUN SERPL-MCNC: 40 MG/DL (ref 6–23)
CALCIUM SERPL-MCNC: 7.7 MG/DL (ref 8.6–10.6)
CHLORIDE SERPL-SCNC: 107 MMOL/L (ref 98–107)
CO2 SERPL-SCNC: 20 MMOL/L (ref 21–32)
CREAT SERPL-MCNC: 4.62 MG/DL (ref 0.5–1.3)
EOSINOPHIL # BLD AUTO: 0.13 X10*3/UL (ref 0–0.4)
EOSINOPHIL NFR BLD AUTO: 1.4 %
ERYTHROCYTE [DISTWIDTH] IN BLOOD BY AUTOMATED COUNT: 14.8 % (ref 11.5–14.5)
GFR SERPL CREATININE-BSD FRML MDRD: 13 ML/MIN/1.73M*2
GLUCOSE BLD MANUAL STRIP-MCNC: 100 MG/DL (ref 74–99)
GLUCOSE BLD MANUAL STRIP-MCNC: 110 MG/DL (ref 74–99)
GLUCOSE BLD MANUAL STRIP-MCNC: 153 MG/DL (ref 74–99)
GLUCOSE BLD MANUAL STRIP-MCNC: 155 MG/DL (ref 74–99)
GLUCOSE SERPL-MCNC: 92 MG/DL (ref 74–99)
HCT VFR BLD AUTO: 23.6 % (ref 41–52)
HGB BLD-MCNC: 7.5 G/DL (ref 13.5–17.5)
IMM GRANULOCYTES # BLD AUTO: 0.19 X10*3/UL (ref 0–0.5)
IMM GRANULOCYTES NFR BLD AUTO: 2 % (ref 0–0.9)
LYMPHOCYTES # BLD AUTO: 1.23 X10*3/UL (ref 0.8–3)
LYMPHOCYTES NFR BLD AUTO: 13 %
MCH RBC QN AUTO: 28.2 PG (ref 26–34)
MCHC RBC AUTO-ENTMCNC: 31.8 G/DL (ref 32–36)
MCV RBC AUTO: 89 FL (ref 80–100)
MONOCYTES # BLD AUTO: 0.54 X10*3/UL (ref 0.05–0.8)
MONOCYTES NFR BLD AUTO: 5.7 %
NEUTROPHILS # BLD AUTO: 7.34 X10*3/UL (ref 1.6–5.5)
NEUTROPHILS NFR BLD AUTO: 77.8 %
NRBC BLD-RTO: 0 /100 WBCS (ref 0–0)
PHOSPHATE SERPL-MCNC: 4.5 MG/DL (ref 2.5–4.9)
PLATELET # BLD AUTO: 112 X10*3/UL (ref 150–450)
POTASSIUM SERPL-SCNC: 3.6 MMOL/L (ref 3.5–5.3)
RBC # BLD AUTO: 2.66 X10*6/UL (ref 4.5–5.9)
SODIUM SERPL-SCNC: 138 MMOL/L (ref 136–145)
WBC # BLD AUTO: 9.4 X10*3/UL (ref 4.4–11.3)

## 2023-11-18 PROCEDURE — 2500000001 HC RX 250 WO HCPCS SELF ADMINISTERED DRUGS (ALT 637 FOR MEDICARE OP)

## 2023-11-18 PROCEDURE — 87040 BLOOD CULTURE FOR BACTERIA: CPT

## 2023-11-18 PROCEDURE — 96372 THER/PROPH/DIAG INJ SC/IM: CPT

## 2023-11-18 PROCEDURE — 85025 COMPLETE CBC W/AUTO DIFF WBC: CPT

## 2023-11-18 PROCEDURE — 82947 ASSAY GLUCOSE BLOOD QUANT: CPT

## 2023-11-18 PROCEDURE — 80069 RENAL FUNCTION PANEL: CPT

## 2023-11-18 PROCEDURE — 2500000002 HC RX 250 W HCPCS SELF ADMINISTERED DRUGS (ALT 637 FOR MEDICARE OP, ALT 636 FOR OP/ED)

## 2023-11-18 PROCEDURE — 1200000002 HC GENERAL ROOM WITH TELEMETRY DAILY

## 2023-11-18 PROCEDURE — 36415 COLL VENOUS BLD VENIPUNCTURE: CPT

## 2023-11-18 PROCEDURE — 84100 ASSAY OF PHOSPHORUS: CPT

## 2023-11-18 PROCEDURE — 99232 SBSQ HOSP IP/OBS MODERATE 35: CPT

## 2023-11-18 PROCEDURE — 87075 CULTR BACTERIA EXCEPT BLOOD: CPT

## 2023-11-18 PROCEDURE — 2500000004 HC RX 250 GENERAL PHARMACY W/ HCPCS (ALT 636 FOR OP/ED)

## 2023-11-18 RX ADMIN — METOPROLOL SUCCINATE 100 MG: 100 TABLET, EXTENDED RELEASE ORAL at 09:00

## 2023-11-18 RX ADMIN — MUPIROCIN: 20 OINTMENT TOPICAL at 20:52

## 2023-11-18 RX ADMIN — ACETAMINOPHEN 650 MG: 325 TABLET ORAL at 12:30

## 2023-11-18 RX ADMIN — AMLODIPINE BESYLATE 10 MG: 10 TABLET ORAL at 20:51

## 2023-11-18 RX ADMIN — INSULIN LISPRO 1 UNITS: 100 INJECTION, SOLUTION INTRAVENOUS; SUBCUTANEOUS at 18:24

## 2023-11-18 RX ADMIN — HEPARIN SODIUM 5000 UNITS: 5000 INJECTION INTRAVENOUS; SUBCUTANEOUS at 02:45

## 2023-11-18 RX ADMIN — ROSUVASTATIN CALCIUM 10 MG: 10 TABLET, FILM COATED ORAL at 20:51

## 2023-11-18 RX ADMIN — CEFAZOLIN SODIUM 2 G: 2 INJECTION, SOLUTION INTRAVENOUS at 16:25

## 2023-11-18 RX ADMIN — OXYCODONE HYDROCHLORIDE 5 MG: 5 TABLET ORAL at 01:34

## 2023-11-18 RX ADMIN — TAMSULOSIN HYDROCHLORIDE 0.8 MG: 0.4 CAPSULE ORAL at 20:51

## 2023-11-18 RX ADMIN — HEPARIN SODIUM 5000 UNITS: 5000 INJECTION INTRAVENOUS; SUBCUTANEOUS at 18:24

## 2023-11-18 RX ADMIN — Medication 5 MG: at 20:51

## 2023-11-18 RX ADMIN — FINASTERIDE 5 MG: 5 TABLET, FILM COATED ORAL at 08:42

## 2023-11-18 RX ADMIN — CEFAZOLIN SODIUM 2 G: 2 INJECTION, SOLUTION INTRAVENOUS at 04:37

## 2023-11-18 RX ADMIN — MUPIROCIN: 20 OINTMENT TOPICAL at 08:42

## 2023-11-18 RX ADMIN — HEPARIN SODIUM 5000 UNITS: 5000 INJECTION INTRAVENOUS; SUBCUTANEOUS at 10:30

## 2023-11-18 RX ADMIN — INSULIN GLARGINE 10 UNITS: 100 INJECTION, SOLUTION SUBCUTANEOUS at 20:52

## 2023-11-18 ASSESSMENT — COGNITIVE AND FUNCTIONAL STATUS - GENERAL
MOVING TO AND FROM BED TO CHAIR: A LITTLE
MOVING TO AND FROM BED TO CHAIR: A LITTLE
DRESSING REGULAR LOWER BODY CLOTHING: A LITTLE
WALKING IN HOSPITAL ROOM: A LITTLE
STANDING UP FROM CHAIR USING ARMS: A LITTLE
DAILY ACTIVITIY SCORE: 19
HELP NEEDED FOR BATHING: A LITTLE
CLIMB 3 TO 5 STEPS WITH RAILING: A LITTLE
DRESSING REGULAR UPPER BODY CLOTHING: A LITTLE
DRESSING REGULAR UPPER BODY CLOTHING: A LITTLE
TOILETING: TOTAL
MOBILITY SCORE: 20
MOBILITY SCORE: 20
WALKING IN HOSPITAL ROOM: A LITTLE
HELP NEEDED FOR BATHING: A LITTLE
STANDING UP FROM CHAIR USING ARMS: A LITTLE
DRESSING REGULAR LOWER BODY CLOTHING: A LITTLE
TOILETING: A LOT
DAILY ACTIVITIY SCORE: 18
CLIMB 3 TO 5 STEPS WITH RAILING: A LITTLE

## 2023-11-18 ASSESSMENT — PAIN - FUNCTIONAL ASSESSMENT: PAIN_FUNCTIONAL_ASSESSMENT: 0-10

## 2023-11-18 ASSESSMENT — PAIN SCALES - GENERAL
PAINLEVEL_OUTOF10: 8
PAINLEVEL_OUTOF10: 0 - NO PAIN
PAINLEVEL_OUTOF10: 2

## 2023-11-18 NOTE — PROGRESS NOTES
" Subjective     Overnight events:  Pt was seen and examined at bedside. No acute overnight events. Pt denies any chest pain, SOB, dyspnea, new cough, palpitations, nausea, vomiting, diarrhea, constipation, dizziness, changes in visions, headaches, or worsening lower extremity swelling. Pt is comfortable in bed and in no acute distress.    Vital signs:  Blood pressure 131/66, pulse 76, temperature 36.1 °C (97 °F), resp. rate 18, height 1.88 m (6' 2.02\"), weight 115 kg (253 lb 15.5 oz), SpO2 96 %.    I/O last 3 completed shifts:  In: 800 (6.9 mL/kg) [P.O.:600; IV Piggyback:200]  Out: 1875 (16.3 mL/kg) [Urine:1875 (0.5 mL/kg/hr)]  Weight: 115.2 kg     Physical Exam  GENERAL:  In no acute distress  NEUROLOGIC:  A and O x 3. Cranial nerves 2 through 12 grossly intact with no gait disturbances. Intact motor and sensory systems, with normal reflexes and coordination.    HEENT:  Pupils are equal, round, and reactive to light and accommodation. Extraocular motion intact.    MOUTH: MMM, no lesions observed  CARDIAC: S1 + S2, RRR, no M/R/G.    LUNGS: CTA BL.  No wheezes or coarse breath sounds. Symmetric respirations. No increased work of breathing.   ABDOMEN: Soft, non-tender to palpation. No organomegaly. Normal BS in 4Q, No rebound or guarding.  EXTREMITIES:  Moving x4 equally and spontaneously. Right knee with mild effusion present but no erythema or warmth. Chronic limited ROM but not worse than baseline. No BLE edema   SKIN: Clean, warm, dry, and intact with normal skin turgor.  PSYCH: Appropriate mood and behavior.     Relevant Results  Scheduled medications  amLODIPine, 10 mg, oral, q PM  ceFAZolin in dextrose (iso-os), 2 g, intravenous, q12h  finasteride, 5 mg, oral, Daily  heparin (porcine), 5,000 Units, subcutaneous, q8h  insulin glargine, 10 Units, subcutaneous, Nightly  insulin lispro, 0-5 Units, subcutaneous, TID with meals  melatonin, 5 mg, oral, Nightly  metoprolol succinate XL, 100 mg, oral, Daily  mupirocin, " , Topical, BID  rosuvastatin, 10 mg, oral, Nightly  tamsulosin, 0.8 mg, oral, Nightly      Continuous medications     PRN medications  PRN medications: acetaminophen, dextrose 10 % in water (D10W), dextrose, glucagon, ondansetron, oxyCODONE      Labs:  Last CBC:  Lab Results   Component Value Date    WBC 13.5 (H) 11/17/2023    HGB 7.8 (L) 11/17/2023    HCT 23.7 (L) 11/17/2023    MCV 88 11/17/2023     (L) 11/17/2023       Last RFP:  Lab Results   Component Value Date    GLUCOSE 76 11/17/2023    CALCIUM 7.7 (L) 11/17/2023     11/17/2023    K 3.2 (L) 11/17/2023    CO2 19 (L) 11/17/2023     11/17/2023    BUN 44 (H) 11/17/2023    CREATININE 4.75 (H) 11/17/2023       Last LFTs:  Lab Results   Component Value Date    ALT <3 (L) 11/17/2023    AST 11 11/17/2023    ALKPHOS 94 11/17/2023    BILITOT 0.3 11/17/2023       Last coags:  Lab Results   Component Value Date    INR 1.2 (H) 11/17/2023    APTT 36 11/17/2023       Micro/culture data:  Susceptibility data from last 90 days.  Collected Specimen Info Organism Clindamycin Erythromycin Oxacillin Tetracycline Trimethoprim/Sulfamethoxazole Vancomycin   11/14/23 Swab from Anterior Nares Methicillin Resistant Staphylococcus aureus (MRSA)         11/14/23 Blood culture from Peripheral Venipuncture Methicillin Susceptible Staphylococcus aureus (MSSA) S S S S S S         Imaging:  ECG 12 Lead  Sinus rhythm with 1st degree AV block  Low voltage QRS  Prolonged QT  Abnormal ECG  When compared with ECG of 02-OCT-2023 12:09,  No significant change was found  Confirmed by Benito Pedro ([id number]) on 11/17/2023 10:57:31 PM         Assessment/Plan     Assessment: Mr. Chester Verduzco is a 73 year old male with PMHX HTN, HLD, Dual chamber PPM implanted in 11/1996 for SSS (RV is passive) with most recent generator replacement in 11/2020 ([employer] - [employer] MRI), Type 2 DM, gout, and recent hospitalization (10/2-10/7) for Staph bacteremia, diarrhea, NOHELIA , and hyponatremia 2/2  hypovolemia who presented to [facility] ED from a SNF on 11/13 for NOHELIA on labs from the SNF and oliguria. Blood cultures were positive for MSSA and previously had been positive 10/8 and 11/10 for MSSA as well. FILIPE 11/16 showed tricuspid valve vegetation as well as right atrial lead vegetation. Transferred to [facility] for likely removal of PPM leads and endocarditis.  Patient has been afebrile without leukocytosis and hemodynamically stable.     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 Renal U/S did not show any hydronephrosis.  - Baseline prior to October 2023 hospitalization ~1.0-1.1.   - Elevated to 5.43 on arrival to [facility] on 11/13, down trending  - Initial urine electrolytes showed Fena 2.5% indicating intrinsic etiology   - Normal C3 and C4  - Follow up  ANCA, SPEP/UPEP with IF, free kappa robert  - Nephrology following  - Rojo replaced (11/17) , strict I&Os, No indication for HD at this time  - Daily RFP  -Replete lytes as needed    #Type 2 DM  - Checking A1c  - On Lantus 10 units QHS and cover with SSI  - hypoglycemia protocol    #HTN  #HLD  - Holding home losartan and lasix in setting of NOHELIA  - Continue Amlodipine 10, metop succinate 100 daily, and rosuvastatin 10 daily      #BPH  - Was initially planned for outpatient robotic prostatectomy end of  November that has since been canceled and rescheduled for December  - Continue with Flomax 0.8 mg QHS and finasteride 5 md daily       F: As needed  E: AS needed  N: Currently NPO for possible EP procedure   A:  LUE PICC, PIV, Rojo   DVT ppx: Heparin SQ  GI: N/A  Code Status: DNR, ok for elective intubation   NOK: Charlene Pardo (friend) [phone]      Active Problems:    Endocarditis         Gonzalo Henley MD

## 2023-11-18 NOTE — CARE PLAN
Patient remained safe and stable throughout shift. Continues on IV antibiotics. Pacemaker removal planned for Friday.

## 2023-11-19 LAB
ALBUMIN MFR UR ELPH: 42.8 %
ALBUMIN SERPL BCP-MCNC: 2.8 G/DL (ref 3.4–5)
ALPHA1 GLOB MFR UR ELPH: 7.7 %
ALPHA2 GLOB MFR UR ELPH: 16.7 %
ANION GAP SERPL CALC-SCNC: 14 MMOL/L (ref 10–20)
B-GLOBULIN MFR UR ELPH: 13 %
BACTERIA BLD AEROBE CULT: ABNORMAL
BACTERIA BLD CULT: ABNORMAL
BASOPHILS # BLD AUTO: 0.02 X10*3/UL (ref 0–0.1)
BASOPHILS NFR BLD AUTO: 0.3 %
BUN SERPL-MCNC: 39 MG/DL (ref 6–23)
CALCIUM SERPL-MCNC: 7.9 MG/DL (ref 8.6–10.6)
CHLORIDE SERPL-SCNC: 107 MMOL/L (ref 98–107)
CO2 SERPL-SCNC: 20 MMOL/L (ref 21–32)
CREAT SERPL-MCNC: 4.5 MG/DL (ref 0.5–1.3)
EOSINOPHIL # BLD AUTO: 0.13 X10*3/UL (ref 0–0.4)
EOSINOPHIL NFR BLD AUTO: 1.7 %
ERYTHROCYTE [DISTWIDTH] IN BLOOD BY AUTOMATED COUNT: 14.9 % (ref 11.5–14.5)
GAMMA GLOB MFR UR ELPH: 19.8 %
GFR SERPL CREATININE-BSD FRML MDRD: 13 ML/MIN/1.73M*2
GLUCOSE BLD MANUAL STRIP-MCNC: 102 MG/DL (ref 74–99)
GLUCOSE BLD MANUAL STRIP-MCNC: 110 MG/DL (ref 74–99)
GLUCOSE BLD MANUAL STRIP-MCNC: 150 MG/DL (ref 74–99)
GLUCOSE BLD MANUAL STRIP-MCNC: 182 MG/DL (ref 74–99)
GLUCOSE SERPL-MCNC: 101 MG/DL (ref 74–99)
GRAM STN SPEC: ABNORMAL
HCT VFR BLD AUTO: 23.3 % (ref 41–52)
HGB BLD-MCNC: 7.5 G/DL (ref 13.5–17.5)
IMM GRANULOCYTES # BLD AUTO: 0.15 X10*3/UL (ref 0–0.5)
IMM GRANULOCYTES NFR BLD AUTO: 2 % (ref 0–0.9)
LYMPHOCYTES # BLD AUTO: 1.18 X10*3/UL (ref 0.8–3)
LYMPHOCYTES NFR BLD AUTO: 15.7 %
MCH RBC QN AUTO: 28 PG (ref 26–34)
MCHC RBC AUTO-ENTMCNC: 32.2 G/DL (ref 32–36)
MCV RBC AUTO: 87 FL (ref 80–100)
MONOCYTES # BLD AUTO: 0.46 X10*3/UL (ref 0.05–0.8)
MONOCYTES NFR BLD AUTO: 6.1 %
NEUTROPHILS # BLD AUTO: 5.56 X10*3/UL (ref 1.6–5.5)
NEUTROPHILS NFR BLD AUTO: 74.2 %
NRBC BLD-RTO: 0 /100 WBCS (ref 0–0)
PATH REVIEW-URINE PROTEIN ELECTROPHORESIS: NORMAL
PHOSPHATE SERPL-MCNC: 4.6 MG/DL (ref 2.5–4.9)
PLATELET # BLD AUTO: 111 X10*3/UL (ref 150–450)
POTASSIUM SERPL-SCNC: 3.8 MMOL/L (ref 3.5–5.3)
RBC # BLD AUTO: 2.68 X10*6/UL (ref 4.5–5.9)
SODIUM SERPL-SCNC: 137 MMOL/L (ref 136–145)
URINE ELECTROPHORESIS COMMENT: NORMAL
WBC # BLD AUTO: 7.5 X10*3/UL (ref 4.4–11.3)

## 2023-11-19 PROCEDURE — 87040 BLOOD CULTURE FOR BACTERIA: CPT

## 2023-11-19 PROCEDURE — 2500000001 HC RX 250 WO HCPCS SELF ADMINISTERED DRUGS (ALT 637 FOR MEDICARE OP)

## 2023-11-19 PROCEDURE — 2500000004 HC RX 250 GENERAL PHARMACY W/ HCPCS (ALT 636 FOR OP/ED)

## 2023-11-19 PROCEDURE — 36415 COLL VENOUS BLD VENIPUNCTURE: CPT

## 2023-11-19 PROCEDURE — 85025 COMPLETE CBC W/AUTO DIFF WBC: CPT

## 2023-11-19 PROCEDURE — 84100 ASSAY OF PHOSPHORUS: CPT

## 2023-11-19 PROCEDURE — 80069 RENAL FUNCTION PANEL: CPT

## 2023-11-19 PROCEDURE — 82947 ASSAY GLUCOSE BLOOD QUANT: CPT

## 2023-11-19 PROCEDURE — 99232 SBSQ HOSP IP/OBS MODERATE 35: CPT

## 2023-11-19 PROCEDURE — 96372 THER/PROPH/DIAG INJ SC/IM: CPT

## 2023-11-19 PROCEDURE — 1200000002 HC GENERAL ROOM WITH TELEMETRY DAILY

## 2023-11-19 RX ORDER — POLYETHYLENE GLYCOL 3350 17 G/17G
17 POWDER, FOR SOLUTION ORAL DAILY
Status: DISCONTINUED | OUTPATIENT
Start: 2023-11-19 | End: 2023-12-04 | Stop reason: HOSPADM

## 2023-11-19 RX ORDER — SENNOSIDES 8.6 MG/1
1 TABLET ORAL DAILY PRN
Status: COMPLETED | OUTPATIENT
Start: 2023-11-19 | End: 2023-11-21

## 2023-11-19 RX ORDER — POLYETHYLENE GLYCOL 3350 17 G/17G
17 POWDER, FOR SOLUTION ORAL DAILY PRN
Status: DISCONTINUED | OUTPATIENT
Start: 2023-11-19 | End: 2023-11-19

## 2023-11-19 RX ORDER — SENNOSIDES 8.6 MG/1
1 TABLET ORAL NIGHTLY
Status: CANCELLED | OUTPATIENT
Start: 2023-11-19

## 2023-11-19 RX ADMIN — CEFAZOLIN SODIUM 2 G: 2 INJECTION, SOLUTION INTRAVENOUS at 16:12

## 2023-11-19 RX ADMIN — MUPIROCIN: 20 OINTMENT TOPICAL at 21:49

## 2023-11-19 RX ADMIN — ROSUVASTATIN CALCIUM 10 MG: 10 TABLET, FILM COATED ORAL at 21:50

## 2023-11-19 RX ADMIN — HEPARIN SODIUM 5000 UNITS: 5000 INJECTION INTRAVENOUS; SUBCUTANEOUS at 03:48

## 2023-11-19 RX ADMIN — INSULIN GLARGINE 10 UNITS: 100 INJECTION, SOLUTION SUBCUTANEOUS at 21:51

## 2023-11-19 RX ADMIN — HEPARIN SODIUM 5000 UNITS: 5000 INJECTION INTRAVENOUS; SUBCUTANEOUS at 18:47

## 2023-11-19 RX ADMIN — FINASTERIDE 5 MG: 5 TABLET, FILM COATED ORAL at 09:11

## 2023-11-19 RX ADMIN — Medication 5 MG: at 21:50

## 2023-11-19 RX ADMIN — HEPARIN SODIUM 5000 UNITS: 5000 INJECTION INTRAVENOUS; SUBCUTANEOUS at 10:43

## 2023-11-19 RX ADMIN — ACETAMINOPHEN 650 MG: 325 TABLET ORAL at 03:48

## 2023-11-19 RX ADMIN — TAMSULOSIN HYDROCHLORIDE 0.8 MG: 0.4 CAPSULE ORAL at 21:50

## 2023-11-19 RX ADMIN — ACETAMINOPHEN 650 MG: 325 TABLET ORAL at 11:47

## 2023-11-19 RX ADMIN — AMLODIPINE BESYLATE 10 MG: 10 TABLET ORAL at 21:50

## 2023-11-19 RX ADMIN — SENNOSIDES 8.6 MG: 8.6 TABLET, FILM COATED ORAL at 11:47

## 2023-11-19 RX ADMIN — METOPROLOL SUCCINATE 100 MG: 100 TABLET, EXTENDED RELEASE ORAL at 09:11

## 2023-11-19 RX ADMIN — MUPIROCIN: 20 OINTMENT TOPICAL at 09:11

## 2023-11-19 RX ADMIN — CEFAZOLIN SODIUM 2 G: 2 INJECTION, SOLUTION INTRAVENOUS at 03:48

## 2023-11-19 ASSESSMENT — PAIN SCALES - GENERAL
PAINLEVEL_OUTOF10: 2
PAINLEVEL_OUTOF10: 0 - NO PAIN
PAINLEVEL_OUTOF10: 8
PAINLEVEL_OUTOF10: 0 - NO PAIN
PAINLEVEL_OUTOF10: 8
PAINLEVEL_OUTOF10: 0 - NO PAIN

## 2023-11-19 ASSESSMENT — COGNITIVE AND FUNCTIONAL STATUS - GENERAL
DRESSING REGULAR UPPER BODY CLOTHING: A LITTLE
DRESSING REGULAR LOWER BODY CLOTHING: A LITTLE
HELP NEEDED FOR BATHING: A LITTLE
TOILETING: A LOT
MOBILITY SCORE: 20
HELP NEEDED FOR BATHING: A LITTLE
CLIMB 3 TO 5 STEPS WITH RAILING: A LITTLE
STANDING UP FROM CHAIR USING ARMS: A LITTLE
WALKING IN HOSPITAL ROOM: A LITTLE
CLIMB 3 TO 5 STEPS WITH RAILING: A LITTLE
DAILY ACTIVITIY SCORE: 19
DAILY ACTIVITIY SCORE: 19
MOVING TO AND FROM BED TO CHAIR: A LITTLE
DRESSING REGULAR UPPER BODY CLOTHING: A LITTLE
WALKING IN HOSPITAL ROOM: A LITTLE
DRESSING REGULAR LOWER BODY CLOTHING: A LITTLE
STANDING UP FROM CHAIR USING ARMS: A LITTLE
TOILETING: A LOT
MOBILITY SCORE: 20
MOVING TO AND FROM BED TO CHAIR: A LITTLE

## 2023-11-19 ASSESSMENT — PAIN - FUNCTIONAL ASSESSMENT
PAIN_FUNCTIONAL_ASSESSMENT: 0-10

## 2023-11-19 ASSESSMENT — PAIN SCALES - WONG BAKER: WONGBAKER_NUMERICALRESPONSE: NO HURT

## 2023-11-19 NOTE — PROGRESS NOTES
" Subjective     Overnight events:  Pt was seen and examined at bedside. No acute overnight events. Pt complains of Constipation,Pt denies any chest pain, SOB, dyspnea, new cough, palpitations, nausea, vomiting, diarrhea, dizziness, changes in visions, headaches, or worsening lower extremity swelling. Pt is comfortable in bed and in no acute distress.    Vital signs:  Blood pressure 153/63, pulse 70, temperature 36.2 °C (97.2 °F), temperature source Temporal, resp. rate 20, height 1.88 m (6' 2.02\"), weight 115 kg (254 lb 6.6 oz), SpO2 93 %.    I/O last 3 completed shifts:  In: 700 (6.1 mL/kg) [P.O.:600; IV Piggyback:100]  Out: 2250 (19.5 mL/kg) [Urine:2250 (0.5 mL/kg/hr)]  Weight: 115.4 kg     Physical Exam  GENERAL:  In no acute distress  NEUROLOGIC:  A and O x 3. Cranial nerves 2 through 12 grossly intact with no gait disturbances. Intact motor and sensory systems, with normal reflexes and coordination.    HEENT:  Pupils are equal, round, and reactive to light and accommodation. Extraocular motion intact.    MOUTH: MMM, no lesions observed  CARDIAC: S1 + S2, RRR, no M/R/G.    LUNGS: CTA BL.  No wheezes or coarse breath sounds. Symmetric respirations. No increased work of breathing.   ABDOMEN: Soft, non-tender to palpation. No organomegaly. Normal BS in 4Q, No rebound or guarding.  EXTREMITIES:  Moving x4 equally and spontaneously. Right knee with mild effusion present but no erythema or warmth. Chronic limited ROM but not worse than baseline. No BLE edema   SKIN: Clean, warm, dry, and intact with normal skin turgor.  PSYCH: Appropriate mood and behavior.     Relevant Results  Scheduled medications  amLODIPine, 10 mg, oral, q PM  ceFAZolin in dextrose (iso-os), 2 g, intravenous, q12h  finasteride, 5 mg, oral, Daily  heparin (porcine), 5,000 Units, subcutaneous, q8h  insulin glargine, 10 Units, subcutaneous, Nightly  insulin lispro, 0-5 Units, subcutaneous, TID with meals  melatonin, 5 mg, oral, Nightly  metoprolol " succinate XL, 100 mg, oral, Daily  mupirocin, , Topical, BID  rosuvastatin, 10 mg, oral, Nightly  tamsulosin, 0.8 mg, oral, Nightly      Continuous medications     PRN medications  PRN medications: acetaminophen, dextrose 10 % in water (D10W), dextrose, glucagon, ondansetron, oxyCODONE      Labs:  Last CBC:  Lab Results   Component Value Date    WBC 7.5 11/19/2023    HGB 7.5 (L) 11/19/2023    HCT 23.3 (L) 11/19/2023    MCV 87 11/19/2023     (L) 11/19/2023       Last RFP:  Lab Results   Component Value Date    GLUCOSE 101 (H) 11/19/2023    CALCIUM 7.9 (L) 11/19/2023     11/19/2023    K 3.8 11/19/2023    CO2 20 (L) 11/19/2023     11/19/2023    BUN 39 (H) 11/19/2023    CREATININE 4.50 (H) 11/19/2023       Last LFTs:  Lab Results   Component Value Date    ALT <3 (L) 11/17/2023    AST 11 11/17/2023    ALKPHOS 94 11/17/2023    BILITOT 0.3 11/17/2023       Last coags:  Lab Results   Component Value Date    INR 1.2 (H) 11/17/2023    APTT 36 11/17/2023       Micro/culture data:  Susceptibility data from last 90 days.  Collected Specimen Info Organism Clindamycin Erythromycin Oxacillin Tetracycline Trimethoprim/Sulfamethoxazole Vancomycin   11/14/23 Swab from Anterior Nares Methicillin Resistant Staphylococcus aureus (MRSA)         11/14/23 Blood culture from Peripheral Venipuncture Methicillin Susceptible Staphylococcus aureus (MSSA) S S S S S S         Imaging:  ECG 12 Lead  Sinus rhythm with 1st degree AV block  Low voltage QRS  Prolonged QT  Abnormal ECG  When compared with ECG of 02-OCT-2023 12:09,  No significant change was found  Confirmed by Benito Pedro ([id number]) on 11/17/2023 10:57:31 PM         Assessment/Plan     Assessment: Mr. Chester Verduzco is a 73 year old male with PMHX HTN, HLD, Dual chamber PPM implanted in 11/1996 for SSS (RV is passive) with most recent generator replacement in 11/2020 ([employer] - [employer] MRI), Type 2 DM, gout, and recent hospitalization (10/2-10/7) for Staph bacteremia,  diarrhea, NOHELIA , and hyponatremia 2/2 hypovolemia who presented to [facility] ED from a SNF on 11/13 for NOHELIA on labs from the SNF and oliguria. Blood cultures were positive for MSSA and previously had been positive 10/8 and 11/10 for MSSA as well. FILIPE 11/16 showed tricuspid valve vegetation as well as right atrial lead vegetation. Transferred to [facility] for likely removal of PPM leads and endocarditis.  Patient has been afebrile without leukocytosis and hemodynamically stable.     Updates 11/18:  - EP  removal of lead on 11/24  - Started PRN mirlax , Senokot, for constipation.   - plan to consult podiatry on Monday for an ingrown nail in his L foot.       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 Renal U/S did not show any hydronephrosis.  - Baseline prior to October 2023 hospitalization ~1.0-1.1.   - Elevated to 5.43 on arrival to [facility] on 11/13, down trending  - Initial urine electrolytes showed Fena 2.5% indicating intrinsic etiology   - Normal C3 and C4  - Follow up  ANCA, SPEP/UPEP with IF, free kappa orbert  - Nephrology following  - Rojo replaced (11/17) , strict I&Os, No indication for HD at this time  - Daily RFP  -Replete lytes as needed    #Type 2 DM  - Checking A1c  - On Lantus 10 units QHS and cover with SSI  - hypoglycemia protocol    #HTN  #HLD  - Holding home losartan and lasix in setting of  NOHELIA  - Continue Amlodipine 10, metop succinate 100 daily, and rosuvastatin 10 daily      #BPH  - Was initially planned for outpatient robotic prostatectomy end of November that has since been canceled and rescheduled for December  - Continue with Flomax 0.8 mg QHS and finasteride 5 md daily       F: As needed  E: AS needed  N: Currently NPO for possible EP procedure   A:  LUE PICC, PIV, Rojo   DVT ppx: Heparin SQ  GI: N/A  Code Status: DNR, ok for elective intubation   NOK: Charlene Pardo (friend) [phone]      Active Problems:    Endocarditis         Alejandro Rainey MD

## 2023-11-19 NOTE — CARE PLAN
Problem: Infection related to problem list condition  Goal: Infection will resolve through treatment  Outcome: Progressing     Problem: Pain  Goal: My pain/discomfort is manageable  Outcome: Progressing   The patient's goals for the shift include to be painfree    The clinical goals for the shift include pt will remain free from falls throughout this shift    Over the shift, the patient did make progress toward the following goals. Barriers to progression include none. Recommendations to address these barriers include continue to monitor and use call light.

## 2023-11-20 LAB
ALBUMIN SERPL BCP-MCNC: 2.8 G/DL (ref 3.4–5)
ANION GAP SERPL CALC-SCNC: 14 MMOL/L (ref 10–20)
BACTERIA BLD CULT: NORMAL
BACTERIA BLD CULT: NORMAL
BASOPHILS # BLD AUTO: 0.02 X10*3/UL (ref 0–0.1)
BASOPHILS NFR BLD AUTO: 0.3 %
BUN SERPL-MCNC: 38 MG/DL (ref 6–23)
CALCIUM SERPL-MCNC: 8 MG/DL (ref 8.6–10.6)
CHLORIDE SERPL-SCNC: 107 MMOL/L (ref 98–107)
CO2 SERPL-SCNC: 21 MMOL/L (ref 21–32)
CREAT SERPL-MCNC: 4.11 MG/DL (ref 0.5–1.3)
EOSINOPHIL # BLD AUTO: 0.13 X10*3/UL (ref 0–0.4)
EOSINOPHIL NFR BLD AUTO: 1.8 %
ERYTHROCYTE [DISTWIDTH] IN BLOOD BY AUTOMATED COUNT: 15.2 % (ref 11.5–14.5)
GFR SERPL CREATININE-BSD FRML MDRD: 15 ML/MIN/1.73M*2
GLUCOSE BLD MANUAL STRIP-MCNC: 143 MG/DL (ref 74–99)
GLUCOSE BLD MANUAL STRIP-MCNC: 159 MG/DL (ref 74–99)
GLUCOSE BLD MANUAL STRIP-MCNC: 198 MG/DL (ref 74–99)
GLUCOSE BLD MANUAL STRIP-MCNC: 93 MG/DL (ref 74–99)
GLUCOSE SERPL-MCNC: 75 MG/DL (ref 74–99)
HCT VFR BLD AUTO: 24.1 % (ref 41–52)
HGB BLD-MCNC: 7.7 G/DL (ref 13.5–17.5)
IMM GRANULOCYTES # BLD AUTO: 0.15 X10*3/UL (ref 0–0.5)
IMM GRANULOCYTES NFR BLD AUTO: 2.1 % (ref 0–0.9)
LYMPHOCYTES # BLD AUTO: 1.12 X10*3/UL (ref 0.8–3)
LYMPHOCYTES NFR BLD AUTO: 15.7 %
MAGNESIUM SERPL-MCNC: 1.7 MG/DL (ref 1.6–2.4)
MCH RBC QN AUTO: 28.6 PG (ref 26–34)
MCHC RBC AUTO-ENTMCNC: 32 G/DL (ref 32–36)
MCV RBC AUTO: 90 FL (ref 80–100)
MONOCYTES # BLD AUTO: 0.43 X10*3/UL (ref 0.05–0.8)
MONOCYTES NFR BLD AUTO: 6 %
NEUTROPHILS # BLD AUTO: 5.29 X10*3/UL (ref 1.6–5.5)
NEUTROPHILS NFR BLD AUTO: 74.1 %
NRBC BLD-RTO: 0 /100 WBCS (ref 0–0)
PHOSPHATE SERPL-MCNC: 4.4 MG/DL (ref 2.5–4.9)
PLATELET # BLD AUTO: 121 X10*3/UL (ref 150–450)
POTASSIUM SERPL-SCNC: 4 MMOL/L (ref 3.5–5.3)
RBC # BLD AUTO: 2.69 X10*6/UL (ref 4.5–5.9)
SODIUM SERPL-SCNC: 138 MMOL/L (ref 136–145)
WBC # BLD AUTO: 7.1 X10*3/UL (ref 4.4–11.3)

## 2023-11-20 PROCEDURE — 99233 SBSQ HOSP IP/OBS HIGH 50: CPT | Performed by: INTERNAL MEDICINE

## 2023-11-20 PROCEDURE — 85025 COMPLETE CBC W/AUTO DIFF WBC: CPT

## 2023-11-20 PROCEDURE — 1200000002 HC GENERAL ROOM WITH TELEMETRY DAILY

## 2023-11-20 PROCEDURE — 2500000004 HC RX 250 GENERAL PHARMACY W/ HCPCS (ALT 636 FOR OP/ED)

## 2023-11-20 PROCEDURE — 2500000001 HC RX 250 WO HCPCS SELF ADMINISTERED DRUGS (ALT 637 FOR MEDICARE OP)

## 2023-11-20 PROCEDURE — 83735 ASSAY OF MAGNESIUM: CPT

## 2023-11-20 PROCEDURE — 82947 ASSAY GLUCOSE BLOOD QUANT: CPT

## 2023-11-20 PROCEDURE — 84100 ASSAY OF PHOSPHORUS: CPT

## 2023-11-20 PROCEDURE — 96372 THER/PROPH/DIAG INJ SC/IM: CPT

## 2023-11-20 PROCEDURE — 36415 COLL VENOUS BLD VENIPUNCTURE: CPT

## 2023-11-20 PROCEDURE — 87075 CULTR BACTERIA EXCEPT BLOOD: CPT

## 2023-11-20 PROCEDURE — 99233 SBSQ HOSP IP/OBS HIGH 50: CPT | Performed by: STUDENT IN AN ORGANIZED HEALTH CARE EDUCATION/TRAINING PROGRAM

## 2023-11-20 PROCEDURE — 87040 BLOOD CULTURE FOR BACTERIA: CPT

## 2023-11-20 RX ORDER — MAGNESIUM SULFATE HEPTAHYDRATE 40 MG/ML
2 INJECTION, SOLUTION INTRAVENOUS ONCE
Status: COMPLETED | OUTPATIENT
Start: 2023-11-20 | End: 2023-11-20

## 2023-11-20 RX ORDER — TRAZODONE HYDROCHLORIDE 50 MG/1
50 TABLET ORAL NIGHTLY
Status: DISCONTINUED | OUTPATIENT
Start: 2023-11-20 | End: 2023-11-22

## 2023-11-20 RX ADMIN — ACETAMINOPHEN 650 MG: 325 TABLET ORAL at 02:35

## 2023-11-20 RX ADMIN — HEPARIN SODIUM 5000 UNITS: 5000 INJECTION INTRAVENOUS; SUBCUTANEOUS at 18:32

## 2023-11-20 RX ADMIN — MUPIROCIN: 20 OINTMENT TOPICAL at 08:25

## 2023-11-20 RX ADMIN — CEFAZOLIN SODIUM 2 G: 2 INJECTION, SOLUTION INTRAVENOUS at 16:42

## 2023-11-20 RX ADMIN — MAGNESIUM SULFATE HEPTAHYDRATE 2 G: 40 INJECTION, SOLUTION INTRAVENOUS at 14:13

## 2023-11-20 RX ADMIN — CEFAZOLIN SODIUM 2 G: 2 INJECTION, SOLUTION INTRAVENOUS at 04:40

## 2023-11-20 RX ADMIN — SENNOSIDES 8.6 MG: 8.6 TABLET, FILM COATED ORAL at 08:25

## 2023-11-20 RX ADMIN — FINASTERIDE 5 MG: 5 TABLET, FILM COATED ORAL at 08:25

## 2023-11-20 RX ADMIN — HEPARIN SODIUM 5000 UNITS: 5000 INJECTION INTRAVENOUS; SUBCUTANEOUS at 11:06

## 2023-11-20 RX ADMIN — INSULIN LISPRO 1 UNITS: 100 INJECTION, SOLUTION INTRAVENOUS; SUBCUTANEOUS at 19:34

## 2023-11-20 RX ADMIN — INSULIN GLARGINE 10 UNITS: 100 INJECTION, SOLUTION SUBCUTANEOUS at 21:45

## 2023-11-20 RX ADMIN — ROSUVASTATIN CALCIUM 10 MG: 10 TABLET, FILM COATED ORAL at 21:42

## 2023-11-20 RX ADMIN — AMLODIPINE BESYLATE 10 MG: 10 TABLET ORAL at 21:42

## 2023-11-20 RX ADMIN — METOPROLOL SUCCINATE 100 MG: 100 TABLET, EXTENDED RELEASE ORAL at 08:25

## 2023-11-20 RX ADMIN — MUPIROCIN: 20 OINTMENT TOPICAL at 21:49

## 2023-11-20 RX ADMIN — HEPARIN SODIUM 5000 UNITS: 5000 INJECTION INTRAVENOUS; SUBCUTANEOUS at 02:36

## 2023-11-20 RX ADMIN — TAMSULOSIN HYDROCHLORIDE 0.8 MG: 0.4 CAPSULE ORAL at 21:42

## 2023-11-20 ASSESSMENT — PAIN DESCRIPTION - LOCATION: LOCATION: HEAD

## 2023-11-20 ASSESSMENT — COGNITIVE AND FUNCTIONAL STATUS - GENERAL
MOBILITY SCORE: 20
DRESSING REGULAR UPPER BODY CLOTHING: A LITTLE
TOILETING: TOTAL
DRESSING REGULAR LOWER BODY CLOTHING: A LITTLE
STANDING UP FROM CHAIR USING ARMS: A LITTLE
WALKING IN HOSPITAL ROOM: A LITTLE
DAILY ACTIVITIY SCORE: 18
CLIMB 3 TO 5 STEPS WITH RAILING: A LITTLE
MOVING TO AND FROM BED TO CHAIR: A LITTLE
HELP NEEDED FOR BATHING: A LITTLE

## 2023-11-20 ASSESSMENT — PAIN SCALES - GENERAL
PAINLEVEL_OUTOF10: 3
PAINLEVEL_OUTOF10: 0 - NO PAIN
PAINLEVEL_OUTOF10: 0 - NO PAIN

## 2023-11-20 ASSESSMENT — PAIN SCALES - WONG BAKER: WONGBAKER_NUMERICALRESPONSE: NO HURT

## 2023-11-20 NOTE — PROGRESS NOTES
Chester Verduzco   73 y.o.    @@  [facility]/Room: 52577620/5060/5060-A    Subjective: No acute events overnight.     Objective:     Meds:   amLODIPine, 10 mg, q PM  ceFAZolin in dextrose (iso-os), 2 g, q12h  finasteride, 5 mg, Daily  heparin (porcine), 5,000 Units, q8h  insulin glargine, 10 Units, Nightly  insulin lispro, 0-5 Units, TID with meals  melatonin, 5 mg, Nightly  metoprolol succinate XL, 100 mg, Daily  mupirocin, , BID  polyethylene glycol, 17 g, Daily  rosuvastatin, 10 mg, Nightly  tamsulosin, 0.8 mg, Nightly         acetaminophen, 650 mg, q6h PRN  dextrose 10 % in water (D10W), 0.3 g/kg/hr, Once PRN  dextrose, 25 g, q15 min PRN  glucagon, 1 mg, q15 min PRN  ondansetron, 4 mg, q8h PRN  oxyCODONE, 5 mg, q6h PRN  sennosides, 1 tablet, Daily PRN        Vitals:    11/20/23 1500   BP: 135/62   Pulse: 76   Resp: 18   Temp: 36.2 °C (97.2 °F)   SpO2: 97%          Intake/Output Summary (Last 24 hours) at 11/20/2023 1725  Last data filed at 11/20/2023 1500  Gross per 24 hour   Intake 820 ml   Output 1850 ml   Net -1030 ml       General appearance: no distress  Heart: RRR  Lungs: CTA bilat  Neuro: No FND,asterixis      Blood Labs:  Results for orders placed or performed during the hospital encounter of 11/16/23 (from the past 24 hour(s))   POCT GLUCOSE   Result Value Ref Range    POCT Glucose 110 (H) 74 - 99 mg/dL   POCT GLUCOSE   Result Value Ref Range    POCT Glucose 182 (H) 74 - 99 mg/dL   POCT GLUCOSE   Result Value Ref Range    POCT Glucose 93 74 - 99 mg/dL   CBC and Auto Differential   Result Value Ref Range    WBC 7.1 4.4 - 11.3 x10*3/uL    nRBC 0.0 0.0 - 0.0 /100 WBCs    RBC 2.69 (L) 4.50 - 5.90 x10*6/uL    Hemoglobin 7.7 (L) 13.5 - 17.5 g/dL    Hematocrit 24.1 (L) 41.0 - 52.0 %    MCV 90 80 - 100 fL    MCH 28.6 26.0 - 34.0 pg    MCHC 32.0 32.0 - 36.0 g/dL    RDW 15.2 (H) 11.5 - 14.5 %    Platelets 121 (L) 150 - 450 x10*3/uL    Neutrophils % 74.1 40.0 - 80.0 %    Immature Granulocytes %, Automated 2.1 (H) 0.0  - 0.9 %    Lymphocytes % 15.7 13.0 - 44.0 %    Monocytes % 6.0 2.0 - 10.0 %    Eosinophils % 1.8 0.0 - 6.0 %    Basophils % 0.3 0.0 - 2.0 %    Neutrophils Absolute 5.29 1.60 - 5.50 x10*3/uL    Immature Granulocytes Absolute, Automated 0.15 0.00 - 0.50 x10*3/uL    Lymphocytes Absolute 1.12 0.80 - 3.00 x10*3/uL    Monocytes Absolute 0.43 0.05 - 0.80 x10*3/uL    Eosinophils Absolute 0.13 0.00 - 0.40 x10*3/uL    Basophils Absolute 0.02 0.00 - 0.10 x10*3/uL   Renal Function Panel   Result Value Ref Range    Glucose 75 74 - 99 mg/dL    Sodium 138 136 - 145 mmol/L    Potassium 4.0 3.5 - 5.3 mmol/L    Chloride 107 98 - 107 mmol/L    Bicarbonate 21 21 - 32 mmol/L    Anion Gap 14 10 - 20 mmol/L    Urea Nitrogen 38 (H) 6 - 23 mg/dL    Creatinine 4.11 (H) 0.50 - 1.30 mg/dL    eGFR 15 (L) >60 mL/min/1.73m*2    Calcium 8.0 (L) 8.6 - 10.6 mg/dL    Phosphorus 4.4 2.5 - 4.9 mg/dL    Albumin 2.8 (L) 3.4 - 5.0 g/dL   Magnesium   Result Value Ref Range    Magnesium 1.70 1.60 - 2.40 mg/dL   POCT GLUCOSE   Result Value Ref Range    POCT Glucose 143 (H) 74 - 99 mg/dL   POCT GLUCOSE   Result Value Ref Range    POCT Glucose 198 (H) 74 - 99 mg/dL              ASSESSMENT:  Chester Verduzco is a 73 y.o. male with a past medical hx of HTN, HLD, IDDMT2, gout, OA  of the right knee, BPH, and SSS s/p dual chamber PPM placement (1996) admitted for persistent MSSA bacteremia in the context of tricuspid valve and RA lead vegetation. Nephrology consulted for NOHELIA of unclear etiology.        #NOHELIA Stage III  - Baseline creatinine: 1.0.   -Creatine elevated on admission 10/2, trending between 2.4-2.9 during that hospitalization. Continued to rise (3.3-4.9) while at SNF, 5.43 on admission 11/13.  - Etiology: most likely represents ATN (less likely AIN vs infectious GN)  - UA (11/13) showed: 2+ protein, 3+ blood, moderate LE  -Renal U/S: no hydronephrosis, no stones  - Urine electrolyes showed FeNA of 2.5%, consistent with intrarenal pathology  -  Electrolytes (Na, K, Ca, Phos): K 3.5 (recommend repletion). Electrolytes otherwise stable     Recommendations:  -Largely suspect ATN given gradual rise in creatinine over past month and history of recent diarrhea and nephrotoxic antibiotic exposure. Creatinine slowly down trending today so recommend supportive care at this time  -Please continue holding home Lasix and losartan at this time however if renal function continues to improve recommend restarting prior to discharge   -Daily RFP   - Indication for dialysis:  No   - Avoid nephrotoxins, contrast if possible  - strict Is/Os  - Renal dosing for medications for latest eGFR, follow medication trough as appropriate  - Avoid hypotension/rapid fluctuations in BPs      Radha Nicholson DO  Nephrology Fellow   Daytime / Weekend Renal Pager [phone]  After 7 pm Emergencies [phone] Pager [phone]

## 2023-11-20 NOTE — PROGRESS NOTES
Transitional Care Coordination Progress Note:  Patient discussed during interdisciplinary rounds.   Team members present: Hector Momin RN [facility], [facility] Team, Nurse Manager  Plan per Medical/Surgical team: EP consult, Lead removal 11/24, IV antibiotics, PT/OT, PICC removed, nephrology, NOHELIA  Payer: Medicare/ [employer]  Status: inpatient  Discharge disposition: Return to [facility]  Potential Barriers: None  ADOD: Next week pending outcome of OR  Hector Momin RN Transitional Care Coordinator [phone]

## 2023-11-20 NOTE — CARE PLAN
The patient's goals for the shift include to be painfree    The clinical goals for the shift include Pt will remain hemodynamically stable throughout this shift    Problem: Infection related to problem list condition  Goal: Infection will resolve through treatment  Outcome: Progressing     Problem: Pain  Goal: My pain/discomfort is manageable  Outcome: Progressing     Problem: Safety  Goal: Patient will be injury free during hospitalization  Outcome: Progressing  Goal: I will remain free of falls  Outcome: Progressing     Problem: Daily Care  Goal: Daily care needs are met  Outcome: Progressing     Problem: Psychosocial Needs  Goal: Demonstrates ability to cope with hospitalization/illness  Outcome: Progressing  Goal: Collaborate with me, my family, and caregiver to identify my specific goals  Outcome: Progressing     Problem: Discharge Barriers  Goal: My discharge needs are met  Outcome: Progressing

## 2023-11-20 NOTE — PROGRESS NOTES
" Subjective     Overnight events:  Pt was seen and examined at bedside. No acute overnight events. Pt complains of Constipation,Pt denies any chest pain, SOB, dyspnea, new cough, palpitations, nausea, vomiting, diarrhea, dizziness, changes in visions, headaches, or worsening lower extremity swelling. Pt is comfortable in bed and in no acute distress.    Vital signs:  Blood pressure 135/65, pulse 72, temperature 36.5 °C (97.7 °F), temperature source Temporal, resp. rate 18, height 1.88 m (6' 2.02\"), weight 116 kg (256 lb 13.4 oz), SpO2 98 %.    I/O last 3 completed shifts:  In: 840 (7.2 mL/kg) [P.O.:840]  Out: 2650 (22.7 mL/kg) [Urine:2650 (0.6 mL/kg/hr)]  Weight: 116.5 kg     Physical Exam  GENERAL:  In no acute distress  NEUROLOGIC:  A and O x 3. Cranial nerves 2 through 12 grossly intact with no gait disturbances. Intact motor and sensory systems, with normal reflexes and coordination.    HEENT:  Pupils are equal, round, and reactive to light and accommodation. Extraocular motion intact.    MOUTH: MMM, no lesions observed  CARDIAC: S1 + S2, RRR, no M/R/G.    LUNGS: CTA BL.  No wheezes or coarse breath sounds. Symmetric respirations. No increased work of breathing.   ABDOMEN: Soft, non-tender to palpation. No organomegaly. Normal BS in 4Q, No rebound or guarding.  EXTREMITIES:  Moving x4 equally and spontaneously. Right knee with mild effusion present but no erythema or warmth. Chronic limited ROM but not worse than baseline. No BLE edema   SKIN: Clean, warm, dry, and intact with normal skin turgor.  PSYCH: Appropriate mood and behavior.     Relevant Results  Scheduled medications  amLODIPine, 10 mg, oral, q PM  ceFAZolin in dextrose (iso-os), 2 g, intravenous, q12h  finasteride, 5 mg, oral, Daily  heparin (porcine), 5,000 Units, subcutaneous, q8h  insulin glargine, 10 Units, subcutaneous, Nightly  insulin lispro, 0-5 Units, subcutaneous, TID with meals  melatonin, 5 mg, oral, Nightly  metoprolol succinate XL, 100 " mg, oral, Daily  mupirocin, , Topical, BID  polyethylene glycol, 17 g, oral, Daily  rosuvastatin, 10 mg, oral, Nightly  tamsulosin, 0.8 mg, oral, Nightly      Continuous medications     PRN medications  PRN medications: acetaminophen, dextrose 10 % in water (D10W), dextrose, glucagon, ondansetron, oxyCODONE, sennosides      Labs:  Last CBC:  Lab Results   Component Value Date    WBC 7.5 11/19/2023    HGB 7.5 (L) 11/19/2023    HCT 23.3 (L) 11/19/2023    MCV 87 11/19/2023     (L) 11/19/2023       Last RFP:  Lab Results   Component Value Date    GLUCOSE 101 (H) 11/19/2023    CALCIUM 7.9 (L) 11/19/2023     11/19/2023    K 3.8 11/19/2023    CO2 20 (L) 11/19/2023     11/19/2023    BUN 39 (H) 11/19/2023    CREATININE 4.50 (H) 11/19/2023       Last LFTs:  Lab Results   Component Value Date    ALT <3 (L) 11/17/2023    AST 11 11/17/2023    ALKPHOS 94 11/17/2023    BILITOT 0.3 11/17/2023       Last coags:  Lab Results   Component Value Date    INR 1.2 (H) 11/17/2023    APTT 36 11/17/2023       Micro/culture data:  Susceptibility data from last 90 days.  Collected Specimen Info Organism Clindamycin Erythromycin Oxacillin Tetracycline Trimethoprim/Sulfamethoxazole Vancomycin   11/14/23 Swab from Anterior Nares Methicillin Resistant Staphylococcus aureus (MRSA)         11/14/23 Blood culture from Peripheral Venipuncture Methicillin Susceptible Staphylococcus aureus (MSSA) S S S S S S         Imaging:  ECG 12 Lead  Sinus rhythm with 1st degree AV block  Low voltage QRS  Prolonged QT  Abnormal ECG  When compared with ECG of 02-OCT-2023 12:09,  No significant change was found  Confirmed by Benito Pedro ([id number]) on 11/17/2023 10:57:31 PM         Assessment/Plan     Assessment: Mr. Chester Verduzco is a 73 year old male with PMHX HTN, HLD, Dual chamber PPM implanted in 11/1996 for SSS (RV is passive) with most recent generator replacement in 11/2020 ([employer] - [employer] MRI), Type 2 DM, gout, and recent hospitalization  (10/2-10/7) for Staph bacteremia, diarrhea, NOHELIA , and hyponatremia 2/2 hypovolemia who presented to [facility] ED from a SNF on 11/13 for NOHELIA on labs from the SNF and oliguria. Blood cultures were positive for MSSA and previously had been positive 10/8 and 11/10 for MSSA as well. FILIPE 11/16 showed tricuspid valve vegetation as well as right atrial lead vegetation. Transferred to [facility] for likely removal of PPM leads and endocarditis.  Patient has been afebrile without leukocytosis and hemodynamically stable.     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 Renal U/S did not show any hydronephrosis.  - Baseline prior to October 2023 hospitalization ~1.0-1.1.   - Elevated to 5.43 on arrival to [facility] on 11/13, down trending  - Initial urine electrolytes showed Fena 2.5% indicating intrinsic etiology   - Normal C3 and C4  - Follow up  ANCA, SPEP/UPEP with IF, free kappa robert  - Nephrology following  - Rojo replaced (11/17) , strict I&Os, No indication for HD at this time  - Daily RFP  -Replete lytes as needed    #Type 2 DM  - Checking A1c  - On Lantus 10 units QHS and cover with SSI  - hypoglycemia protocol    #HTN  #HLD  - Holding home losartan and lasix in setting of NOHELIA  - Continue Amlodipine 10, metop succinate 100 daily, and rosuvastatin 10 daily      #BPH  -  Was initially planned for outpatient robotic prostatectomy end of November that has since been canceled and rescheduled for December  - Continue with Flomax 0.8 mg QHS and finasteride 5 md daily       F: As needed  E: AS needed  N: Currently NPO for possible EP procedure   A:  LUE PICC, PIV, Rojo   DVT ppx: Heparin SQ  GI: N/A  Code Status: DNR, ok for elective intubation   NOK: Charlene Pardo (friend) [phone]      Active Problems:    Endocarditis         Gonzalo Henley MD

## 2023-11-20 NOTE — CARE PLAN
Patient safe and stable throughout shift. Continues on IV antibiotics. Repleted with 2g IV mag. Plan for PPM extraction on Friday.       Problem: Infection related to problem list condition  Goal: Infection will resolve through treatment  Outcome: Progressing     Problem: Pain  Goal: My pain/discomfort is manageable  Outcome: Progressing     Problem: Safety  Goal: Patient will be injury free during hospitalization  Outcome: Progressing  Goal: I will remain free of falls  Outcome: Progressing     Problem: Daily Care  Goal: Daily care needs are met  Outcome: Progressing     Problem: Psychosocial Needs  Goal: Demonstrates ability to cope with hospitalization/illness  Outcome: Progressing  Goal: Collaborate with me, my family, and caregiver to identify my specific goals  Outcome: Progressing     Problem: Discharge Barriers  Goal: My discharge needs are met  Outcome: Progressing

## 2023-11-21 ENCOUNTER — ANESTHESIA EVENT (OUTPATIENT)
Dept: CARDIOLOGY | Facility: HOSPITAL | Age: 73
DRG: 260 | End: 2023-11-21
Payer: MEDICARE

## 2023-11-21 LAB
ABO GROUP (TYPE) IN BLOOD: NORMAL
ABO GROUP (TYPE) IN BLOOD: NORMAL
ALBUMIN SERPL BCP-MCNC: 2.7 G/DL (ref 3.4–5)
ANCA AB PATTERN SER IF-IMP: NORMAL
ANCA IGG TITR SER IF: NORMAL {TITER}
ANION GAP SERPL CALC-SCNC: 14 MMOL/L (ref 10–20)
ANTIBODY SCREEN: NORMAL
ANTIBODY SCREEN: NORMAL
APTT PPP: 38 SECONDS (ref 27–38)
BASOPHILS # BLD AUTO: 0.02 X10*3/UL (ref 0–0.1)
BASOPHILS NFR BLD AUTO: 0.3 %
BUN SERPL-MCNC: 37 MG/DL (ref 6–23)
CALCIUM SERPL-MCNC: 8.2 MG/DL (ref 8.6–10.6)
CHLORIDE SERPL-SCNC: 106 MMOL/L (ref 98–107)
CO2 SERPL-SCNC: 20 MMOL/L (ref 21–32)
CREAT SERPL-MCNC: 3.72 MG/DL (ref 0.5–1.3)
EOSINOPHIL # BLD AUTO: 0.1 X10*3/UL (ref 0–0.4)
EOSINOPHIL NFR BLD AUTO: 1.5 %
ERYTHROCYTE [DISTWIDTH] IN BLOOD BY AUTOMATED COUNT: 15.3 % (ref 11.5–14.5)
GFR SERPL CREATININE-BSD FRML MDRD: 17 ML/MIN/1.73M*2
GLUCOSE BLD MANUAL STRIP-MCNC: 118 MG/DL (ref 74–99)
GLUCOSE BLD MANUAL STRIP-MCNC: 123 MG/DL (ref 74–99)
GLUCOSE BLD MANUAL STRIP-MCNC: 176 MG/DL (ref 74–99)
GLUCOSE BLD MANUAL STRIP-MCNC: 194 MG/DL (ref 74–99)
GLUCOSE SERPL-MCNC: 168 MG/DL (ref 74–99)
HCT VFR BLD AUTO: 24.2 % (ref 41–52)
HGB BLD-MCNC: 7.6 G/DL (ref 13.5–17.5)
IMM GRANULOCYTES # BLD AUTO: 0.09 X10*3/UL (ref 0–0.5)
IMM GRANULOCYTES NFR BLD AUTO: 1.3 % (ref 0–0.9)
INR PPP: 1.2 (ref 0.9–1.1)
LYMPHOCYTES # BLD AUTO: 0.95 X10*3/UL (ref 0.8–3)
LYMPHOCYTES NFR BLD AUTO: 14 %
MAGNESIUM SERPL-MCNC: 1.93 MG/DL (ref 1.6–2.4)
MCH RBC QN AUTO: 28.8 PG (ref 26–34)
MCHC RBC AUTO-ENTMCNC: 31.4 G/DL (ref 32–36)
MCV RBC AUTO: 92 FL (ref 80–100)
MONOCYTES # BLD AUTO: 0.37 X10*3/UL (ref 0.05–0.8)
MONOCYTES NFR BLD AUTO: 5.4 %
MYELOPEROXIDASE AB SER-ACNC: 0 AU/ML (ref 0–19)
NEUTROPHILS # BLD AUTO: 5.27 X10*3/UL (ref 1.6–5.5)
NEUTROPHILS NFR BLD AUTO: 77.5 %
NRBC BLD-RTO: 0 /100 WBCS (ref 0–0)
PHOSPHATE SERPL-MCNC: 4.1 MG/DL (ref 2.5–4.9)
PLATELET # BLD AUTO: 104 X10*3/UL (ref 150–450)
POTASSIUM SERPL-SCNC: 4.2 MMOL/L (ref 3.5–5.3)
PROTEINASE3 AB SER-ACNC: 0 AU/ML (ref 0–19)
PROTHROMBIN TIME: 13.3 SECONDS (ref 9.8–12.8)
RBC # BLD AUTO: 2.64 X10*6/UL (ref 4.5–5.9)
RH FACTOR (ANTIGEN D): NORMAL
RH FACTOR (ANTIGEN D): NORMAL
SODIUM SERPL-SCNC: 136 MMOL/L (ref 136–145)
WBC # BLD AUTO: 6.8 X10*3/UL (ref 4.4–11.3)

## 2023-11-21 PROCEDURE — 1200000002 HC GENERAL ROOM WITH TELEMETRY DAILY

## 2023-11-21 PROCEDURE — 85025 COMPLETE CBC W/AUTO DIFF WBC: CPT

## 2023-11-21 PROCEDURE — 2500000001 HC RX 250 WO HCPCS SELF ADMINISTERED DRUGS (ALT 637 FOR MEDICARE OP)

## 2023-11-21 PROCEDURE — 85730 THROMBOPLASTIN TIME PARTIAL: CPT

## 2023-11-21 PROCEDURE — 96372 THER/PROPH/DIAG INJ SC/IM: CPT

## 2023-11-21 PROCEDURE — 86850 RBC ANTIBODY SCREEN: CPT

## 2023-11-21 PROCEDURE — 82947 ASSAY GLUCOSE BLOOD QUANT: CPT

## 2023-11-21 PROCEDURE — 86901 BLOOD TYPING SEROLOGIC RH(D): CPT

## 2023-11-21 PROCEDURE — 2500000004 HC RX 250 GENERAL PHARMACY W/ HCPCS (ALT 636 FOR OP/ED)

## 2023-11-21 PROCEDURE — 85610 PROTHROMBIN TIME: CPT

## 2023-11-21 PROCEDURE — 87040 BLOOD CULTURE FOR BACTERIA: CPT

## 2023-11-21 PROCEDURE — 86920 COMPATIBILITY TEST SPIN: CPT

## 2023-11-21 PROCEDURE — 36415 COLL VENOUS BLD VENIPUNCTURE: CPT

## 2023-11-21 PROCEDURE — 86900 BLOOD TYPING SEROLOGIC ABO: CPT

## 2023-11-21 PROCEDURE — 99223 1ST HOSP IP/OBS HIGH 75: CPT | Performed by: PHYSICIAN ASSISTANT

## 2023-11-21 PROCEDURE — 83735 ASSAY OF MAGNESIUM: CPT

## 2023-11-21 PROCEDURE — 99233 SBSQ HOSP IP/OBS HIGH 50: CPT | Performed by: INTERNAL MEDICINE

## 2023-11-21 PROCEDURE — 80069 RENAL FUNCTION PANEL: CPT

## 2023-11-21 RX ORDER — INSULIN GLARGINE 100 [IU]/ML
5 INJECTION, SOLUTION SUBCUTANEOUS NIGHTLY
Status: DISCONTINUED | OUTPATIENT
Start: 2023-11-21 | End: 2023-11-23

## 2023-11-21 RX ADMIN — CEFAZOLIN SODIUM 2 G: 2 INJECTION, SOLUTION INTRAVENOUS at 17:42

## 2023-11-21 RX ADMIN — METOPROLOL SUCCINATE 100 MG: 100 TABLET, EXTENDED RELEASE ORAL at 09:18

## 2023-11-21 RX ADMIN — INSULIN GLARGINE 5 UNITS: 100 INJECTION, SOLUTION SUBCUTANEOUS at 21:11

## 2023-11-21 RX ADMIN — CEFAZOLIN SODIUM 2 G: 2 INJECTION, SOLUTION INTRAVENOUS at 03:38

## 2023-11-21 RX ADMIN — ROSUVASTATIN CALCIUM 10 MG: 10 TABLET, FILM COATED ORAL at 21:12

## 2023-11-21 RX ADMIN — INSULIN LISPRO 1 UNITS: 100 INJECTION, SOLUTION INTRAVENOUS; SUBCUTANEOUS at 18:02

## 2023-11-21 RX ADMIN — SENNOSIDES 8.6 MG: 8.6 TABLET, FILM COATED ORAL at 09:22

## 2023-11-21 RX ADMIN — TRAZODONE HYDROCHLORIDE 50 MG: 50 TABLET ORAL at 21:13

## 2023-11-21 RX ADMIN — TAMSULOSIN HYDROCHLORIDE 0.8 MG: 0.4 CAPSULE ORAL at 21:12

## 2023-11-21 RX ADMIN — FINASTERIDE 5 MG: 5 TABLET, FILM COATED ORAL at 09:18

## 2023-11-21 RX ADMIN — HEPARIN SODIUM 5000 UNITS: 5000 INJECTION INTRAVENOUS; SUBCUTANEOUS at 17:42

## 2023-11-21 RX ADMIN — AMLODIPINE BESYLATE 10 MG: 10 TABLET ORAL at 21:12

## 2023-11-21 RX ADMIN — HEPARIN SODIUM 5000 UNITS: 5000 INJECTION INTRAVENOUS; SUBCUTANEOUS at 10:30

## 2023-11-21 RX ADMIN — TRAZODONE HYDROCHLORIDE 50 MG: 50 TABLET ORAL at 00:13

## 2023-11-21 RX ADMIN — OXYCODONE HYDROCHLORIDE 5 MG: 5 TABLET ORAL at 14:44

## 2023-11-21 RX ADMIN — Medication 5 MG: at 21:12

## 2023-11-21 RX ADMIN — HEPARIN SODIUM 5000 UNITS: 5000 INJECTION INTRAVENOUS; SUBCUTANEOUS at 02:27

## 2023-11-21 RX ADMIN — Medication 5 MG: at 00:13

## 2023-11-21 ASSESSMENT — PAIN SCALES - GENERAL
PAINLEVEL_OUTOF10: 6
PAINLEVEL_OUTOF10: 0 - NO PAIN
PAINLEVEL_OUTOF10: 8
PAINLEVEL_OUTOF10: 0 - NO PAIN

## 2023-11-21 ASSESSMENT — PAIN - FUNCTIONAL ASSESSMENT
PAIN_FUNCTIONAL_ASSESSMENT: 0-10

## 2023-11-21 ASSESSMENT — PAIN SCALES - WONG BAKER: WONGBAKER_NUMERICALRESPONSE: HURTS LITTLE BIT

## 2023-11-21 ASSESSMENT — ENCOUNTER SYMPTOMS
RESPIRATORY NEGATIVE: 1
CARDIOVASCULAR NEGATIVE: 1
CONSTITUTIONAL NEGATIVE: 1
MUSCULOSKELETAL NEGATIVE: 1
NEUROLOGICAL NEGATIVE: 1
EYES NEGATIVE: 1
GASTROINTESTINAL NEGATIVE: 1
ENDOCRINE NEGATIVE: 1

## 2023-11-21 ASSESSMENT — PAIN DESCRIPTION - LOCATION: LOCATION: KNEE

## 2023-11-21 ASSESSMENT — PAIN DESCRIPTION - ORIENTATION: ORIENTATION: RIGHT

## 2023-11-21 NOTE — CARE PLAN
Chester Verduzco is a 73 y.o. male with a past medical hx of HTN, HLD, IDDMT2, gout, OA  of the right knee, BPH, and SSS s/p dual chamber PPM placement (1996) admitted for persistent MSSA bacteremia in the context of tricuspid valve and RA lead vegetation. Nephrology consulted for NOHELIA of unclear etiology.         #NOHELIA Stage III, Baseline creatinine: 1.0. likely ATN with signs of improvement and increasing urine output.      Recommendations:  Creatinine continues down trending today so recommend supportive care at this time  -Can resume home lasix and losartan if creatinine continues to downtrend tomorrow  -continue supportive management per primary team    Will sign off at this time. Thank you for the consult.         Radha Nicholson DO  Nephrology Fellow   Daytime / Weekend Renal Pager [phone]  After 7 pm Emergencies [phone] Pager [phone]

## 2023-11-21 NOTE — CONSULTS
Podiatry INITIAL CONSULT NOTE    SERVICE DATE: 11/21/2023   SERVICE TIME:  9:44 AM      REASON FOR CONSULT: ingrowing toenail    PRIMARY CARE PHYSICIAN: Jeffry De Leon MD      History Of Present Illness  Chester Verduzco is a 73 y.o. male presenting with PMHx lymphedema, ventral hernia repair, HTN, HLD, BPH, obesity who presents to hospital for abnormal BUN/Creatinine and positive bood culture for staph aureus.     We are consulted for painful ingrowing toenails. Patient states he sees Dr. Sarah DPM for his lower extremity cares. States he is on allopurinol for his gout, which flares up on his right big toe joint mostly. States he doesn't like how his toenails are brittle and wants them trimmed. Denies further pain or discomfort today.      Past Medical History  He has a past medical history of Abnormal weight gain (10/27/2016), Achilles tendinitis, unspecified leg (08/16/2016), Acute upper respiratory infection, unspecified, Allergic contact dermatitis due to plants, except food (08/22/2013), BPH with obstruction/lower urinary tract symptoms, Cellulitis of right lower limb (07/30/2021), Cough, unspecified (03/21/2016), Diabetes (CMS/[facility]), Encounter for screening for human immunodeficiency virus (HIV) (06/14/2016), Hordeolum externum unspecified eye, unspecified eyelid (08/14/2019), Hyperglycemia, unspecified (12/13/2017), Incisional hernia without obstruction or gangrene (06/15/2015), Knee joint pain, MSSA bacteremia (10/2023), Muscle spasm of calf (08/16/2016), Obstructive uropathy, Personal history of other diseases of the digestive system (06/30/2015), Personal history of other diseases of the digestive system (02/05/2016), Personal history of other diseases of the respiratory system (12/23/2014), Personal history of other diseases of the respiratory system (01/28/2016), Personal history of other infectious and parasitic diseases (12/02/2015), Personal history of other infectious and parasitic diseases,  Personal history of other specified conditions (02/04/2015), Personal history of other specified conditions (08/16/2016), Personal history of other specified conditions (01/26/2015), Personal history of pneumonia (recurrent) (01/29/2016), Personal history of urinary (tract) infections (02/19/2013), Prostatitis (03/21/2023), Pruritus, unspecified (02/10/2015), Sinoatrial node dysfunction (CMS/HCC), Strain of muscle, fascia and tendon of the posterior muscle group at thigh level, right thigh, initial encounter (05/10/2016), and Unspecified symptoms and signs involving the genitourinary system (12/20/2016).    Surgical History  He has a past surgical history that includes Varicose vein surgery (08/22/2013); Cardiac pacemaker placement (08/22/2013); Other surgical history (06/15/2015); Ventral hernia repair (06/15/2015); Other surgical history (09/16/2019); and Peripherally inserted central catheter insertion.     Social History  He reports that he has never smoked. He has never been exposed to tobacco smoke. He has never used smokeless tobacco. He reports that he does not currently use alcohol. He reports that he does not use drugs.    Family History  No family history on file.     Allergies  Bupropion and Keflex [cephalexin]    Review of Systems    PHYSICAL EXAM    Objective:   Vasc: DP and PT pulses are palpable bilateral.  CFT is less than 3 seconds bilateral.  Skin temperature is warm to cool proximal to distal bilateral.  Edema noted to b/l LE    Neuro:  Light touch is intact to the foot bilateral.     Derm: Nails 1-5 bilateral are intact. Slight ingrowing of the lateral nail plate noted to right hallux with dried blood surrounding it, no signs of erythema, purulence, or fluctuance.  Hyperkeratotic lesion noted to left 2nd toe at the distal tuft, sharply paring of the lesion revealed healthy healed skin underneath without any open lesions.     Ortho: Muscle strength is 5/5 for all pedal groups tested.  Ankle  "joint, subtalar joint, 1st MPJ and lesser MPJ ROM is full and without pain or crepitus.  The foot type is rectus bilateral off weight bearing.  There are no structural deformities noted.    Last Recorded Vitals  Blood pressure 152/70, pulse 73, temperature 36.8 °C (98.3 °F), temperature source Temporal, resp. rate 18, height 1.88 m (6' 2.02\"), weight 116 kg (256 lb 2.8 oz), SpO2 97 %.         Assessment/Plan     Oncychocryptosis, right lateral border, hallux  Right toe pain  Hyperkeratotic lesion, left 2nd distal toe    Patient was seen and examined at bedside    -With patient's consent the ingrown toenail noted above was prepped with betadine and debrided in a slantback fashion without any incident. The hyperkeratotic lesion noted above was sharply pared with #10 blade without any incident then painted with betadine    No further intervention needed at this time.    Patient can follow-up with his podiatrist for his podiatric care.    Podiatry signing off, please contact for any concerns/questions.    Case to be discussed with attending, A&P above reflects a tentative plan. Please await for the final signature from the attending physician on service.    Urszula Del Rio DPM PGY-2  Podiatric Medicine & Surgery  Please contact for any questions or concerns.      SIGNATURE: Urszula Del Rio DPM PGY-2 PATIENT NAME: Chester Verduzco   DATE: November 21, 2023 MRN: [id number]   TIME: 9:50 AM             "

## 2023-11-21 NOTE — ANESTHESIA PREPROCEDURE EVALUATION
"Patient: Chester Verduzco    Procedure Information       Date/Time: 11/22/23 0830    Procedure: PPM Lead Extraction (Left)    Location: [facility] HYBRID/CAHVC / [facility] 2F Cardiac Cath Lab    Providers: Etienne Aguilar MD            Relevant Problems   Anesthesia  Past Surgical History:  08/22/2013: CARDIAC PACEMAKER PLACEMENT      Comment:  Pacemaker Permanent Placement  06/15/2015: OTHER SURGICAL HISTORY      Comment:  Ventral Hernia Repair - Recurrent  09/16/2019: OTHER SURGICAL HISTORY      Comment:  Prostate biopsy  No date: PERIPHERALLY INSERTED CENTRAL CATHETER INSERTION  08/22/2013: VARICOSE VEIN SURGERY      Comment:  Varicose Vein Ligation  06/15/2015: VENTRAL HERNIA REPAIR      Comment:  Ventral Hernia Repair     (+) History of general anesthesia   No history of complications History of anesthesia complications      Cardiovascular  [place] (As per cardiology):  \"Assessment/Plan   Chester Verduzco is a 73 y.o. male with a PMHx of HTN, HLD, Dual chamber PPM implanted in 11/1996 for SSS (RV is passive) with most recent generator replacement in 11/2020 ([employer] - [employer] MRI), Type 2 DM, gout, and recent hospitalization (10/2-10/7) for Staph bacteremia.  Admitted with persistent staph bacteremia in the setting of RA lead vegetation and TV lead vegetation.   #CIED Infection  - Last Blood cultures positive 11/14/23 Growing MRSA  - Last Blood culture negative 11/16/23  - Other possible sources: None  - TTE- showed RV/TV/ vegetations <2cm  - Given persistent bacteremia with Abx and presencense of vegetations on imaging, there is a Class I indication for CIED removal for source control  - Device interrogation revealed that the pt is not device dependant  30% and AP 29% so likely will be able to avoid a temp PPM and maybe complete Abx course before re-implant  - Will plan for CIED (Right sided Dual Chamber PPM) extraction on 11/22/23 - Pending OR availability and CTS consult  - High risk features for " "extraction procedure: Lead age ~27 years, right sided device, Passive RV lead  -- 4u pRBC on hold prior to procedure, will need ICU bed after procedure.\"  TTE 11/16./23:  CONCLUSIONS:   1. Left ventricular systolic function is normal with a 55-60% estimated ejection fraction.   2. Spectral Doppler shows an impaired relaxation pattern of left ventricular diastolic filling.   3. There is mild mitral and tricuspid regurgitation.   4. The estimated pulmonary artery pressure is mildly elevated with the RVSP at 39.2 mmHg.   5. No definite valvular vegetations were visualized.        (+) HTN (hypertension)   (+) PVD (peripheral vascular disease) (CMS/[facility])   (+) Pacemaker   (+) Pure hypercholesterolemia   (+) Sinoatrial node dysfunction (CMS/[facility])      Endocrine   (+) Diabetic glomerulopathy (CMS/[facility])   (+) Type 2 diabetes mellitus with diabetic neuropathy, with long-term current use of insulin (CMS/HCC)      /Renal   (+) NOHELIA (acute kidney injury) (CMS/HCC)   (+) Acute renal failure, unspecified acute renal failure type (CMS/HCC)   (+) CKD (chronic kidney disease) stage 4, GFR 15-29 ml/min (CMS/HCC)   (+) Diabetic glomerulopathy (CMS/[facility])      Neuro/Psych   (+) Bell's palsy      Hematology   (+) Anemia      Musculoskeletal   (+) Osteoarthritis of left knee      Infectious Disease   (+) Mycotic toenails      Other      06/15/2015: Incisional hernia without obstruction or gangrene      Comment:  Recurrent ventral hernia  No date: Knee joint pain      Comment:  Right knee- bone on bone  10/2023: MSSA bacteremia  08/16/2016: Muscle spasm of calf      Comment:  Muscle spasm of calf  No date: Obstructive uropathy  06/30/2015: Personal history of other diseases of the digestive system      Comment:  History of umbilical hernia  02/05/2016: Personal history of other diseases of the digestive system      Comment:  History of ventral hernia  12/23/2014: Personal history of other diseases of the respiratory   system      Comment:  " History of acute sinusitis  01/28/2016: Personal history of other diseases of the respiratory   system      Comment:  History of upper respiratory infection  12/02/2015: Personal history of other infectious and parasitic   diseases      Comment:  History of hepatitis B virus infection  No date: Personal history of other infectious and parasitic diseases      Comment:  History of hepatitis B virus infection  02/04/2015: Personal history of other specified conditions      Comment:  History of nausea and vomiting  08/16/2016: Personal history of other specified conditions      Comment:  History of edema  01/26/2015: Personal history of other specified conditions      Comment:  History of jaundice  01/29/2016: Personal history of pneumonia (recurrent)      Comment:  History of pneumonia  02/19/2013: Personal history of urinary (tract) infections      Comment:  History of urinary tract infection  03/21/2023: Prostatitis  02/10/2015: Pruritus, unspecified      Comment:  Pruritus  No date: Sinoatrial node dysfunction (CMS/HCC)         Clinical information reviewed:   Tobacco  Allergies  Meds  Problems  Med Hx  Surg Hx   Fam Hx  Soc   Hx        NPO Detail:  No data recorded     Physical Exam    Airway  TM distance: >3 FB  Neck ROM: full     Cardiovascular   Rhythm: regular  Rate: normal     Dental   Comments: Upper Dentures (Full)   Pulmonary - normal exam  Breath sounds clear to auscultation     Abdominal            Anesthesia Plan    ASA 4     general     The patient is not a current smoker.    Anesthetic plan and risks discussed with patient.  Use of blood products discussed with patient who.    Plan discussed with CRNA.

## 2023-11-21 NOTE — CARE PLAN
The patient's goals for the shift include to be painfree    The clinical goals for the shift include Patient will show no further signs/symptoms of infection.

## 2023-11-21 NOTE — PROGRESS NOTES
Overnight Events:    No overnight events from EP standpoint.     Objective Data:  Last Recorded Vitals:  Vitals:    11/20/23 0640 11/20/23 0758 11/20/23 1141 11/20/23 1500   BP:  135/65 130/62 135/62   BP Location:  Left arm Left arm Left arm   Patient Position:  Lying Lying Lying   Pulse:  72 75 76   Resp:  18 18 18   Temp:  36.5 °C (97.7 °F) 36 °C (96.8 °F) 36.2 °C (97.2 °F)   TempSrc:  Temporal Temporal Temporal   SpO2:  98% 98% 97%   Weight: 116 kg (256 lb 13.4 oz)      Height:           Last Labs:  CBC - 11/20/2023:  9:14 AM  7.1 7.7 121    24.1      CMP - 11/20/2023:  9:14 AM  8.0 5.6; 5.6 11 --- 0.3   4.4 2.8 <3 94      PTT - 11/17/2023: 10:14 AM  1.2   13.8 36     TROPHS   Date/Time Value Ref Range Status   09/18/2023 05:52 PM 15 0 - 20 ng/L Final     Comment:     .  Less than 99th percentile of normal range cutoff-  Female and children under 18 years old <14 ng/L; Male <21 ng/L: Negative  Repeat testing should be performed if clinically indicated.   .  Female and children under 18 years old 14-50 ng/L; Male 21-50 ng/L:  Consistent with possible cardiac damage and possible increased clinical   risk. Serial measurements may help to assess extent of myocardial damage.   .  >50 ng/L: Consistent with cardiac damage, increased clinical risk and  myocardial infarction. Serial measurements may help assess extent of   myocardial damage.   .   NOTE: Children less than 1 year old may have higher baseline troponin   levels and results should be interpreted in conjunction with the overall   clinical context.   .  NOTE: Troponin I testing is performed using a different   testing methodology at [facility] than at other   [facility] hospitals. Direct result comparisons should only   be made within the same method.       BNP   Date/Time Value Ref Range Status   10/02/2023 01:07 PM 97 0 - 99 pg/mL Final     HGBA1C   Date/Time Value Ref Range Status   11/17/2023 10:14 AM 6.6 see below % Final   08/11/2023 12:59 PM  9.9 % Final     Comment:          Diagnosis of Diabetes-Adults   Non-Diabetic: < or = 5.6%   Increased risk for developing diabetes: 5.7-6.4%   Diagnostic of diabetes: > or = 6.5%  .       Monitoring of Diabetes                Age (y)     Therapeutic Goal (%)   Adults:          >18           <7.0   Pediatrics:    13-18           <7.5                   7-12           <8.0                   0- 6            7.5-8.5   American Diabetes Association. Diabetes Care 33(S1), Jan 2010.     02/07/2023 09:07 AM 8.4 % Final     Comment:          Diagnosis of Diabetes-Adults   Non-Diabetic: < or = 5.6%   Increased risk for developing diabetes: 5.7-6.4%   Diagnostic of diabetes: > or = 6.5%  .       Monitoring of Diabetes                Age (y)     Therapeutic Goal (%)   Adults:          >18           <7.0   Pediatrics:    13-18           <7.5                   7-12           <8.0                   0- 6            7.5-8.5   American Diabetes Association. Diabetes Care 33(S1), Jan 2010.       VLDL   Date/Time Value Ref Range Status   08/11/2023 12:59 PM 57 0 - 40 mg/dL Final   08/10/2022 11:09 AM 33 0 - 40 mg/dL Final   08/19/2020 12:17 PM 62 0 - 40 mg/dL Final      Last I/O:  I/O last 3 completed shifts:  In: 1470 (12.6 mL/kg) [P.O.:1420; I.V.:50 (0.4 mL/kg)]  Out: 3200 (27.5 mL/kg) [Urine:3200 (0.8 mL/kg/hr)]  Weight: 116.5 kg     Past Cardiology Tests (Last 3 Years):  EKG:  ECG 12 Lead 11/17/2023    ECG 12 Lead 10/9/2023    Echo:  Transthoracic Echo (TTE) Complete 11/15/2023    Ejection Fractions:  EF   Date/Time Value Ref Range Status   11/14/2023 03:22 PM 63       Cath:  No results found for this or any previous visit from the past 1095 days.    Stress Test:  No results found for this or any previous visit from the past 1095 days.    Cardiac Imaging:  No results found for this or any previous visit from the past 1095 days.      Inpatient Medications:  Scheduled medications   Medication Dose Route Frequency    amLODIPine  10 mg  oral q PM    ceFAZolin in dextrose (iso-os)  2 g intravenous q12h    finasteride  5 mg oral Daily    heparin (porcine)  5,000 Units subcutaneous q8h    insulin glargine  10 Units subcutaneous Nightly    insulin lispro  0-5 Units subcutaneous TID with meals    melatonin  5 mg oral Nightly    metoprolol succinate XL  100 mg oral Daily    mupirocin   Topical BID    polyethylene glycol  17 g oral Daily    rosuvastatin  10 mg oral Nightly    tamsulosin  0.8 mg oral Nightly     PRN medications   Medication    acetaminophen    dextrose 10 % in water (D10W)    dextrose    glucagon    ondansetron    oxyCODONE    sennosides     Continuous Medications   Medication Dose Last Rate       Physical Exam:  General Appearance:     Alert, cooperative, no distress, appears stated age  Lungs:   Clear to auscultation bilaterally, respirations unlabored  Heart:     Regular rate and rhythm, S1 and S2 normal, no murmur, rub  or gallop.  Abdomen:  Soft,   Extremities: No edema     Assessment/Plan   Chester Verduzco is a 73 y.o. male with a PMHx of HTN, HLD, Dual chamber PPM implanted in 11/1996 for SSS (RV is passive) with most recent generator replacement in 11/2020 ([employer] - [employer] MRI), Type 2 DM, gout, and recent hospitalization (10/2-10/7) for Staph bacteremia.  Admitted with persistent staph bacteremia in the setting of RA lead vegetation and TV lead vegetation.     #CIED Infection  - Last Blood cultures positive 11/14/23 Growing MRSA  - Last Blood culture negative 11/16/23  - Other possible sources: None  - TTE- showed RV/TV/ vegetations <2cm  - Given persistent bacteremia with Abx and presencense of vegetations on imaging, there is a Class I indication for CIED removal for source control  - Device interrogation revealed that the pt is not device dependant  30% and AP 29% so likely will be able to avoid a temp PPM and maybe complete Abx course before re-implant  - Will plan for CIED (Right sided Dual Chamber PPM) extraction on  11/22/23 - Pending OR availability and CTS consult  - High risk features for extraction procedure: Lead age ~27 years, right sided device, Passive RV lead  - I have reviewed the extraction procedure in detail with the patient who understands the procedure will be done under GA with CT surgical backup. In addition they understand there is a 2-3% risk of vascular or cardiac tear requiring emergent cardiac surgery for life threatening bleeding.  - We spoke at length about the DNR status and pt is ok with reverting to full code for the procedure.   - 4u pRBC on hold prior to procedure, will need ICU bed after procedure.    Device Details: [employer] dcPPM  RA Lead - 1188; Implanted  Nov 1996  RV Lead - 1246 (Passive lead); Implanted  Nov 1996  ICD Coils: None  Abandanded Lead - None    Code Status:  DNR    This case was discussed with ETIENNE Bermudez and > 30 minutes was spent in the professional and overall care of this patient.    Ava Gonzalez MD  Cardiology Fellow PGY 4    I saw and evaluated the patient. I personally obtained the key and critical portions of the history and physical exam or was physically present for key and critical portions performed by the resident/fellow. I reviewed the resident/fellow's documentation and discussed the patient with the resident/fellow. I agree with the resident/fellow's medical decision making as documented in the note, and my edits (bold and italic) have been made to the document as needed.     Etienne Aguilar MD [facility]  Cardiac Electrophysiology    **Disclaimer: This note was dictated by speech recognition, and every effort has been made to prevent any error in transcription, however minor errors may be present**

## 2023-11-21 NOTE — CONSULTS
Reason for Consult: Evaluation for backup pacer lead removal     CARDIAC SURGERY CONSULT NOTE    HISTORY OF PRESENT ILLNESS  Chester Verduzco is a 73 y.o. male with a PMXH of HTN, HLD, Dual chamber PPM implanted in 11/1996 for SSS (RV is passive) with most recent generator replacement in 11/2020 ([facility]), Type 2 DM, gout, and recent hospitalization (10/2-10/7) for Staph bacteremia.  The patient presented to [facility] with generalized weakness and was admitted with NOHELIA. Blood cultures were positive for MSSA and the patient underwent a FILIPE on 11/16 which showed tricuspid valve vegetation as well as right atrial lead vegetation.  He was transferred for lead extraction at [facility].  The patient reports feeling fine without complaints of chest pain, shortness of breath, palpitations or lightheadedness.     ID is following patient and recommends IV antibiotics for 4 weeks after lead removal for MSSA bacteremia and TV endocarditis. EP plans for lead removal 11/22. Nephrology following patient for NOHELIA with etiology most likely ATN.     Cardiac surgery is consulted for evaluation for backup pacer lead removal.       Subjective   Past Medical History:   Diagnosis Date    Abnormal weight gain 10/27/2016    Abnormal weight gain    Achilles tendinitis, unspecified leg 08/16/2016    Achilles tendinitis    Acute upper respiratory infection, unspecified     Acute URI    Allergic contact dermatitis due to plants, except food 08/22/2013    Contact dermatitis due to poison ivy    BPH with obstruction/lower urinary tract symptoms     Cellulitis of right lower limb 07/30/2021    Cellulitis of right lower extremity without foot    Cough, unspecified 03/21/2016    Cough    Diabetes (CMS/[facility])     Encounter for screening for human immunodeficiency virus (HIV) 06/14/2016    Screening for HIV (human immunodeficiency virus)    Hordeolum externum unspecified eye, unspecified eyelid 08/14/2019    Stye    Hyperglycemia, unspecified  12/13/2017    Chronic hyperglycemia    Incisional hernia without obstruction or gangrene 06/15/2015    Recurrent ventral hernia    Knee joint pain     Right knee- bone on bone    MSSA bacteremia 10/2023    Muscle spasm of calf 08/16/2016    Muscle spasm of calf    Obstructive uropathy     Personal history of other diseases of the digestive system 06/30/2015    History of umbilical hernia    Personal history of other diseases of the digestive system 02/05/2016    History of ventral hernia    Personal history of other diseases of the respiratory system 12/23/2014    History of acute sinusitis    Personal history of other diseases of the respiratory system 01/28/2016    History of upper respiratory infection    Personal history of other infectious and parasitic diseases 12/02/2015    History of hepatitis B virus infection    Personal history of other infectious and parasitic diseases     History of hepatitis B virus infection    Personal history of other specified conditions 02/04/2015    History of nausea and vomiting    Personal history of other specified conditions 08/16/2016    History of edema    Personal history of other specified conditions 01/26/2015    History of jaundice    Personal history of pneumonia (recurrent) 01/29/2016    History of pneumonia    Personal history of urinary (tract) infections 02/19/2013    History of urinary tract infection    Prostatitis 03/21/2023    Pruritus, unspecified 02/10/2015    Pruritus    Sinoatrial node dysfunction (CMS/HCC)     Strain of muscle, fascia and tendon of the posterior muscle group at thigh level, right thigh, initial encounter 05/10/2016    Tear of right hamstring    Unspecified symptoms and signs involving the genitourinary system 12/20/2016    UTI symptoms     Past Surgical History:   Procedure Laterality Date    CARDIAC PACEMAKER PLACEMENT  08/22/2013    Pacemaker Permanent Placement    OTHER SURGICAL HISTORY  06/15/2015    Ventral Hernia Repair - Recurrent     OTHER SURGICAL HISTORY  09/16/2019    Prostate biopsy    PERIPHERALLY INSERTED CENTRAL CATHETER INSERTION      VARICOSE VEIN SURGERY  08/22/2013    Varicose Vein Ligation    VENTRAL HERNIA REPAIR  06/15/2015    Ventral Hernia Repair     Social History     Tobacco Use    Smoking status: Never     Passive exposure: Never    Smokeless tobacco: Never   Vaping Use    Vaping Use: Never used   Substance Use Topics    Alcohol use: Not Currently    Drug use: Never     No family history on file.    Bupropion and Keflex [cephalexin]    Prior to Admission medications    Medication Sig Start Date End Date Taking? Authorizing Provider   amLODIPine (Norvasc) 10 mg tablet Take 1 tablet (10 mg) by mouth once daily in the evening. 10/7/23  Yes JOSE Pastrana-CNP   dextrose 10 % in water, D10W, 10 % infusion Infuse 34.5 g/hr at 345 mL/hr into a venous catheter 1 time if needed (For blood glucose less than 70 mg/dL after 30 minutes of intervention.  Discontinue once blood glucose reaches 100 mg/dL.) for up to 1 dose. 11/16/23  Yes Zhen Panda MD   finasteride (Proscar) 5 mg tablet Take 1 tablet (5 mg) by mouth once daily.   Yes Historical Provider, MD   heparin sodium,porcine (heparin, porcine,) 5,000 unit/mL injection Inject 1 mL (5,000 Units) under the skin every 8 hours. Do not start before November 17, 2023. 11/17/23  Yes Zhen Panda MD   hydrALAZINE (Apresoline) 10 mg tablet Take 1 tablet (10 mg) by mouth 3 times a day. Hold for SBP <120   Yes Historical Provider, MD   HYDROcodone-acetaminophen (Norco) 5-325 mg tablet Take 1 tablet by mouth every 6 hours if needed for severe pain (7 - 10).   Yes Historical Provider, MD   lactobacillus acidophilus & bulgar (Lactinex) 1 million cell chewable tablet Chew 1 tablet once daily. For 28 days  12/10/23 Yes Historical Provider, MD   losartan (Cozaar) 100 mg tablet TAKE 1 TABLET BY MOUTH EVERY DAY  Patient taking differently: Take 1 tablet (100 mg) by mouth once daily.  3/8/23  Yes Jeffry De Leon MD   lubricating eye drops ophthalmic solution Administer 1 drop into the left eye every 8 hours if needed for dry eyes.   Yes Historical Provider, MD   menthol (Biofreeze, menthol,) 4 % gel Apply 1 Application topically every 6 hours if needed. To right knee   Yes Historical Provider, MD   metoprolol succinate XL (Toprol-XL) 100 mg 24 hr tablet TAKE 1 TABLET BY MOUTH EVERY DAY  Patient taking differently: Take 1 tablet (100 mg) by mouth once daily. 3/21/23  Yes Jeffry De Leon MD   ondansetron (Zofran) 4 mg tablet Take 1 tablet (4 mg) by mouth every 6 hours if needed for nausea or vomiting.   Yes Historical Provider, MD   rosuvastatin (Crestor) 10 mg tablet TAKE 1 TABLET BY MOUTH EVERYDAY AT BEDTIME 4/14/23  Yes Jeffry De Leon MD   tamsulosin (Flomax) 0.4 mg 24 hr capsule Take 2 capsules (0.8 mg) by mouth once daily at bedtime.   Yes Historical Provider, MD   allopurinol (Zyloprim) 100 mg tablet Take 1 tablet (100 mg) by mouth once daily.    Historical Provider, MD   insulin degludec (Tresiba FlexTouch U-100) 100 unit/mL (3 mL) injection Inject 10 Units under the skin once daily at bedtime. Take as directed per insulin instructions.    Historical Provider, MD   acetaminophen (Tylenol) 325 mg tablet Take 2 tablets (650 mg) by mouth every 4 hours if needed for mild pain (1 - 3).  11/16/23  Historical Provider, MD   allopurinol (Zyloprim) 100 mg tablet Take 1 tablet (100 mg) by mouth once daily.  Patient not taking: Reported on 11/13/2023 6/15/23 11/16/23  Jeffry De Leon MD   bisacodyl (C-Lax Laxative, bisacodyl,) 5 mg EC tablet Take 2 tablets (10 mg) by mouth once daily as needed for constipation. Do not crush, chew, or split. For no results from MOM and/or no BM in 3 days  11/16/23  Historical Provider, MD   bisacodyl (Dulcolax, bisacodyl,) 10 mg suppository Insert 1 suppository (10 mg) into the rectum once daily as needed for constipation.  11/16/23  Historical  Provider, MD   ferrous gluconate (Fergon) 324 (38 Fe) mg tablet Take 1 tablet (38 mg of iron) by mouth once daily with a meal. Do not start before October 8, 2023. 10/8/23 11/16/23  PROSPER Pastrana   fluticasone (Flonase) 50 mcg/actuation nasal spray Administer 1 spray into each nostril every 12 hours if needed for rhinitis. Shake gently. Before first use, prime pump. After use, clean tip and replace cap.  11/16/23  Historical Provider, MD   furosemide (Lasix) 40 mg tablet Take 1 tablet (40 mg) by mouth once daily. Do not start before October 9, 2023.  Patient not taking: Reported on 11/13/2023 10/9/23 11/16/23  PROSPER Pastrana   insulin degludec (Tresiba FlexTouch U-100) 100 unit/mL (3 mL) injection Inject 10 Units under the skin once daily at bedtime.  Patient not taking: Reported on 11/13/2023 8/16/23 11/16/23  Jeffry De Leon MD   insulin regular (HumuLIN R) 100 unit/mL injection Inject 0-5 Units under the skin 3 times a day with meals. Take as directed per insulin instructions.  Patient not taking: Reported on 11/7/2023 10/7/23 11/16/23  PROSPER Pastrana   mineral oil enema Insert 133 mL (1 enema) into the rectum once daily as needed for constipation. If no BM or results from MOM in 3 days  11/16/23  Historical Provider, MD   naltrexone (Depade) 50 mg tablet Take 1 tablet (50 mg) by mouth once daily. Pt has not started this medication yet  Patient not taking: Reported on 11/13/2023 10/7/23 11/16/23  PROSPER Pastrana   pantoprazole (ProtoNix) 40 mg EC tablet Take 1 tablet (40 mg) by mouth once daily in the morning. Take before meals. Do not crush, chew, or split. Do not start before October 8, 2023.  Patient not taking: Reported on 11/13/2023 10/8/23 11/16/23  PROSPER Pastrana   potassium chloride CR (Klor-Con M20) 20 mEq ER tablet Take 1 tablet (20 mEq) by mouth once daily. Do not crush or chew. Do not start before October 8, 2023. 10/8/23 11/16/23  Carmen Larsen,  "APRN-CNP   sennosides-docusate sodium (Kita-Colace) 8.6-50 mg tablet Take 2 tablets by mouth 2 times a day.  11/16/23  Historical Provider, MD   sodium chloride 0.9% (sodium chloride) flush Infuse 10 mL into a venous catheter 2 times a day. Every shift  11/16/23  Historical Provider, MD       Review of Systems  Review of Systems   Constitutional: Negative.    HENT: Negative.     Eyes: Negative.    Respiratory: Negative.     Cardiovascular: Negative.    Gastrointestinal: Negative.    Endocrine: Negative.    Genitourinary: Negative.    Musculoskeletal: Negative.    Skin: Negative.    Neurological: Negative.            Objective   /70 (BP Location: Left arm, Patient Position: Lying)   Pulse 73   Temp 36.8 °C (98.3 °F) (Temporal)   Resp 18   Ht 1.88 m (6' 2.02\")   Wt 116 kg (256 lb 2.8 oz)   SpO2 97%   BMI 32.88 kg/m²       Vitals:    11/21/23 0428   Weight: 116 kg (256 lb 2.8 oz)          Intake/Output Summary (Last 24 hours) at 11/21/2023 0956  Last data filed at 11/21/2023 0905  Gross per 24 hour   Intake 870 ml   Output 2600 ml   Net -1730 ml       Physical Exam  Physical Exam  Constitutional:       Appearance: He is obese. He is not ill-appearing.   HENT:      Head: Normocephalic.      Mouth/Throat:      Mouth: Mucous membranes are moist.   Cardiovascular:      Rate and Rhythm: Normal rate and regular rhythm.      Heart sounds: No murmur heard.  Pulmonary:      Effort: Pulmonary effort is normal.      Breath sounds: Normal breath sounds.   Abdominal:      General: Bowel sounds are normal. There is no distension.      Palpations: Abdomen is soft.      Tenderness: There is no abdominal tenderness.   Musculoskeletal:         General: Normal range of motion.      Cervical back: Neck supple.   Skin:     General: Skin is warm and dry.   Neurological:      General: No focal deficit present.      Mental Status: He is alert and oriented to person, place, and time.   Psychiatric:         Mood and Affect: Mood " normal.         Behavior: Behavior normal.         Medications  Scheduled medications  amLODIPine, 10 mg, oral, q PM  ceFAZolin in dextrose (iso-os), 2 g, intravenous, q12h  finasteride, 5 mg, oral, Daily  heparin (porcine), 5,000 Units, subcutaneous, q8h  insulin glargine, 5 Units, subcutaneous, Nightly  insulin lispro, 0-5 Units, subcutaneous, TID with meals  melatonin, 5 mg, oral, Nightly  metoprolol succinate XL, 100 mg, oral, Daily  polyethylene glycol, 17 g, oral, Daily  rosuvastatin, 10 mg, oral, Nightly  tamsulosin, 0.8 mg, oral, Nightly  traZODone, 50 mg, oral, Nightly    Continuous medications   PRN medications  PRN medications: acetaminophen, dextrose 10 % in water (D10W), dextrose, glucagon, ondansetron, oxyCODONE    Labs  Results for orders placed or performed during the hospital encounter of 11/16/23 (from the past 24 hour(s))   POCT GLUCOSE   Result Value Ref Range    POCT Glucose 143 (H) 74 - 99 mg/dL   POCT GLUCOSE   Result Value Ref Range    POCT Glucose 198 (H) 74 - 99 mg/dL   POCT GLUCOSE   Result Value Ref Range    POCT Glucose 159 (H) 74 - 99 mg/dL   POCT GLUCOSE   Result Value Ref Range    POCT Glucose 123 (H) 74 - 99 mg/dL       Cardiac Testing  LHC: no recent [facility]    Echo: 11/13/23  CONCLUSIONS:   1. Left ventricular systolic function is normal with a 55-60% estimated ejection fraction.   2. Spectral Doppler shows an impaired relaxation pattern of left ventricular diastolic filling.   3. There is mild mitral and tricuspid regurgitation.   4. The estimated pulmonary artery pressure is mildly elevated with the RVSP at 39.2 mmHg.   5. No definite valvular vegetations were visualized.          ASSESSMENT & PLAN    Risk stratification for backup lead removal:  Duration of PPM: Implanted in 1996 with generator replacement in 2020     Concern for infection of the PPM: FILIPE shows tricuspid valve vegetation as well as right atrial lead vegetations; recent MSSA bacteremia, last positive blood cultures  11/14    Ejection fraction: TTE on 11/13 with EF 55-60%    Previous cardiac surgery interventions: No cardiac surgery interventions prior.     Patient's age and overall frailty and functional status: 74 yo M; Patient previously very active prior to recent infection, lives alone and takes care of his house including mowing the lawn.       RECOMMENDATIONS  - Dr. Lowe aware of patient and reviewing case and available imaging & data.   - Cardiac surgeon, Dr. Lowe, will provide backup for pacer lead removal 11/22  - Patient educated with risks, benefits and alternatives and all questions answered.      Will continue to follow along   Thank you for the consultation.   Please page the cardiac surgery consult pager [phone] with any questions or changes in patient condition.      Kristine Garrison PA-C  Cardiac Surgery Consult [facility]  [facility] [place]  Cardiac Surgery Consult Pager [phone]     11/21/2023  9:56 AM

## 2023-11-21 NOTE — PROGRESS NOTES
" Subjective     Overnight events:  Pt was seen and examined at bedside. No acute overnight events. Pt complains of Constipation,Pt denies any chest pain, SOB, dyspnea, new cough, palpitations, nausea, vomiting, diarrhea, dizziness, changes in visions, headaches, or worsening lower extremity swelling. Pt is comfortable in bed and in no acute distress.    Vital signs:  Blood pressure 158/72, pulse 77, temperature 36.1 °C (97 °F), temperature source Temporal, resp. rate 18, height 1.88 m (6' 2.02\"), weight 116 kg (256 lb 2.8 oz), SpO2 95 %.    I/O last 3 completed shifts:  In: 1470 (12.6 mL/kg) [P.O.:1420; I.V.:50 (0.4 mL/kg)]  Out: 3200 (27.5 mL/kg) [Urine:3200 (0.8 mL/kg/hr)]  Weight: 116.5 kg     Physical Exam  GENERAL:  In no acute distress  NEUROLOGIC:  A and O x 3. Cranial nerves 2 through 12 grossly intact with no gait disturbances. Intact motor and sensory systems, with normal reflexes and coordination.    HEENT:  Pupils are equal, round, and reactive to light and accommodation. Extraocular motion intact.    MOUTH: MMM, no lesions observed  CARDIAC: S1 + S2, RRR, no M/R/G.    LUNGS: CTA BL.  No wheezes or coarse breath sounds. Symmetric respirations. No increased work of breathing.   ABDOMEN: Soft, non-tender to palpation. No organomegaly. Normal BS in 4Q, No rebound or guarding.  EXTREMITIES:  Moving x4 equally and spontaneously. Right knee with mild effusion present but no erythema or warmth. Chronic limited ROM but not worse than baseline. No BLE edema   SKIN: Clean, warm, dry, and intact with normal skin turgor.  PSYCH: Appropriate mood and behavior.     Relevant Results  Scheduled medications  amLODIPine, 10 mg, oral, q PM  ceFAZolin in dextrose (iso-os), 2 g, intravenous, q12h  finasteride, 5 mg, oral, Daily  heparin (porcine), 5,000 Units, subcutaneous, q8h  insulin glargine, 10 Units, subcutaneous, Nightly  insulin lispro, 0-5 Units, subcutaneous, TID with meals  melatonin, 5 mg, oral, " Nightly  metoprolol succinate XL, 100 mg, oral, Daily  polyethylene glycol, 17 g, oral, Daily  rosuvastatin, 10 mg, oral, Nightly  tamsulosin, 0.8 mg, oral, Nightly  traZODone, 50 mg, oral, Nightly      Continuous medications     PRN medications  PRN medications: acetaminophen, dextrose 10 % in water (D10W), dextrose, glucagon, ondansetron, oxyCODONE, sennosides      Labs:  Last CBC:  Lab Results   Component Value Date    WBC 7.1 11/20/2023    HGB 7.7 (L) 11/20/2023    HCT 24.1 (L) 11/20/2023    MCV 90 11/20/2023     (L) 11/20/2023       Last RFP:  Lab Results   Component Value Date    GLUCOSE 75 11/20/2023    CALCIUM 8.0 (L) 11/20/2023     11/20/2023    K 4.0 11/20/2023    CO2 21 11/20/2023     11/20/2023    BUN 38 (H) 11/20/2023    CREATININE 4.11 (H) 11/20/2023       Last LFTs:  Lab Results   Component Value Date    ALT <3 (L) 11/17/2023    AST 11 11/17/2023    ALKPHOS 94 11/17/2023    BILITOT 0.3 11/17/2023       Last coags:  Lab Results   Component Value Date    INR 1.2 (H) 11/17/2023    APTT 36 11/17/2023       Micro/culture data:  Susceptibility data from last 90 days.  Collected Specimen Info Organism Clindamycin Erythromycin Oxacillin Tetracycline Trimethoprim/Sulfamethoxazole Vancomycin   11/14/23 Swab from Anterior Nares Methicillin Resistant Staphylococcus aureus (MRSA)         11/14/23 Blood culture from Peripheral Venipuncture Methicillin Susceptible Staphylococcus aureus (MSSA) S S S S S S         Imaging:  ECG 12 Lead  Sinus rhythm with 1st degree AV block  Low voltage QRS  Prolonged QT  Abnormal ECG  When compared with ECG of 02-OCT-2023 12:09,  No significant change was found  Confirmed by Benito Pedro ([id number]) on 11/17/2023 10:57:31 PM        Assessment/Plan     Assessment: Mr. Chester Verduzco is a 73 year old male with PMHX HTN, HLD, Dual chamber PPM implanted in 11/1996 for SSS (RV is passive) with most recent generator replacement in 11/2020 ([facility] - [employer]), Type 2 DM,  gout, and recent hospitalization (10/2-10/7) for Staph bacteremia, diarrhea, NOHELIA , and hyponatremia 2/2 hypovolemia who presented to [facility] ED from a SNF on 11/13 for NOHELIA on labs from the SNF and oliguria. Blood cultures were positive for MSSA and previously had been positive 10/8 and 11/10 for MSSA. Blood cultures with NGTD as of 11/16.  FILIPE 11/16 showed tricuspid valve vegetation as well as right atrial lead vegetation. Patient has been afebrile without leukocytosis and hemodynamically stable. Plan for lead extraction on 11/21.  Patient on IV antibiotics with stop 4 weeks after lead removal (12/20).     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 Renal U/S did not show any hydronephrosis.  - Baseline prior to October 2023 hospitalization ~1.0-1.1.   - Elevated to 5.43 on arrival to [facility] on 11/13, down trending  - Initial urine electrolytes showed Fena 2.5% indicating intrinsic etiology   - Normal C3 and C4  - Follow up  ANCA, SPEP/UPEP with IF, free kappa robert  - Nephrology following  - Rojo replaced (11/17) , strict I&Os, No indication for HD at this time  - Daily RFP  -Replete lytes as needed    #Type 2 DM  - Checking A1c  - On Lantus 10 units QHS and cover with SSI, will  switch to Lantus 5 while NPO  - hypoglycemia protocol    #HTN  #HLD  - Holding home losartan and lasix in setting of NOHELIA  - Continue Amlodipine 10, metop succinate 100 daily, and rosuvastatin 10 daily      #BPH  - Was initially planned for outpatient robotic prostatectomy end of November that has since been canceled and rescheduled for December  - Continue with Flomax 0.8 mg QHS and finasteride 5 md daily       F: As needed  E: AS needed  N: Currently NPO at midnight   A:  Darrel DIAMOND as of 11/17  DVT ppx: Heparin SQ  GI: N/A  Code Status: DNR, ok for elective intubation   NOK: Charlenelo Pardo (friend) [phone]      Active Problems:    Endocarditis         Gonzalo Henley MD

## 2023-11-22 ENCOUNTER — LAB REQUISITION (OUTPATIENT)
Dept: LAB | Facility: HOSPITAL | Age: 73
End: 2023-11-22
Payer: COMMERCIAL

## 2023-11-22 ENCOUNTER — ANESTHESIA (OUTPATIENT)
Dept: CARDIOLOGY | Facility: HOSPITAL | Age: 73
DRG: 260 | End: 2023-11-22
Payer: MEDICARE

## 2023-11-22 ENCOUNTER — HOSPITAL ENCOUNTER (OUTPATIENT)
Dept: OPERATING ROOM | Facility: HOSPITAL | Age: 73
Discharge: HOME | End: 2023-11-22

## 2023-11-22 PROBLEM — Z98.890 HISTORY OF GENERAL ANESTHESIA: Status: ACTIVE | Noted: 2023-11-22

## 2023-11-22 LAB
ALBUMIN SERPL BCP-MCNC: 3 G/DL (ref 3.4–5)
ALBUMIN: 2.4 G/DL (ref 3.4–5)
ALPHA 1 GLOBULIN: 0.4 G/DL (ref 0.2–0.6)
ALPHA 2 GLOBULIN: 0.9 G/DL (ref 0.4–1.1)
ANION GAP BLDA CALCULATED.4IONS-SCNC: 9 MMO/L (ref 10–25)
ANION GAP SERPL CALC-SCNC: 15 MMOL/L (ref 10–20)
BACTERIA BLD CULT: NORMAL
BASE EXCESS BLDA CALC-SCNC: -3.4 MMOL/L (ref -2–3)
BASOPHILS # BLD AUTO: 0.02 X10*3/UL (ref 0–0.1)
BASOPHILS NFR BLD AUTO: 0.2 %
BETA GLOBULIN: 0.7 G/DL (ref 0.5–1.2)
BODY TEMPERATURE: 37 DEGREES CELSIUS
BUN SERPL-MCNC: 35 MG/DL (ref 6–23)
CA-I BLDA-SCNC: 1.26 MMOL/L (ref 1.1–1.33)
CALCIUM SERPL-MCNC: 8.3 MG/DL (ref 8.6–10.6)
CHLORIDE BLDA-SCNC: 108 MMOL/L (ref 98–107)
CHLORIDE SERPL-SCNC: 108 MMOL/L (ref 98–107)
CO2 SERPL-SCNC: 19 MMOL/L (ref 21–32)
COHGB MFR BLDA: 1.8 %
CREAT SERPL-MCNC: 3.59 MG/DL (ref 0.5–1.3)
DO-HGB MFR BLDA: 0.4 % (ref 0–5)
EOSINOPHIL # BLD AUTO: 0.04 X10*3/UL (ref 0–0.4)
EOSINOPHIL NFR BLD AUTO: 0.4 %
ERYTHROCYTE [DISTWIDTH] IN BLOOD BY AUTOMATED COUNT: 15.2 % (ref 11.5–14.5)
GAMMA GLOBULIN: 1.2 G/DL (ref 0.5–1.4)
GFR SERPL CREATININE-BSD FRML MDRD: 17 ML/MIN/1.73M*2
GLUCOSE BLD MANUAL STRIP-MCNC: 117 MG/DL (ref 74–99)
GLUCOSE BLD MANUAL STRIP-MCNC: 134 MG/DL (ref 74–99)
GLUCOSE BLD MANUAL STRIP-MCNC: 147 MG/DL (ref 74–99)
GLUCOSE BLD MANUAL STRIP-MCNC: 98 MG/DL (ref 74–99)
GLUCOSE BLDA-MCNC: 152 MG/DL (ref 74–99)
GLUCOSE SERPL-MCNC: 156 MG/DL (ref 74–99)
HCO3 BLDA-SCNC: 22.1 MMOL/L (ref 22–26)
HCT VFR BLD AUTO: 24.1 % (ref 41–52)
HCT VFR BLD EST: 25 % (ref 41–52)
HGB BLD-MCNC: 7.8 G/DL (ref 13.5–17.5)
HGB BLDA-MCNC: 8.2 G/DL (ref 13.5–17.5)
HGB BLDA-MCNC: 8.2 G/DL (ref 13.5–17.5)
IMM GRANULOCYTES # BLD AUTO: 0.11 X10*3/UL (ref 0–0.5)
IMM GRANULOCYTES NFR BLD AUTO: 1.1 % (ref 0–0.9)
IMMUNOFIXATION COMMENT: ABNORMAL
INHALED O2 CONCENTRATION: 50 %
LACTATE BLDA-SCNC: 0.7 MMOL/L (ref 0.4–2)
LYMPHOCYTES # BLD AUTO: 0.59 X10*3/UL (ref 0.8–3)
LYMPHOCYTES NFR BLD AUTO: 6.2 %
MAGNESIUM SERPL-MCNC: 1.9 MG/DL (ref 1.6–2.4)
MCH RBC QN AUTO: 29.1 PG (ref 26–34)
MCHC RBC AUTO-ENTMCNC: 32.4 G/DL (ref 32–36)
MCV RBC AUTO: 90 FL (ref 80–100)
METHGB MFR BLDA: 1.3 % (ref 0–1.5)
MONOCYTES # BLD AUTO: 0.45 X10*3/UL (ref 0.05–0.8)
MONOCYTES NFR BLD AUTO: 4.7 %
NEUTROPHILS # BLD AUTO: 8.38 X10*3/UL (ref 1.6–5.5)
NEUTROPHILS NFR BLD AUTO: 87.4 %
NRBC BLD-RTO: 0 /100 WBCS (ref 0–0)
OXYHGB MFR BLDA: 96.6 % (ref 94–98)
OXYHGB MFR BLDA: 96.6 % (ref 94–98)
PATH REVIEW - SERUM IMMUNOFIXATION: ABNORMAL
PATH REVIEW-SERUM PROTEIN ELECTROPHORESIS: ABNORMAL
PCO2 BLDA: 41 MM HG (ref 38–42)
PH BLDA: 7.34 PH (ref 7.38–7.42)
PHOSPHATE SERPL-MCNC: 5.1 MG/DL (ref 2.5–4.9)
PLATELET # BLD AUTO: 108 X10*3/UL (ref 150–450)
PO2 BLDA: 121 MM HG (ref 85–95)
POTASSIUM BLDA-SCNC: 4.5 MMOL/L (ref 3.5–5.3)
POTASSIUM SERPL-SCNC: 4.4 MMOL/L (ref 3.5–5.3)
PROTEIN ELECTROPHORESIS COMMENT: ABNORMAL
RBC # BLD AUTO: 2.68 X10*6/UL (ref 4.5–5.9)
SAO2 % BLDA: 100 % (ref 94–100)
SODIUM BLDA-SCNC: 135 MMOL/L (ref 136–145)
SODIUM SERPL-SCNC: 138 MMOL/L (ref 136–145)
WBC # BLD AUTO: 9.6 X10*3/UL (ref 4.4–11.3)

## 2023-11-22 PROCEDURE — 82435 ASSAY OF BLOOD CHLORIDE: CPT

## 2023-11-22 PROCEDURE — 3600000012 HC PERFUSION TIME - EACH INCREMENTAL 1 MINUTE: Performed by: STUDENT IN AN ORGANIZED HEALTH CARE EDUCATION/TRAINING PROGRAM

## 2023-11-22 PROCEDURE — 87102 FUNGUS ISOLATION CULTURE: CPT

## 2023-11-22 PROCEDURE — C1769 GUIDE WIRE: HCPCS | Performed by: STUDENT IN AN ORGANIZED HEALTH CARE EDUCATION/TRAINING PROGRAM

## 2023-11-22 PROCEDURE — 84132 ASSAY OF SERUM POTASSIUM: CPT

## 2023-11-22 PROCEDURE — 87206 SMEAR FLUORESCENT/ACID STAI: CPT | Performed by: STUDENT IN AN ORGANIZED HEALTH CARE EDUCATION/TRAINING PROGRAM

## 2023-11-22 PROCEDURE — 87186 SC STD MICRODIL/AGAR DIL: CPT | Performed by: STUDENT IN AN ORGANIZED HEALTH CARE EDUCATION/TRAINING PROGRAM

## 2023-11-22 PROCEDURE — 87116 MYCOBACTERIA CULTURE: CPT

## 2023-11-22 PROCEDURE — 87075 CULTR BACTERIA EXCEPT BLOOD: CPT | Performed by: STUDENT IN AN ORGANIZED HEALTH CARE EDUCATION/TRAINING PROGRAM

## 2023-11-22 PROCEDURE — A33235 PR REMV TRANSVEN PACER ELECTRODE,2 LEAD

## 2023-11-22 PROCEDURE — 2500000002 HC RX 250 W HCPCS SELF ADMINISTERED DRUGS (ALT 637 FOR MEDICARE OP, ALT 636 FOR OP/ED)

## 2023-11-22 PROCEDURE — 99233 SBSQ HOSP IP/OBS HIGH 50: CPT | Performed by: INTERNAL MEDICINE

## 2023-11-22 PROCEDURE — 2500000004 HC RX 250 GENERAL PHARMACY W/ HCPCS (ALT 636 FOR OP/ED)

## 2023-11-22 PROCEDURE — 33235 REMOVAL PACEMAKER ELECTRODE: CPT | Performed by: STUDENT IN AN ORGANIZED HEALTH CARE EDUCATION/TRAINING PROGRAM

## 2023-11-22 PROCEDURE — C1894 INTRO/SHEATH, NON-LASER: HCPCS | Performed by: STUDENT IN AN ORGANIZED HEALTH CARE EDUCATION/TRAINING PROGRAM

## 2023-11-22 PROCEDURE — 37799 UNLISTED PX VASCULAR SURGERY: CPT

## 2023-11-22 PROCEDURE — 3700000002 HC GENERAL ANESTHESIA TIME - EACH INCREMENTAL 1 MINUTE: Performed by: STUDENT IN AN ORGANIZED HEALTH CARE EDUCATION/TRAINING PROGRAM

## 2023-11-22 PROCEDURE — 3700000001 HC GENERAL ANESTHESIA TIME - INITIAL BASE CHARGE: Performed by: STUDENT IN AN ORGANIZED HEALTH CARE EDUCATION/TRAINING PROGRAM

## 2023-11-22 PROCEDURE — C1730 CATH, EP, 19 OR FEW ELECT: HCPCS | Performed by: STUDENT IN AN ORGANIZED HEALTH CARE EDUCATION/TRAINING PROGRAM

## 2023-11-22 PROCEDURE — A33235 PR REMV TRANSVEN PACER ELECTRODE,2 LEAD: Performed by: ANESTHESIOLOGY

## 2023-11-22 PROCEDURE — 2500000004 HC RX 250 GENERAL PHARMACY W/ HCPCS (ALT 636 FOR OP/ED): Performed by: STUDENT IN AN ORGANIZED HEALTH CARE EDUCATION/TRAINING PROGRAM

## 2023-11-22 PROCEDURE — 2720000007 HC OR 272 NO HCPCS: Performed by: STUDENT IN AN ORGANIZED HEALTH CARE EDUCATION/TRAINING PROGRAM

## 2023-11-22 PROCEDURE — 36620 INSERTION CATHETER ARTERY: CPT | Performed by: ANESTHESIOLOGY

## 2023-11-22 PROCEDURE — C1893 INTRO/SHEATH, FIXED,NON-PEEL: HCPCS | Performed by: STUDENT IN AN ORGANIZED HEALTH CARE EDUCATION/TRAINING PROGRAM

## 2023-11-22 PROCEDURE — 87206 SMEAR FLUORESCENT/ACID STAI: CPT

## 2023-11-22 PROCEDURE — 33233 REMOVAL OF PM GENERATOR: CPT | Performed by: STUDENT IN AN ORGANIZED HEALTH CARE EDUCATION/TRAINING PROGRAM

## 2023-11-22 PROCEDURE — 3600000011 HC PERFUSION TIME - INITIAL BASE CHARGE: Performed by: STUDENT IN AN ORGANIZED HEALTH CARE EDUCATION/TRAINING PROGRAM

## 2023-11-22 PROCEDURE — 83050 HGB METHEMOGLOBIN QUAN: CPT

## 2023-11-22 PROCEDURE — 85018 HEMOGLOBIN: CPT

## 2023-11-22 PROCEDURE — 85025 COMPLETE CBC W/AUTO DIFF WBC: CPT

## 2023-11-22 PROCEDURE — 2500000005 HC RX 250 GENERAL PHARMACY W/O HCPCS

## 2023-11-22 PROCEDURE — 87102 FUNGUS ISOLATION CULTURE: CPT | Mod: OUT | Performed by: INTERNAL MEDICINE

## 2023-11-22 PROCEDURE — 82947 ASSAY GLUCOSE BLOOD QUANT: CPT

## 2023-11-22 PROCEDURE — C1887 CATHETER, GUIDING: HCPCS | Performed by: STUDENT IN AN ORGANIZED HEALTH CARE EDUCATION/TRAINING PROGRAM

## 2023-11-22 PROCEDURE — P9045 ALBUMIN (HUMAN), 5%, 250 ML: HCPCS | Mod: JZ

## 2023-11-22 PROCEDURE — 2500000001 HC RX 250 WO HCPCS SELF ADMINISTERED DRUGS (ALT 637 FOR MEDICARE OP)

## 2023-11-22 PROCEDURE — 36556 INSERT NON-TUNNEL CV CATH: CPT | Performed by: ANESTHESIOLOGY

## 2023-11-22 PROCEDURE — C1895 LEAD, AICD, ENDO DUAL COIL: HCPCS | Performed by: STUDENT IN AN ORGANIZED HEALTH CARE EDUCATION/TRAINING PROGRAM

## 2023-11-22 PROCEDURE — C1760 CLOSURE DEV, VASC: HCPCS | Performed by: STUDENT IN AN ORGANIZED HEALTH CARE EDUCATION/TRAINING PROGRAM

## 2023-11-22 PROCEDURE — 87116 MYCOBACTERIA CULTURE: CPT | Mod: OUT | Performed by: INTERNAL MEDICINE

## 2023-11-22 PROCEDURE — 87015 SPECIMEN INFECT AGNT CONCNTJ: CPT

## 2023-11-22 PROCEDURE — 83735 ASSAY OF MAGNESIUM: CPT

## 2023-11-22 PROCEDURE — 99100 ANES PT EXTEME AGE<1 YR&>70: CPT | Performed by: ANESTHESIOLOGY

## 2023-11-22 PROCEDURE — 2500000004 HC RX 250 GENERAL PHARMACY W/ HCPCS (ALT 636 FOR OP/ED): Mod: JZ

## 2023-11-22 PROCEDURE — C2628 CATHETER, OCCLUSION: HCPCS | Performed by: STUDENT IN AN ORGANIZED HEALTH CARE EDUCATION/TRAINING PROGRAM

## 2023-11-22 PROCEDURE — C1731 CATH, EP, 20 OR MORE ELEC: HCPCS | Performed by: STUDENT IN AN ORGANIZED HEALTH CARE EDUCATION/TRAINING PROGRAM

## 2023-11-22 PROCEDURE — 2780000003 HC OR 278 NO HCPCS: Performed by: STUDENT IN AN ORGANIZED HEALTH CARE EDUCATION/TRAINING PROGRAM

## 2023-11-22 PROCEDURE — 2020000001 HC ICU ROOM DAILY

## 2023-11-22 PROCEDURE — 87102 FUNGUS ISOLATION CULTURE: CPT | Performed by: STUDENT IN AN ORGANIZED HEALTH CARE EDUCATION/TRAINING PROGRAM

## 2023-11-22 PROCEDURE — 96372 THER/PROPH/DIAG INJ SC/IM: CPT

## 2023-11-22 RX ORDER — ROCURONIUM BROMIDE 10 MG/ML
INJECTION, SOLUTION INTRAVENOUS AS NEEDED
Status: DISCONTINUED | OUTPATIENT
Start: 2023-11-22 | End: 2023-11-22

## 2023-11-22 RX ORDER — CEFAZOLIN 1 G/1
INJECTION, POWDER, FOR SOLUTION INTRAVENOUS AS NEEDED
Status: DISCONTINUED | OUTPATIENT
Start: 2023-11-22 | End: 2023-11-22

## 2023-11-22 RX ORDER — MIDAZOLAM HYDROCHLORIDE 1 MG/ML
INJECTION INTRAMUSCULAR; INTRAVENOUS AS NEEDED
Status: DISCONTINUED | OUTPATIENT
Start: 2023-11-22 | End: 2023-11-22

## 2023-11-22 RX ORDER — NOREPINEPHRINE BITARTRATE 0.03 MG/ML
INJECTION, SOLUTION INTRAVENOUS CONTINUOUS PRN
Status: DISCONTINUED | OUTPATIENT
Start: 2023-11-22 | End: 2023-11-22

## 2023-11-22 RX ORDER — ALBUMIN HUMAN 50 G/1000ML
SOLUTION INTRAVENOUS AS NEEDED
Status: DISCONTINUED | OUTPATIENT
Start: 2023-11-22 | End: 2023-11-22

## 2023-11-22 RX ORDER — FENTANYL CITRATE 50 UG/ML
INJECTION, SOLUTION INTRAMUSCULAR; INTRAVENOUS AS NEEDED
Status: DISCONTINUED | OUTPATIENT
Start: 2023-11-22 | End: 2023-11-22

## 2023-11-22 RX ORDER — BUPIVACAINE HYDROCHLORIDE 2.5 MG/ML
INJECTION, SOLUTION EPIDURAL; INFILTRATION; INTRACAUDAL AS NEEDED
Status: DISCONTINUED | OUTPATIENT
Start: 2023-11-22 | End: 2023-11-22 | Stop reason: HOSPADM

## 2023-11-22 RX ORDER — VANCOMYCIN HYDROCHLORIDE 1 G/20ML
INJECTION, POWDER, LYOPHILIZED, FOR SOLUTION INTRAVENOUS AS NEEDED
Status: DISCONTINUED | OUTPATIENT
Start: 2023-11-22 | End: 2023-11-22

## 2023-11-22 RX ORDER — ONDANSETRON HYDROCHLORIDE 2 MG/ML
INJECTION, SOLUTION INTRAVENOUS AS NEEDED
Status: DISCONTINUED | OUTPATIENT
Start: 2023-11-22 | End: 2023-11-22

## 2023-11-22 RX ORDER — PROPOFOL 10 MG/ML
INJECTION, EMULSION INTRAVENOUS AS NEEDED
Status: DISCONTINUED | OUTPATIENT
Start: 2023-11-22 | End: 2023-11-22

## 2023-11-22 RX ORDER — PHENYLEPHRINE 10 MG/250 ML(40 MCG/ML)IN 0.9 % SOD.CHLORIDE INTRAVENOUS
CONTINUOUS PRN
Status: DISCONTINUED | OUTPATIENT
Start: 2023-11-22 | End: 2023-11-22

## 2023-11-22 RX ORDER — LIDOCAINE HYDROCHLORIDE 20 MG/ML
INJECTION, SOLUTION INFILTRATION; PERINEURAL AS NEEDED
Status: DISCONTINUED | OUTPATIENT
Start: 2023-11-22 | End: 2023-11-22

## 2023-11-22 RX ADMIN — Medication 16 MCG: at 10:55

## 2023-11-22 RX ADMIN — Medication 16 MCG: at 13:39

## 2023-11-22 RX ADMIN — ROCURONIUM BROMIDE 20 MG: 50 INJECTION, SOLUTION INTRAVENOUS at 10:14

## 2023-11-22 RX ADMIN — Medication 8 MCG: at 10:28

## 2023-11-22 RX ADMIN — VANCOMYCIN HYDROCHLORIDE 750 MG: 1 INJECTION, POWDER, LYOPHILIZED, FOR SOLUTION INTRAVENOUS at 10:01

## 2023-11-22 RX ADMIN — ROCURONIUM BROMIDE 50 MG: 50 INJECTION, SOLUTION INTRAVENOUS at 09:14

## 2023-11-22 RX ADMIN — ROCURONIUM BROMIDE 10 MG: 50 INJECTION, SOLUTION INTRAVENOUS at 11:28

## 2023-11-22 RX ADMIN — ACETAMINOPHEN 650 MG: 325 TABLET ORAL at 19:42

## 2023-11-22 RX ADMIN — Medication 8 MCG: at 10:03

## 2023-11-22 RX ADMIN — CEFAZOLIN SODIUM 2 G: 2 INJECTION, SOLUTION INTRAVENOUS at 03:28

## 2023-11-22 RX ADMIN — CEFAZOLIN SODIUM 2 G: 2 INJECTION, SOLUTION INTRAVENOUS at 19:25

## 2023-11-22 RX ADMIN — Medication 0.2 MCG/KG/MIN: at 11:09

## 2023-11-22 RX ADMIN — ALBUMIN (HUMAN) 250 ML: 12.5 SOLUTION INTRAVENOUS at 14:01

## 2023-11-22 RX ADMIN — CEFAZOLIN 2 G: 330 INJECTION, POWDER, FOR SOLUTION INTRAMUSCULAR; INTRAVENOUS at 10:00

## 2023-11-22 RX ADMIN — SODIUM CHLORIDE, SODIUM LACTATE, POTASSIUM CHLORIDE, AND CALCIUM CHLORIDE: 600; 310; 30; 20 INJECTION, SOLUTION INTRAVENOUS at 09:14

## 2023-11-22 RX ADMIN — Medication 5 MG: at 20:59

## 2023-11-22 RX ADMIN — Medication 16 MCG: at 10:42

## 2023-11-22 RX ADMIN — ROCURONIUM BROMIDE 10 MG: 50 INJECTION, SOLUTION INTRAVENOUS at 12:10

## 2023-11-22 RX ADMIN — OXYCODONE HYDROCHLORIDE 5 MG: 5 TABLET ORAL at 16:56

## 2023-11-22 RX ADMIN — ONDANSETRON 4 MG: 2 INJECTION, SOLUTION INTRAMUSCULAR; INTRAVENOUS at 13:53

## 2023-11-22 RX ADMIN — MIDAZOLAM HYDROCHLORIDE 2 MG: 1 INJECTION, SOLUTION INTRAMUSCULAR; INTRAVENOUS at 09:14

## 2023-11-22 RX ADMIN — ROCURONIUM BROMIDE 20 MG: 50 INJECTION, SOLUTION INTRAVENOUS at 10:44

## 2023-11-22 RX ADMIN — FENTANYL CITRATE 100 MCG: 50 INJECTION, SOLUTION INTRAMUSCULAR; INTRAVENOUS at 09:14

## 2023-11-22 RX ADMIN — PROPOFOL 150 MG: 10 INJECTION, EMULSION INTRAVENOUS at 09:14

## 2023-11-22 RX ADMIN — SUGAMMADEX 250 MG: 100 INJECTION, SOLUTION INTRAVENOUS at 14:05

## 2023-11-22 RX ADMIN — AMLODIPINE BESYLATE 10 MG: 10 TABLET ORAL at 20:59

## 2023-11-22 RX ADMIN — OXYCODONE HYDROCHLORIDE 5 MG: 5 TABLET ORAL at 03:30

## 2023-11-22 RX ADMIN — INSULIN GLARGINE 5 UNITS: 100 INJECTION, SOLUTION SUBCUTANEOUS at 20:59

## 2023-11-22 RX ADMIN — Medication 8 MCG: at 10:22

## 2023-11-22 RX ADMIN — HEPARIN SODIUM 5000 UNITS: 5000 INJECTION INTRAVENOUS; SUBCUTANEOUS at 03:24

## 2023-11-22 RX ADMIN — LIDOCAINE HYDROCHLORIDE 100 MG: 20 INJECTION, SOLUTION INFILTRATION; PERINEURAL at 09:14

## 2023-11-22 RX ADMIN — Medication 0.04 MCG/KG/MIN: at 10:42

## 2023-11-22 RX ADMIN — Medication 16 MCG: at 13:05

## 2023-11-22 RX ADMIN — TAMSULOSIN HYDROCHLORIDE 0.8 MG: 0.4 CAPSULE ORAL at 20:59

## 2023-11-22 RX ADMIN — Medication 16 MCG: at 10:33

## 2023-11-22 RX ADMIN — ROSUVASTATIN CALCIUM 10 MG: 10 TABLET, FILM COATED ORAL at 20:59

## 2023-11-22 SDOH — HEALTH STABILITY: MENTAL HEALTH: CURRENT SMOKER: 0

## 2023-11-22 ASSESSMENT — PAIN - FUNCTIONAL ASSESSMENT: PAIN_FUNCTIONAL_ASSESSMENT: 0-10

## 2023-11-22 ASSESSMENT — ENCOUNTER SYMPTOMS
VOMITING: 0
NAUSEA: 0
SHORTNESS OF BREATH: 0
FEVER: 0
CHILLS: 0
BACK PAIN: 1
ABDOMINAL PAIN: 0
HEADACHES: 0

## 2023-11-22 ASSESSMENT — PAIN SCALES - GENERAL
PAINLEVEL_OUTOF10: 0 - NO PAIN
PAINLEVEL_OUTOF10: 7
PAINLEVEL_OUTOF10: 10 - WORST POSSIBLE PAIN

## 2023-11-22 NOTE — H&P
History Of Present Illness  Chester Verduzco is a 73 y.o. male PMH including HTN, HLD, Dual chamber PPM implanted in 11/1996 for SSS (RV is passive) with most recent generator replacement in 11/2020 ([employer] - Assurity MRI), Type 2 DM, and gout who presents to CICU s/p pacemaker lead extraction (11/22/2023) for MSSA bacteremia and TV endocarditis.    Of note, he had recent hospitalization (10/2-10/7) for Staph bacteremia, diarrhea, NOHELIA , and hyponatremia 2/2 hypovolemia. He then presented to [facility] ED from a SNF on 11/13 for NOHELIA on labs and oliguria. Blood cultures were positive for MSSA and previously had been positive 10/8 and 11/10 for MSSA. Blood cultures with NGTD as of 11/16.  FILIPE 11/16 showed tricuspid valve vegetation as well as right atrial lead vegetation. Patient has been afebrile without leukocytosis and hemodynamically stable.  Patient on IV antibiotics with stop 4 weeks after lead removal (12/20).    He underwent successful lead extraction 11/22/2023 and was transferred to the CICU for further management.    Last objective data per chart review:  - Vitals:   T 35.7, HR 75, BP 18, /73, SpO2 99 on RA  - Labs:   CBC: WBC 6.8, Hgb 7.6, plt 104   BMP: Na 136, K 4.2, Cl 106, HCO3 20, BUN 37, Cr 3.72, glu 168   LFT: Ca 8.2, Phos 4.1, Mg 1.93   Heme: PT 13.3, INR 1.2, aPTT 38   BCx: 11/14 MSSA, BCX 11/16 NGTD, final    Cardiac Hx:  - Echocardiography FILIPE 11/16/2023:  CONCLUSIONS:   1. Spherical 0.9 cm x 0.9 cm vegetation on the posterior leaflet of the tricuspid valve.   2. 1.25 cm x 0.5 cm linear vegetation on the right atrial lead (cardiovascular implantable electronic device infection).   3. Left ventricular systolic function is normal.   4. No aortic valve vegetation visualized.   5. Mild aortic valve regurgitation.   6. No left atrial thrombus..     Past Medical History  Past Medical History:   Diagnosis Date    Abnormal weight gain 10/27/2016    Abnormal weight gain    Achilles tendinitis,  unspecified leg 08/16/2016    Achilles tendinitis    Acute upper respiratory infection, unspecified     Acute URI    Allergic contact dermatitis due to plants, except food 08/22/2013    Contact dermatitis due to poison ivy    BPH with obstruction/lower urinary tract symptoms     Cellulitis of right lower limb 07/30/2021    Cellulitis of right lower extremity without foot    Cough, unspecified 03/21/2016    Cough    Diabetes (CMS/HCC)     Encounter for screening for human immunodeficiency virus (HIV) 06/14/2016    Screening for HIV (human immunodeficiency virus)    Hordeolum externum unspecified eye, unspecified eyelid 08/14/2019    Stye    Hyperglycemia, unspecified 12/13/2017    Chronic hyperglycemia    Incisional hernia without obstruction or gangrene 06/15/2015    Recurrent ventral hernia    Knee joint pain     Right knee- bone on bone    MSSA bacteremia 10/2023    Muscle spasm of calf 08/16/2016    Muscle spasm of calf    Obstructive uropathy     Personal history of other diseases of the digestive system 06/30/2015    History of umbilical hernia    Personal history of other diseases of the digestive system 02/05/2016    History of ventral hernia    Personal history of other diseases of the respiratory system 12/23/2014    History of acute sinusitis    Personal history of other diseases of the respiratory system 01/28/2016    History of upper respiratory infection    Personal history of other infectious and parasitic diseases 12/02/2015    History of hepatitis B virus infection    Personal history of other infectious and parasitic diseases     History of hepatitis B virus infection    Personal history of other specified conditions 02/04/2015    History of nausea and vomiting    Personal history of other specified conditions 08/16/2016    History of edema    Personal history of other specified conditions 01/26/2015    History of jaundice    Personal history of pneumonia (recurrent) 01/29/2016    History of  "pneumonia    Personal history of urinary (tract) infections 02/19/2013    History of urinary tract infection    Prostatitis 03/21/2023    Pruritus, unspecified 02/10/2015    Pruritus    Sinoatrial node dysfunction (CMS/HCC)     Strain of muscle, fascia and tendon of the posterior muscle group at thigh level, right thigh, initial encounter 05/10/2016    Tear of right hamstring    Unspecified symptoms and signs involving the genitourinary system 12/20/2016    UTI symptoms       Surgical History  Past Surgical History:   Procedure Laterality Date    CARDIAC PACEMAKER PLACEMENT  08/22/2013    Pacemaker Permanent Placement    OTHER SURGICAL HISTORY  06/15/2015    Ventral Hernia Repair - Recurrent    OTHER SURGICAL HISTORY  09/16/2019    Prostate biopsy    PERIPHERALLY INSERTED CENTRAL CATHETER INSERTION      VARICOSE VEIN SURGERY  08/22/2013    Varicose Vein Ligation    VENTRAL HERNIA REPAIR  06/15/2015    Ventral Hernia Repair        Social History  He reports that he has never smoked. He has never been exposed to tobacco smoke. He has never used smokeless tobacco. He reports that he does not currently use alcohol. He reports that he does not use drugs.    Family History  No family history on file.     Allergies  Bupropion and Keflex [cephalexin]    Review of Systems   Constitutional:  Negative for chills and fever.   Respiratory:  Negative for shortness of breath.    Cardiovascular:  Negative for chest pain.   Gastrointestinal:  Negative for abdominal pain, nausea and vomiting.   Musculoskeletal:  Positive for back pain (pt states his \"butt\" hurts).   Neurological:  Negative for headaches.        Physical Exam  Constitutional:       General: He is not in acute distress.  HENT:      Mouth/Throat:      Mouth: Mucous membranes are moist.      Pharynx: Oropharynx is clear.   Eyes:      General: No scleral icterus.     Extraocular Movements: Extraocular movements intact.      Pupils: Pupils are equal, round, and reactive to " "light.   Cardiovascular:      Rate and Rhythm: Normal rate. Rhythm irregular.      Pulses: Normal pulses.      Heart sounds: No murmur heard.     Comments: Occasional ectopic beats on monitor  Pulmonary:      Effort: No respiratory distress.      Breath sounds: No wheezing, rhonchi or rales.   Abdominal:      Palpations: Abdomen is soft.      Tenderness: There is no abdominal tenderness.   Musculoskeletal:      Right lower leg: Edema (trace) present.      Left lower leg: Edema (trace) present.   Skin:     Comments: Venous stasis changes over bilateral LE   Neurological:      General: No focal deficit present.      Mental Status: He is alert and oriented to person, place, and time.          Last Recorded Vitals  Blood pressure 144/73, pulse 75, temperature 35.7 °C (96.3 °F), temperature source Temporal, resp. rate 18, height 1.88 m (6' 2.02\"), weight 117 kg (257 lb 15 oz), SpO2 99 %.    Relevant Results    Scheduled medications  amLODIPine, 10 mg, oral, q PM  ceFAZolin in dextrose (iso-os), 2 g, intravenous, q12h  finasteride, 5 mg, oral, Daily  [Held by provider] heparin (porcine), 5,000 Units, subcutaneous, q8h  insulin glargine, 5 Units, subcutaneous, Nightly  insulin lispro, 0-5 Units, subcutaneous, TID with meals  melatonin, 5 mg, oral, Nightly  metoprolol succinate XL, 100 mg, oral, Daily  polyethylene glycol, 17 g, oral, Daily  rosuvastatin, 10 mg, oral, Nightly  tamsulosin, 0.8 mg, oral, Nightly      Continuous medications     PRN medications  PRN medications: acetaminophen, dextrose 10 % in water (D10W), dextrose, glucagon, ondansetron, oxyCODONE    Results for orders placed or performed during the hospital encounter of 11/16/23 (from the past 24 hour(s))   POCT GLUCOSE   Result Value Ref Range    POCT Glucose 194 (H) 74 - 99 mg/dL   Type and screen   Result Value Ref Range    ABO TYPE O     Rh TYPE POS     ANTIBODY SCREEN NEG    POCT GLUCOSE   Result Value Ref Range    POCT Glucose 176 (H) 74 - 99 mg/dL "   POCT GLUCOSE   Result Value Ref Range    POCT Glucose 117 (H) 74 - 99 mg/dL   CBC and Auto Differential   Result Value Ref Range    WBC 9.6 4.4 - 11.3 x10*3/uL    nRBC 0.0 0.0 - 0.0 /100 WBCs    RBC 2.68 (L) 4.50 - 5.90 x10*6/uL    Hemoglobin 7.8 (L) 13.5 - 17.5 g/dL    Hematocrit 24.1 (L) 41.0 - 52.0 %    MCV 90 80 - 100 fL    MCH 29.1 26.0 - 34.0 pg    MCHC 32.4 32.0 - 36.0 g/dL    RDW 15.2 (H) 11.5 - 14.5 %    Platelets 108 (L) 150 - 450 x10*3/uL    Neutrophils % 87.4 40.0 - 80.0 %    Immature Granulocytes %, Automated 1.1 (H) 0.0 - 0.9 %    Lymphocytes % 6.2 13.0 - 44.0 %    Monocytes % 4.7 2.0 - 10.0 %    Eosinophils % 0.4 0.0 - 6.0 %    Basophils % 0.2 0.0 - 2.0 %    Neutrophils Absolute 8.38 (H) 1.60 - 5.50 x10*3/uL    Immature Granulocytes Absolute, Automated 0.11 0.00 - 0.50 x10*3/uL    Lymphocytes Absolute 0.59 (L) 0.80 - 3.00 x10*3/uL    Monocytes Absolute 0.45 0.05 - 0.80 x10*3/uL    Eosinophils Absolute 0.04 0.00 - 0.40 x10*3/uL    Basophils Absolute 0.02 0.00 - 0.10 x10*3/uL        Assessment/Plan   Principal Problem:    MSSA bacteremia  Active Problems:    Endocarditis    History of general anesthesia  Mr. Chester Verduzco is a 73 year old male with PMH including HTN, HLD, Dual chamber PPM implanted in 11/1996 for SSS (RV is passive) with most recent generator replacement in 11/2020 ([employer] - [employer]urity MRI), Type 2 DM, and gout [facility]/2023) for MSSA bacteremia and tricuspid valve endocarditis. Currently monitoring heart rate off pacemaker and continuing antibiotics.    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)   #Tricuspid Valve Endocarditis   -ID following, appreciate recs, cefazolin x4 weeks after lead removal  - Blood cultures from 10/9, 11/10, and 11/14 were positive for MSSA   - Repeat blood cx collected 11/16 finalized NGTD  - Device Interrogated 11/17, Pacing RA 29%,  RV 30%   - Daily CBC Trend WBC, no leukocytosis    Plan:  - Continue Cefazolin 2 g q12 until (12/20)  - Pressure dressing  -Will remove figure of 8 stich in AM 11/23  -Repeat blood cultures in AM  -If needs pacing, will likely need TVP    #HTN  #HLD  - Holding home losartan and lasix in setting of NOHELIA  - Continue Amlodipine 10, metop succinate 100 daily, and rosuvastatin 10 daily    PULMONARY  [facility]    RENAL/  #NOHELIA: likely ATN given gradual rise in creatinine over past month and history of recent diarrhea and nephrotoxic antibiotic exposure. Renal U/S did not show any hydronephrosis.  - Baseline prior to October 2023 hospitalization ~1.0-1.1.   - Elevated to 5.43 on arrival to [facility] on 11/13, down trending  - Initial urine electrolytes showed Fena 2.5% indicating intrinsic etiology   - Normal C3 and C4  - Follow up  ANCA, SPEP/UPEP with IF, free kappa robert  - Nephrology following  - Maldonado replaced (11/17) , strict I&Os, No indication for HD at this time  - Daily RFP  -Replete lytes as needed    #BPH  - Was initially planned for outpatient robotic prostatectomy end of November that has since been canceled and rescheduled for December  - Continue with Flomax 0.8 mg QHS and finasteride 5 md daily  - Continue maldonado    GASTROINTESTINAL  VIOLA    ENDOCRINE  #Type 2 DM  - On lantus 5 QHS and SSI while NPO, can uptitrate as needed  - hypoglycemia protocol    HEME/ONC  #Anemia  -Normocytic, baseline normal as of 9/2023  -Maintain active T+S  -Transfuse Hgb less than 7.0    INFECTIOUS DISEASE  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           Parminder Bustamante MD PhD

## 2023-11-22 NOTE — ANESTHESIA PROCEDURE NOTES
Arterial Line:    Date/Time: 11/22/2023 9:40 AM    Staffing  Performed: attending   Authorized by: Jeffry Gallagher MD    Performed by: JOSE Burt-CRNA    An arterial line was placed. Procedure performed using surface landmarks.in the OR for the following indication(s): continuous blood pressure monitoring and blood sampling needed.    A 20 gauge (size), 1 and 3/4 inch (length), Angiocath (type) catheter was placed into the Left radial artery, secured by Tegaderm,   Seldinger technique used.  Events:  patient tolerated procedure well with no complications.

## 2023-11-22 NOTE — PROGRESS NOTES
" Subjective     Overnight events:  Pt was seen and examined at bedside. No acute overnight events. Made NPO for lead extraction on 11/22. Pt complains of Constipation,Pt denies any chest pain, SOB, dyspnea, new cough, palpitations, nausea, vomiting, diarrhea, dizziness, changes in visions, headaches, or worsening lower extremity swelling. Pt is comfortable in bed and in no acute distress.    Vital signs:  Blood pressure 143/68, pulse 76, temperature 36.1 °C (96.9 °F), temperature source Temporal, resp. rate 18, height 1.88 m (6' 2.02\"), weight 117 kg (257 lb 15 oz), SpO2 97 %.    I/O last 3 completed shifts:  In: 720 (6.2 mL/kg) [P.O.:720]  Out: 2975 (25.4 mL/kg) [Urine:2975 (0.7 mL/kg/hr)]  Weight: 117 kg     Physical Exam  GENERAL:  In no acute distress  NEUROLOGIC:  A and O x 3. Cranial nerves 2 through 12 grossly intact with no gait disturbances. Intact motor and sensory systems, with normal reflexes and coordination.    HEENT:  Pupils are equal, round, and reactive to light and accommodation. Extraocular motion intact.    MOUTH: MMM, no lesions observed  CARDIAC: S1 + S2, RRR, no M/R/G.    LUNGS: CTA BL.  No wheezes or coarse breath sounds. Symmetric respirations. No increased work of breathing.   ABDOMEN: Soft, non-tender to palpation. No organomegaly. Normal BS in 4Q, No rebound or guarding.  EXTREMITIES:  Moving x4 equally and spontaneously. Right knee with mild effusion present but no erythema or warmth. Chronic limited ROM but not worse than baseline. No BLE edema   SKIN: Clean, warm, dry, and intact with normal skin turgor.  PSYCH: Appropriate mood and behavior.     Relevant Results  Scheduled medications  amLODIPine, 10 mg, oral, q PM  ceFAZolin in dextrose (iso-os), 2 g, intravenous, q12h  finasteride, 5 mg, oral, Daily  heparin (porcine), 5,000 Units, subcutaneous, q8h  insulin glargine, 5 Units, subcutaneous, Nightly  insulin lispro, 0-5 Units, subcutaneous, TID with meals  melatonin, 5 mg, oral, " Nightly  metoprolol succinate XL, 100 mg, oral, Daily  polyethylene glycol, 17 g, oral, Daily  rosuvastatin, 10 mg, oral, Nightly  tamsulosin, 0.8 mg, oral, Nightly  traZODone, 50 mg, oral, Nightly      Continuous medications     PRN medications  PRN medications: acetaminophen, dextrose 10 % in water (D10W), dextrose, glucagon, ondansetron, oxyCODONE      Labs:  Last CBC:  Lab Results   Component Value Date    WBC 6.8 11/21/2023    HGB 7.6 (L) 11/21/2023    HCT 24.2 (L) 11/21/2023    MCV 92 11/21/2023     (L) 11/21/2023       Last RFP:  Lab Results   Component Value Date    GLUCOSE 168 (H) 11/21/2023    CALCIUM 8.2 (L) 11/21/2023     11/21/2023    K 4.2 11/21/2023    CO2 20 (L) 11/21/2023     11/21/2023    BUN 37 (H) 11/21/2023    CREATININE 3.72 (H) 11/21/2023       Last LFTs:  Lab Results   Component Value Date    ALT <3 (L) 11/17/2023    AST 11 11/17/2023    ALKPHOS 94 11/17/2023    BILITOT 0.3 11/17/2023       Last coags:  Lab Results   Component Value Date    INR 1.2 (H) 11/21/2023    APTT 38 11/21/2023       Micro/culture data:  Susceptibility data from last 90 days.  Collected Specimen Info Organism Clindamycin Erythromycin Oxacillin Tetracycline Trimethoprim/Sulfamethoxazole Vancomycin   11/14/23 Swab from Anterior Nares Methicillin Resistant Staphylococcus aureus (MRSA)         11/14/23 Blood culture from Peripheral Venipuncture Methicillin Susceptible Staphylococcus aureus (MSSA) S S S S S S         Imaging:  ECG 12 Lead  Sinus rhythm with 1st degree AV block  Low voltage QRS  Prolonged QT  Abnormal ECG  When compared with ECG of 02-OCT-2023 12:09,  No significant change was found  Confirmed by Benito Pedro ([id number]) on 11/17/2023 10:57:31 PM        Assessment/Plan     Assessment and Hospital course   Mr. Chester Verduzco is a 73 year old male with PMHX HTN, HLD, Dual chamber PPM implanted in 11/1996 for SSS (RV is passive) with most recent generator replacement in 11/2020 ([employer] -  Assurity MRI), Type 2 DM, gout, and recent hospitalization (10/2-10/7) for Staph bacteremia, diarrhea, NOHELIA , and hyponatremia 2/2 hypovolemia who presented to [facility] ED from a SNF on 11/13 for NOHELIA on labs from the SNF and oliguria. Blood cultures were positive for MSSA and previously had been positive 10/8 and 11/10 for MSSA. Blood cultures with NGTD as of 11/16.  FILIPE 11/16 showed tricuspid valve vegetation as well as right atrial lead vegetation. Patient has been afebrile without leukocytosis and hemodynamically stable. Plan for lead extraction on 11/22.  Patient on IV antibiotics with stop 4 weeks after lead removal (12/20). Home lasix and losartan were being held in setting of NOHELIA 2/2 to nephrotoxic antibiotic exposure which is now resolving with adequate urine output. Patient has maldonado I/s/o severe BPH.     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 Renal U/S did not show any hydronephrosis.  -  Baseline prior to October 2023 hospitalization ~1.0-1.1.   - Elevated to 5.43 on arrival to [facility] on 11/13, down trending  - Initial urine electrolytes showed Fena 2.5% indicating intrinsic etiology   - Normal C3 and C4  - Follow up  ANCA, SPEP/UPEP with IF, free kappa robert  - Nephrology following  - Maldonado replaced (11/17) , strict I&Os, No indication for HD at this time  - Daily RFP  -Replete lytes as needed    #Type 2 DM  - Checking A1c  - On Lantus 10 units QHS and cover with SSI, will switch to Lantus 5 while NPO  - hypoglycemia protocol    #HTN  #HLD  - Holding home losartan and lasix in setting of NOHELIA  - Continue Amlodipine 10, metop succinate 100 daily, and rosuvastatin 10 daily      #BPH  - Was initially planned for outpatient robotic prostatectomy end of November that has since been canceled and rescheduled for December  - Continue with Flomax 0.8 mg QHS and finasteride 5 md daily  - Continue maldonado        F: As needed  E: AS needed  N: Currently NPO at midnight   A:  PIVKaurey new as of 11/17  DVT ppx: Heparin SQ  GI: N/A  Code Status: DNR, ok for elective intubation   NOK: Charlene Pardo (friend) [phone]      Active Problems:    Endocarditis         Gonzalo Henley MD

## 2023-11-22 NOTE — PROGRESS NOTES
1/1 CICU     ADMIT  Admitted from [facility] with persistent staph bacteremia in the setting of RA lead vegetation and TV lead vegetation    SIGNIFICANT EVENTS  11/22  - Lead Removal and to CICU at LOS 6d. IV antibiotics, PICC removed, nephrology, NOHELIA     DC PLAN  RETURN to [facility]    > Medicare, -no- precert needed     GMLOS/BTD  DATE: 11/22 - (1) Medical / (2) DC - none     COMPLETED  (X) 11/22 - patient new to author & EMR/DC notes reviewed. Updated clinicals in CP to [facility].     Natty Nelson, LSW, MSW

## 2023-11-22 NOTE — ANESTHESIA POSTPROCEDURE EVALUATION
Patient: Chester Verduzco    Procedure Summary       Date: 11/22/23 [facility] / [facility] [address] Cardiac Cath Lab    Anesthesia Start: 0856 Anesthesia Stop: 1453    Procedure: PPM Lead Extraction (Left) Diagnosis:       Endocarditis      (Endocarditis [I38])    Providers: Etienne Aguilar MD Responsible Provider: Jeffry Gallagher MD    Anesthesia Type: general ASA Status: 4            Anesthesia Type: No value filed.    Vitals Value Taken Time   /72 11/22/23 1445   Temp 36.2 11/22/23 1454   Pulse 75 11/22/23 1453   Resp 14 11/22/23 1453   SpO2 100 % 11/22/23 1453   Vitals shown include unvalidated device data.    Anesthesia Post Evaluation    Patient location during evaluation: ICU  Patient participation: complete - patient participated  Level of consciousness: awake and alert  Pain management: satisfactory to patient  Airway patency: patent  Cardiovascular status: acceptable and blood pressure returned to baseline  Respiratory status: acceptable and face mask  Hydration status: acceptable  Postoperative Nausea and Vomiting: none        There were no known notable events for this encounter.

## 2023-11-22 NOTE — ADDENDUM NOTE
Addendum  created 11/22/23 1553 by Jeffry Gallagher MD    Review and Sign - Ready for Procedure, Review and Sign - Signed

## 2023-11-22 NOTE — ANESTHESIA PROCEDURE NOTES
Airway  Date/Time: 11/22/2023 9:18 AM  Urgency: elective    Airway not difficult    Staffing  Performed: CRNA   Authorized by: Jeffry Gallagher MD    Performed by: JOSE Burt-JACK  Patient location during procedure: OR    Indications and Patient Condition  Indications for airway management: anesthesia  Spontaneous ventilation: present  Sedation level: deep  Preoxygenated: yes  Patient position: sniffing  MILS maintained throughout  Mask difficulty assessment: 1 - vent by mask  Planned trial extubation    Final Airway Details  Final airway type: endotracheal airway      Successful airway: ETT  Cuffed: yes   Successful intubation technique: direct laryngoscopy  Endotracheal tube insertion site: oral  Blade: Dawson  Blade size: #4  ETT size (mm): 7.5  Cormack-Lehane Classification: grade I - full view of glottis  Placement verified by: chest auscultation   Measured from: lips  ETT to lips (cm): 22  Number of attempts at approach: 1

## 2023-11-22 NOTE — ANESTHESIA PROCEDURE NOTES
Central Venous Line:    Date/Time: 11/22/2023 10:31 AM    A central venous line was placed in the OR for the following indication(s): central venous access.  Staffing  Performed: attending   Authorized by: Jeffry Gallagher MD    Performed by: JOSE Burt-CRNA    Sterility preparation included the following: provider hand hygiene performed prior to central venous catheter insertion, all 5 sterile barriers used (gloves, gown, cap, mask, large sterile drape) during central venous catheter insertion, antiseptic used during central venous catheter insertion and skin prep agent completely dried prior to procedure.  The patient was placed in supine position.    Left femoral vein was prepped.    The site was prepped with Chlorhexidine.  Size: 9 Fr (8 Fr)   Length: 20  Catheter type: introducer   Number of Lumens: double lumen      During the procedure, the following specific steps were taken: target vein identified, needle advanced into vein and blood aspirated and guidewire advanced into vein.  Seldinger technique used.  Procedure performed using surface landmarks.    Intravenous verification was obtained by ultrasound, venous blood return and manometry.      Post insertion care included: all ports aspirated, all ports flushed easily, guidewire removed intact, Biopatch applied, line sutured in place and dressing applied.    During the procedure the patient experienced: patient tolerated procedure well with no complications.          Additional notes:  Left femoral central line inserted by surgeon

## 2023-11-22 NOTE — PROGRESS NOTES
Communication Note    Patient Name: Chester Verduzco  MRN: [id number]  Today's Date: 11/22/2023   Missed Visit: Yes  Missed Visit Reason:  (Patient in OR)      11/22/23 at 2:08 PM   Mary Ellen Ludwig PTA   Rehab Office: [phone]

## 2023-11-23 ENCOUNTER — APPOINTMENT (OUTPATIENT)
Dept: RADIOLOGY | Facility: HOSPITAL | Age: 73
DRG: 260 | End: 2023-11-23
Payer: MEDICARE

## 2023-11-23 LAB
ALBUMIN SERPL BCP-MCNC: 2.8 G/DL (ref 3.4–5)
ANION GAP SERPL CALC-SCNC: 15 MMOL/L (ref 10–20)
BACTERIA BLD CULT: NORMAL
BACTERIA BLD CULT: NORMAL
BASOPHILS # BLD AUTO: 0.01 X10*3/UL (ref 0–0.1)
BASOPHILS NFR BLD AUTO: 0.1 %
BUN SERPL-MCNC: 36 MG/DL (ref 6–23)
CALCIUM SERPL-MCNC: 8.1 MG/DL (ref 8.6–10.6)
CHLORIDE SERPL-SCNC: 108 MMOL/L (ref 98–107)
CO2 SERPL-SCNC: 20 MMOL/L (ref 21–32)
CREAT SERPL-MCNC: 3.62 MG/DL (ref 0.5–1.3)
EOSINOPHIL # BLD AUTO: 0.08 X10*3/UL (ref 0–0.4)
EOSINOPHIL NFR BLD AUTO: 0.9 %
ERYTHROCYTE [DISTWIDTH] IN BLOOD BY AUTOMATED COUNT: 15.6 % (ref 11.5–14.5)
GFR SERPL CREATININE-BSD FRML MDRD: 17 ML/MIN/1.73M*2
GLUCOSE BLD MANUAL STRIP-MCNC: 101 MG/DL (ref 74–99)
GLUCOSE BLD MANUAL STRIP-MCNC: 103 MG/DL (ref 74–99)
GLUCOSE BLD MANUAL STRIP-MCNC: 107 MG/DL (ref 74–99)
GLUCOSE BLD MANUAL STRIP-MCNC: 132 MG/DL (ref 74–99)
GLUCOSE BLD MANUAL STRIP-MCNC: 94 MG/DL (ref 74–99)
GLUCOSE SERPL-MCNC: 100 MG/DL (ref 74–99)
HCT VFR BLD AUTO: 22.2 % (ref 41–52)
HGB BLD-MCNC: 7.2 G/DL (ref 13.5–17.5)
IMM GRANULOCYTES # BLD AUTO: 0.06 X10*3/UL (ref 0–0.5)
IMM GRANULOCYTES NFR BLD AUTO: 0.7 % (ref 0–0.9)
LYMPHOCYTES # BLD AUTO: 0.7 X10*3/UL (ref 0.8–3)
LYMPHOCYTES NFR BLD AUTO: 8.2 %
MAGNESIUM SERPL-MCNC: 1.82 MG/DL (ref 1.6–2.4)
MCH RBC QN AUTO: 29.4 PG (ref 26–34)
MCHC RBC AUTO-ENTMCNC: 32.4 G/DL (ref 32–36)
MCV RBC AUTO: 91 FL (ref 80–100)
MONOCYTES # BLD AUTO: 0.51 X10*3/UL (ref 0.05–0.8)
MONOCYTES NFR BLD AUTO: 6 %
NEUTROPHILS # BLD AUTO: 7.16 X10*3/UL (ref 1.6–5.5)
NEUTROPHILS NFR BLD AUTO: 84.1 %
NRBC BLD-RTO: 0 /100 WBCS (ref 0–0)
PHOSPHATE SERPL-MCNC: 5 MG/DL (ref 2.5–4.9)
PLATELET # BLD AUTO: 116 X10*3/UL (ref 150–450)
POTASSIUM SERPL-SCNC: 4.8 MMOL/L (ref 3.5–5.3)
RBC # BLD AUTO: 2.45 X10*6/UL (ref 4.5–5.9)
SODIUM SERPL-SCNC: 138 MMOL/L (ref 136–145)
WBC # BLD AUTO: 8.5 X10*3/UL (ref 4.4–11.3)

## 2023-11-23 PROCEDURE — 96372 THER/PROPH/DIAG INJ SC/IM: CPT

## 2023-11-23 PROCEDURE — 37799 UNLISTED PX VASCULAR SURGERY: CPT

## 2023-11-23 PROCEDURE — 2500000001 HC RX 250 WO HCPCS SELF ADMINISTERED DRUGS (ALT 637 FOR MEDICARE OP)

## 2023-11-23 PROCEDURE — 80069 RENAL FUNCTION PANEL: CPT

## 2023-11-23 PROCEDURE — 83735 ASSAY OF MAGNESIUM: CPT

## 2023-11-23 PROCEDURE — 76882 US LMTD JT/FCL EVL NVASC XTR: CPT | Mod: LEFT SIDE | Performed by: STUDENT IN AN ORGANIZED HEALTH CARE EDUCATION/TRAINING PROGRAM

## 2023-11-23 PROCEDURE — 2500000004 HC RX 250 GENERAL PHARMACY W/ HCPCS (ALT 636 FOR OP/ED)

## 2023-11-23 PROCEDURE — 1200000002 HC GENERAL ROOM WITH TELEMETRY DAILY

## 2023-11-23 PROCEDURE — 99232 SBSQ HOSP IP/OBS MODERATE 35: CPT | Performed by: INTERNAL MEDICINE

## 2023-11-23 PROCEDURE — 82947 ASSAY GLUCOSE BLOOD QUANT: CPT

## 2023-11-23 PROCEDURE — 85025 COMPLETE CBC W/AUTO DIFF WBC: CPT

## 2023-11-23 PROCEDURE — 99223 1ST HOSP IP/OBS HIGH 75: CPT

## 2023-11-23 PROCEDURE — 0JPT3PZ REMOVAL OF CARDIAC RHYTHM RELATED DEVICE FROM TRUNK SUBCUTANEOUS TISSUE AND FASCIA, PERCUTANEOUS APPROACH: ICD-10-PCS

## 2023-11-23 PROCEDURE — 99233 SBSQ HOSP IP/OBS HIGH 50: CPT | Performed by: STUDENT IN AN ORGANIZED HEALTH CARE EDUCATION/TRAINING PROGRAM

## 2023-11-23 PROCEDURE — 02PA3MZ REMOVAL OF CARDIAC LEAD FROM HEART, PERCUTANEOUS APPROACH: ICD-10-PCS

## 2023-11-23 PROCEDURE — 76882 US LMTD JT/FCL EVL NVASC XTR: CPT | Mod: LT

## 2023-11-23 PROCEDURE — 99233 SBSQ HOSP IP/OBS HIGH 50: CPT | Performed by: INTERNAL MEDICINE

## 2023-11-23 RX ORDER — MAGNESIUM SULFATE 1 G/100ML
1 INJECTION INTRAVENOUS ONCE
Status: COMPLETED | OUTPATIENT
Start: 2023-11-23 | End: 2023-11-23

## 2023-11-23 RX ORDER — SENNOSIDES 8.6 MG/1
1 TABLET ORAL NIGHTLY
Status: DISCONTINUED | OUTPATIENT
Start: 2023-11-23 | End: 2023-12-04 | Stop reason: HOSPADM

## 2023-11-23 RX ORDER — INSULIN GLARGINE 100 [IU]/ML
10 INJECTION, SOLUTION SUBCUTANEOUS NIGHTLY
Status: DISCONTINUED | OUTPATIENT
Start: 2023-11-23 | End: 2023-12-04 | Stop reason: HOSPADM

## 2023-11-23 RX ADMIN — OXYCODONE HYDROCHLORIDE 5 MG: 5 TABLET ORAL at 18:45

## 2023-11-23 RX ADMIN — CEFAZOLIN SODIUM 2 G: 2 INJECTION, SOLUTION INTRAVENOUS at 16:40

## 2023-11-23 RX ADMIN — TAMSULOSIN HYDROCHLORIDE 0.8 MG: 0.4 CAPSULE ORAL at 20:50

## 2023-11-23 RX ADMIN — ACETAMINOPHEN 650 MG: 325 TABLET ORAL at 12:30

## 2023-11-23 RX ADMIN — OXYCODONE HYDROCHLORIDE 5 MG: 5 TABLET ORAL at 03:44

## 2023-11-23 RX ADMIN — ROSUVASTATIN CALCIUM 10 MG: 10 TABLET, FILM COATED ORAL at 22:10

## 2023-11-23 RX ADMIN — HEPARIN SODIUM 5000 UNITS: 5000 INJECTION INTRAVENOUS; SUBCUTANEOUS at 18:12

## 2023-11-23 RX ADMIN — FINASTERIDE 5 MG: 5 TABLET, FILM COATED ORAL at 10:07

## 2023-11-23 RX ADMIN — MAGNESIUM SULFATE HEPTAHYDRATE 1 G: 1 INJECTION, SOLUTION INTRAVENOUS at 10:20

## 2023-11-23 RX ADMIN — ACETAMINOPHEN 650 MG: 325 TABLET ORAL at 03:44

## 2023-11-23 RX ADMIN — METOPROLOL SUCCINATE 100 MG: 100 TABLET, EXTENDED RELEASE ORAL at 10:08

## 2023-11-23 RX ADMIN — HEPARIN SODIUM 5000 UNITS: 5000 INJECTION INTRAVENOUS; SUBCUTANEOUS at 10:08

## 2023-11-23 RX ADMIN — INSULIN GLARGINE 10 UNITS: 100 INJECTION, SOLUTION SUBCUTANEOUS at 20:55

## 2023-11-23 RX ADMIN — SENNOSIDES 8.6 MG: 8.6 TABLET, FILM COATED ORAL at 12:31

## 2023-11-23 RX ADMIN — CEFAZOLIN SODIUM 2 G: 2 INJECTION, SOLUTION INTRAVENOUS at 03:44

## 2023-11-23 RX ADMIN — Medication 5 MG: at 20:50

## 2023-11-23 RX ADMIN — SENNOSIDES 8.6 MG: 8.6 TABLET, FILM COATED ORAL at 20:50

## 2023-11-23 RX ADMIN — HEPARIN SODIUM 5000 UNITS: 5000 INJECTION INTRAVENOUS; SUBCUTANEOUS at 03:44

## 2023-11-23 RX ADMIN — AMLODIPINE BESYLATE 10 MG: 10 TABLET ORAL at 20:50

## 2023-11-23 ASSESSMENT — PAIN - FUNCTIONAL ASSESSMENT
PAIN_FUNCTIONAL_ASSESSMENT: 0-10

## 2023-11-23 ASSESSMENT — PAIN SCALES - GENERAL
PAINLEVEL_OUTOF10: 0 - NO PAIN
PAINLEVEL_OUTOF10: 5 - MODERATE PAIN
PAINLEVEL_OUTOF10: 7
PAINLEVEL_OUTOF10: 8
PAINLEVEL_OUTOF10: 0 - NO PAIN
PAINLEVEL_OUTOF10: 7
PAINLEVEL_OUTOF10: 6

## 2023-11-23 ASSESSMENT — COGNITIVE AND FUNCTIONAL STATUS - GENERAL
HELP NEEDED FOR BATHING: A LOT
CLIMB 3 TO 5 STEPS WITH RAILING: A LOT
MOBILITY SCORE: 16
MOVING TO AND FROM BED TO CHAIR: A LITTLE
DRESSING REGULAR UPPER BODY CLOTHING: A LOT
TURNING FROM BACK TO SIDE WHILE IN FLAT BAD: A LITTLE
DRESSING REGULAR LOWER BODY CLOTHING: A LOT
DAILY ACTIVITIY SCORE: 13
WALKING IN HOSPITAL ROOM: A LOT
PERSONAL GROOMING: A LITTLE
EATING MEALS: A LITTLE
MOVING FROM LYING ON BACK TO SITTING ON SIDE OF FLAT BED WITH BEDRAILS: A LITTLE
TOILETING: TOTAL
STANDING UP FROM CHAIR USING ARMS: A LITTLE

## 2023-11-23 ASSESSMENT — PAIN SCALES - WONG BAKER: WONGBAKER_NUMERICALRESPONSE: HURTS LITTLE MORE

## 2023-11-23 ASSESSMENT — PAIN DESCRIPTION - LOCATION
LOCATION: KNEE
LOCATION: CHEST

## 2023-11-23 ASSESSMENT — PAIN DESCRIPTION - ORIENTATION
ORIENTATION: RIGHT
ORIENTATION: RIGHT

## 2023-11-23 ASSESSMENT — PAIN DESCRIPTION - DESCRIPTORS: DESCRIPTORS: SORE;ACHING

## 2023-11-23 NOTE — PROGRESS NOTES
Chester Verduzco is a 73 y.o. male on day 7 of admission presenting with MSSA bacteremia.    Brief hospital course:  Mr. Chester Verduzco is a 73 year old male with PMHX HTN, HLD, Dual chamber PPM implanted in 11/1996 for SSS (RV is passive) with most recent generator replacement in 11/2020 ([employer] - [employer]), Type 2 DM, gout, and recent hospitalization (10/2-10/7) for Staph bacteremia, diarrhea, NOHELIA , and hyponatremia 2/2 hypovolemia who presented to [facility] ED from a SNF on 11/13 for NOHELIA on labs from the SNF and oliguria. Blood cultures were positive for MSSA and previously had been positive 10/8 and 11/10 for MSSA as well. FILIPE 11/16 showed tricuspid valve vegetation as well as right atrial lead vegetation. Transferred to [facility] for likely removal of PPM leads and endocarditis.  Patient has been afebrile without leukocytosis and hemodynamically stable.    Out patient follow up needed:   [] Podiatry   [] Cardiology   [] Urology    ICU course:  Pt underwent lead extraction 11/22/2023, per procedure note no plan for re-implant ig rates remain adequate and he can complete full course of abx prior to re-implant. If needed sooner will need 72 hours of negative cultures prior to re-implant. He tolerated the procedure well and is stable for transfer to floor 11/23/2023.    Follow up items:  -monitor HR on tele, will need re-implant arranged once Abx course completed  -monitor Hgb, has been downtrending with unclear etiology (iatrogenic, 2/2 infection, no reported overt bleeding) would maintain active T+S (good through 11/24)  -F/up LLE ultrasound for inguinal mass    Subjective   NAEON. Pt seen in bed. States his backside pain is much better today. Endorses some pain over his incision but otherwise feels well. Denies n/v/f/c, chest pain other than with his incision, shortness of breath, or abdominal pain.        Objective     Physical Exam  Exam conducted with a chaperone present.   Constitutional:       General:  "He is not in acute distress.  HENT:      Mouth/Throat:      Mouth: Mucous membranes are moist.      Pharynx: Oropharynx is clear.   Eyes:      General: No scleral icterus.     Extraocular Movements: Extraocular movements intact.      Pupils: Pupils are equal, round, and reactive to light.   Cardiovascular:      Rate and Rhythm: Normal rate and regular rhythm.      Heart sounds: No murmur heard.  Pulmonary:      Effort: No respiratory distress.      Breath sounds: No wheezing, rhonchi or rales.   Abdominal:      Palpations: Abdomen is soft.      Tenderness: There is no abdominal tenderness.      Hernia: A hernia is present. Hernia is present in the left inguinal area.   Genitourinary:     Comments: Mass present in L inguinal region  Musculoskeletal:      Right lower leg: Edema (trace) present.      Left lower leg: Edema (trace) present.   Skin:     Comments: Venous stasis changes over bilateral LE   Neurological:      General: No focal deficit present.      Mental Status: He is alert and oriented to person, place, and time.         Last Recorded Vitals  Blood pressure 123/62, pulse 77, temperature 36.5 °C (97.7 °F), temperature source Temporal, resp. rate 25, height 1.88 m (6' 2.02\"), weight 108 kg (237 lb), SpO2 98 %.  Intake/Output last 3 Shifts:  I/O last 3 completed shifts:  In: 1776.6 (16.5 mL/kg) [I.V.:226.6 (2.1 mL/kg); IV Piggyback:1550]  Out: 3075 (28.6 mL/kg) [Urine:3025 (0.8 mL/kg/hr); Blood:50]  Weight: 107.5 kg     Relevant Results           This patient currently has cardiac telemetry ordered; if you would like to modify or discontinue the telemetry order, click here to go to the orders activity to modify/discontinue the order.    Scheduled medications  amLODIPine, 10 mg, oral, q PM  ceFAZolin in dextrose (iso-os), 2 g, intravenous, q12h  finasteride, 5 mg, oral, Daily  heparin (porcine), 5,000 Units, subcutaneous, q8h  insulin glargine, 10 Units, subcutaneous, Nightly  insulin lispro, 0-5 Units, " subcutaneous, TID with meals  melatonin, 5 mg, oral, Nightly  metoprolol succinate XL, 100 mg, oral, Daily  polyethylene glycol, 17 g, oral, Daily  rosuvastatin, 10 mg, oral, Nightly  sennosides, 1 tablet, oral, Nightly  tamsulosin, 0.8 mg, oral, Nightly      Continuous medications     PRN medications  PRN medications: acetaminophen, dextrose 10 % in water (D10W), dextrose, glucagon, ondansetron, oxyCODONE      This patient has a urinary catheter   Reason for the urinary catheter remaining today? urinary retention/bladder outlet obstruction, acute or chronic           Assessment/Plan   Principal Problem:    MSSA bacteremia  Active Problems:    Endocarditis    History of general anesthesia  Mr. Chester Verduzco is a 73 year old male with PMH including HTN, HLD, Dual chamber PPM implanted in 11/1996 for SSS (RV is passive) with most recent generator replacement in 11/2020 ([facility]), Type 2 DM, and gout who presents to CICU s/p pacemaker lead extraction (11/22/2023) for MSSA bacteremia and tricuspid valve endocarditis. Currently monitoring heart rate off pacemaker and continuing antibiotics.     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)   #Tricuspid Valve Endocarditis   -ID following, appreciate recs, cefazolin x4 weeks after lead removal  - Blood cultures from 10/9, 11/10, and 11/14 were positive for MSSA   - Repeat blood cx collected 11/16 finalized NGTD  - Device Interrogated 11/17, Pacing RA 29%, RV 30%   - Daily CBC Trend WBC, no leukocytosis  - Pressure dressing removed, stitch removed, no bleeding noted     Plan:  - Continue Cefazolin 2 g q12 until (12/20)  -Monitor on tele, if bradycardic may need re-implantation per EP, otherwise can be done after completing Abx course     #HTN  #HLD  - Holding home losartan and lasix in setting of NOHELIA  - Continue Amlodipine 10, metop succinate 100 daily, and rosuvastatin 10 daily      PULMONARY  [place]     RENAL/  #NOHELIA: likely ATN given gradual rise in creatinine over past month and history of recent diarrhea and nephrotoxic antibiotic exposure. Renal U/S did not show any hydronephrosis.  - Baseline prior to October 2023 hospitalization ~1.0-1.1.   - Elevated to 5.43 on [facility], down trending  - Initial urine electrolytes showed Fena 2.5% indicating intrinsic etiology   - Normal C3 and C4  - Follow up  ANCA, SPEP/UPEP with IF, free kappa robert  - Nephrology following  - Maldonado replaced (11/17) , strict I&Os, No indication for HD at this time  - Daily RFP  -Replete lytes as needed     #BPH  - Was initially planned for outpatient robotic prostatectomy end of November that has since been canceled and rescheduled for December  - Continue with Flomax 0.8 mg QHS and finasteride 5 md daily  - Continue maldonado     GASTROINTESTINAL  [place]A     ENDOCRINE  #Type 2 DM  - lantus 10 nightly and SSI  - hypoglycemia protocol     HEME/ONC  #Anemia  -Normocytic, baseline normal as of 9/2023  -Maintain active T+S (current active through 11/24)  -Transfuse Hgb less than 7.0     INFECTIOUS DISEASE  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)   #Tricuspid Valve Endocarditis   #Possible concern for septic Joint  -per chart review, unclear history of septic joint  -On cefazolin, plan for 4 week course after lead removal per ID     MSK/DERM  #Inguinal mass  -noted in left groin, per patient first noted roughly 1 month ago, non tender  -Will order US to better assess mass     F: As needed  E: AS needed  N: Diabetic  A:  PIV, Maldonado new as of 11/17     DVT ppx: Heparin SQ  GI: N/A     Code Status: DNR, ok for elective intubation (reversed temporarily periprocedurally)  NOK: Charlene Pardo (friend) [phone]               Parminder Bustamante MD PhD

## 2023-11-23 NOTE — PROGRESS NOTES
Subjective Data:  Doing well, some left sided tenderness.       Objective Data:  Last Recorded Vitals:  Vitals:    11/23/23 0800 11/23/23 0900 11/23/23 1000 11/23/23 1100   BP: 136/72 112/61 129/73 123/62   Pulse: 80 81 87 77   Resp: 17 18 20 25   Temp:    36.5 °C (97.7 °F)   TempSrc:    Temporal   SpO2: 96% 97% 100% 98%   Weight:       Height:           Last Labs:  CBC - 11/23/2023:  5:29 AM  8.5 7.2 116    22.2      CMP - 11/23/2023:  5:29 AM  8.1 5.6; 5.6 11 --- 0.3   5.0 2.8 <3 94      PTT - 11/21/2023: 10:11 AM  1.2   13.3 38     TROPHS   Date/Time Value Ref Range Status   09/18/2023 05:52 PM 15 0 - 20 ng/L Final     Comment:     .  Less than 99th percentile of normal range cutoff-  Female and children under 18 years old <14 ng/L; Male <21 ng/L: Negative  Repeat testing should be performed if clinically indicated.   .  Female and children under 18 years old 14-50 ng/L; Male 21-50 ng/L:  Consistent with possible cardiac damage and possible increased clinical   risk. Serial measurements may help to assess extent of myocardial damage.   .  >50 ng/L: Consistent with cardiac damage, increased clinical risk and  myocardial infarction. Serial measurements may help assess extent of   myocardial damage.   .   NOTE: Children less than 1 year old may have higher baseline troponin   levels and results should be interpreted in conjunction with the overall   clinical context.   .  NOTE: Troponin I testing is performed using a different   testing methodology at [facility] than at other   [facility]. Direct result comparisons should only   be made within the same method.       BNP   Date/Time Value Ref Range Status   10/02/2023 01:07 PM 97 0 - 99 pg/mL Final     HGBA1C   Date/Time Value Ref Range Status   11/17/2023 10:14 AM 6.6 see below % Final   08/11/2023 12:59 PM 9.9 % Final     Comment:          Diagnosis of Diabetes-Adults   Non-Diabetic: < or = 5.6%   Increased risk for developing diabetes:  5.7-6.4%   Diagnostic of diabetes: > or = 6.5%  .       Monitoring of Diabetes                Age (y)     Therapeutic Goal (%)   Adults:          >18           <7.0   Pediatrics:    13-18           <7.5                   7-12           <8.0                   0- 6            7.5-8.5   American Diabetes Association. Diabetes Care 33(S1), Jan 2010.     02/07/2023 09:07 AM 8.4 % Final     Comment:          Diagnosis of Diabetes-Adults   Non-Diabetic: < or = 5.6%   Increased risk for developing diabetes: 5.7-6.4%   Diagnostic of diabetes: > or = 6.5%  .       Monitoring of Diabetes                Age (y)     Therapeutic Goal (%)   Adults:          >18           <7.0   Pediatrics:    13-18           <7.5                   7-12           <8.0                   0- 6            7.5-8.5   American Diabetes Association. Diabetes Care 33(S1), Jan 2010.       VLDL   Date/Time Value Ref Range Status   08/11/2023 12:59 PM 57 0 - 40 mg/dL Final   08/10/2022 11:09 AM 33 0 - 40 mg/dL Final   08/19/2020 12:17 PM 62 0 - 40 mg/dL Final      Last I/O:  I/O last 3 completed shifts:  In: 1776.6 (16.5 mL/kg) [I.V.:226.6 (2.1 mL/kg); IV Piggyback:1550]  Out: 3075 (28.6 mL/kg) [Urine:3025 (0.8 mL/kg/hr); Blood:50]  Weight: 107.5 kg     Past Cardiology Tests (Last 3 Years):  EKG:  ECG 12 Lead 11/17/2023      ECG 12 Lead 10/9/2023    Echo:  Anesthesia Intraoperative Transesophageal Echocardiogram 11/22/2023      Transesophageal Echo (FILIPE) 11/22/2023      Transthoracic Echo (TTE) Complete 11/15/2023    Ejection Fractions:  EF   Date/Time Value Ref Range Status   11/14/2023 03:22 PM 63       Cath:  No results found for this or any previous visit from the past 1095 days.    Stress Test:  No results found for this or any previous visit from the past 1095 days.    Cardiac Imaging:  No results found for this or any previous visit from the past 1095 days.      Inpatient Medications:  Scheduled medications   Medication Dose Route Frequency     amLODIPine  10 mg oral q PM    ceFAZolin in dextrose (iso-os)  2 g intravenous q12h    finasteride  5 mg oral Daily    heparin (porcine)  5,000 Units subcutaneous q8h    insulin glargine  10 Units subcutaneous Nightly    insulin lispro  0-5 Units subcutaneous TID with meals    melatonin  5 mg oral Nightly    metoprolol succinate XL  100 mg oral Daily    polyethylene glycol  17 g oral Daily    rosuvastatin  10 mg oral Nightly    tamsulosin  0.8 mg oral Nightly     PRN medications   Medication    acetaminophen    dextrose 10 % in water (D10W)    dextrose    glucagon    ondansetron    oxyCODONE     Continuous Medications   Medication Dose Last Rate        Assessment/Plan   Patient doing well post procedure. Device and complete leads were successfully extracted, Pressure dressing removed today, incision well approximated with staples. No hematoma. Continue with IV abx. Will follow.  Peripheral IV 11/13/23 20 G Right;Lateral;Distal Wrist (Active)   Site Assessment Clean;Dry;Intact 11/23/23 0800   Dressing Type Transparent 11/23/23 0800   Line Status No blood return;Flushed;Saline locked 11/23/23 0800   Dressing Status Clean;Dry;Occlusive 11/23/23 0800   Number of days: 10       Peripheral IV 11/22/23 Left;Dorsal Hand (Active)   Site Assessment Clean;Dry;Intact 11/23/23 0800   Dressing Type Transparent 11/23/23 0800   Line Status No blood return;Flushed;Saline locked 11/23/23 0800   Dressing Status Clean;Dry;Occlusive 11/23/23 0800   Number of days: 1       Peripheral IV 11/22/23 16 G Left Hand (Active)   Site Assessment Clean;Dry;Intact 11/23/23 0800   Dressing Type Transparent 11/23/23 0800   Line Status No blood return;Flushed;Saline locked 11/23/23 0800   Dressing Status Clean;Dry;Occlusive 11/23/23 0800   Number of days: 1       Peripheral IV 11/22/23 16 G Right;Anterior Hand (Active)   Site Assessment Clean;Dry;Intact 11/23/23 0800   Dressing Type Transparent 11/23/23 0800   Line Status Infusing 11/23/23 0800    Dressing Status Clean;Dry;Occlusive 11/23/23 0800   Number of days: 1       Urethral Catheter (Active)   Site Assessment Clean;Skin intact 11/23/23 0800   Collection Container Standard drainage bag 11/23/23 0800   Securement Method Securing device (Describe) 11/23/23 0800   Reason for Continuing Urinary Catheterization acute urinary retention 11/23/23 0800   Number of days: 6       Code Status:  Full Code          Ti Martins MD

## 2023-11-23 NOTE — CARE PLAN
Problem: Pain  Goal: My pain/discomfort is manageable  Outcome: Progressing     Problem: Safety  Goal: Patient will be injury free during hospitalization  Outcome: Progressing  Goal: I will remain free of falls  Outcome: Progressing     Problem: Daily Care  Goal: Daily care needs are met  Outcome: Progressing     Problem: Psychosocial Needs  Goal: Demonstrates ability to cope with hospitalization/illness  Outcome: Progressing  Goal: Collaborate with me, my family, and caregiver to identify my specific goals  Outcome: Progressing    The patient's goals for the shift include to be painfree    The clinical goals for the shift include remain safe and free of injury.

## 2023-11-23 NOTE — PROGRESS NOTES
"Transfer To Floor     Subjective     Overnight events:  Pt was seen and examined at bedside. No acute overnight events. Now s/p lead extraction 11/22. Pt denies any chest pain, SOB, dyspnea, new cough, palpitations, nausea, vomiting, diarrhea, dizziness, changes in visions, headaches, or worsening lower extremity swelling. Pt is comfortable in bed and in no acute distress. Has a painless non-reducible soft tissue mass in the left groin. He reports it has been there for at least a month.    Vital signs:  Blood pressure 123/62, pulse 80, temperature 36.5 °C (97.7 °F), temperature source Temporal, resp. rate 20, height 1.88 m (6' 2.02\"), weight 108 kg (237 lb), SpO2 97 %.    I/O last 3 completed shifts:  In: 1776.6 (16.5 mL/kg) [I.V.:226.6 (2.1 mL/kg); IV Piggyback:1550]  Out: 3075 (28.6 mL/kg) [Urine:3025 (0.8 mL/kg/hr); Blood:50]  Weight: 107.5 kg     Physical Exam  GENERAL:  In no acute distress  NEUROLOGIC:  A and O x 3. Cranial nerves 2 through 12 grossly intact with no gait disturbances. Intact motor and sensory systems, with normal reflexes and coordination.    HEENT:  Pupils are equal, round, and reactive to light and accommodation. Extraocular motion intact.    MOUTH: MMM, no lesions observed  CARDIAC: S1 + S2, RRR, no M/R/G.    LUNGS: CTA BL.  No wheezes or coarse breath sounds. Symmetric respirations. No increased work of breathing.   ABDOMEN: Soft, non-tender to palpation. No organomegaly. Normal BS in 4Q, No rebound or guarding.  EXTREMITIES:  Moving x4 equally and spontaneously. Right knee with mild effusion present but no erythema or warmth. Chronic limited ROM but not worse than baseline. No BLE edema   SKIN: Clean, warm, dry, and intact with normal skin turgor.  PSYCH: Appropriate mood and behavior.     Relevant Results  Scheduled medications  amLODIPine, 10 mg, oral, q PM  ceFAZolin in dextrose (iso-os), 2 g, intravenous, q12h  finasteride, 5 mg, oral, Daily  heparin (porcine), 5,000 Units, " subcutaneous, q8h  insulin glargine, 10 Units, subcutaneous, Nightly  insulin lispro, 0-5 Units, subcutaneous, TID with meals  melatonin, 5 mg, oral, Nightly  metoprolol succinate XL, 100 mg, oral, Daily  polyethylene glycol, 17 g, oral, Daily  rosuvastatin, 10 mg, oral, Nightly  sennosides, 1 tablet, oral, Nightly  tamsulosin, 0.8 mg, oral, Nightly      Continuous medications     PRN medications  PRN medications: acetaminophen, dextrose 10 % in water (D10W), dextrose, glucagon, ondansetron, oxyCODONE      Labs:  Last CBC:  Lab Results   Component Value Date    WBC 8.5 11/23/2023    HGB 7.2 (L) 11/23/2023    HCT 22.2 (L) 11/23/2023    MCV 91 11/23/2023     (L) 11/23/2023       Last RFP:  Lab Results   Component Value Date    GLUCOSE 100 (H) 11/23/2023    CALCIUM 8.1 (L) 11/23/2023     11/23/2023    K 4.8 11/23/2023    CO2 20 (L) 11/23/2023     (H) 11/23/2023    BUN 36 (H) 11/23/2023    CREATININE 3.62 (H) 11/23/2023       Last LFTs:  Lab Results   Component Value Date    ALT <3 (L) 11/17/2023    AST 11 11/17/2023    ALKPHOS 94 11/17/2023    BILITOT 0.3 11/17/2023       Last coags:  Lab Results   Component Value Date    INR 1.2 (H) 11/21/2023    APTT 38 11/21/2023       Micro/culture data:  Susceptibility data from last 90 days.  Collected Specimen Info Organism Clindamycin Erythromycin Oxacillin Tetracycline Trimethoprim/Sulfamethoxazole Vancomycin   11/14/23 Swab from Anterior Nares Methicillin Resistant Staphylococcus aureus (MRSA)         11/14/23 Blood culture from Peripheral Venipuncture Methicillin Susceptible Staphylococcus aureus (MSSA) S S S S S S         Imaging:  Anesthesia Intraoperative Transesophageal Echocardiogram  [facility] - Dept of Anesthesiology, [address] [place] [address]                      Tel [phone] and Fax [phone]    TRANSESOPHAGEAL ECHOCARDIOGRAM REPORT       Patient Name:      MOE Godoy Physician: [id number] Jeffry Joiner  Date:        11/22/2023           Ordering Provider: [id number] MARCUS LEDBETTER  MRN/PID:           [id number]             Fellow:  Accession#:        [id number]         Nurse:  Date of Birth/Age: 1950 / 73 years Sonographer:  Gender:            M                    Additional Staff:  BSA:               m2                   Encounter#:        [id number]    Study Type:    ANESTHESIA INTRAOPERATIVE FILIPE  Diagnosis/ICD: Presence of cardiac pacemaker-Z95.0    PHYSICIAN INTERPRETATION:  Left Ventricle: The left ventricular systolic function is normal, with an estimated ejection fraction of 70%. The left ventricular cavity size is normal. Spectral Doppler shows a normal pattern of left ventricular diastolic filling.  Left Atrium: The left atrium is normal in size. There is a normal sized left atrial appendage, there is no mass visualized in the left atrial appendage, there is no spontaneous contrast noted in the left atrial appendage, there is no thrombus visualized in the left atrial appendage and the left atrial appendage is small.  Right Ventricle: The right ventricle is normal in size. There is normal right ventricular global systolic function.  Right Atrium: The right atrium is upper limits of normal in size.  Aortic Valve: The aortic valve appears structurally normal. There is no evidence of aortic valve stenosis.  There is mild aortic valve regurgitation.  Mitral Valve: The mitral valve is normal in structure. There is mild mitral valve regurgitation.  Tricuspid Valve: The tricuspid valve is structurally normal. There is trace to mild tricuspid regurgitation. Vegatation noted in posterior leaflet, as previously reported.    S/P Dual chamber pacer lead extraction:    There was no evidence of pericardial effusion detected. The tricuspid valve was structurally normal with no change in the degree of TR. A small vegetation was still visible on the posterior leaflet of the tricuspied valve.  Pulmonic Valve: The pulmonic  valve was not assessed. Pulmonic valve regurgitation was not assessed.  Pericardium: There is no pericardial effusion noted.  Aorta: The aortic root is normal.  In comparison to the previous echocardiogram(s): Not performed on intraoperative study.       CONCLUSIONS:   1. Left ventricular systolic function is normal with a 70% estimated ejection fraction.   2. Aortic valve stenosis is not present.   3. Mild aortic valve regurgitation.    QUANTITATIVE DATA SUMMARY:     [id number] Jeffry Gallagher  Electronically signed on 11/22/2023 at 3:55:38 PM       ** Final **  Transesophageal Echo (FILIPE)     [facility], [address] [place] [address]                Tel [phone] and Fax [phone]    TRANSESOPHAGEAL ECHOCARDIOGRAM REPORT       Patient Name:      MOE Godoy Physician:   [id number] Jimmie Hall MD  Study Date:        11/16/2023          Ordering Provider:   [id number] ELICIA NICE  MRN/PID:           [id number]            Fellow:  Accession#:        [id number]        Nurse:               Cierra Luo RN  Date of Birth/Age: 1950 / 73      Sonographer:         Summer Best RDCS                     years  Gender:            M                   Additional Staff:  Height:            187.96 cm           Admit Date:          11/13/2023  Weight:            111.13 kg           Admission Status:    Inpatient - Routine  BSA:               2.37 m2             Encounter#:          [id number]                                         Department Location: [facility] Cath Lab  Blood Pressure: 120 /64 mmHg    Study Type:    TRANSESOPHAGEAL ECHO (FILIPE)  Diagnosis/ICD: Bacteremia-R78.81  Indication:    Bacteremia  CPT Code:      FILIPE Complete-47411; Doppler Limited-21453; Color Doppler-16270    Patient History:  Pertinent History: HTN and Hyperlipidemia. lymphedema, ventral  hernia.    Study Detail: The following Echo studies were performed: 2D, M-Mode, Doppler and                color flow. The patient was sedated.       PHYSICIAN INTERPRETATION:  FILIPE Details: The FILIPE probe used was Nandini X8. Technically adequate omniplane transesophageal echocardiogram performed.  FILIPE Medication: The pharynx was anesthetized with viscous lidocaine. The patient was sedated using moderate sedation. Midazolam and Fentanyl was used to sedate the patient for this exam.  FILIPE Procedure: The probe was passed without difficulty. The patient tolerated the procedure well without any apparent complications.  Left Ventricle: The left ventricular systolic function is normal. There are no regional wall motion abnormalities. The left ventricular cavity size is normal. Left ventricular diastolic filling was not assessed.  Left Atrium: The left atrium is upper limits of normal in size. There is no definite left atrial thrombus present. There is a normal sized left atrial appendage.  Right Ventricle: The right ventricle is normal in size. There is normal right ventricular global systolic function.  Right Atrium: The right atrium is upper limits of normal in size. There is a device visualized in the right atrium. There appears to be a linear (1.25 cm x 0.5 cm) vegetation that appears attached to the right atrial lead.  Aortic Valve: There is no visualized aortic valve vegetation. The aortic valve is trileaflet. There is mild aortic valve regurgitation.  Mitral Valve: The mitral valve is normal in structure. There is mild mitral valve regurgitation.  Tricuspid Valve: The tricuspid valve is structurally normal. There is mild tricuspid regurgitation. There is a small mobile vegetation on the posterior leaflet of the tricuspid valve. The TV vegetation measures 9 mm x 9 mm.  Pulmonic Valve: The pulmonic valve is structurally normal. There is trace pulmonic valve regurgitation.  Pericardium: There is no pericardial effusion  noted.  Aorta: The aortic root is normal.       CONCLUSIONS:   1. Spherical 0.9 cm x 0.9 cm vegetation on the posterior leaflet of the tricuspid valve.   2. 1.25 cm x 0.5 cm linear vegetation on the right atrial lead (cardiovascular implantable electronic device infection).   3. Left ventricular systolic function is normal.   4. No aortic valve vegetation visualized.   5. Mild aortic valve regurgitation.   6. No left atrial thrombus.    QUANTITATIVE DATA SUMMARY:     [id number] Jimmie Hall MD  Electronically signed on 11/22/2023 at 2:58:54 PM       ** Final **  ICD Lead Extraction  Transvenous Lead extraction of Dual Chamber Pacemaker    Procedures  Removal of PPM generator (27469), Removal electrodes dual lead PPM system   (03156), Debridement of subcutaneous tissue (<20 sq cm) (44123), and   Ultrasound guided vascular access (86185)    Patient history:  Please refer to the detailed history and physical on the patient's medical   chart.     Procedure narrative:  The patient was taken to [facility] OR in a fasting state, where they were   monitored throughout the procedure. Patient was intubated by anesthesia   and a radial arterial line was placed for continuous hemodynamic   monitoring. Post ETT intubation, a FILIPE probe was advanced by Anesthesia to   get baseline images. Initial FILIPE imaging showed No pericardial effusion   and  mild-to-moderate  tricuspid regurgitation.     Patient was then prepped and draped from neck to knees with a windowed   drape to allow for rapid sternotomy access (subxiphoid, sternotomy   incision and groin access site were marked pre-draping with sterile skin   safe marker).  The right common femoral vein was evaluated with ultrasound, it was normal   in size and anatomy.  The vein was accessed using ultrasound guidance and   a 18G Cook needle, with direct visualization of the needle at all times.    Images were saved for both vein accesses.  Over the guidewires an 8F   sheath was inserted into  the right femoral vein.  Similarly a second right   femoral access was obtain for a 12F sheath and a third access via left   femoral access was obtain for a 9F dual MAC sheath.  Through the 12F femoral sheath a stiff amplatz wire was attempted to be   advanced to the the IJ/right subclavian.  There was an area of stenosis   noted along the SVC/RA and the wire would not advance further.  We then   advanced a 5 Maltese Kumpe catheter over the Amplatz wire to try directed   better to the IJ.  This was also unfortunately unsuccessful.    At this point we obtained brachiocephalic (right) side access through the   pocket using a micropuncture needle under fluoroscopic guidance with   modified Salinger technique.  The micropuncture sheath (4F) was advanced   over the micropuncture wire through which a glide J-tip catheter (260 cm)   was advanced to the IJ.  We then attempted to advance the a second 5F   Kumpe catheter over this glide down passed into the IVC however we met   resistance at the brachiocephalic junction.  The wire was able to be   advanced down to the right femoral vein.  Through the 12 F femoral sheath   we were able to use a 25 mm gooseneck snare to snare the wire and   externalized the wire.  We now had access with the same wire into the   right brachiocephalic and right femoral vein.  Over the end through the   right femoral vein we then advanced a Kumpe catheter which past the area   of previously noted stenosis.  Glidewire was then removed and a stiff   Amplatz (J-tipped) was introduced and advanced over to the right   IJ/subclavian area.    Over the wire the Bridge balloon was advanced to line up the prox and   distal markers over the SVC/RA junction. The balloon was inflated with a   60/40 Saline/Contrast mix in a 60cc syringe, optimal inflation was noted   after 45cc of Saline-contrast mix was used. The proximal part of the   balloon was marked with a sticker outside the sheath, to allow for rapid    advancement even without fluoroscopic guidance in case of an emergency.   The Balloon was subsequently deflated and moved back to the IVC while   keeping the amplatz wire in place.   The area of the prior device implant site (right pectoral deltoid and   subclavian area) was sterilely  was anesthetized locally using 50/50   combination of  2% lidocaine with Epi and 0.25% Marcaine.   Using a #15 scalpel blade, an incision was made at the right pectodeltoid   groove. The skin was dissected and blunted on the previously implanted   pulse generator.   The RA pacing and RV pacing leads were disconnected from the pulse   generator.   The lead loops were freed from underlying capsule/tissue, and suture   sleeve was removed after cutting the sutures.  Stylets were introduced into the RA pacing leads and torqueing tool was   used to try retract the active fixation screw under fluoroscopic guidance.    The fixation screw retracted back by half return however we were unable   to get full retraction of the screw.    Lead prep:  The RV pacing lead was first prepped and then DF4 pin was not cut. A lead   locking stylet (LLD #2) was then introduced into the central lumen and   advanced under fluoroscopy. Once in final position, the LLD was deployed.   A #2 silk suture was tired to the lead and proximal portion of suture to   the proximal portion of the LLD.     Similarly LLD (LLD EZ) was used for the RA pacing lead after it was   prepped after gaining access to the central lumen.     Extraction:  CTS surgeon was contacted and informed prior to use of any extraction   tools or before extraction of any leads was attempted. Surgeon was   confirmed to be available and ready.   We first started with the RA pacing lead and used the 11F Tighrail. The   11F Tighrail was advanced over the lead with traction-counter traction   carefully under fluroscopic guidance.  There was significant lead-lead   binding noted throughout the  intravascular course of the leads. We   attempted to advance the tight rail past the SVC/RA junction but there was   significant lead lead interaction and RA leads started to uncoil.    At this point we then turned our attention to the RV pacing lead and used   the 11 [place] tight rail.  We were able to get slightly further past the   SVC/RA junction with a tight rail but again the lead lead binding cause   significant interaction and we had concerns about lead integrity.    At this point I elected to try snare from the femoral approach to try to   get better countertraction and a different angle for the extraction   equipment to go over the lead.  The RFV 8 [place] sheath was exchanged for   a 14 [place] Lamar dry seal sheath.  Through the dry seal we used a   deflectable EP catheter (Livewire f which was placed for the EP XT) and a   gooseneck snare (initially tried using the already open 25 mm snare which   was exchanged for the 15 mm snare).  We were unable to snare across the   lead and then decided to proceed with a superior approach only.    Given the lead lead binding and using integrity of the RA lead we then   opted to use the 14F Excimer laser with medium Visi-sheath and careful   traction-counter traction the RV pacing lead was safely extracted after   slowly advancing over the lead.  Mild hypertension with RV inversion was   noted initially until the RV tip became free from the RV myocardium and   lead was able to be pulled back safely and extracted completely.  Similarly we used the 14 F Excimer laser with medium VISI sheath over the   right atrial lead and after careful traction countertraction the lead was   removed in its entirety.  The helix was noted to be out and had stretched   with the countertraction traction.    Extraction tools used: LLD 2 x1. LLD EZ x1, 14F Excimer laser with medium   Visi-sheath, 11F Tighrail, and deflectable EP catheter and 15mm gooseneck   snare  Once all leads were  extracted, FILIPE images were again obtained. There was   no change in the pericardial effusion. The Tricuspid valve was noted to be   intact and the extent of TR was noted to be  mild-to-moderate .  The venotomy access was closed with a purse-string suture using a Vicryl   suture on a UR5 needle. Hemostasis was confirmed.   The pocket was irrigated and cleansed. After removing diseased tissue from   pocket edges, removal of infected/granulation tissue, hemostasis within   the pocket was confirmed. The pocket was closed with staples.     Re-implant:  None    Access closure:  Femoral venous access is were closed using the Vascade closure device   (total x2 - RFV 8F and LFV 9F). Figure of 8 suture along the RFV 14f   access.  Manual pressure at this time was held for 5 to 10 minutes.     Summary:  s/p Transvenous Lead Extraction of Dual chamber pacemaker - pulse   generator and Dual leads (RA and RV)  Debridement of subcutaneous tissue (<20 sq cm)    Recommendation:  Please hold all anticoagulation including heparin products unless   otherwise specified by our electrophysiology service - admit to CICU  Monitor on telemetry overnight  c/w Abx under ID guidance  Bedrest for 4 hours post sheath removal  Figure of 8 suture along RFV access to be removed in AM tomorrow  If pt maintains adequate rates, no plan for re-implant and can complete   his full Abx course prior to re-implant, if needed sooner then need to   wait atleast 72hr of negative cultures prior to re-implant (left side)    Patient Instructions:  Staples to be removed in 2 weeks  Please do not lift right arm above shoulder level for 4-5 weeks  No repetitive motion or lifting heavy weight for 4-5 weeks  No driving, alcohol or making legal decisions for 24 hours.  Keep wound completely dry for 7 days; may shower but keep bandage as dry   as possible.  No soaking in hot tubs or baths for 10 days. May sponge bath  Keep bandage on incision until seen in the office in  1 week.  Allow steristrips underneath to fall off naturally.     See complete procedural log and parameters.      Assessment/Plan     Assessment and Hospital course   Mr. Chester Verduzco is a 73 year old male with PMHX HTN, HLD, Dual chamber PPM implanted in 11/1996 for SSS (RV is passive) with most recent generator replacement in 11/2020 ([facility]), Type 2 DM, gout, and recent hospitalization (10/2-10/7) for Staph bacteremia, diarrhea, NOHELIA , and hyponatremia 2/2 hypovolemia who presented to [facility] ED from a SNF on 11/13 for NOHELIA on labs from the SNF and oliguria. Blood cultures were positive for MSSA and previously had been positive 10/8 and 11/10 for MSSA. Blood cultures with NGTD as of 11/16.  FILIPE 11/16 showed tricuspid valve vegetation as well as right atrial lead vegetation. Patient has been afebrile without leukocytosis and hemodynamically stable. Lead extraction complete 11/22.  Patient on IV antibiotics with stop 4 weeks after lead removal (~12/20). Home lasix and losartan were being held in setting of NOHELIA 2/2 to nephrotoxic antibiotic exposure which is now resolving with adequate urine output. Patient has maldonado I/s/o severe BPH. Lead removed 11/22 for MSSA bacteremia and tricuspid valve endocarditis. Currently monitoring heart rate off pacemaker and continuing antibiotics.      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 Renal U/S did not show any hydronephrosis.  - Baseline prior to October 2023 hospitalization ~1.0-1.1.   - Elevated to 5.43 on arrival to [facility] on 11/13, down trending  - Initial urine electrolytes showed Fena 2.5% indicating intrinsic etiology   - Normal C3 and C4  - Follow up  ANCA, SPEP/UPEP with IF, free kappa robert  - Nephrology following  - Maldonado replaced (11/17) , strict I&Os, No indication for HD at this time  - Daily RFP  - Replete lytes as needed    #Type 2 DM  - Checking A1c  - On Lantus 10 units QHS and cover with SSI  - hypoglycemia protocol    #HTN  #HLD  - Holding home losartan and lasix in setting of NOHELIA  - Continue Amlodipine 10, metop succinate 100 daily, and rosuvastatin 10 daily      #BPH  - Was initially planned for outpatient robotic prostatectomy end of November that has since been canceled and rescheduled for December  - Continue with Flomax 0.8 mg QHS and finasteride 5 md daily  - Continue maldonado       F: As needed  E: AS needed  N: Adult; Carb Controlled  A:  PIV, Maldonado new as of 11/17  DVT ppx: Heparin SQ  GI: N/A  Code Status: Full code  NOK: Charlene Pardo (friend) [phone]      Principal Problem:    MSSA bacteremia  Active Problems:    Endocarditis    History of general anesthesia         Devon Fairchild MD   Internal Medicine PGY-I    Edited by Lyla Menard PGY-2

## 2023-11-23 NOTE — PROGRESS NOTES
Chester Verduzco is a 73 y.o. male on day 7 of admission presenting with MSSA bacteremia.    Subjective   Interval History: NAEON. Patient remains afebrile and underwent ICD removal on 11/22. He endorse mild pain over surgical area. Denies fever, chills, night sweats, nausea, vomiting, diarrhea, abdominal pain, SOB, CP and palpitations        Review of Systems   All other systems reviewed and are negative.      Objective   Range of Vitals (last 24 hours)  Heart Rate:  [70-87]   Temp:  [36.5 °C (97.7 °F)-36.7 °C (98.1 °F)]   Resp:  [13-25]   BP: (107-147)/(56-73)   Weight:  [108 kg (237 lb)]   SpO2:  [95 %-100 %]   Daily Weight  11/23/23 : 108 kg (237 lb)    Body mass index is 30.42 kg/m².    Physical Exam  Constitutional:       Appearance: Normal appearance.   HENT:      Head: Normocephalic and atraumatic.   Eyes:      Conjunctiva/sclera: Conjunctivae normal.      Pupils: Pupils are equal, round, and reactive to light.   Cardiovascular:      Rate and Rhythm: Normal rate and regular rhythm.      Pulses: Normal pulses.      Heart sounds: Normal heart sounds. No murmur heard.  Pulmonary:      Effort: Pulmonary effort is normal.      Breath sounds: Normal breath sounds. No wheezing, rhonchi or rales.   Abdominal:      General: Bowel sounds are normal. There is no distension.      Palpations: Abdomen is soft.      Tenderness: There is no abdominal tenderness.   Musculoskeletal:      Cervical back: Neck supple. No rigidity.   Skin:     General: Skin is warm and dry.      Coloration: Skin is not jaundiced.      Comments: Surgical incision w/o drainage or erythema and w/ mild TTP   Neurological:      General: No focal deficit present.      Mental Status: He is alert and oriented to person, place, and time.           Antibiotics  heparin (porcine) injection 5,000 Units  amLODIPine (Norvasc) tablet 10 mg  ceFAZolin in dextrose (iso-os) (Ancef) IVPB 2 g  finasteride (Proscar) tablet 5 mg  heparin (porcine) injection 5,000  Units  insulin glargine (Lantus) injection 10 Units  melatonin tablet 5 mg  insulin lispro (HumaLOG) injection 0-5 Units  metoprolol succinate XL (Toprol-XL) 24 hr tablet 100 mg  mupirocin (Bactroban) 2 % ointment  perflutren protein A microsphere (Optison) injection 0.5 mL  rosuvastatin (Crestor) tablet 10 mg  sulfur hexafluoride microsphr (Lumason) injection 24.28 mg  tamsulosin (Flomax) 24 hr capsule 0.8 mg  acetaminophen (Tylenol) tablet 650 mg  acetaminophen (Tylenol) oral liquid 650 mg  acetaminophen (Tylenol) suppository 650 mg  dextrose 10 % in water (D10W) infusion  dextrose 50 % injection 25 g  glucagon (Glucagen) injection 1 mg  dextrose 50 % injection 25 g  glucagon (Glucagen) injection 1 mg  dextrose 10 % in water (D10W) infusion  insulin glargine (Lantus) injection 10 Units  insulin lispro (HumaLOG) injection 0-5 Units  insulin glargine (Lantus) injection 10 Units  acetaminophen (Tylenol) tablet 650 mg  oxyCODONE (Roxicodone) immediate release tablet 5 mg  allopurinol (Zyloprim) tablet    ondansetron (Zofran) tablet 4 mg  sennosides (Senokot) tablet 8.6 mg  sennosides (Senokot) tablet 8.6 mg  polyethylene glycol (Glycolax, Miralax) packet 17 g  sennosides (Senokot) tablet 8.6 mg  polyethylene glycol (Glycolax, Miralax) packet 17 g  magnesium sulfate IV 2 g  traZODone (Desyrel) tablet 50 mg  insulin glargine (Lantus) injection 5 Units  propofol (Diprivan) injection  - Omnicell Override Pull  midazolam (Versed) injection  - Omnicell Override Pull  fentaNYL PF (Sublimaze) injection  - Omnicell Override Pull  bupivacaine PF (Marcaine) 0.25 % (2.5 mg/mL) injection  rocuronium (ZeMuron) injection  - Omnicell Override Pull  rocuronium (ZeMuron) injection  - Omnicell Override Pull  rocuronium (ZeMuron) injection  - Omnicell Override Pull  sevoflurane inhaler solution  - Omnicell Override Pull  ondansetron (Zofran) injection  - Omnicell Override Pull  ceFAZolin (Ancef) injection  - Omnicell Override  Pull  ceFAZolin (Ancef) injection  - Omnicell Override Pull  sugammadex (Bridion) injection  - Omnicell Override Pull  sugammadex (Bridion) injection  - Omnicell Override Pull  magnesium sulfate in D5W IV 1 g  insulin glargine (Lantus) injection 10 Units  sennosides (Senokot) tablet 8.6 mg      Relevant Results  Labs  Results from last 72 hours   Lab Units 11/23/23  0529 11/22/23  1527 11/21/23  1010   WBC AUTO x10*3/uL 8.5 9.6 6.8   HEMOGLOBIN g/dL 7.2* 7.8* 7.6*   HEMATOCRIT % 22.2* 24.1* 24.2*   PLATELETS AUTO x10*3/uL 116* 108* 104*   NEUTROS PCT AUTO % 84.1 87.4 77.5   LYMPHS PCT AUTO % 8.2 6.2 14.0   MONOS PCT AUTO % 6.0 4.7 5.4   EOS PCT AUTO % 0.9 0.4 1.5     Results from last 72 hours   Lab Units 11/23/23  0529 11/22/23  1527 11/21/23  1011   SODIUM mmol/L 138 138 136   POTASSIUM mmol/L 4.8 4.4 4.2   CHLORIDE mmol/L 108* 108* 106   CO2 mmol/L 20* 19* 20*   BUN mg/dL 36* 35* 37*   CREATININE mg/dL 3.62* 3.59* 3.72*   GLUCOSE mg/dL 100* 156* 168*   CALCIUM mg/dL 8.1* 8.3* 8.2*   ANION GAP mmol/L 15 15 14   EGFR mL/min/1.73m*2 17* 17* 17*   PHOSPHORUS mg/dL 5.0* 5.1* 4.1     Results from last 72 hours   Lab Units 11/23/23  0529 11/22/23  1527 11/21/23  1011   ALBUMIN g/dL 2.8* 3.0* 2.7*     Estimated Creatinine Clearance: 24.1 mL/min (A) (by C-G formula based on SCr of 3.62 mg/dL (H)).  C-Reactive Protein   Date Value Ref Range Status   11/14/2023 9.21 (H) <1.00 mg/dL Final     CRP   Date Value Ref Range Status   09/18/2023 22.55 (A) mg/dL Final     Comment:     REF VALUE  < 1.00     06/23/2021 0.50 mg/dL Final     Comment:     REF VALUE  < 1.00       Microbiology  Susceptibility data from last 14 days.  Collected Specimen Info Organism Clindamycin Erythromycin Oxacillin Tetracycline Trimethoprim/Sulfamethoxazole Vancomycin   11/14/23 Swab from Anterior Nares Methicillin Resistant Staphylococcus aureus (MRSA)         11/14/23 Blood culture from Peripheral Venipuncture Methicillin Susceptible Staphylococcus  aureus (MSSA) S S S S S S       Imaging  11/23 US LE  IMPRESSION:  1. Enlarged left inguinal/external iliac lymph nodes measuring up to  5.7 cm x 3.3 cm x 4.4 cm with internal hypervascularity which were  visualized on the CT dated 10/02/2023 and not definitively seen  dating back to 2015 concerning for a lymphoproliferative disorder or  metastatic disease from left lower extremity malignancy, soft tissue  biopsy for diagnosis and/or PET-CT is recommended for further  evaluation.        Assessment/Plan   Mr Verduzco is a 73 y.o. male with a PMHx of HTN, HLD, dual chamber PPM implanted in 11/1996 for SSS, T2DM, gout, and recent hospitalization due to hypovolemia 2/2 diarrhea c/b  NOHELIA (10/2-10/7) who was admitted at OSH due to MSSA bacteremia. FILIPE won 11/16 concerning for tricuspid valve vegetation and right atrial lead vegetation for which he was transferred to [facility] for further management and evaluation. ID consulted for abx recs. Now, s/p removal of ICD on 11/22. Repeat BCX remains no growth.     Diagnosis:  #MSSA bacteremia 2/2 TV IE and ICD infection s/p ICD removal (11/22)    Recommendations:  -Continue Cefazolin 2gr q12hr IV  -Plan to complete a 4 week course from 11/22 to 12/20/23  -Monitor CBC, BMP, ESR and CRP weekly and fax to [phone] Attn Dr Gonzalez  -ID will follow up as outpatient with Dr Gonzalez (~1 week prior ending abx course)    ID will sign off   Plan discussed with Dr Carlos Hernandez MD  ID Team A pager [phone]

## 2023-11-23 NOTE — ACP (ADVANCE CARE PLANNING)
Confirming Previous Code Status: Yes    Patient transferred back from CICU to [facility]/general medicine floor following pacemaker lead removal on 11/22. Patient's code status was changed from DNAR/ok to intubate prior to procedure and remained per post procedure protocol. Reviewed pacemaker device interrogation; AP 29% and  30%. Given that patient had lead extraction on 11/22 and we are now about 24hours post procedure, readdressed code status with patient. Had lengthy discussion about code status upon pt's transfer to the floor. Patient would like to remain full code, especially until pacemaker can be replaced following treatment with IV antibiotics. Pt's code status will remain full code.

## 2023-11-23 NOTE — CARE PLAN
The patient's goals for the shift include to be painfree    The clinical goals for the shift include Patient will show no further s/s of infection    Over the shift, the patient did make progress toward the following goals.

## 2023-11-24 ENCOUNTER — DOCUMENTATION (OUTPATIENT)
Dept: CARDIOLOGY | Facility: HOSPITAL | Age: 73
End: 2023-11-24
Payer: COMMERCIAL

## 2023-11-24 PROBLEM — I44.2 COMPLETE ATRIOVENTRICULAR BLOCK (MULTI): Status: ACTIVE | Noted: 2023-11-16

## 2023-11-24 LAB
ABO GROUP (TYPE) IN BLOOD: NORMAL
ALBUMIN SERPL BCP-MCNC: 2.7 G/DL (ref 3.4–5)
ANION GAP SERPL CALC-SCNC: 15 MMOL/L (ref 10–20)
ANTIBODY SCREEN: NORMAL
APTT PPP: 37 SECONDS (ref 27–38)
BACTERIA BLD CULT: NORMAL
BACTERIA BLD CULT: NORMAL
BACTERIA SPEC CULT: ABNORMAL
BASOPHILS # BLD AUTO: 0.02 X10*3/UL (ref 0–0.1)
BASOPHILS NFR BLD AUTO: 0.2 %
BUN SERPL-MCNC: 35 MG/DL (ref 6–23)
CALCIUM SERPL-MCNC: 8.2 MG/DL (ref 8.6–10.6)
CHLORIDE SERPL-SCNC: 107 MMOL/L (ref 98–107)
CO2 SERPL-SCNC: 20 MMOL/L (ref 21–32)
CREAT SERPL-MCNC: 3.68 MG/DL (ref 0.5–1.3)
EOSINOPHIL # BLD AUTO: 0.08 X10*3/UL (ref 0–0.4)
EOSINOPHIL NFR BLD AUTO: 1 %
ERYTHROCYTE [DISTWIDTH] IN BLOOD BY AUTOMATED COUNT: 15.9 % (ref 11.5–14.5)
GFR SERPL CREATININE-BSD FRML MDRD: 17 ML/MIN/1.73M*2
GLUCOSE BLD MANUAL STRIP-MCNC: 101 MG/DL (ref 74–99)
GLUCOSE BLD MANUAL STRIP-MCNC: 108 MG/DL (ref 74–99)
GLUCOSE BLD MANUAL STRIP-MCNC: 118 MG/DL (ref 74–99)
GLUCOSE BLD MANUAL STRIP-MCNC: 92 MG/DL (ref 74–99)
GLUCOSE SERPL-MCNC: 104 MG/DL (ref 74–99)
GRAM STN SPEC: ABNORMAL
GRAM STN SPEC: ABNORMAL
HCT VFR BLD AUTO: 22 % (ref 41–52)
HGB BLD-MCNC: 7.1 G/DL (ref 13.5–17.5)
IMM GRANULOCYTES # BLD AUTO: 0.07 X10*3/UL (ref 0–0.5)
IMM GRANULOCYTES NFR BLD AUTO: 0.9 % (ref 0–0.9)
INR PPP: 1.5 (ref 0.9–1.1)
LYMPHOCYTES # BLD AUTO: 1.04 X10*3/UL (ref 0.8–3)
LYMPHOCYTES NFR BLD AUTO: 12.7 %
MAGNESIUM SERPL-MCNC: 1.88 MG/DL (ref 1.6–2.4)
MCH RBC QN AUTO: 29.2 PG (ref 26–34)
MCHC RBC AUTO-ENTMCNC: 32.3 G/DL (ref 32–36)
MCV RBC AUTO: 91 FL (ref 80–100)
MONOCYTES # BLD AUTO: 0.61 X10*3/UL (ref 0.05–0.8)
MONOCYTES NFR BLD AUTO: 7.5 %
NEUTROPHILS # BLD AUTO: 6.36 X10*3/UL (ref 1.6–5.5)
NEUTROPHILS NFR BLD AUTO: 77.7 %
NRBC BLD-RTO: 0 /100 WBCS (ref 0–0)
PHOSPHATE SERPL-MCNC: 4.8 MG/DL (ref 2.5–4.9)
PLATELET # BLD AUTO: 99 X10*3/UL (ref 150–450)
POTASSIUM SERPL-SCNC: 4.5 MMOL/L (ref 3.5–5.3)
PROTHROMBIN TIME: 16.7 SECONDS (ref 9.8–12.8)
RBC # BLD AUTO: 2.43 X10*6/UL (ref 4.5–5.9)
RH FACTOR (ANTIGEN D): NORMAL
SODIUM SERPL-SCNC: 137 MMOL/L (ref 136–145)
WBC # BLD AUTO: 8.2 X10*3/UL (ref 4.4–11.3)

## 2023-11-24 PROCEDURE — 99233 SBSQ HOSP IP/OBS HIGH 50: CPT | Performed by: INTERNAL MEDICINE

## 2023-11-24 PROCEDURE — 2500000001 HC RX 250 WO HCPCS SELF ADMINISTERED DRUGS (ALT 637 FOR MEDICARE OP)

## 2023-11-24 PROCEDURE — 36415 COLL VENOUS BLD VENIPUNCTURE: CPT

## 2023-11-24 PROCEDURE — 97530 THERAPEUTIC ACTIVITIES: CPT | Mod: GP,CQ

## 2023-11-24 PROCEDURE — 99232 SBSQ HOSP IP/OBS MODERATE 35: CPT | Performed by: INTERNAL MEDICINE

## 2023-11-24 PROCEDURE — 86900 BLOOD TYPING SEROLOGIC ABO: CPT

## 2023-11-24 PROCEDURE — 2500000004 HC RX 250 GENERAL PHARMACY W/ HCPCS (ALT 636 FOR OP/ED)

## 2023-11-24 PROCEDURE — 82947 ASSAY GLUCOSE BLOOD QUANT: CPT

## 2023-11-24 PROCEDURE — 80069 RENAL FUNCTION PANEL: CPT

## 2023-11-24 PROCEDURE — 83735 ASSAY OF MAGNESIUM: CPT

## 2023-11-24 PROCEDURE — 85730 THROMBOPLASTIN TIME PARTIAL: CPT

## 2023-11-24 PROCEDURE — 85025 COMPLETE CBC W/AUTO DIFF WBC: CPT

## 2023-11-24 PROCEDURE — 2500000001 HC RX 250 WO HCPCS SELF ADMINISTERED DRUGS (ALT 637 FOR MEDICARE OP): Performed by: STUDENT IN AN ORGANIZED HEALTH CARE EDUCATION/TRAINING PROGRAM

## 2023-11-24 PROCEDURE — 1200000002 HC GENERAL ROOM WITH TELEMETRY DAILY

## 2023-11-24 PROCEDURE — 96372 THER/PROPH/DIAG INJ SC/IM: CPT

## 2023-11-24 PROCEDURE — 86901 BLOOD TYPING SEROLOGIC RH(D): CPT

## 2023-11-24 PROCEDURE — 85610 PROTHROMBIN TIME: CPT

## 2023-11-24 PROCEDURE — 97165 OT EVAL LOW COMPLEX 30 MIN: CPT | Mod: GO

## 2023-11-24 PROCEDURE — 86920 COMPATIBILITY TEST SPIN: CPT

## 2023-11-24 RX ORDER — METOPROLOL SUCCINATE 50 MG/1
50 TABLET, EXTENDED RELEASE ORAL DAILY
Status: DISCONTINUED | OUTPATIENT
Start: 2023-11-24 | End: 2023-11-25

## 2023-11-24 RX ORDER — MAGNESIUM SULFATE HEPTAHYDRATE 40 MG/ML
2 INJECTION, SOLUTION INTRAVENOUS ONCE
Status: COMPLETED | OUTPATIENT
Start: 2023-11-24 | End: 2023-11-24

## 2023-11-24 RX ADMIN — HEPARIN SODIUM 5000 UNITS: 5000 INJECTION INTRAVENOUS; SUBCUTANEOUS at 02:23

## 2023-11-24 RX ADMIN — METOPROLOL SUCCINATE 50 MG: 50 TABLET, EXTENDED RELEASE ORAL at 09:04

## 2023-11-24 RX ADMIN — ROSUVASTATIN CALCIUM 10 MG: 10 TABLET, FILM COATED ORAL at 20:36

## 2023-11-24 RX ADMIN — AMLODIPINE BESYLATE 10 MG: 10 TABLET ORAL at 20:35

## 2023-11-24 RX ADMIN — MAGNESIUM SULFATE HEPTAHYDRATE 2 G: 40 INJECTION, SOLUTION INTRAVENOUS at 10:34

## 2023-11-24 RX ADMIN — INSULIN GLARGINE 10 UNITS: 100 INJECTION, SOLUTION SUBCUTANEOUS at 20:35

## 2023-11-24 RX ADMIN — Medication 5 MG: at 20:35

## 2023-11-24 RX ADMIN — CEFAZOLIN SODIUM 2 G: 2 INJECTION, SOLUTION INTRAVENOUS at 16:11

## 2023-11-24 RX ADMIN — OXYCODONE HYDROCHLORIDE 5 MG: 5 TABLET ORAL at 11:47

## 2023-11-24 RX ADMIN — FINASTERIDE 5 MG: 5 TABLET, FILM COATED ORAL at 09:04

## 2023-11-24 RX ADMIN — HEPARIN SODIUM 5000 UNITS: 5000 INJECTION INTRAVENOUS; SUBCUTANEOUS at 09:40

## 2023-11-24 RX ADMIN — CEFAZOLIN SODIUM 2 G: 2 INJECTION, SOLUTION INTRAVENOUS at 04:22

## 2023-11-24 RX ADMIN — TAMSULOSIN HYDROCHLORIDE 0.8 MG: 0.4 CAPSULE ORAL at 20:35

## 2023-11-24 RX ADMIN — HEPARIN SODIUM 5000 UNITS: 5000 INJECTION INTRAVENOUS; SUBCUTANEOUS at 18:30

## 2023-11-24 ASSESSMENT — COGNITIVE AND FUNCTIONAL STATUS - GENERAL
HELP NEEDED FOR BATHING: A LOT
MOVING FROM LYING ON BACK TO SITTING ON SIDE OF FLAT BED WITH BEDRAILS: A LITTLE
MOVING FROM LYING ON BACK TO SITTING ON SIDE OF FLAT BED WITH BEDRAILS: A LITTLE
STANDING UP FROM CHAIR USING ARMS: A LOT
DRESSING REGULAR UPPER BODY CLOTHING: A LOT
TOILETING: TOTAL
MOBILITY SCORE: 12
TURNING FROM BACK TO SIDE WHILE IN FLAT BAD: A LOT
STANDING UP FROM CHAIR USING ARMS: A LOT
DRESSING REGULAR LOWER BODY CLOTHING: A LOT
DRESSING REGULAR LOWER BODY CLOTHING: TOTAL
DRESSING REGULAR UPPER BODY CLOTHING: TOTAL
WALKING IN HOSPITAL ROOM: A LOT
PERSONAL GROOMING: A LOT
PERSONAL GROOMING: A LITTLE
WALKING IN HOSPITAL ROOM: A LOT
TURNING FROM BACK TO SIDE WHILE IN FLAT BAD: A LITTLE
DRESSING REGULAR LOWER BODY CLOTHING: A LOT
PATIENT BASELINE BEDBOUND: NO
TOILETING: TOTAL
CLIMB 3 TO 5 STEPS WITH RAILING: TOTAL
MOVING FROM LYING ON BACK TO SITTING ON SIDE OF FLAT BED WITH BEDRAILS: A LITTLE
HELP NEEDED FOR BATHING: A LOT
EATING MEALS: A LITTLE
MOVING TO AND FROM BED TO CHAIR: A LITTLE
HELP NEEDED FOR BATHING: TOTAL
DAILY ACTIVITIY SCORE: 13
EATING MEALS: A LITTLE
WALKING IN HOSPITAL ROOM: A LOT
MOVING TO AND FROM BED TO CHAIR: A LITTLE
MOBILITY SCORE: 16
DAILY ACTIVITIY SCORE: 12
MOBILITY SCORE: 14
TURNING FROM BACK TO SIDE WHILE IN FLAT BAD: A LITTLE
TOILETING: A LITTLE
STANDING UP FROM CHAIR USING ARMS: A LITTLE
PERSONAL GROOMING: A LITTLE
DRESSING REGULAR UPPER BODY CLOTHING: A LOT
CLIMB 3 TO 5 STEPS WITH RAILING: TOTAL
DAILY ACTIVITIY SCORE: 13
MOVING TO AND FROM BED TO CHAIR: A LOT
CLIMB 3 TO 5 STEPS WITH RAILING: A LOT

## 2023-11-24 ASSESSMENT — PAIN - FUNCTIONAL ASSESSMENT
PAIN_FUNCTIONAL_ASSESSMENT: 0-10

## 2023-11-24 ASSESSMENT — PAIN SCALES - GENERAL
PAINLEVEL_OUTOF10: 0 - NO PAIN
PAINLEVEL_OUTOF10: 3
PAINLEVEL_OUTOF10: 7
PAINLEVEL_OUTOF10: 0 - NO PAIN
PAINLEVEL_OUTOF10: 5 - MODERATE PAIN

## 2023-11-24 ASSESSMENT — ACTIVITIES OF DAILY LIVING (ADL): BATHING_ASSISTANCE: MODERATE

## 2023-11-24 NOTE — CARE PLAN
Problem: Infection related to problem list condition  Goal: Infection will resolve through treatment  Outcome: Progressing     Problem: Pain  Goal: My pain/discomfort is manageable  Outcome: Progressing     Problem: Safety  Goal: Patient will be injury free during hospitalization  Outcome: Progressing  Goal: I will remain free of falls  Outcome: Progressing     Problem: Daily Care  Goal: Daily care needs are met  Outcome: Progressing     Problem: Psychosocial Needs  Goal: Demonstrates ability to cope with hospitalization/illness  Outcome: Progressing  Goal: Collaborate with me, my family, and caregiver to identify my specific goals  Outcome: Progressing   The patient's goals for the shift include to be painfree    The clinical goals for the shift include Patient will have controlled pain and remain hemodynamically stable this shift. Free from injury or fall.

## 2023-11-24 NOTE — PROGRESS NOTES
Occupational Therapy    Evaluation    Patient Name: Chester Verduzco  MRN: [id number]  Today's Date: 11/24/2023  Time Calculation  Start Time: 1120  Stop Time: 1132  Time Calculation (min): 12 min    Assessment  IP OT Assessment  OT Assessment: Pt with impaired ability to complete functional transfer, required total assist x 1 with 2 sit to stand attempts, however, unable to complete the transfer. RN assisted with sit to stand and chair to BSC transfer with total assist x 1. Pt presents with decreased activity tolerance and limited strength.  Prognosis: Good  Barriers to Discharge: None  Evaluation/Treatment Tolerance: Patient tolerated treatment well  Medical Staff Made Aware: Yes  End of Session Communication: Bedside nurse  End of Session Patient Position:  ([facility] [place], [place])  Plan:  Treatment Interventions: ADL retraining, Functional transfer training  No Skilled OT: Independent with ADLs  OT Frequency: 3 times per week  OT Discharge Recommendations: Moderate intensity level of continued care  OT Recommended Transfer Status: Maximum assist, Assist of 2  OT - OK to Discharge: Yes    Subjective   Current Problem:  1. Lymphadenopathy, inguinal  Referral to Surgical Oncology      2. Endocarditis  Cardiac Device Check - Inpatient    Cardiac Device Check - Inpatient    Referral to Urology    Case Request EP Lab: PPM Lead Extraction    Case Request EP Lab: PPM Lead Extraction    ICD Lead Extraction    ICD Lead Extraction      3. MSSA bacteremia  CANCELED: Case Request EP Lab: Pacemaker/AICD Lead Extraction Excimer Laser    CANCELED: Case Request EP Lab: Pacemaker/AICD Lead Extraction Excimer Laser      4. CKD (chronic kidney disease) stage 4, GFR 15-29 ml/min (CMS/[facility])  Referral to Urology      5. NOHELIA (acute kidney injury) (CMS/[facility])  Referral to Urology      6. Sick sinus syndrome (CMS/[facility])  Cardiac Device Check - In Clinic    Cardiac Device Check - In Clinic      7. Presence of cardiac pacemaker  Cardiac Device Check - In Clinic     Cardiac Device Check - In Clinic      8. Pacemaker  Anesthesia Intraoperative Transesophageal Echocardiogram    Anesthesia Intraoperative Transesophageal Echocardiogram      9. Complete atrioventricular block (CMS/HCC)  Case Request EP Lab: PPM IMPLANT DUAL    Case Request EP Lab: PPM IMPLANT DUAL        General:  Reason for Referral: From SNF 2/2 NOHELIA c infected leads  Past Medical History Relevant to Rehab: PMHX HTN, HLD, Dual chamber PPM implanted in 11/1996 for SSS (RV is passive) with most recent generator replacement in 11/2020 ([employer] [facility] [employer] MRI), Type 2 DM, gout, and recent hospitalization (10/2-10/7) for Staph bacteremia, diarrhea, NOHELIA , and hyponatremia 2/2 hypovolemia  Prior to Session Communication: Bedside nurse  Family/Caregiver Present: No  General Comment: Pt was in a chair at the start of the session. RN assisted with pt's BSC transfer due to level of assist required with transfers.   Precautions:  Medical Precautions: Fall precautions, Cardiac precautions  Vital Signs:     Pain:  Pain Assessment  Pain Assessment: 0-10  Pain Score: 5 - Moderate pain  Pain Type: Acute pain  Pain Location: Knee  Pain Orientation: Right  Lines/Tubes/Drains:  Urethral Catheter (Active)   Number of days: 7         Objective   Cognition:  Overall Cognitive Status: Within Functional Limits           Home Living:  Type of Home: House  Lives With: Alone  Home Adaptive Equipment: None  Home Layout: One level  Home Access: Stairs to enter without rails   Prior Function:  Level of [place]: Independent with ADLs and functional transfers  Receives Help From: Friends  Homemaking Assistance: Independent  Vocational: Retired (RN)       ADL:  Eating Assistance: Stand by  Eating Deficit: Setup  Grooming Assistance: Stand by  Grooming Deficit: Setup  Bathing Assistance: Moderate  Bathing Deficit:  (anticipate)  UE Dressing Assistance: Stand by  UE Dressing Deficit: Setup (anticipate)  LE Dressing Assistance: Maximal  LE  Dressing Deficit: Thread RLE into pants (anticipate)  Toileting Assistance with Device: Total  Activity Tolerance:  Endurance: Tolerates less than 10 min exercise with changes in vital signs  Bed Mobility/Transfers: Bed Mobility  Bed Mobility: No   and Transfers  Transfer: Yes  Transfer 1  Transfer From 1: Sit to  Transfer to 1: Stand  Technique 1: Sit to stand  Transfer Device 1: Walker  Transfer Level of Assistance 1:  (total assist, unable to complete transfer x 2 attempts. RN assisted with sit to stand transfer total assist x 1 and chair to BSC transfer. Pt unable to take a side step, removed the chair and placed the BSC under the pt. Pt is at high risk for falls.)       Vision: Vision - Basic Assessment  Current Vision: No visual deficits   and    Sensation:  Light Touch: No apparent deficits    Coordination:  Movements are Fluid and Coordinated: Yes        Extremities: RUE   RUE :  (R shoulder AROM limited due to recent prodecure, R elbow to digit AROM WFL.), LUE   LUE: Within Functional Limits,  , and        Outcome Measures: [facility] Daily Activity  Putting on and taking off regular lower body clothing: A lot  Bathing (including washing, rinsing, drying): A lot  Putting on and taking off regular upper body clothing: A lot  Toileting, which includes using toilet, bedpan or urinal: Total  Taking care of personal grooming such as brushing teeth: A lot  Eating Meals: A little  Daily Activity - Total Score: 12    4AT Negative          ,          Education Documentation  ADL Training, taught by Sara Marrufo OT at 11/24/2023  2:40 PM.  Learner: Patient  Readiness: Acceptance  Method: Explanation  Response: Verbalizes Understanding    Education Comments  No comments found.        Goals:   Encounter Problems       Encounter Problems (Active)       ADLs       Patient will perform UB and LB bathing  with minimal assist  level of assistance and raised toilet seat, grab bars, bedside commode, and shower chair.  (Progressing)       Start:  11/24/23    Expected End:  12/15/23            Patient will complete toileting including hygiene clothing management/hygiene with moderate assist level of assistance and raised toilet seat, grab bars, bedside commode, and shower chair. (Progressing)       Start:  11/24/23    Expected End:  12/15/23               BALANCE       Pt will maintain dynamic standing balance during ADL task with moderate assist level of assistance in order to demonstrate decreased risk of falling and improved postural control. (Progressing)       Start:  11/24/23    Expected End:  12/15/23               MOBILITY       Patient will perform Functional mobility min Household distances/Community Distances with moderate assist level of assistance and least restrictive device in order to improve safety and functional mobility. (Progressing)       Start:  11/24/23    Expected End:  12/15/23               TRANSFERS       Patient will perform bed mobility moderate assist level of assistance and bed rails in order to improve safety and independence with mobility (Progressing)       Start:  11/24/23    Expected End:  12/15/23                     11/24/23 at 2:41 PM   JULIAN Salomon/L, OTD   Rehab Office: [phone]

## 2023-11-24 NOTE — PROGRESS NOTES
"Transfer To Floor     Subjective     Overnight events:  Pt was seen and examined at bedside. No acute overnight events. Now s/p lead extraction 11/22. Pt denies any chest pain, SOB, dyspnea, new cough, palpitations, nausea, vomiting, diarrhea, dizziness, changes in visions, headaches, or worsening lower extremity swelling. Pt is comfortable in bed and in no acute distress. Has a painless non-reducible soft tissue mass in the left groin. He reports it has been there for at least a month.    Vital signs:  Blood pressure 121/64, pulse 80, temperature 36.5 °C (97.7 °F), temperature source Temporal, resp. rate 18, height 1.88 m (6' 2.02\"), weight 116 kg (255 lb 1.2 oz), SpO2 95 %.    I/O last 3 completed shifts:  In: 2816.6 (26.2 mL/kg) [P.O.:840; I.V.:326.6 (3 mL/kg); IV Piggyback:1650]  Out: 2750 (25.6 mL/kg) [Urine:2700 (0.7 mL/kg/hr); Blood:50]  Weight: 107.5 kg     Physical Exam  GENERAL:  In no acute distress  NEUROLOGIC:  A and O x 3. Cranial nerves 2 through 12 grossly intact with no gait disturbances. Intact motor and sensory systems, with normal reflexes and coordination.    HEENT:  Pupils are equal, round, and reactive to light and accommodation. Extraocular motion intact.    MOUTH: MMM, no lesions observed  CARDIAC: S1 + S2, RRR, no M/R/G.    LUNGS: CTA BL.  No wheezes or coarse breath sounds. Symmetric respirations. No increased work of breathing.   ABDOMEN: Soft, non-tender to palpation. No organomegaly. Normal BS in 4Q, No rebound or guarding.  EXTREMITIES:  Moving x4 equally and spontaneously. Right knee with mild effusion present but no erythema or warmth. Chronic limited ROM but not worse than baseline. No BLE edema   SKIN: Clean, warm, dry, and intact with normal skin turgor.  PSYCH: Appropriate mood and behavior.     Relevant Results  Scheduled medications  amLODIPine, 10 mg, oral, q PM  ceFAZolin in dextrose (iso-os), 2 g, intravenous, q12h  finasteride, 5 mg, oral, Daily  heparin (porcine), 5,000 " Units, subcutaneous, q8h  insulin glargine, 10 Units, subcutaneous, Nightly  insulin lispro, 0-5 Units, subcutaneous, TID with meals  melatonin, 5 mg, oral, Nightly  metoprolol succinate XL, 100 mg, oral, Daily  polyethylene glycol, 17 g, oral, Daily  rosuvastatin, 10 mg, oral, Nightly  sennosides, 1 tablet, oral, Nightly  tamsulosin, 0.8 mg, oral, Nightly      Continuous medications     PRN medications  PRN medications: acetaminophen, dextrose 10 % in water (D10W), dextrose, glucagon, ondansetron, oxyCODONE      Labs:  Last CBC:  Lab Results   Component Value Date    WBC 8.2 11/24/2023    HGB 7.1 (L) 11/24/2023    HCT 22.0 (L) 11/24/2023    MCV 91 11/24/2023    PLT 99 (L) 11/24/2023       Last RFP:  Lab Results   Component Value Date    GLUCOSE 104 (H) 11/24/2023    CALCIUM 8.2 (L) 11/24/2023     11/24/2023    K 4.5 11/24/2023    CO2 20 (L) 11/24/2023     11/24/2023    BUN 35 (H) 11/24/2023    CREATININE 3.68 (H) 11/24/2023       Last LFTs:  Lab Results   Component Value Date    ALT <3 (L) 11/17/2023    AST 11 11/17/2023    ALKPHOS 94 11/17/2023    BILITOT 0.3 11/17/2023       Last coags:  Lab Results   Component Value Date    INR 1.5 (H) 11/24/2023    APTT 37 11/24/2023       Micro/culture data:  Susceptibility data from last 90 days.  Collected Specimen Info Organism Clindamycin Erythromycin Oxacillin Tetracycline Trimethoprim/Sulfamethoxazole Vancomycin   11/22/23 Tissue/Biopsy from Other (specify in comments) Staphylococcus aureus         11/14/23 Swab from Anterior Nares Methicillin Resistant Staphylococcus aureus (MRSA)         11/14/23 Blood culture from Peripheral Venipuncture Methicillin Susceptible Staphylococcus aureus (MSSA) S S S S S S         Imaging:  US extremity nonvascular real time w image documentation limited anatomic specific  Narrative: Interpreted By:  Luis Armando Guerin  and Jonatan Gomez   STUDY:  US EXTREMITY NONVASCULAR REAL TIME WITH IMAGE DOCUMENTATION LIMITED  ANATOMIC  SPECIFIC;  11/23/2023 12:14 pm      INDICATION:  Signs/Symptoms:Left groin swelling.      COMPARISON:  CT AP 10/02/2023, 01/19/2015      ACCESSION NUMBER(S):  [id number]      ORDERING CLINICIAN:  ROSALINO ROSE      TECHNIQUE:  Multiple grayscale and color Doppler static and dynamic images of the  left groin were obtained at the symptomatic area of clinically  suspected abnormality.      FINDINGS:  Images demonstrate multiple enlarged hypoechoic lesions of the left  groin measuring 5.7 x 3.3 x 4.4 cm and 3.1 x 2.1 x 2.4 cm which  demonstrate internal vascularity. These correspond to the enlarged  lymph nodes best seen on the prior CT dated 10/02/2023.      Impression: 1. Enlarged left inguinal/external iliac lymph nodes measuring up to  5.7 cm x 3.3 cm x 4.4 cm with internal hypervascularity which were  visualized on the CT dated 10/02/2023 and not definitively seen  dating back to 2015 concerning for a lymphoproliferative disorder or  metastatic disease from left lower extremity malignancy, soft tissue  biopsy for diagnosis and/or PET-CT is recommended for further  evaluation.      I personally reviewed the image(s) / study and I agree with the  findings as stated by Patricia Cheung MD. This study was interpreted at  [place], [place].      MACRO:  None.      Signed by: Luis Armando Guerin 11/23/2023 1:18 PM  Dictation workstation:   [id number]      Assessment/Plan     Assessment and Hospital course   Mr. Chester Verduzco is a 73 year old male with PMHX HTN, HLD, Dual chamber PPM implanted in 11/1996 for SSS (RV is passive) with most recent generator replacement in 11/2020 ([employer] [facility] [employer] [facility]), Type 2 DM, gout, and recent hospitalization (10/2-10/7) for Staph bacteremia, diarrhea, NOHELIA , and hyponatremia 2/2 hypovolemia who presented to [facility] ED from a SNF on 11/13 for NOHELIA on labs from the SNF and oliguria. Blood cultures were positive for MSSA and previously had been positive 10/8 and  11/10 for MSSA. Blood cultures with NGTD as of 11/16.  FILIPE 11/16 showed tricuspid valve vegetation as well as right atrial lead vegetation. Patient has been afebrile without leukocytosis and hemodynamically stable. Device Interrogated 11/17 with AP 29% and  30%.  Lead extraction complete 11/22.  Patient on IV antibiotics with stop 4 weeks after lead removal (~12/20). Home lasix and losartan were being held in setting of NOHELIA 2/2 to nephrotoxic antibiotic exposure which is now resolving with adequate urine output. Patient has maldonado I/s/o severe BPH. Of note, Patient noticed left groin lymphadenopathy and  U/S (11/23) showed Enlarged left inguinal/external iliac lymph nodes.  Given state of infection, could be reactive or concerning for a lymphoproliferative disorder or metastatic disease from left lower extremity malignancy. At this time PET scan would be hard to interpret given state of infection, will consider surg onc inpatient excisional tissue biopsy for diagnosis before discharge.     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    - Given level of pacing prior to surgery, pending EP recs regarding timing of reimplantation of PPM. Patient is monitored on tele without  bradycardia after lead removal.  - Daily CBC Trend  WBC, no leukocytosis  --Monitor CBC, BMP, ESR and CRP weekly and fax to [phone] Attn Dr Gonzalez  -ID will follow up as outpatient with Dr Gonzalez (~1 week prior ending abx course)  -Decrease metoprolol succ 100 mg to 50 mg, goal to wean off beta blocker, given pt no longer being paced.     #NOHELIA: likely ATN given gradual rise in creatinine over past month and history of recent diarrhea and nephrotoxic antibiotic exposure. Renal U/S did not show any hydronephrosis.  - Baseline prior to October 2023 hospitalization ~1.0-1.1.   - Elevated to 5.43 on arrival to [facility] on 11/13, down trending  - Initial urine electrolytes showed Fena 2.5% indicating intrinsic etiology   - Normal C3 and C4  - Follow up  ANCA, SPEP/UPEP with IF, free kappa robert  - Nephrology signed off, will reengage if Cr starts to uptrend  - Rojo replaced (11/17) ,strict I&Os, No indication for HD at this time  - Daily RFP  - Replete lytes as needed    #Inguinal lymphadenopathy  -Patient has endorsed left sided non tender lymphadenopathy for one month   - U/S (11/23) showed Enlarged left inguinal/external iliac lymph nodes measuring up to  5.7 cm x 3.3 cm x 4.4 cm with internal hypervascularity which were  visualized on the CT dated 10/02/2023 and not definitively seen dating back to 2015.  -Given state of infection, could be reactive or concerning for a lymphoproliferative disorder or   metastatic disease from left lower extremity malignancy  -At this time PET scan would be hard to interpret given state of infection, will consider surg onc inpatient excisional tissue biopsy for diagnosis   -Referral to diagnostic clinic for outpatient follow up    #Type 2 DM  - Checking A1c  - On Lantus 10 units QHS and cover with SSI  - hypoglycemia protocol    #HTN  #HLD  - Holding home losartan and lasix in setting of NOHELIA  - Continue Amlodipine 10, metop succinate 100 daily, and rosuvastatin 10 daily      #BPH  - Was initially planned for outpatient robotic  prostatectomy end of November that has since been canceled and rescheduled for December  - Continue with Flomax 0.8 mg QHS and finasteride 5 md daily  - Continue maldonado       F: As needed  E: AS needed  N: Adult; Carb Controlled  A:  PIV, Maldonado new as of 11/17  DVT ppx: Heparin SQ  GI: N/A  Code Status: Full code  NOK: Charlene Pardo (friend) [phone]      Principal Problem:    MSSA bacteremia  Active Problems:    Endocarditis    History of general anesthesia         Gonzalo Henley MD   Internal Medicine PGY-I    Edited by Lyla Menard PGY-2

## 2023-11-24 NOTE — CONSULTS
Reason For Consult  Excisional biopsy of left inguinal lymphadenopathy     History Of Present Illness  Chester Verduzco is a 73 y.o. male with pmh HTN, HLD, Dual chamber PPM admitted with endocarditis with blood cultures positive for MSSA on 10/10 and NGTD as of 11/16. FILIPE 11/16 showed tricuspid valve vegetation as well as right atrial lead vegetation with both leads since removed. Patient had noticed left groin lymphadenopathy for the last month and it was previously noted on a CT on 10/2. The lymphadenopathy has cont'd to this admission and recently evaluated via US which demonstrated enlarged left inguinal/external iliac lymph nodes measuring up to 5.7 cm x 3.3 cm x 4.4 cm with internal hypervascularity which were visualized on the CT dated 10/02/2023 and not definitively seen dating back to 2015 concerning for a lymphoproliferative disorder or metastatic disease from left lower extremity malignancy. Surgical oncology consulted for evaluation for excisional biopsy while patient is inpatient.     Patient states that he first noticed the lump while several weeks ago. States that it is painless. Denies drainage or overlying skin changes. Patient has chronic venous stasis and diabetic neuropathy that is unchanged from baseline. Denies known penile or rectal masses.     Past Medical History  He has a past medical history of Abnormal weight gain (10/27/2016), Achilles tendinitis, unspecified leg (08/16/2016), Acute upper respiratory infection, unspecified, Allergic contact dermatitis due to plants, except food (08/22/2013), BPH with obstruction/lower urinary tract symptoms, Cellulitis of right lower limb (07/30/2021), Cough, unspecified (03/21/2016), Diabetes (CMS/[facility]), Encounter for screening for human immunodeficiency virus (HIV) (06/14/2016), Hordeolum externum unspecified eye, unspecified eyelid (08/14/2019), Hyperglycemia, unspecified (12/13/2017), Incisional hernia without obstruction or gangrene (06/15/2015),  Knee joint pain, MSSA bacteremia (10/2023), Muscle spasm of calf (08/16/2016), Obstructive uropathy, Personal history of other diseases of the digestive system (06/30/2015), Personal history of other diseases of the digestive system (02/05/2016), Personal history of other diseases of the respiratory system (12/23/2014), Personal history of other diseases of the respiratory system (01/28/2016), Personal history of other infectious and parasitic diseases (12/02/2015), Personal history of other infectious and parasitic diseases, Personal history of other specified conditions (02/04/2015), Personal history of other specified conditions (08/16/2016), Personal history of other specified conditions (01/26/2015), Personal history of pneumonia (recurrent) (01/29/2016), Personal history of urinary (tract) infections (02/19/2013), Prostatitis (03/21/2023), Pruritus, unspecified (02/10/2015), Sinoatrial node dysfunction (CMS/HCC), Strain of muscle, fascia and tendon of the posterior muscle group at thigh level, right thigh, initial encounter (05/10/2016), and Unspecified symptoms and signs involving the genitourinary system (12/20/2016).    Surgical History  He has a past surgical history that includes Varicose vein surgery (08/22/2013); Cardiac pacemaker placement (08/22/2013); Other surgical history (06/15/2015); Ventral hernia repair (06/15/2015); Other surgical history (09/16/2019); and Peripherally inserted central catheter insertion.     Social History  He reports that he has never smoked. He has never been exposed to tobacco smoke. He has never used smokeless tobacco. He reports that he does not currently use alcohol. He reports that he does not use drugs.    Family History  No family history on file.     Allergies  Bupropion and Keflex [cephalexin]    Review of Systems    All other ROS negative expect those mentioned in HPI.      Physical Exam  General: Laying in bed. NAD.   Eyes: EOMI  ENMT: no apparent injury, no  "lesions seen, MMM  Head/neck: NCAT  Cardiac: regular rate in chart  Pulm: normal respiratory effort  GI: soft, NT/ND, 5cm x3cm hard immobile mass palpated in left inguinal lymph node basin  : maldonado in place  Msk: JOHNS  Extremities: normal extremities  Skin: warm, dry, no lesions noted in LLE, chronic venous stasis  Neuro: Aox3, neuropathy in LE at baseline  Psych: appropriate mood and behavior      Last Recorded Vitals  Blood pressure 135/64, pulse 82, temperature 36.3 °C (97.3 °F), temperature source Temporal, resp. rate 18, height 1.88 m (6' 2.02\"), weight 116 kg (255 lb 1.2 oz), SpO2 96 %.         Assessment/Plan     Chester Verduzco is a 73 y.o. male with pmh HTN, HLD, Dual chamber PPM admitted with endocarditis c/b tricuspid valve vegetation as well as right atrial lead vegetation with both leads since removed and previously noted left inguinal lymphadenopathy for which surgical oncology is consulted for excisional biopsy.     PET scan deferred at this time d/t active infectious process.    Recommendations:   - Patient to follow up with Dr. Cameron for further discussion and evaluation of left inguinal lymphadenopathy   - Surg onc will place referral order  - Will contact surgeon's office to arrange appointment  - Please reach out with any further questions or concerns    Discussed with attending, Dr. Danelle Anderson MD  Surgical Oncology PGY1     "

## 2023-11-24 NOTE — PROGRESS NOTES
Physical Therapy    Physical Therapy Treatment    Patient Name: Chester Verduzco  MRN: [id number]  Today's Date: 11/24/2023  Time Calculation  Start Time: 1030  Stop Time: 1100  Time Calculation (min): 30 min       Assessment/Plan   PT Assessment  PT Assessment Results: Decreased strength, Decreased endurance, Decreased range of motion, Impaired balance  Rehab Prognosis: Excellent  Strengths: Ability to acquire knowledge, Attitude of self, Coping skills  End of Session Communication: Bedside nurse  End of Session Patient Position: Up in chair     PT Plan  Treatment/Interventions: Bed mobility, Transfer training, Gait training, Stair training, Balance training, Strengthening, Therapeutic exercise, Endurance training, Range of motion, Therapeutic activity, Home exercise program  PT Plan: Skilled PT  PT Frequency: 5 times per week  PT Discharge Recommendations: Moderate intensity level of continued care  PT Recommended Transfer Status: Assist x1, Assistive device  PT - OK to Discharge: Yes      General Visit Information:   PT  Visit  PT Received On: 11/24/23  Response to Previous Treatment: Patient with no complaints from previous session.  Prior to Session Communication: Bedside nurse  Patient Position Received: Bed, 3 rail up (with CTA in room assisting with hygiene tasks)  Family/Caregiver Present: No  General Comment: States R knee discomfort but willing to attempt OOB mobility     Subjective   Precautions:  Precautions  Medical Precautions: Fall precautions, Cardiac precautions  Vital Signs:  Vital Signs  Heart Rate: 82  Heart Rate Source: Monitor    Objective   Pain:  Pain Assessment  Pain Score:  (did not provide numerical reading)  Pain Type: Chronic pain  Pain Location: Knee  Pain Orientation: Right  Cognition:  Cognition  Overall Cognitive Status: Within Functional Limits  Lines/Tubes/Drains:  Urethral Catheter (Active)   Number of days: 6       PT Treatments:  Therapeutic Exercise  Therapeutic Exercise  Performed: Yes  Therapeutic Exercise Activity 1: Seated juvencio LE: LAQ x10  Therapeutic Activity  Therapeutic Activity Performed: Yes  Therapeutic Activity 1: Tolerated static stance at bedside with Min A x 2 and juvencio UE support on FWW for :30 sec prior to amb to chair at bedside.     Bed Mobility  Bed Mobility: Yes  Bed Mobility 1  Bed Mobility 1: Supine to sitting  Level of Assistance 1: Minimum assistance, Minimal verbal cues  Bed Mobility Comments 1: HOB elevated  Ambulation/Gait Training  Ambulation/Gait Training Performed: Yes  Ambulation/Gait Training 1  Surface 1: Level tile  Device 1: Rolling walker  Assistance 1: Minimal verbal cues (Min A x 2)  Quality of Gait 1: Inconsistent stride length, Decreased step length, Forward flexed posture  Comments/Distance (ft) 1: 4 steps over to chair at bedside, 2 fwd/backwards steps  Transfers  Transfer: Yes  Transfer 1  Transfer From 1: Sit to  Transfer to 1: Stand  Technique 1: Sit to stand  Transfer Device 1: Walker  Transfer Level of Assistance 1: Minimal verbal cues (Mod A x 2)  Trials/Comments 1: x2 trials from various surfac level heights  Transfers 2  Transfer From 2: Stand to  Transfer to 2: Sit  Technique 2: Stand to sit  Transfer Device 2: Walker  Transfer Level of Assistance 2: Minimal verbal cues (Mod A x 2)  Trials/Comments 2: x2 trials             Outcome Measures:  [facility] Basic Mobility  Turning from your back to your side while in a flat bed without using bedrails: A little  Moving from lying on your back to sitting on the side of a flat bed without using bedrails: A lot  Moving to and from bed to chair (including a wheelchair): A lot  Standing up from a chair using your arms (e.g. wheelchair or bedside chair): A lot  To walk in hospital room: A lot  Climbing 3-5 steps with railing: Total  Basic Mobility - Total Score: 12                            Education Documentation  Precautions, taught by Mary Ellen Ludwig PTA at 11/24/2023  1:11 PM.  Learner:  Patient  Readiness: Acceptance  Method: Explanation  Response: Verbalizes Understanding    Body Mechanics, taught by Mary Ellen Ludwig PTA at 11/24/2023  1:11 PM.  Learner: Patient  Readiness: Acceptance  Method: Explanation  Response: Verbalizes Understanding    Mobility Training, taught by Mary Ellen Ludwig PTA at 11/24/2023  1:11 PM.  Learner: Patient  Readiness: Acceptance  Method: Explanation  Response: Verbalizes Understanding    Education Comments  No comments found.          OP EDUCATION:       Encounter Problems       Encounter Problems (Active)       PT Problem       Patient will complete supine to sit and sit to supine Independent  (Progressing)       Start:  11/17/23    Expected End:  12/01/23            Patient will perform sit<>stand transfer with Rolling Walker, and Supervision  (Progressing)       Start:  11/17/23    Expected End:  12/01/23            Patient will ambulate >100' with Rolling Walker and Supervision  (Progressing)       Start:  11/17/23    Expected End:  12/01/23            Patient will ascend/descend 3 steps with Bilateral Rails and SBA  (Progressing)       Start:  11/17/23    Expected End:  12/01/23 11/24/23 at 1:12 PM   Mary Ellen Ludwig PTA   Rehab Office: [phone]

## 2023-11-24 NOTE — PROGRESS NOTES
Subjective Data:  Review of the patient's telemetry showed that overnight and this morning the patient had two episodes of high-grade AV block, lasting longest 5 seconds.  The patient also had few episodes of nonconducted PACs later this morning however now conducting one-to-one.  The patient reports that he was completely asymptomatic and denied lightheadedness or loss of consciousness.    Objective Data:  Last Recorded Vitals:  Vitals:    11/23/23 2021 11/23/23 2309 11/24/23 0353 11/24/23 0752   BP: 131/64 134/69 121/64 135/64   BP Location: Left arm Left arm Left arm Left arm   Patient Position: Lying Lying Lying Lying   Pulse: 85 80 80 82   Resp: 18 18 18 18   Temp: 36.5 °C (97.7 °F) 35.8 °C (96.4 °F) 36.5 °C (97.7 °F) 36.3 °C (97.3 °F)   TempSrc: Temporal Temporal Temporal Temporal   SpO2: 96% 96% 95% 96%   Weight:   116 kg (255 lb 1.2 oz)    Height:           Last Labs:  CBC - 11/24/2023:  4:53 AM  8.2 7.1 99    22.0      CMP - 11/24/2023:  4:53 AM  8.2 5.6; 5.6 11 --- 0.3   4.8 2.7 <3 94      PTT - 11/24/2023:  4:53 AM  1.5   16.7 37     TROPHS   Date/Time Value Ref Range Status   09/18/2023 05:52 PM 15 0 - 20 ng/L Final     Comment:     .  Less than 99th percentile of normal range cutoff-  Female and children under 18 years old <14 ng/L; Male <21 ng/L: Negative  Repeat testing should be performed if clinically indicated.   .  Female and children under 18 years old 14-50 ng/L; Male 21-50 ng/L:  Consistent with possible cardiac damage and possible increased clinical   risk. Serial measurements may help to assess extent of myocardial damage.   .  >50 ng/L: Consistent with cardiac damage, increased clinical risk and  myocardial infarction. Serial measurements may help assess extent of   myocardial damage.   .   NOTE: Children less than 1 year old may have higher baseline troponin   levels and results should be interpreted in conjunction with the overall   clinical context.   .  NOTE: Troponin I testing is  performed using a different   testing methodology at [facility] than at other   [facility]. Direct result comparisons should only   be made within the same method.       BNP   Date/Time Value Ref Range Status   10/02/2023 01:07 PM 97 0 - 99 pg/mL Final     HGBA1C   Date/Time Value Ref Range Status   11/17/2023 10:14 AM 6.6 see below % Final   08/11/2023 12:59 PM 9.9 % Final     Comment:          Diagnosis of Diabetes-Adults   Non-Diabetic: < or = 5.6%   Increased risk for developing diabetes: 5.7-6.4%   Diagnostic of diabetes: > or = 6.5%  .       Monitoring of Diabetes                Age (y)     Therapeutic Goal (%)   Adults:          >18           <7.0   Pediatrics:    13-18           <7.5                   7-12           <8.0                   0- 6            7.5-8.5   American Diabetes Association. Diabetes Care 33(S1), Jan 2010.     02/07/2023 09:07 AM 8.4 % Final     Comment:          Diagnosis of Diabetes-Adults   Non-Diabetic: < or = 5.6%   Increased risk for developing diabetes: 5.7-6.4%   Diagnostic of diabetes: > or = 6.5%  .       Monitoring of Diabetes                Age (y)     Therapeutic Goal (%)   Adults:          >18           <7.0   Pediatrics:    13-18           <7.5                   7-12           <8.0                   0- 6            7.5-8.5   American Diabetes Association. Diabetes Care 33(S1), Jan 2010.       VLDL   Date/Time Value Ref Range Status   08/11/2023 12:59 PM 57 0 - 40 mg/dL Final   08/10/2022 11:09 AM 33 0 - 40 mg/dL Final   08/19/2020 12:17 PM 62 0 - 40 mg/dL Final      Last I/O:  I/O last 3 completed shifts:  In: 1280 (11.1 mL/kg) [P.O.:1080; I.V.:100 (0.9 mL/kg); IV Piggyback:100]  Out: 2650 (22.9 mL/kg) [Urine:2650 (0.6 mL/kg/hr)]  Weight: 115.7 kg     Past Cardiology Tests (Last 3 Years):  EKG:  ECG 12 Lead 11/17/2023      ECG 12 Lead 10/9/2023    Echo:  Anesthesia Intraoperative Transesophageal Echocardiogram 11/22/2023      Transesophageal Echo (FILIPE)  11/22/2023      Transthoracic Echo (TTE) Complete 11/15/2023    Ejection Fractions:  EF   Date/Time Value Ref Range Status   11/14/2023 03:22 PM 63       Cath:  No results found for this or any previous visit from the past 1095 days.    Stress Test:  No results found for this or any previous visit from the past 1095 days.    Cardiac Imaging:  No results found for this or any previous visit from the past 1095 days.      Inpatient Medications:  Scheduled medications   Medication Dose Route Frequency    amLODIPine  10 mg oral q PM    ceFAZolin in dextrose (iso-os)  2 g intravenous q12h    finasteride  5 mg oral Daily    heparin (porcine)  5,000 Units subcutaneous q8h    insulin glargine  10 Units subcutaneous Nightly    insulin lispro  0-5 Units subcutaneous TID with meals    magnesium sulfate  2 g intravenous Once    melatonin  5 mg oral Nightly    metoprolol succinate XL  50 mg oral Daily    polyethylene glycol  17 g oral Daily    rosuvastatin  10 mg oral Nightly    sennosides  1 tablet oral Nightly    tamsulosin  0.8 mg oral Nightly     PRN medications   Medication    acetaminophen    dextrose 10 % in water (D10W)    dextrose    glucagon    ondansetron    oxyCODONE     Continuous Medications   Medication Dose Last Rate      General Appearance:    Alert, cooperative, no distress, appears stated age  Lungs:   Clear to auscultation bilaterally, respirations unlabored  Heart:    Regular rate and rhythm, S1 and S2 normal, no murmur, rub  or gallop. Wound is clean without erythema or collection, open to air, mild-moderate tenderness.  Abdomen:  Soft, non-tender, bowel sounds active all four quadrants, no masses, no organomegaly  Extremities: Extremities normal, atraumatic, no cyanosis or edema      Assessment/Plan   Patient doing well post procedure. Device and complete leads were successfully extracted, Wound check with incision well approximated with staples. No hematoma. Continue with IV abx. The patient was not pacing  dependant, device check revealed V-pacing at 30%, however on tele the patient had two episodes of high-grade AV block, lasting longest 5 seconds.  The patient also had few episodes of nonconducted PACs later this morning however now conducting one-to-one. The patient has a prolonged VT with narrow QRS which suggest that the block is at the AV node level and not below. Currently asymptomatic.  -Monitor on tele.  -Planning for pacemaker placement on Monday 11/27/23, keep NPO Sunday midnight.    Peripheral IV 11/13/23 20 G Right;Lateral;Distal Wrist (Active)   Site Assessment Clean;Dry;Intact 11/23/23 0800   Dressing Type Transparent 11/23/23 0800   Line Status No blood return;Flushed;Saline locked 11/23/23 0800   Dressing Status Clean;Dry;Occlusive 11/23/23 0800   Number of days: 10       Peripheral IV 11/22/23 Left;Dorsal Hand (Active)   Site Assessment Clean;Dry;Intact 11/23/23 0800   Dressing Type Transparent 11/23/23 0800   Line Status No blood return;Flushed;Saline locked 11/23/23 0800   Dressing Status Clean;Dry;Occlusive 11/23/23 0800   Number of days: 1       Peripheral IV 11/22/23 16 G Left Hand (Active)   Site Assessment Clean;Dry;Intact 11/23/23 0800   Dressing Type Transparent 11/23/23 0800   Line Status No blood return;Flushed;Saline locked 11/23/23 0800   Dressing Status Clean;Dry;Occlusive 11/23/23 0800   Number of days: 1       Peripheral IV 11/22/23 16 G Right;Anterior Hand (Active)   Site Assessment Clean;Dry;Intact 11/23/23 0800   Dressing Type Transparent 11/23/23 0800   Line Status Infusing 11/23/23 0800   Dressing Status Clean;Dry;Occlusive 11/23/23 0800   Number of days: 1       Urethral Catheter (Active)   Site Assessment Clean;Skin intact 11/23/23 0800   Collection Container Standard drainage bag 11/23/23 0800   Securement Method Securing device (Describe) 11/23/23 0800   Reason for Continuing Urinary Catheterization acute urinary retention 11/23/23 0800   Number of days: 6     This case was  discussed with Dr. KOHLER and > 30 minutes was spent in the professional and overall care of this patient.    Code Status:  Full Code    Ava Gonzalez MD  Cardiology Fellow PGY4    I saw and evaluated the patient. I personally obtained the key and critical portions of the history and physical exam or was physically present for key and critical portions performed by the resident/fellow. I reviewed the resident/fellow's documentation and discussed the patient with the resident/fellow. I agree with the resident/fellow's medical decision making as documented in the note.    Ti Martins MD

## 2023-11-25 LAB
ALBUMIN SERPL BCP-MCNC: 2.6 G/DL (ref 3.4–5)
ANION GAP SERPL CALC-SCNC: 14 MMOL/L (ref 10–20)
BACTERIA BLD CULT: NORMAL
BACTERIA SPEC CULT: ABNORMAL
BASOPHILS # BLD AUTO: 0.02 X10*3/UL (ref 0–0.1)
BASOPHILS NFR BLD AUTO: 0.3 %
BLOOD EXPIRATION DATE: NORMAL
BUN SERPL-MCNC: 33 MG/DL (ref 6–23)
CALCIUM SERPL-MCNC: 8.1 MG/DL (ref 8.6–10.6)
CHLORIDE SERPL-SCNC: 108 MMOL/L (ref 98–107)
CO2 SERPL-SCNC: 21 MMOL/L (ref 21–32)
CREAT SERPL-MCNC: 3.67 MG/DL (ref 0.5–1.3)
DISPENSE STATUS: NORMAL
EOSINOPHIL # BLD AUTO: 0.09 X10*3/UL (ref 0–0.4)
EOSINOPHIL NFR BLD AUTO: 1.5 %
ERYTHROCYTE [DISTWIDTH] IN BLOOD BY AUTOMATED COUNT: 15.2 % (ref 11.5–14.5)
ERYTHROCYTE [DISTWIDTH] IN BLOOD BY AUTOMATED COUNT: 15.4 % (ref 11.5–14.5)
GFR SERPL CREATININE-BSD FRML MDRD: 17 ML/MIN/1.73M*2
GLUCOSE BLD MANUAL STRIP-MCNC: 107 MG/DL (ref 74–99)
GLUCOSE BLD MANUAL STRIP-MCNC: 136 MG/DL (ref 74–99)
GLUCOSE BLD MANUAL STRIP-MCNC: 82 MG/DL (ref 74–99)
GLUCOSE BLD MANUAL STRIP-MCNC: 93 MG/DL (ref 74–99)
GLUCOSE SERPL-MCNC: 88 MG/DL (ref 74–99)
GRAM STN SPEC: ABNORMAL
GRAM STN SPEC: ABNORMAL
HCT VFR BLD AUTO: 22.1 % (ref 41–52)
HCT VFR BLD AUTO: 23.6 % (ref 41–52)
HGB BLD-MCNC: 6.8 G/DL (ref 13.5–17.5)
HGB BLD-MCNC: 7.4 G/DL (ref 13.5–17.5)
IMM GRANULOCYTES # BLD AUTO: 0.04 X10*3/UL (ref 0–0.5)
IMM GRANULOCYTES NFR BLD AUTO: 0.7 % (ref 0–0.9)
LYMPHOCYTES # BLD AUTO: 1.07 X10*3/UL (ref 0.8–3)
LYMPHOCYTES NFR BLD AUTO: 17.7 %
MAGNESIUM SERPL-MCNC: 2.25 MG/DL (ref 1.6–2.4)
MCH RBC QN AUTO: 28.3 PG (ref 26–34)
MCH RBC QN AUTO: 28.4 PG (ref 26–34)
MCHC RBC AUTO-ENTMCNC: 30.8 G/DL (ref 32–36)
MCHC RBC AUTO-ENTMCNC: 31.4 G/DL (ref 32–36)
MCV RBC AUTO: 90 FL (ref 80–100)
MCV RBC AUTO: 92 FL (ref 80–100)
MONOCYTES # BLD AUTO: 0.48 X10*3/UL (ref 0.05–0.8)
MONOCYTES NFR BLD AUTO: 7.9 %
NEUTROPHILS # BLD AUTO: 4.36 X10*3/UL (ref 1.6–5.5)
NEUTROPHILS NFR BLD AUTO: 71.9 %
NRBC BLD-RTO: 0 /100 WBCS (ref 0–0)
NRBC BLD-RTO: 0 /100 WBCS (ref 0–0)
PHOSPHATE SERPL-MCNC: 4.7 MG/DL (ref 2.5–4.9)
PLATELET # BLD AUTO: 103 X10*3/UL (ref 150–450)
PLATELET # BLD AUTO: 104 X10*3/UL (ref 150–450)
POTASSIUM SERPL-SCNC: 4.5 MMOL/L (ref 3.5–5.3)
PRODUCT BLOOD TYPE: 5100
PRODUCT CODE: NORMAL
RBC # BLD AUTO: 2.4 X10*6/UL (ref 4.5–5.9)
RBC # BLD AUTO: 2.61 X10*6/UL (ref 4.5–5.9)
SODIUM SERPL-SCNC: 138 MMOL/L (ref 136–145)
UNIT ABO: NORMAL
UNIT NUMBER: NORMAL
UNIT RH: NORMAL
UNIT VOLUME: 285
UNIT VOLUME: 287
UNIT VOLUME: 315
UNIT VOLUME: 350
UNIT VOLUME: 350
WBC # BLD AUTO: 6.1 X10*3/UL (ref 4.4–11.3)
WBC # BLD AUTO: 6.2 X10*3/UL (ref 4.4–11.3)
XM INTEP: NORMAL

## 2023-11-25 PROCEDURE — 36415 COLL VENOUS BLD VENIPUNCTURE: CPT

## 2023-11-25 PROCEDURE — 2500000004 HC RX 250 GENERAL PHARMACY W/ HCPCS (ALT 636 FOR OP/ED)

## 2023-11-25 PROCEDURE — 1200000002 HC GENERAL ROOM WITH TELEMETRY DAILY

## 2023-11-25 PROCEDURE — 36430 TRANSFUSION BLD/BLD COMPNT: CPT

## 2023-11-25 PROCEDURE — 2500000001 HC RX 250 WO HCPCS SELF ADMINISTERED DRUGS (ALT 637 FOR MEDICARE OP): Performed by: STUDENT IN AN ORGANIZED HEALTH CARE EDUCATION/TRAINING PROGRAM

## 2023-11-25 PROCEDURE — 85027 COMPLETE CBC AUTOMATED: CPT

## 2023-11-25 PROCEDURE — 96372 THER/PROPH/DIAG INJ SC/IM: CPT

## 2023-11-25 PROCEDURE — 80069 RENAL FUNCTION PANEL: CPT

## 2023-11-25 PROCEDURE — 82947 ASSAY GLUCOSE BLOOD QUANT: CPT

## 2023-11-25 PROCEDURE — 99233 SBSQ HOSP IP/OBS HIGH 50: CPT | Performed by: INTERNAL MEDICINE

## 2023-11-25 PROCEDURE — 2500000001 HC RX 250 WO HCPCS SELF ADMINISTERED DRUGS (ALT 637 FOR MEDICARE OP)

## 2023-11-25 PROCEDURE — P9016 RBC LEUKOCYTES REDUCED: HCPCS

## 2023-11-25 PROCEDURE — 83735 ASSAY OF MAGNESIUM: CPT

## 2023-11-25 PROCEDURE — 99232 SBSQ HOSP IP/OBS MODERATE 35: CPT | Performed by: INTERNAL MEDICINE

## 2023-11-25 PROCEDURE — 85025 COMPLETE CBC W/AUTO DIFF WBC: CPT

## 2023-11-25 RX ORDER — METOPROLOL SUCCINATE 25 MG/1
25 TABLET, EXTENDED RELEASE ORAL DAILY
Status: DISCONTINUED | OUTPATIENT
Start: 2023-11-26 | End: 2023-12-04 | Stop reason: HOSPADM

## 2023-11-25 RX ADMIN — INSULIN GLARGINE 10 UNITS: 100 INJECTION, SOLUTION SUBCUTANEOUS at 21:20

## 2023-11-25 RX ADMIN — HEPARIN SODIUM 5000 UNITS: 5000 INJECTION INTRAVENOUS; SUBCUTANEOUS at 03:33

## 2023-11-25 RX ADMIN — TAMSULOSIN HYDROCHLORIDE 0.8 MG: 0.4 CAPSULE ORAL at 21:19

## 2023-11-25 RX ADMIN — ROSUVASTATIN CALCIUM 10 MG: 10 TABLET, FILM COATED ORAL at 21:20

## 2023-11-25 RX ADMIN — HEPARIN SODIUM 5000 UNITS: 5000 INJECTION INTRAVENOUS; SUBCUTANEOUS at 18:30

## 2023-11-25 RX ADMIN — CEFAZOLIN SODIUM 2 G: 2 INJECTION, SOLUTION INTRAVENOUS at 15:52

## 2023-11-25 RX ADMIN — FINASTERIDE 5 MG: 5 TABLET, FILM COATED ORAL at 08:22

## 2023-11-25 RX ADMIN — CEFAZOLIN SODIUM 2 G: 2 INJECTION, SOLUTION INTRAVENOUS at 03:33

## 2023-11-25 RX ADMIN — Medication 5 MG: at 21:20

## 2023-11-25 RX ADMIN — METOPROLOL SUCCINATE 50 MG: 50 TABLET, EXTENDED RELEASE ORAL at 08:22

## 2023-11-25 RX ADMIN — AMLODIPINE BESYLATE 10 MG: 10 TABLET ORAL at 21:19

## 2023-11-25 ASSESSMENT — COGNITIVE AND FUNCTIONAL STATUS - GENERAL
DAILY ACTIVITIY SCORE: 14
MOVING TO AND FROM BED TO CHAIR: A LITTLE
HELP NEEDED FOR BATHING: A LOT
TURNING FROM BACK TO SIDE WHILE IN FLAT BAD: A LITTLE
DRESSING REGULAR LOWER BODY CLOTHING: A LOT
DRESSING REGULAR UPPER BODY CLOTHING: A LOT
MOVING FROM LYING ON BACK TO SITTING ON SIDE OF FLAT BED WITH BEDRAILS: A LITTLE
STANDING UP FROM CHAIR USING ARMS: A LOT
CLIMB 3 TO 5 STEPS WITH RAILING: TOTAL
TOILETING: TOTAL
PERSONAL GROOMING: A LITTLE
MOBILITY SCORE: 14
WALKING IN HOSPITAL ROOM: A LOT

## 2023-11-25 ASSESSMENT — PAIN SCALES - GENERAL: PAINLEVEL_OUTOF10: 0 - NO PAIN

## 2023-11-25 ASSESSMENT — PAIN - FUNCTIONAL ASSESSMENT: PAIN_FUNCTIONAL_ASSESSMENT: 0-10

## 2023-11-25 NOTE — CARE PLAN
Pt Hg 6.8, given 1 unit of blood. Repeat 7.4 Continues on IV antibiotics, plan for pacemaker Monday.

## 2023-11-25 NOTE — CARE PLAN
The patient's goals for the shift include to be painfree    The clinical goals for the shift include patients pain to be less than 6/10 today   Patients pain better with meds. Patient up to chair today. Continue to encourage ambulation     Problem: Infection related to problem list condition  Goal: Infection will resolve through treatment  Outcome: Progressing     Problem: Pain  Goal: My pain/discomfort is manageable  Outcome: Progressing     Problem: Safety  Goal: I will remain free of falls  Outcome: Progressing     Problem: Daily Care  Goal: Daily care needs are met  Outcome: Progressing

## 2023-11-25 NOTE — PROGRESS NOTES
Subjective Data:  Review of the patient's telemetry showed that on 11/23-24 patient had two episodes of high-grade AV block, lasting longest 5 seconds.  The patient also had few episodes of nonconducted PACs later this morning however now conducting one-to-one but now in [facility].     Objective Data:  Last Recorded Vitals:  Vitals:    11/25/23 1045 11/25/23 1124 11/25/23 1158 11/25/23 1245   BP: 131/68 128/69 128/75 134/72   BP Location:       Patient Position:       Pulse: 77 72 73 74   Resp: 18 18 18 18   Temp: 36.8 °C (98.2 °F) 36.7 °C (98.1 °F) 36.4 °C (97.5 °F) 36.6 °C (97.9 °F)   TempSrc:  Temporal Temporal Temporal   SpO2:  98% 94%    Weight:       Height:           Last Labs:  CBC - 11/25/2023:  6:20 AM  6.1 6.8 104    22.1      CMP - 11/25/2023:  6:20 AM  8.1 5.6; 5.6 11 --- 0.3   4.7 2.6 <3 94      PTT - 11/24/2023:  4:53 AM  1.5   16.7 37     TROPHS   Date/Time Value Ref Range Status   09/18/2023 05:52 PM 15 0 - 20 ng/L Final     Comment:     .  Less than 99th percentile of normal range cutoff-  Female and children under 18 years old <14 ng/L; Male <21 ng/L: Negative  Repeat testing should be performed if clinically indicated.   .  Female and children under 18 years old 14-50 ng/L; Male 21-50 ng/L:  Consistent with possible cardiac damage and possible increased clinical   risk. Serial measurements may help to assess extent of myocardial damage.   .  >50 ng/L: Consistent with cardiac damage, increased clinical risk and  myocardial infarction. Serial measurements may help assess extent of   myocardial damage.   .   NOTE: Children less than 1 year old may have higher baseline troponin   levels and results should be interpreted in conjunction with the overall   clinical context.   .  NOTE: Troponin I testing is performed using a different   testing methodology at [facility] than at other   [facility] hospitals. Direct result comparisons should only   be made within the same method.       BNP    Date/Time Value Ref Range Status   10/02/2023 01:07 PM 97 0 - 99 pg/mL Final     HGBA1C   Date/Time Value Ref Range Status   11/17/2023 10:14 AM 6.6 see below % Final   08/11/2023 12:59 PM 9.9 % Final     Comment:          Diagnosis of Diabetes-Adults   Non-Diabetic: < or = 5.6%   Increased risk for developing diabetes: 5.7-6.4%   Diagnostic of diabetes: > or = 6.5%  .       Monitoring of Diabetes                Age (y)     Therapeutic Goal (%)   Adults:          >18           <7.0   Pediatrics:    13-18           <7.5                   7-12           <8.0                   0- 6            7.5-8.5   American Diabetes Association. Diabetes Care 33(S1), Jan 2010.     02/07/2023 09:07 AM 8.4 % Final     Comment:          Diagnosis of Diabetes-Adults   Non-Diabetic: < or = 5.6%   Increased risk for developing diabetes: 5.7-6.4%   Diagnostic of diabetes: > or = 6.5%  .       Monitoring of Diabetes                Age (y)     Therapeutic Goal (%)   Adults:          >18           <7.0   Pediatrics:    13-18           <7.5                   7-12           <8.0                   0- 6            7.5-8.5   American Diabetes Association. Diabetes Care 33(S1), Jan 2010.       VLDL   Date/Time Value Ref Range Status   08/11/2023 12:59 PM 57 0 - 40 mg/dL Final   08/10/2022 11:09 AM 33 0 - 40 mg/dL Final   08/19/2020 12:17 PM 62 0 - 40 mg/dL Final      Last I/O:  I/O last 3 completed shifts:  In: 490 (4.3 mL/kg) [P.O.:240; I.V.:50 (0.4 mL/kg); IV Piggyback:200]  Out: 2450 (21.3 mL/kg) [Urine:2450 (0.6 mL/kg/hr)]  Weight: 115.2 kg     Past Cardiology Tests (Last 3 Years):  EKG:  ECG 12 Lead 11/17/2023      ECG 12 Lead 10/9/2023    Echo:  Anesthesia Intraoperative Transesophageal Echocardiogram 11/22/2023      Transesophageal Echo (FILIPE) 11/22/2023      Transthoracic Echo (TTE) Complete 11/15/2023    Ejection Fractions:  EF   Date/Time Value Ref Range Status   11/14/2023 03:22 PM 63       Cath:  No results found for this or any  previous visit from the past 1095 days.    Stress Test:  No results found for this or any previous visit from the past 1095 days.    Cardiac Imaging:  No results found for this or any previous visit from the past 1095 days.      Inpatient Medications:  Scheduled medications   Medication Dose Route Frequency    amLODIPine  10 mg oral q PM    ceFAZolin in dextrose (iso-os)  2 g intravenous q12h    finasteride  5 mg oral Daily    heparin (porcine)  5,000 Units subcutaneous q8h    insulin glargine  10 Units subcutaneous Nightly    insulin lispro  0-5 Units subcutaneous TID with meals    melatonin  5 mg oral Nightly    [START ON 11/26/2023] metoprolol succinate XL  25 mg oral Daily    polyethylene glycol  17 g oral Daily    rosuvastatin  10 mg oral Nightly    sennosides  1 tablet oral Nightly    tamsulosin  0.8 mg oral Nightly     PRN medications   Medication    acetaminophen    dextrose 10 % in water (D10W)    dextrose    glucagon    ondansetron    oxyCODONE     Continuous Medications   Medication Dose Last Rate      General Appearance:    Alert, cooperative, no distress, appears stated age  Lungs:   Clear to auscultation bilaterally, respirations unlabored  Heart:    Regular rate and rhythm, S1 and S2 normal, no murmur, rub  or gallop. Wound is clean without erythema or collection, open to air, mild-moderate tenderness.  Abdomen:  Soft, non-tender, bowel sounds active all four quadrants, no masses, no organomegaly  Extremities: Extremities normal, atraumatic, no cyanosis or edema      Assessment/Plan   Patient doing well post procedure. Device and complete leads were successfully extracted, Wound check with incision well approximated with staples. No hematoma. Continue with IV abx. The patient was not pacing dependant, device check revealed V-pacing at 30%, however on tele the patient had two episodes of high-grade AV block, lasting longest 5 seconds.  The patient also had few episodes of nonconducted PACs later this  morning however now conducting one-to-one but now in [facility]. . The patient has a prolonged GA with narrow QRS which suggest that the block is at the AV node level and not below. Currently asymptomatic. Last positive blood cultures were on 11/14 and has been negative since 11/16  -Monitor on tele.  -Planning for pacemaker placement on Monday 11/27/23, keep NPO Sunday midnight.    Peripheral IV 11/13/23 20 G Right;Lateral;Distal Wrist (Active)   Site Assessment Clean;Dry;Intact 11/23/23 0800   Dressing Type Transparent 11/23/23 0800   Line Status No blood return;Flushed;Saline locked 11/23/23 0800   Dressing Status Clean;Dry;Occlusive 11/23/23 0800   Number of days: 10       Peripheral IV 11/22/23 Left;Dorsal Hand (Active)   Site Assessment Clean;Dry;Intact 11/23/23 0800   Dressing Type Transparent 11/23/23 0800   Line Status No blood return;Flushed;Saline locked 11/23/23 0800   Dressing Status Clean;Dry;Occlusive 11/23/23 0800   Number of days: 1       Peripheral IV 11/22/23 16 G Left Hand (Active)   Site Assessment Clean;Dry;Intact 11/23/23 0800   Dressing Type Transparent 11/23/23 0800   Line Status No blood return;Flushed;Saline locked 11/23/23 0800   Dressing Status Clean;Dry;Occlusive 11/23/23 0800   Number of days: 1       Peripheral IV 11/22/23 16 G Right;Anterior Hand (Active)   Site Assessment Clean;Dry;Intact 11/23/23 0800   Dressing Type Transparent 11/23/23 0800   Line Status Infusing 11/23/23 0800   Dressing Status Clean;Dry;Occlusive 11/23/23 0800   Number of days: 1       Urethral Catheter (Active)   Site Assessment Clean;Skin intact 11/23/23 0800   Collection Container Standard drainage bag 11/23/23 0800   Securement Method Securing device (Describe) 11/23/23 0800   Reason for Continuing Urinary Catheterization acute urinary retention 11/23/23 0800   Number of days: 6     This case was discussed with Dr. KOHLER and > 30 minutes was spent in the professional and overall care of this  patient.    Code Status:  Full Code    Ava Gonzalez MD  Cardiology Fellow PGY4    I saw and evaluated the patient. I personally obtained the key and critical portions of the history and physical exam or was physically present for key and critical portions performed by the resident/fellow. I reviewed the resident/fellow's documentation and discussed the patient with the resident/fellow. I agree with the resident/fellow's medical decision making as documented in the note.    Ti Martins MD

## 2023-11-25 NOTE — PROGRESS NOTES
"Transfer To Floor     Subjective     Overnight events:  Pt was seen and examined at bedside. No acute overnight events. Now s/p lead extraction 11/22. Overnight asymptomatic episodes of NSVT, bradycardia and sinus pauses noted on telemetry. Pt denies any chest pain, SOB, dyspnea, new cough, palpitations, nausea, vomiting, diarrhea, dizziness, changes in visions, headaches, or worsening lower extremity swelling. Pt is comfortable in bed and in no acute distress. Has a painless non-reducible soft tissue mass in the left groin. He reports it has been there for at least a month.    Vital signs:  Blood pressure 127/63, pulse 80, temperature 37 °C (98.6 °F), temperature source Skin, resp. rate 18, height 1.88 m (6' 2.02\"), weight 115 kg (253 lb 15.5 oz), SpO2 96 %.    I/O last 3 completed shifts:  In: 490 (4.3 mL/kg) [P.O.:240; I.V.:50 (0.4 mL/kg); IV Piggyback:200]  Out: 2450 (21.3 mL/kg) [Urine:2450 (0.6 mL/kg/hr)]  Weight: 115.2 kg     Physical Exam  GENERAL:  In no acute distress  NEUROLOGIC:  A and O x 3. Cranial nerves 2 through 12 grossly intact with no gait disturbances. Intact motor and sensory systems, with normal reflexes and coordination.    HEENT:  Pupils are equal, round, and reactive to light and accommodation. Extraocular motion intact.    MOUTH: MMM, no lesions observed  CARDIAC: S1 + S2, RRR, no M/R/G.    LUNGS: CTA BL.  No wheezes or coarse breath sounds. Symmetric respirations. No increased work of breathing.   ABDOMEN: Soft, non-tender to palpation. No organomegaly. Normal BS in 4Q, No rebound or guarding.  EXTREMITIES:  Moving x4 equally and spontaneously. Right knee with mild effusion present but no erythema or warmth. Chronic limited ROM but not worse than baseline. No BLE edema   SKIN: Clean, warm, dry, and intact with normal skin turgor.  PSYCH: Appropriate mood and behavior.     Relevant Results  Scheduled medications  amLODIPine, 10 mg, oral, q PM  ceFAZolin in dextrose (iso-os), 2 g, " intravenous, q12h  finasteride, 5 mg, oral, Daily  heparin (porcine), 5,000 Units, subcutaneous, q8h  insulin glargine, 10 Units, subcutaneous, Nightly  insulin lispro, 0-5 Units, subcutaneous, TID with meals  melatonin, 5 mg, oral, Nightly  metoprolol succinate XL, 50 mg, oral, Daily  polyethylene glycol, 17 g, oral, Daily  rosuvastatin, 10 mg, oral, Nightly  sennosides, 1 tablet, oral, Nightly  tamsulosin, 0.8 mg, oral, Nightly      Continuous medications     PRN medications  PRN medications: acetaminophen, dextrose 10 % in water (D10W), dextrose, glucagon, ondansetron, oxyCODONE      Labs:  Last CBC:  Lab Results   Component Value Date    WBC 6.1 11/25/2023    HGB 6.8 (L) 11/25/2023    HCT 22.1 (L) 11/25/2023    MCV 92 11/25/2023     (L) 11/25/2023       Last RFP:  Lab Results   Component Value Date    GLUCOSE 88 11/25/2023    CALCIUM 8.1 (L) 11/25/2023     11/25/2023    K 4.5 11/25/2023    CO2 21 11/25/2023     (H) 11/25/2023    BUN 33 (H) 11/25/2023    CREATININE 3.67 (H) 11/25/2023       Last LFTs:  Lab Results   Component Value Date    ALT <3 (L) 11/17/2023    AST 11 11/17/2023    ALKPHOS 94 11/17/2023    BILITOT 0.3 11/17/2023       Last coags:  Lab Results   Component Value Date    INR 1.5 (H) 11/24/2023    APTT 37 11/24/2023       Micro/culture data:  Susceptibility data from last 90 days.  Collected Specimen Info Organism Clindamycin Erythromycin Oxacillin Tetracycline Trimethoprim/Sulfamethoxazole Vancomycin   11/22/23 Tissue/Biopsy from Other (specify in comments) Methicillin Susceptible Staphylococcus aureus (MSSA) S S S S S S   11/14/23 Swab from Anterior Nares Methicillin Resistant Staphylococcus aureus (MRSA)         11/14/23 Blood culture from Peripheral Venipuncture Methicillin Susceptible Staphylococcus aureus (MSSA) S S S S S S         Imaging:  US extremity nonvascular real time w image documentation limited anatomic specific  Narrative: Interpreted By:  Luis Armando Guerin,   and Jonatan Gomez   STUDY:  US EXTREMITY NONVASCULAR REAL TIME WITH IMAGE DOCUMENTATION LIMITED  ANATOMIC SPECIFIC;  11/23/2023 12:14 pm      INDICATION:  Signs/Symptoms:Left groin swelling.      COMPARISON:  CT AP 10/02/2023, 01/19/2015      ACCESSION NUMBER(S):  [id number]      ORDERING CLINICIAN:  ROSALINO ROSE      TECHNIQUE:  Multiple grayscale and color Doppler static and dynamic images of the  left groin were obtained at the symptomatic area of clinically  suspected abnormality.      FINDINGS:  Images demonstrate multiple enlarged hypoechoic lesions of the left  groin measuring 5.7 x 3.3 x 4.4 cm and 3.1 x 2.1 x 2.4 cm which  demonstrate internal vascularity. These correspond to the enlarged  lymph nodes best seen on the prior CT dated 10/02/2023.      Impression: 1. Enlarged left inguinal/external iliac lymph nodes measuring up to  5.7 cm x 3.3 cm x 4.4 cm with internal hypervascularity which were  visualized on the CT dated 10/02/2023 and not definitively seen  dating back to 2015 concerning for a lymphoproliferative disorder or  metastatic disease from left lower extremity malignancy, soft tissue  biopsy for diagnosis and/or PET-CT is recommended for further  evaluation.      I personally reviewed the image(s) / study and I agree with the  findings as stated by Patricia Cheung MD. This study was interpreted at  [facility], [place], [place].      MACRO:  None.      Signed by: Luis Armando Guerin 11/23/2023 1:18 PM  Dictation workstation:   [id number]      Assessment/Plan     Assessment and Hospital course   Mr. Chester Verduzco is a 73 year old male with PMHX HTN, HLD, Dual chamber PPM implanted in 11/1996 for SSS (RV is passive) with most recent generator replacement in 11/2020 ([employer] [facility]), Type 2 DM, gout, and recent hospitalization (10/2-10/7) for Staph bacteremia, diarrhea, NOHELIA , and hyponatremia 2/2 hypovolemia who presented to [facility] ED from a SNF on 11/13 for NOHELIA on labs from  the SNF and oliguria. Blood cultures were positive for MSSA and previously had been positive 10/8 and 11/10 for MSSA. Blood cultures with NGTD as of 11/16.  FILIPE 11/16 showed tricuspid valve vegetation as well as right atrial lead vegetation. Patient has been afebrile without leukocytosis and hemodynamically stable. Device Interrogated 11/17 with AP 29% and  30%.  Lead extraction complete 11/22.  Patient on IV antibiotics with stop 4 weeks after lead removal (~12/20). After lead removal, on telemetry the patient had several episodes of intermittent asymptomatic high-grade AV block, NSVT,and bradycardia. Therefore EP plans to implant PPM on 11/27.  Home lasix and losartan were being held in setting of NOHELIA 2/2 to nephrotoxic antibiotic exposure which is now stable but not at baseline with adequate urine output. Patient has maldonado I/s/o severe BPH. Of note, Patient noticed left groin lymphadenopathy and  U/S (11/23) showed Enlarged left inguinal/external iliac lymph nodes.  Given state of infection, could be reactive or concerning for a lymphoproliferative disorder or metastatic disease from left lower extremity malignancy. At this time PET scan would be hard to interpret given state of infection, will consider surg onc inpatient excisional tissue biopsy for diagnosis before discharge.     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    - Pacemaker placement scheduled Monday 11/27, given asymptomatic episodes of NSVT, bradycardia and sinus pauses noted on tele  - Daily CBC Trend WBC, no leukocytosis  --Monitor CBC, BMP, ESR and CRP weekly and fax to [phone] Attn Dr Gonzalez  -ID will follow up as outpatient with Dr Gonzalez (~1 week prior ending abx course)  -Decrease metoprolol succ 50 mg to 25 mg, goal to wean off beta blocker, given pt no longer being paced.     #NOHELIA: likely ATN given gradual rise in creatinine over past month and history of recent diarrhea and nephrotoxic antibiotic exposure. Renal U/S did not show any hydronephrosis.  - Baseline prior to October 2023 hospitalization ~1.0-1.1.   - Elevated to 5.43 on arrival to [facility] on 11/13, down trending  - Initial urine electrolytes showed Fena 2.5% indicating intrinsic etiology   - Normal C3 and C4  - Follow up  ANCA, SPEP/UPEP with IF, free kappa robert  - Nephrology signed off, will reengage if Cr starts to uptrend  - Rojo replaced (11/17) ,strict I&Os, No indication for HD at this time  - Daily RFP  - Replete lytes as needed    #Inguinal lymphadenopathy  -Patient has endorsed left sided non tender lymphadenopathy for one month   - U/S (11/23) showed Enlarged left inguinal/external iliac lymph nodes measuring up to  5.7 cm x 3.3 cm x 4.4 cm with internal hypervascularity which were  visualized on the CT dated 10/02/2023 and not definitively seen dating back to 2015.  -Given state of infection, could be reactive or concerning for a lymphoproliferative disorder or   metastatic disease from left lower extremity malignancy  -PET scan deferred at this time d/t active infectious process. Surg onc consulted  -Referral to diagnostic clinic for outpatient follow up  -Patient to follow up with   Rakesh (Surgical oncology) for further discussion and evaluation of left inguinal lymphadenopathy     #Type 2 DM  - Checking A1c  - On Lantus 10 units QHS and cover with SSI  - hypoglycemia protocol    #HTN  #HLD  - Holding home losartan and lasix in setting of NOHELIA  - Continue Amlodipine 10, metop succinate 25 daily, and rosuvastatin 10 daily      #BPH  - Was initially planned for outpatient robotic prostatectomy end of November that has since been canceled and rescheduled for December  - Continue with Flomax 0.8 mg QHS and finasteride 5 md daily  - Continue maldonado     #Anemia  :Patient with slowly down trending Hgb from since this admission. Baseline 13-14.   -Today Hgb at 6.8, received 1 unit pRBC (11/25)   -No acute signs or symptoms of bleeding at this time  - Will continue to monitor and transfuse for Hgb <7      F: As needed  E: AS needed  N: Adult; Carb Controlled  A:  PIV, Maldonado new as of 11/17  DVT ppx: Heparin SQ  GI: N/A  Code Status: Full code  NOK: Charlene Pardo (friend) [phone]      Principal Problem:    MSSA bacteremia  Active Problems:    Endocarditis    History of general anesthesia    Complete atrioventricular block (CMS/HCC)         Gonzalo Henley MD   Internal Medicine PGY-I    Edited by Lyla Menard PGY-2

## 2023-11-26 LAB
ABO GROUP (TYPE) IN BLOOD: NORMAL
ALBUMIN SERPL BCP-MCNC: 2.6 G/DL (ref 3.4–5)
ANION GAP SERPL CALC-SCNC: 15 MMOL/L (ref 10–20)
ANTIBODY SCREEN: NORMAL
BASOPHILS # BLD AUTO: 0.02 X10*3/UL (ref 0–0.1)
BASOPHILS NFR BLD AUTO: 0.4 %
BUN SERPL-MCNC: 32 MG/DL (ref 6–23)
CALCIUM SERPL-MCNC: 8 MG/DL (ref 8.6–10.6)
CHLORIDE SERPL-SCNC: 106 MMOL/L (ref 98–107)
CO2 SERPL-SCNC: 21 MMOL/L (ref 21–32)
CREAT SERPL-MCNC: 3.34 MG/DL (ref 0.5–1.3)
EOSINOPHIL # BLD AUTO: 0.08 X10*3/UL (ref 0–0.4)
EOSINOPHIL NFR BLD AUTO: 1.5 %
ERYTHROCYTE [DISTWIDTH] IN BLOOD BY AUTOMATED COUNT: 15.5 % (ref 11.5–14.5)
GFR SERPL CREATININE-BSD FRML MDRD: 19 ML/MIN/1.73M*2
GLUCOSE BLD MANUAL STRIP-MCNC: 108 MG/DL (ref 74–99)
GLUCOSE BLD MANUAL STRIP-MCNC: 109 MG/DL (ref 74–99)
GLUCOSE BLD MANUAL STRIP-MCNC: 85 MG/DL (ref 74–99)
GLUCOSE BLD MANUAL STRIP-MCNC: 92 MG/DL (ref 74–99)
GLUCOSE SERPL-MCNC: 79 MG/DL (ref 74–99)
HCT VFR BLD AUTO: 24 % (ref 41–52)
HGB BLD-MCNC: 7.4 G/DL (ref 13.5–17.5)
IMM GRANULOCYTES # BLD AUTO: 0.02 X10*3/UL (ref 0–0.5)
IMM GRANULOCYTES NFR BLD AUTO: 0.4 % (ref 0–0.9)
LYMPHOCYTES # BLD AUTO: 0.96 X10*3/UL (ref 0.8–3)
LYMPHOCYTES NFR BLD AUTO: 17.7 %
MAGNESIUM SERPL-MCNC: 2 MG/DL (ref 1.6–2.4)
MCH RBC QN AUTO: 27.8 PG (ref 26–34)
MCHC RBC AUTO-ENTMCNC: 30.8 G/DL (ref 32–36)
MCV RBC AUTO: 90 FL (ref 80–100)
MONOCYTES # BLD AUTO: 0.42 X10*3/UL (ref 0.05–0.8)
MONOCYTES NFR BLD AUTO: 7.7 %
NEUTROPHILS # BLD AUTO: 3.93 X10*3/UL (ref 1.6–5.5)
NEUTROPHILS NFR BLD AUTO: 72.3 %
NRBC BLD-RTO: 0 /100 WBCS (ref 0–0)
PHOSPHATE SERPL-MCNC: 4.8 MG/DL (ref 2.5–4.9)
PLATELET # BLD AUTO: 102 X10*3/UL (ref 150–450)
POTASSIUM SERPL-SCNC: 4.2 MMOL/L (ref 3.5–5.3)
RBC # BLD AUTO: 2.66 X10*6/UL (ref 4.5–5.9)
RH FACTOR (ANTIGEN D): NORMAL
SODIUM SERPL-SCNC: 138 MMOL/L (ref 136–145)
WBC # BLD AUTO: 5.4 X10*3/UL (ref 4.4–11.3)

## 2023-11-26 PROCEDURE — 97110 THERAPEUTIC EXERCISES: CPT | Mod: GP,CQ

## 2023-11-26 PROCEDURE — 2500000001 HC RX 250 WO HCPCS SELF ADMINISTERED DRUGS (ALT 637 FOR MEDICARE OP)

## 2023-11-26 PROCEDURE — 36415 COLL VENOUS BLD VENIPUNCTURE: CPT

## 2023-11-26 PROCEDURE — 99232 SBSQ HOSP IP/OBS MODERATE 35: CPT | Performed by: INTERNAL MEDICINE

## 2023-11-26 PROCEDURE — 2500000004 HC RX 250 GENERAL PHARMACY W/ HCPCS (ALT 636 FOR OP/ED)

## 2023-11-26 PROCEDURE — 83735 ASSAY OF MAGNESIUM: CPT

## 2023-11-26 PROCEDURE — 82947 ASSAY GLUCOSE BLOOD QUANT: CPT

## 2023-11-26 PROCEDURE — 86901 BLOOD TYPING SEROLOGIC RH(D): CPT

## 2023-11-26 PROCEDURE — 1200000002 HC GENERAL ROOM WITH TELEMETRY DAILY

## 2023-11-26 PROCEDURE — 80069 RENAL FUNCTION PANEL: CPT

## 2023-11-26 PROCEDURE — 87075 CULTR BACTERIA EXCEPT BLOOD: CPT

## 2023-11-26 PROCEDURE — 87040 BLOOD CULTURE FOR BACTERIA: CPT

## 2023-11-26 PROCEDURE — 97530 THERAPEUTIC ACTIVITIES: CPT | Mod: GP,CQ

## 2023-11-26 PROCEDURE — 99233 SBSQ HOSP IP/OBS HIGH 50: CPT | Performed by: INTERNAL MEDICINE

## 2023-11-26 PROCEDURE — 85025 COMPLETE CBC W/AUTO DIFF WBC: CPT

## 2023-11-26 PROCEDURE — 96372 THER/PROPH/DIAG INJ SC/IM: CPT

## 2023-11-26 RX ADMIN — CEFAZOLIN SODIUM 2 G: 2 INJECTION, SOLUTION INTRAVENOUS at 16:20

## 2023-11-26 RX ADMIN — Medication 5 MG: at 21:03

## 2023-11-26 RX ADMIN — FINASTERIDE 5 MG: 5 TABLET, FILM COATED ORAL at 09:36

## 2023-11-26 RX ADMIN — CEFAZOLIN SODIUM 2 G: 2 INJECTION, SOLUTION INTRAVENOUS at 03:34

## 2023-11-26 RX ADMIN — HEPARIN SODIUM 5000 UNITS: 5000 INJECTION INTRAVENOUS; SUBCUTANEOUS at 17:35

## 2023-11-26 RX ADMIN — AMLODIPINE BESYLATE 10 MG: 10 TABLET ORAL at 21:03

## 2023-11-26 RX ADMIN — HEPARIN SODIUM 5000 UNITS: 5000 INJECTION INTRAVENOUS; SUBCUTANEOUS at 03:30

## 2023-11-26 RX ADMIN — ROSUVASTATIN CALCIUM 10 MG: 10 TABLET, FILM COATED ORAL at 21:03

## 2023-11-26 RX ADMIN — INSULIN GLARGINE 10 UNITS: 100 INJECTION, SOLUTION SUBCUTANEOUS at 21:03

## 2023-11-26 RX ADMIN — TAMSULOSIN HYDROCHLORIDE 0.8 MG: 0.4 CAPSULE ORAL at 21:02

## 2023-11-26 RX ADMIN — METOPROLOL SUCCINATE 25 MG: 25 TABLET, EXTENDED RELEASE ORAL at 09:36

## 2023-11-26 RX ADMIN — HEPARIN SODIUM 5000 UNITS: 5000 INJECTION INTRAVENOUS; SUBCUTANEOUS at 09:36

## 2023-11-26 ASSESSMENT — COGNITIVE AND FUNCTIONAL STATUS - GENERAL
STANDING UP FROM CHAIR USING ARMS: A LOT
MOVING TO AND FROM BED TO CHAIR: A LITTLE
CLIMB 3 TO 5 STEPS WITH RAILING: TOTAL
MOBILITY SCORE: 14
CLIMB 3 TO 5 STEPS WITH RAILING: TOTAL
WALKING IN HOSPITAL ROOM: A LOT
MOVING FROM LYING ON BACK TO SITTING ON SIDE OF FLAT BED WITH BEDRAILS: A LITTLE
MOVING FROM LYING ON BACK TO SITTING ON SIDE OF FLAT BED WITH BEDRAILS: A LITTLE
TURNING FROM BACK TO SIDE WHILE IN FLAT BAD: A LITTLE
TURNING FROM BACK TO SIDE WHILE IN FLAT BAD: A LITTLE
WALKING IN HOSPITAL ROOM: A LOT
MOVING TO AND FROM BED TO CHAIR: A LITTLE
STANDING UP FROM CHAIR USING ARMS: A LOT
MOBILITY SCORE: 14

## 2023-11-26 ASSESSMENT — PAIN - FUNCTIONAL ASSESSMENT
PAIN_FUNCTIONAL_ASSESSMENT: 0-10
PAIN_FUNCTIONAL_ASSESSMENT: 0-10

## 2023-11-26 ASSESSMENT — PAIN SCALES - GENERAL
PAINLEVEL_OUTOF10: 0 - NO PAIN
PAINLEVEL_OUTOF10: 0 - NO PAIN

## 2023-11-26 NOTE — CONSULTS
Inpatient consult to infectious diseases  Consult performed by: Mara RIVAS MD  Consult ordered by: Vernon Mendoza MD      Referred by Dr. Mcmahan    This documentation is to remove the consultation order originally addressed on 11/23/23 by Sherwin Gonzalez and Saleem Capellan.    Additionally, called to answer the question of timing of PM reimplantation.    He had MSSA bacteremia with evidence of TV vegetation on FILIPE.  First negative blood culture was 11/16/23.  His device was removed on 11/22/23 with cultures sent of tissue (I assume leads were sent, specimen not specifically identified in culture report). These both gre MSSA.  No blood cultures were collected on 11/22.  He has been receiving Cefazolin since transfer from [facility].    Per published gudelines from [employer] (Circulation Vol 121, No3; Jan 2010, pp, 458-477), in setting of valvular vegetations, devices can be reimplanted 14 days after removal if blood cultures collected the day of device removal are negative. As there are no blood cultures from that date and the device cultures were positive, I have suggested that he have 2 sets of blood cultures collected today and if remain negative, then device may be reimplanted 14 days thereafter.    He should complete 4 weeks of IV Cefazolin from date of device removal as outlined in the ID note on 11/23/23.  I have communicated these recommendations to the team through [facility] (Sherwin Mcmahan and Lisa) and attempted discussion through phone messaging as well.    Mara Hernandez MD.  (please reach through EPIC Chat)  Infectious Diseases, Senior Attending Physician  Team A,  pager [phone]

## 2023-11-26 NOTE — CARE PLAN
The patient's goals for the shift include to be painfree    The clinical goals for the shift include Pt pain to be less than 5/10 by end of shift

## 2023-11-26 NOTE — PROGRESS NOTES
Physical Therapy    Physical Therapy Treatment    Patient Name: Chester Verduzco  MRN: [id number]  Today's Date: 11/26/2023  Time Calculation  Start Time: 1028  Stop Time: 1055  Time Calculation (min): 27 min       Assessment/Plan   PT Assessment  PT Assessment Results: Decreased strength, Decreased endurance, Impaired balance, Decreased mobility  Rehab Prognosis: Excellent  End of Session Communication: Bedside nurse  End of Session Patient Position: Bed, 3 rail up, Alarm off, not on at start of session     PT Plan  Treatment/Interventions: Bed mobility, Transfer training, Gait training, Stair training, Balance training, Strengthening, Therapeutic exercise, Endurance training, Range of motion, Therapeutic activity, Home exercise program  PT Plan: Skilled PT  PT Frequency: 5 times per week  PT Discharge Recommendations: Moderate intensity level of continued care  PT Recommended Transfer Status: Assist x1, Assistive device  PT - OK to Discharge: Yes      General Visit Information:   PT  Visit  PT Received On: 11/26/23  Response to Previous Treatment: Patient with no complaints from previous session.  General  Prior to Session Communication: Bedside nurse  Patient Position Received: Bed, 3 rail up  General Comment: Pt supine in bed, reports feeling very fatigued but is pleasant and agreeable. Able to complete transfers with modAx1 this date    Subjective   Precautions:  Precautions  Medical Precautions: Fall precautions  Vital Signs:       Objective   Pain:  Pain Assessment  Pain Assessment: 0-10  Pain Score: 0 - No pain    Treatments:  Therapeutic Exercise  Therapeutic Exercise Performed: Yes  Therapeutic Exercise Activity 1: Seated B LE AP, LAQS, hip flex 15x/LE         Therapeutic Activity  Therapeutic Activity Performed: Yes  Therapeutic Activity 1: Static stand at 2WW 2x~1min with CGA      Bed Mobility  Bed Mobility: Yes  Bed Mobility 1  Bed Mobility 1: Supine to sitting, Sitting to supine  Level of Assistance 1:  Close supervision  Bed Mobility Comments 1: heavy use of bedrail, mod pt labor       Transfers  Transfer: Yes  Transfer 1  Transfer From 1: Sit to  Transfer to 1: Stand  Technique 1: Sit to stand  Transfer Device 1: Walker  Transfer Level of Assistance 1: Moderate assistance  Trials/Comments 1: 2x from EOB. 1x from commode  Transfers 2  Transfer From 2: Stand to  Transfer to 2: Sit  Technique 2: Stand to sit  Transfer Device 2: Walker  Transfer Level of Assistance 2: Minimum assistance  Transfers 3  Transfer From 3: Bed to, Commode-standard to  Transfer to 3: Bed  Technique 3: Stand pivot  Transfer Device 3: Walker  Transfer Level of Assistance 3: Minimum assistance  Trials/Comments 3: limited WB R LE    Outcome Measures:     [facility] Basic Mobility  Turning from your back to your side while in a flat bed without using bedrails: A little  Moving from lying on your back to sitting on the side of a flat bed without using bedrails: A little  Moving to and from bed to chair (including a wheelchair): A little  Standing up from a chair using your arms (e.g. wheelchair or bedside chair): A lot  To walk in hospital room: A lot  Climbing 3-5 steps with railing: Total  Basic Mobility - Total Score: 14    Education Documentation  Precautions, taught by Danielle Bernal PTA at 11/26/2023 11:29 AM.  Learner: Patient  Readiness: Acceptance  Method: Explanation, Demonstration  Response: Verbalizes Understanding, Demonstrated Understanding    Body Mechanics, taught by Danielle Bernal PTA at 11/26/2023 11:29 AM.  Learner: Patient  Readiness: Acceptance  Method: Explanation, Demonstration  Response: Verbalizes Understanding, Demonstrated Understanding    Mobility Training, taught by Danielle Bernal PTA at 11/26/2023 11:29 AM.  Learner: Patient  Readiness: Acceptance  Method: Explanation, Demonstration  Response: Verbalizes Understanding, Demonstrated Understanding    Education Comments  No comments found.        OP  EDUCATION:       Encounter Problems       Encounter Problems (Active)       PT Problem       Patient will complete supine to sit and sit to supine Independent  (Progressing)       Start:  11/17/23    Expected End:  12/01/23            Patient will perform sit<>stand transfer with Rolling Walker, and Supervision  (Progressing)       Start:  11/17/23    Expected End:  12/01/23            Patient will ambulate >100' with Rolling Walker and Supervision  (Progressing)       Start:  11/17/23    Expected End:  12/01/23            Patient will ascend/descend 3 steps with Bilateral Rails and SBA  (Progressing)       Start:  11/17/23    Expected End:  12/01/23                 MALA Anderson

## 2023-11-26 NOTE — PROGRESS NOTES
"Transfer To Floor     Subjective     Overnight events:  Pt was seen and examined at bedside. No acute overnight events. Pt denies any chest pain, SOB, dyspnea, new cough, palpitations, nausea, vomiting, diarrhea, dizziness, changes in visions, headaches, or worsening lower extremity swelling. Pt is comfortable in bed and in no acute distress. Has a painless non-reducible soft tissue mass in the left groin.     Vital signs:  Blood pressure 146/68, pulse 75, temperature 36.9 °C (98.4 °F), temperature source Temporal, resp. rate 18, height 1.88 m (6' 2.02\"), weight 115 kg (253 lb 8.5 oz), SpO2 96 %.    I/O last 3 completed shifts:  In: 1940 (16.9 mL/kg) [P.O.:1440; Blood:300; IV Piggyback:200]  Out: 3450 (30 mL/kg) [Urine:3450 (0.8 mL/kg/hr)]  Weight: 115 kg     Physical Exam  GENERAL:  In no acute distress  NEUROLOGIC:  A and O x 3. Cranial nerves 2 through 12 grossly intact with no gait disturbances. Intact motor and sensory systems, with normal reflexes and coordination.    HEENT:  Pupils are equal, round, and reactive to light and accommodation. Extraocular motion intact.    MOUTH: MMM, no lesions observed  CARDIAC: S1 + S2, RRR, no M/R/G.    LUNGS: CTA BL.  No wheezes or coarse breath sounds. Symmetric respirations. No increased work of breathing.   ABDOMEN: Soft, non-tender to palpation. No organomegaly. Normal BS in 4Q, No rebound or guarding.  EXTREMITIES:  Moving x4 equally and spontaneously. Right knee with mild effusion present but no erythema or warmth. Chronic limited ROM but not worse than baseline. No BLE edema   SKIN: Clean, warm, dry, and intact with normal skin turgor.  PSYCH: Appropriate mood and behavior.     Relevant Results  Scheduled medications  amLODIPine, 10 mg, oral, q PM  ceFAZolin in dextrose (iso-os), 2 g, intravenous, q12h  finasteride, 5 mg, oral, Daily  heparin (porcine), 5,000 Units, subcutaneous, q8h  insulin glargine, 10 Units, subcutaneous, Nightly  insulin lispro, 0-5 Units, " subcutaneous, TID with meals  melatonin, 5 mg, oral, Nightly  metoprolol succinate XL, 25 mg, oral, Daily  polyethylene glycol, 17 g, oral, Daily  rosuvastatin, 10 mg, oral, Nightly  sennosides, 1 tablet, oral, Nightly  tamsulosin, 0.8 mg, oral, Nightly      Continuous medications     PRN medications  PRN medications: acetaminophen, dextrose 10 % in water (D10W), dextrose, glucagon, ondansetron, oxyCODONE      Labs:  Last CBC:  Lab Results   Component Value Date    WBC 6.2 11/25/2023    HGB 7.4 (L) 11/25/2023    HCT 23.6 (L) 11/25/2023    MCV 90 11/25/2023     (L) 11/25/2023       Last RFP:  Lab Results   Component Value Date    GLUCOSE 88 11/25/2023    CALCIUM 8.1 (L) 11/25/2023     11/25/2023    K 4.5 11/25/2023    CO2 21 11/25/2023     (H) 11/25/2023    BUN 33 (H) 11/25/2023    CREATININE 3.67 (H) 11/25/2023       Last LFTs:  Lab Results   Component Value Date    ALT <3 (L) 11/17/2023    AST 11 11/17/2023    ALKPHOS 94 11/17/2023    BILITOT 0.3 11/17/2023       Last coags:  Lab Results   Component Value Date    INR 1.5 (H) 11/24/2023    APTT 37 11/24/2023       Micro/culture data:  Susceptibility data from last 90 days.  Collected Specimen Info Organism Clindamycin Erythromycin Oxacillin Tetracycline Trimethoprim/Sulfamethoxazole Vancomycin   11/22/23 Tissue/Biopsy from Other (specify in comments) Methicillin Susceptible Staphylococcus aureus (MSSA) S S S S S S   11/22/23 Tissue/Biopsy from Other (specify in comments) Methicillin Susceptible Staphylococcus aureus (MSSA) S S S S S S   11/14/23 Swab from Anterior Nares Methicillin Resistant Staphylococcus aureus (MRSA)         11/14/23 Blood culture from Peripheral Venipuncture Methicillin Susceptible Staphylococcus aureus (MSSA) S S S S S S         Imaging:  US extremity nonvascular real time w image documentation limited anatomic specific  Narrative: Interpreted By:  Luis Armando Guerin and Jiang Sirui   STUDY:  US EXTREMITY NONVASCULAR REAL  TIME WITH IMAGE DOCUMENTATION LIMITED  ANATOMIC SPECIFIC;  11/23/2023 12:14 pm      INDICATION:  Signs/Symptoms:Left groin swelling.      COMPARISON:  CT AP 10/02/2023, 01/19/2015      ACCESSION NUMBER(S):  [id number]      ORDERING CLINICIAN:  ROSALINO ROSE      TECHNIQUE:  Multiple grayscale and color Doppler static and dynamic images of the  left groin were obtained at the symptomatic area of clinically  suspected abnormality.      FINDINGS:  Images demonstrate multiple enlarged hypoechoic lesions of the left  groin measuring 5.7 x 3.3 x 4.4 cm and 3.1 x 2.1 x 2.4 cm which  demonstrate internal vascularity. These correspond to the enlarged  lymph nodes best seen on the prior CT dated 10/02/2023.      Impression: 1. Enlarged left inguinal/external iliac lymph nodes measuring up to  5.7 cm x 3.3 cm x 4.4 cm with internal hypervascularity which were  visualized on the CT dated 10/02/2023 and not definitively seen  dating back to 2015 concerning for a lymphoproliferative disorder or  metastatic disease from left lower extremity malignancy, soft tissue  biopsy for diagnosis and/or PET-CT is recommended for further  evaluation.      I personally reviewed the image(s) / study and I agree with the  findings as stated by Patricia Cheung MD. This study was interpreted at  [facility] [place], [place].      MACRO:  None.      Signed by: Luis Armando Guerin 11/23/2023 1:18 PM  Dictation workstation:   [id number]      Assessment/Plan   Mr. Chester Verduzco is a 73 year old male with PMHX HTN, HLD, Dual chamber PPM implanted in 11/1996 for SSS (RV is passive) with most recent generator replacement in 11/2020 ([employer] - [employer] MRI), Type 2 DM, gout, and recent hospitalization (10/2-10/7) for Staph bacteremia, diarrhea, NOHELIA , and hyponatremia 2/2 hypovolemia who presented to [facility] ED from a SNF on 11/13 for NOHELIA on labs from the SNF and oliguria. Blood cultures were positive for MSSA and previously had been  positive 10/8 and 11/10 for MSSA. Blood cultures with NGTD as of 11/16.  FILIPE 11/16 showed tricuspid valve vegetation as well as right atrial lead vegetation. Patient has been afebrile without leukocytosis and hemodynamically stable. Device Interrogated 11/17 with AP 29% and  30%.  Lead extraction complete 11/22.  Patient on IV antibiotics with stop 4 weeks after lead removal (~12/20). After lead removal, on telemetry the patient had several episodes of intermittent asymptomatic high-grade AV block, NSVT,and bradycardia. EP plans to implant PPM after 14 days of negative blood culture post removal per, ID. Patient received a unit of packed red blood cells for low hemoglobin (11/25). Hemoglobin has remained stable with no signs or symptoms of bleeding. Home lasix and losartan were being held in setting of NOHELIA 2/2 to nephrotoxic antibiotic exposure which down trending but not at baseline with adequate urine output. Patient has maldonado I/s/o severe BPH. Of note, Patient noticed left groin lymphadenopathy and U/S (11/23) showed Enlarged left inguinal/external iliac lymph nodes. PET scan deferred at this time d/t active infectious process. Patient has outpatient follow up with surgical oncology and diagnostic clinic for further work up.      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    -Given asymptomatic episodes of NSVT, bradycardia and sinus pauses noted on tele, patient needs to be inpatient until placement of PPM  -Given the high-grade AV block episodes the patient would need to be monitored until pacemaker is reimplanted   -Monitor tele  - Daily CBC Trend WBC, no leukocytosis  --Monitor CBC, BMP, ESR and CRP weekly and fax to [phone] Attn Dr Gonzalez  -ID will follow up as outpatient with Dr Gonzalez (~1 week prior ending abx course)  -Metoprolol succ 25 mg, goal to wean off beta blocker, given pt no longer being paced.     #NOHELIA, improving  :likely ATN given gradual rise in creatinine over past month and history of recent diarrhea and nephrotoxic antibiotic exposure. Renal U/S did not show any hydronephrosis.  - Baseline prior to October 2023 hospitalization ~1.0-1.1.   - Elevated to 5.43 on arrival to [facility] on 11/13, down trending  - Initial urine electrolytes showed Fena 2.5% indicating intrinsic etiology   - Normal C3 and C4  - Follow up  ANCA, SPEP/UPEP with IF, free kappa robert  - Nephrology signed off, will reengage Monday if Cr starts to uptrend   - Rojo replaced (11/17) ,strict I&Os, No indication for HD at this time  - Daily RFP  - Replete lytes as needed    #Inguinal lymphadenopathy  -Patient has endorsed left sided non tender lymphadenopathy for one month   - U/S (11/23) showed Enlarged left inguinal/external iliac lymph nodes measuring up to  5.7 cm x 3.3 cm x 4.4 cm with internal hypervascularity which were  visualized on the CT dated 10/02/2023 and not definitively seen dating back to 2015.  -Given state of infection, could be reactive or concerning for a lymphoproliferative disorder or   metastatic disease from left lower extremity malignancy  -PET scan deferred at this time d/t active infectious process. Surg onc consulted  -Referral to diagnostic clinic for outpatient follow up  -Patient to  follow up with Dr. Cameron (Surgical oncology) for further discussion and evaluation of left inguinal lymphadenopathy     #Type 2 DM  - Checking A1c  - On Lantus 10 units QHS and cover with SSI,  will make Lantus 5 while NPO, restart lantus 10 when diet restarted  - hypoglycemia protocol    #HTN  #HLD  - Holding home losartan and lasix in setting of NOHELIA  - Continue Amlodipine 10, metop succinate 25 daily, and rosuvastatin 10 daily      #BPH  - Was initially planned for outpatient robotic prostatectomy end of November that has since been canceled and rescheduled for December  - Continue with Flomax 0.8 mg QHS and finasteride 5 md daily  - Continue maldonado     #Anemia  :Patient with slowly down trending Hgb from since this admission. Baseline 13-14. Stable this AM  -Received 1 unit pRBC (11/25) for Hgb 6.8 with appropriate increase to 7.4  -No acute signs or symptoms of bleeding at this time  - Will continue to monitor and transfuse for Hgb <7      F: As needed  E: AS needed  N: Adult; Carb Controlled  A:  PIV, Maldonado new as of 11/17  DVT ppx: Heparin SQ  GI: N/A  Code Status: Full code  NOK: Charlene Pardo (friend) [phone]      Principal Problem:    MSSA bacteremia  Active Problems:    Endocarditis    History of general anesthesia    Complete atrioventricular block (CMS/HCC)         Gonzalo Henley MD   Internal Medicine PGY-I

## 2023-11-26 NOTE — PROGRESS NOTES
Subjective Data:  Device cx growing MSSA, other wise Blood cultures have been negative since 11/16. Review of Tele showed episodes of non conducted PACs but otherwise no significant high-grade AV block was noted in the last 24 hours.    Objective Data:  Last Recorded Vitals:  Vitals:    11/25/23 1952 11/25/23 2355 11/26/23 0433 11/26/23 0752   BP: 142/68 138/69 146/68 134/67   BP Location:    Right arm   Patient Position:       Pulse: 77 73 75 78   Resp: 18 18 18 18   Temp: 36.6 °C (97.8 °F) 36 °C (96.8 °F) 36.9 °C (98.4 °F) 36.6 °C (97.9 °F)   TempSrc: Temporal Temporal Temporal Temporal   SpO2: 96% 96% 96% 98%   Weight:   115 kg (253 lb 8.5 oz)    Height:           Last Labs:  CBC - 11/26/2023:  6:29 AM  5.4 7.4 102    24.0      CMP - 11/26/2023:  6:29 AM  8.0 5.6; 5.6 11 --- 0.3   4.8 2.6 <3 94      PTT - 11/24/2023:  4:53 AM  1.5   16.7 37     TROPHS   Date/Time Value Ref Range Status   09/18/2023 05:52 PM 15 0 - 20 ng/L Final     Comment:     .  Less than 99th percentile of normal range cutoff-  Female and children under 18 years old <14 ng/L; Male <21 ng/L: Negative  Repeat testing should be performed if clinically indicated.   .  Female and children under 18 years old 14-50 ng/L; Male 21-50 ng/L:  Consistent with possible cardiac damage and possible increased clinical   risk. Serial measurements may help to assess extent of myocardial damage.   .  >50 ng/L: Consistent with cardiac damage, increased clinical risk and  myocardial infarction. Serial measurements may help assess extent of   myocardial damage.   .   NOTE: Children less than 1 year old may have higher baseline troponin   levels and results should be interpreted in conjunction with the overall   clinical context.   .  NOTE: Troponin I testing is performed using a different   testing methodology at [facility] than at other   [facility] hospitals. Direct result comparisons should only   be made within the same method.       BNP   Date/Time  Value Ref Range Status   10/02/2023 01:07 PM 97 0 - 99 pg/mL Final     HGBA1C   Date/Time Value Ref Range Status   11/17/2023 10:14 AM 6.6 see below % Final   08/11/2023 12:59 PM 9.9 % Final     Comment:          Diagnosis of Diabetes-Adults   Non-Diabetic: < or = 5.6%   Increased risk for developing diabetes: 5.7-6.4%   Diagnostic of diabetes: > or = 6.5%  .       Monitoring of Diabetes                Age (y)     Therapeutic Goal (%)   Adults:          >18           <7.0   Pediatrics:    13-18           <7.5                   7-12           <8.0                   0- 6            7.5-8.5   American Diabetes Association. Diabetes Care 33(S1), Jan 2010.     02/07/2023 09:07 AM 8.4 % Final     Comment:          Diagnosis of Diabetes-Adults   Non-Diabetic: < or = 5.6%   Increased risk for developing diabetes: 5.7-6.4%   Diagnostic of diabetes: > or = 6.5%  .       Monitoring of Diabetes                Age (y)     Therapeutic Goal (%)   Adults:          >18           <7.0   Pediatrics:    13-18           <7.5                   7-12           <8.0                   0- 6            7.5-8.5   American Diabetes Association. Diabetes Care 33(S1), Jan 2010.       VLDL   Date/Time Value Ref Range Status   08/11/2023 12:59 PM 57 0 - 40 mg/dL Final   08/10/2022 11:09 AM 33 0 - 40 mg/dL Final   08/19/2020 12:17 PM 62 0 - 40 mg/dL Final      Last I/O:  I/O last 3 completed shifts:  In: 2500 (21.7 mL/kg) [P.O.:2000; Blood:300; IV Piggyback:200]  Out: 3450 (30 mL/kg) [Urine:3450 (0.8 mL/kg/hr)]  Weight: 115 kg     Past Cardiology Tests (Last 3 Years):  EKG:  ECG 12 Lead 11/17/2023      ECG 12 Lead 10/9/2023    Echo:  Anesthesia Intraoperative Transesophageal Echocardiogram 11/22/2023      Transesophageal Echo (FILIPE) 11/22/2023      Transthoracic Echo (TTE) Complete 11/15/2023    Ejection Fractions:  EF   Date/Time Value Ref Range Status   11/14/2023 03:22 PM 63       Cath:  No results found for this or any previous visit from the past  1095 days.    Stress Test:  No results found for this or any previous visit from the past 1095 days.    Cardiac Imaging:  No results found for this or any previous visit from the past 1095 days.      Inpatient Medications:  Scheduled medications   Medication Dose Route Frequency    amLODIPine  10 mg oral q PM    ceFAZolin in dextrose (iso-os)  2 g intravenous q12h    finasteride  5 mg oral Daily    heparin (porcine)  5,000 Units subcutaneous q8h    insulin glargine  10 Units subcutaneous Nightly    insulin lispro  0-5 Units subcutaneous TID with meals    melatonin  5 mg oral Nightly    metoprolol succinate XL  25 mg oral Daily    polyethylene glycol  17 g oral Daily    rosuvastatin  10 mg oral Nightly    sennosides  1 tablet oral Nightly    tamsulosin  0.8 mg oral Nightly     PRN medications   Medication    acetaminophen    dextrose 10 % in water (D10W)    dextrose    glucagon    ondansetron    oxyCODONE     Continuous Medications   Medication Dose Last Rate      General Appearance:    Alert, cooperative, no distress, appears stated age  Lungs:   Clear to auscultation bilaterally, respirations unlabored  Heart:    Regular rate and rhythm, S1 and S2 normal, no murmur, rub  or gallop. Wound is clean without erythema or collection, open to air, mild-moderate tenderness.  Abdomen:  Soft, non-tender, bowel sounds active all four quadrants, no masses, no organomegaly  Extremities: Extremities normal, atraumatic, no cyanosis or edema      Assessment/Plan   Chester Verduzco is a 73 y.o. male with a PMXH of HTN, HLD, Dual chamber PPM implanted in 11/1996 for SSS (RV is passive) with most recent generator replacement in 11/2020 ([employer] - [employer] MRI), Type 2 DM, gout, and recent hospitalization (10/2-10/7) for Staph bacteremia.  Admitted with persistent staph bacteremia in the setting of RA lead vegetation and TV lead vegetation.     Patient doing well post procedure. Device and complete leads were successfully extracted,  Wound check with incision well approximated with staples. No hematoma. Continue with IV abx. The patient was not pacing dependant on device check revealed V-pacing at 30%, however on tele the patient had two episodes of high-grade AV block, lasting longest 5 seconds (11/23-24).  The patient also had few episodes of nonconducted PACs, however now conducting one-to-one but now in [facility]. . The patient has a prolonged WA with narrow QRS which suggest that the block is at the AV node level and not below. Currently asymptomatic. Last positive blood cultures were on 11/14 and has been negative since 11/16  -Monitor on tele.  -Reengaged ID to clarify timing of pacer reimplantation, given the high-grade AV block episodes the patient would need to be monitored until pacemaker is reimplanted.    Peripheral IV 11/13/23 20 G Right;Lateral;Distal Wrist (Active)   Site Assessment Clean;Dry;Intact 11/23/23 0800   Dressing Type Transparent 11/23/23 0800   Line Status No blood return;Flushed;Saline locked 11/23/23 0800   Dressing Status Clean;Dry;Occlusive 11/23/23 0800   Number of days: 10       Peripheral IV 11/22/23 Left;Dorsal Hand (Active)   Site Assessment Clean;Dry;Intact 11/23/23 0800   Dressing Type Transparent 11/23/23 0800   Line Status No blood return;Flushed;Saline locked 11/23/23 0800   Dressing Status Clean;Dry;Occlusive 11/23/23 0800   Number of days: 1       Peripheral IV 11/22/23 16 G Left Hand (Active)   Site Assessment Clean;Dry;Intact 11/23/23 0800   Dressing Type Transparent 11/23/23 0800   Line Status No blood return;Flushed;Saline locked 11/23/23 0800   Dressing Status Clean;Dry;Occlusive 11/23/23 0800   Number of days: 1       Peripheral IV 11/22/23 16 G Right;Anterior Hand (Active)   Site Assessment Clean;Dry;Intact 11/23/23 0800   Dressing Type Transparent 11/23/23 0800   Line Status Infusing 11/23/23 0800   Dressing Status Clean;Dry;Occlusive 11/23/23 0800   Number of days: 1       Urethral Catheter  (Active)   Site Assessment Clean;Skin intact 11/23/23 0800   Collection Container Standard drainage bag 11/23/23 0800   Securement Method Securing device (Describe) 11/23/23 0800   Reason for Continuing Urinary Catheterization acute urinary retention 11/23/23 0800   Number of days: 6     This case was discussed with Dr. KOHLER and > 30 minutes was spent in the professional and overall care of this patient.    Code Status:  Full Code    Ava Gonzalez MD  Cardiology Fellow PGY4    I saw and evaluated the patient. I personally obtained the key and critical portions of the history and physical exam or was physically present for key and critical portions performed by the resident/fellow. I reviewed the resident/fellow's documentation and discussed the patient with the resident/fellow. I agree with the resident/fellow's medical decision making as documented in the note.    Ti Martins MD

## 2023-11-27 ENCOUNTER — APPOINTMENT (OUTPATIENT)
Dept: CARDIOLOGY | Facility: HOSPITAL | Age: 73
DRG: 260 | End: 2023-11-27
Payer: MEDICARE

## 2023-11-27 LAB
ALBUMIN SERPL BCP-MCNC: 2.9 G/DL (ref 3.4–5)
ANION GAP SERPL CALC-SCNC: 14 MMOL/L (ref 10–20)
ATRIAL RATE: 75 BPM
ATRIAL RATE: 84 BPM
ATRIAL RATE: 84 BPM
BASOPHILS # BLD AUTO: 0.02 X10*3/UL (ref 0–0.1)
BASOPHILS NFR BLD AUTO: 0.4 %
BUN SERPL-MCNC: 30 MG/DL (ref 6–23)
CALCIUM SERPL-MCNC: 8.3 MG/DL (ref 8.6–10.6)
CHLORIDE SERPL-SCNC: 106 MMOL/L (ref 98–107)
CO2 SERPL-SCNC: 22 MMOL/L (ref 21–32)
CREAT SERPL-MCNC: 3.26 MG/DL (ref 0.5–1.3)
EOSINOPHIL # BLD AUTO: 0.09 X10*3/UL (ref 0–0.4)
EOSINOPHIL NFR BLD AUTO: 1.8 %
ERYTHROCYTE [DISTWIDTH] IN BLOOD BY AUTOMATED COUNT: 15.3 % (ref 11.5–14.5)
GFR SERPL CREATININE-BSD FRML MDRD: 19 ML/MIN/1.73M*2
GLUCOSE BLD MANUAL STRIP-MCNC: 120 MG/DL (ref 74–99)
GLUCOSE BLD MANUAL STRIP-MCNC: 77 MG/DL (ref 74–99)
GLUCOSE BLD MANUAL STRIP-MCNC: 88 MG/DL (ref 74–99)
GLUCOSE BLD MANUAL STRIP-MCNC: 90 MG/DL (ref 74–99)
GLUCOSE SERPL-MCNC: 74 MG/DL (ref 74–99)
HCT VFR BLD AUTO: 24.3 % (ref 41–52)
HGB BLD-MCNC: 7.8 G/DL (ref 13.5–17.5)
IMM GRANULOCYTES # BLD AUTO: 0.03 X10*3/UL (ref 0–0.5)
IMM GRANULOCYTES NFR BLD AUTO: 0.6 % (ref 0–0.9)
LYMPHOCYTES # BLD AUTO: 0.96 X10*3/UL (ref 0.8–3)
LYMPHOCYTES NFR BLD AUTO: 19.3 %
MAGNESIUM SERPL-MCNC: 1.95 MG/DL (ref 1.6–2.4)
MCH RBC QN AUTO: 28.9 PG (ref 26–34)
MCHC RBC AUTO-ENTMCNC: 32.1 G/DL (ref 32–36)
MCV RBC AUTO: 90 FL (ref 80–100)
MONOCYTES # BLD AUTO: 0.33 X10*3/UL (ref 0.05–0.8)
MONOCYTES NFR BLD AUTO: 6.6 %
NEUTROPHILS # BLD AUTO: 3.55 X10*3/UL (ref 1.6–5.5)
NEUTROPHILS NFR BLD AUTO: 71.3 %
NRBC BLD-RTO: 0 /100 WBCS (ref 0–0)
P AXIS: 13 DEGREES
P AXIS: 22 DEGREES
P AXIS: 62 DEGREES
P OFFSET: 136 MS
P OFFSET: 143 MS
P OFFSET: 149 MS
P ONSET: 76 MS
P ONSET: 82 MS
P ONSET: 92 MS
PHOSPHATE SERPL-MCNC: 4.8 MG/DL (ref 2.5–4.9)
PLATELET # BLD AUTO: 103 X10*3/UL (ref 150–450)
POTASSIUM SERPL-SCNC: 4.3 MMOL/L (ref 3.5–5.3)
PR INTERVAL: 248 MS
PR INTERVAL: 264 MS
PR INTERVAL: 288 MS
Q ONSET: 216 MS
Q ONSET: 220 MS
Q ONSET: 226 MS
QRS COUNT: 12 BEATS
QRS COUNT: 14 BEATS
QRS COUNT: 14 BEATS
QRS DURATION: 86 MS
QRS DURATION: 86 MS
QRS DURATION: 92 MS
QT INTERVAL: 404 MS
QT INTERVAL: 414 MS
QT INTERVAL: 472 MS
QTC CALCULATION(BAZETT): 477 MS
QTC CALCULATION(BAZETT): 489 MS
QTC CALCULATION(BAZETT): 527 MS
QTC FREDERICIA: 451 MS
QTC FREDERICIA: 462 MS
QTC FREDERICIA: 508 MS
R AXIS: 31 DEGREES
R AXIS: 32 DEGREES
R AXIS: 65 DEGREES
RBC # BLD AUTO: 2.7 X10*6/UL (ref 4.5–5.9)
SODIUM SERPL-SCNC: 138 MMOL/L (ref 136–145)
T AXIS: 41 DEGREES
T AXIS: 42 DEGREES
T AXIS: 52 DEGREES
T OFFSET: 427 MS
T OFFSET: 428 MS
T OFFSET: 452 MS
VENTRICULAR RATE: 75 BPM
VENTRICULAR RATE: 84 BPM
VENTRICULAR RATE: 84 BPM
WBC # BLD AUTO: 5 X10*3/UL (ref 4.4–11.3)

## 2023-11-27 PROCEDURE — 97110 THERAPEUTIC EXERCISES: CPT | Mod: GP

## 2023-11-27 PROCEDURE — 82947 ASSAY GLUCOSE BLOOD QUANT: CPT

## 2023-11-27 PROCEDURE — 2500000001 HC RX 250 WO HCPCS SELF ADMINISTERED DRUGS (ALT 637 FOR MEDICARE OP)

## 2023-11-27 PROCEDURE — 36415 COLL VENOUS BLD VENIPUNCTURE: CPT

## 2023-11-27 PROCEDURE — 2500000004 HC RX 250 GENERAL PHARMACY W/ HCPCS (ALT 636 FOR OP/ED)

## 2023-11-27 PROCEDURE — 93005 ELECTROCARDIOGRAM TRACING: CPT

## 2023-11-27 PROCEDURE — 97530 THERAPEUTIC ACTIVITIES: CPT | Mod: GP

## 2023-11-27 PROCEDURE — 85025 COMPLETE CBC W/AUTO DIFF WBC: CPT

## 2023-11-27 PROCEDURE — 96372 THER/PROPH/DIAG INJ SC/IM: CPT

## 2023-11-27 PROCEDURE — 99232 SBSQ HOSP IP/OBS MODERATE 35: CPT | Performed by: INTERNAL MEDICINE

## 2023-11-27 PROCEDURE — 1200000002 HC GENERAL ROOM WITH TELEMETRY DAILY

## 2023-11-27 PROCEDURE — 83735 ASSAY OF MAGNESIUM: CPT

## 2023-11-27 PROCEDURE — 80069 RENAL FUNCTION PANEL: CPT

## 2023-11-27 RX ORDER — MAGNESIUM CHLORIDE 64 MG
64 TABLET, DELAYED RELEASE (ENTERIC COATED) ORAL DAILY
Status: DISCONTINUED | OUTPATIENT
Start: 2023-11-27 | End: 2023-11-28

## 2023-11-27 RX ADMIN — OXYCODONE HYDROCHLORIDE 5 MG: 5 TABLET ORAL at 15:26

## 2023-11-27 RX ADMIN — MAGNESIUM 64 MG (MAGNESIUM CHLORIDE) TABLET,DELAYED RELEASE 64 MG: at 17:48

## 2023-11-27 RX ADMIN — HEPARIN SODIUM 5000 UNITS: 5000 INJECTION INTRAVENOUS; SUBCUTANEOUS at 17:48

## 2023-11-27 RX ADMIN — CEFAZOLIN SODIUM 2 G: 2 INJECTION, SOLUTION INTRAVENOUS at 15:28

## 2023-11-27 RX ADMIN — AMLODIPINE BESYLATE 10 MG: 10 TABLET ORAL at 20:17

## 2023-11-27 RX ADMIN — Medication 5 MG: at 20:17

## 2023-11-27 RX ADMIN — CEFAZOLIN SODIUM 2 G: 2 INJECTION, SOLUTION INTRAVENOUS at 03:35

## 2023-11-27 RX ADMIN — ACETAMINOPHEN 650 MG: 325 TABLET ORAL at 10:52

## 2023-11-27 RX ADMIN — FINASTERIDE 5 MG: 5 TABLET, FILM COATED ORAL at 08:34

## 2023-11-27 RX ADMIN — HEPARIN SODIUM 5000 UNITS: 5000 INJECTION INTRAVENOUS; SUBCUTANEOUS at 09:53

## 2023-11-27 RX ADMIN — METOPROLOL SUCCINATE 25 MG: 25 TABLET, EXTENDED RELEASE ORAL at 08:34

## 2023-11-27 RX ADMIN — TAMSULOSIN HYDROCHLORIDE 0.8 MG: 0.4 CAPSULE ORAL at 20:17

## 2023-11-27 RX ADMIN — INSULIN GLARGINE 10 UNITS: 100 INJECTION, SOLUTION SUBCUTANEOUS at 20:18

## 2023-11-27 RX ADMIN — ROSUVASTATIN CALCIUM 10 MG: 10 TABLET, FILM COATED ORAL at 20:17

## 2023-11-27 RX ADMIN — HEPARIN SODIUM 5000 UNITS: 5000 INJECTION INTRAVENOUS; SUBCUTANEOUS at 03:35

## 2023-11-27 ASSESSMENT — PAIN - FUNCTIONAL ASSESSMENT
PAIN_FUNCTIONAL_ASSESSMENT: 0-10

## 2023-11-27 ASSESSMENT — COGNITIVE AND FUNCTIONAL STATUS - GENERAL
TURNING FROM BACK TO SIDE WHILE IN FLAT BAD: A LITTLE
MOVING FROM LYING ON BACK TO SITTING ON SIDE OF FLAT BED WITH BEDRAILS: A LITTLE
HELP NEEDED FOR BATHING: A LOT
TOILETING: TOTAL
DRESSING REGULAR LOWER BODY CLOTHING: A LOT
MOVING TO AND FROM BED TO CHAIR: A LOT
DRESSING REGULAR UPPER BODY CLOTHING: A LOT
STANDING UP FROM CHAIR USING ARMS: A LOT
CLIMB 3 TO 5 STEPS WITH RAILING: TOTAL
TURNING FROM BACK TO SIDE WHILE IN FLAT BAD: A LITTLE
STANDING UP FROM CHAIR USING ARMS: A LOT
WALKING IN HOSPITAL ROOM: A LOT
MOBILITY SCORE: 14
MOBILITY SCORE: 13
CLIMB 3 TO 5 STEPS WITH RAILING: TOTAL
MOVING TO AND FROM BED TO CHAIR: A LITTLE
DAILY ACTIVITIY SCORE: 14
MOVING FROM LYING ON BACK TO SITTING ON SIDE OF FLAT BED WITH BEDRAILS: A LITTLE
PERSONAL GROOMING: A LITTLE
WALKING IN HOSPITAL ROOM: A LOT

## 2023-11-27 ASSESSMENT — PAIN DESCRIPTION - LOCATION: LOCATION: HEAD

## 2023-11-27 ASSESSMENT — PAIN SCALES - GENERAL
PAINLEVEL_OUTOF10: 0 - NO PAIN
PAINLEVEL_OUTOF10: 0 - NO PAIN
PAINLEVEL_OUTOF10: 6
PAINLEVEL_OUTOF10: 1
PAINLEVEL_OUTOF10: 3
PAINLEVEL_OUTOF10: 7

## 2023-11-27 ASSESSMENT — PAIN SCALES - WONG BAKER: WONGBAKER_NUMERICALRESPONSE: NO HURT

## 2023-11-27 NOTE — PROGRESS NOTES
"   Subjective     Overnight events: NATALIO     Pt is comfortable in bed and in no acute distress. Expresses trying to to min postive mindset in all that has occurred.   Denies CP, SOB, Abd pain, HA, n/v, LE pain.    Vital signs:  Blood pressure 160/71, pulse 80, temperature 36.5 °C (97.7 °F), resp. rate 18, height 1.88 m (6' 2.02\"), weight 114 kg (250 lb 14.1 oz), SpO2 98 %.    I/O last 3 completed shifts:  In: 900 (7.9 mL/kg) [P.O.:800; IV Piggyback:100]  Out: 2540 (22.3 mL/kg) [Urine:2540 (0.6 mL/kg/hr)]  Weight: 113.8 kg     Physical Exam  GENERAL:  In no acute distress  NEUROLOGIC:  A and O x 3. Cranial nerves 2 through 12 grossly intact with no gait disturbances. Intact motor and sensory systems, with normal reflexes and coordination.    HEENT:  Pupils are equal, round, and reactive to light and accommodation. Extraocular motion intact.    MOUTH: MMM, no lesions observed  CARDIAC: S1 + S2, RRR, no M/R/G.    LUNGS: CTA BL.  No wheezes or coarse breath sounds. Symmetric respirations. No increased work of breathing.   ABDOMEN: Soft, non-tender to palpation. No organomegaly. Normal BS in 4Q, No rebound or guarding.  EXTREMITIES:  Moving x4 equally and spontaneously. Right knee with mild effusion present but no erythema or warmth. Chronic limited ROM but not worse than baseline. No BLE edema. Painless non-reducible soft tissue mass in the left groin.  SKIN: Clean, warm, dry, and intact with normal skin turgor.  PSYCH: Appropriate mood and behavior.     Relevant Results  Scheduled medications  amLODIPine, 10 mg, oral, q PM  ceFAZolin in dextrose (iso-os), 2 g, intravenous, q12h  finasteride, 5 mg, oral, Daily  heparin (porcine), 5,000 Units, subcutaneous, q8h  insulin glargine, 10 Units, subcutaneous, Nightly  insulin lispro, 0-5 Units, subcutaneous, TID with meals  magnesium chloride, 64 mg, oral, Daily  melatonin, 5 mg, oral, Nightly  metoprolol succinate XL, 25 mg, oral, Daily  polyethylene glycol, 17 g, oral, " Daily  rosuvastatin, 10 mg, oral, Nightly  sennosides, 1 tablet, oral, Nightly  tamsulosin, 0.8 mg, oral, Nightly      Continuous medications     PRN medications  PRN medications: acetaminophen, dextrose 10 % in water (D10W), dextrose, glucagon, ondansetron, oxyCODONE      Labs:  Last CBC:  Lab Results   Component Value Date    WBC 5.0 11/27/2023    HGB 7.8 (L) 11/27/2023    HCT 24.3 (L) 11/27/2023    MCV 90 11/27/2023     (L) 11/27/2023       Last RFP:  Lab Results   Component Value Date    GLUCOSE 74 11/27/2023    CALCIUM 8.3 (L) 11/27/2023     11/27/2023    K 4.3 11/27/2023    CO2 22 11/27/2023     11/27/2023    BUN 30 (H) 11/27/2023    CREATININE 3.26 (H) 11/27/2023       Last LFTs:  Lab Results   Component Value Date    ALT <3 (L) 11/17/2023    AST 11 11/17/2023    ALKPHOS 94 11/17/2023    BILITOT 0.3 11/17/2023       Last coags:  Lab Results   Component Value Date    INR 1.5 (H) 11/24/2023    APTT 37 11/24/2023       Micro/culture data:  Susceptibility data from last 90 days.  Collected Specimen Info Organism Clindamycin Erythromycin Oxacillin Tetracycline Trimethoprim/Sulfamethoxazole Vancomycin   11/22/23 Tissue/Biopsy from Other (specify in comments) Methicillin Susceptible Staphylococcus aureus (MSSA) S S S S S S   11/22/23 Tissue/Biopsy from Other (specify in comments) Methicillin Susceptible Staphylococcus aureus (MSSA) S S S S S S   11/14/23 Swab from Anterior Nares Methicillin Resistant Staphylococcus aureus (MRSA)         11/14/23 Blood culture from Peripheral Venipuncture Methicillin Susceptible Staphylococcus aureus (MSSA) S S S S S S       Imaging:  Electrocardiogram, 12-lead PRN ACS symptoms  Sinus rhythm with 1st degree AV block  Otherwise normal ECG  When compared with ECG of 23-NOV-2023 08:22,  No significant change was found  Confirmed by Dallin Stallworth ([id number]) on 11/27/2023 1:18:13 PM  Electrocardiogram, 12-lead PRN ACS symptoms  Sinus rhythm with 1st degree AV  block  Prolonged QT  Abnormal ECG  When compared with ECG of 22-NOV-2023 14:48,  Premature ventricular complexes are no longer Present  Confirmed by Dallin Stallworth ([id number]) on 11/27/2023 1:18:09 PM  Electrocardiogram, 12-lead PRN ACS symptoms  Sinus rhythm with 1st degree AV block with frequent Premature ventricular complexes  Prolonged QT  Abnormal ECG  When compared with ECG of 17-NOV-2023 04:18,  Premature ventricular complexes are now Present  Confirmed by Dallin Stallworth ([id number]) on 11/27/2023 1:17:36 PM      Assessment/Plan   Mr. Chester Verduzco is a 73 year old male with PMHX HTN, HLD, Dual chamber PPM implanted in 11/1996 for SSS (RV is passive) with most recent generator replacement in 11/2020 ([employer]), Type 2 DM, gout, and recent hospitalization (10/2-10/7) for Staph bacteremia, diarrhea, NOHELIA , and hyponatremia 2/2 hypovolemia who presented to [facility] ED from a SNF on 11/13 for NOHELIA on labs from the SNF and oliguria. Blood cultures were positive for MSSA and previously had been positive 10/8 and 11/10 for MSSA. Blood cultures with NGTD as of 11/16.  FILIPE 11/16 showed tricuspid valve vegetation as well as right atrial lead vegetation. Patient has been afebrile without leukocytosis and hemodynamically stable. Device Interrogated 11/17 with AP 29% and  30%.  Lead extraction complete 11/22.  Patient on IV antibiotics with stop 4 weeks after lead removal (~12/20). After lead removal, on telemetry the patient had several episodes of intermittent asymptomatic high-grade AV block, NSVT,and bradycardia. EP plans to implant PPM after 14 days of negative blood culture post removal per, ID. Patient received a unit of packed red blood cells for low hemoglobin (11/25). Hemoglobin has remained stable with no signs or symptoms of bleeding. Home lasix and losartan were being held in setting of NOHELIA 2/2 to nephrotoxic antibiotic exposure which down trending but not at baseline with adequate urine output. Patient has  maldonado I/s/o severe BPH. Of note, Patient noticed left groin lymphadenopathy and U/S (11/23) showed Enlarged left inguinal/external iliac lymph nodes. PET scan deferred at this time d/t active infectious process. Patient has outpatient follow up with surgical oncology and diagnostic clinic for further work up.      Updates 11/27  - Patient will be held at the hospital for all 14 days considering full dependence on pacemaker.   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    -Given asymptomatic episodes of NSVT, bradycardia and sinus pauses noted on tele, patient needs to be inpatient until placement of PPM  -Given the high-grade AV block episodes the patient would need to be monitored until pacemaker is reimplanted   -Monitor tele  - Daily CBC Trend WBC, no leukocytosis  --Monitor CBC, BMP, ESR and CRP weekly and fax to [phone] Attn Dr Gonzalez  -ID will follow up as outpatient with Dr Gonzalez (~1 week prior ending abx course)  -Metoprolol succ 25 mg, goal to wean off beta blocker, given pt no longer being paced.     #NOHELIA, improving  :likely ATN given gradual rise in creatinine over past month and history of recent diarrhea and nephrotoxic antibiotic exposure. Renal U/S did not show any hydronephrosis.  - Baseline prior  to October 2023 hospitalization ~1.0-1.1.   - Elevated to 5.43 on arrival to [facility] on 11/13, down trending  - Initial urine electrolytes showed Fena 2.5% indicating intrinsic etiology   - Normal C3 and C4  - Follow up  ANCA, SPEP/UPEP with IF, free kappa robert  - Nephrology signed off, will reengage Monday if Cr starts to uptrend   - Maldonado replaced (11/17) ,strict I&Os, No indication for HD at this time  - Daily RFP  - Replete lytes as needed    #Inguinal lymphadenopathy  -Patient has endorsed left sided non tender lymphadenopathy for one month   - U/S (11/23) showed Enlarged left inguinal/external iliac lymph nodes measuring up to  5.7 cm x 3.3 cm x 4.4 cm with internal hypervascularity which were  visualized on the CT dated 10/02/2023 and not definitively seen dating back to 2015.  -Given state of infection, could be reactive or concerning for a lymphoproliferative disorder or   metastatic disease from left lower extremity malignancy  -PET scan deferred at this time d/t active infectious process. Surg onc consulted  -Referral to diagnostic clinic for outpatient follow up  -Patient to follow up with Dr. Cameron (Surgical oncology) for further discussion and evaluation of left inguinal lymphadenopathy     #Type 2 DM  - Checking A1c  - On Lantus 10 units QHS and cover with SSI,  will make Lantus 5 while NPO, restart lantus 10 when diet restarted  - hypoglycemia protocol    #HTN  #HLD  - Holding home losartan and lasix in setting of NOHELIA  - Continue Amlodipine 10, metop succinate 25 daily, and rosuvastatin 10 daily      #BPH  - Was initially planned for outpatient robotic prostatectomy end of November that has since been canceled and rescheduled for December  - Continue with Flomax 0.8 mg QHS and finasteride 5 md daily  - Continue maldonado     #Anemia  :Patient with slowly down trending Hgb from since this admission. Baseline 13-14. Stable this AM  -Received 1 unit pRBC (11/25) for Hgb 6.8 with appropriate increase to  7.4  -No acute signs or symptoms of bleeding at this time  - Will continue to monitor and transfuse for Hgb <7      F: As needed  E: AS needed  N: Adult; Carb Controlled  A:  PIV, Rojo new as of 11/17  DVT ppx: Heparin SQ  GI: N/A  Code Status: Full code  NOK: Charlene Pardo (friend) [phone]      Principal Problem:    MSSA bacteremia  Active Problems:    Endocarditis    History of general anesthesia    Complete atrioventricular block (CMS/HCC)         Guanaco Maharaj MD   Internal Medicine PGY-I

## 2023-11-27 NOTE — CARE PLAN
The patient's goals for the shift include to be painfree    The clinical goals for the shift include Patient to have no new signs of infection    Patient has no new signs of infection will continue to monitor.     Problem: Infection related to problem list condition  Goal: Infection will resolve through treatment  Outcome: Progressing     Problem: Pain  Goal: My pain/discomfort is manageable  Outcome: Progressing     Problem: Skin  Goal: Prevent/minimize sheer/friction injuries  Outcome: Progressing  Flowsheets (Taken 11/27/2023 2587)  Prevent/minimize sheer/friction injuries: Turn/reposition every 2 hours/use positioning/transfer devices

## 2023-11-27 NOTE — PROGRESS NOTES
Physical Therapy    Physical Therapy Treatment    Patient Name: Chester Verduzco  MRN: [id number]  Today's Date: 2023  Time Calculation  Start Time: 1440  Stop Time: 1505  Time Calculation (min): 25 min       Assessment/Plan   PT Assessment  Medical Staff Made Aware: Yes  End of Session Communication: Bedside nurse  Assessment Comment: Pt tolerated session with increased rest break after standing activity.  Pt completed ther ex with no complaints.  Pt continues to be limited by R knee feeling like it is going to buckle in standing.  PT continues to benefit from skilled PT and remains appropriate for moderate intensity PT upon discharge.  End of Session Patient Position: Up in chair, Alarm off, not on at start of session  PT Plan  Inpatient/Swing Bed or Outpatient: Inpatient  PT Plan  Treatment/Interventions: Bed mobility, Transfer training, Gait training, Stair training, Balance training, Strengthening, Therapeutic exercise, Endurance training, Range of motion, Therapeutic activity, Home exercise program  PT Plan: Skilled PT  PT Frequency: 5 times per week  PT Discharge Recommendations: Moderate intensity level of continued care  PT Recommended Transfer Status: Assist x1, Assistive device (RW)  PT - OK to Discharge: Yes      General Visit Information:   PT  Visit  PT Received On: 23  Response to Previous Treatment: Patient with no complaints from previous session.  General  Prior to Session Communication: Bedside nurse  Patient Position Received: Bed, 4 rail up, Alarm off, not on at start of session  General Comment: Pt cleared for PT by RN.  Pt alert and agreeable to PT.    Subjective   Precautions:  Precautions  Medical Precautions: Fall precautions, Cardiac precautions  Vital Signs:  Vital Signs  Heart Rate:  (pre:82, durin,  post:91)  Heart Rate Source: Monitor    Objective   Cognition:  Cognition  Overall Cognitive Status: Within Functional Limits    Activity Tolerance:  Activity  Tolerance  Endurance: Tolerates 10 - 20 min exercise with multiple rests  Treatments:  Therapeutic Exercise  Therapeutic Exercise Performed: Yes  Therapeutic Exercise Activity 1: heel slides, ankle pumps, supine hip abd/add, quad sets, LAQ, and standing marches x10 each LE seated marches x20.    Bed Mobility  Bed Mobility: Yes  Bed Mobility 1  Bed Mobility 1: Supine to sitting (modified log roll with use of bed rail, HOB flat)  Level of Assistance 1: Minimum assistance     Transfers  Transfer: Yes  Transfer 1  Transfer From 1: Bed to  Transfer to 1: Stand  Technique 1: Sit to stand  Transfer Device 1: Walker  Transfer Level of Assistance 1: Moderate assistance  Transfers 2  Transfer From 2: Stand to  Transfer to 2: Chair with arms  Technique 2: Stand pivot  Transfer Device 2: Walker  Transfer Level of Assistance 2: Contact guard  Trials/Comments 2: multiple small pivots due to pt unable to take steps with R LE, feeling like R knee will buckle    Outcome Measures:  [facility] Basic Mobility  Turning from your back to your side while in a flat bed without using bedrails: A little  Moving from lying on your back to sitting on the side of a flat bed without using bedrails: A little  Moving to and from bed to chair (including a wheelchair): A lot  Standing up from a chair using your arms (e.g. wheelchair or bedside chair): A lot  To walk in hospital room: A lot  Climbing 3-5 steps with railing: Total  Basic Mobility - Total Score: 13    Education Documentation  Body Mechanics, taught by Pratima Vega PT at 11/27/2023  3:24 PM.  Learner: Patient  Readiness: Acceptance  Method: Explanation  Response: Verbalizes Understanding    Mobility Training, taught by Pratima Vega PT at 11/27/2023  3:24 PM.  Learner: Patient  Readiness: Acceptance  Method: Explanation  Response: Verbalizes Understanding    Education Comments  No comments found.        Encounter Problems       Encounter Problems (Active)       PT Problem       Patient  will complete supine to sit and sit to supine Independent  (Progressing)       Start:  11/17/23    Expected End:  12/01/23            Patient will perform sit<>stand transfer with Rolling Walker, and Supervision  (Progressing)       Start:  11/17/23    Expected End:  12/01/23            Patient will ambulate >100' with Rolling Walker and Supervision  (Progressing)       Start:  11/17/23    Expected End:  12/01/23            Patient will ascend/descend 3 steps with Bilateral Rails and SBA  (Not Progressing)       Start:  11/17/23    Expected End:  12/01/23

## 2023-11-27 NOTE — PROGRESS NOTES
Transitional Care Coordination Progress Note:  Patient discussed during interdisciplinary rounds.   Team members present: Hector Momin RN [facility], [facility] Team, Nurse Manager  Plan per Medical/Surgical team: EP, s/p device removal, ID recs, IV Cefazolin for 4 weeks, need PICC placement, PT/OT, monitor Hgb, NOHELIA, new pacemaker 14 days after removal  [facility]  Status: inpatient  Discharge disposition: Return to [place]  Potential Barriers: None  ADOD: TBD pending pacemaker implantation date  Hector Momin RN Transitional Care Coordinator [phone]

## 2023-11-28 LAB
ALBUMIN SERPL BCP-MCNC: 2.8 G/DL (ref 3.4–5)
ANION GAP SERPL CALC-SCNC: 14 MMOL/L (ref 10–20)
BASOPHILS # BLD AUTO: 0.02 X10*3/UL (ref 0–0.1)
BASOPHILS NFR BLD AUTO: 0.4 %
BUN SERPL-MCNC: 27 MG/DL (ref 6–23)
CALCIUM SERPL-MCNC: 8.2 MG/DL (ref 8.6–10.6)
CHLORIDE SERPL-SCNC: 107 MMOL/L (ref 98–107)
CO2 SERPL-SCNC: 21 MMOL/L (ref 21–32)
CREAT SERPL-MCNC: 3.1 MG/DL (ref 0.5–1.3)
EOSINOPHIL # BLD AUTO: 0.1 X10*3/UL (ref 0–0.4)
EOSINOPHIL NFR BLD AUTO: 2.1 %
ERYTHROCYTE [DISTWIDTH] IN BLOOD BY AUTOMATED COUNT: 14.8 % (ref 11.5–14.5)
GFR SERPL CREATININE-BSD FRML MDRD: 21 ML/MIN/1.73M*2
GLUCOSE BLD MANUAL STRIP-MCNC: 100 MG/DL (ref 74–99)
GLUCOSE BLD MANUAL STRIP-MCNC: 101 MG/DL (ref 74–99)
GLUCOSE BLD MANUAL STRIP-MCNC: 138 MG/DL (ref 74–99)
GLUCOSE BLD MANUAL STRIP-MCNC: 92 MG/DL (ref 74–99)
GLUCOSE SERPL-MCNC: 77 MG/DL (ref 74–99)
HCT VFR BLD AUTO: 25.4 % (ref 41–52)
HGB BLD-MCNC: 8.2 G/DL (ref 13.5–17.5)
IMM GRANULOCYTES # BLD AUTO: 0.03 X10*3/UL (ref 0–0.5)
IMM GRANULOCYTES NFR BLD AUTO: 0.6 % (ref 0–0.9)
LYMPHOCYTES # BLD AUTO: 0.83 X10*3/UL (ref 0.8–3)
LYMPHOCYTES NFR BLD AUTO: 17.7 %
MAGNESIUM SERPL-MCNC: 1.8 MG/DL (ref 1.6–2.4)
MCH RBC QN AUTO: 29.2 PG (ref 26–34)
MCHC RBC AUTO-ENTMCNC: 32.3 G/DL (ref 32–36)
MCV RBC AUTO: 90 FL (ref 80–100)
MONOCYTES # BLD AUTO: 0.35 X10*3/UL (ref 0.05–0.8)
MONOCYTES NFR BLD AUTO: 7.5 %
NEUTROPHILS # BLD AUTO: 3.36 X10*3/UL (ref 1.6–5.5)
NEUTROPHILS NFR BLD AUTO: 71.7 %
NRBC BLD-RTO: 0 /100 WBCS (ref 0–0)
PHOSPHATE SERPL-MCNC: 4.6 MG/DL (ref 2.5–4.9)
PLATELET # BLD AUTO: 91 X10*3/UL (ref 150–450)
POTASSIUM SERPL-SCNC: 3.9 MMOL/L (ref 3.5–5.3)
RBC # BLD AUTO: 2.81 X10*6/UL (ref 4.5–5.9)
SODIUM SERPL-SCNC: 138 MMOL/L (ref 136–145)
WBC # BLD AUTO: 4.7 X10*3/UL (ref 4.4–11.3)

## 2023-11-28 PROCEDURE — 83735 ASSAY OF MAGNESIUM: CPT

## 2023-11-28 PROCEDURE — 2500000002 HC RX 250 W HCPCS SELF ADMINISTERED DRUGS (ALT 637 FOR MEDICARE OP, ALT 636 FOR OP/ED)

## 2023-11-28 PROCEDURE — 99232 SBSQ HOSP IP/OBS MODERATE 35: CPT | Performed by: INTERNAL MEDICINE

## 2023-11-28 PROCEDURE — 1200000002 HC GENERAL ROOM WITH TELEMETRY DAILY

## 2023-11-28 PROCEDURE — 2500000001 HC RX 250 WO HCPCS SELF ADMINISTERED DRUGS (ALT 637 FOR MEDICARE OP)

## 2023-11-28 PROCEDURE — 2500000004 HC RX 250 GENERAL PHARMACY W/ HCPCS (ALT 636 FOR OP/ED)

## 2023-11-28 PROCEDURE — 96372 THER/PROPH/DIAG INJ SC/IM: CPT

## 2023-11-28 PROCEDURE — 85025 COMPLETE CBC W/AUTO DIFF WBC: CPT

## 2023-11-28 PROCEDURE — 80069 RENAL FUNCTION PANEL: CPT

## 2023-11-28 PROCEDURE — 36415 COLL VENOUS BLD VENIPUNCTURE: CPT

## 2023-11-28 PROCEDURE — 82947 ASSAY GLUCOSE BLOOD QUANT: CPT

## 2023-11-28 PROCEDURE — 99233 SBSQ HOSP IP/OBS HIGH 50: CPT | Performed by: STUDENT IN AN ORGANIZED HEALTH CARE EDUCATION/TRAINING PROGRAM

## 2023-11-28 RX ORDER — INSULIN GLARGINE 100 [IU]/ML
5 INJECTION, SOLUTION SUBCUTANEOUS ONCE
Status: COMPLETED | OUTPATIENT
Start: 2023-11-28 | End: 2023-11-28

## 2023-11-28 RX ADMIN — TAMSULOSIN HYDROCHLORIDE 0.8 MG: 0.4 CAPSULE ORAL at 22:08

## 2023-11-28 RX ADMIN — AMLODIPINE BESYLATE 10 MG: 10 TABLET ORAL at 22:09

## 2023-11-28 RX ADMIN — HEPARIN SODIUM 5000 UNITS: 5000 INJECTION INTRAVENOUS; SUBCUTANEOUS at 02:36

## 2023-11-28 RX ADMIN — Medication 5 MG: at 22:08

## 2023-11-28 RX ADMIN — FINASTERIDE 5 MG: 5 TABLET, FILM COATED ORAL at 08:48

## 2023-11-28 RX ADMIN — CEFAZOLIN SODIUM 2 G: 2 INJECTION, SOLUTION INTRAVENOUS at 15:49

## 2023-11-28 RX ADMIN — CEFAZOLIN SODIUM 2 G: 2 INJECTION, SOLUTION INTRAVENOUS at 05:01

## 2023-11-28 RX ADMIN — ROSUVASTATIN CALCIUM 10 MG: 10 TABLET, FILM COATED ORAL at 22:08

## 2023-11-28 RX ADMIN — INSULIN GLARGINE 5 UNITS: 100 INJECTION, SOLUTION SUBCUTANEOUS at 22:09

## 2023-11-28 RX ADMIN — HEPARIN SODIUM 5000 UNITS: 5000 INJECTION INTRAVENOUS; SUBCUTANEOUS at 09:43

## 2023-11-28 RX ADMIN — METOPROLOL SUCCINATE 25 MG: 25 TABLET, EXTENDED RELEASE ORAL at 08:48

## 2023-11-28 RX ADMIN — HEPARIN SODIUM 5000 UNITS: 5000 INJECTION INTRAVENOUS; SUBCUTANEOUS at 18:20

## 2023-11-28 RX ADMIN — MAGNESIUM 64 MG (MAGNESIUM CHLORIDE) TABLET,DELAYED RELEASE 64 MG: at 08:48

## 2023-11-28 ASSESSMENT — COGNITIVE AND FUNCTIONAL STATUS - GENERAL
MOBILITY SCORE: 13
HELP NEEDED FOR BATHING: A LOT
HELP NEEDED FOR BATHING: A LITTLE
DAILY ACTIVITIY SCORE: 14
TURNING FROM BACK TO SIDE WHILE IN FLAT BAD: A LITTLE
WALKING IN HOSPITAL ROOM: A LOT
TOILETING: TOTAL
MOVING TO AND FROM BED TO CHAIR: A LOT
MOVING TO AND FROM BED TO CHAIR: A LOT
DRESSING REGULAR LOWER BODY CLOTHING: A LOT
MOBILITY SCORE: 13
DAILY ACTIVITIY SCORE: 17
TURNING FROM BACK TO SIDE WHILE IN FLAT BAD: A LITTLE
STANDING UP FROM CHAIR USING ARMS: A LOT
CLIMB 3 TO 5 STEPS WITH RAILING: TOTAL
CLIMB 3 TO 5 STEPS WITH RAILING: TOTAL
PERSONAL GROOMING: A LITTLE
MOVING FROM LYING ON BACK TO SITTING ON SIDE OF FLAT BED WITH BEDRAILS: A LITTLE
DRESSING REGULAR UPPER BODY CLOTHING: A LOT
DRESSING REGULAR UPPER BODY CLOTHING: A LOT
STANDING UP FROM CHAIR USING ARMS: A LOT
TOILETING: A LITTLE
DRESSING REGULAR LOWER BODY CLOTHING: A LOT
MOVING FROM LYING ON BACK TO SITTING ON SIDE OF FLAT BED WITH BEDRAILS: A LITTLE
WALKING IN HOSPITAL ROOM: A LOT
PERSONAL GROOMING: A LITTLE

## 2023-11-28 ASSESSMENT — PAIN SCALES - GENERAL
PAINLEVEL_OUTOF10: 0 - NO PAIN
PAINLEVEL_OUTOF10: 0 - NO PAIN

## 2023-11-28 ASSESSMENT — PAIN - FUNCTIONAL ASSESSMENT
PAIN_FUNCTIONAL_ASSESSMENT: 0-10
PAIN_FUNCTIONAL_ASSESSMENT: 0-10

## 2023-11-28 NOTE — CARE PLAN
The patient's goals for the shift include to be painfree    The clinical goals for the shift include Patient will have no new signs of infection    No new signs of infection will continue to monitor    Problem: Infection related to problem list condition  Goal: Infection will resolve through treatment  Outcome: Progressing     Problem: Pain  Goal: My pain/discomfort is manageable  Outcome: Progressing     Problem: Safety  Goal: Patient will be injury free during hospitalization  Outcome: Progressing

## 2023-11-28 NOTE — PROGRESS NOTES
"   Subjective     Overnight events:   Pt was seen and examined at bedside. No acute overnight events. Pt denies any chest pain, SOB, dyspnea, new cough, palpitations, nausea, vomiting, diarrhea, dizziness, changes in visions, headaches, or worsening lower extremity swelling. Pt is comfortable in bed and in no acute distress.     Vital signs:  Blood pressure 138/69, pulse 82, temperature 36.6 °C (97.8 °F), resp. rate 18, height 1.88 m (6' 2.02\"), weight 114 kg (251 lb 8.7 oz), SpO2 97 %.    I/O last 3 completed shifts:  In: 1820 (16 mL/kg) [P.O.:1420; IV Piggyback:400]  Out: 3740 (32.8 mL/kg) [Urine:3740 (0.9 mL/kg/hr)]  Weight: 114.1 kg     Physical Exam  GENERAL:  In no acute distress  NEUROLOGIC:  A and O x 3. Cranial nerves 2 through 12 grossly intact with no gait disturbances. Intact motor and sensory systems, with normal reflexes and coordination.    HEENT:  Pupils are equal, round, and reactive to light and accommodation. Extraocular motion intact.    MOUTH: MMM, no lesions observed  CARDIAC: S1 + S2, RRR, no M/R/G.    LUNGS: CTA BL.  No wheezes or coarse breath sounds. Symmetric respirations. No increased work of breathing.   ABDOMEN: Soft, non-tender to palpation. No organomegaly. Normal BS in 4Q, No rebound or guarding.  EXTREMITIES:  Moving x4 equally and spontaneously. Right knee with mild effusion present but no erythema or warmth. Chronic limited ROM but not worse than baseline. No BLE edema. Painless non-reducible soft tissue mass in the left groin.  SKIN: Clean, warm, dry, and intact with normal skin turgor.  PSYCH: Appropriate mood and behavior.     Relevant Results  Scheduled medications  amLODIPine, 10 mg, oral, q PM  ceFAZolin in dextrose (iso-os), 2 g, intravenous, q12h  finasteride, 5 mg, oral, Daily  heparin (porcine), 5,000 Units, subcutaneous, q8h  insulin glargine, 10 Units, subcutaneous, Nightly  insulin lispro, 0-5 Units, subcutaneous, TID with meals  magnesium chloride, 64 mg, oral, " Daily  melatonin, 5 mg, oral, Nightly  metoprolol succinate XL, 25 mg, oral, Daily  polyethylene glycol, 17 g, oral, Daily  rosuvastatin, 10 mg, oral, Nightly  sennosides, 1 tablet, oral, Nightly  tamsulosin, 0.8 mg, oral, Nightly      Continuous medications     PRN medications  PRN medications: acetaminophen, dextrose 10 % in water (D10W), dextrose, glucagon, ondansetron, oxyCODONE      Labs:  Last CBC:  Lab Results   Component Value Date    WBC 4.7 11/28/2023    HGB 8.2 (L) 11/28/2023    HCT 25.4 (L) 11/28/2023    MCV 90 11/28/2023    PLT 91 (L) 11/28/2023       Last RFP:  Lab Results   Component Value Date    GLUCOSE 77 11/28/2023    CALCIUM 8.2 (L) 11/28/2023     11/28/2023    K 3.9 11/28/2023    CO2 21 11/28/2023     11/28/2023    BUN 27 (H) 11/28/2023    CREATININE 3.10 (H) 11/28/2023       Last LFTs:  Lab Results   Component Value Date    ALT <3 (L) 11/17/2023    AST 11 11/17/2023    ALKPHOS 94 11/17/2023    BILITOT 0.3 11/17/2023       Last coags:  Lab Results   Component Value Date    INR 1.5 (H) 11/24/2023    APTT 37 11/24/2023       Micro/culture data:  Susceptibility data from last 90 days.  Collected Specimen Info Organism Clindamycin Erythromycin Oxacillin Tetracycline Trimethoprim/Sulfamethoxazole Vancomycin   11/22/23 Tissue/Biopsy from Other (specify in comments) Methicillin Susceptible Staphylococcus aureus (MSSA) S S S S S S   11/22/23 Tissue/Biopsy from Other (specify in comments) Methicillin Susceptible Staphylococcus aureus (MSSA) S S S S S S   11/14/23 Swab from Anterior Nares Methicillin Resistant Staphylococcus aureus (MRSA)         11/14/23 Blood culture from Peripheral Venipuncture Methicillin Susceptible Staphylococcus aureus (MSSA) S S S S S S         Imaging:  Electrocardiogram, 12-lead PRN ACS symptoms  Sinus rhythm with 1st degree AV block  Otherwise normal ECG  When compared with ECG of 23-NOV-2023 08:22,  No significant change was found  Confirmed by Dallin Stallworth ([id number])  on 11/27/2023 1:18:13 PM  Electrocardiogram, 12-lead PRN ACS symptoms  Sinus rhythm with 1st degree AV block  Prolonged QT  Abnormal ECG  When compared with ECG of 22-NOV-2023 14:48,  Premature ventricular complexes are no longer Present  Confirmed by Dallin Stallworth ([id number]) on 11/27/2023 1:18:09 PM  Electrocardiogram, 12-lead PRN ACS symptoms  Sinus rhythm with 1st degree AV block with frequent Premature ventricular complexes  Prolonged QT  Abnormal ECG  When compared with ECG of 17-NOV-2023 04:18,  Premature ventricular complexes are now Present  Confirmed by Dallin Stallworth ([id number]) on 11/27/2023 1:17:36 PM      Assessment/Plan   Mr. Chester Verduzco is a 73 year old male with PMHX HTN, HLD, Dual chamber PPM implanted in 11/1996 for SSS (RV is passive) with most recent generator replacement in 11/2020 ([employer] - Assurity MRI), Type 2 DM, gout, and recent hospitalization (10/2-10/7) for Staph bacteremia, diarrhea, NOHELIA , and hyponatremia 2/2 hypovolemia who presented to [facility] ED from a SNF on 11/13 for NOHELIA on labs from the SNF and oliguria. Blood cultures were positive for MSSA and previously had been positive 10/8 and 11/10 for MSSA. Blood cultures with NGTD as of 11/16.  FILIPE 11/16 showed tricuspid valve vegetation as well as right atrial lead vegetation. Patient has been afebrile without leukocytosis and hemodynamically stable. Device Interrogated 11/17 with AP 29% and  30%.  Lead extraction complete 11/22.  Patient on IV antibiotics with stop 4 weeks after lead removal (~12/20). After lead removal, on telemetry the patient had several episodes of intermittent asymptomatic high-grade AV block, NSVT,and bradycardia. EP plans to implant PPM after 14 days of negative blood culture post removal per, ID. Patient received a unit of packed red blood cells for low hemoglobin (11/25). Hemoglobin has remained stable with no signs or symptoms of bleeding. Home lasix and losartan were being held in setting of NOHELIA 2/2 to  nephrotoxic antibiotic exposure which down trending but not at baseline with adequate urine output. Patient has maldonado I/s/o severe BPH. Of note, Patient noticed left groin lymphadenopathy and U/S (11/23) showed Enlarged left inguinal/external iliac lymph nodes. PET scan deferred at this time d/t active infectious process. Patient has outpatient follow up with surgical oncology and diagnostic clinic for further work up however biopsy maybe done inpatient as patient is to be monitored for 14 days until reimplantation of pacemaker.      Updates 11/28  - Pacemaker to be placed after 14 days of negative blood cultures after lead removal (~12/13). Patient will be held at the hospital considering full dependence on pacemaker.   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    -Given asymptomatic episodes of NSVT, bradycardia and sinus pauses noted on tele, patient needs to be inpatient and monitored on tele until placement of PPM  - Daily CBC Trend WBC, no leukocytosis  --Monitor CBC, BMP, ESR and CRP weekly and fax to [phone] Attn Dr Gonzalez  -ID will follow up as outpatient with Dr Gonzalez (~1 week prior ending abx course)  -Metoprolol succ 25 mg daily while patient is without a pacemaker  -Pending EP and ID discussion on timeline for PPM implantation    #NOHELIA, improving  :likely ATN given gradual rise in creatinine over past month and history of recent diarrhea  and nephrotoxic antibiotic exposure. Renal U/S did not show any hydronephrosis.  - Baseline prior to October 2023 hospitalization ~1.0-1.1.   - Elevated to 5.43 on arrival to [facility] on 11/13, down trending  - Initial urine electrolytes showed Fena 2.5% indicating intrinsic etiology, Normal C3 and C4  - Nephrology signed off, will reengage  if Cr starts to uptrend   - Maldonado replaced (11/17) ,strict I&Os, No indication for HD at this time  - Daily RFP  - Replete lytes as needed    #Inguinal lymphadenopathy  -Patient has endorsed left sided non tender lymphadenopathy for one month   - U/S (11/23) showed Enlarged left inguinal/external iliac lymph nodes measuring up to  5.7 cm x 3.3 cm x 4.4 cm with internal hypervascularity which were  visualized on the CT dated 10/02/2023 and not definitively seen dating back to 2015.  -Given state of infection, could be reactive or concerning for a lymphoproliferative disorder or metastatic disease from left lower extremity malignancy  -PET scan deferred at this time d/t active infectious process. Surg onc consulted  -May have inpatient core biopsy given prolonged hospital stay  -Patient to follow up with Dr. Cameron (Surgical oncology) for further discussion and evaluation of left inguinal lymphadenopathy   -Referral to diagnostic clinic for outpatient follow up    #Type 2 DM  - Checking A1c  - On Lantus 10 units QHS and cover with SSI,    - hypoglycemia protocol    #HTN  #HLD  - Holding home losartan and lasix in setting of NOHELIA  - Continue Amlodipine 10, metop succinate 25 daily, and rosuvastatin 10 daily      #BPH  - Was initially planned for outpatient robotic prostatectomy end of November that has since been canceled and rescheduled for December  - Continue with Flomax 0.8 mg QHS and finasteride 5 md daily  - Continue maldonado     #Anemia  :Patient with slowly down trending Hgb from since this admission. Baseline 13-14. Stable this AM  -Received 1 unit pRBC (11/25) for Hgb 6.8  with appropriate increase to 7.4  -No acute signs or symptoms of bleeding at this time  - Will continue to monitor and transfuse for Hgb <7      F: As needed  E: AS needed  N: Adult; Carb Controlled  A:  PIV, Rojo new as of 11/17  DVT ppx: Heparin SQ  GI: N/A  Code Status: Full code  NOK: Charlene Pardo (friend) [phone]      Principal Problem:    MSSA bacteremia  Active Problems:    Endocarditis    History of general anesthesia    Complete atrioventricular block (CMS/HCC)         Gonzalo Henley MD   Internal Medicine PGY-I

## 2023-11-28 NOTE — CARE PLAN
Problem: Skin  Goal: Prevent/minimize sheer/friction injuries  11/28/2023 0152 by Lucila Tamayo RN  Outcome: Progressing  Flowsheets (Taken 11/28/2023 0152)  Prevent/minimize sheer/friction injuries:   HOB 30 degrees or less   Use pull sheet  11/28/2023 0148 by Lucila Tamayo RN  Outcome: Progressing  11/27/2023 2138 by Lucila Tamayo RN  Outcome: Progressing   The patient's goals for the shift include to be painfree    The clinical goals for the shift include Patient wll remain safe and hemodynamically stable.

## 2023-11-28 NOTE — PROGRESS NOTES
Subjective Data:  Device cx growing MSSA, otherwise Blood cultures have been negative since 11/16. Review of Tele showed episodes of non conducted PACs but otherwise no significant high-grade AV block was noted in the last 24 hours.    Objective Data:  Last Recorded Vitals:  Vitals:    11/28/23 0458 11/28/23 0833 11/28/23 1200 11/28/23 1553   BP: 132/77 149/73 138/69 134/74   Pulse: 86 82 82 85   Resp: 18 18 18 18   Temp: 37.2 °C (99 °F) 36.6 °C (97.8 °F) 36.6 °C (97.8 °F) 36.4 °C (97.6 °F)   TempSrc: Temporal      SpO2: 97% 96% 97% 99%   Weight: 114 kg (251 lb 8.7 oz)      Height:           Last Labs:  CBC - 11/28/2023:  8:07 AM  4.7 8.2 91    25.4      CMP - 11/28/2023:  8:07 AM  8.2 5.6; 5.6 11 --- 0.3   4.6 2.8 <3 94      PTT - 11/24/2023:  4:53 AM  1.5   16.7 37     TROPHS   Date/Time Value Ref Range Status   09/18/2023 05:52 PM 15 0 - 20 ng/L Final     Comment:     .  Less than 99th percentile of normal range cutoff-  Female and children under 18 years old <14 ng/L; Male <21 ng/L: Negative  Repeat testing should be performed if clinically indicated.   .  Female and children under 18 years old 14-50 ng/L; Male 21-50 ng/L:  Consistent with possible cardiac damage and possible increased clinical   risk. Serial measurements may help to assess extent of myocardial damage.   .  >50 ng/L: Consistent with cardiac damage, increased clinical risk and  myocardial infarction. Serial measurements may help assess extent of   myocardial damage.   .   NOTE: Children less than 1 year old may have higher baseline troponin   levels and results should be interpreted in conjunction with the overall   clinical context.   .  NOTE: Troponin I testing is performed using a different   testing methodology at [facility] than at other   [facility] hospitals. Direct result comparisons should only   be made within the same method.       BNP   Date/Time Value Ref Range Status   10/02/2023 01:07 PM 97 0 - 99 pg/mL Final     HGBA1C    Date/Time Value Ref Range Status   11/17/2023 10:14 AM 6.6 see below % Final   08/11/2023 12:59 PM 9.9 % Final     Comment:          Diagnosis of Diabetes-Adults   Non-Diabetic: < or = 5.6%   Increased risk for developing diabetes: 5.7-6.4%   Diagnostic of diabetes: > or = 6.5%  .       Monitoring of Diabetes                Age (y)     Therapeutic Goal (%)   Adults:          >18           <7.0   Pediatrics:    13-18           <7.5                   7-12           <8.0                   0- 6            7.5-8.5   American Diabetes Association. Diabetes Care 33(S1), Jan 2010.     02/07/2023 09:07 AM 8.4 % Final     Comment:          Diagnosis of Diabetes-Adults   Non-Diabetic: < or = 5.6%   Increased risk for developing diabetes: 5.7-6.4%   Diagnostic of diabetes: > or = 6.5%  .       Monitoring of Diabetes                Age (y)     Therapeutic Goal (%)   Adults:          >18           <7.0   Pediatrics:    13-18           <7.5                   7-12           <8.0                   0- 6            7.5-8.5   American Diabetes Association. Diabetes Care 33(S1), Jan 2010.       VLDL   Date/Time Value Ref Range Status   08/11/2023 12:59 PM 57 0 - 40 mg/dL Final   08/10/2022 11:09 AM 33 0 - 40 mg/dL Final   08/19/2020 12:17 PM 62 0 - 40 mg/dL Final      Last I/O:  I/O last 3 completed shifts:  In: 1820 (16 mL/kg) [P.O.:1420; IV Piggyback:400]  Out: 3740 (32.8 mL/kg) [Urine:3740 (0.9 mL/kg/hr)]  Weight: 114.1 kg     Past Cardiology Tests (Last 3 Years):  EKG:  ECG 12 Lead 11/17/2023      ECG 12 Lead 10/9/2023    Echo:  Anesthesia Intraoperative Transesophageal Echocardiogram 11/22/2023      Transesophageal Echo (FILIPE) 11/22/2023      Transthoracic Echo (TTE) Complete 11/15/2023    Ejection Fractions:  EF   Date/Time Value Ref Range Status   11/14/2023 03:22 PM 63       Cath:  No results found for this or any previous visit from the past 1095 days.    Stress Test:  No results found for this or any previous visit from the  past 1095 days.    Cardiac Imaging:  No results found for this or any previous visit from the past 1095 days.      Inpatient Medications:  Scheduled medications   Medication Dose Route Frequency    amLODIPine  10 mg oral q PM    ceFAZolin in dextrose (iso-os)  2 g intravenous q12h    finasteride  5 mg oral Daily    heparin (porcine)  5,000 Units subcutaneous q8h    insulin glargine  10 Units subcutaneous Nightly    insulin lispro  0-5 Units subcutaneous TID with meals    magnesium chloride  64 mg oral Daily    melatonin  5 mg oral Nightly    metoprolol succinate XL  25 mg oral Daily    polyethylene glycol  17 g oral Daily    rosuvastatin  10 mg oral Nightly    sennosides  1 tablet oral Nightly    tamsulosin  0.8 mg oral Nightly     PRN medications   Medication    acetaminophen    dextrose 10 % in water (D10W)    dextrose    glucagon    ondansetron    oxyCODONE     Continuous Medications   Medication Dose Last Rate      General Appearance:    Alert, cooperative, no distress, appears stated age  Lungs:   Clear to auscultation bilaterally, respirations unlabored  Heart:    Regular rate and rhythm, S1 and S2 normal, no murmur, rub  or gallop. Wound is clean without erythema or collection, open to air, mild-moderate tenderness.  Abdomen:  Soft, non-tender, bowel sounds active all four quadrants, no masses, no organomegaly  Extremities: Extremities normal, atraumatic, no cyanosis or edema      Assessment/Plan   Chester Verduzco is a 73 y.o. male with a PMXH of HTN, HLD, Dual chamber PPM implanted in 11/1996 for SSS (RV is passive) with most recent generator replacement in 11/2020 ([employer] - Assurity MRI), Type 2 DM, gout, and recent hospitalization (10/2-10/7) for Staph bacteremia.  Admitted with persistent staph bacteremia in the setting of RA lead vegetation and TV lead vegetation.     Patient doing well post procedure. Device and complete leads were successfully extracted, Wound check with incision well approximated  with staples. No hematoma. Continue with IV abx. The patient was not pacing dependant on device check revealed V-pacing at 30%, however on tele the patient had two episodes of high-grade AV block, lasting longest 5 seconds (11/23-24).  The patient also had few episodes of nonconducted PACs, however now conducting one-to-one but now in [facility]. . The patient has a prolonged VA with narrow QRS which suggest that the block is at the AV node level and not below. Currently asymptomatic. Last positive blood cultures were on 11/14 and has been negative since 11/16  -Monitor on tele.  -Based on HRS 2017 Consensus statement, it is reasonable to re-implant after 72hr of negative blood cultures as long as CIED indications remain (for this pt indication is intermittent AV block), discussed the recommendations with ID and they were in agreement so we will plan for device re-implant (micra leadless pacemaker) on Thursday 11/30/23.  -Had a long detailed conversation with ID consultant and primary cardiology team, we all agree that if pt's most recent cultures are negative past 72 hours then its reasonable to proceed with leadless PPM implant, further given that post procedure FILIPE (at end of case) did not show any valve vegetations it helps decrease our concerns about recurrent infection with re-implant    Peripheral IV 11/13/23 20 G Right;Lateral;Distal Wrist (Active)   Site Assessment Clean;Dry;Intact 11/23/23 0800   Dressing Type Transparent 11/23/23 0800   Line Status No blood return;Flushed;Saline locked 11/23/23 0800   Dressing Status Clean;Dry;Occlusive 11/23/23 0800   Number of days: 10       Peripheral IV 11/22/23 Left;Dorsal Hand (Active)   Site Assessment Clean;Dry;Intact 11/23/23 0800   Dressing Type Transparent 11/23/23 0800   Line Status No blood return;Flushed;Saline locked 11/23/23 0800   Dressing Status Clean;Dry;Occlusive 11/23/23 0800   Number of days: 1       Peripheral IV 11/22/23 16 G Left Hand (Active)    Site Assessment Clean;Dry;Intact 11/23/23 0800   Dressing Type Transparent 11/23/23 0800   Line Status No blood return;Flushed;Saline locked 11/23/23 0800   Dressing Status Clean;Dry;Occlusive 11/23/23 0800   Number of days: 1       Peripheral IV 11/22/23 16 G Right;Anterior Hand (Active)   Site Assessment Clean;Dry;Intact 11/23/23 0800   Dressing Type Transparent 11/23/23 0800   Line Status Infusing 11/23/23 0800   Dressing Status Clean;Dry;Occlusive 11/23/23 0800   Number of days: 1       Urethral Catheter (Active)   Site Assessment Clean;Skin intact 11/23/23 0800   Collection Container Standard drainage bag 11/23/23 0800   Securement Method Securing device (Describe) 11/23/23 0800   Reason for Continuing Urinary Catheterization acute urinary retention 11/23/23 0800   Number of days: 6     This case was discussed with Dr. Aguilar and > 30 minutes was spent in the professional and overall care of this patient.    Code Status:  Full Code    Ava Gonzalez MD  Cardiology Fellow PGY4    I saw and evaluated the patient. I personally obtained the key and critical portions of the history and physical exam or was physically present for key and critical portions performed by the resident/fellow. I reviewed the resident/fellow's documentation and discussed the patient with the resident/fellow. I agree with the resident/fellow's medical decision making as documented in the note, and my edits (bold and italic) have been made to the document as needed.     Etienne Aguilar MD [facility]  Cardiac Electrophysiology    **Disclaimer: This note was dictated by speech recognition, and every effort has been made to prevent any error in transcription, however minor errors may be present**

## 2023-11-28 NOTE — PROGRESS NOTES
Subjective Data:  Device cx growing MSSA, otherwise Blood cultures have been negative since 11/16. Review of Tele showed episodes of non conducted PACs but otherwise no significant high-grade AV block was noted in the last 24 hours. ID re-consulted to help with duration of therapy, recommended repeating blood cultures (11/26) and awaiting 14 days before re-implantation.    Objective Data:  Last Recorded Vitals:  Vitals:    11/27/23 0415 11/27/23 0751 11/27/23 1152 11/27/23 1606   BP: 135/70 131/72 133/83 160/71   BP Location:  Right arm Right arm    Patient Position:  Lying Lying    Pulse: 79 83 82 80   Resp: 18 18 18 18   Temp:  36.4 °C (97.5 °F) 36.6 °C (97.9 °F) 36.5 °C (97.7 °F)   TempSrc:  Temporal Temporal    SpO2: 97% 97% 97% 98%   Weight: 114 kg (250 lb 14.1 oz)      Height:           Last Labs:  CBC - 11/27/2023:  7:13 AM  5.0 7.8 103    24.3      CMP - 11/27/2023:  7:13 AM  8.3 5.6; 5.6 11 --- 0.3   4.8 2.9 <3 94      PTT - 11/24/2023:  4:53 AM  1.5   16.7 37     TROPHS   Date/Time Value Ref Range Status   09/18/2023 05:52 PM 15 0 - 20 ng/L Final     Comment:     .  Less than 99th percentile of normal range cutoff-  Female and children under 18 years old <14 ng/L; Male <21 ng/L: Negative  Repeat testing should be performed if clinically indicated.   .  Female and children under 18 years old 14-50 ng/L; Male 21-50 ng/L:  Consistent with possible cardiac damage and possible increased clinical   risk. Serial measurements may help to assess extent of myocardial damage.   .  >50 ng/L: Consistent with cardiac damage, increased clinical risk and  myocardial infarction. Serial measurements may help assess extent of   myocardial damage.   .   NOTE: Children less than 1 year old may have higher baseline troponin   levels and results should be interpreted in conjunction with the overall   clinical context.   .  NOTE: Troponin I testing is performed using a different   testing methodology at [facility]  than at other   system hospitals. Direct result comparisons should only   be made within the same method.       BNP   Date/Time Value Ref Range Status   10/02/2023 01:07 PM 97 0 - 99 pg/mL Final     HGBA1C   Date/Time Value Ref Range Status   11/17/2023 10:14 AM 6.6 see below % Final   08/11/2023 12:59 PM 9.9 % Final     Comment:          Diagnosis of Diabetes-Adults   Non-Diabetic: < or = 5.6%   Increased risk for developing diabetes: 5.7-6.4%   Diagnostic of diabetes: > or = 6.5%  .       Monitoring of Diabetes                Age (y)     Therapeutic Goal (%)   Adults:          >18           <7.0   Pediatrics:    13-18           <7.5                   7-12           <8.0                   0- 6            7.5-8.5   American Diabetes Association. Diabetes Care 33(S1), Jan 2010.     02/07/2023 09:07 AM 8.4 % Final     Comment:          Diagnosis of Diabetes-Adults   Non-Diabetic: < or = 5.6%   Increased risk for developing diabetes: 5.7-6.4%   Diagnostic of diabetes: > or = 6.5%  .       Monitoring of Diabetes                Age (y)     Therapeutic Goal (%)   Adults:          >18           <7.0   Pediatrics:    13-18           <7.5                   7-12           <8.0                   0- 6            7.5-8.5   American Diabetes Association. Diabetes Care 33(S1), Jan 2010.       VLDL   Date/Time Value Ref Range Status   08/11/2023 12:59 PM 57 0 - 40 mg/dL Final   08/10/2022 11:09 AM 33 0 - 40 mg/dL Final   08/19/2020 12:17 PM 62 0 - 40 mg/dL Final      Last I/O:  I/O last 3 completed shifts:  In: 1240 (10.9 mL/kg) [P.O.:940; IV Piggyback:300]  Out: 3190 (28 mL/kg) [Urine:3190 (0.8 mL/kg/hr)]  Weight: 113.8 kg     Past Cardiology Tests (Last 3 Years):  EKG:  ECG 12 Lead 11/17/2023      ECG 12 Lead 10/9/2023    Echo:  Anesthesia Intraoperative Transesophageal Echocardiogram 11/22/2023      Transesophageal Echo (FILIPE) 11/22/2023      Transthoracic Echo (TTE) Complete 11/15/2023    Ejection Fractions:  EF   Date/Time  Value Ref Range Status   11/14/2023 03:22 PM 63       Cath:  No results found for this or any previous visit from the past 1095 days.    Stress Test:  No results found for this or any previous visit from the past 1095 days.    Cardiac Imaging:  No results found for this or any previous visit from the past 1095 days.      Inpatient Medications:  Scheduled medications   Medication Dose Route Frequency    amLODIPine  10 mg oral q PM    ceFAZolin in dextrose (iso-os)  2 g intravenous q12h    finasteride  5 mg oral Daily    heparin (porcine)  5,000 Units subcutaneous q8h    insulin glargine  10 Units subcutaneous Nightly    insulin lispro  0-5 Units subcutaneous TID with meals    magnesium chloride  64 mg oral Daily    melatonin  5 mg oral Nightly    metoprolol succinate XL  25 mg oral Daily    polyethylene glycol  17 g oral Daily    rosuvastatin  10 mg oral Nightly    sennosides  1 tablet oral Nightly    tamsulosin  0.8 mg oral Nightly     PRN medications   Medication    acetaminophen    dextrose 10 % in water (D10W)    dextrose    glucagon    ondansetron    oxyCODONE     Continuous Medications   Medication Dose Last Rate      General Appearance:    Alert, cooperative, no distress, appears stated age  Lungs:   Clear to auscultation bilaterally, respirations unlabored  Heart:    Regular rate and rhythm, S1 and S2 normal, no murmur, rub  or gallop. Wound is clean without erythema or collection, open to air, mild-moderate tenderness.  Abdomen:  Soft, non-tender, bowel sounds active all four quadrants, no masses, no organomegaly  Extremities: Extremities normal, atraumatic, no cyanosis or edema      Assessment/Plan   Chester Verduzco is a 73 y.o. male with a PMXH of HTN, HLD, Dual chamber PPM implanted in 11/1996 for SSS (RV is passive) with most recent generator replacement in 11/2020 ([employer] - Assurity MRI), Type 2 DM, gout, and recent hospitalization (10/2-10/7) for Staph bacteremia.  Admitted with persistent staph  bacteremia in the setting of RA lead vegetation and TV lead vegetation.     Patient doing well post procedure. Device and complete leads were successfully extracted, Wound check with incision well approximated with staples. No hematoma. Continue with IV abx. The patient was not pacing dependant on device check revealed V-pacing at 30%, however on tele the patient had two episodes of high-grade AV block, lasting longest 5 seconds (11/23-24).  The patient also had few episodes of nonconducted PACs, however now conducting one-to-one but now in [facility]. . The patient has a prolonged KY with narrow QRS which suggest that the block is at the AV node level and not below. Currently asymptomatic. Last positive blood cultures were on 11/14 and has been negative since 11/16  -Monitor on tele.  -Will need to re-implant (micra leadless pacemaker) before discharge, will confirm timing after further discussion with ID team.  -Based on HRS 2017 Consensus statement, it is reasonable to re-implant after 72hr of negative blood cultures as long as CIED indications remain (for this pt indication is intermittent AV block)    Peripheral IV 11/13/23 20 G Right;Lateral;Distal Wrist (Active)   Site Assessment Clean;Dry;Intact 11/23/23 0800   Dressing Type Transparent 11/23/23 0800   Line Status No blood return;Flushed;Saline locked 11/23/23 0800   Dressing Status Clean;Dry;Occlusive 11/23/23 0800   Number of days: 10       Peripheral IV 11/22/23 Left;Dorsal Hand (Active)   Site Assessment Clean;Dry;Intact 11/23/23 0800   Dressing Type Transparent 11/23/23 0800   Line Status No blood return;Flushed;Saline locked 11/23/23 0800   Dressing Status Clean;Dry;Occlusive 11/23/23 0800   Number of days: 1       Peripheral IV 11/22/23 16 G Left Hand (Active)   Site Assessment Clean;Dry;Intact 11/23/23 0800   Dressing Type Transparent 11/23/23 0800   Line Status No blood return;Flushed;Saline locked 11/23/23 0800   Dressing Status Clean;Dry;Occlusive  11/23/23 0800   Number of days: 1       Peripheral IV 11/22/23 16 G Right;Anterior Hand (Active)   Site Assessment Clean;Dry;Intact 11/23/23 0800   Dressing Type Transparent 11/23/23 0800   Line Status Infusing 11/23/23 0800   Dressing Status Clean;Dry;Occlusive 11/23/23 0800   Number of days: 1       Urethral Catheter (Active)   Site Assessment Clean;Skin intact 11/23/23 0800   Collection Container Standard drainage bag 11/23/23 0800   Securement Method Securing device (Describe) 11/23/23 0800   Reason for Continuing Urinary Catheterization acute urinary retention 11/23/23 0800   Number of days: 6     This case was discussed with Dr. Aguilar and > 30 minutes was spent in the professional and overall care of this patient.    Code Status:  Full Code    Ava Gonzalez MD  Cardiology Fellow PGY4    I saw and evaluated the patient. I personally obtained the key and critical portions of the history and physical exam or was physically present for key and critical portions performed by the resident/fellow. I reviewed the resident/fellow's documentation and discussed the patient with the resident/fellow. I agree with the resident/fellow's medical decision making as documented in the note, and my edits (bold and italic) have been made to the document as needed.     Etienne Aguilar MD [facility]  Cardiac Electrophysiology    **Disclaimer: This note was dictated by speech recognition, and every effort has been made to prevent any error in transcription, however minor errors may be present**

## 2023-11-28 NOTE — CARE PLAN
The patient's goals for the shift include to be painfree    The clinical goals for the shift include Patient wll remain safe and hemodynamically stable.      Problem: Skin  Goal: Prevent/minimize sheer/friction injuries  11/28/2023 0148 by Lucila Tamayo RN  Outcome: Progressing  11/27/2023 2138 by Lucila Tamayo RN  Outcome: Progressing     Problem: Psychosocial Needs  Goal: Demonstrates ability to cope with hospitalization/illness  11/28/2023 0148 by Lucila Tamayo RN  Outcome: Progressing  11/27/2023 2138 by Lucila Tamayo RN  Outcome: Progressing  Goal: Collaborate with me, my family, and caregiver to identify my specific goals  11/28/2023 0148 by Lucila Tamayo RN  Outcome: Progressing  11/27/2023 2138 by Lucila Tamayo RN  Outcome: Progressing

## 2023-11-28 NOTE — CARE PLAN
Problem: Skin  Goal: Prevent/minimize sheer/friction injuries  11/28/2023 1735 by Bill Martinez RN  Flowsheets (Taken 11/28/2023 1735)  Prevent/minimize sheer/friction injuries:   Use pull sheet   Turn/reposition every 2 hours/use positioning/transfer devices  11/28/2023 1735 by Bill Martinez RN  Outcome: Progressing  Flowsheets (Taken 11/28/2023 1735)  Prevent/minimize sheer/friction injuries:   Use pull sheet   Turn/reposition every 2 hours/use positioning/transfer devices

## 2023-11-29 ENCOUNTER — APPOINTMENT (OUTPATIENT)
Dept: RADIOLOGY | Facility: HOSPITAL | Age: 73
DRG: 260 | End: 2023-11-29
Payer: MEDICARE

## 2023-11-29 PROBLEM — I44.2: Status: ACTIVE | Noted: 2023-11-16

## 2023-11-29 LAB
ABO GROUP (TYPE) IN BLOOD: NORMAL
ALBUMIN SERPL BCP-MCNC: 2.7 G/DL (ref 3.4–5)
ANION GAP SERPL CALC-SCNC: 13 MMOL/L (ref 10–20)
ANTIBODY SCREEN: NORMAL
APTT PPP: 36 SECONDS (ref 27–38)
BASOPHILS # BLD AUTO: 0.02 X10*3/UL (ref 0–0.1)
BASOPHILS NFR BLD AUTO: 0.5 %
BUN SERPL-MCNC: 25 MG/DL (ref 6–23)
CALCIUM SERPL-MCNC: 8.3 MG/DL (ref 8.6–10.6)
CHLORIDE SERPL-SCNC: 107 MMOL/L (ref 98–107)
CO2 SERPL-SCNC: 24 MMOL/L (ref 21–32)
CREAT SERPL-MCNC: 2.84 MG/DL (ref 0.5–1.3)
EOSINOPHIL # BLD AUTO: 0.11 X10*3/UL (ref 0–0.4)
EOSINOPHIL NFR BLD AUTO: 2.8 %
ERYTHROCYTE [DISTWIDTH] IN BLOOD BY AUTOMATED COUNT: 15.2 % (ref 11.5–14.5)
GFR SERPL CREATININE-BSD FRML MDRD: 23 ML/MIN/1.73M*2
GLUCOSE BLD MANUAL STRIP-MCNC: 134 MG/DL (ref 74–99)
GLUCOSE BLD MANUAL STRIP-MCNC: 88 MG/DL (ref 74–99)
GLUCOSE BLD MANUAL STRIP-MCNC: 92 MG/DL (ref 74–99)
GLUCOSE BLD MANUAL STRIP-MCNC: 94 MG/DL (ref 74–99)
GLUCOSE SERPL-MCNC: 78 MG/DL (ref 74–99)
HCT VFR BLD AUTO: 23.9 % (ref 41–52)
HGB BLD-MCNC: 7.7 G/DL (ref 13.5–17.5)
IMM GRANULOCYTES # BLD AUTO: 0.03 X10*3/UL (ref 0–0.5)
IMM GRANULOCYTES NFR BLD AUTO: 0.8 % (ref 0–0.9)
INR PPP: 1.2 (ref 0.9–1.1)
LYMPHOCYTES # BLD AUTO: 0.86 X10*3/UL (ref 0.8–3)
LYMPHOCYTES NFR BLD AUTO: 21.9 %
MAGNESIUM SERPL-MCNC: 1.67 MG/DL (ref 1.6–2.4)
MCH RBC QN AUTO: 28.3 PG (ref 26–34)
MCHC RBC AUTO-ENTMCNC: 32.2 G/DL (ref 32–36)
MCV RBC AUTO: 88 FL (ref 80–100)
MONOCYTES # BLD AUTO: 0.35 X10*3/UL (ref 0.05–0.8)
MONOCYTES NFR BLD AUTO: 8.9 %
NEUTROPHILS # BLD AUTO: 2.56 X10*3/UL (ref 1.6–5.5)
NEUTROPHILS NFR BLD AUTO: 65.1 %
NRBC BLD-RTO: 0 /100 WBCS (ref 0–0)
PHOSPHATE SERPL-MCNC: 4.7 MG/DL (ref 2.5–4.9)
PLATELET # BLD AUTO: 98 X10*3/UL (ref 150–450)
POTASSIUM SERPL-SCNC: 4 MMOL/L (ref 3.5–5.3)
PROTHROMBIN TIME: 13.9 SECONDS (ref 9.8–12.8)
RBC # BLD AUTO: 2.72 X10*6/UL (ref 4.5–5.9)
RH FACTOR (ANTIGEN D): NORMAL
SODIUM SERPL-SCNC: 140 MMOL/L (ref 136–145)
WBC # BLD AUTO: 3.9 X10*3/UL (ref 4.4–11.3)

## 2023-11-29 PROCEDURE — 36415 COLL VENOUS BLD VENIPUNCTURE: CPT

## 2023-11-29 PROCEDURE — 2500000001 HC RX 250 WO HCPCS SELF ADMINISTERED DRUGS (ALT 637 FOR MEDICARE OP)

## 2023-11-29 PROCEDURE — 88341 IMHCHEM/IMCYTCHM EA ADD ANTB: CPT | Performed by: PATHOLOGY

## 2023-11-29 PROCEDURE — 81210 BRAF GENE: CPT | Performed by: STUDENT IN AN ORGANIZED HEALTH CARE EDUCATION/TRAINING PROGRAM

## 2023-11-29 PROCEDURE — 1200000002 HC GENERAL ROOM WITH TELEMETRY DAILY

## 2023-11-29 PROCEDURE — 2500000004 HC RX 250 GENERAL PHARMACY W/ HCPCS (ALT 636 FOR OP/ED)

## 2023-11-29 PROCEDURE — 99152 MOD SED SAME PHYS/QHP 5/>YRS: CPT

## 2023-11-29 PROCEDURE — 85025 COMPLETE CBC W/AUTO DIFF WBC: CPT

## 2023-11-29 PROCEDURE — 86901 BLOOD TYPING SEROLOGIC RH(D): CPT

## 2023-11-29 PROCEDURE — 76942 ECHO GUIDE FOR BIOPSY: CPT

## 2023-11-29 PROCEDURE — 80069 RENAL FUNCTION PANEL: CPT

## 2023-11-29 PROCEDURE — 88189 FLOWCYTOMETRY/READ 16 & >: CPT | Performed by: PATHOLOGY

## 2023-11-29 PROCEDURE — 88342 IMHCHEM/IMCYTCHM 1ST ANTB: CPT | Performed by: PATHOLOGY

## 2023-11-29 PROCEDURE — 88363 XM ARCHIVE TISSUE MOLEC ANAL: CPT | Performed by: PATHOLOGY

## 2023-11-29 PROCEDURE — 88305 TISSUE EXAM BY PATHOLOGIST: CPT | Performed by: PATHOLOGY

## 2023-11-29 PROCEDURE — 83735 ASSAY OF MAGNESIUM: CPT

## 2023-11-29 PROCEDURE — 99152 MOD SED SAME PHYS/QHP 5/>YRS: CPT | Performed by: RADIOLOGY

## 2023-11-29 PROCEDURE — G0452 MOLECULAR PATHOLOGY INTERPR: HCPCS | Performed by: STUDENT IN AN ORGANIZED HEALTH CARE EDUCATION/TRAINING PROGRAM

## 2023-11-29 PROCEDURE — 88342 IMHCHEM/IMCYTCHM 1ST ANTB: CPT | Mod: TC,SUR | Performed by: STUDENT IN AN ORGANIZED HEALTH CARE EDUCATION/TRAINING PROGRAM

## 2023-11-29 PROCEDURE — 76942 ECHO GUIDE FOR BIOPSY: CPT | Performed by: RADIOLOGY

## 2023-11-29 PROCEDURE — 07BJ4ZX EXCISION OF LEFT INGUINAL LYMPHATIC, PERCUTANEOUS ENDOSCOPIC APPROACH, DIAGNOSTIC: ICD-10-PCS | Performed by: STUDENT IN AN ORGANIZED HEALTH CARE EDUCATION/TRAINING PROGRAM

## 2023-11-29 PROCEDURE — 38505 NEEDLE BIOPSY LYMPH NODES: CPT

## 2023-11-29 PROCEDURE — 85610 PROTHROMBIN TIME: CPT

## 2023-11-29 PROCEDURE — 2500000004 HC RX 250 GENERAL PHARMACY W/ HCPCS (ALT 636 FOR OP/ED): Performed by: RADIOLOGY

## 2023-11-29 PROCEDURE — 88341 IMHCHEM/IMCYTCHM EA ADD ANTB: CPT | Mod: TC,SUR | Performed by: STUDENT IN AN ORGANIZED HEALTH CARE EDUCATION/TRAINING PROGRAM

## 2023-11-29 PROCEDURE — 38505 NEEDLE BIOPSY LYMPH NODES: CPT | Performed by: RADIOLOGY

## 2023-11-29 PROCEDURE — 82947 ASSAY GLUCOSE BLOOD QUANT: CPT

## 2023-11-29 PROCEDURE — 2720000007 HC OR 272 NO HCPCS

## 2023-11-29 PROCEDURE — 86850 RBC ANTIBODY SCREEN: CPT

## 2023-11-29 PROCEDURE — 85730 THROMBOPLASTIN TIME PARTIAL: CPT

## 2023-11-29 PROCEDURE — 99232 SBSQ HOSP IP/OBS MODERATE 35: CPT | Performed by: INTERNAL MEDICINE

## 2023-11-29 PROCEDURE — 88185 FLOWCYTOMETRY/TC ADD-ON: CPT | Mod: TC

## 2023-11-29 RX ORDER — FENTANYL CITRATE 50 UG/ML
INJECTION, SOLUTION INTRAMUSCULAR; INTRAVENOUS
Status: COMPLETED | OUTPATIENT
Start: 2023-11-29 | End: 2023-11-29

## 2023-11-29 RX ORDER — MIDAZOLAM HYDROCHLORIDE 1 MG/ML
INJECTION INTRAMUSCULAR; INTRAVENOUS
Status: COMPLETED | OUTPATIENT
Start: 2023-11-29 | End: 2023-11-29

## 2023-11-29 RX ADMIN — FINASTERIDE 5 MG: 5 TABLET, FILM COATED ORAL at 08:26

## 2023-11-29 RX ADMIN — SENNOSIDES 8.6 MG: 8.6 TABLET, FILM COATED ORAL at 20:47

## 2023-11-29 RX ADMIN — METOPROLOL SUCCINATE 25 MG: 25 TABLET, EXTENDED RELEASE ORAL at 08:26

## 2023-11-29 RX ADMIN — FENTANYL CITRATE 50 MCG: 50 INJECTION, SOLUTION INTRAMUSCULAR; INTRAVENOUS at 12:06

## 2023-11-29 RX ADMIN — Medication 5 MG: at 20:47

## 2023-11-29 RX ADMIN — CEFAZOLIN SODIUM 2 G: 2 INJECTION, SOLUTION INTRAVENOUS at 03:53

## 2023-11-29 RX ADMIN — MIDAZOLAM HYDROCHLORIDE 1 MG: 1 INJECTION, SOLUTION INTRAMUSCULAR; INTRAVENOUS at 12:06

## 2023-11-29 RX ADMIN — TAMSULOSIN HYDROCHLORIDE 0.8 MG: 0.4 CAPSULE ORAL at 20:47

## 2023-11-29 RX ADMIN — ROSUVASTATIN CALCIUM 10 MG: 10 TABLET, FILM COATED ORAL at 20:47

## 2023-11-29 RX ADMIN — CEFAZOLIN SODIUM 2 G: 2 INJECTION, SOLUTION INTRAVENOUS at 16:06

## 2023-11-29 RX ADMIN — AMLODIPINE BESYLATE 10 MG: 10 TABLET ORAL at 20:46

## 2023-11-29 ASSESSMENT — COGNITIVE AND FUNCTIONAL STATUS - GENERAL
DRESSING REGULAR LOWER BODY CLOTHING: A LOT
TOILETING: A LOT
TURNING FROM BACK TO SIDE WHILE IN FLAT BAD: A LITTLE
CLIMB 3 TO 5 STEPS WITH RAILING: TOTAL
MOVING TO AND FROM BED TO CHAIR: A LOT
MOVING TO AND FROM BED TO CHAIR: A LOT
MOBILITY SCORE: 13
PERSONAL GROOMING: A LITTLE
STANDING UP FROM CHAIR USING ARMS: A LOT
MOVING FROM LYING ON BACK TO SITTING ON SIDE OF FLAT BED WITH BEDRAILS: A LITTLE
DRESSING REGULAR LOWER BODY CLOTHING: A LOT
HELP NEEDED FOR BATHING: A LOT
MOVING FROM LYING ON BACK TO SITTING ON SIDE OF FLAT BED WITH BEDRAILS: A LITTLE
CLIMB 3 TO 5 STEPS WITH RAILING: TOTAL
HELP NEEDED FOR BATHING: A LITTLE
STANDING UP FROM CHAIR USING ARMS: A LOT
WALKING IN HOSPITAL ROOM: A LOT
MOBILITY SCORE: 12
PERSONAL GROOMING: A LITTLE
DAILY ACTIVITIY SCORE: 15
DRESSING REGULAR UPPER BODY CLOTHING: A LOT
DAILY ACTIVITIY SCORE: 17
TOILETING: A LITTLE
TURNING FROM BACK TO SIDE WHILE IN FLAT BAD: A LOT
WALKING IN HOSPITAL ROOM: A LOT
DRESSING REGULAR UPPER BODY CLOTHING: A LOT

## 2023-11-29 ASSESSMENT — PAIN - FUNCTIONAL ASSESSMENT
PAIN_FUNCTIONAL_ASSESSMENT: 0-10

## 2023-11-29 ASSESSMENT — PAIN SCALES - GENERAL
PAINLEVEL_OUTOF10: 0 - NO PAIN

## 2023-11-29 NOTE — PROGRESS NOTES
Communication Note    Patient Name: Chester Verduzco  MRN: [id number]  Today's Date: 11/29/2023     Discipline: Physical Therapy      Missed Visit: Yes  Missed Visit Reason:  (Patient off the floor at IR)      11/29/23 at 11:43 AM   Mary Ellen Ludwig PTA   Rehab Office: [phone]

## 2023-11-29 NOTE — PROGRESS NOTES
"   Subjective     Overnight events:   Pt was seen and examined at bedside. No acute overnight events. Pt denies any chest pain, SOB, dyspnea, new cough, palpitations, nausea, vomiting, diarrhea, dizziness, changes in visions, headaches, or worsening lower extremity swelling. Pt is comfortable in bed and in no acute distress.     Vital signs:  Blood pressure 151/72, pulse 82, temperature 36.2 °C (97.2 °F), temperature source Temporal, resp. rate 18, height 1.88 m (6' 2.02\"), weight 112 kg (247 lb 5.7 oz), SpO2 97 %.    I/O last 3 completed shifts:  In: 580 (5.2 mL/kg) [P.O.:480; IV Piggyback:100]  Out: 3251 (29 mL/kg) [Urine:3250 (0.8 mL/kg/hr); Stool:1]  Weight: 112.2 kg     Physical Exam  GENERAL:  In no acute distress  NEUROLOGIC:  A and O x 3. Cranial nerves 2 through 12 grossly intact with no gait disturbances. Intact motor and sensory systems, with normal reflexes and coordination.    HEENT:  Pupils are equal, round, and reactive to light and accommodation. Extraocular motion intact.    MOUTH: MMM, no lesions observed  CARDIAC: S1 + S2, RRR, no M/R/G.    LUNGS: CTA BL.  No wheezes or coarse breath sounds. Symmetric respirations. No increased work of breathing.   ABDOMEN: Soft, non-tender to palpation. No organomegaly. Normal BS in 4Q, No rebound or guarding.  EXTREMITIES:  Moving x4 equally and spontaneously. Right knee with mild effusion present but no erythema or warmth. Chronic limited ROM but not worse than baseline. No BLE edema. Painless non-reducible soft tissue mass in the left groin.  SKIN: Clean, warm, dry, and intact with normal skin turgor.  PSYCH: Appropriate mood and behavior.     Relevant Results  Scheduled medications  amLODIPine, 10 mg, oral, q PM  ceFAZolin in dextrose (iso-os), 2 g, intravenous, q12h  finasteride, 5 mg, oral, Daily  [Held by provider] heparin (porcine), 5,000 Units, subcutaneous, q8h  [Held by provider] insulin glargine, 10 Units, subcutaneous, Nightly  insulin lispro, 0-5 " Units, subcutaneous, TID with meals  melatonin, 5 mg, oral, Nightly  metoprolol succinate XL, 25 mg, oral, Daily  polyethylene glycol, 17 g, oral, Daily  rosuvastatin, 10 mg, oral, Nightly  sennosides, 1 tablet, oral, Nightly  tamsulosin, 0.8 mg, oral, Nightly      Continuous medications     PRN medications  PRN medications: acetaminophen, dextrose 10 % in water (D10W), dextrose, glucagon, ondansetron, oxyCODONE      Labs:  Last CBC:  Lab Results   Component Value Date    WBC 4.7 11/28/2023    HGB 8.2 (L) 11/28/2023    HCT 25.4 (L) 11/28/2023    MCV 90 11/28/2023    PLT 91 (L) 11/28/2023       Last RFP:  Lab Results   Component Value Date    GLUCOSE 77 11/28/2023    CALCIUM 8.2 (L) 11/28/2023     11/28/2023    K 3.9 11/28/2023    CO2 21 11/28/2023     11/28/2023    BUN 27 (H) 11/28/2023    CREATININE 3.10 (H) 11/28/2023       Last LFTs:  Lab Results   Component Value Date    ALT <3 (L) 11/17/2023    AST 11 11/17/2023    ALKPHOS 94 11/17/2023    BILITOT 0.3 11/17/2023       Last coags:  Lab Results   Component Value Date    INR 1.5 (H) 11/24/2023    APTT 37 11/24/2023       Micro/culture data:  Susceptibility data from last 90 days.  Collected Specimen Info Organism Clindamycin Erythromycin Oxacillin Tetracycline Trimethoprim/Sulfamethoxazole Vancomycin   11/22/23 Tissue/Biopsy from Other (specify in comments) Methicillin Susceptible Staphylococcus aureus (MSSA) S S S S S S   11/22/23 Tissue/Biopsy from Other (specify in comments) Methicillin Susceptible Staphylococcus aureus (MSSA) S S S S S S   11/14/23 Swab from Anterior Nares Methicillin Resistant Staphylococcus aureus (MRSA)         11/14/23 Blood culture from Peripheral Venipuncture Methicillin Susceptible Staphylococcus aureus (MSSA) S S S S S S         Imaging:  Electrocardiogram, 12-lead PRN ACS symptoms  Sinus rhythm with 1st degree AV block  Otherwise normal ECG  When compared with ECG of 23-NOV-2023 08:22,  No significant change was  found  Confirmed by Dallin Stallworth ([id number]) on 11/27/2023 1:18:13 PM  Electrocardiogram, 12-lead PRN ACS symptoms  Sinus rhythm with 1st degree AV block  Prolonged QT  Abnormal ECG  When compared with ECG of 22-NOV-2023 14:48,  Premature ventricular complexes are no longer Present  Confirmed by Dallin Stallworth ([id number]) on 11/27/2023 1:18:09 PM  Electrocardiogram, 12-lead PRN ACS symptoms  Sinus rhythm with 1st degree AV block with frequent Premature ventricular complexes  Prolonged QT  Abnormal ECG  When compared with ECG of 17-NOV-2023 04:18,  Premature ventricular complexes are now Present  Confirmed by Lyndon Stallwortho ([id number]) on 11/27/2023 1:17:36 PM      Assessment/Plan   Mr. Chester Verduzco is a 73 year old male with PMHX HTN, HLD, Dual chamber PPM implanted in 11/1996 for SSS (RV is passive) with most recent generator replacement in 11/2020 ([employer] - Assurity MRI), Type 2 DM, gout, and recent hospitalization (10/2-10/7) for Staph bacteremia, diarrhea, NOHELIA , and hyponatremia 2/2 hypovolemia who presented to [facility] ED from a SNF on 11/13 for NOHELIA on labs from the SNF and oliguria. Blood cultures were positive for MSSA and previously had been positive 10/8 and 11/10 for MSSA. Blood cultures with NGTD as of 11/16.  FILIPE 11/16 showed tricuspid valve vegetation as well as right atrial lead vegetation. Patient has been afebrile without leukocytosis and hemodynamically stable. Device Interrogated 11/17 with AP 29% and  30%.  Lead extraction complete 11/22.  Patient on IV antibiotics with stop 4 weeks after lead removal (~12/20). After lead removal, on telemetry the patient had several episodes of intermittent asymptomatic high-grade AV block, NSVT,and bradycardia. EP plans to implant PPM after 14 days of negative blood culture post removal per, ID. Patient received a unit of packed red blood cells for low hemoglobin (11/25). Hemoglobin has remained stable with no signs or symptoms of bleeding. Home lasix and losartan were being  "held in setting of NOHELIA 2/2 to nephrotoxic antibiotic exposure which down trending but not at baseline with adequate urine output. Patient has maldonado I/s/o severe BPH. Of note, Patient noticed left groin lymphadenopathy and U/S (11/23) showed Enlarged left inguinal/external iliac lymph nodes. PET scan deferred at this time d/t active infectious process. Patient has outpatient follow up with surgical oncology and diagnostic clinic for further work up however biopsy maybe done inpatient as patient is to be monitored for 14 days until reimplantation of pacemaker.      Updates 11/29  -Continue Cefazolin 2g q12 until (~12/20)  - Type and screen good through 11/28  -Underwent core biopsy for inguinal lymphadenopathy with IR today   -NPO at midnight for lead implant in the morning.     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    -Given asymptomatic episodes of NSVT, bradycardia and sinus pauses noted on tele, patient needs to be inpatient and monitored on tele until placement of PPM  - Daily CBC Trend WBC, no leukocytosis  --Monitor CBC, BMP, ESR and CRP weekly and fax to [phone] Attn Dr Gonzalez  -ID will follow up as outpatient with Dr Gonzalez (~1 week prior ending abx course)  -Metoprolol succ 25 mg daily while patient is without a pacemaker  -Per EP note: \"Based on HRS 2017 Consensus statement, it is reasonable to re-implant after 72hr of negative blood cultures as long as CIED indications remain (for this pt indication is intermittent AV block), discussed the recommendations with ID and they " "were in agreement so we will plan for device re-implant (micra leadless pacemaker) on Thursday 11/30/23 and further given that post procedure FILIPE (at end of case) did not show any valve vegetations it helps decrease our concerns about recurrent infection with re-implant\"  -NPO @ midnight         #NOHELIA, improving  :likely ATN given gradual rise in creatinine over past month and history of recent diarrhea and nephrotoxic antibiotic exposure. Renal U/S did not show any hydronephrosis.  - Baseline prior to October 2023 hospitalization ~1.0-1.1.   - Elevated to 5.43 on arrival to [facility] on 11/13, down trending  - Initial urine electrolytes showed Fena 2.5% indicating intrinsic etiology, Normal C3 and C4  - Nephrology signed off, will reengage  if Cr starts to uptrend   - Rojo replaced (11/17) ,strict I&Os, No indication for HD at this time  - Daily RFP  - Replete lytes as needed  -Encourage PO intake    #Inguinal lymphadenopathy  -Patient has endorsed left sided non tender lymphadenopathy for one month   - U/S (11/23) showed Enlarged left inguinal/external iliac lymph nodes measuring up to  5.7 cm x 3.3 cm x 4.4 cm with internal hypervascularity which were  visualized on the CT dated 10/02/2023 and not definitively seen dating back to 2015.  -Given state of infection, could be reactive or concerning for a lymphoproliferative disorder or metastatic disease from left lower extremity malignancy  -PET scan deferred at this time d/t active infectious process. Surg onc consulted  -Patient to follow up with Dr. Cameron (Surgical oncology) for further discussion and evaluation of left inguinal lymphadenopathy   -Underwent core biopsy for inguinal lymphadenopathy with IR 11/29  -Referral to diagnostic clinic for outpatient follow up  -F/up biopsy results    #Type 2 DM  - Checking A1c  - On Lantus 10 units QHS and cover with SSI,    - hypoglycemia protocol    #HTN  #HLD  - Holding home losartan and lasix in setting of NOHELIA  - " Continue Amlodipine 10, metop succinate 25 daily, and rosuvastatin 10 daily      #BPH  - Was initially planned for outpatient robotic prostatectomy end of November that has since been canceled and rescheduled for December  - Continue with Flomax 0.8 mg QHS and finasteride 5 md daily  - Continue maldonado     #Anemia  :Patient with slowly down trending Hgb from since this admission. Baseline 13-14. Stable this AM  -Received 1 unit pRBC (11/25) for Hgb 6.8 with appropriate increase to 7.4  -No acute signs or symptoms of bleeding at this time  - Will continue to monitor and transfuse for Hgb <7      F: As needed  E: AS needed  N: Adult; Carb Controlled  A:  PIV, Maldonado new as of 11/17  DVT ppx: Heparin SQ  GI: N/A  Code Status: Full code  NOK: Charlene Pardo (friend) [phone]      Principal Problem:    MSSA bacteremia  Active Problems:    Endocarditis    History of general anesthesia    Complete atrioventricular block (CMS/HCC)         Gonzalo Henley MD   Internal Medicine PGY-I

## 2023-11-29 NOTE — POST-PROCEDURE NOTE
Interventional Radiology Brief Postprocedure Note    Attending: Mao Dubose MD    Assistant: Ponce Dela Cruz MD    Diagnosis: Left inguinal lymphadenopathy    Description of procedure: Successful ultrasound-guided left inguinal lymph node biopsy. A total of 3 core biopsy samples were obtained and sent to pathology. Please see PACS report for further details.     Anesthesia:  MAC    Complications: None    Estimated Blood Loss: minimal    Medications  As of 11/29/23 1225      amLODIPine (Norvasc) tablet 10 mg (mg) Total dose:  120 mg Dosing weight:  120      Date/Time Rate/Dose/Volume Action       11/17/23 2127 10 mg Given     11/18/23 2051 10 mg Given     11/19/23 2150 10 mg Given     11/20/23 2142 10 mg Given     11/21/23 2112 10 mg Given     11/22/23  1444 *Not included in total MAR Hold      1512 *Not included in total MAR Unhold      2059 10 mg Given     11/23/23 2050 10 mg Given     11/24/23 2035 10 mg Given     11/25/23 2119 10 mg Given     11/26/23 2103 10 mg Given     11/27/23 2017 10 mg Given     11/28/23 2209 10 mg Given               ceFAZolin in dextrose (iso-os) (Ancef) IVPB 2 g (g) Total dose:  24 g* Dosing weight:  120   *From user-documented volume     Date/Time Rate/Dose/Volume Action       11/17/23  0444 2 g (over 30 min) New Bag      0514  (over 30 min) Stopped      1627 2 g (over 30 min) New Bag      1657 200 mL Stopped     11/18/23  0437 2 g (over 30 min) New Bag      0507  (over 30 min) Stopped      1625 2 g (over 30 min) - 100 mL New Bag      1655  (over 30 min) Stopped     11/19/23  0348 2 g (over 30 min) New Bag      0418  (over 30 min) Stopped      1612 2 g (over 30 min) New Bag      1642  (over 30 min) Stopped     11/20/23  0440 2 g (over 30 min) New Bag      0510  (over 30 min) Stopped      1642 2 g (over 30 min) New Bag      1712  (over 30 min) Stopped     11/21/23  0338 2 g (over 30 min) New Bag      0408  (over 30 min) Stopped      1742 2 g (over 30 min) New Bag      1812  (over  30 min) Stopped     11/22/23  0328 2 g (over 30 min) New Bag      0358  (over 30 min) Stopped      1925 2 g (over 30 min) New Bag      1955  (over 30 min) Stopped     11/23/23  0344 2 g (over 30 min) New Bag      0414  (over 30 min) Stopped      1640 2 g (over 30 min) New Bag      1710 100 mL Stopped     11/24/23  0422 2 g (over 30 min) New Bag      0452  (over 30 min) Stopped      1611 2 g (over 30 min) New Bag      1641 200 mL Stopped     11/25/23  0333 2 g (over 30 min) New Bag      0403  (over 30 min) Stopped      1552 2 g (over 30 min) - 100 mL New Bag      1622  (over 30 min) Stopped     11/26/23  0334 2 g (over 30 min) - 100 mL New Bag      0404  (over 30 min) Stopped      1620 2 g (over 30 min) New Bag      1650  (over 30 min) Stopped     11/27/23  0335 2 g (over 30 min) New Bag      0405  (over 30 min) Stopped      1528 2 g (over 30 min) New Bag      1558  (over 30 min) Stopped      1700 300 mL      11/28/23  0501 2 g (over 30 min) - 100 mL New Bag      0531  (over 30 min) Stopped      1549 2 g (over 30 min) New Bag      1619  (over 30 min) Stopped     11/29/23  0353 2 g (over 30 min) New Bag      0423  (over 30 min) Stopped               finasteride (Proscar) tablet 5 mg (mg) Total dose:  60 mg      Date/Time Rate/Dose/Volume Action       11/17/23  0917 5 mg Given     11/18/23  0842 5 mg Given     11/19/23  0911 5 mg Given     11/20/23  0825 5 mg Given     11/21/23  0918 5 mg Given     11/22/23  1444 *Not included in total MAR Hold      1512 *Not included in total MAR Unhold     11/23/23  1007 5 mg Given     11/24/23  0904 5 mg Given     11/25/23  0822 5 mg Given     11/26/23  0936 5 mg Given     11/27/23  0834 5 mg Given     11/28/23  0848 5 mg Given     11/29/23  0826 5 mg Given               heparin (porcine) injection 5,000 Units (Units) Total dose:  165,000 Units* Dosing weight:  120   *Administration not included in total     Date/Time Rate/Dose/Volume Action       11/17/23  0300 5,000 Units Given       1003 5,000 Units Given      1821 5,000 Units Given     11/18/23  0245 5,000 Units Given      1030 5,000 Units Given      1824 5,000 Units Given     11/19/23  0348 5,000 Units Given      1043 5,000 Units Given      1847 5,000 Units Given     11/20/23  0236 5,000 Units Given      1106 5,000 Units Given      1832 5,000 Units Given     11/21/23  0227 5,000 Units Given      1030 5,000 Units Given      1742 5,000 Units Given     11/22/23  0324 5,000 Units Given      1444 *Not included in total MAR Hold      1512 *Not included in total MAR Unhold      1619 *Not included in total Held by provider      1830 *Not included in total Automatically Held      1853 *Not included in total Unheld by provider     11/23/23  0344 5,000 Units Given      1008 5,000 Units Given      1812 5,000 Units Given     11/24/23  0223 5,000 Units Given      0940 5,000 Units Given      1830 5,000 Units Given     11/25/23  0333 5,000 Units Given      1030 *5,000 Units Missed      1830 5,000 Units Given     11/26/23  0330 5,000 Units Given      0936 5,000 Units Given      1735 5,000 Units Given     11/27/23  0335 5,000 Units Given      0953 5,000 Units Given      1748 5,000 Units Given     11/28/23  0236 5,000 Units Given      0943 5,000 Units Given      1820 5,000 Units Given      1851 *Not included in total Held by provider     11/29/23  0230 *5,000 Units Missed      1030 *Not included in total Automatically Held               melatonin tablet 5 mg (mg) Total dose:  55 mg* Dosing weight:  120   *Administration not included in total     Date/Time Rate/Dose/Volume Action       11/17/23  2100 *5 mg Missed     11/18/23 2051 5 mg Given     11/19/23  2150 5 mg Given     11/21/23  0013 5 mg Given      2112 5 mg Given     11/22/23  1444 *Not included in total MAR Hold      1512 *Not included in total MAR Unhold      2059 5 mg Given     11/23/23  2050 5 mg Given     11/24/23  2035 5 mg Given     11/25/23  2120 5 mg Given     11/26/23  2103 5 mg Given      11/27/23  2017 5 mg Given     11/28/23 2208 5 mg Given               metoprolol succinate XL (Toprol-XL) 24 hr tablet 100 mg (mg) Total dose:  600 mg      Date/Time Rate/Dose/Volume Action       11/17/23  0917 100 mg Given     11/18/23  0900 100 mg Given     11/19/23  0911 100 mg Given     11/20/23  0825 100 mg Given     11/21/23  0918 100 mg Given     11/22/23  1444 *Not included in total MAR Hold      1512 *Not included in total MAR Unhold     11/23/23  1008 100 mg Given               mupirocin (Bactroban) 2 % ointment Total dose:  Cannot be calculated* Dosing weight:  120   *Administration does not have dose documented     Date/Time Rate/Dose/Volume Action       11/17/23  1222  Given      2130  Given     11/18/23  0842  Given      2052  Given     11/19/23  0911  Given      2149  Given     11/20/23  0825  Given      2149  Given               rosuvastatin (Crestor) tablet 10 mg (mg) Total dose:  120 mg      Date/Time Rate/Dose/Volume Action       11/17/23 2132 10 mg Given     11/18/23 2051 10 mg Given     11/19/23 2150 10 mg Given     11/20/23 2142 10 mg Given     11/21/23  2112 10 mg Given     11/22/23  1444 *Not included in total MAR Hold      1512 *Not included in total MAR Unhold      2059 10 mg Given     11/23/23 2210 10 mg Given     11/24/23 2036 10 mg Given     11/25/23 2120 10 mg Given     11/26/23 2103 10 mg Given     11/27/23 2017 10 mg Given     11/28/23 2208 10 mg Given               tamsulosin (Flomax) 24 hr capsule 0.8 mg (mg) Total dose:  9.6 mg      Date/Time Rate/Dose/Volume Action       11/17/23 2127 0.8 mg Given     11/18/23 2051 0.8 mg Given     11/19/23 2150 0.8 mg Given     11/20/23 2142 0.8 mg Given     11/21/23 2112 0.8 mg Given     11/22/23  1444 *Not included in total MAR Hold      1512 *Not included in total MAR Unhold      2059 0.8 mg Given     11/23/23 2050 0.8 mg Given     11/24/23 2035 0.8 mg Given     11/25/23 2119 0.8 mg Given     11/26/23 2102 0.8 mg Given      11/27/23 2017 0.8 mg Given     11/28/23 2208 0.8 mg Given               dextrose 50 % injection 25 g (g) Total dose:  Cannot be calculated* Dosing weight:  120   *Administration dose not documented     Date/Time Rate/Dose/Volume Action       11/22/23  1444 *Not included in total MAR Hold      1619 *Not included in total MAR Unhold               glucagon (Glucagen) injection 1 mg (mg) Total dose:  Cannot be calculated* Dosing weight:  120   *Administration dose not documented     Date/Time Rate/Dose/Volume Action       11/22/23  1444 *Not included in total MAR Hold      1619 *Not included in total MAR Unhold               dextrose 10 % in water (D10W) infusion (g/kg/hr) Total dose:  Not documented* Dosing weight:  120   *Total volume has not been documented. View each administration to see the amount administered.     Date/Time Rate/Dose/Volume Action       11/22/23  1444 *Not included in total MAR Hold      1619 *Not included in total MAR Unhold               insulin lispro (HumaLOG) injection 0-5 Units (Units) Total dose:  3 Units Dosing weight:  120      Date/Time Rate/Dose/Volume Action       11/17/23  0800 *Not included in total Missed      1200 *Not included in total Missed      1700 *Not included in total Missed     11/18/23  0800 *Not included in total Missed      1200 *Not included in total Missed      1824 1 Units Given     11/19/23  0800 *Not included in total Missed      1200 *Not included in total Missed      1700 *Not included in total Missed     11/20/23  0800 *Not included in total Missed      1200 *Not included in total Missed      1934 1 Units Given     11/21/23  0800 *Not included in total Missed      1200 *Not included in total Missed      1802 1 Units Given     11/22/23  0800 *Not included in total Missed      1444 *Not included in total MAR Hold      1619 *Not included in total MAR Unhold      1700 *Not included in total Missed     11/23/23  0800 *Not included in total Missed      1200 *Not  included in total Missed      1700 *Not included in total Missed     11/24/23  0800 *Not included in total Missed      1200 *Not included in total Missed      1700 *Not included in total Missed     11/25/23  0800 *Not included in total Missed      1200 *Not included in total Missed      1700 *Not included in total Missed     11/26/23  0800 *Not included in total Missed      1200 *Not included in total Missed      1700 *Not included in total Missed     11/27/23  0800 *Not included in total Missed      1200 *Not included in total Missed      1700 *Not included in total Missed     11/28/23  0800 *Not included in total Missed      1200 *Not included in total Missed      1700 *Not included in total Missed     11/29/23  0800 *Not included in total Missed               insulin glargine (Lantus) injection 10 Units (Units) Total dose:  30 Units Dosing weight:  120      Date/Time Rate/Dose/Volume Action       11/18/23 2052 10 Units Given     11/19/23 2151 10 Units Given     11/20/23 2145 10 Units Given               insulin glargine (Lantus) injection 5 Units (Units) Total dose:  10 Units Dosing weight:  116      Date/Time Rate/Dose/Volume Action       11/21/23  2111 5 Units Given     11/22/23  1444 *Not included in total MAR Hold      1619 *Not included in total MAR Unhold      2059 5 Units Given               insulin glargine (Lantus) injection 10 Units (Units) Total dose:  50 Units* Dosing weight:  108   *Administration not included in total     Date/Time Rate/Dose/Volume Action       11/23/23  1308 *Not included in total MAR Hold      1346 *Not included in total MAR Unhold      2055 10 Units Given     11/24/23  2035 10 Units Given     11/25/23 2120 10 Units Given     11/26/23  2103 10 Units Given     11/27/23  2018 10 Units Given     11/28/23  1851 *Not included in total Held by provider      2100 *10 Units Missed               insulin glargine (Lantus) injection 5 Units (Units) Total dose:  5 Units Dosing weight:   114      Date/Time Rate/Dose/Volume Action       11/28/23  2209 5 Units Given               acetaminophen (Tylenol) tablet 650 mg (mg) Total dose:  5,850 mg Dosing weight:  120      Date/Time Rate/Dose/Volume Action       11/17/23  1002 650 mg Given     11/18/23  1230 650 mg Given     11/19/23  0348 650 mg Given      1147 650 mg Given     11/20/23  0235 650 mg Given     11/22/23  1444 *Not included in total MAR Hold      1619 *Not included in total MAR Unhold      1942 650 mg Given     11/23/23  0344 650 mg Given      1230 650 mg Given     11/26/23  Canceled Entry     11/27/23  1052 650 mg Given               oxyCODONE (Roxicodone) immediate release tablet 5 mg (mg) Total dose:  50 mg Dosing weight:  116      Date/Time Rate/Dose/Volume Action       11/17/23  0136 5 mg Given      1221 5 mg Given     11/18/23  0134 5 mg Given     11/21/23  1444 5 mg Given     11/22/23  0330 5 mg Given      1444 *Not included in total MAR Hold      1619 *Not included in total MAR Unhold      1656 5 mg Given     11/23/23  0344 5 mg Given      1845 5 mg Given     11/24/23  1147 5 mg Given     11/27/23  1526 5 mg Given               ondansetron (Zofran) tablet 4 mg (mg) Total dose:  4 mg Dosing weight:  116      Date/Time Rate/Dose/Volume Action       11/17/23  2236 4 mg Given     11/22/23  1444 *Not included in total MAR Hold      1619 *Not included in total MAR Unhold               sennosides (Senokot) tablet 8.6 mg (mg) Total dose:  3 tablet Dosing weight:  115      Date/Time Rate/Dose/Volume Action       11/19/23  1147 8.6 mg Given     11/20/23  0825 8.6 mg Given     11/21/23  0922 8.6 mg Given               sennosides (Senokot) tablet 8.6 mg (mg) Total dose:  2 tablet* Dosing weight:  108   *Administration not included in total     Date/Time Rate/Dose/Volume Action       11/23/23  1231 8.6 mg Given      1308 *Not included in total MAR Hold      1346 *Not included in total MAR Unhold      2050 8.6 mg Given     11/24/23 2100 *8.6 mg  Missed     11/25/23 2119 *8.6 mg Missed     11/26/23  2100 *8.6 mg Missed     11/27/23  2100 *8.6 mg Missed     11/28/23  2100 *8.6 mg Missed               polyethylene glycol (Glycolax, Miralax) packet 17 g (g) Total dose:  Cannot be calculated* Dosing weight:  115   *Administration dose not documented     Date/Time Rate/Dose/Volume Action       11/19/23  1130 *17 g Missed     11/20/23  0900 *17 g Missed     11/21/23  0900 *17 g Missed     11/22/23  1444 *Not included in total MAR Hold      1619 *Not included in total MAR Unhold     11/23/23  0900 *17 g Missed     11/24/23  0900 *17 g Missed     11/25/23  0900 *17 g Missed     11/26/23  0900 *17 g Missed     11/27/23  0900 *17 g Missed     11/28/23  0900 *17 g Missed     11/29/23  0900 *17 g Missed               magnesium sulfate IV 2 g (mL/hr) Total volume:  Cannot be calculated* Dosing weight:  117   *Total user-documented volume 200 mL may contain volume from other administrations     Date/Time Rate/Dose/Volume Action       11/20/23  1413 2 g - 25 mL/hr (over 120 min) New Bag      1613  (over 120 min) Stopped               magnesium sulfate IV 2 g (mL/hr) Total volume:  Cannot be calculated* Dosing weight:  116   *Total user-documented volume 200 mL may contain volume from other administrations     Date/Time Rate/Dose/Volume Action       11/24/23  1034 2 g - 25 mL/hr (over 120 min) New Bag      1234  (over 120 min) Stopped               traZODone (Desyrel) tablet 50 mg (mg) Total dose:  100 mg Dosing weight:  117      Date/Time Rate/Dose/Volume Action       11/21/23  0013 50 mg Given      2113 50 mg Given     11/22/23  1444 *Not included in total MAR Hold      1619 *Not included in total MAR Unhold               magnesium sulfate in D5W IV 1 g (mL/hr) Total volume:  Cannot be calculated* Dosing weight:  108   *Total user-documented volume 200 mL may contain volume from other administrations     Date/Time Rate/Dose/Volume Action       11/23/23  1020 1 g - 100  mL/hr (over 60 min) New Bag      1120  (over 60 min) Stopped               metoprolol succinate XL (Toprol-XL) 24 hr tablet 50 mg (mg) Total dose:  100 mg      Date/Time Rate/Dose/Volume Action       11/24/23  0904 50 mg Given     11/25/23  0822 50 mg Given               metoprolol succinate XL (Toprol-XL) 24 hr tablet 25 mg (mg) Total dose:  100 mg      Date/Time Rate/Dose/Volume Action       11/26/23  0936 25 mg Given     11/27/23  0834 25 mg Given     11/28/23  0848 25 mg Given     11/29/23  0826 25 mg Given               magnesium chloride (MagDelay) EC tablet 64 mg (mg) Total dose:  128 mg Dosing weight:  114      Date/Time Rate/Dose/Volume Action       11/27/23  1748 64 mg Given     11/28/23  0848 64 mg Given               fentaNYL PF (Sublimaze) injection (mcg) Total dose:  50 mcg      Date/Time Rate/Dose/Volume Action       11/29/23  1206 50 mcg Given               midazolam (Versed) injection (mg) Total dose:  1 mg      Date/Time Rate/Dose/Volume Action       11/29/23  1206 1 mg Given                     See detailed result report with images in PACS.    The patient tolerated the procedure well without incident or complication and is in stable condition.

## 2023-11-29 NOTE — CARE PLAN
Problem: Infection related to problem list condition  Goal: Infection will resolve through treatment  Outcome: Progressing     Problem: Pain  Goal: My pain/discomfort is manageable  Outcome: Progressing     Problem: Safety  Goal: Patient will be injury free during hospitalization  Outcome: Progressing  Goal: I will remain free of falls  Outcome: Progressing     Problem: Daily Care  Goal: Daily care needs are met  Outcome: Progressing     Problem: Skin  Goal: Prevent/minimize sheer/friction injuries  Outcome: Progressing      Pt HDS, had biopsy of left groin. Plan for pacemaker placement tomorrow.

## 2023-11-29 NOTE — PROGRESS NOTES
Occupational Therapy                 Therapy Communication Note    Patient Name: Chester Verduzco  MRN: [id number]  Today's Date: 11/29/2023     Discipline: Occupational Therapy    Missed Visit Reason: Missed Visit Reason:  (Pt off the floor at IR, will re-attempt as schedule permits)    Missed Time: Attempt

## 2023-11-30 LAB
ALBUMIN SERPL BCP-MCNC: 2.9 G/DL (ref 3.4–5)
ANION GAP SERPL CALC-SCNC: 15 MMOL/L (ref 10–20)
APTT PPP: 36 SECONDS (ref 27–38)
BACTERIA BLD CULT: NORMAL
BACTERIA BLD CULT: NORMAL
BASOPHILS # BLD AUTO: 0.03 X10*3/UL (ref 0–0.1)
BASOPHILS NFR BLD AUTO: 0.7 %
BUN SERPL-MCNC: 25 MG/DL (ref 6–23)
CALCIUM SERPL-MCNC: 8.3 MG/DL (ref 8.6–10.6)
CHLORIDE SERPL-SCNC: 106 MMOL/L (ref 98–107)
CO2 SERPL-SCNC: 23 MMOL/L (ref 21–32)
CREAT SERPL-MCNC: 2.69 MG/DL (ref 0.5–1.3)
EOSINOPHIL # BLD AUTO: 0.11 X10*3/UL (ref 0–0.4)
EOSINOPHIL NFR BLD AUTO: 2.6 %
ERYTHROCYTE [DISTWIDTH] IN BLOOD BY AUTOMATED COUNT: 15 % (ref 11.5–14.5)
GFR SERPL CREATININE-BSD FRML MDRD: 24 ML/MIN/1.73M*2
GLUCOSE BLD MANUAL STRIP-MCNC: 105 MG/DL (ref 74–99)
GLUCOSE BLD MANUAL STRIP-MCNC: 129 MG/DL (ref 74–99)
GLUCOSE BLD MANUAL STRIP-MCNC: 92 MG/DL (ref 74–99)
GLUCOSE BLD MANUAL STRIP-MCNC: 96 MG/DL (ref 74–99)
GLUCOSE SERPL-MCNC: 86 MG/DL (ref 74–99)
HCT VFR BLD AUTO: 25.9 % (ref 41–52)
HGB BLD-MCNC: 8.4 G/DL (ref 13.5–17.5)
IMM GRANULOCYTES # BLD AUTO: 0.03 X10*3/UL (ref 0–0.5)
IMM GRANULOCYTES NFR BLD AUTO: 0.7 % (ref 0–0.9)
INR PPP: 1.2 (ref 0.9–1.1)
LYMPHOCYTES # BLD AUTO: 0.92 X10*3/UL (ref 0.8–3)
LYMPHOCYTES NFR BLD AUTO: 21.4 %
MAGNESIUM SERPL-MCNC: 1.69 MG/DL (ref 1.6–2.4)
MCH RBC QN AUTO: 28.6 PG (ref 26–34)
MCHC RBC AUTO-ENTMCNC: 32.4 G/DL (ref 32–36)
MCV RBC AUTO: 88 FL (ref 80–100)
MONOCYTES # BLD AUTO: 0.35 X10*3/UL (ref 0.05–0.8)
MONOCYTES NFR BLD AUTO: 8.1 %
NEUTROPHILS # BLD AUTO: 2.86 X10*3/UL (ref 1.6–5.5)
NEUTROPHILS NFR BLD AUTO: 66.5 %
NRBC BLD-RTO: 0 /100 WBCS (ref 0–0)
PHOSPHATE SERPL-MCNC: 4.6 MG/DL (ref 2.5–4.9)
PLATELET # BLD AUTO: 98 X10*3/UL (ref 150–450)
POTASSIUM SERPL-SCNC: 3.7 MMOL/L (ref 3.5–5.3)
PROTHROMBIN TIME: 13.9 SECONDS (ref 9.8–12.8)
RBC # BLD AUTO: 2.94 X10*6/UL (ref 4.5–5.9)
SODIUM SERPL-SCNC: 140 MMOL/L (ref 136–145)
WBC # BLD AUTO: 4.3 X10*3/UL (ref 4.4–11.3)

## 2023-11-30 PROCEDURE — 85025 COMPLETE CBC W/AUTO DIFF WBC: CPT

## 2023-11-30 PROCEDURE — 2500000004 HC RX 250 GENERAL PHARMACY W/ HCPCS (ALT 636 FOR OP/ED)

## 2023-11-30 PROCEDURE — 2500000001 HC RX 250 WO HCPCS SELF ADMINISTERED DRUGS (ALT 637 FOR MEDICARE OP)

## 2023-11-30 PROCEDURE — 33274 TCAT INSJ/RPL PERM LDLS PM: CPT | Mod: 22 | Performed by: STUDENT IN AN ORGANIZED HEALTH CARE EDUCATION/TRAINING PROGRAM

## 2023-11-30 PROCEDURE — 99153 MOD SED SAME PHYS/QHP EA: CPT | Performed by: STUDENT IN AN ORGANIZED HEALTH CARE EDUCATION/TRAINING PROGRAM

## 2023-11-30 PROCEDURE — 2720000007 HC OR 272 NO HCPCS: Performed by: STUDENT IN AN ORGANIZED HEALTH CARE EDUCATION/TRAINING PROGRAM

## 2023-11-30 PROCEDURE — 33274 TCAT INSJ/RPL PERM LDLS PM: CPT | Performed by: STUDENT IN AN ORGANIZED HEALTH CARE EDUCATION/TRAINING PROGRAM

## 2023-11-30 PROCEDURE — C1894 INTRO/SHEATH, NON-LASER: HCPCS | Performed by: STUDENT IN AN ORGANIZED HEALTH CARE EDUCATION/TRAINING PROGRAM

## 2023-11-30 PROCEDURE — 84100 ASSAY OF PHOSPHORUS: CPT

## 2023-11-30 PROCEDURE — 83735 ASSAY OF MAGNESIUM: CPT

## 2023-11-30 PROCEDURE — 82947 ASSAY GLUCOSE BLOOD QUANT: CPT

## 2023-11-30 PROCEDURE — 2750000001 HC OR 275 NO HCPCS: Performed by: STUDENT IN AN ORGANIZED HEALTH CARE EDUCATION/TRAINING PROGRAM

## 2023-11-30 PROCEDURE — 2500000004 HC RX 250 GENERAL PHARMACY W/ HCPCS (ALT 636 FOR OP/ED): Performed by: STUDENT IN AN ORGANIZED HEALTH CARE EDUCATION/TRAINING PROGRAM

## 2023-11-30 PROCEDURE — 97110 THERAPEUTIC EXERCISES: CPT | Mod: GP

## 2023-11-30 PROCEDURE — 80069 RENAL FUNCTION PANEL: CPT

## 2023-11-30 PROCEDURE — C1786 PMKR, SINGLE, RATE-RESP: HCPCS | Performed by: STUDENT IN AN ORGANIZED HEALTH CARE EDUCATION/TRAINING PROGRAM

## 2023-11-30 PROCEDURE — 1200000002 HC GENERAL ROOM WITH TELEMETRY DAILY

## 2023-11-30 PROCEDURE — 85610 PROTHROMBIN TIME: CPT

## 2023-11-30 PROCEDURE — 99233 SBSQ HOSP IP/OBS HIGH 50: CPT | Performed by: INTERNAL MEDICINE

## 2023-11-30 PROCEDURE — 99152 MOD SED SAME PHYS/QHP 5/>YRS: CPT | Performed by: STUDENT IN AN ORGANIZED HEALTH CARE EDUCATION/TRAINING PROGRAM

## 2023-11-30 PROCEDURE — C1769 GUIDE WIRE: HCPCS | Performed by: STUDENT IN AN ORGANIZED HEALTH CARE EDUCATION/TRAINING PROGRAM

## 2023-11-30 PROCEDURE — 97530 THERAPEUTIC ACTIVITIES: CPT | Mod: GP

## 2023-11-30 PROCEDURE — 36415 COLL VENOUS BLD VENIPUNCTURE: CPT

## 2023-11-30 DEVICE — TPS MC2AVR1 MICRA AV2 US
Type: IMPLANTABLE DEVICE | Site: HEART | Status: FUNCTIONAL
Brand: MICRA™ AV2

## 2023-11-30 RX ORDER — BUPIVACAINE HYDROCHLORIDE 5 MG/ML
INJECTION, SOLUTION EPIDURAL; INTRACAUDAL AS NEEDED
Status: DISCONTINUED | OUTPATIENT
Start: 2023-11-30 | End: 2023-11-30 | Stop reason: HOSPADM

## 2023-11-30 RX ORDER — SODIUM CHLORIDE 9 MG/ML
INJECTION, SOLUTION INTRAVENOUS CONTINUOUS PRN
Status: COMPLETED | OUTPATIENT
Start: 2023-11-30 | End: 2023-11-30

## 2023-11-30 RX ORDER — VANCOMYCIN HYDROCHLORIDE 1 G/200ML
INJECTION, SOLUTION INTRAVENOUS CONTINUOUS PRN
Status: COMPLETED | OUTPATIENT
Start: 2023-11-30 | End: 2023-11-30

## 2023-11-30 RX ORDER — DIPHENHYDRAMINE HYDROCHLORIDE 50 MG/ML
INJECTION INTRAMUSCULAR; INTRAVENOUS AS NEEDED
Status: DISCONTINUED | OUTPATIENT
Start: 2023-11-30 | End: 2023-11-30 | Stop reason: HOSPADM

## 2023-11-30 RX ORDER — HEPARIN SODIUM 1000 [USP'U]/ML
INJECTION, SOLUTION INTRAVENOUS; SUBCUTANEOUS AS NEEDED
Status: DISCONTINUED | OUTPATIENT
Start: 2023-11-30 | End: 2023-11-30 | Stop reason: HOSPADM

## 2023-11-30 RX ORDER — FENTANYL CITRATE 50 UG/ML
INJECTION, SOLUTION INTRAMUSCULAR; INTRAVENOUS AS NEEDED
Status: DISCONTINUED | OUTPATIENT
Start: 2023-11-30 | End: 2023-11-30 | Stop reason: HOSPADM

## 2023-11-30 RX ORDER — MIDAZOLAM HYDROCHLORIDE 1 MG/ML
INJECTION INTRAMUSCULAR; INTRAVENOUS AS NEEDED
Status: DISCONTINUED | OUTPATIENT
Start: 2023-11-30 | End: 2023-11-30 | Stop reason: HOSPADM

## 2023-11-30 RX ADMIN — AMLODIPINE BESYLATE 10 MG: 10 TABLET ORAL at 21:13

## 2023-11-30 RX ADMIN — METOPROLOL SUCCINATE 25 MG: 25 TABLET, EXTENDED RELEASE ORAL at 09:12

## 2023-11-30 RX ADMIN — INSULIN GLARGINE 10 UNITS: 100 INJECTION, SOLUTION SUBCUTANEOUS at 22:28

## 2023-11-30 RX ADMIN — ROSUVASTATIN CALCIUM 10 MG: 10 TABLET, FILM COATED ORAL at 22:27

## 2023-11-30 RX ADMIN — Medication 5 MG: at 21:13

## 2023-11-30 RX ADMIN — CEFAZOLIN SODIUM 2 G: 2 INJECTION, SOLUTION INTRAVENOUS at 03:59

## 2023-11-30 RX ADMIN — TAMSULOSIN HYDROCHLORIDE 0.8 MG: 0.4 CAPSULE ORAL at 21:13

## 2023-11-30 RX ADMIN — CEFAZOLIN SODIUM 2 G: 2 INJECTION, SOLUTION INTRAVENOUS at 18:04

## 2023-11-30 RX ADMIN — FINASTERIDE 5 MG: 5 TABLET, FILM COATED ORAL at 09:12

## 2023-11-30 ASSESSMENT — COGNITIVE AND FUNCTIONAL STATUS - GENERAL
WALKING IN HOSPITAL ROOM: A LOT
WALKING IN HOSPITAL ROOM: A LOT
MOVING TO AND FROM BED TO CHAIR: A LOT
CLIMB 3 TO 5 STEPS WITH RAILING: TOTAL
DRESSING REGULAR LOWER BODY CLOTHING: A LOT
STANDING UP FROM CHAIR USING ARMS: A LOT
HELP NEEDED FOR BATHING: A LOT
STANDING UP FROM CHAIR USING ARMS: A LOT
MOBILITY SCORE: 12
MOBILITY SCORE: 12
DAILY ACTIVITIY SCORE: 15
MOVING FROM LYING ON BACK TO SITTING ON SIDE OF FLAT BED WITH BEDRAILS: A LITTLE
TOILETING: A LOT
TURNING FROM BACK TO SIDE WHILE IN FLAT BAD: A LOT
MOVING TO AND FROM BED TO CHAIR: A LOT
PERSONAL GROOMING: A LITTLE
CLIMB 3 TO 5 STEPS WITH RAILING: TOTAL
MOVING FROM LYING ON BACK TO SITTING ON SIDE OF FLAT BED WITH BEDRAILS: A LITTLE
TURNING FROM BACK TO SIDE WHILE IN FLAT BAD: A LOT
DRESSING REGULAR UPPER BODY CLOTHING: A LOT

## 2023-11-30 ASSESSMENT — PAIN DESCRIPTION - DESCRIPTORS: DESCRIPTORS: ACHING;SORE

## 2023-11-30 ASSESSMENT — PAIN - FUNCTIONAL ASSESSMENT
PAIN_FUNCTIONAL_ASSESSMENT: 0-10

## 2023-11-30 ASSESSMENT — PAIN SCALES - GENERAL
PAINLEVEL_OUTOF10: 4
PAINLEVEL_OUTOF10: 6
PAINLEVEL_OUTOF10: 0 - NO PAIN

## 2023-11-30 NOTE — PROGRESS NOTES
"   Subjective     Overnight events:   Pt was seen and examined at bedside. NPO overnight for lead implantation. No acute overnight events. Pt denies any chest pain, SOB, dyspnea, new cough, palpitations, nausea, vomiting, diarrhea, dizziness, changes in visions, headaches, or worsening lower extremity swelling. Pt is comfortable in bed and in no acute distress.     Vital signs:  Blood pressure 142/71, pulse 76, temperature 36.4 °C (97.5 °F), temperature source Temporal, resp. rate 18, height 1.88 m (6' 2.02\"), weight 110 kg (242 lb 8.1 oz), SpO2 97 %.    I/O last 3 completed shifts:  In: 1010 (9.2 mL/kg) [P.O.:610; IV Piggyback:400]  Out: 2501 (22.7 mL/kg) [Urine:2500 (0.6 mL/kg/hr); Stool:1]  Weight: 110 kg     Physical Exam  GENERAL:  In no acute distress  NEUROLOGIC:  A and O x 3. Cranial nerves 2 through 12 grossly intact with no gait disturbances. Intact motor and sensory systems, with normal reflexes and coordination.    HEENT:  Pupils are equal, round, and reactive to light and accommodation. Extraocular motion intact.    MOUTH: MMM, no lesions observed  CARDIAC: S1 + S2, RRR, no M/R/G.    LUNGS: CTA BL.  No wheezes or coarse breath sounds. Symmetric respirations. No increased work of breathing.   ABDOMEN: Soft, non-tender to palpation. No organomegaly. Normal BS in 4Q, No rebound or guarding.  EXTREMITIES:  Moving x4 equally and spontaneously. Right knee with mild effusion present but no erythema or warmth. Chronic limited ROM but not worse than baseline. No BLE edema. Painless non-reducible soft tissue mass in the left groin.  SKIN: Clean, warm, dry, and intact with normal skin turgor.  PSYCH: Appropriate mood and behavior.     Relevant Results  Scheduled medications  amLODIPine, 10 mg, oral, q PM  ceFAZolin in dextrose (iso-os), 2 g, intravenous, q12h  finasteride, 5 mg, oral, Daily  [Held by provider] heparin (porcine), 5,000 Units, subcutaneous, q8h  [Held by provider] insulin glargine, 10 Units, " subcutaneous, Nightly  insulin lispro, 0-5 Units, subcutaneous, TID with meals  melatonin, 5 mg, oral, Nightly  metoprolol succinate XL, 25 mg, oral, Daily  polyethylene glycol, 17 g, oral, Daily  rosuvastatin, 10 mg, oral, Nightly  sennosides, 1 tablet, oral, Nightly  tamsulosin, 0.8 mg, oral, Nightly      Continuous medications     PRN medications  PRN medications: acetaminophen, dextrose 10 % in water (D10W), dextrose, glucagon, ondansetron, oxyCODONE      Labs:  Last CBC:  Lab Results   Component Value Date    WBC 3.9 (L) 11/29/2023    HGB 7.7 (L) 11/29/2023    HCT 23.9 (L) 11/29/2023    MCV 88 11/29/2023    PLT 98 (L) 11/29/2023       Last RFP:  Lab Results   Component Value Date    GLUCOSE 78 11/29/2023    CALCIUM 8.3 (L) 11/29/2023     11/29/2023    K 4.0 11/29/2023    CO2 24 11/29/2023     11/29/2023    BUN 25 (H) 11/29/2023    CREATININE 2.84 (H) 11/29/2023       Last LFTs:  Lab Results   Component Value Date    ALT <3 (L) 11/17/2023    AST 11 11/17/2023    ALKPHOS 94 11/17/2023    BILITOT 0.3 11/17/2023       Last coags:  Lab Results   Component Value Date    INR 1.2 (H) 11/29/2023    APTT 36 11/29/2023       Micro/culture data:  Susceptibility data from last 90 days.  Collected Specimen Info Organism Clindamycin Erythromycin Oxacillin Tetracycline Trimethoprim/Sulfamethoxazole Vancomycin   11/22/23 Tissue/Biopsy from Other (specify in comments) Methicillin Susceptible Staphylococcus aureus (MSSA) S S S S S S   11/22/23 Tissue/Biopsy from Other (specify in comments) Methicillin Susceptible Staphylococcus aureus (MSSA) S S S S S S   11/14/23 Swab from Anterior Nares Methicillin Resistant Staphylococcus aureus (MRSA)         11/14/23 Blood culture from Peripheral Venipuncture Methicillin Susceptible Staphylococcus aureus (MSSA) S S S S S S         Imaging:  Electrocardiogram, 12-lead PRN ACS symptoms  Sinus rhythm with 1st degree AV block  Otherwise normal ECG  When compared with ECG of  23-NOV-2023 08:22,  No significant change was found  Confirmed by Federica Dallin ([id number]) on 11/27/2023 1:18:13 PM  Electrocardiogram, 12-lead PRN ACS symptoms  Sinus rhythm with 1st degree AV block  Prolonged QT  Abnormal ECG  When compared with ECG of 22-NOV-2023 14:48,  Premature ventricular complexes are no longer Present  Confirmed by Federica Dallin ([id number]) on 11/27/2023 1:18:09 PM  Electrocardiogram, 12-lead PRN ACS symptoms  Sinus rhythm with 1st degree AV block with frequent Premature ventricular complexes  Prolonged QT  Abnormal ECG  When compared with ECG of 17-NOV-2023 04:18,  Premature ventricular complexes are now Present  Confirmed by Thal Dallin ([id number]) on 11/27/2023 1:17:36 PM      Assessment/Plan   Mr. Chester Verduzco is a 73 year old male with PMHX HTN, HLD, Dual chamber PPM implanted in 11/1996 for SSS (RV is passive) with most recent generator replacement in 11/2020 ([employer] - Assurity MRI), Type 2 DM, gout, and recent hospitalization (10/2-10/7) for Staph bacteremia, diarrhea, NOHELIA , and hyponatremia 2/2 hypovolemia who [facility] ED from a SNF on 11/13 for NOHELIA on labs from the SNF and oliguria. Blood cultures were positive for MSSA and previously had been positive 10/8 and 11/10 for MSSA. Blood cultures with NGTD as of 11/16.  FILIPE 11/16 showed tricuspid valve vegetation as well as right atrial lead vegetation. Patient has been afebrile without leukocytosis and hemodynamically stable. Device Interrogated 11/17 with AP 29% and  30%.  Lead extraction complete 11/22.  Patient on IV antibiotics with stop 4 weeks after lead removal (~12/20). After lead removal, on telemetry the patient had several episodes of intermittent asymptomatic high-grade AV block, NSVT,and bradycardia. EP plans to implant PPM after 14 days of negative blood culture post removal per, ID. Patient received a unit of packed red blood cells for low hemoglobin (11/25). Hemoglobin has remained stable with no signs or symptoms  "of bleeding. Home lasix and losartan were being held in setting of NOHELIA 2/2 to nephrotoxic antibiotic exposure which down trending but not at baseline with adequate urine output. Patient has maldonado I/s/o severe BPH. Of note, Patient noticed left groin lymphadenopathy and U/S (11/23) showed Enlarged left inguinal/external iliac lymph nodes. PET scan deferred at this time d/t active infectious process. Underwent core needle biopsy on 11/29. Patient to follow up biopsy results with surgical oncology. Device re-implant (micra leadless pacemaker) 11/30, with plan for PICC placement after placement.    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    -Given asymptomatic episodes of NSVT, bradycardia and sinus pauses noted on tele, patient needs to be inpatient and monitored on tele until placement of PPM  - Daily CBC Trend WBC, no leukocytosis  --Monitor CBC, BMP, ESR and CRP weekly and fax to [phone] Attn Dr Gonzalez  -ID will follow up as outpatient with Dr Gonzalez (~1 week prior ending abx course)  -Metoprolol succ 25 mg daily while patient is without a pacemaker  -Per EP note: \"Based on HRS 2017 Consensus statement, it is reasonable to re-implant after 72hr of negative blood cultures as long as CIED indications remain (for this pt indication is intermittent AV block), discussed the recommendations with ID and they were in " "agreement so we will plan for device re-implant (micra leadless pacemaker) on Thursday 11/30/23 and further given that post procedure FILIPE (at end of case) did not show any valve vegetations it helps decrease our concerns about recurrent infection with re-implant\"     #NOHELIA, improving  :likely ATN given gradual rise in creatinine over past month and history of recent diarrhea and nephrotoxic antibiotic exposure. Renal U/S did not show any hydronephrosis.  - Baseline prior to October 2023 hospitalization ~1.0-1.1.   - Elevated to 5.43 on arrival to [facility] on 11/13, down trending  - Initial urine electrolytes showed Fena 2.5% indicating intrinsic etiology, Normal C3 and C4  - Nephrology signed off, will reengage  if Cr starts to uptrend   - Rojo replaced (11/17) ,strict I&Os, No indication for HD at this time  - Daily RFP  - Replete lytes as needed  -Encourage PO intake    #Inguinal lymphadenopathy  -Patient has endorsed left sided non tender lymphadenopathy for one month   - U/S (11/23) showed Enlarged left inguinal/external iliac lymph nodes measuring up to  5.7 cm x 3.3 cm x 4.4 cm with internal hypervascularity which were  visualized on the CT dated 10/02/2023 and not definitively seen dating back to 2015.  -Given state of infection, could be reactive or concerning for a lymphoproliferative disorder or metastatic disease from left lower extremity malignancy  -PET scan deferred at this time d/t active infectious process. Surg onc consulted  -Patient to follow up with Dr. Cameron (Surgical oncology) for further discussion and evaluation of left inguinal lymphadenopathy   -Underwent core biopsy for inguinal lymphadenopathy with IR 11/29  -Referral to diagnostic clinic for outpatient follow up  -F/up biopsy results    #Type 2 DM  - Checking A1c  - On Lantus 10 units QHS and cover with SSI,  on lantus 5 while NPO  - hypoglycemia protocol    #HTN  #HLD  - Holding home losartan and lasix in setting of NOHELIA  - Continue " Amlodipine 10, metop succinate 25 daily, and rosuvastatin 10 daily      #BPH  - Was initially planned for outpatient robotic prostatectomy end of November that has since been canceled and rescheduled for December  - Continue with Flomax 0.8 mg QHS and finasteride 5 md daily  - Continue maldonado     #Anemia  :Patient with slowly down trending Hgb from since this admission. Baseline 13-14. Stable this AM  -Received 1 unit pRBC (11/25) for Hgb 6.8 with appropriate increase to 7.4  -No acute signs or symptoms of bleeding at this time  - Will continue to monitor and transfuse for Hgb <7      F: As needed  E: AS needed  N: Adult; Carb Controlled  A:  PIV, Maldonado new as of 11/17  DVT ppx: Heparin subcutaneous, Holding for procedure  GI: N/A  Code Status: Full code  NOK: Charlene Pardo (friend) [phone]      Principal Problem:    MSSA bacteremia  Active Problems:    Endocarditis    History of general anesthesia    Complete atrioventricular block (CMS/HCC)    Intermittent complete atrioventricular block (CMS/HCC)         Gonzalo Henley MD   Internal Medicine PGY-I

## 2023-11-30 NOTE — PROGRESS NOTES
Occupational Therapy                 Therapy Communication Note    Patient Name: Chester Verduzco  MRN: [id number]  Today's Date: 11/30/2023     Discipline: Occupational Therapy    Missed Visit Reason: Missed Visit Reason: Patient in a medical procedure    Missed Time: Attempt @ 1500

## 2023-11-30 NOTE — PROGRESS NOTES
Subjective Data/Interval events:  S/p Micra PPM implantation today.   Blood cultures negative since 11/26.     Objective Data:  Last Recorded Vitals:  Vitals:    11/30/23 0721 11/30/23 1100 11/30/23 1730 11/30/23 1745   BP: 141/71 143/75 141/76 141/79   BP Location: Right arm Right arm     Patient Position: Lying Lying     Pulse: 85 84 74 75   Resp: 17 16     Temp: 36.9 °C (98.5 °F) 37.2 °C (99 °F) 37.6 °C (99.7 °F)    TempSrc: Temporal Temporal Axillary    SpO2: 97% 97% 98%    Weight:       Height:           Last Labs:  CBC - 11/30/2023:  6:34 AM  4.3 8.4 98    25.9      CMP - 11/30/2023:  6:34 AM  8.3 5.6; 5.6 11 --- 0.3   4.6 2.9 <3 94      PTT - 11/30/2023:  7:14 AM  1.2   13.9 36     TROPHS   Date/Time Value Ref Range Status   09/18/2023 05:52 PM 15 0 - 20 ng/L Final     Comment:     .  Less than 99th percentile of normal range cutoff-  Female and children under 18 years old <14 ng/L; Male <21 ng/L: Negative  Repeat testing should be performed if clinically indicated.   .  Female and children under 18 years old 14-50 ng/L; Male 21-50 ng/L:  Consistent with possible cardiac damage and possible increased clinical   risk. Serial measurements may help to assess extent of myocardial damage.   .  >50 ng/L: Consistent with cardiac damage, increased clinical risk and  myocardial infarction. Serial measurements may help assess extent of   myocardial damage.   .   NOTE: Children less than 1 year old may have higher baseline troponin   levels and results should be interpreted in conjunction with the overall   clinical context.   .  NOTE: Troponin I testing is performed using a different   testing methodology at [facility] than at other   [facility] hospitals. Direct result comparisons should only   be made within the same method.       BNP   Date/Time Value Ref Range Status   10/02/2023 01:07 PM 97 0 - 99 pg/mL Final     HGBA1C   Date/Time Value Ref Range Status   11/17/2023 10:14 AM 6.6 see below % Final    08/11/2023 12:59 PM 9.9 % Final     Comment:          Diagnosis of Diabetes-Adults   Non-Diabetic: < or = 5.6%   Increased risk for developing diabetes: 5.7-6.4%   Diagnostic of diabetes: > or = 6.5%  .       Monitoring of Diabetes                Age (y)     Therapeutic Goal (%)   Adults:          >18           <7.0   Pediatrics:    13-18           <7.5                   7-12           <8.0                   0- 6            7.5-8.5   American Diabetes Association. Diabetes Care 33(S1), Jan 2010.     02/07/2023 09:07 AM 8.4 % Final     Comment:          Diagnosis of Diabetes-Adults   Non-Diabetic: < or = 5.6%   Increased risk for developing diabetes: 5.7-6.4%   Diagnostic of diabetes: > or = 6.5%  .       Monitoring of Diabetes                Age (y)     Therapeutic Goal (%)   Adults:          >18           <7.0   Pediatrics:    13-18           <7.5                   7-12           <8.0                   0- 6            7.5-8.5   American Diabetes Association. Diabetes Care 33(S1), Jan 2010.       VLDL   Date/Time Value Ref Range Status   08/11/2023 12:59 PM 57 0 - 40 mg/dL Final   08/10/2022 11:09 AM 33 0 - 40 mg/dL Final   08/19/2020 12:17 PM 62 0 - 40 mg/dL Final      Last I/O:  I/O last 3 completed shifts:  In: 1010 (9.2 mL/kg) [P.O.:610; IV Piggyback:400]  Out: 2501 (22.7 mL/kg) [Urine:2500 (0.6 mL/kg/hr); Stool:1]  Weight: 110 kg         Inpatient Medications:  Scheduled medications   Medication Dose Route Frequency    amLODIPine  10 mg oral q PM    ceFAZolin in dextrose (iso-os)  2 g intravenous q12h    finasteride  5 mg oral Daily    [Held by provider] heparin (porcine)  5,000 Units subcutaneous q8h    [Held by provider] insulin glargine  10 Units subcutaneous Nightly    insulin lispro  0-5 Units subcutaneous TID with meals    melatonin  5 mg oral Nightly    metoprolol succinate XL  25 mg oral Daily    polyethylene glycol  17 g oral Daily    rosuvastatin  10 mg oral Nightly    sennosides  1 tablet oral  Nightly    tamsulosin  0.8 mg oral Nightly     PRN medications   Medication    acetaminophen    dextrose 10 % in water (D10W)    dextrose    glucagon    ondansetron    oxyCODONE     Continuous Medications   Medication Dose Last Rate      General Appearance:    Alert, cooperative, no distress, appears stated age  Lungs:   Clear to auscultation bilaterally, respirations unlabored  Heart:    Regular rate and rhythm, S1 and S2 normal, no murmur, rub  or gallop. Wound is clean without erythema or collection, open to air, mild-moderate tenderness.  Abdomen:  Soft, non-tender, bowel sounds active all four quadrants, no masses, no organomegaly  Extremities: Extremities normal, atraumatic, no cyanosis or edema      Assessment/Plan   Chester Verduzco is a 73 y.o. male with a PMXH of HTN, HLD, Dual chamber PPM implanted in 11/1996 for SSS (RV is passive) with most recent generator replacement in 11/2020 ([employer] - Assurity MRI), Type 2 DM, gout, and recent hospitalization (10/2-10/7) for Staph bacteremia.  Admitted with persistent staph bacteremia in the setting of RA lead vegetation and TV lead vegetation.     Patient doing well post procedure. Device and complete leads were successfully extracted, Wound check with incision well approximated with staples. No hematoma. Continue with IV abx. The patient was not pacing dependant on device check revealed V-pacing at 30%, however on tele the patient had two episodes of high-grade AV block, lasting longest 5 seconds (11/23-24).  The patient also had few episodes of nonconducted PACs, however now conducting one-to-one but now in [facility]. . The patient has a prolonged UT with narrow QRS which suggest that the block is at the AV node level and not below. Currently asymptomatic. Last positive blood cultures were on 11/14 and has been negative since 11/16  -Monitor on tele.  -Based on HRS 2017 Consensus statement, it is reasonable to re-implant after 72hr of negative blood cultures  as long as CIED indications remain (for this pt indication is intermittent AV block), discussed the recommendations with ID and they were in agreement so we will plan for device re-implant (micra leadless pacemaker)     S/p Micra leadless pacemaker implantation today 11/30/2023.     Recommendations:  - Device interrogation in AM   - Please order Limited echocardiogram in AM to assess for any effusions  This case was discussed with Dr. Aguilar and > 30 minutes was spent in the professional and overall care of this patient.    Code Status:  Full Code    Saw Corrales MD  Cardiology Fellow PGY4    I saw and evaluated the patient. I personally obtained the key and critical portions of the history and physical exam or was physically present for key and critical portions performed by the resident/fellow. I reviewed the resident/fellow's documentation and discussed the patient with the resident/fellow. I agree with the resident/fellow's medical decision making as documented in the note, and my edits (bold and italic) have been made to the document as needed.     Etienne Aguilar MD [facility]  Cardiac Electrophysiology    **Disclaimer: This note was dictated by speech recognition, and every effort has been made to prevent any error in transcription, however minor errors may be present**

## 2023-11-30 NOTE — CARE PLAN
The patient's goals for the shift include to be painfree    The clinical goals for the shift include Patient to have no new signs of infection    No new signs of infection continue to monitor for fever, increased wbc or chills    Problem: Infection related to problem list condition  Goal: Infection will resolve through treatment  Outcome: Progressing     Problem: Pain  Goal: My pain/discomfort is manageable  Outcome: Progressing     Problem: Safety  Goal: Patient will be injury free during hospitalization  Outcome: Progressing     Problem: Skin  Goal: Prevent/minimize sheer/friction injuries  Outcome: Progressing  Flowsheets (Taken 11/30/2023 3476)  Prevent/minimize sheer/friction injuries: Turn/reposition every 2 hours/use positioning/transfer devices

## 2023-11-30 NOTE — CARE PLAN
The patient's goals for the shift include to be painfree    The clinical goals for the shift include Patient will remain stable and NPO after midnight.      Problem: Infection related to problem list condition  Goal: Infection will resolve through treatment  Outcome: Progressing     Problem: Pain  Goal: My pain/discomfort is manageable  Outcome: Progressing     Problem: Safety  Goal: Patient will be injury free during hospitalization  Outcome: Progressing  Goal: I will remain free of falls  Outcome: Progressing     Problem: Daily Care  Goal: Daily care needs are met  Outcome: Progressing     Problem: Skin  Goal: Prevent/minimize sheer/friction injuries  11/29/2023 2334 by Lucila Tamayo RN  Outcome: Progressing  Flowsheets (Taken 11/29/2023 2334)  Prevent/minimize sheer/friction injuries:   Turn/reposition every 2 hours/use positioning/transfer devices   HOB 30 degrees or less  11/29/2023 2215 by Lucila Tamayo RN  Outcome: Progressing     Problem: Psychosocial Needs  Goal: Demonstrates ability to cope with hospitalization/illness  Outcome: Progressing  Goal: Collaborate with me, my family, and caregiver to identify my specific goals  Outcome: Progressing

## 2023-11-30 NOTE — PROGRESS NOTES
Physical Therapy    Physical Therapy Treatment    Patient Name: Chester Verduzco  MRN: [id number]  Today's Date: 11/30/2023  Time Calculation  Start Time: 1206  Stop Time: 1236  Time Calculation (min): 30 min       Assessment/Plan   PT Assessment  PT Assessment Results: Decreased strength, Decreased endurance, Impaired balance, Decreased mobility, Pain  Rehab Prognosis: Excellent  End of Session Communication: Bedside nurse  Assessment Comment: Pt continues to demo dec strength, balance, & endurance limiting functional mobility.  R knee pain hindering functional mobility as well.  Pt motivated for return to indep.  Remains appropriate for continued skilled therapy.  End of Session Patient Position:  (transport cart, leaving floor)  PT Plan  Inpatient/Swing Bed or Outpatient: Inpatient  PT Plan  Treatment/Interventions: Bed mobility, Transfer training, Gait training, Balance training, Strengthening, Endurance training, Therapeutic exercise, Therapeutic activity  PT Plan: Skilled PT  PT Frequency: 5 times per week  PT Discharge Recommendations: Moderate intensity level of continued care  PT Recommended Transfer Status: Assist x1, Assistive device (RW)  PT - OK to Discharge: Yes (PT eval complete & dc recs made)      General Visit Information:   PT  Visit  PT Received On: 11/30/23  Response to Previous Treatment: Patient with no complaints from previous session.  General  Family/Caregiver Present: No  Prior to Session Communication: Bedside nurse  Patient Position Received: Bed, 4 rail up, Alarm off, not on at start of session  General Comment: Pt supine in bed, awaiting procedure, but agreeable to therapy session.    Subjective   Precautions:  Precautions  Medical Precautions: Fall precautions, Cardiac precautions       Objective   Pain:  Pain Assessment  Pain Assessment: 0-10  Pain Score: 0 - No pain  Cognition:  Cognition  Overall Cognitive Status: Within Functional Limits  Postural Control:  Postural  Control  Postural Control: Within Functional Limits     Activity Tolerance:  Activity Tolerance  Endurance: Tolerates 10 - 20 min exercise with multiple rests  Treatments:  Therapeutic Exercise  Therapeutic Exercise Performed: Yes  Therapeutic Exercise Activity 1: heel slides, ankle pumps, supine hip abd/add, LAQ, and seated marches x12 each BLE         Balance/Neuromuscular Re-Education  Balance/Neuromuscular Re-Education Activity Performed: Yes  Balance/Neuromuscular Re-Education Activity 1: static stand at WW x 1 min with CGA; small lateral WS with CGA - limited by R knee pain    Bed Mobility  Bed Mobility: Yes  Bed Mobility 1  Bed Mobility 1: Supine to sitting, Sitting to supine  Level of Assistance 1: Minimum assistance  Bed Mobility Comments 1: modified logroll with use of rail    Ambulation/Gait Training  Ambulation/Gait Training Performed: No (Pt leaving floor for procedure at end of session)  Transfers  Transfer: Yes  Transfer 1  Technique 1: Sit to stand, Stand to sit  Transfer Device 1: Walker  Transfer Level of Assistance 1: Minimum assistance, Minimal verbal cues  Trials/Comments 1: from elevated surface; cueing for hand placement & safety    Outcome Measures:  [facility] Basic Mobility  Turning from your back to your side while in a flat bed without using bedrails: A little  Moving from lying on your back to sitting on the side of a flat bed without using bedrails: A lot  Moving to and from bed to chair (including a wheelchair): A lot  Standing up from a chair using your arms (e.g. wheelchair or bedside chair): A lot  To walk in hospital room: A lot  Climbing 3-5 steps with railing: Total  Basic Mobility - Total Score: 12    Education Documentation  Precautions, taught by Tri Guillaume, PT at 11/30/2023  3:15 PM.  Learner: Patient  Readiness: Acceptance  Method: Explanation  Response: Verbalizes Understanding    Body Mechanics, taught by Tri Guillaume, PT at 11/30/2023  3:15 PM.  Learner:  Patient  Readiness: Acceptance  Method: Explanation  Response: Verbalizes Understanding    Mobility Training, taught by Tri Guillaume, PT at 11/30/2023  3:15 PM.  Learner: Patient  Readiness: Acceptance  Method: Explanation  Response: Verbalizes Understanding    Education Comments  No comments found.        OP EDUCATION:       Encounter Problems       Encounter Problems (Active)       PT Problem       Patient will complete supine to sit and sit to supine Independent  (Progressing)       Start:  11/17/23    Expected End:  12/01/23            Patient will perform sit<>stand transfer with Rolling Walker, and Supervision  (Progressing)       Start:  11/17/23    Expected End:  12/01/23            Patient will ambulate >100' with Rolling Walker and Supervision  (Progressing)       Start:  11/17/23    Expected End:  12/01/23            Patient will ascend/descend 3 steps with Bilateral Rails and SBA  (Not Progressing)       Start:  11/17/23    Expected End:  12/01/23

## 2023-11-30 NOTE — CARE PLAN
Problem: Infection related to problem list condition  Goal: Infection will resolve through treatment  Outcome: Progressing     Problem: Pain  Goal: My pain/discomfort is manageable  Outcome: Progressing     Problem: Safety  Goal: Patient will be injury free during hospitalization  Outcome: Progressing  Goal: I will remain free of falls  Outcome: Progressing     Problem: Daily Care  Goal: Daily care needs are met  Outcome: Progressing     Problem: Psychosocial Needs  Goal: Demonstrates ability to cope with hospitalization/illness  Outcome: Progressing  Goal: Collaborate with me, my family, and caregiver to identify my specific goals  Outcome: Progressing   The patient's goals for the shift include to be painfree    The clinical goals for the shift include Patient will remain stable and NPO after midnight.

## 2023-12-01 ENCOUNTER — APPOINTMENT (OUTPATIENT)
Dept: RADIOLOGY | Facility: HOSPITAL | Age: 73
DRG: 260 | End: 2023-12-01
Payer: MEDICARE

## 2023-12-01 ENCOUNTER — APPOINTMENT (OUTPATIENT)
Dept: CARDIOLOGY | Facility: HOSPITAL | Age: 73
DRG: 260 | End: 2023-12-01
Payer: MEDICARE

## 2023-12-01 ENCOUNTER — APPOINTMENT (OUTPATIENT)
Dept: SURGICAL ONCOLOGY | Facility: HOSPITAL | Age: 73
End: 2023-12-01
Payer: MEDICARE

## 2023-12-01 LAB
ALBUMIN SERPL BCP-MCNC: 3 G/DL (ref 3.4–5)
ANION GAP SERPL CALC-SCNC: 14 MMOL/L (ref 10–20)
BASOPHILS # BLD AUTO: 0.02 X10*3/UL (ref 0–0.1)
BASOPHILS NFR BLD AUTO: 0.5 %
BUN SERPL-MCNC: 22 MG/DL (ref 6–23)
CALCIUM SERPL-MCNC: 8.1 MG/DL (ref 8.6–10.6)
CHLORIDE SERPL-SCNC: 106 MMOL/L (ref 98–107)
CO2 SERPL-SCNC: 23 MMOL/L (ref 21–32)
CREAT SERPL-MCNC: 2.71 MG/DL (ref 0.5–1.3)
EJECTION FRACTION APICAL 4 CHAMBER: 61.9
EJECTION FRACTION: 64
EOSINOPHIL # BLD AUTO: 0.12 X10*3/UL (ref 0–0.4)
EOSINOPHIL NFR BLD AUTO: 2.8 %
ERYTHROCYTE [DISTWIDTH] IN BLOOD BY AUTOMATED COUNT: 15.3 % (ref 11.5–14.5)
GFR SERPL CREATININE-BSD FRML MDRD: 24 ML/MIN/1.73M*2
GLUCOSE BLD MANUAL STRIP-MCNC: 142 MG/DL (ref 74–99)
GLUCOSE BLD MANUAL STRIP-MCNC: 82 MG/DL (ref 74–99)
GLUCOSE BLD MANUAL STRIP-MCNC: 92 MG/DL (ref 74–99)
GLUCOSE BLD MANUAL STRIP-MCNC: 95 MG/DL (ref 74–99)
GLUCOSE SERPL-MCNC: 83 MG/DL (ref 74–99)
HCT VFR BLD AUTO: 23.5 % (ref 41–52)
HGB BLD-MCNC: 7.6 G/DL (ref 13.5–17.5)
IMM GRANULOCYTES # BLD AUTO: 0.03 X10*3/UL (ref 0–0.5)
IMM GRANULOCYTES NFR BLD AUTO: 0.7 % (ref 0–0.9)
LEFT VENTRICLE INTERNAL DIMENSION DIASTOLE: 4.39 (ref 3.5–6)
LYMPHOCYTES # BLD AUTO: 0.85 X10*3/UL (ref 0.8–3)
LYMPHOCYTES NFR BLD AUTO: 20 %
MAGNESIUM SERPL-MCNC: 1.61 MG/DL (ref 1.6–2.4)
MCH RBC QN AUTO: 28.8 PG (ref 26–34)
MCHC RBC AUTO-ENTMCNC: 32.3 G/DL (ref 32–36)
MCV RBC AUTO: 89 FL (ref 80–100)
MONOCYTES # BLD AUTO: 0.37 X10*3/UL (ref 0.05–0.8)
MONOCYTES NFR BLD AUTO: 8.7 %
NEUTROPHILS # BLD AUTO: 2.87 X10*3/UL (ref 1.6–5.5)
NEUTROPHILS NFR BLD AUTO: 67.3 %
NRBC BLD-RTO: 0 /100 WBCS (ref 0–0)
PHOSPHATE SERPL-MCNC: 4.7 MG/DL (ref 2.5–4.9)
PLATELET # BLD AUTO: 92 X10*3/UL (ref 150–450)
POTASSIUM SERPL-SCNC: 3.6 MMOL/L (ref 3.5–5.3)
RBC # BLD AUTO: 2.64 X10*6/UL (ref 4.5–5.9)
RIGHT VENTRICLE PEAK SYSTOLIC PRESSURE: 39.1
SODIUM SERPL-SCNC: 139 MMOL/L (ref 136–145)
WBC # BLD AUTO: 4.3 X10*3/UL (ref 4.4–11.3)

## 2023-12-01 PROCEDURE — 93308 TTE F-UP OR LMTD: CPT | Performed by: INTERNAL MEDICINE

## 2023-12-01 PROCEDURE — 1200000002 HC GENERAL ROOM WITH TELEMETRY DAILY

## 2023-12-01 PROCEDURE — 2500000004 HC RX 250 GENERAL PHARMACY W/ HCPCS (ALT 636 FOR OP/ED)

## 2023-12-01 PROCEDURE — 83735 ASSAY OF MAGNESIUM: CPT

## 2023-12-01 PROCEDURE — 99232 SBSQ HOSP IP/OBS MODERATE 35: CPT | Performed by: INTERNAL MEDICINE

## 2023-12-01 PROCEDURE — 2500000001 HC RX 250 WO HCPCS SELF ADMINISTERED DRUGS (ALT 637 FOR MEDICARE OP)

## 2023-12-01 PROCEDURE — 94760 N-INVAS EAR/PLS OXIMETRY 1: CPT

## 2023-12-01 PROCEDURE — 85025 COMPLETE CBC W/AUTO DIFF WBC: CPT

## 2023-12-01 PROCEDURE — 82947 ASSAY GLUCOSE BLOOD QUANT: CPT

## 2023-12-01 PROCEDURE — 71046 X-RAY EXAM CHEST 2 VIEWS: CPT | Performed by: RADIOLOGY

## 2023-12-01 PROCEDURE — 96372 THER/PROPH/DIAG INJ SC/IM: CPT

## 2023-12-01 PROCEDURE — 93308 TTE F-UP OR LMTD: CPT

## 2023-12-01 PROCEDURE — 80069 RENAL FUNCTION PANEL: CPT

## 2023-12-01 PROCEDURE — 71046 X-RAY EXAM CHEST 2 VIEWS: CPT

## 2023-12-01 PROCEDURE — 36415 COLL VENOUS BLD VENIPUNCTURE: CPT

## 2023-12-01 RX ORDER — LIDOCAINE HYDROCHLORIDE 10 MG/ML
5 INJECTION INFILTRATION; PERINEURAL ONCE
Status: CANCELLED | OUTPATIENT
Start: 2023-12-01 | End: 2023-12-01

## 2023-12-01 RX ORDER — BISACODYL 5 MG
10 TABLET, DELAYED RELEASE (ENTERIC COATED) ORAL ONCE
Status: COMPLETED | OUTPATIENT
Start: 2023-12-01 | End: 2023-12-01

## 2023-12-01 RX ADMIN — ROSUVASTATIN CALCIUM 10 MG: 10 TABLET, FILM COATED ORAL at 20:44

## 2023-12-01 RX ADMIN — BENZOCAINE AND MENTHOL 1 LOZENGE: 15; 3.6 LOZENGE ORAL at 20:48

## 2023-12-01 RX ADMIN — AMLODIPINE BESYLATE 10 MG: 10 TABLET ORAL at 20:44

## 2023-12-01 RX ADMIN — INSULIN GLARGINE 10 UNITS: 100 INJECTION, SOLUTION SUBCUTANEOUS at 22:52

## 2023-12-01 RX ADMIN — HEPARIN SODIUM 5000 UNITS: 5000 INJECTION INTRAVENOUS; SUBCUTANEOUS at 17:30

## 2023-12-01 RX ADMIN — BENZOCAINE AND MENTHOL 1 LOZENGE: 15; 3.6 LOZENGE ORAL at 04:15

## 2023-12-01 RX ADMIN — CEFAZOLIN SODIUM 2 G: 2 INJECTION, SOLUTION INTRAVENOUS at 04:04

## 2023-12-01 RX ADMIN — FINASTERIDE 5 MG: 5 TABLET, FILM COATED ORAL at 09:44

## 2023-12-01 RX ADMIN — METOPROLOL SUCCINATE 25 MG: 25 TABLET, EXTENDED RELEASE ORAL at 09:44

## 2023-12-01 RX ADMIN — HEPARIN SODIUM 5000 UNITS: 5000 INJECTION INTRAVENOUS; SUBCUTANEOUS at 09:44

## 2023-12-01 RX ADMIN — PERFLUTREN 3 ML OF DILUTION: 6.52 INJECTION, SUSPENSION INTRAVENOUS at 13:12

## 2023-12-01 RX ADMIN — OXYCODONE HYDROCHLORIDE 5 MG: 5 TABLET ORAL at 04:15

## 2023-12-01 RX ADMIN — TAMSULOSIN HYDROCHLORIDE 0.8 MG: 0.4 CAPSULE ORAL at 20:44

## 2023-12-01 RX ADMIN — POLYETHYLENE GLYCOL 3350 17 G: 17 POWDER, FOR SOLUTION ORAL at 09:44

## 2023-12-01 RX ADMIN — CEFAZOLIN SODIUM 2 G: 2 INJECTION, SOLUTION INTRAVENOUS at 16:17

## 2023-12-01 RX ADMIN — BISACODYL 10 MG: 5 TABLET, COATED ORAL at 09:46

## 2023-12-01 RX ADMIN — HEPARIN SODIUM 5000 UNITS: 5000 INJECTION INTRAVENOUS; SUBCUTANEOUS at 04:04

## 2023-12-01 ASSESSMENT — COGNITIVE AND FUNCTIONAL STATUS - GENERAL
EATING MEALS: A LITTLE
MOVING FROM LYING ON BACK TO SITTING ON SIDE OF FLAT BED WITH BEDRAILS: A LITTLE
DRESSING REGULAR UPPER BODY CLOTHING: A LOT
PERSONAL GROOMING: A LITTLE
DAILY ACTIVITIY SCORE: 14
MOVING TO AND FROM BED TO CHAIR: A LOT
TOILETING: A LOT
STANDING UP FROM CHAIR USING ARMS: A LOT
DRESSING REGULAR LOWER BODY CLOTHING: A LOT
HELP NEEDED FOR BATHING: A LOT
TURNING FROM BACK TO SIDE WHILE IN FLAT BAD: A LOT
CLIMB 3 TO 5 STEPS WITH RAILING: TOTAL
WALKING IN HOSPITAL ROOM: A LOT
MOBILITY SCORE: 12

## 2023-12-01 ASSESSMENT — PAIN - FUNCTIONAL ASSESSMENT
PAIN_FUNCTIONAL_ASSESSMENT: 0-10
PAIN_FUNCTIONAL_ASSESSMENT: 0-10

## 2023-12-01 ASSESSMENT — PAIN SCALES - GENERAL
PAINLEVEL_OUTOF10: 0 - NO PAIN

## 2023-12-01 NOTE — PROGRESS NOTES
"   Subjective     Overnight events: NATALIO  Pt was seen and examined at bedside. Pt stated not having a BM since Tuesday.   denies any chest pain, SOB, dyspnea, new cough, palpitations, nausea, vomiting, diarrhea, dizziness, changes in visions, headaches, or worsening lower extremity swelling. Pt is comfortable in bed and in no acute distress.     Vital signs:  Blood pressure 138/70, pulse 83, temperature 36 °C (96.8 °F), temperature source Temporal, resp. rate 16, height 1.88 m (6' 2.02\"), weight 112 kg (247 lb 12.8 oz), SpO2 96 %.    I/O last 3 completed shifts:  In: 1000 (8.9 mL/kg) [P.O.:600; IV Piggyback:400]  Out: 2375 (21.1 mL/kg) [Urine:2325 (0.6 mL/kg/hr); Blood:50]  Weight: 112.4 kg     Physical Exam  GENERAL:  In no acute distress  NEUROLOGIC:  A and O x 3. Cranial nerves 2 through 12 grossly intact with no gait disturbances. Intact motor and sensory systems, with normal reflexes and coordination.    HEENT:  Pupils are equal, round, and reactive to light and accommodation. Extraocular motion intact.    MOUTH: MMM, no lesions observed  CARDIAC: S1 + S2, RRR, no M/R/G.    LUNGS: CTA BL.  No wheezes or coarse breath sounds. Symmetric respirations. No increased work of breathing.   ABDOMEN: Soft, non-tender to palpation. No organomegaly. Normal BS in 4Q, No rebound or guarding.  EXTREMITIES:  Moving x4 equally and spontaneously. Right knee with mild effusion present but no erythema or warmth. Chronic limited ROM but not worse than baseline. No BLE edema. Painless non-reducible soft tissue mass in the left groin.  SKIN: Clean, warm, dry, and intact with normal skin turgor.  PSYCH: Appropriate mood and behavior.     Relevant Results  Scheduled medications  amLODIPine, 10 mg, oral, q PM  ceFAZolin in dextrose (iso-os), 2 g, intravenous, q12h  finasteride, 5 mg, oral, Daily  heparin (porcine), 5,000 Units, subcutaneous, q8h  insulin glargine, 10 Units, subcutaneous, Nightly  insulin lispro, 0-5 Units, subcutaneous, " TID with meals  melatonin, 5 mg, oral, Nightly  metoprolol succinate XL, 25 mg, oral, Daily  polyethylene glycol, 17 g, oral, Daily  rosuvastatin, 10 mg, oral, Nightly  sennosides, 1 tablet, oral, Nightly  tamsulosin, 0.8 mg, oral, Nightly      Continuous medications     PRN medications  PRN medications: acetaminophen, benzocaine-menthol, dextrose 10 % in water (D10W), dextrose, glucagon, ondansetron, oxyCODONE      Labs:  Last CBC:  Lab Results   Component Value Date    WBC 4.3 (L) 12/01/2023    HGB 7.6 (L) 12/01/2023    HCT 23.5 (L) 12/01/2023    MCV 89 12/01/2023    PLT 92 (L) 12/01/2023       Last RFP:  Lab Results   Component Value Date    GLUCOSE 83 12/01/2023    CALCIUM 8.1 (L) 12/01/2023     12/01/2023    K 3.6 12/01/2023    CO2 23 12/01/2023     12/01/2023    BUN 22 12/01/2023    CREATININE 2.71 (H) 12/01/2023       Last LFTs:  Lab Results   Component Value Date    ALT <3 (L) 11/17/2023    AST 11 11/17/2023    ALKPHOS 94 11/17/2023    BILITOT 0.3 11/17/2023       Last coags:  Lab Results   Component Value Date    INR 1.2 (H) 11/30/2023    APTT 36 11/30/2023       Micro/culture data:  Susceptibility data from last 90 days.  Collected Specimen Info Organism Clindamycin Erythromycin Oxacillin Tetracycline Trimethoprim/Sulfamethoxazole Vancomycin   11/22/23 Tissue/Biopsy from Other (specify in comments) Methicillin Susceptible Staphylococcus aureus (MSSA) S S S S S S   11/22/23 Tissue/Biopsy from Other (specify in comments) Methicillin Susceptible Staphylococcus aureus (MSSA) S S S S S S   11/14/23 Swab from Anterior Nares Methicillin Resistant Staphylococcus aureus (MRSA)         11/14/23 Blood culture from Peripheral Venipuncture Methicillin Susceptible Staphylococcus aureus (MSSA) S S S S S S       Imaging:  Electrophysiology procedure  Leadless pacemaker implantation    Procedures:  Leadless PPM Implant (58416-52), Ultrasound Guided vascular access (14127)    Patient history:  Please refer to  the detailed history and physical on the patient's medical   chart.     Procedure narrative:  The patient was in the fasting state. A baseline ECG was recorded. The   patient was set up for continuous monitoring of surface 12 lead ECG and   pulse oximetry. Blood pressure was monitored with automatic cuff   measurements. The procedure was performed under IV conscious sedation   supplemented with intermittent moderate sedation. Bilateral groins were   clipped, prepped, and draped in the usual sterile fashion.  The Right common femoral vein was evaluated with ultrasound, it was normal   in size and anatomy.  The vein was accessed using ultrasound guidance and   a 18G Cook needle, with direct visualization of the needle at all times.   Images were saved for the vein access. A 8F sheath was advanced into the   vein.   Long J-tip stiff Amplatz wire was attempted to be advanced to the SVC   however we were unable to pass into the right atrium up to the SVC.  We   initially used a 5F Kumpe catheter over the wire to redirect the catheter   but were unable to and the wire would only advance to the RV but not up to   the SVC via the RA.  It appeared that the entry point of the IVC into the   RA was very posterior and angled which is making the wire deflection into   the right orientation difficult.  We then switched our a stiff Amplatz for   a 0.35 J-tip glide catheter which was also again unsuccessful.  The glide   catheter was exchanged back for the stiff Amplatz wire and the Amplatz   wire was advanced up to the right atrium as far as it would go.    After dilating with 20F natasha dilator the venotomy was dilated and then   the dilator was exchanged for the the 23F Medtronic introducer sheath.  Over the stiff wire the sheath was then advanced up to the right atrium   while carefully monitoring the wire position.  The wire and dilator were   pulled back while the sheath was advanced to that area of the inner   dilator.  We  were unable to get blood back and the concern was that the   sheath was against myocardium and at this point the sheath was brought   back.  Through the 23 F micro introducer sheath a long Buck 6F cath   catheter was advanced over the wire.  Wire was then advanced into the RV   and up the outflow tract and this was used as well to guide the sheath   further up over its inner dilator after the 6F catheter was removed.  5000 units of IV heparin were administered  The Micra delivery sheath system was introduced through the sheath and   after on sheath and with the outer sheath we were able to deflect down.    With significant difficulty the sheath was able to cross the tricuspid   valve in an appropriate plane getting down closer to the apex of the RV.    This required multiple maneuvering steps and multiple manipulations to   allow us to get a septal location of the final position.  Under fluoroscopic guidance, the leadless pacemaker was advanced to the   heart. With CAR and [place] fluoroscopy, the pacemaker was advanced to the   midseptum.   This was not easy but after few trials we successfully   crossed the valve.    Appropriate electrical measurements were obtained. Cine documented   appropriate deployment of the leadless pacemaker.  A tug-test was done at this time, under Cine fluoroscopy 2/4 tines were   noted to flex with the tug. At this time the anchor suture was cut, and   removed under fluoroscopy. The delivery sheath was then removed.  Electrical measurements were assessed and were appropriate.  The sheath was then removed. Hemostasis was achieved with figure of 8   stitch. Manual pressure was applied.    Final Implant Settings:  : Medtronic  Device: Leadless pacemaker (Serial #[id number])  Lead Parameters  RVent: 570ohms, R wave 4.8mV, threshold 103V@0.4msec  Pacing  Mode: VVI Lower rate 50bpm        Post implant device parameters scanned into the system    Summary:  Successful leadless  pacemaker implant Medtronic (MICRA AV)    Recommendations:  Will go back to his bed, as long as device parameters and PA lateral x-ray   are within acceptable limits patient will will be cleared to discharge   from EP perspective after all his arrangements are made for outpatient   antibiotic therapy as per ID   Bedrest for 4hours post-sheath removal  Right femoral vein was closed figure of 8 will be removed tomorrow.  A PA-lateral chest X-ray should be performed and telemetry monitoring   continued for 24 hours.   A 12 lead ECG should be performed prior to discharge from the hospital.   The patient should continue with the present medications.     Patient Instructions:  No driving, alcohol or making legal decisions for 24 hours.  Remove band-aid/tegaderm in 1 day.  No heavy lifting, strenuous exercise for 1 week  Please call our office if you notice any groin discharge or swelling or   fever.   For cardiology electrophysiology emergencies (such as severe heart racing,   bleeding, severe groin pain/swelling, high fever, wound discharge, stroke   symptoms, or recent severe chest pain)     Follow up:   Follow-up with Dr. Aguilar within 4 weeks for a Device check or as scheduled.    See complete procedural log and parameters.   US guided biopsy lymph node superficial  Narrative: Interpreted By:  Mao Dubose and Calo Sean-Matthew   STUDY:  US GUIDED BIOPSY LYMPH NODE SUPERFICIAL;  11/29/2023 12:23 pm      INDICATION:  Signs/Symptoms:Lymphadenopathy.      COMPARISON:  CT abdomen pelvis 10/02/2023      ACCESSION NUMBER(S):  [id number]      ORDERING CLINICIAN:  ASHLEY RAYMUNDO      TECHNIQUE:  INTERVENTIONALIST(S):  MD Ponce Moreira MD      CONSENT:  The patient was informed of the nature of the proposed procedure. The  purposes, alternatives, risks, and benefits were explained and  discussed. All questions were answered and consent was obtained.      SEDATION:  Moderate conscious IV sedation services (supervision  of  administration, induction, and maintenance) were provided by the  physician performing the procedure with intravenous fentanyl 50mcg  and versed 1mg for 12 minutes. The physician was assisted by an  independent trained observer, an interventional radiology nurse, in  the continuous monitoring of patient level of consciousness and  physiologic status.      MEDICATION/CONTRAST:  No additional.      TIME OUT:  A time out was performed immediately prior to procedure start with  the interventional team, correctly identifying the patient name, date  of birth, MRN, procedure, anatomy (including marking of site and  side), patient position, procedure consent form, relevant laboratory  and imaging test results, antibiotic administration, safety  precautions, and procedure-specific equipment needs.      COMPLICATIONS:  No immediate adverse events identified.      FINDINGS:  The patient was placed in the supine position. Limited sonographic  images of the left inguinal region were obtained for purposes of  needle guidance, which demonstrated an enlarged left inguinal lymph  node. The area of concern was prepped and draped under sterile  technique.      1% lidocaine was injected subcutaneously and then into the deeper  tissues surrounding the targeted area for biopsy. An 18 gauge core  biopsy needle was passed via a 17 gauge coaxial introducer needle to  obtain a total of 3 core samples.      Postprocedure images demonstrate no evidence for hemorrhage. The  patient tolerated the procedure well and there were no immediate  complications. Specimen(s) sent to pathology.      Impression: Status post successful ultrasound guided core needle biopsy of left  inguinal lymph node. Specimen(s) Sent to pathology.      I was present for and/or performed the critical portions of the  procedure and immediately available throughout the entire procedure.      I personally reviewed the images/study and I agree with the findings  as stated  by Thao Dela Cruz MD. This study was interpreted at  [facility], [place], [place].      Performed and dictated at [facility].      MACRO:  None.      Signed by: Mao Dubose 11/30/2023 4:04 PM  Dictation workstation:   [id number]      Assessment/Plan   Mr. Chester Verduzco is a 73 year old male with PMHX HTN, HLD, Dual chamber PPM implanted in 11/1996 for SSS (RV is passive) with most recent generator replacement in 11/2020 ([employer] - [employer] MRI), Type 2 DM, gout, and recent hospitalization (10/2-10/7) for Staph bacteremia, diarrhea, NOHELIA , and hyponatremia 2/2 hypovolemia who presented to [facility] ED from a SNF on 11/13 for NOHELIA on labs from the SNF and oliguria. Blood cultures were positive for MSSA and previously had been positive 10/8 and 11/10 for MSSA. Blood cultures with NGTD as of 11/16.  FILIPE 11/16 showed tricuspid valve vegetation as well as right atrial lead vegetation. Patient has been afebrile without leukocytosis and hemodynamically stable. Device Interrogated 11/17 with AP 29% and  30%.  Lead extraction complete 11/22.  Patient on IV antibiotics with stop 4 weeks after lead removal (~12/20). After lead removal, on telemetry the patient had several episodes of intermittent asymptomatic high-grade AV block, NSVT,and bradycardia. EP plans to implant PPM after 14 days of negative blood culture post removal per, ID. Patient received a unit of packed red blood cells for low hemoglobin (11/25). Hemoglobin has remained stable with no signs or symptoms of bleeding. Home lasix and losartan were being held in setting of NOHELIA 2/2 to nephrotoxic antibiotic exposure which down trending but not at baseline with adequate urine output. Patient has maldonado I/s/o severe BPH. Of note, Patient noticed left groin lymphadenopathy and U/S (11/23) showed Enlarged left inguinal/external iliac lymph nodes. PET scan deferred at this time d/t active  "infectious process. Underwent core needle biopsy on 11/29. Patient to follow up biopsy results with surgical oncology. Device re-implant (micra leadless pacemaker) 11/30, with plan for PICC placement after placement.    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    -Given asymptomatic episodes of NSVT, bradycardia and sinus pauses noted on tele, patient needs to be inpatient and monitored on tele until placement of PPM  - Daily CBC Trend WBC, no leukocytosis  --Monitor CBC, BMP, ESR and CRP weekly and fax to [phone] Attn Dr Gonzalez  -ID will follow up as outpatient with Dr Gonzalez (~1 week prior ending abx course)  -Metoprolol succ 25 mg daily while patient is without a pacemaker  -Per EP note: \"Based on HRS 2017 Consensus statement, it is reasonable to re-implant after 72hr of negative blood cultures as long as CIED indications remain (for this pt indication is intermittent AV block), discussed the recommendations with ID and they were in agreement so we will plan for device re-implant (micra leadless pacemaker) on Thursday 11/30/23 and further given that post procedure FILIPE (at end of case) did not show any valve vegetations it helps decrease our concerns about recurrent infection with re-implant\"   "   #NOHELIA, improving  :likely ATN given gradual rise in creatinine over past month and history of recent diarrhea and nephrotoxic antibiotic exposure. Renal U/S did not show any hydronephrosis.  - Baseline prior to October 2023 hospitalization ~1.0-1.1.   - Elevated to 5.43 on arrival to [facility] on 11/13, down trending  - Initial urine electrolytes showed Fena 2.5% indicating intrinsic etiology, Normal C3 and C4  - Nephrology signed off, will reengage  if Cr starts to uptrend   - Rojo replaced (11/17) ,strict I&Os, No indication for HD at this time  - Daily RFP  - Replete lytes as needed  -Encourage PO intake    #Inguinal lymphadenopathy  -Patient has endorsed left sided non tender lymphadenopathy for one month   - U/S (11/23) showed Enlarged left inguinal/external iliac lymph nodes measuring up to  5.7 cm x 3.3 cm x 4.4 cm with internal hypervascularity which were  visualized on the CT dated 10/02/2023 and not definitively seen dating back to 2015.  -Given state of infection, could be reactive or concerning for a lymphoproliferative disorder or metastatic disease from left lower extremity malignancy  -PET scan deferred at this time d/t active infectious process. Surg onc consulted  -Patient to follow up with Dr. Cameron (Surgical oncology) for further discussion and evaluation of left inguinal lymphadenopathy   -Underwent core biopsy for inguinal lymphadenopathy with IR 11/29  -Referral to diagnostic clinic for outpatient follow up  -F/up biopsy results    #Type 2 DM  - Checking A1c  - On Lantus 10 units QHS and cover with SSI,  on lantus 5 while NPO  - hypoglycemia protocol    #HTN  #HLD  - Holding home losartan and lasix in setting of NOHELIA  - Continue Amlodipine 10, metop succinate 25 daily, and rosuvastatin 10 daily      #BPH  - Was initially planned for outpatient robotic prostatectomy end of November that has since been canceled and rescheduled for December  - Continue with Flomax 0.8 mg QHS and finasteride 5 md  daily  - Continue maldonado     #Anemia  :Patient with slowly down trending Hgb from since this admission. Baseline 13-14. Stable this AM  -Received 1 unit pRBC (11/25) for Hgb 6.8 with appropriate increase to 7.4  -No acute signs or symptoms of bleeding at this time  - Will continue to monitor and transfuse for Hgb <7      F: As needed  E: AS needed  N: Adult; Carb Controlled  A:  PIV, Maldonado new as of 11/17  DVT ppx: Heparin subcutaneous, Holding for procedure  GI: N/A  Code Status: Full code  NOK: Charlene Pardo (friend) [phone]      Principal Problem:    MSSA bacteremia  Active Problems:    Endocarditis    History of general anesthesia    Complete atrioventricular block (CMS/HCC)    Intermittent complete atrioventricular block (CMS/HCC)         Guanaco Maharaj MD   Internal Medicine PGY-I

## 2023-12-01 NOTE — PROGRESS NOTES
Transitional Care Coordination Progress Note:  Patient discussed during interdisciplinary rounds.   Team members present: Hector Momin RN TCC, [facility] Team, Nurse Manager  Plan per Medical/Surgical team: EP recs, s/p Micra placement 11/30, device interrogation, s/p device removal, ECHO, PT/OT, ID recs, IV antibiotics, PICC placement, s/p core biopsy, monitor Hgb, staple removal 12/04  Payer: Medicare/ [employer]  Status: inpatient  Discharge disposition: Return to [facility]  Potential Barriers: None  ADOD: 12/04  This TCC spoke with admissions at [facility] regarding appointment Monday at [facility] for staple removal. Per facility, they are unable to ensure transport to [facility] for Monday. Facility states patient should have staples removed at [facility] prior to DC to [facility]. This TCC notified team patient is unable to go to facility until Monday 12/04. Bedside nurse also notified. Patient down in ECHO and this TCC unable to notifiy patient.   Hector Momin RN Transitional Care Coordinator [phone]

## 2023-12-01 NOTE — CARE PLAN
The patient's goals for the shift include to be painfree    The clinical goals for the shift include Patient to have no new signs of infection    Over the shift, the patient did  Problem: Infection related to problem list condition  Goal: Infection will resolve through treatment  Outcome: Progressing     Problem: Pain  Goal: My pain/discomfort is manageable  Outcome: Progressing     Problem: Safety  Goal: Patient will be injury free during hospitalization  Outcome: Progressing  Goal: I will remain free of falls  Outcome: Progressing     Problem: Daily Care  Goal: Daily care needs are met  Outcome: Progressing     Problem: Psychosocial Needs  Goal: Demonstrates ability to cope with hospitalization/illness  Outcome: Progressing  Goal: Collaborate with me, my family, and caregiver to identify my specific goals  Outcome: Progressing     Problem: Discharge Barriers  Goal: My discharge needs are met  Outcome: Progressing     Problem: Skin  Goal: Prevent/minimize sheer/friction injuries  Outcome: Progressing    make progress toward the following goals.

## 2023-12-01 NOTE — PROGRESS NOTES
Subjective Data:   None     Overnight Events:    None      Objective Data:  Last Recorded Vitals:  Vitals:    11/30/23 2052 11/30/23 2335 12/01/23 0403 12/01/23 0936   BP: 134/69 134/71 132/69 138/70   BP Location: Right arm Right arm Right arm Right arm   Patient Position: Lying Lying Lying Lying   Pulse: 81 76 84 83   Resp: 16 17 18 16   Temp: 36.2 °C (97.2 °F) 36.1 °C (97 °F) 36.3 °C (97.3 °F) 36 °C (96.8 °F)   TempSrc: Temporal Temporal Temporal Temporal   SpO2: 98% 95% 97% 96%   Weight:   112 kg (247 lb 12.8 oz)    Height:           Last Labs:  CBC - 12/1/2023:  6:53 AM  4.3 7.6 92    23.5      CMP - 12/1/2023:  6:53 AM  8.1 5.6; 5.6 11 --- 0.3   4.7 3.0 <3 94      PTT - 11/30/2023:  7:14 AM  1.2   13.9 36     TROPHS   Date/Time Value Ref Range Status   09/18/2023 05:52 PM 15 0 - 20 ng/L Final     Comment:     .  Less than 99th percentile of normal range cutoff-  Female and children under 18 years old <14 ng/L; Male <21 ng/L: Negative  Repeat testing should be performed if clinically indicated.   .  Female and children under 18 years old 14-50 ng/L; Male 21-50 ng/L:  Consistent with possible cardiac damage and possible increased clinical   risk. Serial measurements may help to assess extent of myocardial damage.   .  >50 ng/L: Consistent with cardiac damage, increased clinical risk and  myocardial infarction. Serial measurements may help assess extent of   myocardial damage.   .   NOTE: Children less than 1 year old may have higher baseline troponin   levels and results should be interpreted in conjunction with the overall   clinical context.   .  NOTE: Troponin I testing is performed using a different   testing methodology at [facility] than at other   [facility] hospitals. Direct result comparisons should only   be made within the same method.       BNP   Date/Time Value Ref Range Status   10/02/2023 01:07 PM 97 0 - 99 pg/mL Final     HGBA1C   Date/Time Value Ref Range Status   11/17/2023 10:14 AM  6.6 see below % Final   08/11/2023 12:59 PM 9.9 % Final     Comment:          Diagnosis of Diabetes-Adults   Non-Diabetic: < or = 5.6%   Increased risk for developing diabetes: 5.7-6.4%   Diagnostic of diabetes: > or = 6.5%  .       Monitoring of Diabetes                Age (y)     Therapeutic Goal (%)   Adults:          >18           <7.0   Pediatrics:    13-18           <7.5                   7-12           <8.0                   0- 6            7.5-8.5   American Diabetes Association. Diabetes Care 33(S1), Jan 2010.     02/07/2023 09:07 AM 8.4 % Final     Comment:          Diagnosis of Diabetes-Adults   Non-Diabetic: < or = 5.6%   Increased risk for developing diabetes: 5.7-6.4%   Diagnostic of diabetes: > or = 6.5%  .       Monitoring of Diabetes                Age (y)     Therapeutic Goal (%)   Adults:          >18           <7.0   Pediatrics:    13-18           <7.5                   7-12           <8.0                   0- 6            7.5-8.5   American Diabetes Association. Diabetes Care 33(S1), Jan 2010.       VLDL   Date/Time Value Ref Range Status   08/11/2023 12:59 PM 57 0 - 40 mg/dL Final   08/10/2022 11:09 AM 33 0 - 40 mg/dL Final   08/19/2020 12:17 PM 62 0 - 40 mg/dL Final      Last I/O:  I/O last 3 completed shifts:  In: 1000 (8.9 mL/kg) [P.O.:600; IV Piggyback:400]  Out: 2375 (21.1 mL/kg) [Urine:2325 (0.6 mL/kg/hr); Blood:50]  Weight: 112.4 kg     Past Cardiology Tests (Last 3 Years):  EKG:  Electrocardiogram, 12-lead PRN ACS symptoms 11/27/2023      Electrocardiogram, 12-lead PRN ACS symptoms 11/27/2023      Electrocardiogram, 12-lead PRN ACS symptoms 11/27/2023      ECG 12 Lead 11/17/2023      ECG 12 Lead     Echo:  Anesthesia Intraoperative Transesophageal Echocardiogram 11/22/2023      Transesophageal Echo (FILIPE) 11/16/2023      Transthoracic Echo (TTE) Complete 11/14/2023    Ejection Fractions:  EF   Date/Time Value Ref Range Status   11/14/2023 03:22 PM 63       Cath:  No results found for  this or any previous visit from the past 1095 days.    Stress Test:  No results found for this or any previous visit from the past 1095 days.    Cardiac Imaging:  No results found for this or any previous visit from the past 1095 days.      Inpatient Medications:  Scheduled medications   Medication Dose Route Frequency    amLODIPine  10 mg oral q PM    ceFAZolin in dextrose (iso-os)  2 g intravenous q12h    finasteride  5 mg oral Daily    heparin (porcine)  5,000 Units subcutaneous q8h    insulin glargine  10 Units subcutaneous Nightly    insulin lispro  0-5 Units subcutaneous TID with meals    melatonin  5 mg oral Nightly    metoprolol succinate XL  25 mg oral Daily    polyethylene glycol  17 g oral Daily    rosuvastatin  10 mg oral Nightly    sennosides  1 tablet oral Nightly    tamsulosin  0.8 mg oral Nightly     PRN medications   Medication    acetaminophen    benzocaine-menthol    dextrose 10 % in water (D10W)    dextrose    glucagon    ondansetron    oxyCODONE     Continuous Medications   Medication Dose Last Rate       Physical Exam:  General Appearance:    Alert, cooperative, no distress, appears stated age  Lungs:   Clear to auscultation bilaterally, respirations unlabored  Heart:     Regular rate and rhythm, S1 and S2 normal, no murmur, rub  or gallop. Wound is clean without erythema or collection, open to air, mild-moderate tenderness.  Abdomen:  Soft, non-tender, bowel sounds active all four quadrants, no masses, no organomegaly  Extremities: Extremities normal, atraumatic, no cyanosis or edema        Assessment/Plan   Luba is a 73 y.o. male with a PMXH of HTN, HLD, Dual chamber PPM implanted in 11/1996 for SSS (RV is passive) with most recent generator replacement in 11/2020 ([employer] - [employer] MRI), Type 2 DM, gout, and recent hospitalization (10/2-10/7) for Staph bacteremia.  Admitted with persistent staph bacteremia in the setting of RA lead vegetation and TV lead vegetation, device explantation a  weeks ago, sill have the staples.   S/p MICRA implantation on 11/30.      Plan:   - Figure of 8 is removed today. Groin looks fine.   - CXR is  pending for device position.   - Had trival effusion during the case, echo today is pending.   -  AB per ID, will need PICC for IV AB.   - will need the staples to be removed on Monday 12/4, I will remove it if he is still here, but if he leave then we need to make sure that he will be able to go to [facility] to remove it on Monday ( he has a an appointment) Per  the SNF is not going to pay for his trip.    Peripheral IV 11/27/23 20 G Left;Posterior Forearm (Active)   Site Assessment Clean;Dry 12/01/23 0800   Dressing Status Clean;Dry 12/01/23 0800   Number of days: 4       Urethral Catheter (Active)   Output (mL) 300 mL 12/01/23 0636   Number of days: 14       Code Status:  Full Code      Heena Holbrook MD    I saw and evaluated the patient. I personally obtained the key and critical portions of the history and physical exam or was physically present for key and critical portions performed by the resident/fellow. I reviewed the resident/fellow's documentation and discussed the patient with the resident/fellow. I agree with the resident/fellow's medical decision making as documented in the note, and my edits (bold and italic) have been made to the document as needed.     Etienne Aguilar MD [facility]  Cardiac Electrophysiology    **Disclaimer: This note was dictated by speech recognition, and every effort has been made to prevent any error in transcription, however minor errors may be present**

## 2023-12-01 NOTE — CARE PLAN
The patient's goals for the shift include to be painfree    The clinical goals for the shift include Patient to have no oozing from sites    Sites had no oozing or bleeding.       Problem: Pain  Goal: My pain/discomfort is manageable  Outcome: Progressing     Problem: Skin  Goal: Prevent/minimize sheer/friction injuries  Outcome: Progressing  Flowsheets (Taken 12/1/2023 1720)  Prevent/minimize sheer/friction injuries: Turn/reposition every 2 hours/use positioning/transfer devices

## 2023-12-01 NOTE — PROGRESS NOTES
Physical Therapy                 Therapy Communication Note    Patient Name: Chester Verduzco  MRN: [id number]  Today's Date: 12/1/2023     Discipline: Physical Therapy    Missed Visit Reason: Missed Visit Reason:  (Patient unavailable due to being off the floor at echo. Will reattempt as schedule permits)    Missed Time: Attempt

## 2023-12-02 ENCOUNTER — APPOINTMENT (OUTPATIENT)
Dept: RADIOLOGY | Facility: HOSPITAL | Age: 73
DRG: 260 | End: 2023-12-02
Payer: MEDICARE

## 2023-12-02 LAB
ALBUMIN SERPL BCP-MCNC: 3.1 G/DL (ref 3.4–5)
ANION GAP SERPL CALC-SCNC: 13 MMOL/L (ref 10–20)
BASOPHILS # BLD AUTO: 0.02 X10*3/UL (ref 0–0.1)
BASOPHILS NFR BLD AUTO: 0.5 %
BUN SERPL-MCNC: 20 MG/DL (ref 6–23)
CALCIUM SERPL-MCNC: 8.2 MG/DL (ref 8.6–10.6)
CHLORIDE SERPL-SCNC: 105 MMOL/L (ref 98–107)
CO2 SERPL-SCNC: 23 MMOL/L (ref 21–32)
CREAT SERPL-MCNC: 2.45 MG/DL (ref 0.5–1.3)
EOSINOPHIL # BLD AUTO: 0.13 X10*3/UL (ref 0–0.4)
EOSINOPHIL NFR BLD AUTO: 3.3 %
ERYTHROCYTE [DISTWIDTH] IN BLOOD BY AUTOMATED COUNT: 15.4 % (ref 11.5–14.5)
GFR SERPL CREATININE-BSD FRML MDRD: 27 ML/MIN/1.73M*2
GLUCOSE BLD MANUAL STRIP-MCNC: 117 MG/DL (ref 74–99)
GLUCOSE BLD MANUAL STRIP-MCNC: 87 MG/DL (ref 74–99)
GLUCOSE BLD MANUAL STRIP-MCNC: 92 MG/DL (ref 74–99)
GLUCOSE BLD MANUAL STRIP-MCNC: 98 MG/DL (ref 74–99)
GLUCOSE SERPL-MCNC: 78 MG/DL (ref 74–99)
HCT VFR BLD AUTO: 24.2 % (ref 41–52)
HGB BLD-MCNC: 8 G/DL (ref 13.5–17.5)
IMM GRANULOCYTES # BLD AUTO: 0.02 X10*3/UL (ref 0–0.5)
IMM GRANULOCYTES NFR BLD AUTO: 0.5 % (ref 0–0.9)
LYMPHOCYTES # BLD AUTO: 1.08 X10*3/UL (ref 0.8–3)
LYMPHOCYTES NFR BLD AUTO: 27.1 %
MAGNESIUM SERPL-MCNC: 1.6 MG/DL (ref 1.6–2.4)
MCH RBC QN AUTO: 29.4 PG (ref 26–34)
MCHC RBC AUTO-ENTMCNC: 33.1 G/DL (ref 32–36)
MCV RBC AUTO: 89 FL (ref 80–100)
MONOCYTES # BLD AUTO: 0.35 X10*3/UL (ref 0.05–0.8)
MONOCYTES NFR BLD AUTO: 8.8 %
NEUTROPHILS # BLD AUTO: 2.38 X10*3/UL (ref 1.6–5.5)
NEUTROPHILS NFR BLD AUTO: 59.8 %
NRBC BLD-RTO: 0 /100 WBCS (ref 0–0)
PHOSPHATE SERPL-MCNC: 4.2 MG/DL (ref 2.5–4.9)
PLATELET # BLD AUTO: 92 X10*3/UL (ref 150–450)
POTASSIUM SERPL-SCNC: 3.4 MMOL/L (ref 3.5–5.3)
RBC # BLD AUTO: 2.72 X10*6/UL (ref 4.5–5.9)
SODIUM SERPL-SCNC: 138 MMOL/L (ref 136–145)
WBC # BLD AUTO: 4 X10*3/UL (ref 4.4–11.3)

## 2023-12-02 PROCEDURE — 96372 THER/PROPH/DIAG INJ SC/IM: CPT

## 2023-12-02 PROCEDURE — 2780000003 HC OR 278 NO HCPCS

## 2023-12-02 PROCEDURE — 36569 INSJ PICC 5 YR+ W/O IMAGING: CPT

## 2023-12-02 PROCEDURE — 2500000004 HC RX 250 GENERAL PHARMACY W/ HCPCS (ALT 636 FOR OP/ED)

## 2023-12-02 PROCEDURE — 1200000002 HC GENERAL ROOM WITH TELEMETRY DAILY

## 2023-12-02 PROCEDURE — 83735 ASSAY OF MAGNESIUM: CPT

## 2023-12-02 PROCEDURE — 85025 COMPLETE CBC W/AUTO DIFF WBC: CPT

## 2023-12-02 PROCEDURE — 80069 RENAL FUNCTION PANEL: CPT

## 2023-12-02 PROCEDURE — 2500000001 HC RX 250 WO HCPCS SELF ADMINISTERED DRUGS (ALT 637 FOR MEDICARE OP)

## 2023-12-02 PROCEDURE — 82947 ASSAY GLUCOSE BLOOD QUANT: CPT

## 2023-12-02 PROCEDURE — 99232 SBSQ HOSP IP/OBS MODERATE 35: CPT | Performed by: INTERNAL MEDICINE

## 2023-12-02 PROCEDURE — 36415 COLL VENOUS BLD VENIPUNCTURE: CPT

## 2023-12-02 PROCEDURE — 87081 CULTURE SCREEN ONLY: CPT

## 2023-12-02 PROCEDURE — 99238 HOSP IP/OBS DSCHRG MGMT 30/<: CPT

## 2023-12-02 PROCEDURE — C1751 CATH, INF, PER/CENT/MIDLINE: HCPCS

## 2023-12-02 RX ORDER — LIDOCAINE HYDROCHLORIDE 10 MG/ML
5 INJECTION INFILTRATION; PERINEURAL ONCE
Status: DISCONTINUED | OUTPATIENT
Start: 2023-12-02 | End: 2023-12-04 | Stop reason: HOSPADM

## 2023-12-02 RX ADMIN — HEPARIN SODIUM 5000 UNITS: 5000 INJECTION INTRAVENOUS; SUBCUTANEOUS at 18:40

## 2023-12-02 RX ADMIN — CEFAZOLIN SODIUM 2 G: 2 INJECTION, SOLUTION INTRAVENOUS at 16:14

## 2023-12-02 RX ADMIN — Medication 5 MG: at 21:35

## 2023-12-02 RX ADMIN — HEPARIN SODIUM 5000 UNITS: 5000 INJECTION INTRAVENOUS; SUBCUTANEOUS at 09:35

## 2023-12-02 RX ADMIN — CEFAZOLIN SODIUM 2 G: 2 INJECTION, SOLUTION INTRAVENOUS at 03:48

## 2023-12-02 RX ADMIN — FINASTERIDE 5 MG: 5 TABLET, FILM COATED ORAL at 08:56

## 2023-12-02 RX ADMIN — TAMSULOSIN HYDROCHLORIDE 0.8 MG: 0.4 CAPSULE ORAL at 21:35

## 2023-12-02 RX ADMIN — INSULIN GLARGINE 10 UNITS: 100 INJECTION, SOLUTION SUBCUTANEOUS at 21:39

## 2023-12-02 RX ADMIN — HEPARIN SODIUM 5000 UNITS: 5000 INJECTION INTRAVENOUS; SUBCUTANEOUS at 03:47

## 2023-12-02 RX ADMIN — OXYCODONE HYDROCHLORIDE 5 MG: 5 TABLET ORAL at 10:35

## 2023-12-02 RX ADMIN — METOPROLOL SUCCINATE 25 MG: 25 TABLET, EXTENDED RELEASE ORAL at 08:56

## 2023-12-02 RX ADMIN — ROSUVASTATIN CALCIUM 10 MG: 10 TABLET, FILM COATED ORAL at 21:35

## 2023-12-02 RX ADMIN — AMLODIPINE BESYLATE 10 MG: 10 TABLET ORAL at 21:34

## 2023-12-02 ASSESSMENT — COGNITIVE AND FUNCTIONAL STATUS - GENERAL
MOVING TO AND FROM BED TO CHAIR: A LOT
MOVING FROM LYING ON BACK TO SITTING ON SIDE OF FLAT BED WITH BEDRAILS: A LITTLE
STANDING UP FROM CHAIR USING ARMS: A LOT
TURNING FROM BACK TO SIDE WHILE IN FLAT BAD: A LOT
WALKING IN HOSPITAL ROOM: A LOT
MOVING TO AND FROM BED TO CHAIR: A LOT
DRESSING REGULAR LOWER BODY CLOTHING: A LOT
CLIMB 3 TO 5 STEPS WITH RAILING: TOTAL
WALKING IN HOSPITAL ROOM: A LOT
HELP NEEDED FOR BATHING: A LOT
TOILETING: A LOT
TOILETING: A LOT
PERSONAL GROOMING: A LITTLE
CLIMB 3 TO 5 STEPS WITH RAILING: TOTAL
MOBILITY SCORE: 12
EATING MEALS: A LITTLE
MOBILITY SCORE: 12
DRESSING REGULAR UPPER BODY CLOTHING: A LOT
DRESSING REGULAR UPPER BODY CLOTHING: A LOT
DRESSING REGULAR LOWER BODY CLOTHING: A LOT
DAILY ACTIVITIY SCORE: 14
TURNING FROM BACK TO SIDE WHILE IN FLAT BAD: A LOT
EATING MEALS: A LITTLE
HELP NEEDED FOR BATHING: A LOT
DAILY ACTIVITIY SCORE: 14
MOVING FROM LYING ON BACK TO SITTING ON SIDE OF FLAT BED WITH BEDRAILS: A LITTLE
STANDING UP FROM CHAIR USING ARMS: A LOT
PERSONAL GROOMING: A LITTLE

## 2023-12-02 ASSESSMENT — PAIN - FUNCTIONAL ASSESSMENT
PAIN_FUNCTIONAL_ASSESSMENT: 0-10

## 2023-12-02 ASSESSMENT — PAIN SCALES - GENERAL
PAINLEVEL_OUTOF10: 7
PAINLEVEL_OUTOF10: 0 - NO PAIN
PAINLEVEL_OUTOF10: 0 - NO PAIN

## 2023-12-02 NOTE — PROGRESS NOTES
" Subjective     Overnight events:   Pt was seen and examined at bedside. No acute events overnightPt stated not having a BM since Tuesday. Denies any chest pain, SOB, dyspnea, new cough, palpitations, nausea, vomiting, diarrhea, dizziness, changes in visions, headaches, or worsening lower extremity swelling. Pt is comfortable in bed and in no acute distress.     Vital signs:  Blood pressure 138/70, pulse 82, temperature 35.9 °C (96.6 °F), temperature source Temporal, resp. rate 20, height 1.88 m (6' 2.02\"), weight 112 kg (247 lb 12.8 oz), SpO2 94 %.    I/O last 3 completed shifts:  In: 1970 (17.5 mL/kg) [P.O.:1670; IV Piggyback:300]  Out: 2226 (19.8 mL/kg) [Urine:2175 (0.5 mL/kg/hr); Stool:1; Blood:50]  Weight: 112.4 kg     Physical Exam  GENERAL:  In no acute distress  NEUROLOGIC:  A and O x 3. Cranial nerves 2 through 12 grossly intact with no gait disturbances. Intact motor and sensory systems, with normal reflexes and coordination.    HEENT:  Pupils are equal, round, and reactive to light and accommodation. Extraocular motion intact.    MOUTH: MMM, no lesions observed  CARDIAC: S1 + S2, RRR, no M/R/G.    LUNGS: CTA BL.  No wheezes or coarse breath sounds. Symmetric respirations. No increased work of breathing.   ABDOMEN: Soft, non-tender to palpation. No organomegaly. Normal BS in 4Q, No rebound or guarding.  EXTREMITIES:  Moving x4 equally and spontaneously. Right knee with mild effusion present but no erythema or warmth. Chronic limited ROM but not worse than baseline. No BLE edema. Painless non-reducible soft tissue mass in the left groin.  SKIN: Clean, warm, dry, and intact with normal skin turgor.  PSYCH: Appropriate mood and behavior.     Relevant Results  Scheduled medications  amLODIPine, 10 mg, oral, q PM  ceFAZolin in dextrose (iso-os), 2 g, intravenous, q12h  finasteride, 5 mg, oral, Daily  heparin (porcine), 5,000 Units, subcutaneous, q8h  insulin glargine, 10 Units, subcutaneous, Nightly  insulin " lispro, 0-5 Units, subcutaneous, TID with meals  melatonin, 5 mg, oral, Nightly  metoprolol succinate XL, 25 mg, oral, Daily  polyethylene glycol, 17 g, oral, Daily  rosuvastatin, 10 mg, oral, Nightly  sennosides, 1 tablet, oral, Nightly  tamsulosin, 0.8 mg, oral, Nightly      Continuous medications     PRN medications  PRN medications: acetaminophen, benzocaine-menthol, dextrose 10 % in water (D10W), dextrose, glucagon, oxyCODONE      Labs:  Last CBC:  Lab Results   Component Value Date    WBC 4.3 (L) 12/01/2023    HGB 7.6 (L) 12/01/2023    HCT 23.5 (L) 12/01/2023    MCV 89 12/01/2023    PLT 92 (L) 12/01/2023       Last RFP:  Lab Results   Component Value Date    GLUCOSE 83 12/01/2023    CALCIUM 8.1 (L) 12/01/2023     12/01/2023    K 3.6 12/01/2023    CO2 23 12/01/2023     12/01/2023    BUN 22 12/01/2023    CREATININE 2.71 (H) 12/01/2023       Last LFTs:  Lab Results   Component Value Date    ALT <3 (L) 11/17/2023    AST 11 11/17/2023    ALKPHOS 94 11/17/2023    BILITOT 0.3 11/17/2023       Last coags:  Lab Results   Component Value Date    INR 1.2 (H) 11/30/2023    APTT 36 11/30/2023       Micro/culture data:  Susceptibility data from last 90 days.  Collected Specimen Info Organism Clindamycin Erythromycin Oxacillin Tetracycline Trimethoprim/Sulfamethoxazole Vancomycin   11/22/23 Tissue/Biopsy from Other (specify in comments) Methicillin Susceptible Staphylococcus aureus (MSSA) S S S S S S   11/22/23 Tissue/Biopsy from Other (specify in comments) Methicillin Susceptible Staphylococcus aureus (MSSA) S S S S S S   11/14/23 Swab from Anterior Nares Methicillin Resistant Staphylococcus aureus (MRSA)         11/14/23 Blood culture from Peripheral Venipuncture Methicillin Susceptible Staphylococcus aureus (MSSA) S S S S S S         Imaging:  Transthoracic Echo (TTE) [facility], [address] [place] [address]                 Tel [phone] and Fax  [phone]    TRANSTHORACIC ECHOCARDIOGRAM REPORT       Patient Name:      MOE HO   Reading Physician:    [id number] Destiny Mcmahan MD  Study Date:        12/1/2023            Ordering Provider:    [id number] GOLDIE VERDUZCOANH  MRN/PID:           [id number]             Fellow:               Eduardo López MD  Accession#:        [id number]         Nurse:                Radha Valverde RN  Date of Birth/Age: 1950 / 73 years Sonographer:          Enedina Chacon                                                                RDCS, RVT  Gender:            M                    Additional Staff:  Height:            187.96 cm            Admit Date:           11/16/2023  Weight:            112.04 kg            Admission Status:     Inpatient -                                                                Routine  BSA:               2.38 m2              Encounter#:           [id number]                                          Department Location:  [facility] Non                                                                Invasive  Blood Pressure: 138 /70 mmHg    Study Type:    TRANSTHORACIC ECHO (TTE) LIMITED  Diagnosis/ICD: Endocarditis, valve unspecified-I38; Bacteremia-R78.81  Indication:    Endocarditis. Bacteremia.  CPT Code:      Echo Limited-24121    Patient History:  Pertinent History: Tricuspid valve vegetation and Right atrial pacemaker lead                     vegetation.    Study Detail: The following Echo studies were performed: 2D, M-Mode, Doppler and                color flow. Technically challenging study due to body habitus.                Definity used as a contrast agent for endocardial border                definition. Total contrast used for this procedure was 3 mL via IV                push.        PHYSICIAN INTERPRETATION:  Left Ventricle: The left ventricular systolic function is normal, with an estimated ejection fraction of 60-65%. There are no regional wall motion abnormalities. The left ventricular cavity size is normal. There is no evidence of left ventricular hypertrophy. Left ventricular diastolic filling was not assessed.  Left Atrium: The left atrium is normal in size.  Right Ventricle: The right ventricle is normal in size. There is normal right ventricular global systolic function.  Right Atrium: The right atrium is normal in size.  Aortic Valve: There is no visualized aortic valve vegetation. The aortic valve is trileaflet. There is minimal aortic valve cusp calcification. There is trace to mild aortic valve regurgitation.  Mitral Valve: The mitral valve is normal in structure. There was no mitral valve vegetation visualized. There is trace mitral valve regurgitation.  Tricuspid Valve: The tricuspid valve is abnormal. There is mild to moderate tricuspid regurgitation. The Doppler estimated RVSP is mildly elevated at 39.1 mmHg. Tricuspid valve have a mobile vegetation on the anterior leaflet of the tricuspid valve measuring 0.7 cm x 1.1 cm in size (best seen on image 49). A vegetation is also noted on the posterior leaflet but not clearly visualized.  Pulmonic Valve: The pulmonic valve is not well visualized. There is trace pulmonic valve regurgitation.  Pericardium: There is a trivial pericardial effusion.  Aorta: The aortic root is normal.  Systemic Veins: The inferior vena cava appears dilated. There is IVC inspiratory collapse greater than 50%.  In comparison to the previous echocardiogram(s): Compared with study from 11/16/2023, Suboptimal image quality. Patient known to have the vegetation on tricuspid valve leaflet.       CONCLUSIONS:   1. Poorly visualized anatomical structures due to suboptimal image quality.   2. Left ventricular systolic function is normal with a 60-65% estimated  ejection fraction.   3. Tricuspid valve have a mobile vegetation on the anterior leaflet of the tricuspid valve measuring 0.7 cm x 1.1 cm in size (best seen on image 49). A vegetation is also noted on the posterior leaflet but not clearly visualized.   4. Mild to moderate tricuspid regurgitation visualized.   5. Trivial Pericardial Effusion.   6. No aortic valve vegetation visualized.   7. No mitral valve vegetation visualized.   8. Mildly elevated RVSP.   9. The tricuspid valve is abnormal.  10. Compared with study from 11/16/2023, Suboptimal image quality. Patient known to have the vegetation on tricuspid valve leaflet.    QUANTITATIVE DATA SUMMARY:  2D MEASUREMENTS:                           Normal Ranges:  Ao Root d:     3.00 cm   (2.0-3.7cm)  IVSd:          1.10 cm   (0.6-1.1cm)  LVPWd:         0.97 cm   (0.6-1.1cm)  LVIDd:         4.39 cm   (3.9-5.9cm)  LVIDs:         3.15 cm  LV Mass Index: 64.8 g/m2  LV % FS        28.1 %    LV SYSTOLIC FUNCTION BY 2D PLANIMETRY (MOD):                      Normal Ranges:  EF-A4C View: 61.9 % (>=55%)  EF-A2C View: 64.4 %  EF-Biplane:  63.6 %    TRICUSPID VALVE/RVSP:                              Normal Ranges:  Peak TR Velocity: 2.45 m/s  RV Syst Pressure: 39.1 mmHg (< 30mmHg)  IVC Diam:         2.30 cm       [id number] Destiny Mcmahan MD  Electronically signed on 12/1/2023 at 6:03:05 PM       ** Final **      Assessment/Plan   Mr. Chester Verduzco is a 73 year old male with PMHX HTN, HLD, Dual chamber PPM implanted in 11/1996 for SSS (RV is passive) with most recent generator replacement in 11/2020 ([employer] - [employer] MRI), Type 2 DM, gout, and recent hospitalization (10/2-10/7) for Staph bacteremia, diarrhea, NOHELIA , and hyponatremia 2/2 hypovolemia who presented to [facility] ED from a SNF on 11/13 for NOHELIA on labs from the SNF and oliguria. Blood cultures were positive for MSSA and previously had been positive 10/8 and 11/10 for MSSA. Blood cultures with NGTD as of 11/16.  FILIPE 11/16  showed tricuspid valve vegetation as well as right atrial lead vegetation. Patient has been afebrile without leukocytosis and hemodynamically stable. Device Interrogated 11/17 with AP 29% and  30%.  Lead extraction complete 11/22.  Patient on IV antibiotics with stop 4 weeks after lead removal (~12/20). After lead removal, on telemetry the patient had several episodes of intermittent asymptomatic high-grade AV block, NSVT,and bradycardia. EP plans to implant PPM after 14 days of negative blood culture post removal per, ID. Patient received a unit of packed red blood cells for low hemoglobin (11/25). Hemoglobin has remained stable with no signs or symptoms of bleeding. Home lasix and losartan were being held in setting of NOHELIA 2/2 to nephrotoxic antibiotic exposure which down trending but not at baseline with adequate urine output. Patient has maldonado I/s/o severe BPH. Of note, Patient noticed left groin lymphadenopathy and U/S (11/23) showed Enlarged left inguinal/external iliac lymph nodes. PET scan deferred at this time d/t active infectious process. Underwent core needle biopsy on 11/29. Patient to follow up biopsy results with surgical oncology. Device re-implant (micra leadless pacemaker) 11/30, with plan for PICC placement after placement.    Updates 12/2  -PICC line placement order placed, Nephrology okay with PICC  -TTE 12/1 with Tricuspid valve have a mobile vegetation on the anterior leaflet of the tricuspid valve measuring 0.7 cm x 1.1 cm in size   -In discussing with Dr. Victoria (ID) on 12/2, and reviewing past ID notes, reimplant was done iso tricuspid vegetation with the assumption that patient was sterilized given Negative BC, lack of growth in vegetation size and clinical picture. Patient has outpatient follow up scheduled further and antibiotic use will be determined then.  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    -Given asymptomatic episodes of NSVT, bradycardia and sinus pauses noted on tele, patient needs to be inpatient and monitored on tele until placement of PPM  - Daily CBC Trend WBC, no leukocytosis  --Monitor CBC, BMP, ESR and CRP weekly and fax to [phone] Attn Dr Gonzalez  -ID will follow up as outpatient with Dr Gonzalez (~1 week prior ending abx course)  -Metoprolol succ 25 mg daily while patient is without a pacemaker  -Device re-implant (micra leadless pacemaker) 11/30  - Need PICC line before discharge, nephrology okay with PICC  - Post op TTE (12/1) with Tricuspid valve have a mobile vegetation on the anterior leaflet of the tricuspid valve measuring 0.7 cm x 1.1 cm in size   --Reengage EP and ID regarding FILIPE results    #NOHELIA, improving  :likely ATN given gradual rise in creatinine over past month and history of recent diarrhea and nephrotoxic antibiotic exposure. Renal U/S did not show any hydronephrosis.  - Baseline prior to October 2023 hospitalization ~1.0-1.1.   - Elevated to 5.43 on arrival to [facility] on 11/13, down trending  - Initial urine electrolytes showed Fena 2.5% indicating intrinsic etiology, Normal C3 and C4  - Nephrology signed off, will reengage  if Cr starts to uptrend   - Rojo replaced (11/17) ,strict I&Os, No indication for HD at this time  - Daily RFP  - Replete lytes as needed  -Encourage PO intake    #Inguinal lymphadenopathy  -Patient has endorsed left sided non tender lymphadenopathy for one month   - U/S (11/23) showed Enlarged left inguinal/external iliac lymph nodes measuring up to  5.7 cm x 3.3 cm x 4.4 cm with internal hypervascularity which were   visualized on the CT dated 10/02/2023 and not definitively seen dating back to 2015.  -Given state of infection, could be reactive or concerning for a lymphoproliferative disorder or metastatic disease from left lower extremity malignancy  -PET scan deferred at this time d/t active infectious process. Surg onc consulted  -Patient to follow up with Dr. Cameron (Surgical oncology) for further discussion and evaluation of left inguinal lymphadenopathy   -Underwent core biopsy for inguinal lymphadenopathy with IR 11/29  -Referral to diagnostic clinic for outpatient follow up  -F/up biopsy results    #Type 2 DM  - Checking A1c  - On Lantus 10 units QHS and cover with SSI,  on lantus 5 while NPO  - hypoglycemia protocol    #HTN  #HLD  - Holding home losartan and lasix in setting of NOHELIA  - Continue Amlodipine 10, metop succinate 25 daily, and rosuvastatin 10 daily      #BPH  - Was initially planned for outpatient robotic prostatectomy end of November that has since been canceled and rescheduled for December  - Continue with Flomax 0.8 mg QHS and finasteride 5 md daily  - Continue maldonado     #Anemia  :Patient with slowly down trending Hgb from since this admission. Baseline 13-14. Stable this AM  -Received 1 unit pRBC (11/25) for Hgb 6.8 with appropriate increase to 7.4  -No acute signs or symptoms of bleeding at this time  - Will continue to monitor and transfuse for Hgb <7      F: As needed  E: AS needed  N: Adult; Carb Controlled  A:  PIV, Maldonado new as of 11/17  DVT ppx: Heparin subcutaneous, Holding for procedure  GI: N/A  Code Status: Full code  NOK: Charlene Pardo (friend) [phone]      Principal Problem:    MSSA bacteremia  Active Problems:    Endocarditis    History of general anesthesia    Complete atrioventricular block (CMS/HCC)    Intermittent complete atrioventricular block (CMS/HCC)         Gonzalo Henley MD   Internal Medicine PGY-I

## 2023-12-02 NOTE — DISCHARGE SUMMARY
Discharge Diagnosis  MSSA bacteremia    Issues Requiring Follow-Up  []Primary care:   --lasix and losartan were being stopped in setting of NOHELIA secondary, please reassess need to resume  --Metoprolol down titrated to 25 mg daily from home dose of 100 mg daily, please reassess need to resume home dose  [] ID For antibiotic duration, Per ID, please collect CBC, BMP, ESR and CRP weekly and fax to [phone] Attn Dr Gonzalez  [] Cardiology  for ICD follow up  [] Urology for maldonado and BPH management  [] Surgical Oncology to F/ up Biopsy results  [] Podiatry  for ingrown toe nail    Test Results Pending At Discharge  Pending Labs       Order Current Status    Flow Cytometry Test In process    Flow Cytometry Test In process    Staphylococcus Aureus/MRSA Colonization, Culture - PICC Only In process    Surgical Pathology Exam In process    Extra Tubes Preliminary result    Extra Tubes Preliminary result    Fungal Culture/Smear Preliminary result    Green Top Preliminary result    Lavender Top Preliminary result            Hospital Course  Mr. Chester Verduzco is a 73 year old male  who presented to an outside hospital from a SNF on 11/13 for NOHELIA on labs from the SNF, oliguria and blood cultures that were persistently positive for MSSA. He was subsequently transferred to . Blood cultures with NGTD as of 11/16.  FILIPE 11/16 showed tricuspid valve vegetation as well as right atrial lead vegetation. Patient remained afebrile without leukocytosis and hemodynamically stable. Device Interrogated 11/17 with AP 29% and  30%.  Lead extraction complete 11/22. After lead removal, on telemetry the patient had several episodes of intermittent asymptomatic high-grade AV block, NSVT,and bradycardia. As a result weaned his metoprolol. Device re-implant (micra leadless pacemaker) on 11/30. He has been on IV Ancef.  ID Recommends continuation of Cefazolin 4 weeks after lead removal,  until (12/20). PICC placed 12/2.  Reimplant was done in  setting of tricuspid vegetation with the assumption that patient was sterilized given Negative BC, lack of growth in vegetation size and clinical picture. Patient has outpatient follow up scheduled further and antibiotic use will be determined then. Per ID, please collect CBC, BMP, ESR and CRP weekly and fax to [phone] Attn Dr Gonzalez     While inpatient he received a unit of packed red blood cells for low hemoglobin (11/25). Hemoglobin has remained stable with no signs or symptoms of bleeding. Home lasix and losartan were being held in setting of NOHELIA secondary to nephrotoxic antibiotic exposure which is down trending but not at baseline with adequate urine output. Patient has maldonado  in setting of severe BPH and outpatient follow up with urology.     Of note, patient noticed left groin lymphadenopathy and U/S (11/23) showed Enlarged left inguinal/external iliac lymph nodes. PET scan deferred at this time d/t active infectious process. Underwent core needle biopsy on 11/29. Patient to follow up biopsy results with surgical oncology.       Pertinent Physical Exam At Time of Discharge  Physical Exam  GENERAL:  In no acute distress  NEUROLOGIC:  A and O x 3. Cranial nerves 2 through 12 grossly intact with no gait disturbances. Intact motor and sensory systems, with normal reflexes and coordination.    HEENT:  Pupils are equal, round, and reactive to light and accommodation. Extraocular motion intact.    MOUTH: MMM, no lesions observed  CARDIAC: S1 + S2, RRR, no M/R/G.    LUNGS: CTA BL.  No wheezes or coarse breath sounds. Symmetric respirations. No increased work of breathing.   ABDOMEN: Soft, non-tender to palpation. No organomegaly. Normal BS in 4Q, No rebound or guarding.  EXTREMITIES:  Moving x4 equally and spontaneously. Right knee with mild effusion present but no erythema or warmth. Chronic limited ROM but not worse than baseline. No BLE edema. Painless non-reducible soft tissue mass in the left groin.  SKIN:  Clean, warm, dry, and intact with normal skin turgor.  PSYCH: Appropriate mood and behavior    Home Medications     Medication List      START taking these medications     acetaminophen 325 mg tablet; Commonly known as: Tylenol; Take 2 tablets   (650 mg) by mouth every 6 hours if needed for moderate pain (4 - 6).   alteplase 2 mg injection; Commonly known as: Cathflo Activase; 2 mL (2   mg) by intra-catheter route if needed (flushes).   benzocaine-menthol 15-3.6 mg lozenge; Commonly known as: Cepastat Sore   Throat; Dissolve 1 lozenge in the mouth every 2 hours if needed for sore   throat.   ceFAZolin 2 gram injection; Commonly known as: Ancef; Infuse 2 g into a   venous catheter every 12 hours for 17 days.   insulin lispro 100 unit/mL injection; Commonly known as: HumaLOG; Inject   0-0.05 mL (0-5 Units) under the skin 3 times a day with meals. Take as   directed per insulin instructions.   melatonin 5 mg tablet; Take 1 tablet (5 mg) by mouth once daily at   bedtime.   polyethylene glycol 17 gram packet; Commonly known as: Glycolax,   Miralax; Take 17 g by mouth once daily. Do not start before December 4, 2023.; Start taking on: December 4, 2023   sennosides 8.6 mg tablet; Commonly known as: Senokot; Take 1 tablet (8.6   mg) by mouth once daily at bedtime.     CHANGE how you take these medications     metoprolol succinate XL 25 mg 24 hr tablet; Commonly known as:   Toprol-XL; Take 1 tablet (25 mg) by mouth once daily. Do not crush or   chew.; What changed: medication strength, how much to take, additional   instructions     CONTINUE taking these medications     allopurinol 100 mg tablet; Commonly known as: Zyloprim   amLODIPine 10 mg tablet; Commonly known as: Norvasc; Take 1 tablet (10   mg) by mouth once daily in the evening.   Biofreeze (menthol) 4 % gel; Generic drug: menthol   finasteride 5 mg tablet; Commonly known as: Proscar   hydrALAZINE 10 mg tablet; Commonly known as: Apresoline   lactobacillus  acidophilus & bulgar 1 million cell chewable tablet;   Commonly known as: Lactinex   lubricating eye drops ophthalmic solution   ondansetron 4 mg tablet; Commonly known as: Zofran   rosuvastatin 10 mg tablet; Commonly known as: Crestor; TAKE 1 TABLET BY   MOUTH EVERYDAY AT BEDTIME   tamsulosin 0.4 mg 24 hr capsule; Commonly known as: Flomax   Tresiba FlexTouch U-100 100 unit/mL (3 mL) injection; Generic drug:   insulin degludec     STOP taking these medications     dextrose 10 % in water (D10W) 10 % infusion   heparin (porcine) 5,000 unit/mL injection   HYDROcodone-acetaminophen 5-325 mg tablet; Commonly known as: Norco   losartan 100 mg tablet; Commonly known as: Cozaar       Outpatient Follow-Up  Future Appointments   Date Time Provider Department Center   12/12/2023 10:20 AM Ted Cameron MD MPH [id number] [facility]   12/13/2023  2:20 PM Cabrera Delaney MD [id number] [facility]   2/14/2024 10:20 AM Jeffry De Leon MD [id number] [facility]   8/30/2024 10:40 AM Jeffry De Leon MD [id number] [facility]   9/23/2024  9:20 AM Yaz Salinas, APRN-CNP [id number] [facility]       Gonzalo Henley MD

## 2023-12-02 NOTE — CARE PLAN
Problem: Infection related to problem list condition  Goal: Infection will resolve through treatment  Outcome: Progressing     Problem: Pain  Goal: My pain/discomfort is manageable  Outcome: Progressing     Problem: Safety  Goal: Patient will be injury free during hospitalization  Outcome: Progressing  Goal: I will remain free of falls  Outcome: Progressing     Problem: Daily Care  Goal: Daily care needs are met  Outcome: Progressing     Problem: Psychosocial Needs  Goal: Demonstrates ability to cope with hospitalization/illness  Outcome: Progressing  Goal: Collaborate with me, my family, and caregiver to identify my specific goals  Outcome: Progressing     Problem: Skin  Goal: Prevent/minimize sheer/friction injuries  Outcome: Progressing   The patient's goals for the shift include to be painfree    The clinical goals for the shift include Patient to have no oozing from sites.        12/02/23 at 6:09 AM - Clare Rosas RN

## 2023-12-03 LAB
ALBUMIN SERPL BCP-MCNC: 3 G/DL (ref 3.4–5)
ANION GAP SERPL CALC-SCNC: 10 MMOL/L (ref 10–20)
BASOPHILS # BLD AUTO: 0.02 X10*3/UL (ref 0–0.1)
BASOPHILS NFR BLD AUTO: 0.5 %
BUN SERPL-MCNC: 20 MG/DL (ref 6–23)
CALCIUM SERPL-MCNC: 8.2 MG/DL (ref 8.6–10.6)
CHLORIDE SERPL-SCNC: 106 MMOL/L (ref 98–107)
CO2 SERPL-SCNC: 26 MMOL/L (ref 21–32)
CREAT SERPL-MCNC: 2.37 MG/DL (ref 0.5–1.3)
EOSINOPHIL # BLD AUTO: 0.09 X10*3/UL (ref 0–0.4)
EOSINOPHIL NFR BLD AUTO: 2.5 %
ERYTHROCYTE [DISTWIDTH] IN BLOOD BY AUTOMATED COUNT: 15.2 % (ref 11.5–14.5)
GFR SERPL CREATININE-BSD FRML MDRD: 28 ML/MIN/1.73M*2
GLUCOSE BLD MANUAL STRIP-MCNC: 85 MG/DL (ref 74–99)
GLUCOSE BLD MANUAL STRIP-MCNC: 86 MG/DL (ref 74–99)
GLUCOSE BLD MANUAL STRIP-MCNC: 89 MG/DL (ref 74–99)
GLUCOSE SERPL-MCNC: 73 MG/DL (ref 74–99)
HCT VFR BLD AUTO: 22.4 % (ref 41–52)
HGB BLD-MCNC: 7 G/DL (ref 13.5–17.5)
IMM GRANULOCYTES # BLD AUTO: 0.03 X10*3/UL (ref 0–0.5)
IMM GRANULOCYTES NFR BLD AUTO: 0.8 % (ref 0–0.9)
LYMPHOCYTES # BLD AUTO: 0.91 X10*3/UL (ref 0.8–3)
LYMPHOCYTES NFR BLD AUTO: 24.8 %
MAGNESIUM SERPL-MCNC: 1.57 MG/DL (ref 1.6–2.4)
MCH RBC QN AUTO: 27.7 PG (ref 26–34)
MCHC RBC AUTO-ENTMCNC: 31.3 G/DL (ref 32–36)
MCV RBC AUTO: 89 FL (ref 80–100)
MONOCYTES # BLD AUTO: 0.35 X10*3/UL (ref 0.05–0.8)
MONOCYTES NFR BLD AUTO: 9.5 %
NEUTROPHILS # BLD AUTO: 2.27 X10*3/UL (ref 1.6–5.5)
NEUTROPHILS NFR BLD AUTO: 61.9 %
NRBC BLD-RTO: 0 /100 WBCS (ref 0–0)
PHOSPHATE SERPL-MCNC: 4.4 MG/DL (ref 2.5–4.9)
PLATELET # BLD AUTO: 90 X10*3/UL (ref 150–450)
POTASSIUM SERPL-SCNC: 3.5 MMOL/L (ref 3.5–5.3)
RBC # BLD AUTO: 2.53 X10*6/UL (ref 4.5–5.9)
SODIUM SERPL-SCNC: 138 MMOL/L (ref 136–145)
WBC # BLD AUTO: 3.7 X10*3/UL (ref 4.4–11.3)

## 2023-12-03 PROCEDURE — 99232 SBSQ HOSP IP/OBS MODERATE 35: CPT | Performed by: INTERNAL MEDICINE

## 2023-12-03 PROCEDURE — 94760 N-INVAS EAR/PLS OXIMETRY 1: CPT

## 2023-12-03 PROCEDURE — 2500000001 HC RX 250 WO HCPCS SELF ADMINISTERED DRUGS (ALT 637 FOR MEDICARE OP)

## 2023-12-03 PROCEDURE — 96372 THER/PROPH/DIAG INJ SC/IM: CPT

## 2023-12-03 PROCEDURE — 83735 ASSAY OF MAGNESIUM: CPT

## 2023-12-03 PROCEDURE — 82947 ASSAY GLUCOSE BLOOD QUANT: CPT

## 2023-12-03 PROCEDURE — 2500000004 HC RX 250 GENERAL PHARMACY W/ HCPCS (ALT 636 FOR OP/ED)

## 2023-12-03 PROCEDURE — 85025 COMPLETE CBC W/AUTO DIFF WBC: CPT

## 2023-12-03 PROCEDURE — 1200000002 HC GENERAL ROOM WITH TELEMETRY DAILY

## 2023-12-03 PROCEDURE — 80069 RENAL FUNCTION PANEL: CPT

## 2023-12-03 RX ORDER — SENNOSIDES 8.6 MG/1
1 TABLET ORAL NIGHTLY
Start: 2023-12-03 | End: 2024-04-03 | Stop reason: WASHOUT

## 2023-12-03 RX ORDER — HYDRALAZINE HYDROCHLORIDE 10 MG/1
10 TABLET, FILM COATED ORAL 3 TIMES DAILY
Status: DISCONTINUED | OUTPATIENT
Start: 2023-12-03 | End: 2023-12-04 | Stop reason: HOSPADM

## 2023-12-03 RX ORDER — ACETAMINOPHEN 500 MG
5 TABLET ORAL NIGHTLY
Start: 2023-12-03 | End: 2024-04-03 | Stop reason: WASHOUT

## 2023-12-03 RX ORDER — CEFAZOLIN 2 G/1
2 INJECTION, POWDER, FOR SOLUTION INTRAMUSCULAR; INTRAVENOUS EVERY 12 HOURS
Qty: 68 G | Refills: 0
Start: 2023-12-03 | End: 2023-12-14 | Stop reason: ALTCHOICE

## 2023-12-03 RX ORDER — ACETAMINOPHEN 325 MG/1
650 TABLET ORAL EVERY 6 HOURS PRN
Qty: 30 TABLET | Refills: 0
Start: 2023-12-03

## 2023-12-03 RX ORDER — POLYETHYLENE GLYCOL 3350 17 G/17G
17 POWDER, FOR SOLUTION ORAL DAILY
Start: 2023-12-04

## 2023-12-03 RX ORDER — METOPROLOL SUCCINATE 25 MG/1
25 TABLET, EXTENDED RELEASE ORAL DAILY
Start: 2023-12-03 | End: 2024-03-05

## 2023-12-03 RX ORDER — INSULIN LISPRO 100 [IU]/ML
0-5 INJECTION, SOLUTION INTRAVENOUS; SUBCUTANEOUS
Start: 2023-12-03 | End: 2024-02-11 | Stop reason: ALTCHOICE

## 2023-12-03 RX ADMIN — HYDRALAZINE HYDROCHLORIDE 10 MG: 10 TABLET, FILM COATED ORAL at 15:19

## 2023-12-03 RX ADMIN — HYDRALAZINE HYDROCHLORIDE 10 MG: 10 TABLET, FILM COATED ORAL at 21:06

## 2023-12-03 RX ADMIN — HEPARIN SODIUM 5000 UNITS: 5000 INJECTION INTRAVENOUS; SUBCUTANEOUS at 09:50

## 2023-12-03 RX ADMIN — FINASTERIDE 5 MG: 5 TABLET, FILM COATED ORAL at 09:50

## 2023-12-03 RX ADMIN — Medication 5 MG: at 21:06

## 2023-12-03 RX ADMIN — AMLODIPINE BESYLATE 10 MG: 10 TABLET ORAL at 21:06

## 2023-12-03 RX ADMIN — CEFAZOLIN SODIUM 2 G: 2 INJECTION, SOLUTION INTRAVENOUS at 15:19

## 2023-12-03 RX ADMIN — ROSUVASTATIN CALCIUM 10 MG: 10 TABLET, FILM COATED ORAL at 21:06

## 2023-12-03 RX ADMIN — CEFAZOLIN SODIUM 2 G: 2 INJECTION, SOLUTION INTRAVENOUS at 04:20

## 2023-12-03 RX ADMIN — TAMSULOSIN HYDROCHLORIDE 0.8 MG: 0.4 CAPSULE ORAL at 21:06

## 2023-12-03 RX ADMIN — HEPARIN SODIUM 5000 UNITS: 5000 INJECTION INTRAVENOUS; SUBCUTANEOUS at 20:30

## 2023-12-03 RX ADMIN — METOPROLOL SUCCINATE 25 MG: 25 TABLET, EXTENDED RELEASE ORAL at 09:50

## 2023-12-03 RX ADMIN — SENNOSIDES 8.6 MG: 8.6 TABLET, FILM COATED ORAL at 21:06

## 2023-12-03 RX ADMIN — OXYCODONE HYDROCHLORIDE 5 MG: 5 TABLET ORAL at 16:48

## 2023-12-03 RX ADMIN — HEPARIN SODIUM 5000 UNITS: 5000 INJECTION INTRAVENOUS; SUBCUTANEOUS at 02:46

## 2023-12-03 RX ADMIN — BENZOCAINE AND MENTHOL 1 LOZENGE: 15; 3.6 LOZENGE ORAL at 22:47

## 2023-12-03 ASSESSMENT — COGNITIVE AND FUNCTIONAL STATUS - GENERAL
PERSONAL GROOMING: A LITTLE
EATING MEALS: A LITTLE
MOVING FROM LYING ON BACK TO SITTING ON SIDE OF FLAT BED WITH BEDRAILS: A LITTLE
CLIMB 3 TO 5 STEPS WITH RAILING: TOTAL
HELP NEEDED FOR BATHING: A LOT
MOVING FROM LYING ON BACK TO SITTING ON SIDE OF FLAT BED WITH BEDRAILS: A LITTLE
TOILETING: A LOT
MOVING TO AND FROM BED TO CHAIR: A LOT
TURNING FROM BACK TO SIDE WHILE IN FLAT BAD: A LOT
HELP NEEDED FOR BATHING: A LOT
TURNING FROM BACK TO SIDE WHILE IN FLAT BAD: A LOT
CLIMB 3 TO 5 STEPS WITH RAILING: TOTAL
STANDING UP FROM CHAIR USING ARMS: A LOT
DRESSING REGULAR LOWER BODY CLOTHING: A LOT
MOBILITY SCORE: 12
MOVING FROM LYING ON BACK TO SITTING ON SIDE OF FLAT BED WITH BEDRAILS: A LITTLE
PERSONAL GROOMING: A LITTLE
TOILETING: A LOT
PERSONAL GROOMING: A LITTLE
MOBILITY SCORE: 12
STANDING UP FROM CHAIR USING ARMS: A LOT
TOILETING: A LOT
MOVING TO AND FROM BED TO CHAIR: A LOT
DAILY ACTIVITIY SCORE: 14
MOBILITY SCORE: 12
WALKING IN HOSPITAL ROOM: A LOT
DRESSING REGULAR UPPER BODY CLOTHING: A LOT
DRESSING REGULAR UPPER BODY CLOTHING: A LOT
MOVING TO AND FROM BED TO CHAIR: A LOT
STANDING UP FROM CHAIR USING ARMS: A LOT
EATING MEALS: A LITTLE
DRESSING REGULAR LOWER BODY CLOTHING: A LOT
CLIMB 3 TO 5 STEPS WITH RAILING: TOTAL
DRESSING REGULAR LOWER BODY CLOTHING: A LOT
WALKING IN HOSPITAL ROOM: A LOT
TURNING FROM BACK TO SIDE WHILE IN FLAT BAD: A LOT
DAILY ACTIVITIY SCORE: 14
WALKING IN HOSPITAL ROOM: A LOT
DAILY ACTIVITIY SCORE: 15
HELP NEEDED FOR BATHING: A LOT
DRESSING REGULAR UPPER BODY CLOTHING: A LOT

## 2023-12-03 ASSESSMENT — PAIN SCALES - GENERAL
PAINLEVEL_OUTOF10: 7
PAINLEVEL_OUTOF10: 0 - NO PAIN
PAINLEVEL_OUTOF10: 3
PAINLEVEL_OUTOF10: 0 - NO PAIN

## 2023-12-03 ASSESSMENT — PAIN - FUNCTIONAL ASSESSMENT
PAIN_FUNCTIONAL_ASSESSMENT: 0-10

## 2023-12-03 ASSESSMENT — PAIN DESCRIPTION - LOCATION: LOCATION: LEG

## 2023-12-03 NOTE — PROGRESS NOTES
12/03/23        Transitional Care Coordination Progress Note:   Patient discussed during interdisciplinary rounds.   Team members present: BETO TCC MD   Plan per Medical/Surgical team:  s/p device removal, ECHO, PT/OT, ID recs, IV antibiotics, PICC placement, s/p core biopsy, monitor Hgb, staple removal 12/04   Discharge disposition: SNF   Status-Inpatient   Payer- Payor: MEDICARE / Plan: MEDICARE PART A AND B / Product Type: *No Product type* /    Potential Barriers: None   ADOD: 12/5/2023         Klever Salas RN [facility] [phone]

## 2023-12-03 NOTE — CARE PLAN
The patient's goals for the shift include to be painfree    The clinical goals for the shift include Patient to have no oozing from sites    Patient stable no issues during shift, continue to monitor for any changes in condition.

## 2023-12-03 NOTE — PROGRESS NOTES
" Subjective     Overnight events:   Pt was seen and examined at bedside. No acute events overnightPt stated not having a BM since Tuesday. Denies any chest pain, SOB, dyspnea, new cough, palpitations, nausea, vomiting, diarrhea, dizziness, changes in visions, headaches, or worsening lower extremity swelling. Pt is comfortable in bed and in no acute distress.     Vital signs:  Blood pressure 130/77, pulse 88, temperature 36 °C (96.8 °F), temperature source Temporal, resp. rate 18, height 1.88 m (6' 2.02\"), weight 112 kg (247 lb 5.7 oz), SpO2 97 %.    I/O last 3 completed shifts:  In: 500 (4.5 mL/kg) [IV Piggyback:500]  Out: 3075 (27.4 mL/kg) [Urine:3075 (0.8 mL/kg/hr)]  Weight: 112.2 kg     Physical Exam  GENERAL:  In no acute distress  NEUROLOGIC:  A and O x 3. Cranial nerves 2 through 12 grossly intact with no gait disturbances. Intact motor and sensory systems, with normal reflexes and coordination.    HEENT:  Pupils are equal, round, and reactive to light and accommodation. Extraocular motion intact.    MOUTH: MMM, no lesions observed  CARDIAC: S1 + S2, RRR, no M/R/G.    LUNGS: CTA BL.  No wheezes or coarse breath sounds. Symmetric respirations. No increased work of breathing.   ABDOMEN: Soft, non-tender to palpation. No organomegaly. Normal BS in 4Q, No rebound or guarding.  EXTREMITIES:  Moving x4 equally and spontaneously. Right knee with mild effusion present but no erythema or warmth. Chronic limited ROM but not worse than baseline. No BLE edema. Painless non-reducible soft tissue mass in the left groin.  SKIN: Clean, warm, dry, and intact with normal skin turgor.  PSYCH: Appropriate mood and behavior.     Relevant Results  Scheduled medications  amLODIPine, 10 mg, oral, q PM  ceFAZolin in dextrose (iso-os), 2 g, intravenous, q12h  finasteride, 5 mg, oral, Daily  heparin (porcine), 5,000 Units, subcutaneous, q8h  insulin glargine, 10 Units, subcutaneous, Nightly  insulin lispro, 0-5 Units, subcutaneous, TID " with meals  lidocaine, 5 mL, infiltration, Once  melatonin, 5 mg, oral, Nightly  metoprolol succinate XL, 25 mg, oral, Daily  polyethylene glycol, 17 g, oral, Daily  rosuvastatin, 10 mg, oral, Nightly  sennosides, 1 tablet, oral, Nightly  tamsulosin, 0.8 mg, oral, Nightly      Continuous medications     PRN medications  PRN medications: acetaminophen, alteplase, benzocaine-menthol, dextrose 10 % in water (D10W), dextrose, glucagon, oxyCODONE      Labs:  Last CBC:  Lab Results   Component Value Date    WBC 3.7 (L) 12/03/2023    HGB 7.0 (L) 12/03/2023    HCT 22.4 (L) 12/03/2023    MCV 89 12/03/2023    PLT 90 (L) 12/03/2023       Last RFP:  Lab Results   Component Value Date    GLUCOSE 73 (L) 12/03/2023    CALCIUM 8.2 (L) 12/03/2023     12/03/2023    K 3.5 12/03/2023    CO2 26 12/03/2023     12/03/2023    BUN 20 12/03/2023    CREATININE 2.37 (H) 12/03/2023       Last LFTs:  Lab Results   Component Value Date    ALT <3 (L) 11/17/2023    AST 11 11/17/2023    ALKPHOS 94 11/17/2023    BILITOT 0.3 11/17/2023       Last coags:  Lab Results   Component Value Date    INR 1.2 (H) 11/30/2023    APTT 36 11/30/2023       Micro/culture data:  Susceptibility data from last 90 days.  Collected Specimen Info Organism Clindamycin Erythromycin Oxacillin Tetracycline Trimethoprim/Sulfamethoxazole Vancomycin   11/22/23 Tissue/Biopsy from Other (specify in comments) Methicillin Susceptible Staphylococcus aureus (MSSA) S S S S S S   11/22/23 Tissue/Biopsy from Other (specify in comments) Methicillin Susceptible Staphylococcus aureus (MSSA) S S S S S S   11/14/23 Swab from Anterior Nares Methicillin Resistant Staphylococcus aureus (MRSA)         11/14/23 Blood culture from Peripheral Venipuncture Methicillin Susceptible Staphylococcus aureus (MSSA) S S S S S S         Imaging:  Bedside PICC Imaging  These images are not reportable by radiology and will not be interpreted   by  Radiologists.      Assessment/Plan   Mr. Briggs  Luba is a 73 year old male with PMHX HTN, HLD, Dual chamber PPM implanted in 11/1996 for SSS (RV is passive) with most recent generator replacement in 11/2020 ([employer]), Type 2 DM, gout, and recent hospitalization (10/2-10/7) for Staph bacteremia, diarrhea, NOHELIA , and hyponatremia 2/2 hypovolemia who presented to [facility] ED from a SNF on 11/13 for NOHELIA on labs from the SNF and oliguria. Blood cultures were positive for MSSA and previously had been positive 10/8 and 11/10 for MSSA. Blood cultures with NGTD as of 11/16.  FILIPE 11/16 showed tricuspid valve vegetation as well as right atrial lead vegetation. Patient has been afebrile without leukocytosis and hemodynamically stable. Device Interrogated 11/17 with AP 29% and  30%.  Lead extraction complete 11/22.  Patient on IV antibiotics with stop 4 weeks after lead removal (~12/20). After lead removal, on telemetry the patient had several episodes of intermittent asymptomatic high-grade AV block, NSVT,and bradycardia. EP plans to implant PPM after 14 days of negative blood culture post removal per, ID. Patient received a unit of packed red blood cells for low hemoglobin (11/25). Hemoglobin has remained stable with no signs or symptoms of bleeding. Home lasix and losartan were being held in setting of NOHELIA 2/2 to nephrotoxic antibiotic exposure which down trending but not at baseline with adequate urine output. Patient has maldonado I/s/o severe BPH. Of note, Patient noticed left groin lymphadenopathy and U/S (11/23) showed Enlarged left inguinal/external iliac lymph nodes. PET scan deferred at this time d/t active infectious process. Underwent core needle biopsy on 11/29. Patient to follow up biopsy results with surgical oncology. Device re-implant (micra leadless pacemaker) 11/30, PICC placed 12/2. In discussing with ID on 12/2, and reviewing past ID notes, reimplant was done iso tricuspid vegetation with the assumption that patient was sterilized given  Negative BC, lack of growth in vegetation size and clinical picture. Patient has outpatient follow up scheduled further and antibiotic use will be determined then.    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  #Tricuspid Valve Endocarditis, resolved   #Leukocytosis, resolved  - Blood cultures from 10/9, 11/10, and 11/14 were positive for MSSA   - ID recommends IV ANCEF 4 weeks after lead removal, Cefazolin 2 g q12 until  (12/20)  - Daily CBC Trend WBC, no leukocytosis  -Metoprolol succ 25 mg daily  -Device re-implant (micra leadless pacemaker) 11/30  -PICC line placed 12/2  - Post op TTE (12/1) with Tricuspid valve have a mobile vegetation on the anterior leaflet of the tricuspid valve measuring 0.7 cm x 1.1 cm in size   --ID will follow up as outpatient with Dr Gonzalez (~1 week prior ending abx course) will determine further antibiotic need.  CBC, BMP, ESR and CRP weekly and fax to [phone] Attn Dr Gonzalez    #NOHELIA, improving  :likely ATN given gradual rise in creatinine over past month and history of recent diarrhea and nephrotoxic antibiotic exposure. Renal U/S did not show any hydronephrosis.  - Baseline prior to October 2023 hospitalization ~1.0-1.1.   - Elevated to 5.43 on arrival to [facility] on 11/13, down trending  - Initial urine electrolytes showed Fena 2.5% indicating intrinsic etiology, Normal C3 and C4  - Nephrology signed off, will reengage  if Cr starts to uptrend   - Rojo replaced (11/17) ,strict I&Os, No indication for HD at this time  - Daily RFP  - Replete lytes as needed  -Encourage PO intake    #Inguinal lymphadenopathy  -Patient has endorsed left sided non tender lymphadenopathy for one month   - U/S (11/23) showed Enlarged left inguinal/external iliac lymph nodes  measuring up to  5.7 cm x 3.3 cm x 4.4 cm with internal hypervascularity which were  visualized on the CT dated 10/02/2023 and not definitively seen dating back to 2015.  -Given state of infection, could be reactive or concerning for a lymphoproliferative disorder or metastatic disease from left lower extremity malignancy  -PET scan deferred at this time d/t active infectious process. Surg onc consulted  -Patient to follow up with Dr. Cameron (Surgical oncology) for further discussion and evaluation of left inguinal lymphadenopathy   -Underwent core biopsy for inguinal lymphadenopathy with IR 11/29  -Referral to diagnostic clinic for outpatient follow up  -F/up biopsy results    #Type 2 DM  - Checking A1c  - On Lantus 10 units QHS and cover with SSI,  on lantus 5 while NPO  - hypoglycemia protocol    #HTN  #HLD  - Holding home losartan and lasix in setting of NOHELIA  - Continue Amlodipine 10, metop succinate 25 daily, and rosuvastatin 10 daily , Hyrdalizine     #BPH  - Was initially planned for outpatient robotic prostatectomy end of November that has since been canceled and rescheduled for December  - Continue with Flomax 0.8 mg QHS and finasteride 5 md daily  - Continue maldonado     #Anemia  :Patient with slowly down trending Hgb from since this admission. Baseline 13-14. Stable this AM  -Received 1 unit pRBC (11/25) for Hgb 6.8 with appropriate increase to 7.4  -No acute signs or symptoms of bleeding at this time  - Will continue to monitor and transfuse for Hgb <7      F: As needed  E: AS needed  N: Adult; Carb Controlled  A:  PIV, Maldonado new as of 11/17  DVT ppx: Heparin subcutaneous, Holding for procedure  GI: N/A  Code Status: Full code  NOK: Charlene Pardo (friend) [phone]      Principal Problem:    MSSA bacteremia  Active Problems:    Endocarditis    History of general anesthesia    Complete atrioventricular block (CMS/HCC)    Intermittent complete atrioventricular block (CMS/HCC)         Gonzalo Henley MD    Internal Medicine PGY-I

## 2023-12-03 NOTE — DISCHARGE INSTRUCTIONS
Dear Mr. Verduzco,    As you prepare to leave the hospital, we want to provide you with a clear summary of your recent health journey and instructions for your continued recovery: You were admitted due to kidney issues, low urine output, and persistent positive blood cultures for MSSA, leading to the discovery of tricuspid valve and lead vegetations. The infected lead was successfully removed, but post-removal, you experienced intermittent heart-related issues, leading to a change in medication and re-implantation with a micra leadless pacemaker. You are on IV Ancef, and the Infectious Disease team recommends continuing Cefazolin until December 20th, with PICC placement for ongoing care. A unit of packed red blood cells was given for low hemoglobin, and your levels have remained stable. Your home lasix and losartan were temporarily held due to kidney concerns but will be reevaluated in the outpatient setting.    Follow-Up Appointments: You will be contacted to schedule the following appointments if not already listed on the sheet above:   Outpatient Cardiology: Attend your follow-up appointments to monitor your pacemaker and heart health.  Infectious Disease: Continue antibiotic treatment as prescribed and follow up with the Infectious Disease team.  Groin Lymphadenopathy: Enlarged lymph nodes were identified, and a biopsy was performed. Follow up with Surgical Oncology for results.  Urology Follow-Up: Your outpatient urology appointment is essential for addressing your severe BPH and mladonado catheter.    If not contacted within one week please call [phone] to schedule.     Return Precautions:  Heart-Related Symptoms: If you experience chest pain, shortness of breath, or irregular heartbeats, seek prompt medical attention.  Bleeding or Anemia Signs: Report any signs of bleeding or anemia, such as dark stools or extreme fatigue.  Renal Concerns: If you notice changes in urine output or have concerns about kidney  function, inform your healthcare team.  Infectious Symptoms: Watch for signs of infection, such as fever or worsening lymphadenopathy.      We understand that this information may be overwhelming, and we are here to support you in your recovery. If you have any questions or concerns, don't hesitate to reach out.      Wishing you a smooth transition home and a successful recovery,  Your Healthcare Team

## 2023-12-04 VITALS
RESPIRATION RATE: 18 BRPM | DIASTOLIC BLOOD PRESSURE: 75 MMHG | HEIGHT: 74 IN | OXYGEN SATURATION: 98 % | BODY MASS INDEX: 31.26 KG/M2 | HEART RATE: 80 BPM | WEIGHT: 243.61 LBS | TEMPERATURE: 97 F | SYSTOLIC BLOOD PRESSURE: 155 MMHG

## 2023-12-04 LAB
CELL COUNT (BLOOD): 0.03 X10*3/UL
CELL POPULATIONS: NORMAL
DIAGNOSIS: NORMAL
FLOW DIFFERENTIAL: NORMAL
FLOW TEST ORDERED: NORMAL
GLUCOSE BLD MANUAL STRIP-MCNC: 108 MG/DL (ref 74–99)
GLUCOSE BLD MANUAL STRIP-MCNC: 90 MG/DL (ref 74–99)
LAB TEST METHOD: NORMAL
MAGNESIUM SERPL-MCNC: 1.54 MG/DL (ref 1.6–2.4)
NUMBER OF CELLS COLLECTED: NORMAL
PATH REPORT.TOTAL CANCER: NORMAL
SIGNATURE COMMENT: NORMAL
SPECIMEN VIABILITY: NORMAL
STAPHYLOCOCCUS SPEC CULT: NORMAL

## 2023-12-04 PROCEDURE — 2500000004 HC RX 250 GENERAL PHARMACY W/ HCPCS (ALT 636 FOR OP/ED)

## 2023-12-04 PROCEDURE — 2500000001 HC RX 250 WO HCPCS SELF ADMINISTERED DRUGS (ALT 637 FOR MEDICARE OP)

## 2023-12-04 PROCEDURE — 82947 ASSAY GLUCOSE BLOOD QUANT: CPT

## 2023-12-04 PROCEDURE — 99232 SBSQ HOSP IP/OBS MODERATE 35: CPT

## 2023-12-04 PROCEDURE — 83735 ASSAY OF MAGNESIUM: CPT

## 2023-12-04 PROCEDURE — 97110 THERAPEUTIC EXERCISES: CPT | Mod: GP

## 2023-12-04 PROCEDURE — 96372 THER/PROPH/DIAG INJ SC/IM: CPT

## 2023-12-04 RX ORDER — SENNOSIDES 8.6 MG/1
1 TABLET ORAL ONCE
Status: COMPLETED | OUTPATIENT
Start: 2023-12-04 | End: 2023-12-04

## 2023-12-04 RX ORDER — MAGNESIUM SULFATE HEPTAHYDRATE 40 MG/ML
4 INJECTION, SOLUTION INTRAVENOUS ONCE
Status: COMPLETED | OUTPATIENT
Start: 2023-12-04 | End: 2023-12-04

## 2023-12-04 RX ADMIN — FINASTERIDE 5 MG: 5 TABLET, FILM COATED ORAL at 08:33

## 2023-12-04 RX ADMIN — SENNOSIDES 8.6 MG: 8.6 TABLET, FILM COATED ORAL at 08:31

## 2023-12-04 RX ADMIN — CEFAZOLIN SODIUM 2 G: 2 INJECTION, SOLUTION INTRAVENOUS at 04:02

## 2023-12-04 RX ADMIN — MAGNESIUM SULFATE HEPTAHYDRATE 4 G: 40 INJECTION, SOLUTION INTRAVENOUS at 07:39

## 2023-12-04 RX ADMIN — HEPARIN SODIUM 5000 UNITS: 5000 INJECTION INTRAVENOUS; SUBCUTANEOUS at 11:30

## 2023-12-04 RX ADMIN — CEFAZOLIN SODIUM 2 G: 2 INJECTION, SOLUTION INTRAVENOUS at 15:50

## 2023-12-04 RX ADMIN — HYDRALAZINE HYDROCHLORIDE 10 MG: 10 TABLET, FILM COATED ORAL at 14:14

## 2023-12-04 RX ADMIN — HEPARIN SODIUM 5000 UNITS: 5000 INJECTION INTRAVENOUS; SUBCUTANEOUS at 04:05

## 2023-12-04 RX ADMIN — HYDRALAZINE HYDROCHLORIDE 10 MG: 10 TABLET, FILM COATED ORAL at 08:33

## 2023-12-04 RX ADMIN — METOPROLOL SUCCINATE 25 MG: 25 TABLET, EXTENDED RELEASE ORAL at 09:00

## 2023-12-04 ASSESSMENT — PAIN - FUNCTIONAL ASSESSMENT
PAIN_FUNCTIONAL_ASSESSMENT: 0-10
PAIN_FUNCTIONAL_ASSESSMENT: 0-10

## 2023-12-04 ASSESSMENT — COGNITIVE AND FUNCTIONAL STATUS - GENERAL
PERSONAL GROOMING: A LITTLE
STANDING UP FROM CHAIR USING ARMS: A LOT
MOVING TO AND FROM BED TO CHAIR: A LOT
CLIMB 3 TO 5 STEPS WITH RAILING: TOTAL
MOVING FROM LYING ON BACK TO SITTING ON SIDE OF FLAT BED WITH BEDRAILS: A LITTLE
DRESSING REGULAR UPPER BODY CLOTHING: A LOT
TURNING FROM BACK TO SIDE WHILE IN FLAT BAD: A LITTLE
WALKING IN HOSPITAL ROOM: A LOT
STANDING UP FROM CHAIR USING ARMS: A LOT
MOVING TO AND FROM BED TO CHAIR: A LOT
CLIMB 3 TO 5 STEPS WITH RAILING: TOTAL
DAILY ACTIVITIY SCORE: 15
DRESSING REGULAR LOWER BODY CLOTHING: A LOT
TURNING FROM BACK TO SIDE WHILE IN FLAT BAD: A LOT
MOBILITY SCORE: 12
HELP NEEDED FOR BATHING: A LOT
MOBILITY SCORE: 13
TOILETING: A LOT
WALKING IN HOSPITAL ROOM: TOTAL

## 2023-12-04 ASSESSMENT — PAIN SCALES - GENERAL: PAINLEVEL_OUTOF10: 0 - NO PAIN

## 2023-12-04 NOTE — CARE PLAN
Problem: Infection related to problem list condition  Goal: Infection will resolve through treatment  Outcome: Progressing     Problem: Pain  Goal: My pain/discomfort is manageable  Outcome: Progressing     Problem: Safety  Goal: Patient will be injury free during hospitalization  Outcome: Progressing  Goal: I will remain free of falls  Outcome: Progressing     Problem: Daily Care  Goal: Daily care needs are met  Outcome: Progressing     Problem: Psychosocial Needs  Goal: Demonstrates ability to cope with hospitalization/illness  Outcome: Progressing  Goal: Collaborate with me, my family, and caregiver to identify my specific goals  Outcome: Progressing     Problem: Discharge Barriers  Goal: My discharge needs are met  Outcome: Progressing     Problem: Skin  Goal: Prevent/minimize sheer/friction injuries  Outcome: Progressing   The patient's goals for the shift include to be painfree    The clinical goals for the shift include Patient will remain safe and hemodynamically stable.

## 2023-12-04 NOTE — PROGRESS NOTES
Physical Therapy    Physical Therapy Treatment    Patient Name: Chester Verduzco  MRN: [id number]  Today's Date: 12/4/2023  Time Calculation  Start Time: 1135  Stop Time: 1158  Time Calculation (min): 23 min       Assessment/Plan   PT Assessment  Medical Staff Made Aware: Yes  End of Session Communication: Bedside nurse  Assessment Comment: Pt c/o fatigue and weakness this session, politely declining transfers this session.  Completed ther ex with AAROM R heel slides for increased ROM and R hip abd/add due to weakness.  Pt continues to benefit from skilled PT and remains appropriate for moderate intensity PT upon discharge.  End of Session Patient Position: Bed, 3 rail up, Alarm off, not on at start of session  PT Plan  Inpatient/Swing Bed or Outpatient: Inpatient  PT Plan  Treatment/Interventions: Bed mobility, Transfer training, Gait training, Balance training, Strengthening, Endurance training, Therapeutic exercise, Therapeutic activity  PT Plan: Skilled PT  PT Frequency: 5 times per week  PT Discharge Recommendations: Moderate intensity level of continued care  PT Recommended Transfer Status: Assistive device, Assist x1 (RW)  PT - OK to Discharge: Yes      General Visit Information:   PT  Visit  PT Received On: 12/04/23  General  Prior to Session Communication: Bedside nurse  Patient Position Received: Bed, 4 rail up, Alarm off, not on at start of session  General Comment: Pt cleared for PT by RN.  Pt alert and agreeable to ther ex this session, politely declining working on sit to stand transfers and transferring to chair due to feeling wiped out.  PPM implantation 11/30.  Reviewed pacemaker precautions.    Subjective   Precautions:  Precautions  Medical Precautions: Fall precautions, Cardiac precautions  Precautions Comment: Pacemaker precautions  Vital Signs:  Vital Signs  Heart Rate:  (pre:82, post:87)  Heart Rate Source: Monitor    Objective   Pain:  Pain Assessment  Pain Assessment: 0-10  Pain Score:  (not  rated)  Pain Location: Knee  Pain Orientation: Right  Cognition:  Cognition  Overall Cognitive Status: Within Functional Limits    Activity Tolerance:  Activity Tolerance  Endurance: Tolerates 10 - 20 min exercise with multiple rests  Treatments:  Therapeutic Exercise  Therapeutic Exercise Performed: Yes  Therapeutic Exercise Activity 1: Supine ankle pumps, heel slides, hip abd/add, quad sets, seated LAQ, marches x20 each LE.    Bed Mobility  Bed Mobility: Yes  Bed Mobility 1  Bed Mobility 1: Supine to sitting, Sitting to supine  Level of Assistance 1: Close supervision    Ambulation/Gait Training  Ambulation/Gait Training Performed: No  Transfers  Transfer: No  Transfer 1  Transfer From 1: Bed to  Transfer to 1: Stand  Technique 1: Sit to stand  Transfer Device 1: Walker  Transfer Level of Assistance 1: Moderate assistance  Transfers 2  Transfer From 2: Stand to  Transfer to 2: Chair with arms  Technique 2: Stand pivot  Transfer Device 2: Walker  Transfer Level of Assistance 2: Contact guard  Trials/Comments 2: multiple small pivots due to pt unable to take steps with R LE, feeling like R knee will buckle    Outcome Measures:  [facility] Basic Mobility  Turning from your back to your side while in a flat bed without using bedrails: None  Moving from lying on your back to sitting on the side of a flat bed without using bedrails: A little  Moving to and from bed to chair (including a wheelchair): A lot  Standing up from a chair using your arms (e.g. wheelchair or bedside chair): A lot  To walk in hospital room: Total  Climbing 3-5 steps with railing: Total  Basic Mobility - Total Score: 13    Education Documentation  Body Mechanics, taught by Pratima Vega PT at 12/4/2023 12:07 PM.  Learner: Patient  Readiness: Acceptance  Method: Explanation  Response: Verbalizes Understanding    Mobility Training, taught by Pratima Vega PT at 12/4/2023 12:07 PM.  Learner: Patient  Readiness: Acceptance  Method:  Explanation  Response: Verbalizes Understanding    Precautions, taught by Pratima Vega, PT at 12/4/2023 12:07 PM.  Learner: Patient  Readiness: Acceptance  Method: Explanation  Response: Verbalizes Understanding  Comment: Pacemaker precautions    Education Comments  No comments found.          Encounter Problems       Encounter Problems (Active)       PT Problem       Patient will complete supine to sit and sit to supine Independent  (Progressing)       Start:  11/17/23    Expected End:  12/18/23            Patient will perform sit<>stand transfer with Rolling Walker, and Supervision  (Progressing)       Start:  11/17/23    Expected End:  12/18/23            Patient will ambulate >100' with Rolling Walker and Supervision  (Not Progressing)       Start:  11/17/23    Expected End:  12/18/23            Patient will ascend/descend 3 steps with Bilateral Rails and SBA  (Not Progressing)       Start:  11/17/23    Expected End:  12/18/23

## 2023-12-04 NOTE — CARE PLAN
Patient to be discharged to [facility] [place] for Rehab. Report called reviewed discharge summary including medications, diet and follow up appointments. IV discontinued tip intact. PICC remained in place. Will transfer to Nursing Home. Bill Martinez RN     Problem: Skin  Goal: Prevent/minimize sheer/friction injuries  Outcome: Progressing  Flowsheets (Taken 12/4/2023 1251)  Prevent/minimize sheer/friction injuries: Turn/reposition every 2 hours/use positioning/transfer devices

## 2023-12-04 NOTE — PROGRESS NOTES
"Chester Verduzco is a 73 y.o. male on day 18 of admission presenting with MSSA bacteremia.    Subjective     Overnight events:   Pt was seen and examined at bedside. No acute events overnightPt stated not having a BM since Tuesday. Denies any chest pain, SOB, dyspnea, new cough, palpitations, nausea, vomiting, diarrhea, dizziness, changes in visions, headaches, or worsening lower extremity swelling. Pt is comfortable in bed and in no acute distress.     Vital signs:  Blood pressure 147/77, pulse 85, temperature 36.6 °C (97.9 °F), resp. rate 18, height 1.88 m (6' 2.02\"), weight 110 kg (243 lb 9.7 oz), SpO2 96 %.    I/O last 3 completed shifts:  In: 840 (7.6 mL/kg) [P.O.:840]  Out: 2875 (26 mL/kg) [Urine:2875 (0.7 mL/kg/hr)]  Weight: 110.5 kg     Physical Exam  GENERAL:  In no acute distress  NEUROLOGIC:  A and O x 3. Cranial nerves 2 through 12 grossly intact with no gait disturbances. Intact motor and sensory systems, with normal reflexes and coordination.    HEENT:  Pupils are equal, round, and reactive to light and accommodation. Extraocular motion intact.    MOUTH: MMM, no lesions observed  CARDIAC: S1 + S2, RRR, no M/R/G.    LUNGS: CTA BL.  No wheezes or coarse breath sounds. Symmetric respirations. No increased work of breathing.   ABDOMEN: Soft, non-tender to palpation. No organomegaly. Normal BS in 4Q, No rebound or guarding.  EXTREMITIES:  Moving x4 equally and spontaneously. Right knee with mild effusion present but no erythema or warmth. Chronic limited ROM but not worse than baseline. No BLE edema. Painless non-reducible soft tissue mass in the left groin.  SKIN: Clean, warm, dry, and intact with normal skin turgor.  PSYCH: Appropriate mood and behavior.     Relevant Results  Scheduled medications  amLODIPine, 10 mg, oral, q PM  ceFAZolin in dextrose (iso-os), 2 g, intravenous, q12h  finasteride, 5 mg, oral, Daily  heparin (porcine), 5,000 Units, subcutaneous, q8h  hydrALAZINE, 10 mg, oral, TID  insulin " glargine, 10 Units, subcutaneous, Nightly  insulin lispro, 0-5 Units, subcutaneous, TID with meals  lidocaine, 5 mL, infiltration, Once  magnesium sulfate, 4 g, intravenous, Once  melatonin, 5 mg, oral, Nightly  metoprolol succinate XL, 25 mg, oral, Daily  polyethylene glycol, 17 g, oral, Daily  rosuvastatin, 10 mg, oral, Nightly  sennosides, 1 tablet, oral, Nightly  tamsulosin, 0.8 mg, oral, Nightly      Continuous medications     PRN medications  PRN medications: acetaminophen, alteplase, benzocaine-menthol, dextrose 10 % in water (D10W), dextrose, glucagon, oxyCODONE      Labs:  Last CBC:  Lab Results   Component Value Date    WBC 3.7 (L) 12/03/2023    HGB 7.0 (L) 12/03/2023    HCT 22.4 (L) 12/03/2023    MCV 89 12/03/2023    PLT 90 (L) 12/03/2023       Last RFP:  Lab Results   Component Value Date    GLUCOSE 73 (L) 12/03/2023    CALCIUM 8.2 (L) 12/03/2023     12/03/2023    K 3.5 12/03/2023    CO2 26 12/03/2023     12/03/2023    BUN 20 12/03/2023    CREATININE 2.37 (H) 12/03/2023       Last LFTs:  Lab Results   Component Value Date    ALT <3 (L) 11/17/2023    AST 11 11/17/2023    ALKPHOS 94 11/17/2023    BILITOT 0.3 11/17/2023       Last coags:  Lab Results   Component Value Date    INR 1.2 (H) 11/30/2023    APTT 36 11/30/2023       Micro/culture data:  Susceptibility data from last 90 days.  Collected Specimen Info Organism Clindamycin Erythromycin Oxacillin Tetracycline Trimethoprim/Sulfamethoxazole Vancomycin   11/22/23 Tissue/Biopsy from Other (specify in comments) Methicillin Susceptible Staphylococcus aureus (MSSA) S S S S S S   11/22/23 Tissue/Biopsy from Other (specify in comments) Methicillin Susceptible Staphylococcus aureus (MSSA) S S S S S S   11/14/23 Swab from Anterior Nares Methicillin Resistant Staphylococcus aureus (MRSA)         11/14/23 Blood culture from Peripheral Venipuncture Methicillin Susceptible Staphylococcus aureus (MSSA) S S S S S S         Imaging:  XR chest 2  views  Narrative: Interpreted By:  Jacky Awan,   STUDY:  XR CHEST 2 VIEWS;  12/1/2023 7:26 pm      INDICATION:  Signs/Symptoms:post device.      COMPARISON:  10/02/2023.      ACCESSION NUMBER(S):  [id number]      ORDERING CLINICIAN:  BARON BACON      Impression: 1. Removal of the pacemaker. Surgical staple line in the right upper  chest.  2. No pneumothorax seen.  3. Cardiac silhouette is enlarged.  4. Pulmonary vessels are congested centrally.  5. Loop recorder placement.  6. Lungs are hyperinflated/COPD.  7. No pleural effusions seen.      Signed by: Jacky Awan 12/3/2023 6:04 PM  Dictation workstation:   [id number]      Assessment/Plan   Mr. Chester Verduzco is a 73 year old male with PMHX HTN, HLD, Dual chamber PPM implanted in 11/1996 for SSS (RV is passive) with most recent generator replacement in 11/2020 ([employer]), Type 2 DM, gout, and recent hospitalization (10/2-10/7) for Staph bacteremia, diarrhea, NOHELIA , and hyponatremia 2/2 hypovolemia who presented to [facility] ED from a SNF on 11/13 for NOHELIA on labs from the SNF and oliguria. Blood cultures were positive for MSSA and previously had been positive 10/8 and 11/10 for MSSA. Blood cultures with NGTD as of 11/16.  FILIPE 11/16 showed tricuspid valve vegetation as well as right atrial lead vegetation. Patient has been afebrile without leukocytosis and hemodynamically stable. Device Interrogated 11/17 with AP 29% and  30%.  Lead extraction complete 11/22.  Patient on IV antibiotics with stop 4 weeks after lead removal (~12/20). After lead removal, on telemetry the patient had several episodes of intermittent asymptomatic high-grade AV block, NSVT,and bradycardia. EP plans to implant PPM after 14 days of negative blood culture post removal per, ID. Patient received a unit of packed red blood cells for low hemoglobin (11/25). Hemoglobin has remained stable with no signs or symptoms of bleeding. Home lasix and losartan were being held in  setting of NOHELIA 2/2 to nephrotoxic antibiotic exposure which down trending but not at baseline with adequate urine output. Patient has maldonado I/s/o severe BPH. Of note, Patient noticed left groin lymphadenopathy and U/S (11/23) showed Enlarged left inguinal/external iliac lymph nodes. PET scan deferred at this time d/t active infectious process. Underwent core needle biopsy on 11/29. Patient to follow up biopsy results with surgical oncology. Device re-implant (micra leadless pacemaker) 11/30, PICC placed 12/2. In discussing with ID on 12/2, and reviewing past ID notes, reimplant was done iso tricuspid vegetation with the assumption that patient was sterilized given Negative BC, lack of growth in vegetation size and clinical picture. Patient has outpatient follow up scheduled further and antibiotic use will be determined then.    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  #Tricuspid Valve Endocarditis, resolved   #Leukocytosis, resolved  - Blood cultures from 10/9, 11/10, and 11/14 were positive for MSSA   - ID recommends IV ANCEF 4 weeks after lead removal, Cefazolin 2 g q12 until  (12/20)  - Daily CBC Trend WBC, no leukocytosis  -Metoprolol succ 25 mg daily  -Device re-implant (micra leadless pacemaker) 11/30  -PICC line placed 12/2  - Post op TTE (12/1) with Tricuspid valve have a mobile vegetation on the anterior leaflet of the tricuspid valve measuring 0.7 cm x 1.1 cm in size   --ID will follow up as outpatient with Dr Gonzalez (~1 week prior ending abx course) will determine further antibiotic need.  CBC, BMP, ESR and CRP weekly and fax to [phone] Attn Dr Gonzalez    #NOHELIA, improving  :likely ATN given gradual rise in creatinine over past month and history of recent diarrhea  and nephrotoxic antibiotic exposure. Renal U/S did not show any hydronephrosis.  - Baseline prior to October 2023 hospitalization ~1.0-1.1.   - Elevated to 5.43 on arrival to [facility] on 11/13, down trending  - Initial urine electrolytes showed Fena 2.5% indicating intrinsic etiology, Normal C3 and C4  - Nephrology signed off, will reengage  if Cr starts to uptrend   - Maldonado replaced (11/17) ,strict I&Os, No indication for HD at this time  - Daily RFP  - Replete lytes as needed  -Encourage PO intake    #Inguinal lymphadenopathy  -Patient has endorsed left sided non tender lymphadenopathy for one month   - U/S (11/23) showed Enlarged left inguinal/external iliac lymph nodes measuring up to  5.7 cm x 3.3 cm x 4.4 cm with internal hypervascularity which were  visualized on the CT dated 10/02/2023 and not definitively seen dating back to 2015.  -Given state of infection, could be reactive or concerning for a lymphoproliferative disorder or metastatic disease from left lower extremity malignancy  -PET scan deferred at this time d/t active infectious process. Surg onc consulted  -Patient to follow up with Dr. Cameron (Surgical oncology) for further discussion and evaluation of left inguinal lymphadenopathy   -Underwent core biopsy for inguinal lymphadenopathy with IR 11/29  -Referral to diagnostic clinic for outpatient follow up  -F/up biopsy results    #Type 2 DM  - Checking A1c  - On Lantus 10 units QHS and cover with SSI,  on lantus 5 while NPO  - hypoglycemia protocol    #HTN  #HLD  - Holding home losartan and lasix in setting of NOHELIA  - Continue Amlodipine 10, metop succinate 25 daily, and rosuvastatin 10 daily , Hyrdalizine     #BPH  - Was initially planned for outpatient robotic prostatectomy end of November that has since been canceled and rescheduled for December  - Continue with Flomax 0.8 mg QHS and finasteride 5 md daily  - Continue maldonado     #Anemia  :Patient with slowly down trending Hgb from since this  admission. Baseline 13-14. Stable this AM  -Received 1 unit pRBC (11/25) for Hgb 6.8 with appropriate increase to 7.4  -No acute signs or symptoms of bleeding at this time  - Will continue to monitor and transfuse for Hgb <7      F: As needed  E: AS needed  N: Adult; Carb Controlled  A:  PIV, Rojo new as of 11/17  DVT ppx: Heparin subcutaneous, Holding for procedure  GI: N/A  Code Status: Full code  NOK: Charlene Pardo (friend) [phone]      Principal Problem:    MSSA bacteremia  Active Problems:    Endocarditis    History of general anesthesia    Complete atrioventricular block (CMS/HCC)    Intermittent complete atrioventricular block (CMS/HCC)         Gonzalo Henley MD   Internal Medicine PGY-I

## 2023-12-05 ENCOUNTER — APPOINTMENT (OUTPATIENT)
Dept: CARDIOLOGY | Facility: HOSPITAL | Age: 73
End: 2023-12-05
Payer: MEDICARE

## 2023-12-05 LAB
LAB AP ASR DISCLAIMER: NORMAL
LABORATORY COMMENT REPORT: NORMAL
PATH REPORT.FINAL DX SPEC: NORMAL
PATH REPORT.GROSS SPEC: NORMAL
PATH REPORT.RELEVANT HX SPEC: NORMAL
PATH REPORT.TOTAL CANCER: NORMAL

## 2023-12-06 ENCOUNTER — APPOINTMENT (OUTPATIENT)
Dept: UROLOGY | Facility: CLINIC | Age: 73
End: 2023-12-06
Payer: COMMERCIAL

## 2023-12-06 ENCOUNTER — APPOINTMENT (OUTPATIENT)
Dept: CARDIOLOGY | Facility: HOSPITAL | Age: 73
DRG: 260 | End: 2023-12-06
Payer: MEDICARE

## 2023-12-06 ENCOUNTER — APPOINTMENT (OUTPATIENT)
Dept: RADIOLOGY | Facility: HOSPITAL | Age: 73
End: 2023-12-06
Payer: MEDICARE

## 2023-12-07 ENCOUNTER — NURSING HOME VISIT (OUTPATIENT)
Dept: POST ACUTE CARE | Facility: EXTERNAL LOCATION | Age: 73
End: 2023-12-07
Payer: MEDICARE

## 2023-12-07 DIAGNOSIS — M17.11 PRIMARY OSTEOARTHRITIS OF RIGHT KNEE: ICD-10-CM

## 2023-12-07 DIAGNOSIS — E11.40 TYPE 2 DIABETES MELLITUS WITH DIABETIC NEUROPATHY, WITH LONG-TERM CURRENT USE OF INSULIN (MULTI): ICD-10-CM

## 2023-12-07 DIAGNOSIS — Z79.4 TYPE 2 DIABETES MELLITUS WITH DIABETIC NEUROPATHY, WITH LONG-TERM CURRENT USE OF INSULIN (MULTI): ICD-10-CM

## 2023-12-07 DIAGNOSIS — I38 ENDOCARDITIS, UNSPECIFIED CHRONICITY, UNSPECIFIED ENDOCARDITIS TYPE: ICD-10-CM

## 2023-12-07 PROCEDURE — 99305 1ST NF CARE MODERATE MDM 35: CPT | Performed by: FAMILY MEDICINE

## 2023-12-07 NOTE — LETTER
Patient: Chester Verduzco  : 1950    Encounter Date: 2023    Chester Verduzco is a 73 y.o. 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 No fever overnight. No CP/SOB.    O: VSS AFEB 271->249# (intentional weight loss with dietary/diabetic intervention) Awake, alert, NAD. Chest cta. Heart rrr. Right anterior chest with healed PPM incision. Pain in right knee with ROM. HUNG draining clear. Ext no c/c/e.    LAB (23) & (23) Pending. (23) Hgb 7.3, Alb 3.2, WBC 3.72, Cr 2.02, GFR 34, Na 139, K 3.2  (23) Iron 21, TIBC 134, Ferritin 849, Transferrin sat 15.7%    A/P:  # Weakness: SNF PT/OT. He requires wheeled walker, chair lift mechanism, and raised toilet seat to allow him independence with ADLs.  # Endocarditis: old PPM removed, Ancef 2g bid through 23.  # BPH w/ LUTS & Obstructive Uropathy: increase Flomax back to 0.8 mg qhs. Finasteride 5 mg. HUNG. Encouraged to reschedule definitive prostate surgery with Dr Delaney.  # NOHELIA: stabilized off lasix and arb. F/U Nephro/ [facility].  # OA: norco 5/325 1 po q6h prn pain (#120). Scheduled for Rt TKA Dr Kothari 2024 -- will try to get him in for fitting knee brace if PT cannot order one.  # HTN: amlodipine 10, Toprol 25, hydralazine 10 tid. Consider resume ARB as renal function improves.  # DM w nephropathy and neuropathy: stable on Tresiba 10 units daily. When renal function improves can start farxiga. Losing weight intentionally as sticking with diabetic diet.  # Gout:  allopurinol  # Anemia: no iron deficiency. Likely d/t CKD. Consider EPO injection.  # GERD: OFF PPI d/t NOHELIA.  # Left inguinal lymph node excisional bx pending.    Past Surgical History:   Procedure Laterality Date   • CARDIAC ELECTROPHYSIOLOGY PROCEDURE Left 11/22/2023    Procedure: PPM Lead Extraction;  Surgeon: Etienne Aguilar MD;  Location: [facility] 2F Cardiac Cath Lab;  Service: Electrophysiology;  Laterality: Left;   • CARDIAC ELECTROPHYSIOLOGY PROCEDURE N/A 11/30/2023    Procedure: PPM Leadless Implant (MICRA AV);  Surgeon: Etienne Aguilar MD;  Location: [facility] 3529 Cardiac Cath Lab;  Service: Electrophysiology;  Laterality: N/A;   • CARDIAC PACEMAKER PLACEMENT  08/22/2013    Pacemaker Permanent Placement   • OTHER SURGICAL HISTORY  06/15/2015    Ventral Hernia Repair - Recurrent   • OTHER SURGICAL HISTORY  09/16/2019    Prostate biopsy   • PERIPHERALLY INSERTED CENTRAL CATHETER INSERTION     • US GUIDED BIOPSY LYMPH NODE SUPERFICIAL  11/29/2023    US GUIDED BIOPSY LYMPH NODE SUPERFICIAL 11/29/2023 Mao Dubose MD [facility]   • VARICOSE VEIN SURGERY  08/22/2013    Varicose Vein Ligation   • VENTRAL HERNIA REPAIR  06/15/2015    Ventral Hernia Repair      Social History     Socioeconomic History   • Marital status: Single     Spouse name: Not on file   • Number of children: Not on file   • Years of education: Not on file   • Highest education level: Not on file   Occupational History   • Not on file   Tobacco Use   • Smoking status: Never     Passive exposure: Never   • Smokeless tobacco: Never   Vaping Use   • Vaping Use: Never used   Substance and Sexual Activity   • Alcohol use: Not Currently   • Drug use: Never   • Sexual activity: Not Currently   Other Topics Concern   • Not on file   Social History Narrative   • Not on file     Social Determinants of Health     Financial Resource Strain: Low Risk  (11/17/2023)    Overall Financial Resource Strain (CARDIA)    • Difficulty of Paying Living Expenses: Not hard  at all   Recent Concern: Financial Resource Strain - Medium Risk (10/2/2023)    Overall Financial Resource Strain (CARDIA)    • Difficulty of Paying Living Expenses: Somewhat hard   Food Insecurity: No Food Insecurity (10/3/2023)    Hunger Vital Sign    • Worried About Running Out of Food in the Last Year: Never true    • Ran Out of Food in the Last Year: Never true   Transportation Needs: No Transportation Needs (11/17/2023)    PRAPARE - Transportation    • Lack of Transportation (Medical): No    • Lack of Transportation (Non-Medical): No   Physical Activity: Inactive (10/2/2023)    Exercise Vital Sign    • Days of Exercise per Week: 0 days    • Minutes of Exercise per Session: 0 min   Stress: Not on file   Social Connections: Not on file   Intimate Partner Violence: Not on file   Housing Stability: Low Risk  (11/17/2023)    Housing Stability Vital Sign    • Unable to Pay for Housing in the Last Year: No    • Number of Places Lived in the Last Year: 1    • Unstable Housing in the Last Year: No     Past Medical History:   Diagnosis Date   • Abnormal weight gain 10/27/2016    Abnormal weight gain   • Achilles tendinitis, unspecified leg 08/16/2016    Achilles tendinitis   • Acute upper respiratory infection, unspecified     Acute URI   • NOHELIA (acute kidney injury) (CMS/[facility]) 10/02/2023    10/2023-11/2023 Following vancomycin in setting of bacteremia.   • Allergic contact dermatitis due to plants, except food 08/22/2013    Contact dermatitis due to poison ivy   • Bell's palsy 03/21/2023   • BPH with obstruction/lower urinary tract symptoms    • Cellulitis of right lower limb 07/30/2021    Cellulitis of right lower extremity without foot   • Cough, unspecified 03/21/2016    Cough   • Diabetes (CMS/[facility])    • Encounter for screening for human immunodeficiency virus (HIV) 06/14/2016    Screening for HIV (human immunodeficiency virus)   • Hordeolum externum unspecified eye, unspecified eyelid 08/14/2019    Stye   •  Hyperglycemia, unspecified 12/13/2017    Chronic hyperglycemia   • Incisional hernia without obstruction or gangrene 06/15/2015    Recurrent ventral hernia   • Knee joint pain     Right knee- bone on bone   • MSSA bacteremia 10/2023   • Muscle spasm of calf 08/16/2016    Muscle spasm of calf   • Obstructive uropathy    • Personal history of other diseases of the digestive system 06/30/2015    History of umbilical hernia   • Personal history of other diseases of the digestive system 02/05/2016    History of ventral hernia   • Personal history of other diseases of the respiratory system 12/23/2014    History of acute sinusitis   • Personal history of other diseases of the respiratory system 01/28/2016    History of upper respiratory infection   • Personal history of other infectious and parasitic diseases 12/02/2015    History of hepatitis B virus infection   • Personal history of other infectious and parasitic diseases     History of hepatitis B virus infection   • Personal history of other specified conditions 02/04/2015    History of nausea and vomiting   • Personal history of other specified conditions 08/16/2016    History of edema   • Personal history of other specified conditions 01/26/2015    History of jaundice   • Personal history of pneumonia (recurrent) 01/29/2016    History of pneumonia   • Personal history of urinary (tract) infections 02/19/2013    History of urinary tract infection   • Prostatitis 03/21/2023   • Pruritus, unspecified 02/10/2015    Pruritus   • Sinoatrial node dysfunction (CMS/HCC)    • Strain of muscle, fascia and tendon of the posterior muscle group at thigh level, right thigh, initial encounter 05/10/2016    Tear of right hamstring   • Unspecified symptoms and signs involving the genitourinary system 12/20/2016    UTI symptoms      No family history on file.     Review of Systems   Constitutional:  Negative for chills, fatigue and fever.   HENT:  Negative for rhinorrhea and sore  throat.    Eyes:  Negative for pain and redness.   Respiratory:  Negative for cough and shortness of breath.    Cardiovascular:  Negative for chest pain and palpitations.   Gastrointestinal:  Negative for abdominal pain and blood in stool.   Endocrine: Negative for polydipsia and polyuria.   Genitourinary:  Negative for dysuria and hematuria.   Musculoskeletal:  Positive for arthralgias. Negative for back pain and neck stiffness.   Skin:  Negative for rash and wound.   Allergic/Immunologic: Negative for environmental allergies and food allergies.   Neurological:  Positive for weakness. Negative for headaches.   Hematological:  Negative for adenopathy. Does not bruise/bleed easily.   Psychiatric/Behavioral:  Negative for hallucinations and suicidal ideas.       There were no vitals taken for this visit.  Physical Exam  Vitals reviewed.   Constitutional:       General: He is not in acute distress.     Appearance: He is not ill-appearing.   HENT:      Head: Normocephalic and atraumatic.      Right Ear: Tympanic membrane normal.      Left Ear: Tympanic membrane normal.      Nose: No congestion or rhinorrhea.      Mouth/Throat:      Pharynx: No oropharyngeal exudate or posterior oropharyngeal erythema.   Eyes:      Extraocular Movements: Extraocular movements intact.      Conjunctiva/sclera: Conjunctivae normal.      Pupils: Pupils are equal, round, and reactive to light.   Cardiovascular:      Rate and Rhythm: Normal rate and regular rhythm.      Heart sounds: No murmur heard.     No friction rub. No gallop.      Comments: PPM  Pulmonary:      Effort: Pulmonary effort is normal.      Breath sounds: Normal breath sounds. No wheezing, rhonchi or rales.   Abdominal:      General: There is no distension.      Palpations: Abdomen is soft.      Tenderness: There is no abdominal tenderness. There is no guarding or rebound.   Musculoskeletal:         General: No swelling or deformity.      Cervical back: Normal range of motion  "and neck supple.      Right lower leg: No edema.      Left lower leg: No edema.   Skin:     Capillary Refill: Capillary refill takes less than 2 seconds.      Coloration: Skin is not jaundiced.      Findings: No rash.      Comments: Left groin incision c/d/I  Left Right anterior chest incision c/d/i   Neurological:      General: No focal deficit present.      Mental Status: He is alert.      Motor: No weakness.   Psychiatric:         Mood and Affect: Mood normal.         Behavior: Behavior normal.       Lab Results   Component Value Date    WBC 3.7 (L) 12/03/2023    HGB 7.0 (L) 12/03/2023    HCT 22.4 (L) 12/03/2023    MCV 89 12/03/2023    PLT 90 (L) 12/03/2023     Lab Results   Component Value Date    CHOL 109 08/11/2023    CHOL 120 08/10/2022    CHOL 202 (H) 08/19/2020     Lab Results   Component Value Date    HDL 27.7 (A) 08/11/2023    HDL 30.7 (A) 08/10/2022    HDL 33.7 (A) 08/19/2020     No results found for: \"LDLCALC\"  Lab Results   Component Value Date    TRIG 283 (H) 08/11/2023    TRIG 163 (H) 08/10/2022    TRIG 311 (H) 08/19/2020     No components found for: \"CHOLHDL\"  Lab Results   Component Value Date    HGBA1C 6.6 (H) 11/17/2023       Assessment/Plan  Problem List Items Addressed This Visit       Osteoarthritis of right knee    Overview     Severe OA -- injections stopped helping.  Planning TKA (Marilyn Madden prn at [facility].         Type 2 diabetes mellitus with diabetic neuropathy, with long-term current use of insulin (CMS/[facility])    Overview     DX'd 12/2017  STOPPED TZD due to edema 7/2021.   Off METFORMIN d/t diarrhea.   Cannot afford Trulicity.  Sees podiatry for neuropathy  See opto Dr March at [facility] in [place].     TRESIBA 10 units daily         Endocarditis    Overview     11/2023 PPM replaced  ANCEF through 12/22/23 ([facility] @ [place])  ID Dr. Gonzalez            Electronically Signed By: Jeffry De Leon MD   12/17/23  2:46 PM  "

## 2023-12-08 ENCOUNTER — NURSING HOME VISIT (OUTPATIENT)
Dept: POST ACUTE CARE | Facility: EXTERNAL LOCATION | Age: 73
End: 2023-12-08
Payer: MEDICARE

## 2023-12-08 ENCOUNTER — HOSPITAL ENCOUNTER (OUTPATIENT)
Dept: CARDIOLOGY | Facility: CLINIC | Age: 73
Discharge: HOME | End: 2023-12-08
Payer: MEDICARE

## 2023-12-08 DIAGNOSIS — K59.00 CONSTIPATION, UNSPECIFIED CONSTIPATION TYPE: ICD-10-CM

## 2023-12-08 DIAGNOSIS — D64.9 ANEMIA, UNSPECIFIED TYPE: ICD-10-CM

## 2023-12-08 DIAGNOSIS — K21.9 GASTROESOPHAGEAL REFLUX DISEASE, UNSPECIFIED WHETHER ESOPHAGITIS PRESENT: ICD-10-CM

## 2023-12-08 DIAGNOSIS — I44.2 COMPLETE ATRIOVENTRICULAR BLOCK (MULTI): ICD-10-CM

## 2023-12-08 DIAGNOSIS — N40.1 BPH WITH OBSTRUCTION/LOWER URINARY TRACT SYMPTOMS: ICD-10-CM

## 2023-12-08 DIAGNOSIS — I49.5 SSS (SICK SINUS SYNDROME) (MULTI): ICD-10-CM

## 2023-12-08 DIAGNOSIS — I33.0 TRICUSPID VALVE VEGETATION (HHS-HCC): ICD-10-CM

## 2023-12-08 DIAGNOSIS — N13.8 BPH WITH OBSTRUCTION/LOWER URINARY TRACT SYMPTOMS: ICD-10-CM

## 2023-12-08 DIAGNOSIS — M10.9 GOUT, UNSPECIFIED CAUSE, UNSPECIFIED CHRONICITY, UNSPECIFIED SITE: ICD-10-CM

## 2023-12-08 DIAGNOSIS — E11.21: ICD-10-CM

## 2023-12-08 DIAGNOSIS — R59.0 INGUINAL LYMPHADENOPATHY: ICD-10-CM

## 2023-12-08 DIAGNOSIS — E11.21 TYPE 2 DIABETES MELLITUS WITH DIABETIC NEPHROPATHY, WITHOUT LONG-TERM CURRENT USE OF INSULIN (MULTI): ICD-10-CM

## 2023-12-08 DIAGNOSIS — M62.81 MUSCLE WEAKNESS (GENERALIZED): Primary | ICD-10-CM

## 2023-12-08 DIAGNOSIS — I10 ESSENTIAL HYPERTENSION: ICD-10-CM

## 2023-12-08 DIAGNOSIS — E78.5 DYSLIPIDEMIA: ICD-10-CM

## 2023-12-08 DIAGNOSIS — G47.00 INSOMNIA, UNSPECIFIED TYPE: ICD-10-CM

## 2023-12-08 DIAGNOSIS — M13.0 POLYARTHRITIS: ICD-10-CM

## 2023-12-08 PROCEDURE — 99310 SBSQ NF CARE HIGH MDM 45: CPT | Performed by: NURSE PRACTITIONER

## 2023-12-08 NOTE — DOCUMENTATION CLARIFICATION NOTE
"    PATIENT:               MOE HO  ACCT #:                  [id number]  MRN:                       [id number]  :                       1950  ADMIT DATE:       2023 11:48 PM  DISCH DATE:        2023 6:51 PM  RESPONDING PROVIDER #:        [id number]          PROVIDER RESPONSE TEXT:    I concur with the pathology report findings and they are clinically significant    CDI QUERY TEXT:    [facility]_Path Results Simple    Instruction:    Based on your assessment of the patient and the clinical information, please provide the requested documentation by clicking on the appropriate radio button and enter any additional information if prompted.    Question: Please document whether you concur or do not concur with the pathology report findings    When answering this query, please exercise your independent professional judgment. The fact that a question is being asked, does not imply that any particular answer is desired or expected.    The patient's clinical indicators include:  Clinical Information: Progress notes indicate patient is a 73 yr old male admitted for PPM infection and endocarditis.  Progress notes show patient's left groin lymphadenopathy was evaluated during admission.    Clinical Indicators and Pathology Findings:    Surgical Oncology Consult, : \"Patient had noticed left groin lymphadenopathy for the last month and it was previously noted on a CT on 10/2. The lymphadenopathy has cont'd to this admission and recently evaluated via US which demonstrated enlarged left inguinal/external iliac lymph nodes\"    Per Discharge Summary: \"Of note, patient noticed left groin lymphadenopathy and U/S on  showed Enlarged left inguinal/external iliac lymph nodes. PET scan deferred at this time d/t active infectious process. Underwent core needle biopsy on . Patient to follow up biopsy results with surgical oncology.\"    Final Pathology, Surgical Pathology Report, :  \"Inguinal Lymph Node " "Biopsy Left:  Metastatic Melanoma\"    Treatment: Surgical Oncology Consult, Ultrasound, Left Inguinal Biopsy    Risk Factors: Lymphadenopathy  Options provided:  -- I concur with the pathology report findings and they are clinically significant  -- I do not concur with the pathology report findings  -- Other - I will add my own diagnosis  -- Refer to Clinical Documentation Reviewer    Query created by: Ewa Woods on 12/7/2023 4:14 PM      Electronically signed by:  KRAEL MIRAMONTES MD 12/8/2023 11:00 AM          "

## 2023-12-11 ENCOUNTER — NURSING HOME VISIT (OUTPATIENT)
Dept: POST ACUTE CARE | Facility: EXTERNAL LOCATION | Age: 73
End: 2023-12-11
Payer: MEDICARE

## 2023-12-11 ENCOUNTER — TELEPHONE (OUTPATIENT)
Dept: PRIMARY CARE | Facility: CLINIC | Age: 73
End: 2023-12-11
Payer: COMMERCIAL

## 2023-12-11 DIAGNOSIS — I38 ENDOCARDITIS, UNSPECIFIED CHRONICITY, UNSPECIFIED ENDOCARDITIS TYPE: ICD-10-CM

## 2023-12-11 DIAGNOSIS — E11.21: ICD-10-CM

## 2023-12-11 DIAGNOSIS — M17.11 OSTEOARTHRITIS OF RIGHT KNEE, UNSPECIFIED OSTEOARTHRITIS TYPE: ICD-10-CM

## 2023-12-11 LAB
FUNGUS SPEC CULT: NORMAL
FUNGUS SPEC FUNGUS STN: NORMAL

## 2023-12-11 PROCEDURE — 99309 SBSQ NF CARE MODERATE MDM 30: CPT | Performed by: FAMILY MEDICINE

## 2023-12-11 NOTE — LETTER
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 No fever overnight. No CP/SOB.    O: VSS AFEB 249# (23) Awake, alert, NAD. Chest cta. Heart rrr. Right anterior chest with healed PPM incision. Pain in right knee with ROM. HUNG draining clear. Ext no c/c/e.    LAB (23)Hgb 7.8, Alb 3.5, Cr 1.76, GFR 41, K 3.4,  (23) Iron 21, TIBC 134, Ferritin 849, Transferrin sat 15.7%    A/P:  # Weakness: SNF PT/OT. He requires wheeled walker, chair lift mechanism, and raised toilet seat to allow him independence with ADLs.  # Endocarditis: old PPM removed, Ancef 2g bid through 23.  # Intentional weight loss with dietary/diabetic intervention -- goal weight 200# over next 2 years.  # BPH w/ LUTS & Obstructive Uropathy: Flomax 0.8 mg qhs. Finasteride 5 mg. HUNG. F/U Dr Delaney 23.  # NOHELIA: stabilized off lasix and arb. F/U Nephro/ [facility].  # OA: norco 5/325 1 po q6h prn pain. Scheduled for Rt TKA Dr Kothari 2024 -- unable to get transport to [facility] until out of SNF.  # HTN: amlodipine 10, Toprol 25, hydralazine 10 tid. Restart losartan 50 to help boost K.  # DM w nephropathy and neuropathy: stable on Tresiba 10 units daily. When renal function improves can start farxiga. Losing weight intentionally as sticking with diabetic diet.  # Gout: allopurinol  # Anemia: no iron deficiency. Likely d/t CKD. Consider EPO injection.  # GERD: OFF PPI d/t NOHELIA.  # Left inguinal lymph node excisional bx pending.      Electronically Signed By: Jeffry ROWELL  MD Haley   12/17/23  2:48 PM

## 2023-12-12 ENCOUNTER — APPOINTMENT (OUTPATIENT)
Dept: SURGICAL ONCOLOGY | Facility: CLINIC | Age: 73
End: 2023-12-12
Payer: MEDICARE

## 2023-12-12 PROBLEM — R19.7 DIARRHEA: Status: RESOLVED | Noted: 2023-10-02 | Resolved: 2023-12-12

## 2023-12-12 PROBLEM — N18.32 STAGE 3B CHRONIC KIDNEY DISEASE (CKD) (MULTI): Status: ACTIVE | Noted: 2023-11-06

## 2023-12-12 PROBLEM — C43.9 METASTATIC MELANOMA (MULTI): Status: ACTIVE | Noted: 2023-12-12

## 2023-12-12 PROBLEM — Z98.890 HISTORY OF GENERAL ANESTHESIA: Status: RESOLVED | Noted: 2023-11-22 | Resolved: 2023-12-12

## 2023-12-12 PROBLEM — R63.2 POLYPHAGIA: Status: RESOLVED | Noted: 2023-03-21 | Resolved: 2023-12-12

## 2023-12-12 PROBLEM — N17.9 AKI (ACUTE KIDNEY INJURY) (CMS-HCC): Status: RESOLVED | Noted: 2023-10-02 | Resolved: 2023-12-12

## 2023-12-12 PROBLEM — R50.9 FEVER: Status: RESOLVED | Noted: 2023-10-19 | Resolved: 2023-12-12

## 2023-12-12 PROBLEM — I38 ENDOCARDITIS: Status: ACTIVE | Noted: 2023-10-19

## 2023-12-12 PROBLEM — G51.0 BELL'S PALSY: Status: RESOLVED | Noted: 2023-03-21 | Resolved: 2023-12-12

## 2023-12-12 NOTE — PROGRESS NOTES
*Provider Impression*    Patient is a 73 year old male who is seen today for management of multiple medical problems     #Weakness / OA / Gout - PT/OT, allopurinol 100mg daily, acetaminophen 650mg q6h PRN, Norco 5/325mg q6h PRN, flexeril 5mg q8h PRN, biofreeze q4h PRN, f/u w/ Dr. Kothari  #AV block / TV vegetation - s/p PM explant and re-implant; cefazolin 2grams BID until 12/22, f/u w/ cardiology, f/u w/ ID, check CBC, BMP, ESR< CRP weekly and fax to [phone] Attn Dr Gonzalez, reassess need to resume lasix and losartan  #HTN / HLD - amlodipine 10mg daily, metoprolol XL 25mg daily, atorvastatin 20mg daily, hydralazine 10mg TID,  #T2DM - Humalog SSI, Tresiba 10units daily, f/u w/ podiatry for ingrown toenail  #BPH w/ obstruction - maldonado, finasteride 5mg daily, tamsulosin 0.8mg daily, f/u w/ urology Dr. Delaney 12/13  #Inguinal lymphadenopathy - f/u w/ surgical oncology Dr. Rothman 1/8  #GERD / Constipation - zofran 4mg q6h PRN, miralax 17grams daily, senna 8.6mg daily  #Anemia - taken off iron  #Insomnia - melatonin 5mg QHS  #ACP - Full Code  Follow up as needed      *Chief Complaint*     Multiple medical problems    *History of Present Illness*    Patient is a 72 y/o male w/ PMH as below who presents with a 3-week history of diarrhea with generalized weakness. CT A/P: No acute diverticulitis or acute process. Recent antibiotic use (Keflex) for UTI. Stool negative for C.Diff Colitis, PCR noninfectious. ID was consulted - no need for antibiotics. Urine culture was negative. Given IVF for hypovolemia from diarrhea and poor oral intake. Monitored and repleted electrolytes as needed. Nephrology consulted for NOHELIA with hyponatremia. Sodium uptrended: 123>130. Nephrotoxic agents held to allow for recovery. Urine output satisfactory. He was seen by PT/OT who recommended SNF and was d/c to [facility].     He was sent to the ED on 11/13 for NOHELIA on labs from the SNF, oliguria and blood cultures that were persistently positive  for MSSA. He was subsequently transferred to [facility]. Blood cultures with NGTD as of 11/16. FILIPE 11/16 showed tricuspid valve vegetation as well as right atrial lead vegetation. Patient remained afebrile without leukocytosis and hemodynamically stable. Device Interrogated 11/17 with AP 29% and  30%.  Lead extraction complete 11/22. After lead removal, on telemetry the patient had several episodes of intermittent asymptomatic high-grade AV block, NSVT,and bradycardia. As a result his metoprolol was weaned. He underwent device re-implant (micra leadless pacemaker) on 11/30. Reimplant was done in setting of tricuspid vegetation with the assumption that patient was sterilized given Negative BC, lack of growth in vegetation size and clinical picture. He was treated w/ IV Ancef.  ID recommended continuation of Cefazolin 4 weeks after lead removal, until (12/20). PICC placed 12/2.  Patient has outpatient follow up scheduled further and antibiotic use will be determined then. Per ID, please collect CBC, BMP, ESR and CRP weekly and fax to [phone] Attn Dr Gonzalez. While inpatient he received a unit of packed red blood cells for low hemoglobin (11/25). Hemoglobin has remained stable with no signs or symptoms of bleeding. Home lasix and losartan were being held in setting of NOHELIA secondary to nephrotoxic antibiotic exposure which was down trending but not at baseline with adequate urine output. Patient has maldonado in setting of severe BPH and outpatient follow up with urology. Of note, patient noticed left groin lymphadenopathy and U/S (11/23) showed Enlarged left inguinal/external iliac lymph nodes. PET scan deferred at this time d/t active infectious process. Underwent core needle biopsy on 11/29. Patient to follow up biopsy results with surgical oncology.       His labs apprciated today as below w/ improved renal function.    He is seen sitting up in his room and reports still having R knee pain, not using the Norco but will  do so, making progress w/ therapy regardless, no f/c, sweats, PM machine is working, no CP, SOB, cough, n/v, constipation, diarrhea, has maldonado, the neuropathy is improved, and no other new c/o presently.     Alleriges - buPROPion, Cephalexin   PMH - arthritis, obesity, lymphedema, ventral hernia repair, hypertension, hyperlipidemia, BPH, PVD, T2DM, Bell's palsy  PSH - pacemaker, ventral hernia repair, prostate biopsy, varicose vein ligation  FH - Brother had heart disease; Mother had lung cancer  SocHx - Never smoker, Social EtOH    *Review of Systems*  All other systems reviewed are negative except as noted in the HPI     *Vital Signs*   Date: 12/8/23  - T: 97.8  P: 82  R: 18  BP: 152/82  SpO2: 96% on RA ; Date: 12/4/23  Wt: 249    *Results / Data*  CBC - Date: 12/8/23  WBC: 4.56  HGB: 7.5  HCT: 22.7  PLT: 122 ;   BMP - Date: 12/8/23  Na: 139  K: 3.7  Cl: 104  CO2: 24  BUN: 14  Cr: 1.93  Glu: 85  Ca: 8.4  ;   LFT - Date: 12/6/23  AST: 11  ALT: <5  ALP: 80  Tbili: 0.2  ;   Coags - Date:   INR:   PT:  Other - Date: 10/16/23  CRP: 3.8  ESR: 69  ; 10/23/23  CRP: 2.9  ESR: 79 ; 10/30/23  CRP: 1.9    *Physical Exam*  Gen: (+) NAD, (+) well-appearing  HEENT: (+) normocephalic, (+) MMM  Neck: (+) supple  Lungs: (+) CTAB, (-) wheezes, (-) rales, (-) rhonchi  Heart: (+) RRR, (+) S1 S2, (-) murmurs  Pulses: (+) palpable  Abd: (+) soft, (+) NT, (+) ND, (+) BS+  : (+) maldonado  Ext: (+) edema, (-) deformity  MSK: (-) joint swelling  Skin: (+) warm, (+) dry, (-) rash  Neuro: (+) follows commands, (-) tremor, (+) alert

## 2023-12-13 ENCOUNTER — APPOINTMENT (OUTPATIENT)
Dept: UROLOGY | Facility: CLINIC | Age: 73
End: 2023-12-13
Payer: MEDICARE

## 2023-12-13 PROBLEM — M17.11 OSTEOARTHRITIS OF RIGHT KNEE: Status: ACTIVE | Noted: 2023-03-21

## 2023-12-13 LAB
BRAF EXON 15 RESULTS: NORMAL
ELECTRONICALLY SIGNED BY: NORMAL
HOLD SPECIMEN: NORMAL

## 2023-12-13 NOTE — PROGRESS NOTES
Chester Verduzco is a 73 y.o. 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 No fever overnight. No CP/SOB.    O: VSS AFEB 271->249# (intentional weight loss with dietary/diabetic intervention) Awake, alert, NAD. Chest cta. Heart rrr. Right anterior chest with healed PPM incision. Pain in right knee with ROM. HUNG draining clear. Ext no c/c/e.    LAB (23) & (23) Pending. (23) Hgb 7.3, Alb 3.2, WBC 3.72, Cr 2.02, GFR 34, Na 139, K 3.2  (23) Iron 21, TIBC 134, Ferritin 849, Transferrin sat 15.7%    A/P:  # Weakness: SNF PT/OT. He requires wheeled walker, chair lift mechanism, and raised toilet seat to allow him independence with ADLs.  # Endocarditis: old PPM removed, Ancef 2g bid through 23.  # BPH w/ LUTS & Obstructive Uropathy: increase Flomax back to 0.8 mg qhs. Finasteride 5 mg. HUNG. Encouraged to reschedule definitive prostate surgery with Dr Delaney.  # NOHELIA: stabilized off lasix and arb. F/U Nephro/ [place].  # OA: norco 5/325 1 po q6h prn pain (#120). Scheduled for Rt TKA Dr Kothari 2024 -- will try to get him in for fitting knee brace if PT cannot order one.  # HTN: amlodipine 10, Toprol 25, hydralazine 10 tid. Consider resume ARB as renal function improves.  # DM w nephropathy and neuropathy: stable on Tresiba 10 units daily. When renal function improves can start farxiga. Losing weight intentionally as sticking with diabetic diet.  # Gout: allopurinol  # Anemia: no iron deficiency. Likely d/t CKD. Consider EPO injection.  #  GERD: OFF PPI d/t NOHELIA.  # Left inguinal lymph node excisional bx pending.    Past Surgical History:   Procedure Laterality Date    CARDIAC ELECTROPHYSIOLOGY PROCEDURE Left 11/22/2023    Procedure: PPM Lead Extraction;  Surgeon: Etienne Aguilar MD;  [facility] Card[facility] Lab;  Service: Electrophysiology;  Laterality: Left;    CARDIAC ELECTROPHYSIOLOGY PROCEDURE N/A 11/30/2023    Procedure: PPM Leadless Implant (MICRA AV);  Surgeon: Etienne Aguilar MD;  Location: [facility] Cardiac Cath Lab;  Service: Electrophysiology;  Laterality: N/A;    CARDIAC PACEMAKER PLACEMENT  08/22/2013    Pacemaker Permanent Placement    OTHER SURGICAL HISTORY  06/15/2015    Ventral Hernia Repair - Recurrent    OTHER SURGICAL HISTORY  09/16/2019    Prostate biopsy    PERIPHERALLY INSERTED CENTRAL CATHETER INSERTION      US GUIDED BIOPSY LYMPH NODE SUPERFICIAL  11/29/2023    US GUIDED BIOPSY LYMPH NODE SUPERFICIAL 11/29/2023 Mao Dubose MD [facility]    VARICOSE VEIN SURGERY  08/22/2013    Varicose Vein Ligation    VENTRAL HERNIA REPAIR  06/15/2015    Ventral Hernia Repair      Social History     Socioeconomic History    Marital status: Single     Spouse name: Not on file    Number of children: Not on file    Years of education: Not on file    Highest education level: Not on file   Occupational History    Not on file   Tobacco Use    Smoking status: Never     Passive exposure: Never    Smokeless tobacco: Never   Vaping Use    Vaping Use: Never used   Substance and Sexual Activity    Alcohol use: Not Currently    Drug use: Never    Sexual activity: Not Currently   Other Topics Concern    Not on file   Social History Narrative    Not on file     Social Determinants of Health     Financial Resource Strain: Low Risk  (11/17/2023)    Overall Financial Resource Strain (CARDIA)     Difficulty of Paying Living Expenses: Not hard at all   Recent Concern: Financial Resource Strain - Medium Risk (10/2/2023)    Overall Financial Resource  Strain (CARDIA)     Difficulty of Paying Living Expenses: Somewhat hard   Food Insecurity: No Food Insecurity (10/3/2023)    Hunger Vital Sign     Worried About Running Out of Food in the Last Year: Never true     Ran Out of Food in the Last Year: Never true   Transportation Needs: No Transportation Needs (11/17/2023)    PRAPARE - Transportation     Lack of Transportation (Medical): No     Lack of Transportation (Non-Medical): No   Physical Activity: Inactive (10/2/2023)    Exercise Vital Sign     Days of Exercise per Week: 0 days     Minutes of Exercise per Session: 0 min   Stress: Not on file   Social Connections: Not on file   Intimate Partner Violence: Not on file   Housing Stability: Low Risk  (11/17/2023)    Housing Stability Vital Sign     Unable to Pay for Housing in the Last Year: No     Number of Places Lived in the Last Year: 1     Unstable Housing in the Last Year: No     Past Medical History:   Diagnosis Date    Abnormal weight gain 10/27/2016    Abnormal weight gain    Achilles tendinitis, unspecified leg 08/16/2016    Achilles tendinitis    Acute upper respiratory infection, unspecified     Acute URI    NOHELIA (acute kidney injury) (CMS/[facility]) 10/02/2023    10/2023-11/2023 Following vancomycin in setting of bacteremia.    Allergic contact dermatitis due to plants, except food 08/22/2013    Contact dermatitis due to poison ivy    Bell's palsy 03/21/2023    BPH with obstruction/lower urinary tract symptoms     Cellulitis of right lower limb 07/30/2021    Cellulitis of right lower extremity without foot    Cough, unspecified 03/21/2016    Cough    Diabetes (CMS/[facility])     Encounter for screening for human immunodeficiency virus (HIV) 06/14/2016    Screening for HIV (human immunodeficiency virus)    Hordeolum externum unspecified eye, unspecified eyelid 08/14/2019    Stye    Hyperglycemia, unspecified 12/13/2017    Chronic hyperglycemia    Incisional hernia without obstruction or gangrene 06/15/2015    Recurrent  ventral hernia    Knee joint pain     Right knee- bone on bone    MSSA bacteremia 10/2023    Muscle spasm of calf 08/16/2016    Muscle spasm of calf    Obstructive uropathy     Personal history of other diseases of the digestive system 06/30/2015    History of umbilical hernia    Personal history of other diseases of the digestive system 02/05/2016    History of ventral hernia    Personal history of other diseases of the respiratory system 12/23/2014    History of acute sinusitis    Personal history of other diseases of the respiratory system 01/28/2016    History of upper respiratory infection    Personal history of other infectious and parasitic diseases 12/02/2015    History of hepatitis B virus infection    Personal history of other infectious and parasitic diseases     History of hepatitis B virus infection    Personal history of other specified conditions 02/04/2015    History of nausea and vomiting    Personal history of other specified conditions 08/16/2016    History of edema    Personal history of other specified conditions 01/26/2015    History of jaundice    Personal history of pneumonia (recurrent) 01/29/2016    History of pneumonia    Personal history of urinary (tract) infections 02/19/2013    History of urinary tract infection    Prostatitis 03/21/2023    Pruritus, unspecified 02/10/2015    Pruritus    Sinoatrial node dysfunction (CMS/HCC)     Strain of muscle, fascia and tendon of the posterior muscle group at thigh level, right thigh, initial encounter 05/10/2016    Tear of right hamstring    Unspecified symptoms and signs involving the genitourinary system 12/20/2016    UTI symptoms      No family history on file.     Review of Systems   Constitutional:  Negative for chills, fatigue and fever.   HENT:  Negative for rhinorrhea and sore throat.    Eyes:  Negative for pain and redness.   Respiratory:  Negative for cough and shortness of breath.    Cardiovascular:  Negative for chest pain and  palpitations.   Gastrointestinal:  Negative for abdominal pain and blood in stool.   Endocrine: Negative for polydipsia and polyuria.   Genitourinary:  Negative for dysuria and hematuria.   Musculoskeletal:  Positive for arthralgias. Negative for back pain and neck stiffness.   Skin:  Negative for rash and wound.   Allergic/Immunologic: Negative for environmental allergies and food allergies.   Neurological:  Positive for weakness. Negative for headaches.   Hematological:  Negative for adenopathy. Does not bruise/bleed easily.   Psychiatric/Behavioral:  Negative for hallucinations and suicidal ideas.       There were no vitals taken for this visit.  Physical Exam  Vitals reviewed.   Constitutional:       General: He is not in acute distress.     Appearance: He is not ill-appearing.   HENT:      Head: Normocephalic and atraumatic.      Right Ear: Tympanic membrane normal.      Left Ear: Tympanic membrane normal.      Nose: No congestion or rhinorrhea.      Mouth/Throat:      Pharynx: No oropharyngeal exudate or posterior oropharyngeal erythema.   Eyes:      Extraocular Movements: Extraocular movements intact.      Conjunctiva/sclera: Conjunctivae normal.      Pupils: Pupils are equal, round, and reactive to light.   Cardiovascular:      Rate and Rhythm: Normal rate and regular rhythm.      Heart sounds: No murmur heard.     No friction rub. No gallop.      Comments: PPM  Pulmonary:      Effort: Pulmonary effort is normal.      Breath sounds: Normal breath sounds. No wheezing, rhonchi or rales.   Abdominal:      General: There is no distension.      Palpations: Abdomen is soft.      Tenderness: There is no abdominal tenderness. There is no guarding or rebound.   Musculoskeletal:         General: No swelling or deformity.      Cervical back: Normal range of motion and neck supple.      Right lower leg: No edema.      Left lower leg: No edema.   Skin:     Capillary Refill: Capillary refill takes less than 2 seconds.      " Coloration: Skin is not jaundiced.      Findings: No rash.      Comments: Left groin incision c/d/I  Left Right anterior chest incision c/d/i   Neurological:      General: No focal deficit present.      Mental Status: He is alert.      Motor: No weakness.   Psychiatric:         Mood and Affect: Mood normal.         Behavior: Behavior normal.       Lab Results   Component Value Date    WBC 3.7 (L) 12/03/2023    HGB 7.0 (L) 12/03/2023    HCT 22.4 (L) 12/03/2023    MCV 89 12/03/2023    PLT 90 (L) 12/03/2023     Lab Results   Component Value Date    CHOL 109 08/11/2023    CHOL 120 08/10/2022    CHOL 202 (H) 08/19/2020     Lab Results   Component Value Date    HDL 27.7 (A) 08/11/2023    HDL 30.7 (A) 08/10/2022    HDL 33.7 (A) 08/19/2020     No results found for: \"LDLCALC\"  Lab Results   Component Value Date    TRIG 283 (H) 08/11/2023    TRIG 163 (H) 08/10/2022    TRIG 311 (H) 08/19/2020     No components found for: \"CHOLHDL\"  Lab Results   Component Value Date    HGBA1C 6.6 (H) 11/17/2023       Assessment/Plan   Problem List Items Addressed This Visit       Osteoarthritis of right knee    Overview     Severe OA -- injections stopped helping.  Planning TKA ([facility])    Maryanne prn at [facility].         Type 2 diabetes mellitus with diabetic neuropathy, with long-term current use of insulin (CMS/[facility])    Overview     DX'd 12/2017  STOPPED TZD due to edema 7/2021.   Off METFORMIN d/t diarrhea.   Cannot afford Trulicity.  Sees podiatry for neuropathy  [facility] opto Dr March [facility] in [place].     TRESIBA 10 units daily         Endocarditis    Overview     11/2023 PPM replaced  ANCEF through 12/22/23 ([facility] @ [facility])  ID Dr. Gonzalez            "

## 2023-12-14 ENCOUNTER — NURSING HOME VISIT (OUTPATIENT)
Dept: POST ACUTE CARE | Facility: EXTERNAL LOCATION | Age: 73
End: 2023-12-14
Payer: MEDICARE

## 2023-12-14 ENCOUNTER — OFFICE VISIT (OUTPATIENT)
Dept: HEMATOLOGY/ONCOLOGY | Facility: CLINIC | Age: 73
End: 2023-12-14
Payer: MEDICARE

## 2023-12-14 VITALS
RESPIRATION RATE: 17 BRPM | BODY MASS INDEX: 30.8 KG/M2 | HEART RATE: 83 BPM | WEIGHT: 240 LBS | TEMPERATURE: 97.9 F | OXYGEN SATURATION: 99 % | DIASTOLIC BLOOD PRESSURE: 74 MMHG | SYSTOLIC BLOOD PRESSURE: 129 MMHG

## 2023-12-14 DIAGNOSIS — R59.0 INGUINAL LYMPHADENOPATHY: ICD-10-CM

## 2023-12-14 DIAGNOSIS — C43.9 METASTATIC MELANOMA (MULTI): ICD-10-CM

## 2023-12-14 DIAGNOSIS — I10 ESSENTIAL HYPERTENSION: ICD-10-CM

## 2023-12-14 DIAGNOSIS — N40.1 BPH WITH OBSTRUCTION/LOWER URINARY TRACT SYMPTOMS: ICD-10-CM

## 2023-12-14 DIAGNOSIS — M62.81 MUSCLE WEAKNESS (GENERALIZED): Primary | ICD-10-CM

## 2023-12-14 DIAGNOSIS — M13.0 POLYARTHRITIS: ICD-10-CM

## 2023-12-14 DIAGNOSIS — C43.9 METASTATIC MELANOMA (MULTI): Primary | ICD-10-CM

## 2023-12-14 DIAGNOSIS — I33.0 TRICUSPID VALVE VEGETATION (HHS-HCC): ICD-10-CM

## 2023-12-14 DIAGNOSIS — N17.9 ACUTE RENAL FAILURE, UNSPECIFIED ACUTE RENAL FAILURE TYPE (CMS-HCC): ICD-10-CM

## 2023-12-14 DIAGNOSIS — K21.9 GASTROESOPHAGEAL REFLUX DISEASE, UNSPECIFIED WHETHER ESOPHAGITIS PRESENT: ICD-10-CM

## 2023-12-14 DIAGNOSIS — I44.2 COMPLETE ATRIOVENTRICULAR BLOCK (MULTI): ICD-10-CM

## 2023-12-14 DIAGNOSIS — N13.8 BPH WITH OBSTRUCTION/LOWER URINARY TRACT SYMPTOMS: ICD-10-CM

## 2023-12-14 DIAGNOSIS — E11.21 TYPE 2 DIABETES MELLITUS WITH DIABETIC NEPHROPATHY, WITHOUT LONG-TERM CURRENT USE OF INSULIN (MULTI): ICD-10-CM

## 2023-12-14 DIAGNOSIS — I82.890 SUPERFICIAL VEIN THROMBOSIS: ICD-10-CM

## 2023-12-14 DIAGNOSIS — G47.00 INSOMNIA, UNSPECIFIED TYPE: ICD-10-CM

## 2023-12-14 DIAGNOSIS — E87.6 HYPOKALEMIA: ICD-10-CM

## 2023-12-14 DIAGNOSIS — F32.A DEPRESSION, UNSPECIFIED DEPRESSION TYPE: ICD-10-CM

## 2023-12-14 DIAGNOSIS — K59.00 CONSTIPATION, UNSPECIFIED CONSTIPATION TYPE: ICD-10-CM

## 2023-12-14 DIAGNOSIS — M10.9 GOUT, UNSPECIFIED CAUSE, UNSPECIFIED CHRONICITY, UNSPECIFIED SITE: ICD-10-CM

## 2023-12-14 DIAGNOSIS — D64.9 ANEMIA, UNSPECIFIED TYPE: ICD-10-CM

## 2023-12-14 DIAGNOSIS — E78.5 DYSLIPIDEMIA: ICD-10-CM

## 2023-12-14 PROCEDURE — 1125F AMNT PAIN NOTED PAIN PRSNT: CPT | Performed by: INTERNAL MEDICINE

## 2023-12-14 PROCEDURE — 1111F DSCHRG MED/CURRENT MED MERGE: CPT | Performed by: INTERNAL MEDICINE

## 2023-12-14 PROCEDURE — 3078F DIAST BP <80 MM HG: CPT | Performed by: INTERNAL MEDICINE

## 2023-12-14 PROCEDURE — 1159F MED LIST DOCD IN RCRD: CPT | Performed by: INTERNAL MEDICINE

## 2023-12-14 PROCEDURE — 3008F BODY MASS INDEX DOCD: CPT | Performed by: INTERNAL MEDICINE

## 2023-12-14 PROCEDURE — 1160F RVW MEDS BY RX/DR IN RCRD: CPT | Performed by: INTERNAL MEDICINE

## 2023-12-14 PROCEDURE — 3074F SYST BP LT 130 MM HG: CPT | Performed by: INTERNAL MEDICINE

## 2023-12-14 PROCEDURE — 99309 SBSQ NF CARE MODERATE MDM 30: CPT | Performed by: NURSE PRACTITIONER

## 2023-12-14 PROCEDURE — 3066F NEPHROPATHY DOC TX: CPT | Performed by: INTERNAL MEDICINE

## 2023-12-14 PROCEDURE — 99204 OFFICE O/P NEW MOD 45 MIN: CPT | Performed by: INTERNAL MEDICINE

## 2023-12-14 PROCEDURE — 99214 OFFICE O/P EST MOD 30 MIN: CPT | Mod: PO | Performed by: INTERNAL MEDICINE

## 2023-12-14 PROCEDURE — 1036F TOBACCO NON-USER: CPT | Performed by: INTERNAL MEDICINE

## 2023-12-14 PROCEDURE — 3060F POS MICROALBUMINURIA REV: CPT | Performed by: INTERNAL MEDICINE

## 2023-12-14 PROCEDURE — 1158F ADVNC CARE PLAN TLK DOCD: CPT | Performed by: INTERNAL MEDICINE

## 2023-12-14 PROCEDURE — 3044F HG A1C LEVEL LT 7.0%: CPT | Performed by: INTERNAL MEDICINE

## 2023-12-14 ASSESSMENT — NCCN CANCER DISTRESS MANAGEMENT
NCCN EMOTIONAL CONCERNS: 1
NCCN PRACTICAL CONCERNS: 12
NCCN PHYSICAL CONCERNS: 9
NCCN PHYSICAL CONCERNS: 1
NCCN PHYSICAL CONCERNS: 3

## 2023-12-14 ASSESSMENT — ENCOUNTER SYMPTOMS
DEPRESSION: 0
LOSS OF SENSATION IN FEET: 0
OCCASIONAL FEELINGS OF UNSTEADINESS: 0

## 2023-12-14 ASSESSMENT — PAIN SCALES - GENERAL: PAINLEVEL: 8

## 2023-12-14 NOTE — PROGRESS NOTES
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 On zosyn for recurrent MSSA bacteremia, with worsening oliguric kidney function over the weekend -- 2 L NS given with no sign of fluid overload. No CP/SOB.    O: VSS AFEB 271->256#->250# (23)? Awake, alert, NAD. Chest cta. Heart rrr. Indurated/non-fluctuant, non-tender [id number] left inguinal mass. HUNG draining clear. Ext no c/c/e.    LAB (23) Urine (Cl 20, K 15, Na 31, neg nitrates) WBC 9.5, Hgb 8.1->7.3, Cr 3.46->5.51, Alb 2.8, Na 132->128, K 3.5, Prealbumin 10  (23) Iron 21, TIBC 134, Ferritin 849, Transferrin sat 15.7%  (10/30/23) CRP 1.9 K 4.1,    (10/14/23) UCr 34, UUrea 261, Arash 90    (11/10/23) Renal US: no obstruction, 8 cm Left renal cyst. 180 cc Prostate. HUNG.    A/P:  # Weakness: continues to make progress with SNF PT/OT. He requires wheeled walker, chair lift mechanism, and raised toilet seat to allow him independence with ADLs.  # BPH w/ LUTS & Obstructive Uropathy: Flomax 0.8 mg qhs. Finasteride 5 mg. HUNG in for 900 cc PVR 10/8/23. Will need to delay plan for prostate surgery with Dr Delaney for November due to recurrent MSSA bacteremia.  # NOHELIA: acute worsening following initially improving off lasix and arb. Remains oliguric despite 2L NS over weekend. Sent to ER for prepping for HD. Appreciate Dr Isaac's help.  # OA: norco prn pain. Scheduled for Rt TKA Dr Kothari 2024. Order new right knee brace due to feeling of patellar instability with PT -- to see Dr Kothari for this.  # HTN: amlodipine 10, Toprol 100. Consider resume ARB as renal function improves.  # DM w nephropathy: OFF Tresiba.  When renal function improves can start farxiga. Sticking to new  diabetic diet.  # Gout: stable OFF allopurinol  # Anemia: OFF ferrous gluconate d/t n/v. Iron replete with normal Ferritin and low TIBC 11/6/23. Consider EPO if no improvement by 11/13/23.  # GERD: OFF PPI d/t NOHELIA.  # Recurrent MSSA Bacteremia: Resumed zoxyn x 2 weeks. Inadequate response to initial course of oral doxycycline. Echocardiogram pending to rule out endocarditis.  # Left groin mass -- abscess vs lymph node. US pending.

## 2023-12-14 NOTE — PROGRESS NOTES
Patient ID: Chester Verduzco is a 73 y.o. male.  Referring Physician: Jeffry De Leon MD  [address]  [address]  [place] [address]  Primary Care Provider: Jeffry De Leon MD  Visit Type:  Initial Visit         Subjective    HPI  Mr. Chester Verduzco is a 73 year old male with PMHX HTN, HLD, Dual chamber PPM implanted in 11/1996 for SSS (RV is passive) with most recent generator replacement in 11/2020 ([facility] [employer] MRI), Type 2 DM, gout, and recent hospitalization (10/2-10/7) for Staph bacteremia, diarrhea, NOHELIA , and hyponatremia 2/2 hypovolemia who presented to [facility] ED from a SNF on 11/13 for NOHELIA on labs from the SNF and oliguria. Blood cultures were positive for MSSA and previously had been positive 10/8 and 11/10 for MSSA. Blood cultures with NGTD as of 11/16.  FILIPE 11/16 showed tricuspid valve vegetation as well as right atrial lead vegetation. Patient has been afebrile without leukocytosis and hemodynamically stable. Device Interrogated 11/17 with AP 29% and  30%.  Lead extraction complete 11/22.  Patient on IV antibiotics with stop 4 weeks after lead removal (~12/20). After lead removal, on telemetry the patient had several episodes of intermittent asymptomatic high-grade AV block, NSVT,and bradycardia. EP plans to implant PPM after 14 days of negative blood culture post removal per, ID. Patient received a unit of packed red blood cells for low hemoglobin (11/25). Hemoglobin has remained stable with no signs or symptoms of bleeding. Home lasix and losartan were being held in setting of NOHELIA 2/2 to nephrotoxic antibiotic exposure which down trending but not at baseline with adequate urine output. Patient has maldonado I/s/o severe BPH. Of note, Patient noticed left groin lymphadenopathy and U/S (11/23) showed Enlarged left inguinal/external iliac lymph nodes. PET scan deferred at this time d/t active infectious process. Underwent core needle biopsy on 11/29. Patient to follow up biopsy results  with surgical oncology. Device re-implant (micra leadless pacemaker) 11/30, PICC placed 12/2. In discussing with ID on 12/2, and reviewing past ID notes, reimplant was done iso tricuspid vegetation with the assumption that patient was sterilized given Negative BC, lack of growth in vegetation size and clinical picture. Patient has outpatient follow up scheduled further and antibiotic use will be determined then.     Biopsy results confirming metastatic melanoma but because of patient's poor performance status no intervention is planned at this time.  Patient to return in 2 months for evaluation of performance status and to discuss with patient at that time if feasible immuno oncologic therapy.      Review of Systems - Oncology     Objective   BSA: 2.39 meters squared  /74 (BP Location: Right arm)   Pulse 83   Temp 36.6 °C (97.9 °F)   Resp 17   Wt 109 kg (240 lb) Comment: stated, unable to stand  SpO2 99%   BMI 30.80 kg/m²      has a past medical history of Abnormal weight gain (10/27/2016), Achilles tendinitis, unspecified leg (08/16/2016), Acute upper respiratory infection, unspecified, NOHELIA (acute kidney injury) (CMS/Formerly [facility]) (10/02/2023), Allergic contact dermatitis due to plants, except food (08/22/2013), Bell's palsy (03/21/2023), BPH with obstruction/lower urinary tract symptoms, Cellulitis of right lower limb (07/30/2021), Cough, unspecified (03/21/2016), Diabetes (CMS/[facility]ly [facility]), Encounter for screening for human immunodeficiency virus (HIV) (06/14/2016), Hordeolum externum unspecified eye, unspecified eyelid (08/14/2019), Hyperglycemia, unspecified (12/13/2017), Incisional hernia without obstruction or gangrene (06/15/2015), Knee joint pain, MSSA bacteremia (10/2023), Muscle spasm of calf (08/16/2016), Obstructive uropathy, Personal history of other diseases of the digestive system (06/30/2015), Personal history of other diseases of the digestive system (02/05/2016), Personal history of other diseases of  the respiratory system (12/23/2014), Personal history of other diseases of the respiratory system (01/28/2016), Personal history of other infectious and parasitic diseases (12/02/2015), Personal history of other infectious and parasitic diseases, Personal history of other specified conditions (02/04/2015), Personal history of other specified conditions (08/16/2016), Personal history of other specified conditions (01/26/2015), Personal history of pneumonia (recurrent) (01/29/2016), Personal history of urinary (tract) infections (02/19/2013), Prostatitis (03/21/2023), Pruritus, unspecified (02/10/2015), Sinoatrial node dysfunction (CMS/HCC), Strain of muscle, fascia and tendon of the posterior muscle group at thigh level, right thigh, initial encounter (05/10/2016), and Unspecified symptoms and signs involving the genitourinary system (12/20/2016).   has a past surgical history that includes Varicose vein surgery (08/22/2013); Cardiac pacemaker placement (08/22/2013); Other surgical history (06/15/2015); Ventral hernia repair (06/15/2015); Other surgical history (09/16/2019); Peripherally inserted central catheter insertion; Cardiac electrophysiology procedure (Left, 11/22/2023); US guided biopsy lymph node superficial (11/29/2023); and Cardiac electrophysiology procedure (N/A, 11/30/2023).  No family history on file.  Oncology History    No history exists.       Chester JOSLYN Verduzco  reports that he has never smoked. He has never been exposed to tobacco smoke. He has never used smokeless tobacco.  He  reports that he does not currently use alcohol.  He  reports no history of drug use.    Physical Exam    WBC   Date/Time Value Ref Range Status   12/03/2023 04:57 AM 3.7 (L) 4.4 - 11.3 x10*3/uL Final   12/02/2023 08:14 AM 4.0 (L) 4.4 - 11.3 x10*3/uL Final   12/01/2023 06:53 AM 4.3 (L) 4.4 - 11.3 x10*3/uL Final     nRBC   Date Value Ref Range Status   12/03/2023 0.0 0.0 - 0.0 /100 WBCs Final   12/02/2023 0.0 0.0 - 0.0 /100  WBCs Final   12/01/2023 0.0 0.0 - 0.0 /100 WBCs Final     RBC   Date Value Ref Range Status   12/03/2023 2.53 (L) 4.50 - 5.90 x10*6/uL Final   12/02/2023 2.72 (L) 4.50 - 5.90 x10*6/uL Final   12/01/2023 2.64 (L) 4.50 - 5.90 x10*6/uL Final     Hemoglobin   Date Value Ref Range Status   12/03/2023 7.0 (L) 13.5 - 17.5 g/dL Final   12/02/2023 8.0 (L) 13.5 - 17.5 g/dL Final   12/01/2023 7.6 (L) 13.5 - 17.5 g/dL Final     Hematocrit   Date Value Ref Range Status   12/03/2023 22.4 (L) 41.0 - 52.0 % Final   12/02/2023 24.2 (L) 41.0 - 52.0 % Final   12/01/2023 23.5 (L) 41.0 - 52.0 % Final     MCV   Date/Time Value Ref Range Status   12/03/2023 04:57 AM 89 80 - 100 fL Final   12/02/2023 08:14 AM 89 80 - 100 fL Final   12/01/2023 06:53 AM 89 80 - 100 fL Final     MCH   Date/Time Value Ref Range Status   12/03/2023 04:57 AM 27.7 26.0 - 34.0 pg Final   12/02/2023 08:14 AM 29.4 26.0 - 34.0 pg Final   12/01/2023 06:53 AM 28.8 26.0 - 34.0 pg Final     MCHC   Date/Time Value Ref Range Status   12/03/2023 04:57 AM 31.3 (L) 32.0 - 36.0 g/dL Final   12/02/2023 08:14 AM 33.1 32.0 - 36.0 g/dL Final   12/01/2023 06:53 AM 32.3 32.0 - 36.0 g/dL Final     RDW   Date/Time Value Ref Range Status   12/03/2023 04:57 AM 15.2 (H) 11.5 - 14.5 % Final   12/02/2023 08:14 AM 15.4 (H) 11.5 - 14.5 % Final   12/01/2023 06:53 AM 15.3 (H) 11.5 - 14.5 % Final     Platelets   Date/Time Value Ref Range Status   12/03/2023 04:57 AM 90 (L) 150 - 450 x10*3/uL Final   12/02/2023 08:14 AM 92 (L) 150 - 450 x10*3/uL Final   12/01/2023 06:53 AM 92 (L) 150 - 450 x10*3/uL Final     MPV   Date/Time Value Ref Range Status   10/07/2023 05:38 AM 9.2 7.5 - 11.5 fL Final   10/06/2023 05:47 AM 9.7 7.5 - 11.5 fL Final   10/05/2023 05:58 AM 10.2 7.5 - 11.5 fL Final     Neutrophils %   Date/Time Value Ref Range Status   12/03/2023 04:57 AM 61.9 40.0 - 80.0 % Final   12/02/2023 08:14 AM 59.8 40.0 - 80.0 % Final   12/01/2023 06:53 AM 67.3 40.0 - 80.0 % Final     Immature  Granulocytes %, Automated   Date/Time Value Ref Range Status   12/03/2023 04:57 AM 0.8 0.0 - 0.9 % Final     Comment:     Immature Granulocyte Count (IG) includes promyelocytes, myelocytes and metamyelocytes but does not include bands. Percent differential counts (%) should be interpreted in the context of the absolute cell counts (cells/UL).   12/02/2023 08:14 AM 0.5 0.0 - 0.9 % Final     Comment:     Immature Granulocyte Count (IG) includes promyelocytes, myelocytes and metamyelocytes but does not include bands. Percent differential counts (%) should be interpreted in the context of the absolute cell counts (cells/UL).   12/01/2023 06:53 AM 0.7 0.0 - 0.9 % Final     Comment:     Immature Granulocyte Count (IG) includes promyelocytes, myelocytes and metamyelocytes but does not include bands. Percent differential counts (%) should be interpreted in the context of the absolute cell counts (cells/UL).     Lymphocytes %   Date/Time Value Ref Range Status   12/03/2023 04:57 AM 24.8 13.0 - 44.0 % Final   12/02/2023 08:14 AM 27.1 13.0 - 44.0 % Final   12/01/2023 06:53 AM 20.0 13.0 - 44.0 % Final     Monocytes %   Date/Time Value Ref Range Status   12/03/2023 04:57 AM 9.5 2.0 - 10.0 % Final   12/02/2023 08:14 AM 8.8 2.0 - 10.0 % Final   12/01/2023 06:53 AM 8.7 2.0 - 10.0 % Final     Eosinophils %   Date/Time Value Ref Range Status   12/03/2023 04:57 AM 2.5 0.0 - 6.0 % Final   12/02/2023 08:14 AM 3.3 0.0 - 6.0 % Final   12/01/2023 06:53 AM 2.8 0.0 - 6.0 % Final     Basophils %   Date/Time Value Ref Range Status   12/03/2023 04:57 AM 0.5 0.0 - 2.0 % Final   12/02/2023 08:14 AM 0.5 0.0 - 2.0 % Final   12/01/2023 06:53 AM 0.5 0.0 - 2.0 % Final     Neutrophils Absolute   Date/Time Value Ref Range Status   12/03/2023 04:57 AM 2.27 1.60 - 5.50 x10*3/uL Final     Comment:     Percent differential counts (%) should be interpreted in the context of the absolute cell counts (cells/uL).   12/02/2023 08:14 AM 2.38 1.60 - 5.50 x10*3/uL  "Final     Comment:     Percent differential counts (%) should be interpreted in the context of the absolute cell counts (cells/uL).   12/01/2023 06:53 AM 2.87 1.60 - 5.50 x10*3/uL Final     Comment:     Percent differential counts (%) should be interpreted in the context of the absolute cell counts (cells/uL).     Immature Granulocytes Absolute, Automated   Date/Time Value Ref Range Status   12/03/2023 04:57 AM 0.03 0.00 - 0.50 x10*3/uL Final   12/02/2023 08:14 AM 0.02 0.00 - 0.50 x10*3/uL Final   12/01/2023 06:53 AM 0.03 0.00 - 0.50 x10*3/uL Final     Lymphocytes Absolute   Date/Time Value Ref Range Status   12/03/2023 04:57 AM 0.91 0.80 - 3.00 x10*3/uL Final   12/02/2023 08:14 AM 1.08 0.80 - 3.00 x10*3/uL Final   12/01/2023 06:53 AM 0.85 0.80 - 3.00 x10*3/uL Final     Monocytes Absolute   Date/Time Value Ref Range Status   12/03/2023 04:57 AM 0.35 0.05 - 0.80 x10*3/uL Final   12/02/2023 08:14 AM 0.35 0.05 - 0.80 x10*3/uL Final   12/01/2023 06:53 AM 0.37 0.05 - 0.80 x10*3/uL Final     Eosinophils Absolute   Date/Time Value Ref Range Status   12/03/2023 04:57 AM 0.09 0.00 - 0.40 x10*3/uL Final   12/02/2023 08:14 AM 0.13 0.00 - 0.40 x10*3/uL Final   12/01/2023 06:53 AM 0.12 0.00 - 0.40 x10*3/uL Final     Basophils Absolute   Date/Time Value Ref Range Status   12/03/2023 04:57 AM 0.02 0.00 - 0.10 x10*3/uL Final   12/02/2023 08:14 AM 0.02 0.00 - 0.10 x10*3/uL Final   12/01/2023 06:53 AM 0.02 0.00 - 0.10 x10*3/uL Final       No components found for: \"PT\"  aPTT   Date/Time Value Ref Range Status   11/30/2023 07:14 AM 36 27 - 38 seconds Final   11/29/2023 10:10 AM 36 27 - 38 seconds Final   11/24/2023 04:53 AM 37 27 - 38 seconds Final       Assessment/Plan      Patient of Dr. Oakley with a biopsy diagnosis in the left groin lymph node of metastatic melanoma.  Primary is not apparent at this time.  Patient has had about 8 weeks of very ill health.  Patient had pacemaker placed over 20 years ago and developed infection " with endocarditis and currently is very deconditioned.  Unable to rise from a sitting position.  Has had renal insufficiency status post sepsis and is status post pacemaker placement replacement.  Has a history of multiple joint problems and abdominal Sybil.    Seen by oncology because of the back diagnosis of melanoma.  Explained to patient that no oncologic intervention is planned at this time.  He given his weakness and his poor performance status he is not a candidate for immunotherapy nor for chemotherapy.  I will reassess in 2 months.     Diagnoses and all orders for this visit:  Metastatic melanoma (CMS/HCC)  -     NM PET CT melanoma diagnosis for noncovered indications; Future  -     Clinic Appointment Request Follow up; Future  -     Comprehensive Metabolic Panel; Future  -     MR brain w and wo IV contrast; Future  Acute renal failure, unspecified acute renal failure type (CMS/HCC)  -     Comprehensive Metabolic Panel; Future           Wes-Vern Springer MD

## 2023-12-14 NOTE — PATIENT INSTRUCTIONS
Today you met with Dr. Springer. We will order a PET scan and speak with the MRI team regarding getting a brain MRI completed.  We will call you tomorrow (Charlene) to discuss all the scheduled.

## 2023-12-14 NOTE — PROGRESS NOTES
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 No fever overnight. No CP/SOB.    O: VSS AFEB 249# (23) Awake, alert, NAD. Chest cta. Heart rrr. Right anterior chest with healed PPM incision. Pain in right knee with ROM. HUNG draining clear. Ext no c/c/e.    LAB (23)Hgb 7.8, Alb 3.5, Cr 1.76, GFR 41, K 3.4,  (23) Iron 21, TIBC 134, Ferritin 849, Transferrin sat 15.7%    A/P:  # Weakness: SNF PT/OT. He requires wheeled walker, chair lift mechanism, and raised toilet seat to allow him independence with ADLs.  # Endocarditis: old PPM removed, Ancef 2g bid through 23.  # Intentional weight loss with dietary/diabetic intervention -- goal weight 200# over next 2 years.  # BPH w/ LUTS & Obstructive Uropathy: Flomax 0.8 mg qhs. Finasteride 5 mg. HUNG. F/U Dr Delaney 23.  # NOHELIA: stabilized off lasix and arb. F/U Nephro/ [facility].  # OA: norco 5/325 1 po q6h prn pain. Scheduled for Rt TKA Dr Kothari 2024 -- unable to get transport to [facility] until out of SNF.  # HTN: amlodipine 10, Toprol 25, hydralazine 10 tid. Restart losartan 50 to help boost K.  # DM w nephropathy and neuropathy: stable on Tresiba 10 units daily. When renal function improves can start farxiga. Losing weight intentionally as sticking with diabetic diet.  # Gout: allopurinol  # Anemia: no iron deficiency. Likely d/t CKD. Consider EPO injection.  # GERD: OFF PPI d/t NOHELIA.  # Left inguinal lymph node excisional bx pending.

## 2023-12-17 ASSESSMENT — ENCOUNTER SYMPTOMS
DYSURIA: 0
HALLUCINATIONS: 0
EYE PAIN: 0
COUGH: 0
HEMATURIA: 0
BACK PAIN: 0
PALPITATIONS: 0
POLYDIPSIA: 0
WOUND: 0
BLOOD IN STOOL: 0
BRUISES/BLEEDS EASILY: 0
FEVER: 0
SHORTNESS OF BREATH: 0
HEADACHES: 0
CHILLS: 0
NECK STIFFNESS: 0
ADENOPATHY: 0
FATIGUE: 0
ARTHRALGIAS: 1
ABDOMINAL PAIN: 0
SORE THROAT: 0
EYE REDNESS: 0
WEAKNESS: 1
RHINORRHEA: 0

## 2023-12-18 ENCOUNTER — NURSING HOME VISIT (OUTPATIENT)
Dept: POST ACUTE CARE | Facility: EXTERNAL LOCATION | Age: 73
End: 2023-12-18
Payer: MEDICARE

## 2023-12-18 DIAGNOSIS — C43.9 METASTATIC MELANOMA (MULTI): ICD-10-CM

## 2023-12-18 DIAGNOSIS — N40.1 BENIGN LOCALIZED HYPERPLASIA OF PROSTATE WITH URINARY RETENTION: ICD-10-CM

## 2023-12-18 DIAGNOSIS — M19.90 OSTEOARTHRITIS, UNSPECIFIED OSTEOARTHRITIS TYPE, UNSPECIFIED SITE: ICD-10-CM

## 2023-12-18 DIAGNOSIS — R33.8 BENIGN LOCALIZED HYPERPLASIA OF PROSTATE WITH URINARY RETENTION: ICD-10-CM

## 2023-12-18 LAB
FUNGUS SPEC CULT: NORMAL
FUNGUS SPEC FUNGUS STN: NORMAL

## 2023-12-18 PROCEDURE — 99309 SBSQ NF CARE MODERATE MDM 30: CPT | Performed by: FAMILY MEDICINE

## 2023-12-18 NOTE — PROGRESS NOTES
*Provider Impression*    Patient is a 73 year old male who is seen today for management of multiple medical problems     #Weakness / OA / Gout - PT/OT, allopurinol 100mg daily, acetaminophen 650mg q6h PRN, Norco 5/325mg q6h PRN, flexeril 5mg q8h PRN, biofreeze q4h PRN, f/u w/ Dr. Kothari  #AV block / TV vegetation - s/p PM explant and re-implant; cefazolin 2grams BID until 12/22, f/u w/ cardiology, f/u w/ ID, check CBC, BMP, ESR. CRP weekly and fax to [phone] Attn Dr Gonzalez  #Melanoma / Superficial VT - eliquis started, f/u w/ oncology  #Insomnia / Depression - melatonin 5mg QHS, add duloxetine 20mg daily  #HTN / HLD / Hypokalemia - amlodipine 10mg daily, metoprolol XL 25mg daily, atorvastatin 20mg daily, hydralazine 10mg TID, losartan 50mg daily, add KCl 40mEq daily until 12/18, repeat renal panel on 12/18  #T2DM - Humalog SSI, Tresiba 10units daily, f/u w/ podiatry for ingrown toenail  #BPH w/ obstruction - maldonado, finasteride 5mg daily, tamsulosin 0.8mg daily, f/u w/ urology Dr. Delaney  #GERD / Constipation - zofran 4mg q6h PRN, miralax 17grams daily, senna 8.6mg daily  #Anemia - taken off iron  #ACP - Full Code  Follow up as needed      *Chief Complaint*     multiple medical problems    *History of Present Illness*    Patient is a 74 y/o male w/ PMH as below who presents with a 3-week history of diarrhea with generalized weakness. CT A/P: No acute diverticulitis or acute process. Recent antibiotic use (Keflex) for UTI. Stool negative for C.Diff Colitis, PCR noninfectious. ID was consulted - no need for antibiotics. Urine culture was negative. Given IVF for hypovolemia from diarrhea and poor oral intake. Monitored and repleted electrolytes as needed. Nephrology consulted for NOHELIA with hyponatremia. Sodium uptrended: 123>130. Nephrotoxic agents held to allow for recovery. Urine output satisfactory. He was seen by PT/OT who recommended SNF and was d/c to [facility] [place].     He was sent to the ED on 11/13 for NOHELIA on  labs from the SNF, oliguria and blood cultures that were persistently positive for MSSA. He was subsequently transferred to [facility]. Blood cultures with NGTD as of 11/16. FILIPE 11/16 showed tricuspid valve vegetation as well as right atrial lead vegetation. Patient remained afebrile without leukocytosis and hemodynamically stable. Device Interrogated 11/17 with AP 29% and  30%.  Lead extraction complete 11/22. After lead removal, on telemetry the patient had several episodes of intermittent asymptomatic high-grade AV block, NSVT,and bradycardia. As a result his metoprolol was weaned. He underwent device re-implant (micra leadless pacemaker) on 11/30. Reimplant was done in setting of tricuspid vegetation with the assumption that patient was sterilized given Negative BC, lack of growth in vegetation size and clinical picture. He was treated w/ IV Ancef.  ID recommended continuation of Cefazolin 4 weeks after lead removal, until (12/20). PICC placed 12/2.  Patient has outpatient follow up scheduled further and antibiotic use will be determined then. Per ID, please collect CBC, BMP, ESR and CRP weekly and fax to [phone] Attn Dr Gonzalez. While inpatient he received a unit of packed red blood cells for low hemoglobin (11/25). Hemoglobin has remained stable with no signs or symptoms of bleeding. Home lasix and losartan were being held in setting of NOHELIA secondary to nephrotoxic antibiotic exposure which was down trending but not at baseline with adequate urine output. Patient has maldonado in setting of severe BPH and outpatient follow up with urology. Of note, patient noticed left groin lymphadenopathy and U/S (11/23) showed Enlarged left inguinal/external iliac lymph nodes. PET scan deferred at this time d/t active infectious process. Underwent core needle biopsy on 11/29. Patient to follow up biopsy results with surgical oncology.       His biopsy pathology returned w/ melanoma. He has oncology appointment today.  Labs  reflected improved renal function. He had c/o L elbow pain, XR negative, but venous doppler showed superficial venous thrombosis in his left basilic vein. He was started on eliquis.    He is seen sitting up in his room today and reports it's sharp pain radiating down arm, up to 10/10 at worst, hurts w/ movement and twisting, down to 3/10 at best, but hasn't been utilizing the available Norco. Advise to try increasing utilization to improve pain control. Otherwise no f/c, sweats, CP, SOB, n/v, constipation, diarrhea, still has maldonado. He does also report a depressed mood over the past month or so due to all of his medical complications without suicidal ideation and he would like to try something for this.     [place]iges - buPROPion, Cephalexin   PMH - arthritis, obesity, lymphedema, ventral hernia repair, hypertension, hyperlipidemia, BPH, PVD, T2DM, Bell's palsy  PSH - pacemaker, ventral hernia repair, prostate biopsy, varicose vein ligation  FH - Brother had heart disease; Mother had lung cancer  SocHx - Never smoker, Social EtOH    *Review of Systems*  All other systems reviewed are negative except as noted in the HPI     *Vital Signs*   Date: 12/14/23  - T: 97.5  P: 94  R: 18  BP: 147/83  SpO2: 99% on RA ; Date: 12/4/23  Wt: 249    *Results / Data*  CBC - Date: 12/11/23  WBC: 4.26  HGB: 7.8  HCT: 24.2  PLT: 109 ;   BMP - Date: 12/11/23  Na: 140  K: 3.4  Cl: 104  CO2: 23  BUN: 13  Cr: 1.76  Glu: 80  Ca: 8.8 ;   LFT - Date: 12/11/23  AST: 10  ALT: <5  ALP: 79  Tbili: 0.2  ;   Coags - Date:   INR:   PT:  Other - Date: 10/16/23  CRP: 3.8  ESR: 69  ; 10/23/23  CRP: 2.9  ESR: 79 ; 10/30/23  CRP: 1.9    *Physical Exam*  Gen: (+) NAD, (+) well-appearing  HEENT: (+) normocephalic, (+) MMM  Neck: (+) supple  Lungs: (+) CTAB, (-) wheezes, (-) rales, (-) rhonchi  Heart: (+) RRR, (+) S1 S2, (-) murmurs  Pulses: (+) palpable  Abd: (+) soft, (+) NT, (+) ND, (+) BS+  : (+) maldonado  Ext: (+) edema, (-) deformity  MSK: (-) joint  swelling  Skin: (+) warm, (+) dry, (-) rash  Neuro: (+) follows commands, (-) tremor, (+) alert

## 2023-12-18 NOTE — LETTER
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 Right knee pain. No CP/SOB. Left arm swelling improved.    O: VSS AFEB 249# (23) Awake, alert, NAD. Chest cta. Heart rrr. Right anterior chest with healed PPM incision. Pain in right knee with ROM. HUNG OUT. Ext left hand with trace edema.    LAB (23)Hgb 7.8->8.7, CRP 1.9, K 3.4->3.8, GFR 46, Cr 1.76-> 1.59  (23) Alb 3.5,  (23) Iron 21, TIBC 134, Ferritin 849, Transferrin sat 15.7%    A/P:  # Weakness: SNF PT/OT. He requires wheeled walker, chair lift mechanism, and raised toilet seat to allow him independence with ADLs. Goal to return home with home skilled PT/OT/SN to allow transition to home with homebound status once completeds IV ABX and SNF PT.  # Endocarditis: old PPM removed, Ancef 2g bid through 23.  # Intentional weight loss with dietary/diabetic intervention -- goal weight 200# over next 2 years.  # BPH w/ LUTS & Recent Obstructive Uropathy: Flomax 0.8 mg qhs. Finasteride 5 mg. Voiding well since HUNG OUT. F/U Dr Delaney as outpatient.  # NOHELIA: stabilized off lasix and arb. F/U Nephro/ [facility].  # OA: norco 5/325 1 po q6h prn pain. Scheduled for f/u and consult on Rt TKA with Dr Kothari 2024 -- unable to get transport to [facility] until out of SNF. Trial knee immobilizer to help with pain control.  # HTN: amlodipine 10, Toprol 25, hydralazine 10 tid. Tolerating losartan 50.  # DM w nephropathy and neuropathy: stable on Tresiba 10 units daily. When renal function improves can start farxiga. Losing weight intentionally as  sticking with diabetic diet.  # Gout: allopurinol  # Anemia: no iron deficiency. Likely d/t CKD. Consider EPO injection.  # GERD: OFF PPI d/t NOHELIA.  # Metastatic Melanoma: Dx 12/4/23. Oncology Dr Springer 12/14/23 -- plans chemo/immunotx once stronger. recheck in 2 months after PET-CT and MRI.  # Left UE basilic vein thrombosis (SVT)-- cut eliquis to 5 mg bid x 2 weeks, then 2.5 mg bid for DVT prevention -- high risk due to immobility and cancer dx.      Electronically Signed By: Jeffry De Leon MD   12/27/23  2:17 PM

## 2023-12-20 DIAGNOSIS — Z95.0 PACEMAKER: Primary | ICD-10-CM

## 2023-12-20 DIAGNOSIS — I49.5 SSS (SICK SINUS SYNDROME) (MULTI): ICD-10-CM

## 2023-12-21 ENCOUNTER — NURSING HOME VISIT (OUTPATIENT)
Dept: POST ACUTE CARE | Facility: EXTERNAL LOCATION | Age: 73
End: 2023-12-21
Payer: MEDICARE

## 2023-12-21 DIAGNOSIS — K21.9 GASTROESOPHAGEAL REFLUX DISEASE, UNSPECIFIED WHETHER ESOPHAGITIS PRESENT: ICD-10-CM

## 2023-12-21 DIAGNOSIS — N13.8 BPH WITH OBSTRUCTION/LOWER URINARY TRACT SYMPTOMS: ICD-10-CM

## 2023-12-21 DIAGNOSIS — M10.9 GOUT, UNSPECIFIED CAUSE, UNSPECIFIED CHRONICITY, UNSPECIFIED SITE: ICD-10-CM

## 2023-12-21 DIAGNOSIS — C43.9 METASTATIC MELANOMA (MULTI): ICD-10-CM

## 2023-12-21 DIAGNOSIS — K59.00 CONSTIPATION, UNSPECIFIED CONSTIPATION TYPE: ICD-10-CM

## 2023-12-21 DIAGNOSIS — E11.21 TYPE 2 DIABETES MELLITUS WITH DIABETIC NEPHROPATHY, WITHOUT LONG-TERM CURRENT USE OF INSULIN (MULTI): ICD-10-CM

## 2023-12-21 DIAGNOSIS — G47.00 INSOMNIA, UNSPECIFIED TYPE: ICD-10-CM

## 2023-12-21 DIAGNOSIS — E46 PROTEIN-CALORIE MALNUTRITION, UNSPECIFIED SEVERITY (MULTI): ICD-10-CM

## 2023-12-21 DIAGNOSIS — N40.1 BPH WITH OBSTRUCTION/LOWER URINARY TRACT SYMPTOMS: ICD-10-CM

## 2023-12-21 DIAGNOSIS — M13.0 POLYARTHRITIS: ICD-10-CM

## 2023-12-21 DIAGNOSIS — F32.A DEPRESSION, UNSPECIFIED DEPRESSION TYPE: ICD-10-CM

## 2023-12-21 DIAGNOSIS — I33.0 TRICUSPID VALVE VEGETATION (HHS-HCC): ICD-10-CM

## 2023-12-21 DIAGNOSIS — I10 ESSENTIAL HYPERTENSION: ICD-10-CM

## 2023-12-21 DIAGNOSIS — I44.2 COMPLETE ATRIOVENTRICULAR BLOCK (MULTI): ICD-10-CM

## 2023-12-21 DIAGNOSIS — I82.890 SUPERFICIAL VEIN THROMBOSIS: ICD-10-CM

## 2023-12-21 DIAGNOSIS — E78.5 DYSLIPIDEMIA: ICD-10-CM

## 2023-12-21 DIAGNOSIS — M62.81 MUSCLE WEAKNESS (GENERALIZED): Primary | ICD-10-CM

## 2023-12-21 PROBLEM — M19.90 OSTEOARTHRITIS: Status: ACTIVE | Noted: 2023-12-21

## 2023-12-21 PROBLEM — R33.8 BENIGN LOCALIZED HYPERPLASIA OF PROSTATE WITH URINARY RETENTION: Status: ACTIVE | Noted: 2023-12-21

## 2023-12-21 PROCEDURE — 99309 SBSQ NF CARE MODERATE MDM 30: CPT | Performed by: NURSE PRACTITIONER

## 2023-12-21 NOTE — PROGRESS NOTES
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 Right knee pain. No CP/SOB. Left arm swelling improved.    O: VSS AFEB 249# (23) Awake, alert, NAD. Chest cta. Heart rrr. Right anterior chest with healed PPM incision. Pain in right knee with ROM. HUNG OUT. Ext left hand with trace edema.    LAB (23)Hgb 7.8->8.7, CRP 1.9, K 3.4->3.8, GFR 46, Cr 1.76-> 1.59  (23) Alb 3.5,  (23) Iron 21, TIBC 134, Ferritin 849, Transferrin sat 15.7%    A/P:  # Weakness: SNF PT/OT. He requires wheeled walker, chair lift mechanism, and raised toilet seat to allow him independence with ADLs. Goal to return home with home skilled PT/OT/SN to allow transition to home with homebound status once completeds IV ABX and SNF PT.  # Endocarditis: old PPM removed, Ancef 2g bid through 23.  # Intentional weight loss with dietary/diabetic intervention -- goal weight 200# over next 2 years.  # BPH w/ LUTS & Recent Obstructive Uropathy: Flomax 0.8 mg qhs. Finasteride 5 mg. Voiding well since HUNG OUT. F/U Dr Delaney as outpatient.  # NOHELIA: stabilized off lasix and arb. F/U Nephro/ [facility].  # OA: norco 5/325 1 po q6h prn pain. Scheduled for f/u and consult on Rt TKA with Dr Kothari 2024 -- unable to get transport to [facility] until out of SNF. Trial knee immobilizer to help with pain control.  # HTN: amlodipine 10, Toprol 25, hydralazine 10 tid. Tolerating losartan 50.  # DM w nephropathy and neuropathy: stable on Tresiba 10 units daily. When renal function improves can start farxiga. Losing weight intentionally as sticking with diabetic diet.  # Gout: allopurinol  # Anemia: no iron  deficiency. Likely d/t CKD. Consider EPO injection.  # GERD: OFF PPI d/t NOHELIA.  # Metastatic Melanoma: Dx 12/4/23. Oncology Dr Springer 12/14/23 -- plans chemo/immunotx once stronger. recheck in 2 months after PET-CT and MRI.  # Left UE basilic vein thrombosis (SVT)-- cut eliquis to 5 mg bid x 2 weeks, then 2.5 mg bid for DVT prevention -- high risk due to immobility and cancer dx.

## 2023-12-26 ENCOUNTER — APPOINTMENT (OUTPATIENT)
Dept: CARDIOLOGY | Facility: HOSPITAL | Age: 73
End: 2023-12-26
Payer: MEDICARE

## 2023-12-27 ENCOUNTER — TELEPHONE (OUTPATIENT)
Dept: HEMATOLOGY/ONCOLOGY | Facility: CLINIC | Age: 73
End: 2023-12-27
Payer: COMMERCIAL

## 2023-12-27 NOTE — TELEPHONE ENCOUNTER
Called and spoke to Mile.  She is concerned as she has not found a company to take the pt by stretcher.  She wondered if there is someone to assist pt to get onto scanning table if needed.  Given Radiology phone number so they can confirm what assistance is available.  SW consulted and said the facility is responsible for arranging transportation to scan.

## 2023-12-28 ENCOUNTER — NURSING HOME VISIT (OUTPATIENT)
Dept: POST ACUTE CARE | Facility: EXTERNAL LOCATION | Age: 73
End: 2023-12-28
Payer: MEDICARE

## 2023-12-28 DIAGNOSIS — Z79.4 TYPE 2 DIABETES MELLITUS WITH DIABETIC NEUROPATHY, WITH LONG-TERM CURRENT USE OF INSULIN (MULTI): ICD-10-CM

## 2023-12-28 DIAGNOSIS — E11.40 TYPE 2 DIABETES MELLITUS WITH DIABETIC NEUROPATHY, WITH LONG-TERM CURRENT USE OF INSULIN (MULTI): ICD-10-CM

## 2023-12-28 DIAGNOSIS — M35.3 PMR (POLYMYALGIA RHEUMATICA) (MULTI): ICD-10-CM

## 2023-12-28 DIAGNOSIS — E87.6 HYPOKALEMIA: ICD-10-CM

## 2023-12-28 PROCEDURE — 99309 SBSQ NF CARE MODERATE MDM 30: CPT | Performed by: FAMILY MEDICINE

## 2023-12-28 NOTE — LETTER
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 Right knee pain d/t end stage OA. Continues to decline with loss of proximal muscle strength. No CP/SOB. Left arm swelling improved.    O: VSS AFEB 220# (down 30# in 4 weeks) Awake, alert, NAD. Chest cta. Heart rrr (PPM). Pain in right knee with ROM. Ext left hand with trace edema. Urine tea colored this am. Peripheral neuropathy. weakness in hips and shoulders.    LAB (23) BMP, TSH, Blood Cx pending.  (23) CRP 12.4, ESR 70, Hgb 0, K 3, GFR 41, Cr 1.75  (23) Alb 3.5,  (23) Iron 21, TIBC 134, Ferritin 849, Transferrin sat 15.7%    A/P:  # Weakness: SNF PT/OT. He requires wheeled walker, chair lift mechanism, and raised toilet seat to allow him independence with ADLs. Goal to return home with home skilled PT/OT/SN to allow transition to home with homebound status once completeds IV ABX and SNF PT.  # Elevated ESR & proximal muscle weakness c/w PMR: agree with prednisone 20 bid x 3 days then 20 mg daily. DC finasteride to remove testosterone antagonist.  # Endocarditis: old PPM removed, Completed Ancef 2g bid through 23. Check blood cx x 3 23.  # Intentional weight loss with dietary/diabetic intervention -- goal weight 200# but rapid weight loss worrisome for sarcopenia -- liberalize diet (but still no pop).  # BPH w/ LUTS & Recent Obstructive Uropathy: Flomax 0.8 mg qhs. OFF Finasteride d/t weakness. Voiding well since HUNG OUT. F/U Dr Delaney as outpatient.  # NOHELIA: stabilized off lasix. ARB resumed. F/U Nephro/ [facility].  # OA: norco  1 po q6h prn  pain. Scheduled for f/u and consult on Rt TKA with Dr Kothari 1/2024 -- unable to get transport to [facility] until out of SNF. Trial knee immobilizer to help with pain control.  # HTN: amlodipine 10, Toprol 25, hydralazine 10 tid. Increase losartan 100.  # Hypokalemia: supplement, max ARB.  # DM w nephropathy and neuropathy: cut Tresiba to 5 units daily. When renal function improves can start farxiga.  # Gout: allopurinol  # Anemia: no iron deficiency. Likely d/t CKD. Slowly improving -- if plateaus consider EPO injection.  # GERD: OFF PPI d/t NOHELIA.  # Metastatic Melanoma: Dx 12/4/23. Oncology Dr Springer 12/14/23 -- plans chemo/immunotx once stronger. recheck in 2 months after PET-CT and MRI. I recommend delaying transport and PET scans until out of rehab.  # Left UE basilic vein thrombosis (SVT)-- cut eliquis to 5 mg bid x 2 weeks, then 2.5 mg bid for DVT prevention -- high risk due to immobility and cancer dx.      Electronically Signed By: Jeffry De Leon MD   1/14/24  4:07 PM

## 2023-12-29 ENCOUNTER — APPOINTMENT (OUTPATIENT)
Dept: RADIOLOGY | Facility: HOSPITAL | Age: 73
End: 2023-12-29
Payer: MEDICARE

## 2023-12-30 NOTE — PROGRESS NOTES
*Provider Impression*    Patient is a 73 year old male who is seen today for management of multiple medical problems     #Weakness / OA / Gout - PT/OT, allopurinol 100mg daily, acetaminophen 650mg q6h PRN, Norco 5/325mg q6h PRN, biofreeze q4h PRN, f/u w/ Dr. Kothari  #AV block / TV vegetation - s/p PM explant and re-implant; cefazolin 2grams BID until 12/22, f/u w/ cardiology, f/u w/ ID, check CBC, BMP, ESR. CRP weekly and fax to [phone] Attn Dr Gonzalez  #Melanoma / Superficial VT - eliquis 5mg BID until 1/1 then 2.5mg BID, f/u w/ oncology  #BPH w/ obstruction - maldonado, finasteride 5mg daily, tamsulosin 0.8mg daily, f/u w/ urology Dr. Delaney, check UA c&s  #Insomnia / Depression - melatonin 5mg QHS, duloxetine 20mg daily  #HTN / HLD - amlodipine 10mg daily, metoprolol XL 25mg daily, atorvastatin 20mg daily, hydralazine 10mg TID, losartan 50mg daily  #T2DM - Humalog SSI, Tresiba 10units daily, f/u w/ podiatry for ingrown toenail  #GERD / Constipation - zofran 4mg q6h PRN, miralax 17grams daily, senna 8.6mg daily  #PCM - add prostat 30mL daily  #ACP - Full Code  Follow up as needed      *Chief Complaint*     void trial    *History of Present Illness*    Patient is a 74 y/o male w/ PMH as below who presents with a 3-week history of diarrhea with generalized weakness. CT A/P: No acute diverticulitis or acute process. Recent antibiotic use (Keflex) for UTI. Stool negative for C.Diff Colitis, PCR noninfectious. ID was consulted - no need for antibiotics. Urine culture was negative. Given IVF for hypovolemia from diarrhea and poor oral intake. Monitored and repleted electrolytes as needed. Nephrology consulted for NOHELIA with hyponatremia. Sodium uptrended: 123>130. Nephrotoxic agents held to allow for recovery. Urine output satisfactory. He was seen by PT/OT who recommended SNF and was d/c to [facility].     He was sent to the ED on 11/13 for NOHELIA on labs from the SNF, oliguria and blood cultures that were persistently  positive for MSSA. He was subsequently transferred to [facility]. Blood cultures with NGTD as of 11/16. FILIPE 11/16 showed tricuspid valve vegetation as well as right atrial lead vegetation. Patient remained afebrile without leukocytosis and hemodynamically stable. Device Interrogated 11/17 with AP 29% and  30%.  Lead extraction complete 11/22. After lead removal, on telemetry the patient had several episodes of intermittent asymptomatic high-grade AV block, NSVT,and bradycardia. As a result his metoprolol was weaned. He underwent device re-implant (micra leadless pacemaker) on 11/30. Reimplant was done in setting of tricuspid vegetation with the assumption that patient was sterilized given Negative BC, lack of growth in vegetation size and clinical picture. He was treated w/ IV Ancef.  ID recommended continuation of Cefazolin 4 weeks after lead removal, until (12/20). PICC placed 12/2.  Patient has outpatient follow up scheduled further and antibiotic use will be determined then. Per ID, please collect CBC, BMP, ESR and CRP weekly and fax to [phone] Attn Dr Gonzalez. While inpatient he received a unit of packed red blood cells for low hemoglobin (11/25). Hemoglobin has remained stable with no signs or symptoms of bleeding. Home lasix and losartan were being held in setting of NOHELIA secondary to nephrotoxic antibiotic exposure which was down trending but not at baseline with adequate urine output. Patient has maldonado in setting of severe BPH and outpatient follow up with urology. Of note, patient noticed left groin lymphadenopathy and U/S (11/23) showed Enlarged left inguinal/external iliac lymph nodes. PET scan deferred at the  time d/t active infectious process. Underwent core needle biopsy on 11/29. Patient to follow up biopsy results with surgical oncology. His biopsy pathology returned w/ melanoma.     His maldonado was removed and passed void trial.     He is seen sitting up in his room today and denies any dysuria,  frequency, f/c, n/v, CP, SOB, cough, gets some neuropathy in legs, not as much tingling, still heaviness, some polydipsia, accuchecks ok, and no other new c/o presently.     Alleriges - buPROPion, Cephalexin   PMH - arthritis, obesity, lymphedema, ventral hernia repair, hypertension, hyperlipidemia, BPH, PVD, T2DM, Bell's palsy  PSH - pacemaker, ventral hernia repair, prostate biopsy, varicose vein ligation  FH - Brother had heart disease; Mother had lung cancer  SocHx - Never smoker, Social EtOH    *Review of Systems*  All other systems reviewed are negative except as noted in the HPI     *Vital Signs*   Date: 12/21/23  - T: 97.1  P: 82  R: 18  BP: 139/80  SpO2: 97% on RA ; Date: 12/4/23  Wt: 249    *Results / Data*  CBC - Date: 12/18/23  WBC: 4.93  HGB: 8.7  HCT: 26.6  PLT: 114 ;   BMP - Date: 12/18/23  Na: 139  K: 3.8  Cl: 103  CO2: 23  BUN: 15  Cr: 1.59  Glu: 75  Ca: 8.9  Phos: 3.8  Alb: 3.2 ;   LFT - Date: 12/15/23  AST: 9  ALT: <5  ALP: 80  Tbili: 0.3  ;   Coags - Date:   INR:   PT:  Other - Date: 10/16/23  CRP: 3.8  ESR: 69  ; 10/23/23  CRP: 2.9  ESR: 79 ; 10/30/23  CRP: 1.9  ; 12/18/23  CRP: 1.9 ;  12/20/23  ESR: 60    *Physical Exam*  Gen: (+) NAD, (+) well-appearing  HEENT: (+) normocephalic, (+) MMM  Neck: (+) supple  Lungs: (+) CTAB, (-) wheezes, (-) rales, (-) rhonchi  Heart: (+) RRR, (+) S1 S2, (-) murmurs  Pulses: (+) palpable  Abd: (+) soft, (+) NT, (+) ND, (+) BS+  : (+) maldonado  Ext: (+) edema, (-) deformity  MSK: (-) joint swelling  Skin: (+) warm, (+) dry, (-) rash  Neuro: (+) follows commands, (-) tremor, (+) alert

## 2024-01-01 ENCOUNTER — HOSPITAL ENCOUNTER (EMERGENCY)
Facility: HOSPITAL | Age: 74
End: 2024-08-25
Attending: STUDENT IN AN ORGANIZED HEALTH CARE EDUCATION/TRAINING PROGRAM
Payer: MEDICARE

## 2024-01-01 ENCOUNTER — LAB REQUISITION (OUTPATIENT)
Dept: LAB | Facility: HOSPITAL | Age: 74
End: 2024-01-01
Payer: MEDICARE

## 2024-01-01 ENCOUNTER — APPOINTMENT (OUTPATIENT)
Dept: RADIOLOGY | Facility: HOSPITAL | Age: 74
End: 2024-01-01
Payer: MEDICARE

## 2024-01-01 ENCOUNTER — APPOINTMENT (OUTPATIENT)
Dept: CARDIOLOGY | Facility: HOSPITAL | Age: 74
End: 2024-01-01
Payer: MEDICARE

## 2024-01-01 ENCOUNTER — NURSING HOME VISIT (OUTPATIENT)
Dept: POST ACUTE CARE | Facility: EXTERNAL LOCATION | Age: 74
End: 2024-01-01

## 2024-01-01 ENCOUNTER — LAB REQUISITION (OUTPATIENT)
Dept: LAB | Facility: HOSPITAL | Age: 74
End: 2024-01-01
Payer: COMMERCIAL

## 2024-01-01 ENCOUNTER — NURSING HOME VISIT (OUTPATIENT)
Dept: POST ACUTE CARE | Facility: EXTERNAL LOCATION | Age: 74
End: 2024-01-01
Payer: MEDICARE

## 2024-01-01 ENCOUNTER — TELEPHONE (OUTPATIENT)
Dept: HEMATOLOGY/ONCOLOGY | Facility: CLINIC | Age: 74
End: 2024-01-01

## 2024-01-01 VITALS
OXYGEN SATURATION: 98 % | RESPIRATION RATE: 25 BRPM | SYSTOLIC BLOOD PRESSURE: 55 MMHG | DIASTOLIC BLOOD PRESSURE: 39 MMHG | BODY MASS INDEX: 28.02 KG/M2 | WEIGHT: 218.26 LBS | HEART RATE: 111 BPM

## 2024-01-01 DIAGNOSIS — E56.9 VITAMIN DEFICIENCY: ICD-10-CM

## 2024-01-01 DIAGNOSIS — R33.8 BENIGN LOCALIZED HYPERPLASIA OF PROSTATE WITH URINARY RETENTION: ICD-10-CM

## 2024-01-01 DIAGNOSIS — C43.9 METASTATIC MELANOMA (MULTI): ICD-10-CM

## 2024-01-01 DIAGNOSIS — K82.8 GALLBLADDER SLUDGE: ICD-10-CM

## 2024-01-01 DIAGNOSIS — M62.81 MUSCLE WEAKNESS (GENERALIZED): Primary | ICD-10-CM

## 2024-01-01 DIAGNOSIS — K21.9 GASTROESOPHAGEAL REFLUX DISEASE, UNSPECIFIED WHETHER ESOPHAGITIS PRESENT: ICD-10-CM

## 2024-01-01 DIAGNOSIS — M10.9 GOUT, UNSPECIFIED CAUSE, UNSPECIFIED CHRONICITY, UNSPECIFIED SITE: ICD-10-CM

## 2024-01-01 DIAGNOSIS — F32.A DEPRESSION, UNSPECIFIED DEPRESSION TYPE: ICD-10-CM

## 2024-01-01 DIAGNOSIS — N40.1 BENIGN LOCALIZED HYPERPLASIA OF PROSTATE WITH URINARY RETENTION: ICD-10-CM

## 2024-01-01 DIAGNOSIS — D50.9 IRON DEFICIENCY ANEMIA, UNSPECIFIED IRON DEFICIENCY ANEMIA TYPE: ICD-10-CM

## 2024-01-01 DIAGNOSIS — M13.0 POLYARTHRITIS: ICD-10-CM

## 2024-01-01 DIAGNOSIS — I46.9 CARDIAC ARREST (MULTI): Primary | ICD-10-CM

## 2024-01-01 DIAGNOSIS — I44.2 COMPLETE ATRIOVENTRICULAR BLOCK (MULTI): ICD-10-CM

## 2024-01-01 DIAGNOSIS — I82.401 ACUTE DEEP VEIN THROMBOSIS (DVT) OF RIGHT LOWER EXTREMITY, UNSPECIFIED VEIN (MULTI): ICD-10-CM

## 2024-01-01 DIAGNOSIS — E78.5 DYSLIPIDEMIA: ICD-10-CM

## 2024-01-01 DIAGNOSIS — E11.21 DIABETIC NEPHROPATHY ASSOCIATED WITH TYPE 2 DIABETES MELLITUS (MULTI): ICD-10-CM

## 2024-01-01 DIAGNOSIS — N17.9 ACUTE KIDNEY FAILURE, UNSPECIFIED (CMS-HCC): ICD-10-CM

## 2024-01-01 DIAGNOSIS — N13.8 BPH WITH OBSTRUCTION/LOWER URINARY TRACT SYMPTOMS: ICD-10-CM

## 2024-01-01 DIAGNOSIS — N18.32 STAGE 3B CHRONIC KIDNEY DISEASE (CKD) (MULTI): ICD-10-CM

## 2024-01-01 DIAGNOSIS — M19.90 OSTEOARTHRITIS, UNSPECIFIED OSTEOARTHRITIS TYPE, UNSPECIFIED SITE: ICD-10-CM

## 2024-01-01 DIAGNOSIS — I33.0 TRICUSPID VALVE VEGETATION (HHS-HCC): ICD-10-CM

## 2024-01-01 DIAGNOSIS — I50.9 HEART FAILURE, UNSPECIFIED: ICD-10-CM

## 2024-01-01 DIAGNOSIS — Z79.01 LONG TERM (CURRENT) USE OF ANTICOAGULANTS: ICD-10-CM

## 2024-01-01 DIAGNOSIS — D64.9 ANEMIA, UNSPECIFIED TYPE: ICD-10-CM

## 2024-01-01 DIAGNOSIS — K52.9 ACUTE GASTROENTERITIS: ICD-10-CM

## 2024-01-01 DIAGNOSIS — I10 ESSENTIAL HYPERTENSION: ICD-10-CM

## 2024-01-01 DIAGNOSIS — I10 ESSENTIAL (PRIMARY) HYPERTENSION: ICD-10-CM

## 2024-01-01 DIAGNOSIS — E11.21 TYPE 2 DIABETES MELLITUS WITH DIABETIC NEPHROPATHY, WITHOUT LONG-TERM CURRENT USE OF INSULIN (MULTI): ICD-10-CM

## 2024-01-01 DIAGNOSIS — N40.1 BPH WITH OBSTRUCTION/LOWER URINARY TRACT SYMPTOMS: ICD-10-CM

## 2024-01-01 LAB
ABO GROUP (TYPE) IN BLOOD: NORMAL
ALBUMIN SERPL BCP-MCNC: 1.9 G/DL (ref 3.4–5)
ALBUMIN SERPL BCP-MCNC: 2 G/DL (ref 3.4–5)
ALBUMIN SERPL BCP-MCNC: <1.5 G/DL (ref 3.4–5)
ALP SERPL-CCNC: 275 U/L (ref 33–136)
ALP SERPL-CCNC: 521 U/L (ref 33–136)
ALT SERPL W P-5'-P-CCNC: 24 U/L (ref 10–52)
ALT SERPL W P-5'-P-CCNC: 27 U/L (ref 10–52)
ANION GAP BLDA CALCULATED.4IONS-SCNC: 16 MMO/L (ref 10–25)
ANION GAP SERPL CALC-SCNC: 10 MMOL/L (ref 10–20)
ANION GAP SERPL CALC-SCNC: 15 MMOL/L (ref 10–20)
ANION GAP SERPL CALC-SCNC: 8 MMOL/L (ref 10–20)
ANTIBODY SCREEN: NORMAL
APPEARANCE UR: ABNORMAL
APTT PPP: 38 SECONDS (ref 27–38)
AST SERPL W P-5'-P-CCNC: 39 U/L (ref 9–39)
AST SERPL W P-5'-P-CCNC: 47 U/L (ref 9–39)
BACTERIA #/AREA URNS AUTO: ABNORMAL /HPF
BASE EXCESS BLDA CALC-SCNC: -10.8 MMOL/L (ref -2–3)
BASOPHILS # BLD AUTO: 0 X10*3/UL (ref 0–0.1)
BASOPHILS # BLD AUTO: 0.01 X10*3/UL (ref 0–0.1)
BASOPHILS NFR BLD AUTO: 0 %
BASOPHILS NFR BLD AUTO: 0.1 %
BILIRUB SERPL-MCNC: 0.5 MG/DL (ref 0–1.2)
BILIRUB SERPL-MCNC: 0.6 MG/DL (ref 0–1.2)
BILIRUB UR STRIP.AUTO-MCNC: NEGATIVE MG/DL
BODY TEMPERATURE: ABNORMAL
BUN SERPL-MCNC: 32 MG/DL (ref 6–23)
BUN SERPL-MCNC: 36 MG/DL (ref 6–23)
BUN SERPL-MCNC: 59 MG/DL (ref 6–23)
BURR CELLS BLD QL SMEAR: NORMAL
CA-I BLDA-SCNC: 1.06 MMOL/L (ref 1.1–1.33)
CALCIUM SERPL-MCNC: 6.1 MG/DL (ref 8.6–10.3)
CALCIUM SERPL-MCNC: 6.3 MG/DL (ref 8.6–10.3)
CALCIUM SERPL-MCNC: 6.7 MG/DL (ref 8.6–10.3)
CARDIAC TROPONIN I PNL SERPL HS: 71 NG/L (ref 0–20)
CHLORIDE BLDA-SCNC: 118 MMOL/L (ref 98–107)
CHLORIDE SERPL-SCNC: 116 MMOL/L (ref 98–107)
CHLORIDE SERPL-SCNC: 118 MMOL/L (ref 98–107)
CHLORIDE SERPL-SCNC: 119 MMOL/L (ref 98–107)
CO2 SERPL-SCNC: 14 MMOL/L (ref 21–32)
CO2 SERPL-SCNC: 19 MMOL/L (ref 21–32)
CO2 SERPL-SCNC: 19 MMOL/L (ref 21–32)
COLOR UR: ABNORMAL
CREAT SERPL-MCNC: 1.65 MG/DL (ref 0.5–1.3)
CREAT SERPL-MCNC: 1.79 MG/DL (ref 0.5–1.3)
CREAT SERPL-MCNC: 3.09 MG/DL (ref 0.5–1.3)
EGFRCR SERPLBLD CKD-EPI 2021: 21 ML/MIN/1.73M*2
EGFRCR SERPLBLD CKD-EPI 2021: 40 ML/MIN/1.73M*2
EGFRCR SERPLBLD CKD-EPI 2021: 44 ML/MIN/1.73M*2
EOSINOPHIL # BLD AUTO: 0 X10*3/UL (ref 0–0.4)
EOSINOPHIL # BLD AUTO: 0.03 X10*3/UL (ref 0–0.4)
EOSINOPHIL NFR BLD AUTO: 0 %
EOSINOPHIL NFR BLD AUTO: 0.7 %
ERYTHROCYTE [DISTWIDTH] IN BLOOD BY AUTOMATED COUNT: 20.8 % (ref 11.5–14.5)
ERYTHROCYTE [DISTWIDTH] IN BLOOD BY AUTOMATED COUNT: 20.9 % (ref 11.5–14.5)
ERYTHROCYTE [DISTWIDTH] IN BLOOD BY AUTOMATED COUNT: 21.2 % (ref 11.5–14.5)
GLUCOSE BLDA-MCNC: 151 MG/DL (ref 74–99)
GLUCOSE SERPL-MCNC: 102 MG/DL (ref 74–99)
GLUCOSE SERPL-MCNC: 106 MG/DL (ref 74–99)
GLUCOSE SERPL-MCNC: 138 MG/DL (ref 74–99)
GLUCOSE UR STRIP.AUTO-MCNC: NORMAL MG/DL
HCO3 BLDA-SCNC: 12.4 MMOL/L (ref 22–26)
HCT VFR BLD AUTO: 17.5 % (ref 41–52)
HCT VFR BLD AUTO: 26.3 % (ref 41–52)
HCT VFR BLD AUTO: 28.9 % (ref 41–52)
HCT VFR BLD EST: 27 % (ref 41–52)
HGB BLD-MCNC: 5.3 G/DL (ref 13.5–17.5)
HGB BLD-MCNC: 8.2 G/DL (ref 13.5–17.5)
HGB BLD-MCNC: 8.3 G/DL (ref 13.5–17.5)
HGB BLDA-MCNC: 9.1 G/DL (ref 13.5–17.5)
HYPOCHROMIA BLD QL SMEAR: NORMAL
IMM GRANULOCYTES # BLD AUTO: 0.02 X10*3/UL (ref 0–0.5)
IMM GRANULOCYTES # BLD AUTO: 0.09 X10*3/UL (ref 0–0.5)
IMM GRANULOCYTES NFR BLD AUTO: 0.5 % (ref 0–0.9)
IMM GRANULOCYTES NFR BLD AUTO: 1 % (ref 0–0.9)
INHALED O2 CONCENTRATION: 100 %
INR PPP: 1.3 (ref 0.9–1.1)
INR PPP: 1.4 (ref 0.9–1.1)
INR PPP: 1.7 (ref 0.9–1.1)
KETONES UR STRIP.AUTO-MCNC: NEGATIVE MG/DL
LACTATE BLDA-SCNC: 3.8 MMOL/L (ref 0.4–2)
LEUKOCYTE ESTERASE UR QL STRIP.AUTO: ABNORMAL
LYMPHOCYTES # BLD AUTO: 1.14 X10*3/UL (ref 0.8–3)
LYMPHOCYTES # BLD AUTO: 1.2 X10*3/UL (ref 0.8–3)
LYMPHOCYTES NFR BLD AUTO: 13.2 %
LYMPHOCYTES NFR BLD AUTO: 29.5 %
MCH RBC QN AUTO: 28.3 PG (ref 26–34)
MCH RBC QN AUTO: 28.3 PG (ref 26–34)
MCH RBC QN AUTO: 28.6 PG (ref 26–34)
MCHC RBC AUTO-ENTMCNC: 28.7 G/DL (ref 32–36)
MCHC RBC AUTO-ENTMCNC: 30.3 G/DL (ref 32–36)
MCHC RBC AUTO-ENTMCNC: 31.2 G/DL (ref 32–36)
MCV RBC AUTO: 91 FL (ref 80–100)
MCV RBC AUTO: 95 FL (ref 80–100)
MCV RBC AUTO: 99 FL (ref 80–100)
MONOCYTES # BLD AUTO: 0.22 X10*3/UL (ref 0.05–0.8)
MONOCYTES # BLD AUTO: 0.3 X10*3/UL (ref 0.05–0.8)
MONOCYTES NFR BLD AUTO: 3.5 %
MONOCYTES NFR BLD AUTO: 5.4 %
MUCOUS THREADS #/AREA URNS AUTO: ABNORMAL /LPF
NEUTROPHILS # BLD AUTO: 2.6 X10*3/UL (ref 1.6–5.5)
NEUTROPHILS # BLD AUTO: 7.12 X10*3/UL (ref 1.6–5.5)
NEUTROPHILS NFR BLD AUTO: 63.9 %
NEUTROPHILS NFR BLD AUTO: 82.2 %
NITRITE UR QL STRIP.AUTO: ABNORMAL
NRBC BLD-RTO: 0 /100 WBCS (ref 0–0)
NRBC BLD-RTO: 0 /100 WBCS (ref 0–0)
NRBC BLD-RTO: 1.8 /100 WBCS (ref 0–0)
OVALOCYTES BLD QL SMEAR: NORMAL
OXYHGB MFR BLDA: 98.1 % (ref 94–98)
PCO2 BLDA: 20 MM HG (ref 38–42)
PH BLDA: 7.4 PH (ref 7.38–7.42)
PH UR STRIP.AUTO: 6 [PH]
PHOSPHATE SERPL-MCNC: 2.6 MG/DL (ref 2.5–4.9)
PLATELET # BLD AUTO: 100 X10*3/UL (ref 150–450)
PLATELET # BLD AUTO: 86 X10*3/UL (ref 150–450)
PLATELET # BLD AUTO: 87 X10*3/UL (ref 150–450)
PO2 BLDA: 216 MM HG (ref 85–95)
POLYCHROMASIA BLD QL SMEAR: NORMAL
POTASSIUM BLDA-SCNC: 6.8 MMOL/L (ref 3.5–5.3)
POTASSIUM SERPL-SCNC: 3.4 MMOL/L (ref 3.5–5.3)
POTASSIUM SERPL-SCNC: 4 MMOL/L (ref 3.5–5.3)
POTASSIUM SERPL-SCNC: 6.3 MMOL/L (ref 3.5–5.3)
PROT SERPL-MCNC: 3.2 G/DL (ref 6.4–8.2)
PROT SERPL-MCNC: 3.9 G/DL (ref 6.4–8.2)
PROT UR STRIP.AUTO-MCNC: ABNORMAL MG/DL
PROTHROMBIN TIME: 14.3 SECONDS (ref 9.8–12.8)
PROTHROMBIN TIME: 16.3 SECONDS (ref 9.8–12.8)
PROTHROMBIN TIME: 19.6 SECONDS (ref 9.8–12.8)
RBC # BLD AUTO: 1.85 X10*6/UL (ref 4.5–5.9)
RBC # BLD AUTO: 2.9 X10*6/UL (ref 4.5–5.9)
RBC # BLD AUTO: 2.93 X10*6/UL (ref 4.5–5.9)
RBC # UR STRIP.AUTO: ABNORMAL /UL
RBC #/AREA URNS AUTO: ABNORMAL /HPF
RBC MORPH BLD: NORMAL
RH FACTOR (ANTIGEN D): NORMAL
SAO2 % BLDA: 99 % (ref 94–100)
SARS-COV-2 RNA RESP QL NAA+PROBE: NOT DETECTED
SODIUM BLDA-SCNC: 140 MMOL/L (ref 136–145)
SODIUM SERPL-SCNC: 141 MMOL/L (ref 136–145)
SODIUM SERPL-SCNC: 142 MMOL/L (ref 136–145)
SODIUM SERPL-SCNC: 142 MMOL/L (ref 136–145)
SP GR UR STRIP.AUTO: 1.02
SQUAMOUS #/AREA URNS AUTO: ABNORMAL /HPF
UROBILINOGEN UR STRIP.AUTO-MCNC: NORMAL MG/DL
WBC # BLD AUTO: 4.1 X10*3/UL (ref 4.4–11.3)
WBC # BLD AUTO: 5.3 X10*3/UL (ref 4.4–11.3)
WBC # BLD AUTO: 8.7 X10*3/UL (ref 4.4–11.3)
WBC #/AREA URNS AUTO: >50 /HPF
WBC CLUMPS #/AREA URNS AUTO: ABNORMAL /HPF

## 2024-01-01 PROCEDURE — 82435 ASSAY OF BLOOD CHLORIDE: CPT | Mod: 59 | Performed by: STUDENT IN AN ORGANIZED HEALTH CARE EDUCATION/TRAINING PROGRAM

## 2024-01-01 PROCEDURE — 71045 X-RAY EXAM CHEST 1 VIEW: CPT | Performed by: RADIOLOGY

## 2024-01-01 PROCEDURE — 85610 PROTHROMBIN TIME: CPT | Mod: OUT | Performed by: FAMILY MEDICINE

## 2024-01-01 PROCEDURE — 87040 BLOOD CULTURE FOR BACTERIA: CPT | Mod: GEALAB | Performed by: STUDENT IN AN ORGANIZED HEALTH CARE EDUCATION/TRAINING PROGRAM

## 2024-01-01 PROCEDURE — 85610 PROTHROMBIN TIME: CPT | Performed by: STUDENT IN AN ORGANIZED HEALTH CARE EDUCATION/TRAINING PROGRAM

## 2024-01-01 PROCEDURE — 85610 PROTHROMBIN TIME: CPT | Mod: OUT | Performed by: NURSE PRACTITIONER

## 2024-01-01 PROCEDURE — 85730 THROMBOPLASTIN TIME PARTIAL: CPT | Performed by: STUDENT IN AN ORGANIZED HEALTH CARE EDUCATION/TRAINING PROGRAM

## 2024-01-01 PROCEDURE — 96375 TX/PRO/DX INJ NEW DRUG ADDON: CPT

## 2024-01-01 PROCEDURE — 87635 SARS-COV-2 COVID-19 AMP PRB: CPT | Performed by: STUDENT IN AN ORGANIZED HEALTH CARE EDUCATION/TRAINING PROGRAM

## 2024-01-01 PROCEDURE — 87075 CULTR BACTERIA EXCEPT BLOOD: CPT | Mod: 59 | Performed by: INTERNAL MEDICINE

## 2024-01-01 PROCEDURE — 85025 COMPLETE CBC W/AUTO DIFF WBC: CPT | Performed by: STUDENT IN AN ORGANIZED HEALTH CARE EDUCATION/TRAINING PROGRAM

## 2024-01-01 PROCEDURE — 96367 TX/PROPH/DG ADDL SEQ IV INF: CPT

## 2024-01-01 PROCEDURE — 93005 ELECTROCARDIOGRAM TRACING: CPT

## 2024-01-01 PROCEDURE — 82024 ASSAY OF ACTH: CPT | Performed by: INTERNAL MEDICINE

## 2024-01-01 PROCEDURE — 87040 BLOOD CULTURE FOR BACTERIA: CPT | Mod: 59 | Performed by: INTERNAL MEDICINE

## 2024-01-01 PROCEDURE — 2500000004 HC RX 250 GENERAL PHARMACY W/ HCPCS (ALT 636 FOR OP/ED)

## 2024-01-01 PROCEDURE — 2500000005 HC RX 250 GENERAL PHARMACY W/O HCPCS: Performed by: STUDENT IN AN ORGANIZED HEALTH CARE EDUCATION/TRAINING PROGRAM

## 2024-01-01 PROCEDURE — 87086 URINE CULTURE/COLONY COUNT: CPT | Mod: GEALAB | Performed by: STUDENT IN AN ORGANIZED HEALTH CARE EDUCATION/TRAINING PROGRAM

## 2024-01-01 PROCEDURE — 36415 COLL VENOUS BLD VENIPUNCTURE: CPT | Performed by: STUDENT IN AN ORGANIZED HEALTH CARE EDUCATION/TRAINING PROGRAM

## 2024-01-01 PROCEDURE — 94760 N-INVAS EAR/PLS OXIMETRY 1: CPT | Mod: CCI

## 2024-01-01 PROCEDURE — 85027 COMPLETE CBC AUTOMATED: CPT | Mod: OUT | Performed by: FAMILY MEDICINE

## 2024-01-01 PROCEDURE — 80069 RENAL FUNCTION PANEL: CPT | Mod: OUT | Performed by: FAMILY MEDICINE

## 2024-01-01 PROCEDURE — 86901 BLOOD TYPING SEROLOGIC RH(D): CPT | Performed by: STUDENT IN AN ORGANIZED HEALTH CARE EDUCATION/TRAINING PROGRAM

## 2024-01-01 PROCEDURE — 85018 HEMOGLOBIN: CPT | Mod: 59 | Performed by: STUDENT IN AN ORGANIZED HEALTH CARE EDUCATION/TRAINING PROGRAM

## 2024-01-01 PROCEDURE — 99309 SBSQ NF CARE MODERATE MDM 30: CPT | Performed by: FAMILY MEDICINE

## 2024-01-01 PROCEDURE — 96365 THER/PROPH/DIAG IV INF INIT: CPT

## 2024-01-01 PROCEDURE — 84484 ASSAY OF TROPONIN QUANT: CPT | Performed by: STUDENT IN AN ORGANIZED HEALTH CARE EDUCATION/TRAINING PROGRAM

## 2024-01-01 PROCEDURE — 71045 X-RAY EXAM CHEST 1 VIEW: CPT

## 2024-01-01 PROCEDURE — 36415 COLL VENOUS BLD VENIPUNCTURE: CPT | Performed by: FAMILY MEDICINE

## 2024-01-01 PROCEDURE — 84075 ASSAY ALKALINE PHOSPHATASE: CPT | Mod: OUT | Performed by: FAMILY MEDICINE

## 2024-01-01 PROCEDURE — 2500000002 HC RX 250 W HCPCS SELF ADMINISTERED DRUGS (ALT 637 FOR MEDICARE OP, ALT 636 FOR OP/ED): Performed by: STUDENT IN AN ORGANIZED HEALTH CARE EDUCATION/TRAINING PROGRAM

## 2024-01-01 PROCEDURE — 99291 CRITICAL CARE FIRST HOUR: CPT | Performed by: STUDENT IN AN ORGANIZED HEALTH CARE EDUCATION/TRAINING PROGRAM

## 2024-01-01 PROCEDURE — 70450 CT HEAD/BRAIN W/O DYE: CPT

## 2024-01-01 PROCEDURE — 96368 THER/DIAG CONCURRENT INF: CPT

## 2024-01-01 PROCEDURE — 36415 COLL VENOUS BLD VENIPUNCTURE: CPT | Performed by: NURSE PRACTITIONER

## 2024-01-01 PROCEDURE — 70450 CT HEAD/BRAIN W/O DYE: CPT | Performed by: RADIOLOGY

## 2024-01-01 PROCEDURE — 2500000004 HC RX 250 GENERAL PHARMACY W/ HCPCS (ALT 636 FOR OP/ED): Mod: JZ | Performed by: STUDENT IN AN ORGANIZED HEALTH CARE EDUCATION/TRAINING PROGRAM

## 2024-01-01 PROCEDURE — 99309 SBSQ NF CARE MODERATE MDM 30: CPT | Performed by: NURSE PRACTITIONER

## 2024-01-01 PROCEDURE — 96366 THER/PROPH/DIAG IV INF ADDON: CPT

## 2024-01-01 PROCEDURE — 81001 URINALYSIS AUTO W/SCOPE: CPT | Performed by: STUDENT IN AN ORGANIZED HEALTH CARE EDUCATION/TRAINING PROGRAM

## 2024-01-01 PROCEDURE — 85025 COMPLETE CBC W/AUTO DIFF WBC: CPT | Mod: OUT | Performed by: FAMILY MEDICINE

## 2024-01-01 RX ORDER — CALCIUM GLUCONATE 20 MG/ML
2 INJECTION, SOLUTION INTRAVENOUS ONCE
Status: COMPLETED | OUTPATIENT
Start: 2024-01-01 | End: 2024-01-01

## 2024-01-01 RX ORDER — OLANZAPINE 5 MG/1
5 TABLET, ORALLY DISINTEGRATING ORAL EVERY 6 HOURS PRN
Status: DISCONTINUED | OUTPATIENT
Start: 2024-01-01 | End: 2024-01-01 | Stop reason: HOSPADM

## 2024-01-01 RX ORDER — MORPHINE SULFATE 10 MG/.5ML
5 SOLUTION ORAL
Status: DISCONTINUED | OUTPATIENT
Start: 2024-01-01 | End: 2024-01-01 | Stop reason: HOSPADM

## 2024-01-01 RX ORDER — HYOSCYAMINE SULFATE 0.12 MG/1
0.12 TABLET, ORALLY DISINTEGRATING ORAL EVERY 4 HOURS PRN
Status: DISCONTINUED | OUTPATIENT
Start: 2024-01-01 | End: 2024-01-01 | Stop reason: HOSPADM

## 2024-01-01 RX ORDER — DEXTROSE 50 % IN WATER (D50W) INTRAVENOUS SYRINGE
25 ONCE
Status: COMPLETED | OUTPATIENT
Start: 2024-01-01 | End: 2024-01-01

## 2024-01-01 RX ORDER — HALOPERIDOL 2 MG/ML
1 SOLUTION ORAL EVERY 8 HOURS PRN
Status: DISCONTINUED | OUTPATIENT
Start: 2024-01-01 | End: 2024-01-01 | Stop reason: HOSPADM

## 2024-01-01 RX ORDER — LORAZEPAM 0.5 MG/1
0.5 TABLET ORAL EVERY 4 HOURS PRN
Status: DISCONTINUED | OUTPATIENT
Start: 2024-01-01 | End: 2024-01-01 | Stop reason: HOSPADM

## 2024-01-01 RX ORDER — NOREPINEPHRINE BITARTRATE 0.03 MG/ML
0-1 INJECTION, SOLUTION INTRAVENOUS CONTINUOUS
Status: DISCONTINUED | OUTPATIENT
Start: 2024-01-01 | End: 2024-01-01 | Stop reason: HOSPADM

## 2024-01-01 RX ORDER — ACETAMINOPHEN 160 MG/5ML
650 SOLUTION ORAL EVERY 6 HOURS PRN
Status: DISCONTINUED | OUTPATIENT
Start: 2024-01-01 | End: 2024-01-01 | Stop reason: HOSPADM

## 2024-01-01 ASSESSMENT — COLUMBIA-SUICIDE SEVERITY RATING SCALE - C-SSRS
1. IN THE PAST MONTH, HAVE YOU WISHED YOU WERE DEAD OR WISHED YOU COULD GO TO SLEEP AND NOT WAKE UP?: NO
2. HAVE YOU ACTUALLY HAD ANY THOUGHTS OF KILLING YOURSELF?: NO
6. HAVE YOU EVER DONE ANYTHING, STARTED TO DO ANYTHING, OR PREPARED TO DO ANYTHING TO END YOUR LIFE?: NO

## 2024-01-01 ASSESSMENT — PAIN - FUNCTIONAL ASSESSMENT: PAIN_FUNCTIONAL_ASSESSMENT: 0-10

## 2024-01-01 ASSESSMENT — LIFESTYLE VARIABLES
HAVE PEOPLE ANNOYED YOU BY CRITICIZING YOUR DRINKING: NO
HAVE YOU EVER FELT YOU SHOULD CUT DOWN ON YOUR DRINKING: NO
EVER HAD A DRINK FIRST THING IN THE MORNING TO STEADY YOUR NERVES TO GET RID OF A HANGOVER: NO
TOTAL SCORE: 0
EVER FELT BAD OR GUILTY ABOUT YOUR DRINKING: NO

## 2024-01-01 ASSESSMENT — PAIN SCALES - GENERAL: PAINLEVEL_OUTOF10: 0 - NO PAIN

## 2024-01-04 ENCOUNTER — NURSING HOME VISIT (OUTPATIENT)
Dept: POST ACUTE CARE | Facility: EXTERNAL LOCATION | Age: 74
End: 2024-01-04
Payer: MEDICARE

## 2024-01-04 DIAGNOSIS — Z79.4 TYPE 2 DIABETES MELLITUS WITH DIABETIC NEUROPATHY, WITH LONG-TERM CURRENT USE OF INSULIN (MULTI): ICD-10-CM

## 2024-01-04 DIAGNOSIS — E11.40 TYPE 2 DIABETES MELLITUS WITH DIABETIC NEUROPATHY, WITH LONG-TERM CURRENT USE OF INSULIN (MULTI): ICD-10-CM

## 2024-01-04 DIAGNOSIS — M35.3 PMR (POLYMYALGIA RHEUMATICA) (MULTI): ICD-10-CM

## 2024-01-04 PROBLEM — E87.6 HYPOKALEMIA: Status: ACTIVE | Noted: 2024-01-04

## 2024-01-04 PROCEDURE — 99309 SBSQ NF CARE MODERATE MDM 30: CPT | Performed by: FAMILY MEDICINE

## 2024-01-04 NOTE — PROGRESS NOTES
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 Right knee pain d/t end stage OA. Continues to decline with loss of proximal muscle strength. No CP/SOB. Left arm swelling improved.    O: VSS AFEB 220# (down 30# in 4 weeks) Awake, alert, NAD. Chest cta. Heart rrr (PPM). Pain in right knee with ROM. Ext left hand with trace edema. Urine tea colored this am. Peripheral neuropathy. weakness in hips and shoulders.    LAB (23) BMP, TSH, Blood Cx pending.  (23) CRP 12.4, ESR 70, Hgb 0, K 3, GFR 41, Cr 1.75  (23) Alb 3.5,  (23) Iron 21, TIBC 134, Ferritin 849, Transferrin sat 15.7%    A/P:  # Weakness: SNF PT/OT. He requires wheeled walker, chair lift mechanism, and raised toilet seat to allow him independence with ADLs. Goal to return home with home skilled PT/OT/SN to allow transition to home with homebound status once completeds IV ABX and SNF PT.  # Elevated ESR & proximal muscle weakness c/w PMR: agree with prednisone 20 bid x 3 days then 20 mg daily. DC finasteride to remove testosterone antagonist.  # Endocarditis: old PPM removed, Completed Ancef 2g bid through 23. Check blood cx x 3 23.  # Intentional weight loss with dietary/diabetic intervention -- goal weight 200# but rapid weight loss worrisome for sarcopenia -- liberalize diet (but still no pop).  # BPH w/ LUTS & Recent Obstructive Uropathy: Flomax 0.8 mg qhs. OFF Finasteride d/t weakness. Voiding well since HUNG OUT. F/U Dr Delaney as outpatient.  # NOHELIA: stabilized off lasix. ARB resumed. F/U Nephro/ [facility].  # OA: norco  1 po q6h prn pain. Scheduled for f/u and consult on Rt TKA with Dr Kothari 2024 -- unable  to get transport to [facility] until out of SNF. Trial knee immobilizer to help with pain control.  # HTN: amlodipine 10, Toprol 25, hydralazine 10 tid. Increase losartan 100.  # Hypokalemia: supplement, max ARB.  # DM w nephropathy and neuropathy: cut Tresiba to 5 units daily. When renal function improves can start farxiga.  # Gout: allopurinol  # Anemia: no iron deficiency. Likely d/t CKD. Slowly improving -- if plateaus consider EPO injection.  # GERD: OFF PPI d/t NOHELIA.  # Metastatic Melanoma: Dx 12/4/23. Oncology Dr Springer 12/14/23 -- plans chemo/immunotx once stronger. recheck in 2 months after PET-CT and MRI. I recommend delaying transport and PET scans until out of rehab.  # Left UE basilic vein thrombosis (SVT)-- cut eliquis to 5 mg bid x 2 weeks, then 2.5 mg bid for DVT prevention -- high risk due to immobility and cancer dx.

## 2024-01-04 NOTE — LETTER
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 Right knee pain d/t end stage OA. Proximal muscle pain improved with prednisone, strength only slowly returning. No CP/SOB. Left arm swelling improved.    O: VSS AFEB 220# (23, down 30# in 4 weeks) Awake, alert, NAD. Chest cta. Heart rrr (PPM). Pain in right knee with ROM. No c/c/e. Urine clear yellow. Peripheral neuropathy. Weakness in hips and shoulders.    LAB (24) BMP, ESR  (1/3/24)TSH 3.350, Blood Cx NEG, K 3.8, GFR 41, Cr 1.8, Na 136, Alb 3.3,  (24) CRP 12.4->0.6, ESR 70->45  (23) Iron 21, TIBC 134, Ferritin 849, Transferrin sat 15.7%    A/P:  # Weakness: SNF PT/OT. He requires wheeled walker, chair lift mechanism, and raised toilet seat to allow him independence with ADLs. Goal to return home with home skilled PT/OT/SN to allow transition to home with homebound status once completeds IV ABX and SNF PT.  # Elevated ESR & proximal muscle weakness c/w PMR: improving with prednison 40->20 mg daily. Cut to 15 mg daily x 1 week then 10 mg daily. OFF finasteride to remove testosterone antagonist.  # Endocarditis: old PPM removed, Completed Ancef 2g bid through 23. F/U blood cx NEG 23. DC PICC line 24.  # Intentional weight loss with dietary/diabetic intervention -- goal weight 200# but rapid weight loss worrisome for sarcopenia -- liberalize diet (but still no pop).  # BPH w/ LUTS & Recent Obstructive Uropathy: Flomax 0.8 mg qhs. OFF Finasteride d/t weakness. Voiding well since HUNG OUT. F/U Dr Delaney as outpatient.  # NOHELIA: stabilized off lasix. ARB resumed. F/U Nephro/  [facility].  # OA: norco 5/325 1 po q6h prn pain. Scheduled for f/u and consult on Rt TKA with Dr Kothari 1/2024 -- unable to get transport to [facility] until out of SNF. Trial knee immobilizer to help with pain control.  # HTN: amlodipine 10, Toprol 25, hydralazine 10 tid. Increase losartan 100.  # Hypokalemia: supplement, max ARB.  # DM w nephropathy and neuropathy: cut Tresiba to 5 units daily. When renal function improves can start farxiga.  # Gout: allopurinol  # Anemia: no iron deficiency. Likely d/t CKD. Slowly improving -- if plateaus consider EPO injection.  # GERD: OFF PPI d/t NOHELIA.  # Metastatic Melanoma: Dx 12/4/23. Oncology Dr Springer 12/14/23 -- plans chemo/immunotx once stronger. recheck in 2 months after PET-CT and MRI. I recommend delaying transport and PET scans until out of rehab.  # Left UE basilic vein thrombosis (SVT)-- cut eliquis to 5 mg bid x 2 weeks, then 2.5 mg bid for DVT prevention -- high risk due to immobility and cancer dx.      Electronically Signed By: Jeffry De Leon MD   1/14/24  4:07 PM

## 2024-01-08 ENCOUNTER — NURSING HOME VISIT (OUTPATIENT)
Dept: POST ACUTE CARE | Facility: EXTERNAL LOCATION | Age: 74
End: 2024-01-08
Payer: MEDICARE

## 2024-01-08 ENCOUNTER — APPOINTMENT (OUTPATIENT)
Dept: SURGERY | Facility: CLINIC | Age: 74
End: 2024-01-08
Payer: MEDICARE

## 2024-01-08 DIAGNOSIS — E11.40 TYPE 2 DIABETES MELLITUS WITH DIABETIC NEUROPATHY, WITH LONG-TERM CURRENT USE OF INSULIN (MULTI): ICD-10-CM

## 2024-01-08 DIAGNOSIS — Z79.4 TYPE 2 DIABETES MELLITUS WITH DIABETIC NEUROPATHY, WITH LONG-TERM CURRENT USE OF INSULIN (MULTI): ICD-10-CM

## 2024-01-08 DIAGNOSIS — M35.3 PMR (POLYMYALGIA RHEUMATICA) (MULTI): ICD-10-CM

## 2024-01-08 PROCEDURE — 99309 SBSQ NF CARE MODERATE MDM 30: CPT | Performed by: FAMILY MEDICINE

## 2024-01-08 NOTE — PROGRESS NOTES
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 Right knee pain d/t end stage OA. Proximal muscle pain improved with prednisone, strength slowly returning -- advanced from Bennett lift to 2 person assist. No CP/SOB. Left arm swelling improved.    O: VSS AFEB 211# (down 9# in 2 weeks) Awake, alert, NAD. Chest cta. Heart rrr (PPM). Pain in right knee with ROM. No c/c/e. Urine clear yellow. Peripheral neuropathy. Weakness in hips and shoulders.    LAB (24) BMP, ESR  (1/3/24)TSH 3.350, Blood Cx NEG, K 3.8, GFR 41, Cr 1.8, Na 136, Alb 3.3,  (24) CRP 12.4->0.6, ESR 70->45  (23) Iron 21, TIBC 134, Ferritin 849, Transferrin sat 15.7%    A/P:  # Weakness: SNF PT/OT. He requires wheeled walker, chair lift mechanism, and raised toilet seat to allow him independence with ADLs. Goal to return home with home skilled PT/OT/SN to allow transition to home with homebound status once completeds IV ABX and SNF PT.  # Elevated ESR & proximal muscle weakness c/w PMR: Cut to 15 mg daily x 1 week (-) then 10 mg daily. OFF finasteride to remove testosterone antagonist.  # Endocarditis: old PPM removed, Completed Ancef 2g bid through 23. F/U blood cx NEG 23. DC PICC line 24.  # Intentional weight loss with dietary/diabetic intervention -- goal weight 200# but rapid weight loss worrisome for sarcopenia -- liberalize diet (but still no pop).  # BPH w/ LUTS & Recent Obstructive Uropathy: Flomax 0.8 mg qhs. OFF Finasteride d/t weakness. Voiding well since HUNG OUT. F/U Dr Delaney as outpatient.  # NOHELIA: stabilized off lasix. ARB resumed. F/U Nephro/ [facility].  # OA: norco 5/325 1 po q6h prn pain. Scheduled for f/u and consult  on Rt TKA with Dr Kothari 1/2024 -- unable to get transport to [facility] until out of SNF. Trial knee immobilizer to help with pain control.  # HTN: amlodipine 10, Toprol 25, hydralazine 10 tid. Increase losartan 100.  # Hypokalemia: supplement, max ARB.  # DM w nephropathy and neuropathy: DC Tresiba 1/8/24. When renal function improves can start farxiga (if needed).  # Gout: allopurinol  # Anemia: no iron deficiency. Likely d/t CKD. Slowly improving -- if plateaus consider EPO injection.  # GERD: OFF PPI d/t NOHELIA.  # Metastatic Melanoma: Dx 12/4/23. Oncology Dr Springer 12/14/23 -- plans chemo/immunotx once stronger. recheck in 2 months after PET-CT and MRI. I recommend delaying transport and PET scans until out of rehab.  # Left UE basilic vein thrombosis (SVT)-- cut eliquis to 5 mg bid x 2 weeks, then 2.5 mg bid for DVT prevention -- high risk due to immobility and cancer dx.

## 2024-01-08 NOTE — PROGRESS NOTES
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 Right knee pain d/t end stage OA. Proximal muscle pain improved with prednisone, strength only slowly returning. No CP/SOB. Left arm swelling improved.    O: VSS AFEB 220# (23, down 30# in 4 weeks) Awake, alert, NAD. Chest cta. Heart rrr (PPM). Pain in right knee with ROM. No c/c/e. Urine clear yellow. Peripheral neuropathy. Weakness in hips and shoulders.    LAB (24) BMP, ESR  (1/3/24)TSH 3.350, Blood Cx NEG, K 3.8, GFR 41, Cr 1.8, Na 136, Alb 3.3,  (24) CRP 12.4->0.6, ESR 70->45  (23) Iron 21, TIBC 134, Ferritin 849, Transferrin sat 15.7%    A/P:  # Weakness: SNF PT/OT. He requires wheeled walker, chair lift mechanism, and raised toilet seat to allow him independence with ADLs. Goal to return home with home skilled PT/OT/SN to allow transition to home with homebound status once completeds IV ABX and SNF PT.  # Elevated ESR & proximal muscle weakness c/w PMR: improving with prednison 40->20 mg daily. Cut to 15 mg daily x 1 week then 10 mg daily. OFF finasteride to remove testosterone antagonist.  # Endocarditis: old PPM removed, Completed Ancef 2g bid through 23. F/U blood cx NEG 23. DC PICC line 24.  # Intentional weight loss with dietary/diabetic intervention -- goal weight 200# but rapid weight loss worrisome for sarcopenia -- liberalize diet (but still no pop).  # BPH w/ LUTS & Recent Obstructive Uropathy: Flomax 0.8 mg qhs. OFF Finasteride d/t weakness. Voiding well since HUNG OUT. F/U Dr Delaney as outpatient.  # NOHELIA: stabilized off lasix. ARB resumed. F/U Nephro/ [facility].  # OA: norco 5/325 1 po q6h prn pain. Scheduled for f/u and consult  on Rt TKA with Dr Kothari 1/2024 -- unable to get transport to [facility] until out of SNF. Trial knee immobilizer to help with pain control.  # HTN: amlodipine 10, Toprol 25, hydralazine 10 tid. Increase losartan 100.  # Hypokalemia: supplement, max ARB.  # DM w nephropathy and neuropathy: cut Tresiba to 5 units daily. When renal function improves can start farxiga.  # Gout: allopurinol  # Anemia: no iron deficiency. Likely d/t CKD. Slowly improving -- if plateaus consider EPO injection.  # GERD: OFF PPI d/t NOHELIA.  # Metastatic Melanoma: Dx 12/4/23. Oncology Dr Springer 12/14/23 -- plans chemo/immunotx once stronger. recheck in 2 months after PET-CT and MRI. I recommend delaying transport and PET scans until out of rehab.  # Left UE basilic vein thrombosis (SVT)-- cut eliquis to 5 mg bid x 2 weeks, then 2.5 mg bid for DVT prevention -- high risk due to immobility and cancer dx.

## 2024-01-09 LAB
ACID FAST STN SPEC: NORMAL
MYCOBACTERIUM SPEC CULT: NORMAL

## 2024-01-11 ENCOUNTER — NURSING HOME VISIT (OUTPATIENT)
Dept: POST ACUTE CARE | Facility: EXTERNAL LOCATION | Age: 74
End: 2024-01-11
Payer: MEDICARE

## 2024-01-11 DIAGNOSIS — E11.21 TYPE 2 DIABETES MELLITUS WITH DIABETIC NEPHROPATHY, WITHOUT LONG-TERM CURRENT USE OF INSULIN (MULTI): ICD-10-CM

## 2024-01-11 DIAGNOSIS — K59.00 CONSTIPATION, UNSPECIFIED CONSTIPATION TYPE: ICD-10-CM

## 2024-01-11 DIAGNOSIS — I33.0 TRICUSPID VALVE VEGETATION (HHS-HCC): ICD-10-CM

## 2024-01-11 DIAGNOSIS — F32.A DEPRESSION, UNSPECIFIED DEPRESSION TYPE: ICD-10-CM

## 2024-01-11 DIAGNOSIS — G47.00 INSOMNIA, UNSPECIFIED TYPE: ICD-10-CM

## 2024-01-11 DIAGNOSIS — M62.81 MUSCLE WEAKNESS (GENERALIZED): Primary | ICD-10-CM

## 2024-01-11 DIAGNOSIS — K21.9 GASTROESOPHAGEAL REFLUX DISEASE, UNSPECIFIED WHETHER ESOPHAGITIS PRESENT: ICD-10-CM

## 2024-01-11 DIAGNOSIS — I44.2 COMPLETE ATRIOVENTRICULAR BLOCK (MULTI): ICD-10-CM

## 2024-01-11 DIAGNOSIS — N40.1 BPH WITH OBSTRUCTION/LOWER URINARY TRACT SYMPTOMS: ICD-10-CM

## 2024-01-11 DIAGNOSIS — I82.890 SUPERFICIAL VEIN THROMBOSIS: ICD-10-CM

## 2024-01-11 DIAGNOSIS — M13.0 POLYARTHRITIS: ICD-10-CM

## 2024-01-11 DIAGNOSIS — E78.5 DYSLIPIDEMIA: ICD-10-CM

## 2024-01-11 DIAGNOSIS — N13.8 BPH WITH OBSTRUCTION/LOWER URINARY TRACT SYMPTOMS: ICD-10-CM

## 2024-01-11 DIAGNOSIS — C43.9 METASTATIC MELANOMA (MULTI): ICD-10-CM

## 2024-01-11 DIAGNOSIS — I10 ESSENTIAL HYPERTENSION: ICD-10-CM

## 2024-01-11 DIAGNOSIS — M10.9 GOUT, UNSPECIFIED CAUSE, UNSPECIFIED CHRONICITY, UNSPECIFIED SITE: ICD-10-CM

## 2024-01-11 LAB
ACID FAST STN SPEC: NORMAL
MYCOBACTERIUM SPEC CULT: NORMAL

## 2024-01-11 PROCEDURE — 99309 SBSQ NF CARE MODERATE MDM 30: CPT | Performed by: NURSE PRACTITIONER

## 2024-01-15 ENCOUNTER — NURSING HOME VISIT (OUTPATIENT)
Dept: POST ACUTE CARE | Facility: EXTERNAL LOCATION | Age: 74
End: 2024-01-15
Payer: MEDICARE

## 2024-01-15 DIAGNOSIS — E11.9 TYPE 2 DIABETES MELLITUS WITHOUT COMPLICATION, UNSPECIFIED WHETHER LONG TERM INSULIN USE (MULTI): ICD-10-CM

## 2024-01-15 DIAGNOSIS — I10 PRIMARY HYPERTENSION: ICD-10-CM

## 2024-01-15 PROCEDURE — 99309 SBSQ NF CARE MODERATE MDM 30: CPT | Performed by: FAMILY MEDICINE

## 2024-01-15 NOTE — LETTER
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  Proximal muscle pain improved with prednisone, strength slowly returning -- advanced to using pivot-disc for transfers. No CP/SOB.     O: VSS AFEB 211# (24) Awake, alert, NAD. Chest cta. Heart rrr (PPM). Pain in right knee with ROM. No c/c/e. Urine clear yellow. Peripheral neuropathy. Weakness in hips and shoulders.    LAB (1/15/24) Hgb 8.9, Cr 1.42, GFR 47, Na 141, K 3.6, CRP <0.3, ESR 70->45->37->25  (1/3/24)TSH 3.350, Blood Cx NEG, Alb 3.3,  (23) Iron 21, TIBC 134, Ferritin 849, Transferrin sat 15.7%    A/P:  # Weakness: Continues to progress with SNF PT/OT. He requires wheeled walker, chair lift mechanism, and raised toilet seat to allow him independence with ADLs. Goal to return home with home skilled PT/OT/SN to allow transition to home with homebound status once completes IV ABX and SNF PT.  # Elevated ESR & proximal muscle weakness c/w PMR: Tolerating cut to 10 mg daily, plan cut to 7.5 mg daily on 24. DC finasteride 1/15/24 to remove testosterone antagonist.  # Endocarditis: PPM replaced, Completed Ancef 2g bid through 23. F/U blood cx NEG 23. PICC out 24.  # Intentional weight loss with dietary/diabetic intervention -- goal weight 200# but rapid weight loss worrisome for sarcopenia -- liberalize diet (but still no pop).  # BPH w/ LUTS & Recent Obstructive Uropathy: Flomax 0.8 mg qhs. DC Finasteride 1/15/24 d/t weakness. Voiding well since HUNG OUT. F/U Dr Delaney as outpatient.  # NOHELIA: stabilized off lasix. ARB resumed. F/U Nephro/ [facility].  # OA: norco 5/325 1 po q6h prn pain. Scheduled for f/u and  consult on Rt TKA with Dr Kothari 1/2024 -- unable to get transport to [facility] until out of SNF. Trial knee immobilizer to help with pain control.  # HTN: amlodipine 10, Toprol 25, hydralazine 10 tid. Increase losartan 100.  # Hypokalemia: supplement, max ARB.  # DM w nephropathy and neuropathy: OFF Tresiba 1/8/24. DIET CONTROLLED. When renal function improves can start Farxiga (if needed).  # Gout: allopurinol  # Anemia: no iron deficiency. Likely d/t CKD. Slowly improving -- if plateaus consider EPO injection.  # GERD: OFF PPI d/t NOHELIA.  # Metastatic Melanoma: Dx 12/4/23. Oncology Dr Springer 12/14/23 -- plans chemo/immunotx once stronger. recheck in 2 months after PET-CT and MRI. I recommend delaying transport and PET scans until out of rehab.  # Left UE basilic vein thrombosis (SVT)-- cut eliquis to 5 mg bid x 2 weeks, then 2.5 mg bid for DVT prevention -- high risk due to immobility and cancer dx.      Electronically Signed By: Jeffry De Leon MD   1/21/24  8:14 PM

## 2024-01-16 PROBLEM — E66.01 SEVERE OBESITY (MULTI): Status: ACTIVE | Noted: 2024-01-16

## 2024-01-16 PROBLEM — E66.01 MORBID OBESITY (MULTI): Status: ACTIVE | Noted: 2024-01-16

## 2024-01-16 PROBLEM — R73.9 CHRONIC HYPERGLYCEMIA: Status: ACTIVE | Noted: 2023-09-18

## 2024-01-16 PROBLEM — R60.0 EDEMA OF BOTH LOWER EXTREMITIES: Status: ACTIVE | Noted: 2024-01-16

## 2024-01-16 PROBLEM — M25.569 KNEE PAIN: Status: ACTIVE | Noted: 2023-09-17

## 2024-01-16 PROBLEM — E11.9 TYPE 2 DIABETES MELLITUS WITHOUT COMPLICATION (MULTI): Status: ACTIVE | Noted: 2024-01-16

## 2024-01-16 PROBLEM — Z96.0 URINARY CATHETER PRESENT: Status: ACTIVE | Noted: 2024-01-16

## 2024-01-16 PROBLEM — K43.2 RECURRENT VENTRAL HERNIA: Status: ACTIVE | Noted: 2024-01-16

## 2024-01-16 PROBLEM — D72.829 LEUKOCYTOSIS: Status: ACTIVE | Noted: 2024-01-16

## 2024-01-16 PROBLEM — R52 GENERALIZED PAIN: Status: ACTIVE | Noted: 2024-01-16

## 2024-01-16 PROBLEM — N39.0 UTI (URINARY TRACT INFECTION): Status: ACTIVE | Noted: 2024-01-16

## 2024-01-16 PROBLEM — J32.9 SINUSITIS: Status: ACTIVE | Noted: 2024-01-16

## 2024-01-16 PROBLEM — D50.9 IRON DEFICIENCY ANEMIA: Status: ACTIVE | Noted: 2024-01-16

## 2024-01-16 PROBLEM — K59.1 FUNCTIONAL DIARRHEA: Status: ACTIVE | Noted: 2024-01-16

## 2024-01-16 PROBLEM — J98.8 CONGESTION OF RESPIRATORY TRACT: Status: ACTIVE | Noted: 2024-01-16

## 2024-01-16 PROBLEM — I33.0 ACUTE BACTERIAL ENDOCARDITIS (HHS-HCC): Status: ACTIVE | Noted: 2024-01-16

## 2024-01-16 PROBLEM — N39.0 ACUTE URINARY TRACT INFECTION: Status: ACTIVE | Noted: 2023-09-18

## 2024-01-16 PROBLEM — E66.9 OBESITY WITH BODY MASS INDEX 30 OR GREATER: Status: ACTIVE | Noted: 2024-01-16

## 2024-01-16 PROBLEM — R59.0 INGUINAL LYMPHADENOPATHY: Status: ACTIVE | Noted: 2023-09-18

## 2024-01-16 PROBLEM — B95.61 BACTEREMIA DUE TO STAPHYLOCOCCUS AUREUS: Status: ACTIVE | Noted: 2023-10-19

## 2024-01-16 PROBLEM — R60.9 EDEMA: Status: ACTIVE | Noted: 2024-01-16

## 2024-01-16 PROBLEM — N18.4 STAGE 4 CHRONIC KIDNEY DISEASE (MULTI): Status: ACTIVE | Noted: 2023-11-06

## 2024-01-16 PROBLEM — E86.0 MILD DEHYDRATION: Status: ACTIVE | Noted: 2023-09-18

## 2024-01-16 PROBLEM — R60.0 EDEMA OF FOOT: Status: ACTIVE | Noted: 2024-01-16

## 2024-01-16 PROBLEM — R60.0 EDEMA OF LOWER EXTREMITY: Status: ACTIVE | Noted: 2024-01-16

## 2024-01-16 PROBLEM — R78.81 BACTEREMIA DUE TO STAPHYLOCOCCUS AUREUS: Status: ACTIVE | Noted: 2023-10-19

## 2024-01-16 PROBLEM — R26.2 DIFFICULTY WALKING: Status: ACTIVE | Noted: 2024-01-16

## 2024-01-16 RX ORDER — FEBUXOSTAT 40 MG/1
TABLET, FILM COATED ORAL
COMMUNITY
Start: 2016-10-28 | End: 2024-02-11 | Stop reason: ALTCHOICE

## 2024-01-16 RX ORDER — BUPROPION HYDROCHLORIDE 150 MG/1
TABLET ORAL
COMMUNITY
Start: 2022-02-09 | End: 2024-02-11 | Stop reason: ALTCHOICE

## 2024-01-16 RX ORDER — OXYCODONE HYDROCHLORIDE 5 MG/1
5 TABLET ORAL EVERY 6 HOURS PRN
COMMUNITY
Start: 2015-11-06 | End: 2024-02-11 | Stop reason: ALTCHOICE

## 2024-01-16 RX ORDER — TOPIRAMATE 25 MG/1
TABLET ORAL
COMMUNITY
Start: 2019-12-12 | End: 2024-02-11 | Stop reason: ALTCHOICE

## 2024-01-16 RX ORDER — CIPROFLOXACIN 500 MG/1
TABLET ORAL
COMMUNITY
Start: 2020-09-14 | End: 2024-02-11 | Stop reason: ALTCHOICE

## 2024-01-16 RX ORDER — DOXYCYCLINE HYCLATE 100 MG
TABLET ORAL EVERY 12 HOURS
COMMUNITY
Start: 2020-10-07 | End: 2024-02-11 | Stop reason: ALTCHOICE

## 2024-01-16 RX ORDER — DOCUSATE SODIUM 100 MG/1
CAPSULE, LIQUID FILLED ORAL EVERY 12 HOURS
COMMUNITY
Start: 2015-11-06 | End: 2024-02-11 | Stop reason: ALTCHOICE

## 2024-01-16 NOTE — PROGRESS NOTES
*Provider Impression*    Patient is a 73 year old male who is seen today for management of multiple medical problems     #Weakness / OA / Gout - PT/OT, allopurinol 100mg daily, acetaminophen 650mg q6h PRN, Norco 5/325mg q6h PRN, biofreeze q4h PRN, f/u w/ Dr. Kothari  #AV block / TV vegetation - s/p PM explant and re-implant; completed cefazolin, f/u w/ cardiology, f/u w/ ID, check CBC, BMP, ESR. CRP weekly and fax to [phone] Attn Dr Gonzalez  #Melanoma / Superficial VT - eliquis 2.5mg BID, f/u w/ oncology  #BPH w/ obstruction - maldonado, finasteride 5mg daily, tamsulosin 0.8mg daily, f/u w/ urology Dr. Delaney, check UA c&s  #Insomnia / Depression - melatonin 5mg QHS, duloxetine 20mg daily  #HTN / HLD - amlodipine 10mg daily, metoprolol XL 25mg daily, atorvastatin 20mg daily, hydralazine 10mg TID, losartan 100mg daily  #T2DM - Humalog SSI, Tresiba 10units daily, f/u w/ podiatry for ingrown toenail  #GERD / Constipation - zofran 4mg q6h PRN, miralax 17grams daily, senna 8.6mg 2 tabs daily  #PCM - add prostat 30mL daily  #ACP - Full Code  Follow up as needed      *Chief Complaint*     weakness    *History of Present Illness*    Patient is a 74 y/o male w/ PMH as below who presents with a 3-week history of diarrhea with generalized weakness. CT A/P: No acute diverticulitis or acute process. Recent antibiotic use (Keflex) for UTI. Stool negative for C.Diff Colitis, PCR noninfectious. ID was consulted - no need for antibiotics. Urine culture was negative. Given IVF for hypovolemia from diarrhea and poor oral intake. Monitored and repleted electrolytes as needed. Nephrology consulted for NOHELIA with hyponatremia. Sodium uptrended: 123>130. Nephrotoxic agents held to allow for recovery. Urine output satisfactory. He was seen by PT/OT who recommended SNF and was d/c to [facility].     He was sent to the ED on 11/13 for NOHELIA on labs from the SNF, oliguria and blood cultures that were persistently positive for MSSA. He was  subsequently transferred to [facility]. Blood cultures with NGTD as of 11/16. FILIPE 11/16 showed tricuspid valve vegetation as well as right atrial lead vegetation. Patient remained afebrile without leukocytosis and hemodynamically stable. Device Interrogated 11/17 with AP 29% and  30%.  Lead extraction complete 11/22. After lead removal, on telemetry the patient had several episodes of intermittent asymptomatic high-grade AV block, NSVT,and bradycardia. As a result his metoprolol was weaned. He underwent device re-implant (micra leadless pacemaker) on 11/30. Reimplant was done in setting of tricuspid vegetation with the assumption that patient was sterilized given Negative BC, lack of growth in vegetation size and clinical picture. He was treated w/ IV Ancef.  ID recommended continuation of Cefazolin 4 weeks after lead removal, until (12/20). PICC placed 12/2.  Patient has outpatient follow up scheduled further and antibiotic use will be determined then. Per ID, please collect CBC, BMP, ESR and CRP weekly and fax to [phone] Attn Dr Gonzalez. While inpatient he received a unit of packed red blood cells for low hemoglobin (11/25). Hemoglobin has remained stable with no signs or symptoms of bleeding. Home lasix and losartan were being held in setting of NOHELIA secondary to nephrotoxic antibiotic exposure which was down trending but not at baseline with adequate urine output. Patient has maldonado in setting of severe BPH and outpatient follow up with urology. Of note, patient noticed left groin lymphadenopathy and U/S (11/23) showed Enlarged left inguinal/external iliac lymph nodes. PET scan deferred at the  time d/t active infectious process. Underwent core needle biopsy on 11/29. Patient to follow up biopsy results with surgical oncology. His biopsy pathology returned w/ melanoma.     He completed cefazolin, senna increased, losartan increased.    He is seen sitting up in his room today and reports strength is improving, no  f/c, sweats, n/v, CP, SOB, cough, constipation, diarrhea, LUTS, and no other new c/o presently.     Alleriges - buPROPion, Cephalexin   PMH - arthritis, obesity, lymphedema, ventral hernia repair, hypertension, hyperlipidemia, BPH, PVD, T2DM, Bell's palsy  PSH - pacemaker, ventral hernia repair, prostate biopsy, varicose vein ligation  FH - Brother had heart disease; Mother had lung cancer  SocHx - Never smoker, Social EtOH    *Review of Systems*  All other systems reviewed are negative except as noted in the HPI     *Vital Signs*   Date: 1/11/24  - T: 97.5  P: 74  R: 18  BP: 115/59  SpO2: 97% on RA ; Date: 12/4/23  Wt: 249    *Results / Data*  CBC - Date: 1/8/24  WBC: 7.26  HGB: 8.8  HCT: 27.1  PLT: 131 ;   BMP - Date: 1/8/24  Na: 141  K: 3.9  Cl: 106  CO2: 20  BUN: 24  Cr: 1.56  Glu: 93  Ca: 8.8  ;   LFT - Date: 12/15/23  AST: 9  ALT: <5  ALP: 80  Tbili: 0.3  ;   Coags - Date:   INR:   PT:  Other - Date: 10/16/23  CRP: 3.8  ESR: 69  ; 10/23/23  CRP: 2.9  ESR: 79 ; 10/30/23  CRP: 1.9  ; 12/18/23  CRP: 1.9 ;  12/20/23  ESR: 60    *Physical Exam*  Gen: (+) NAD, (+) well-appearing  HEENT: (+) normocephalic, (+) MMM  Neck: (+) supple  Lungs: (+) CTAB, (-) wheezes, (-) rales, (-) rhonchi  Heart: (+) RRR, (+) S1 S2, (-) murmurs  Pulses: (+) palpable  Abd: (+) soft, (+) NT, (+) ND, (+) BS+  Ext: (+) edema, (-) deformity  MSK: (-) joint swelling  Skin: (+) warm, (+) dry, (-) rash  Neuro: (+) follows commands, (-) tremor, (+) alert

## 2024-01-16 NOTE — PROGRESS NOTES
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  Proximal muscle pain improved with prednisone, strength slowly returning -- advanced to using pivot-disc for transfers. No CP/SOB.     O: VSS AFEB 211# (24) Awake, alert, NAD. Chest cta. Heart rrr (PPM). Pain in right knee with ROM. No c/c/e. Urine clear yellow. Peripheral neuropathy. Weakness in hips and shoulders.    LAB (1/15/24) Hgb 8.9, Cr 1.42, GFR 47, Na 141, K 3.6, CRP <0.3, ESR 70->45->37->25  (1/3/24)TSH 3.350, Blood Cx NEG, Alb 3.3,  (23) Iron 21, TIBC 134, Ferritin 849, Transferrin sat 15.7%    A/P:  # Weakness: Continues to progress with SNF PT/OT. He requires wheeled walker, chair lift mechanism, and raised toilet seat to allow him independence with ADLs. Goal to return home with home skilled PT/OT/SN to allow transition to home with homebound status once completes IV ABX and SNF PT.  # Elevated ESR & proximal muscle weakness c/w PMR: Tolerating cut to 10 mg daily, plan cut to 7.5 mg daily on 24. DC finasteride 1/15/24 to remove testosterone antagonist.  # Endocarditis: PPM replaced, Completed Ancef 2g bid through 23. F/U blood cx NEG 23. PICC out 24.  # Intentional weight loss with dietary/diabetic intervention -- goal weight 200# but rapid weight loss worrisome for sarcopenia -- liberalize diet (but still no pop).  # BPH w/ LUTS & Recent Obstructive Uropathy: Flomax 0.8 mg qhs. DC Finasteride 1/15/24 d/t weakness. Voiding well since HUNG OUT. F/U Dr Delaney as outpatient.  # NOHELIA: stabilized off lasix. ARB resumed. F/U Nephro/ [facility].  # OA: norco  1 po q6h prn pain. Scheduled for f/u and consult on Rt TKA with Dr Kothari 2024 -- unable to get transport to [facility] until  out of SNF. Trial knee immobilizer to help with pain control.  # HTN: amlodipine 10, Toprol 25, hydralazine 10 tid. Increase losartan 100.  # Hypokalemia: supplement, max ARB.  # DM w nephropathy and neuropathy: OFF Tresiba 1/8/24. DIET CONTROLLED. When renal function improves can start Farxiga (if needed).  # Gout: allopurinol  # Anemia: no iron deficiency. Likely d/t CKD. Slowly improving -- if plateaus consider EPO injection.  # GERD: OFF PPI d/t NOHELIA.  # Metastatic Melanoma: Dx 12/4/23. Oncology Dr Springer 12/14/23 -- plans chemo/immunotx once stronger. recheck in 2 months after PET-CT and MRI. I recommend delaying transport and PET scans until out of rehab.  # Left UE basilic vein thrombosis (SVT)-- cut eliquis to 5 mg bid x 2 weeks, then 2.5 mg bid for DVT prevention -- high risk due to immobility and cancer dx.

## 2024-01-18 ENCOUNTER — NURSING HOME VISIT (OUTPATIENT)
Dept: POST ACUTE CARE | Facility: EXTERNAL LOCATION | Age: 74
End: 2024-01-18
Payer: MEDICARE

## 2024-01-18 DIAGNOSIS — K82.8 GALLBLADDER SLUDGE: ICD-10-CM

## 2024-01-18 DIAGNOSIS — K21.9 GASTROESOPHAGEAL REFLUX DISEASE, UNSPECIFIED WHETHER ESOPHAGITIS PRESENT: ICD-10-CM

## 2024-01-18 DIAGNOSIS — M10.9 GOUT, UNSPECIFIED CAUSE, UNSPECIFIED CHRONICITY, UNSPECIFIED SITE: ICD-10-CM

## 2024-01-18 DIAGNOSIS — E78.5 DYSLIPIDEMIA: ICD-10-CM

## 2024-01-18 DIAGNOSIS — M13.0 POLYARTHRITIS: ICD-10-CM

## 2024-01-18 DIAGNOSIS — N13.8 BPH WITH OBSTRUCTION/LOWER URINARY TRACT SYMPTOMS: ICD-10-CM

## 2024-01-18 DIAGNOSIS — C43.9 METASTATIC MELANOMA (MULTI): ICD-10-CM

## 2024-01-18 DIAGNOSIS — N40.1 BPH WITH OBSTRUCTION/LOWER URINARY TRACT SYMPTOMS: ICD-10-CM

## 2024-01-18 DIAGNOSIS — K59.00 CONSTIPATION, UNSPECIFIED CONSTIPATION TYPE: ICD-10-CM

## 2024-01-18 DIAGNOSIS — I82.890 SUPERFICIAL VEIN THROMBOSIS: ICD-10-CM

## 2024-01-18 DIAGNOSIS — I44.2 COMPLETE ATRIOVENTRICULAR BLOCK (MULTI): ICD-10-CM

## 2024-01-18 DIAGNOSIS — M62.81 MUSCLE WEAKNESS (GENERALIZED): Primary | ICD-10-CM

## 2024-01-18 DIAGNOSIS — G47.00 INSOMNIA, UNSPECIFIED TYPE: ICD-10-CM

## 2024-01-18 DIAGNOSIS — F32.A DEPRESSION, UNSPECIFIED DEPRESSION TYPE: ICD-10-CM

## 2024-01-18 DIAGNOSIS — I33.0 TRICUSPID VALVE VEGETATION (HHS-HCC): ICD-10-CM

## 2024-01-18 DIAGNOSIS — I10 ESSENTIAL HYPERTENSION: ICD-10-CM

## 2024-01-18 PROCEDURE — 99309 SBSQ NF CARE MODERATE MDM 30: CPT | Performed by: NURSE PRACTITIONER

## 2024-01-22 ENCOUNTER — NURSING HOME VISIT (OUTPATIENT)
Dept: POST ACUTE CARE | Facility: EXTERNAL LOCATION | Age: 74
End: 2024-01-22
Payer: MEDICARE

## 2024-01-22 DIAGNOSIS — E11.21 DIABETIC NEPHROPATHY ASSOCIATED WITH TYPE 2 DIABETES MELLITUS (MULTI): ICD-10-CM

## 2024-01-22 DIAGNOSIS — N40.1 BENIGN LOCALIZED HYPERPLASIA OF PROSTATE WITH URINARY RETENTION: ICD-10-CM

## 2024-01-22 DIAGNOSIS — R33.8 BENIGN LOCALIZED HYPERPLASIA OF PROSTATE WITH URINARY RETENTION: ICD-10-CM

## 2024-01-22 DIAGNOSIS — I10 PRIMARY HYPERTENSION: ICD-10-CM

## 2024-01-22 PROCEDURE — 99309 SBSQ NF CARE MODERATE MDM 30: CPT | Performed by: FAMILY MEDICINE

## 2024-01-22 NOTE — LETTER
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 Proximal muscle pain improving day to day and tolerating slow prednisone wean. No CP/SOB. Med LIst & Probelm list reviewed.    O: VSS AFEB 211# (24) Awake, alert, NAD. Chest cta. Heart rrr (PPM). Pain in right knee with ROM. No c/c/e. Urine clear yellow. Peripheral neuropathy.    LAB (24) Hgb 9.2, Cr 1.4, GFR 53, Na 141, K 3.9, CRP <0.3, ESR 70->45->37->25, Alb 3.4  (1/3/24)TSH 3.350, Blood Cx NEG, Alb 3.3,  (23) Iron 21, TIBC 134, Ferritin 849, Transferrin sat 15.7%    A/P:  # Weakness: Continues to progress with SNF PT/OT. He requires wheeled walker, chair lift mechanism, and raised toilet seat to allow him independence with ADLs. Goal to return home with home skilled PT/OT/SN to allow transition to home with homebound status once completes SNF PT.  # Elevated ESR & proximal muscle weakness c/w PMR: Tolerating cut to 7.5 mg daily, cut to 5 mg 24. OFF finasteride 1/15/24 to remove testosterone antagonist.  # Endocarditis: PPM replaced, Completed Ancef 2g bid through 23. F/U blood cx NEG 23. PICC out 24.  # Intentional weight loss with dietary/diabetic intervention -- goal weight 200# but rapid weight loss worrisome for sarcopenia -- liberalize diet (but still no pop).  # BPH w/ LUTS & Recent Obstructive Uropathy: Flomax 0.8 mg qhs. DC Finasteride 1/15/24 d/t weakness. Voiding well since HUNG OUT. F/U Dr Delaney as outpatient.  # CKD3a: stabilized off lasix. ARB resumed. F/U Nephro/ [facility].  # OA: norco  1 po q6h prn pain. Scheduled for f/u and consult on Rt TKA with Dr Kothari 2024 -- unable to get  transport to [facility] until out of SNF. Trial knee immobilizer to help with pain control.  # HTN: amlodipine 10, Toprol 25, hydralazine 10 tid. Increase losartan 100.  # Hypokalemia: supplement, max ARB.  # DM w nephropathy and neuropathy: OFF Tresiba 1/8/24. DIET CONTROLLED. When renal function improves can start Farxiga (if needed).  # Gout: allopurinol  # Anemia: no iron deficiency. Likely d/t CKD. Slowly improving -- if plateaus consider EPO injection.  # GERD: OFF PPI d/t NOHELIA.  # Metastatic Melanoma: Dx 12/4/23. Oncology Dr Springer 12/14/23 -- plans chemo/immunotx once stronger. recheck in 2 months after PET-CT and MRI. I recommend delaying transport and PET scans until out of rehab.  # Left UE basilic vein thrombosis (SVT)-- cut eliquis to 5 mg bid x 2 weeks, then 2.5 mg bid for DVT prevention -- high risk due to immobility and cancer dx.      Electronically Signed By: Jeffry De Leon MD   2/4/24 12:19 PM

## 2024-01-22 NOTE — PROGRESS NOTES
*Provider Impression*    Patient is a 73 year old male who is seen today for management of multiple medical problems     #Weakness / OA / Gout - PT/OT, allopurinol 100mg daily, acetaminophen 650mg q6h PRN, Norco 5/325mg q6h PRN, biofreeze q4h PRN, f/u w/ Dr. Kothari  #AV block / TV vegetation - s/p PM explant and re-implant; completed cefazolin, f/u w/ cardiology, f/u w/ ID, check CBC, BMP, ESR. CRP weekly and fax to [phone] Attn Dr Gonzalez  #GERD / Constipation / Gallbladder sludge - zofran 4mg q6h PRN, miralax 17grams daily, senna 8.6mg 2 tabs daily, simethicone 80mg 4x/day, consult Dr. Downing  #Melanoma / Superficial VT - eliquis 2.5mg BID, f/u w/ oncology  #BPH w/ obstruction - maldonado, finasteride 5mg daily, tamsulosin 0.8mg daily, f/u w/ urology Dr. Delaney  #Insomnia / Depression - melatonin 5mg QHS, duloxetine 20mg daily  #HTN / HLD - amlodipine 10mg daily, metoprolol XL 25mg daily, atorvastatin 20mg daily, hydralazine 10mg TID, losartan 100mg daily, KCl 10mEq daily  #T2DM - Humalog SSI, Tresiba 10units daily, f/u w/ podiatry for ingrown toenail  #PCM - add prostat 30mL daily  #ACP - Full Code  Follow up as needed      *Chief Complaint*     weakness    *History of Present Illness*    Patient is a 74 y/o male w/ PMH as below who presents with a 3-week history of diarrhea with generalized weakness. CT A/P: No acute diverticulitis or acute process. Recent antibiotic use (Keflex) for UTI. Stool negative for C.Diff Colitis, PCR noninfectious. ID was consulted - no need for antibiotics. Urine culture was negative. Given IVF for hypovolemia from diarrhea and poor oral intake. Monitored and repleted electrolytes as needed. Nephrology consulted for NOHELIA with hyponatremia. Sodium uptrended: 123>130. Nephrotoxic agents held to allow for recovery. Urine output satisfactory. He was seen by PT/OT who recommended SNF and was d/c to [facility].     He was sent to the ED on 11/13 for NOHELIA on labs from the SNF, oliguria and  blood cultures that were persistently positive for MSSA. He was subsequently transferred to [facility]. Blood cultures with NGTD as of 11/16. FILIPE 11/16 showed tricuspid valve vegetation as well as right atrial lead vegetation. Patient remained afebrile without leukocytosis and hemodynamically stable. Device Interrogated 11/17 with AP 29% and  30%.  Lead extraction complete 11/22. After lead removal, on telemetry the patient had several episodes of intermittent asymptomatic high-grade AV block, NSVT,and bradycardia. As a result his metoprolol was weaned. He underwent device re-implant (micra leadless pacemaker) on 11/30. Reimplant was done in setting of tricuspid vegetation with the assumption that patient was sterilized given Negative BC, lack of growth in vegetation size and clinical picture. He was treated w/ IV Ancef.  ID recommended continuation of Cefazolin 4 weeks after lead removal, until (12/20). PICC placed 12/2.  Patient has outpatient follow up scheduled further and antibiotic use will be determined then. Per ID, please collect CBC, BMP, ESR and CRP weekly and fax to [phone] Attn Dr Gonzalez. While inpatient he received a unit of packed red blood cells for low hemoglobin (11/25). Hemoglobin has remained stable with no signs or symptoms of bleeding. Home lasix and losartan were being held in setting of NOHELIA secondary to nephrotoxic antibiotic exposure which was down trending but not at baseline with adequate urine output. Patient has maldonado in setting of severe BPH and outpatient follow up with urology. Of note, patient noticed left groin lymphadenopathy and U/S (11/23) showed Enlarged left inguinal/external iliac lymph nodes. PET scan deferred at the  time d/t active infectious process. Underwent core needle biopsy on 11/29. Patient to follow up biopsy results with surgical oncology. His biopsy pathology returned w/ melanoma.     His labs appreciated as below. He had c/o RLQ cramping, KUB showed ileus.  Lactulose added, he refused, got suppository, repeat KUB ok, abd US galbladder sludge.    He is seen sitting up in his room today and reports simethicone helped, no f/c, sweats, variable appetite, no f/c, sweats, CP, SOB, cough, n/v, and no other new c/o presently.     Alleriges - buPROPion, Cephalexin   PMH - arthritis, obesity, lymphedema, ventral hernia repair, hypertension, hyperlipidemia, BPH, PVD, T2DM, Bell's palsy  PSH - pacemaker, ventral hernia repair, prostate biopsy, varicose vein ligation  FH - Brother had heart disease; Mother had lung cancer  SocHx - Never smoker, Social EtOH    *Review of Systems*  All other systems reviewed are negative except as noted in the HPI     *Vital Signs*   Date: 1/18/24  - T: 97.9  P: 78  R: 18  BP: 128/71  SpO2: 98% on RA ; Date: 1/7/24  Wt: 211    *Results / Data*  CBC - Date: 1/15/24  WBC: 6.05  HGB: 8.9  HCT: 27.6  PLT: 129 ;   BMP - Date: 1/15/24  Na: 140  K: 3.6  Cl: 108  CO2: 21  BUN: 24  Cr: 1.42  Glu: 93  Ca: 9.2  ;   LFT - Date: 12/15/23  AST: 9  ALT: <5  ALP: 80  Tbili: 0.3  ;   Coags - Date:   INR:   PT:  Other - Date: 10/16/23  CRP: 3.8  ESR: 69  ; 10/23/23  CRP: 2.9  ESR: 79 ; 10/30/23  CRP: 1.9  ; 12/18/23  CRP: 1.9 ;  12/20/23  ESR: 60; 1/15/24  ESR: 25  CRP: <0.3    *Physical Exam*  Gen: (+) NAD, (+) well-appearing  HEENT: (+) normocephalic, (+) MMM  Neck: (+) supple  Lungs: (+) CTAB, (-) wheezes, (-) rales, (-) rhonchi  Heart: (+) RRR, (+) S1 S2, (-) murmurs  Pulses: (+) palpable  Abd: (+) soft, (+) NT, (+) ND, (+) BS+  Ext: (+) edema, (-) deformity  MSK: (-) joint swelling  Skin: (+) warm, (+) dry, (-) rash  Neuro: (+) follows commands, (-) tremor, (+) alert

## 2024-01-25 ENCOUNTER — NURSING HOME VISIT (OUTPATIENT)
Dept: POST ACUTE CARE | Facility: EXTERNAL LOCATION | Age: 74
End: 2024-01-25
Payer: MEDICARE

## 2024-01-25 DIAGNOSIS — I33.0 TRICUSPID VALVE VEGETATION (HHS-HCC): ICD-10-CM

## 2024-01-25 DIAGNOSIS — M10.9 GOUT, UNSPECIFIED CAUSE, UNSPECIFIED CHRONICITY, UNSPECIFIED SITE: ICD-10-CM

## 2024-01-25 DIAGNOSIS — I10 ESSENTIAL HYPERTENSION: ICD-10-CM

## 2024-01-25 DIAGNOSIS — K59.00 CONSTIPATION, UNSPECIFIED CONSTIPATION TYPE: ICD-10-CM

## 2024-01-25 DIAGNOSIS — I82.890 SUPERFICIAL VEIN THROMBOSIS: ICD-10-CM

## 2024-01-25 DIAGNOSIS — G47.00 INSOMNIA, UNSPECIFIED TYPE: ICD-10-CM

## 2024-01-25 DIAGNOSIS — M62.81 MUSCLE WEAKNESS (GENERALIZED): Primary | ICD-10-CM

## 2024-01-25 DIAGNOSIS — E78.5 DYSLIPIDEMIA: ICD-10-CM

## 2024-01-25 DIAGNOSIS — F32.A DEPRESSION, UNSPECIFIED DEPRESSION TYPE: ICD-10-CM

## 2024-01-25 DIAGNOSIS — M13.0 POLYARTHRITIS: ICD-10-CM

## 2024-01-25 DIAGNOSIS — I44.2 COMPLETE ATRIOVENTRICULAR BLOCK (MULTI): ICD-10-CM

## 2024-01-25 DIAGNOSIS — K82.8 GALLBLADDER SLUDGE: ICD-10-CM

## 2024-01-25 DIAGNOSIS — N13.8 BPH WITH OBSTRUCTION/LOWER URINARY TRACT SYMPTOMS: ICD-10-CM

## 2024-01-25 DIAGNOSIS — C43.9 METASTATIC MELANOMA (MULTI): ICD-10-CM

## 2024-01-25 DIAGNOSIS — E11.9 TYPE 2 DIABETES MELLITUS WITHOUT COMPLICATION, UNSPECIFIED WHETHER LONG TERM INSULIN USE (MULTI): ICD-10-CM

## 2024-01-25 DIAGNOSIS — K21.9 GASTROESOPHAGEAL REFLUX DISEASE, UNSPECIFIED WHETHER ESOPHAGITIS PRESENT: ICD-10-CM

## 2024-01-25 DIAGNOSIS — N40.1 BPH WITH OBSTRUCTION/LOWER URINARY TRACT SYMPTOMS: ICD-10-CM

## 2024-01-25 PROCEDURE — 99309 SBSQ NF CARE MODERATE MDM 30: CPT | Performed by: NURSE PRACTITIONER

## 2024-01-26 NOTE — PROGRESS NOTES
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 Proximal muscle pain improving day to day and tolerating slow prednisone wean. No CP/SOB. Med LIst & Probelm list reviewed.    O: VSS AFEB 211# (24) Awake, alert, NAD. Chest cta. Heart rrr (PPM). Pain in right knee with ROM. No c/c/e. Urine clear yellow. Peripheral neuropathy.    LAB (24) Hgb 9.2, Cr 1.4, GFR 53, Na 141, K 3.9, CRP <0.3, ESR 70->45->37->25, Alb 3.4  (1/3/24)TSH 3.350, Blood Cx NEG, Alb 3.3,  (23) Iron 21, TIBC 134, Ferritin 849, Transferrin sat 15.7%    A/P:  # Weakness: Continues to progress with SNF PT/OT. He requires wheeled walker, chair lift mechanism, and raised toilet seat to allow him independence with ADLs. Goal to return home with home skilled PT/OT/SN to allow transition to home with homebound status once completes SNF PT.  # Elevated ESR & proximal muscle weakness c/w PMR: Tolerating cut to 7.5 mg daily, cut to 5 mg 24. OFF finasteride 1/15/24 to remove testosterone antagonist.  # Endocarditis: PPM replaced, Completed Ancef 2g bid through 23. F/U blood cx NEG 23. PICC out 24.  # Intentional weight loss with dietary/diabetic intervention -- goal weight 200# but rapid weight loss worrisome for sarcopenia -- liberalize diet (but still no pop).  # BPH w/ LUTS & Recent Obstructive Uropathy: Flomax 0.8 mg qhs. DC Finasteride 1/15/24 d/t weakness. Voiding well since HUNG OUT. F/U Dr Delaney as outpatient.  # CKD3a: stabilized off lasix. ARB resumed. F/U Nephro/ [facility].  # OA: norco  1 po q6h prn pain. Scheduled for f/u and consult on Rt TKA with Dr Kothari 2024 -- unable to get transport to [facility] until out of SNF. Trial knee immobilizer to help with pain  control.  # HTN: amlodipine 10, Toprol 25, hydralazine 10 tid. Increase losartan 100.  # Hypokalemia: supplement, max ARB.  # DM w nephropathy and neuropathy: OFF Tresiba 1/8/24. DIET CONTROLLED. When renal function improves can start Farxiga (if needed).  # Gout: allopurinol  # Anemia: no iron deficiency. Likely d/t CKD. Slowly improving -- if plateaus consider EPO injection.  # GERD: OFF PPI d/t NOHELIA.  # Metastatic Melanoma: Dx 12/4/23. Oncology Dr Springer 12/14/23 -- plans chemo/immunotx once stronger. recheck in 2 months after PET-CT and MRI. I recommend delaying transport and PET scans until out of rehab.  # Left UE basilic vein thrombosis (SVT)-- cut eliquis to 5 mg bid x 2 weeks, then 2.5 mg bid for DVT prevention -- high risk due to immobility and cancer dx.

## 2024-01-29 ENCOUNTER — NURSING HOME VISIT (OUTPATIENT)
Dept: POST ACUTE CARE | Facility: EXTERNAL LOCATION | Age: 74
End: 2024-01-29
Payer: MEDICARE

## 2024-01-29 DIAGNOSIS — E11.21 DIABETIC NEPHROPATHY ASSOCIATED WITH TYPE 2 DIABETES MELLITUS (MULTI): ICD-10-CM

## 2024-01-29 DIAGNOSIS — N40.1 BENIGN LOCALIZED HYPERPLASIA OF PROSTATE WITH URINARY RETENTION: ICD-10-CM

## 2024-01-29 DIAGNOSIS — R33.8 BENIGN LOCALIZED HYPERPLASIA OF PROSTATE WITH URINARY RETENTION: ICD-10-CM

## 2024-01-29 PROCEDURE — 99309 SBSQ NF CARE MODERATE MDM 30: CPT | Performed by: FAMILY MEDICINE

## 2024-01-29 NOTE — LETTER
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 Proximal muscle pain improving day to day and tolerating slow prednisone wean. No CP/SOB. Med LIst & Probelm list reviewed.    O: VSS AFEB 199# (down 12#) Awake, alert, NAD. Chest cta. Heart rrr (PPM). Pain in right knee with ROM. No c/c/e. Urine clear yellow. Peripheral neuropathy.    LAB (24) Folate <2, B12 338, Hgb 9.2, Cr 1.45, Na 143, K 3.9,  (1/3/24)TSH 3.350, Blood Cx NEG, Alb 3.3,  (23) Iron 21, TIBC 134, Ferritin 849, Transferrin sat 15.7%    A/P:  # Weakness: Continues to progress with SNF PT/OT. He requires wheeled walker, chair lift mechanism, and raised toilet seat to allow him independence with ADLs. Goal to return home with home skilled PT/OT/SN to allow transition to home with homebound status once completes SNF PT.  # Elevated ESR & proximal muscle weakness c/w PMR: Tolerating 5 mg prednison -- cut to 2.5 mg 24. OFF finasteride 1/15/24 to remove testosterone antagonist.  # Endocarditis: PPM replaced, Completed Ancef 2g bid through 23. F/U blood cx NEG 23. PICC out 24.  # Intentional weight loss with dietary/diabetic intervention -- he has reached goal weight of 200# -- no further weight loss recommended at this time. Off Restrictive diets.  # BPH w/ LUTS & Recent Obstructive Uropathy: Flomax 0.8 mg qhs. DC Finasteride 1/15/24 d/t weakness. Voiding well since HUNG OUT. F/U Dr Delaney as outpatient.  # CKD3a: stabilized off lasix. ARB resumed. F/U Nephro/ [facility].  # OA: norco  1 po q6h prn pain. Scheduled for f/u and consult on Rt TKA with Dr Kothari 2024 -- unable to get transport to [facility]  until out of SNF. Trial knee immobilizer to help with pain control.  # HTN: amlodipine 10, Toprol 25, hydralazine 10 tid. Increase losartan 100.  # Hypokalemia: supplement, max ARB.  # DM w nephropathy and neuropathy: OFF Tresiba 1/8/24. DIET CONTROLLED. When renal function improves can start Farxiga (if needed).  # Gout: allopurinol  # Anemia: no iron deficiency. Replace Folate and B12. Likely d/t CKD. Slowly improving -- if plateaus consider EPO injection.  # GERD: OFF PPI d/t NOHELIA.  # Metastatic Melanoma: Dx 12/4/23. Oncology Dr Springer 12/14/23 -- plans chemo/immunotx once stronger. recheck in 2 months after PET-CT and MRI. I recommend delaying transport and PET scans until out of rehab.  # Left UE basilic vein thrombosis (SVT)-- cut eliquis to 5 mg bid x 2 weeks, then 2.5 mg bid for DVT prevention -- high risk due to immobility and cancer dx.      Electronically Signed By: Jeffry De Leon MD   2/11/24  4:04 PM

## 2024-02-01 ENCOUNTER — NURSING HOME VISIT (OUTPATIENT)
Dept: POST ACUTE CARE | Facility: EXTERNAL LOCATION | Age: 74
End: 2024-02-01
Payer: MEDICARE

## 2024-02-01 DIAGNOSIS — I33.0 TRICUSPID VALVE VEGETATION (HHS-HCC): ICD-10-CM

## 2024-02-01 DIAGNOSIS — N13.8 BPH WITH OBSTRUCTION/LOWER URINARY TRACT SYMPTOMS: ICD-10-CM

## 2024-02-01 DIAGNOSIS — F32.A DEPRESSION, UNSPECIFIED DEPRESSION TYPE: ICD-10-CM

## 2024-02-01 DIAGNOSIS — I44.2 COMPLETE ATRIOVENTRICULAR BLOCK (MULTI): ICD-10-CM

## 2024-02-01 DIAGNOSIS — K59.00 CONSTIPATION, UNSPECIFIED CONSTIPATION TYPE: ICD-10-CM

## 2024-02-01 DIAGNOSIS — K21.9 GASTROESOPHAGEAL REFLUX DISEASE, UNSPECIFIED WHETHER ESOPHAGITIS PRESENT: ICD-10-CM

## 2024-02-01 DIAGNOSIS — G47.00 INSOMNIA, UNSPECIFIED TYPE: ICD-10-CM

## 2024-02-01 DIAGNOSIS — N40.1 BPH WITH OBSTRUCTION/LOWER URINARY TRACT SYMPTOMS: ICD-10-CM

## 2024-02-01 DIAGNOSIS — M13.0 POLYARTHRITIS: ICD-10-CM

## 2024-02-01 DIAGNOSIS — C43.9 METASTATIC MELANOMA (MULTI): ICD-10-CM

## 2024-02-01 DIAGNOSIS — M10.9 GOUT, UNSPECIFIED CAUSE, UNSPECIFIED CHRONICITY, UNSPECIFIED SITE: ICD-10-CM

## 2024-02-01 DIAGNOSIS — I82.890 SUPERFICIAL VEIN THROMBOSIS: ICD-10-CM

## 2024-02-01 DIAGNOSIS — K82.8 GALLBLADDER SLUDGE: ICD-10-CM

## 2024-02-01 DIAGNOSIS — M62.81 MUSCLE WEAKNESS (GENERALIZED): Primary | ICD-10-CM

## 2024-02-01 PROCEDURE — 99309 SBSQ NF CARE MODERATE MDM 30: CPT | Performed by: NURSE PRACTITIONER

## 2024-02-01 NOTE — PROGRESS NOTES
*Provider Impression*    Patient is a 73 year old male who is seen today for management of multiple medical problems     #Weakness / OA / Gout - PT/OT, allopurinol 100mg daily, acetaminophen 650mg q6h PRN, Norco 5/325mg q6h PRN, biofreeze q4h PRN, f/u w/ Dr. Kothari  #AV block / TV vegetation - s/p PM explant and re-implant; completed cefazolin, f/u w/ cardiology, f/u w/ ID  #GERD / Constipation / Gallbladder slude - zofran 4mg q6h PRN, miralax 17grams daily, senna 8.6mg 2 tabs daily, d/c simethicone, consult Dr. Downing  #Melanoma / Superficial VT - eliquis 2.5mg BID, f/u w/ oncology  #BPH w/ obstruction - finasteride 5mg daily, tamsulosin 0.8mg daily, f/u w/ urology Dr. Delaney  #Insomnia / Depression - melatonin 5mg QHS, duloxetine 20mg daily  #HTN / HLD - amlodipine 10mg daily, metoprolol XL 25mg daily, atorvastatin 20mg daily, hydralazine 10mg TID, losartan 100mg daily, KCl 10mEq daily  #T2DM - f/u w/ podiatry for ingrown toenail  #PCM - prostat 30mL daily, check B12 and folate levels  #ACP - Full Code  Follow up as needed      *Chief Complaint*     weakness    *History of Present Illness*    Patient is a 74 y/o male w/ PMH as below who presents with a 3-week history of diarrhea with generalized weakness. CT A/P: No acute diverticulitis or acute process. Recent antibiotic use (Keflex) for UTI. Stool negative for C.Diff Colitis, PCR noninfectious. ID was consulted - no need for antibiotics. Urine culture was negative. Given IVF for hypovolemia from diarrhea and poor oral intake. Monitored and repleted electrolytes as needed. Nephrology consulted for NOHELIA with hyponatremia. Sodium uptrended: 123>130. Nephrotoxic agents held to allow for recovery. Urine output satisfactory. He was seen by PT/OT who recommended SNF and was d/c to [facility] [place].     He was sent to the ED on 11/13 for NOHELIA on labs from the SNF, oliguria and blood cultures that were persistently positive for MSSA. He was subsequently transferred to [facility].  Blood cultures with NGTD as of 11/16. FILIPE 11/16 showed tricuspid valve vegetation as well as right atrial lead vegetation. Patient remained afebrile without leukocytosis and hemodynamically stable. Device Interrogated 11/17 with AP 29% and  30%.  Lead extraction complete 11/22. After lead removal, on telemetry the patient had several episodes of intermittent asymptomatic high-grade AV block, NSVT,and bradycardia. As a result his metoprolol was weaned. He underwent device re-implant (micra leadless pacemaker) on 11/30. Reimplant was done in setting of tricuspid vegetation with the assumption that patient was sterilized given Negative BC, lack of growth in vegetation size and clinical picture. He was treated w/ IV Ancef.  ID recommended continuation of Cefazolin 4 weeks after lead removal, until (12/20). PICC placed 12/2.  Patient has outpatient follow up scheduled further and antibiotic use will be determined then. Per ID, please collect CBC, BMP, ESR and CRP weekly and fax to [phone] Attn Dr Gonzalez. While inpatient he received a unit of packed red blood cells for low hemoglobin (11/25). Hemoglobin has remained stable with no signs or symptoms of bleeding. Home lasix and losartan were being held in setting of NOHELIA secondary to nephrotoxic antibiotic exposure which was down trending but not at baseline with adequate urine output. Patient has maldonado in setting of severe BPH and outpatient follow up with urology. Of note, patient noticed left groin lymphadenopathy and U/S (11/23) showed Enlarged left inguinal/external iliac lymph nodes. PET scan deferred at the  time d/t active infectious process. Underwent core needle biopsy on 11/29. Patient to follow up biopsy results with surgical oncology. His biopsy pathology returned w/ melanoma.     His labs were appreciated as below.     He is seen sitting up in his room today and reports he is fatigued from therapy, walking 77 feet today, pain hasn't been bad, has f/u w/  oncology / derm, no f/c, sweats, no more cramps, CP, SOB, cough, n/v, constipation, diarrhea, and no other new c/o presently.     Alleriges - buPROPion, Cephalexin   PMH - arthritis, obesity, lymphedema, ventral hernia repair, hypertension, hyperlipidemia, BPH, PVD, T2DM, Bell's palsy  PSH - pacemaker, ventral hernia repair, prostate biopsy, varicose vein ligation  FH - Brother had heart disease; Mother had lung cancer  SocHx - Never smoker, Social EtOH    *Review of Systems*  All other systems reviewed are negative except as noted in the HPI     *Vital Signs*   Date: 1/25/24  - T: 97.4  P: 67  R: 16  BP: 122/65  SpO2: 96% on RA ; Date: 1/24/24  Wt: 198.6    *Results / Data*  CBC - Date: 1/22/24  WBC: 5.21  HGB: 9.2  HCT: 28.3  PLT: 113 ;   BMP - Date: 1/22/24  Na: 141  K: 3.9  Cl: 108  CO2: 21  BUN: 15  Cr: 1.40  Glu: 74  Ca: 8.9  ;   LFT - Date: 1/22/24  AST: 18  ALT: 15  ALP: 81  Tbili: 0.4  ;   Coags - Date:   INR:   PT:  Other - Date: 10/16/23  CRP: 3.8  ESR: 69  ; 10/23/23  CRP: 2.9  ESR: 79 ; 10/30/23  CRP: 1.9  ; 12/18/23  CRP: 1.9 ;  12/20/23  ESR: 60; 1/15/24  ESR: 25  CRP: <0.3    *Physical Exam*  Gen: (+) NAD, (+) well-appearing  HEENT: (+) normocephalic, (+) MMM  Neck: (+) supple  Lungs: (+) CTAB, (-) wheezes, (-) rales, (-) rhonchi  Heart: (+) RRR, (+) S1 S2, (-) murmurs  Pulses: (+) palpable  Abd: (+) soft, (+) NT, (+) ND, (+) BS+  Ext: (+) edema, (-) deformity  MSK: (-) joint swelling  Skin: (+) warm, (+) dry, (-) rash  Neuro: (+) follows commands, (-) tremor, (+) alert

## 2024-02-05 ENCOUNTER — NURSING HOME VISIT (OUTPATIENT)
Dept: POST ACUTE CARE | Facility: EXTERNAL LOCATION | Age: 74
End: 2024-02-05
Payer: MEDICARE

## 2024-02-05 DIAGNOSIS — N40.1 BENIGN LOCALIZED HYPERPLASIA OF PROSTATE WITH URINARY RETENTION: ICD-10-CM

## 2024-02-05 DIAGNOSIS — E11.21 DIABETIC NEPHROPATHY ASSOCIATED WITH TYPE 2 DIABETES MELLITUS (MULTI): ICD-10-CM

## 2024-02-05 DIAGNOSIS — R33.8 BENIGN LOCALIZED HYPERPLASIA OF PROSTATE WITH URINARY RETENTION: ICD-10-CM

## 2024-02-05 DIAGNOSIS — M17.11 OSTEOARTHRITIS OF RIGHT KNEE, UNSPECIFIED OSTEOARTHRITIS TYPE: ICD-10-CM

## 2024-02-05 PROCEDURE — 99316 NF DSCHRG MGMT 30 MIN+: CPT | Performed by: FAMILY MEDICINE

## 2024-02-05 NOTE — LETTER
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 Proximal muscle pain improving day to day and tolerating slow prednisone wean. No CP/SOB. Med LIst & Problem list reviewed.    O: VSS AFEB 199# (24) Awake, alert, NAD. Chest cta. Heart rrr, / RONNIE, PPM. Pain in right knee with ROM. No c/c/e. Urine clear yellow. Peripheral neuropathy.    LAB (24) Pending (24) Folate <2, B12 338, Hgb 9.2, Cr 1.45, Na 143, K 3.9,  (1/3/24)TSH 3.350, Blood Cx NEG, Alb 3.3,  (23) Iron 21, TIBC 134, Ferritin 849, Transferrin sat 15.7%    A/P:  # Weakness: Completed SNF PT/OT 24. He requires wheeled walker, chair lift mechanism, and raised toilet seat to allow him independence with ADLs. DC home with home skilled PT/OT/SN to allow transition to home with homebound status once completes SNF PT.  # Elevated ESR & proximal muscle weakness c/w PMR: Tolerating 2.5 mg prednisone -- complete taper by 0.5 mg every week over next 4 weeks. OFF finasteride 1/15/24 to remove testosterone antagonist.  # Endocarditis: PPM replaced, Completed Ancef 2g bid through 23. F/U blood cx NEG 23. PICC out 24. Heart murmur. Will need follow up Echo.  # Hx morbid obesity: Intentional weight loss with dietary/diabetic intervention -- he has reached goal weight of 200# -- no further weight loss recommended at this time. Off Restrictive diets.  # BPH w/ LUTS & Recent Obstructive Uropathy: Flomax 0.8 mg qhs. OFF Finasteride 1/15/24 d/t weakness. Voiding well since HUNG OUT. F/U Dr Delaney as outpatient.  # CKD3a: stabilized off lasix.  ARB resumed. F/U Nephro/ [facility].  # OA: tyleno and rare use of norco 5/325 1 po q6h prn pain. F/U Ortho ([facility]) to schedule Rt TKA. Trial knee immobilizer to help with pain control.  # HTN: amlodipine 10, Toprol 25, hydralazine 10 tid, Losartan 100.  # Hypokalemia: supplement, max ARB.  # DM w nephropathy and neuropathy: OFF Tresiba 1/8/24. DIET CONTROLLED. When renal function improves can start Farxiga (if still needed).  # Gout: allopurinol  # Anemia: no iron deficiency. Replace Folate and B12. Likely d/t CKD. Slowly improving -- if plateaus consider EPO injection.  # GERD: OFF PPI d/t NOHELIA.  # Metastatic Melanoma: Dx 12/4/23. Oncology ([facility]) 12/14/23 -- plans chemo/immunotx once stronger. recheck in 2 months after PET-CT and MRI.  # Left UE basilic vein thrombosis (SVT)-- cut eliquis to 5 mg bid x 2 weeks, then 2.5 mg bid for DVT prevention -- high risk due to immobility and cancer dx.      Electronically Signed By: Jeffry De Leon MD   2/11/24  4:06 PM

## 2024-02-06 ENCOUNTER — TELEPHONE (OUTPATIENT)
Dept: PRIMARY CARE | Facility: CLINIC | Age: 74
End: 2024-02-06
Payer: COMMERCIAL

## 2024-02-06 ENCOUNTER — HOSPITAL ENCOUNTER (OUTPATIENT)
Dept: CARDIOLOGY | Facility: CLINIC | Age: 74
Discharge: HOME | End: 2024-02-06
Payer: MEDICARE

## 2024-02-06 DIAGNOSIS — I49.5 SSS (SICK SINUS SYNDROME) (MULTI): ICD-10-CM

## 2024-02-06 NOTE — TELEPHONE ENCOUNTER
Transition of Care    Inpatient facility: [place] [facility]  Discharge diagnosis: pacemaker/sepsis/cancer  Discharged to: home  Discharge date: 2/5/2024  Initial Call date: [id number]  Spoke with patient/caregiver: patient                                                                     Do you need assistance  visits prior to your PCP visit: No  Home health care ordered: No  Have you been contacted by home care and have a start of care date: No  Are you taking medications as prescribed at discharge: Yes    Referral to [facility] Pharmacist: No  Patient advised to bring all medications to PCP follow-up appointment.  Patient advised to follow discharge instructions until provider follow-up.  TCM visit date: 2/14/2024  TCM provider visit with: Jeffry De Leon MD

## 2024-02-07 ENCOUNTER — TELEPHONE (OUTPATIENT)
Dept: HEMATOLOGY/ONCOLOGY | Facility: CLINIC | Age: 74
End: 2024-02-07
Payer: COMMERCIAL

## 2024-02-07 DIAGNOSIS — C43.9 METASTATIC MELANOMA (MULTI): ICD-10-CM

## 2024-02-07 NOTE — PROGRESS NOTES
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 Proximal muscle pain improving day to day and tolerating slow prednisone wean. No CP/SOB. Med LIst & Probelm list reviewed.    O: VSS AFEB 199# (down 12#) Awake, alert, NAD. Chest cta. Heart rrr (PPM). Pain in right knee with ROM. No c/c/e. Urine clear yellow. Peripheral neuropathy.    LAB (24) Folate <2, B12 338, Hgb 9.2, Cr 1.45, Na 143, K 3.9,  (1/3/24)TSH 3.350, Blood Cx NEG, Alb 3.3,  (23) Iron 21, TIBC 134, Ferritin 849, Transferrin sat 15.7%    A/P:  # Weakness: Continues to progress with SNF PT/OT. He requires wheeled walker, chair lift mechanism, and raised toilet seat to allow him independence with ADLs. Goal to return home with home skilled PT/OT/SN to allow transition to home with homebound status once completes SNF PT.  # Elevated ESR & proximal muscle weakness c/w PMR: Tolerating 5 mg prednison -- cut to 2.5 mg 24. OFF finasteride 1/15/24 to remove testosterone antagonist.  # Endocarditis: PPM replaced, Completed Ancef 2g bid through 23. F/U blood cx NEG 23. PICC out 24.  # Intentional weight loss with dietary/diabetic intervention -- he has reached goal weight of 200# -- no further weight loss recommended at this time. Off Restrictive diets.  # BPH w/ LUTS & Recent Obstructive Uropathy: Flomax 0.8 mg qhs. DC Finasteride 1/15/24 d/t weakness. Voiding well since HUNG OUT. F/U Dr Delaney as outpatient.  # CKD3a: stabilized off lasix. ARB resumed. F/U Nephro/ [facility].  # OA: norco  1 po q6h prn pain. Scheduled for f/u and consult on Rt TKA with Dr Kothari 2024 -- unable to get transport to [facility]TH[facility] until out of SNF. Trial knee immobilizer to help with pain control.  # HTN:  amlodipine 10, Toprol 25, hydralazine 10 tid. Increase losartan 100.  # Hypokalemia: supplement, max ARB.  # DM w nephropathy and neuropathy: OFF Tresiba 1/8/24. DIET CONTROLLED. When renal function improves can start Farxiga (if needed).  # Gout: allopurinol  # Anemia: no iron deficiency. Replace Folate and B12. Likely d/t CKD. Slowly improving -- if plateaus consider EPO injection.  # GERD: OFF PPI d/t NOHELIA.  # Metastatic Melanoma: Dx 12/4/23. Oncology Dr Springer 12/14/23 -- plans chemo/immunotx once stronger. recheck in 2 months after PET-CT and MRI. I recommend delaying transport and PET scans until out of rehab.  # Left UE basilic vein thrombosis (SVT)-- cut eliquis to 5 mg bid x 2 weeks, then 2.5 mg bid for DVT prevention -- high risk due to immobility and cancer dx.

## 2024-02-07 NOTE — PROGRESS NOTES
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 Proximal muscle pain improving day to day and tolerating slow prednisone wean. No CP/SOB. Med LIst & Problem list reviewed.    O: VSS AFEB 199# (24) Awake, alert, NAD. Chest cta. Heart rrr,  RONNIE, PPM. Pain in right knee with ROM. No c/c/e. Urine clear yellow. Peripheral neuropathy.    LAB (24) Pending (24) Folate <2, B12 338, Hgb 9.2, Cr 1.45, Na 143, K 3.9,  (1/3/24)TSH 3.350, Blood Cx NEG, Alb 3.3,  (23) Iron 21, TIBC 134, Ferritin 849, Transferrin sat 15.7%    A/P:  # Weakness: Completed SNF PT/OT 24. He requires wheeled walker, chair lift mechanism, and raised toilet seat to allow him independence with ADLs. DC home with home skilled PT/OT/SN to allow transition to home with homebound status once completes SNF PT.  # Elevated ESR & proximal muscle weakness c/w PMR: Tolerating 2.5 mg prednisone -- complete taper by 0.5 mg every week over next 4 weeks. OFF finasteride 1/15/24 to remove testosterone antagonist.  # Endocarditis: PPM replaced, Completed Ancef 2g bid through 23. F/U blood cx NEG 23. PICC out 24. Heart murmur. Will need follow up Echo.  # Hx morbid obesity: Intentional weight loss with dietary/diabetic intervention -- he has reached goal weight of 200# -- no further weight loss recommended at this time. Off Restrictive diets.  # BPH w/ LUTS & Recent Obstructive Uropathy: Flomax 0.8 mg qhs. OFF Finasteride 1/15/24 d/t weakness. Voiding well since HUNG OUT. F/U Dr Delaney as outpatient.  # CKD3a: stabilized off lasix. ARB resumed. F/U Nephro/ [facility].  # OA: tyleno and rare use of norco   1 po q6h prn pain. F/U Ortho ([facility]) to schedule Rt TKA. Trial knee immobilizer to help with pain control.  # HTN: amlodipine 10, Toprol 25, hydralazine 10 tid, Losartan 100.  # Hypokalemia: supplement, max ARB.  # DM w nephropathy and neuropathy: OFF Tresiba 1/8/24. DIET CONTROLLED. When renal function improves can start Farxiga (if still needed).  # Gout: allopurinol  # Anemia: no iron deficiency. Replace Folate and B12. Likely d/t CKD. Slowly improving -- if plateaus consider EPO injection.  # GERD: OFF PPI d/t NOHELIA.  # Metastatic Melanoma: Dx 12/4/23. Oncology ([facility]) 12/14/23 -- plans chemo/immunotx once stronger. recheck in 2 months after PET-CT and MRI.  # Left UE basilic vein thrombosis (SVT)-- cut eliquis to 5 mg bid x 2 weeks, then 2.5 mg bid for DVT prevention -- high risk due to immobility and cancer dx.

## 2024-02-08 ENCOUNTER — APPOINTMENT (OUTPATIENT)
Dept: HEMATOLOGY/ONCOLOGY | Facility: CLINIC | Age: 74
End: 2024-02-08
Payer: MEDICARE

## 2024-02-08 NOTE — PROGRESS NOTES
*Provider Impression*    Patient is a 73 year old male who is seen today for management of multiple medical problems     #Weakness / OA / Gout - PT/OT, allopurinol 100mg daily, acetaminophen 650mg q6h PRN, Norco 5/325mg q6h PRN, biofreeze q4h PRN, f/u w/ Dr. Kothari  #AV block / TV vegetation - s/p PM explant and re-implant; completed cefazolin, f/u w/ cardiology, f/u w/ ID  #GERD / Constipation / Gallbladder slude - zofran 4mg q6h PRN, miralax 17grams daily, senna 8.6mg 2 tabs daily, consult Dr. Downing  #Melanoma / Superficial VT - eliquis 2.5mg BID, f/u w/ oncology  #BPH w/ obstruction - finasteride 5mg daily, tamsulosin 0.8mg daily, f/u w/ urology Dr. Delaney  #Insomnia / Depression - melatonin 5mg QHS, duloxetine 20mg daily  #HTN / HLD - amlodipine 10mg daily, metoprolol XL 25mg daily, atorvastatin 20mg daily, hydralazine 10mg TID, losartan 100mg daily, KCl 10mEq daily  #T2DM - f/u w/ podiatry for ingrown toenail  #PCM - prostat 30mL daily  #ACP - Full Code  Follow up as needed      *Chief Complaint*     weakness    *History of Present Illness*    Patient is a 74 y/o male w/ PMH as below who presents with a 3-week history of diarrhea with generalized weakness. CT A/P: No acute diverticulitis or acute process. Recent antibiotic use (Keflex) for UTI. Stool negative for C.Diff Colitis, PCR noninfectious. ID was consulted - no need for antibiotics. Urine culture was negative. Given IVF for hypovolemia from diarrhea and poor oral intake. Monitored and repleted electrolytes as needed. Nephrology consulted for NOHELIA with hyponatremia. Sodium uptrended: 123>130. Nephrotoxic agents held to allow for recovery. Urine output satisfactory. He was seen by PT/OT who recommended SNF and was d/c to [facility] @ [place].     He was sent to the ED on 11/13 for NOHELIA on labs from the SNF, oliguria and blood cultures that were persistently positive for MSSA. He was subsequently transferred to [facility]. Blood cultures with NGTD as of 11/16. FILIPE 11/16  showed tricuspid valve vegetation as well as right atrial lead vegetation. Patient remained afebrile without leukocytosis and hemodynamically stable. Device Interrogated 11/17 with AP 29% and  30%.  Lead extraction complete 11/22. After lead removal, on telemetry the patient had several episodes of intermittent asymptomatic high-grade AV block, NSVT,and bradycardia. As a result his metoprolol was weaned. He underwent device re-implant (micra leadless pacemaker) on 11/30. Reimplant was done in setting of tricuspid vegetation with the assumption that patient was sterilized given Negative BC, lack of growth in vegetation size and clinical picture. He was treated w/ IV Ancef.  ID recommended continuation of Cefazolin 4 weeks after lead removal, until (12/20). PICC placed 12/2.  Patient has outpatient follow up scheduled further and antibiotic use will be determined then. Per ID, please collect CBC, BMP, ESR and CRP weekly and fax to [phone] Attn Dr Gonzalez. While inpatient he received a unit of packed red blood cells for low hemoglobin (11/25). Hemoglobin has remained stable with no signs or symptoms of bleeding. Home lasix and losartan were being held in setting of NOHELIA secondary to nephrotoxic antibiotic exposure which was down trending but not at baseline with adequate urine output. Patient has maldonado in setting of severe BPH and outpatient follow up with urology. Of note, patient noticed left groin lymphadenopathy and U/S (11/23) showed Enlarged left inguinal/external iliac lymph nodes. PET scan deferred at the  time d/t active infectious process. Underwent core needle biopsy on 11/29. Patient to follow up biopsy results with surgical oncology. His biopsy pathology returned w/ melanoma.     He is possibly discharging on Monday. He is seen sitting up in his room today and reports he is still having some neuropathy, eating better, pain has been well controlled w/ APAP, did steps yesterday, has lift chair at home, he  has f/u w/ Dr. De Leon on 2/14, denies any f/c, sweats, CP, SOB, cough, n/v, constipation, dairrhea, and no other new c/o presently.     Alleriges - buPROPion, Cephalexin   PMH - arthritis, obesity, lymphedema, ventral hernia repair, hypertension, hyperlipidemia, BPH, PVD, T2DM, Bell's palsy  PSH - pacemaker, ventral hernia repair, prostate biopsy, varicose vein ligation  FH - Brother had heart disease; Mother had lung cancer  SocHx - Never smoker, Social EtOH    *Review of Systems*  All other systems reviewed are negative except as noted in the HPI     *Vital Signs*   Date: 2/1/24  - T: 97.9  P: 71  R: 16  BP: 130/72  SpO2: 93% on RA ; Date: 1/24/24  Wt: 198.6    *Results / Data*  CBC - Date: 1/29/24  WBC: 5.04  HGB: 9.2  HCT: 28.9  PLT: 106 ;   BMP - Date: 1/29/24  Na: 143  K: 3.8  Cl: 109  CO2: 20  BUN: 14  Cr: 1.45  Glu: 79  Ca: 8.8  ;   LFT - Date: 1/22/24  AST: 18  ALT: 15  ALP: 81  Tbili: 0.4  ;   Coags - Date:   INR:   PT:  Other - Date: 10/16/23  CRP: 3.8  ESR: 69  ; 10/23/23  CRP: 2.9  ESR: 79 ; 10/30/23  CRP: 1.9  ; 12/18/23  CRP: 1.9 ;  12/20/23  ESR: 60; 1/15/24  ESR: 25  CRP: <0.3  ; 1/29/24  B12: 338  Folate: <2.0  ESR: 31    *Physical Exam*  Gen: (+) NAD, (+) well-appearing  HEENT: (+) normocephalic, (+) MMM  Neck: (+) supple  Lungs: (+) CTAB, (-) wheezes, (-) rales, (-) rhonchi  Heart: (+) RRR, (+) S1 S2, (-) murmurs  Pulses: (+) palpable  Abd: (+) soft, (+) NT, (+) ND, (+) BS+  Ext: (+) edema, (-) deformity  MSK: (-) joint swelling  Skin: (+) warm, (+) dry, (-) rash  Neuro: (+) follows commands, (-) tremor, (+) alert

## 2024-02-09 DIAGNOSIS — L30.4 INTERTRIGO: Primary | ICD-10-CM

## 2024-02-09 RX ORDER — NYSTATIN 100000 [USP'U]/G
1 POWDER TOPICAL 2 TIMES DAILY
Qty: 60 G | Refills: 11 | Status: SHIPPED | OUTPATIENT
Start: 2024-02-09 | End: 2024-03-11 | Stop reason: WASHOUT

## 2024-02-13 ENCOUNTER — APPOINTMENT (OUTPATIENT)
Dept: HEMATOLOGY/ONCOLOGY | Facility: CLINIC | Age: 74
End: 2024-02-13
Payer: MEDICARE

## 2024-02-14 ENCOUNTER — TELEMEDICINE (OUTPATIENT)
Dept: PRIMARY CARE | Facility: CLINIC | Age: 74
End: 2024-02-14
Payer: MEDICARE

## 2024-02-14 VITALS
TEMPERATURE: 98.2 F | HEART RATE: 90 BPM | SYSTOLIC BLOOD PRESSURE: 111 MMHG | WEIGHT: 217 LBS | RESPIRATION RATE: 18 BRPM | BODY MASS INDEX: 27.85 KG/M2 | HEIGHT: 74 IN | DIASTOLIC BLOOD PRESSURE: 78 MMHG

## 2024-02-14 DIAGNOSIS — E66.3 OVERWEIGHT WITH BODY MASS INDEX (BMI) OF 27 TO 27.9 IN ADULT: ICD-10-CM

## 2024-02-14 DIAGNOSIS — I82.622 ARM DVT (DEEP VENOUS THROMBOEMBOLISM), ACUTE, LEFT (MULTI): ICD-10-CM

## 2024-02-14 DIAGNOSIS — M35.3 PMR (POLYMYALGIA RHEUMATICA) (MULTI): ICD-10-CM

## 2024-02-14 DIAGNOSIS — E11.40 TYPE 2 DIABETES MELLITUS WITH DIABETIC NEUROPATHY, WITH LONG-TERM CURRENT USE OF INSULIN (MULTI): Primary | ICD-10-CM

## 2024-02-14 DIAGNOSIS — Z78.9 NEVER SMOKED CIGARETTES: ICD-10-CM

## 2024-02-14 DIAGNOSIS — I10 PRIMARY HYPERTENSION: ICD-10-CM

## 2024-02-14 DIAGNOSIS — C43.9 METASTATIC MELANOMA (MULTI): ICD-10-CM

## 2024-02-14 DIAGNOSIS — I89.0 LYMPHEDEMA: ICD-10-CM

## 2024-02-14 DIAGNOSIS — Z79.4 TYPE 2 DIABETES MELLITUS WITH DIABETIC NEUROPATHY, WITH LONG-TERM CURRENT USE OF INSULIN (MULTI): Primary | ICD-10-CM

## 2024-02-14 PROBLEM — R51.9 HEADACHE: Status: RESOLVED | Noted: 2023-03-21 | Resolved: 2024-02-14

## 2024-02-14 PROBLEM — B95.61 BACTEREMIA DUE TO STAPHYLOCOCCUS AUREUS: Status: RESOLVED | Noted: 2023-10-19 | Resolved: 2024-02-14

## 2024-02-14 PROBLEM — J32.9 SINUSITIS: Status: RESOLVED | Noted: 2024-01-16 | Resolved: 2024-02-14

## 2024-02-14 PROBLEM — I33.0 ACUTE BACTERIAL ENDOCARDITIS (HHS-HCC): Status: RESOLVED | Noted: 2024-01-16 | Resolved: 2024-02-14

## 2024-02-14 PROBLEM — N39.0 UTI (URINARY TRACT INFECTION): Status: RESOLVED | Noted: 2024-01-16 | Resolved: 2024-02-14

## 2024-02-14 PROBLEM — N39.0 ACUTE URINARY TRACT INFECTION: Status: RESOLVED | Noted: 2023-09-18 | Resolved: 2024-02-14

## 2024-02-14 PROBLEM — B35.1 MYCOTIC TOENAILS: Status: RESOLVED | Noted: 2023-03-21 | Resolved: 2024-02-14

## 2024-02-14 PROBLEM — R78.81 BACTEREMIA DUE TO STAPHYLOCOCCUS AUREUS: Status: RESOLVED | Noted: 2023-10-19 | Resolved: 2024-02-14

## 2024-02-14 PROBLEM — Z96.0 URINARY CATHETER PRESENT: Status: RESOLVED | Noted: 2024-01-16 | Resolved: 2024-02-14

## 2024-02-14 PROBLEM — I38 ENDOCARDITIS: Status: RESOLVED | Noted: 2023-10-19 | Resolved: 2024-02-14

## 2024-02-14 PROBLEM — N18.4 STAGE 4 CHRONIC KIDNEY DISEASE (MULTI): Status: RESOLVED | Noted: 2023-11-06 | Resolved: 2024-02-14

## 2024-02-14 PROCEDURE — 3044F HG A1C LEVEL LT 7.0%: CPT | Performed by: FAMILY MEDICINE

## 2024-02-14 PROCEDURE — 99495 TRANSJ CARE MGMT MOD F2F 14D: CPT | Performed by: FAMILY MEDICINE

## 2024-02-14 PROCEDURE — 3008F BODY MASS INDEX DOCD: CPT | Performed by: FAMILY MEDICINE

## 2024-02-14 PROCEDURE — 3078F DIAST BP <80 MM HG: CPT | Performed by: FAMILY MEDICINE

## 2024-02-14 PROCEDURE — 1170F FXNL STATUS ASSESSED: CPT | Performed by: FAMILY MEDICINE

## 2024-02-14 PROCEDURE — 1159F MED LIST DOCD IN RCRD: CPT | Performed by: FAMILY MEDICINE

## 2024-02-14 PROCEDURE — 1036F TOBACCO NON-USER: CPT | Performed by: FAMILY MEDICINE

## 2024-02-14 PROCEDURE — 1157F ADVNC CARE PLAN IN RCRD: CPT | Performed by: FAMILY MEDICINE

## 2024-02-14 PROCEDURE — 3048F LDL-C <100 MG/DL: CPT | Performed by: FAMILY MEDICINE

## 2024-02-14 PROCEDURE — 1125F AMNT PAIN NOTED PAIN PRSNT: CPT | Performed by: FAMILY MEDICINE

## 2024-02-14 PROCEDURE — 4010F ACE/ARB THERAPY RXD/TAKEN: CPT | Performed by: FAMILY MEDICINE

## 2024-02-14 PROCEDURE — 1160F RVW MEDS BY RX/DR IN RCRD: CPT | Performed by: FAMILY MEDICINE

## 2024-02-14 PROCEDURE — 3074F SYST BP LT 130 MM HG: CPT | Performed by: FAMILY MEDICINE

## 2024-02-14 RX ORDER — FUROSEMIDE 20 MG/1
20 TABLET ORAL DAILY
Qty: 30 TABLET | Refills: 11 | Status: SHIPPED | OUTPATIENT
Start: 2024-02-14 | End: 2025-02-13

## 2024-02-14 RX ORDER — POTASSIUM CHLORIDE 750 MG/1
10 TABLET, EXTENDED RELEASE ORAL DAILY
COMMUNITY
Start: 2024-02-03 | End: 2024-03-05

## 2024-02-14 RX ORDER — ATORVASTATIN CALCIUM 20 MG/1
20 TABLET, FILM COATED ORAL DAILY
COMMUNITY
Start: 2024-02-03 | End: 2024-03-05

## 2024-02-14 RX ORDER — APIXABAN 2.5 MG/1
2.5 TABLET, FILM COATED ORAL DAILY
COMMUNITY
Start: 2024-02-03 | End: 2024-06-04 | Stop reason: ALTCHOICE

## 2024-02-14 RX ORDER — DULOXETIN HYDROCHLORIDE 20 MG/1
20 CAPSULE, DELAYED RELEASE ORAL DAILY
COMMUNITY
Start: 2024-02-03 | End: 2024-03-05

## 2024-02-14 RX ORDER — PREDNISONE 2.5 MG/1
2.5 TABLET ORAL DAILY
COMMUNITY
Start: 2024-02-03 | End: 2024-02-20 | Stop reason: WASHOUT

## 2024-02-14 RX ORDER — LOSARTAN POTASSIUM 100 MG/1
100 TABLET ORAL DAILY
COMMUNITY
Start: 2024-02-03 | End: 2024-04-23

## 2024-02-14 ASSESSMENT — ACTIVITIES OF DAILY LIVING (ADL)
TAKING_MEDICATION: INDEPENDENT
BATHING: NEEDS ASSISTANCE
MANAGING_FINANCES: INDEPENDENT
DRESSING: NEEDS ASSISTANCE
GROCERY_SHOPPING: NEEDS ASSISTANCE
DOING_HOUSEWORK: NEEDS ASSISTANCE

## 2024-02-14 ASSESSMENT — ENCOUNTER SYMPTOMS
LOSS OF SENSATION IN FEET: 0
DEPRESSION: 0
OCCASIONAL FEELINGS OF UNSTEADINESS: 1

## 2024-02-14 NOTE — ASSESSMENT & PLAN NOTE
Stable off insulin with weight loss.     4/24/2024 Addendum: due to use of prednisone in treatment of his multiple myeloma, blood sugars have increase -- running above 400s which required restarting TRESIBA -- titrated up to 25 units daily.  While back on insulin he requires CGM for management of his diabetes in the setting of ongoing chemotherapy for multiple myeloma. MP

## 2024-02-14 NOTE — PROGRESS NOTES
Subjective   Reason for Visit: Chester Verduzco is an 74 y.o. male seen by teleheleaht for for a  TCM visit after discharge from SNF 2/5/24 (withing 2 weeks from dc) DC summary reviewed. Meds reconciled.     # Weakness: Completed SNF PT/OT 2/4/24. He requires wheeled walker, chair lift mechanism, and raised toilet seat to allow him independence with ADLs. DC'd home with home skilled PT/OT/SN to allow transition to home with homebound status once completes SNF PT.  # Elevated ESR & proximal muscle weakness c/w PMR: Tolerating 2.5 mg prednisone -- complete taper by 0.5 mg every week over next 4 weeks. OFF finasteride 1/15/24 to remove testosterone antagonist.  # Endocarditis: PPM replaced, Completed Ancef 2g bid through 12/22/23. F/U blood cx NEG 12/29/23. PICC out 1/4/24. Heart murmur. Will need follow up Echo.  # Hx morbid obesity: Intentional weight loss with dietary/diabetic intervention -- he has reached goal weight of 200# -- no further weight loss recommended at this time. Off Restrictive diets.  # BPH w/ LUTS & Recent Obstructive Uropathy: Flomax 0.8 mg qhs. OFF Finasteride 1/15/24 d/t weakness. Voiding well since [facility] OUT. F/U Dr Delaney as outpatient.  # CKD3a: stabilized off lasix. ARB resumed. F/U Nephro/ [facility].  # OA: tyleno and rare use of norco 5/325 1 po q6h prn pain. F/U Ortho ([facility]) to schedule Rt TKA. Trial knee immobilizer to help with pain control.  # HTN: amlodipine 10, Toprol 25, hydralazine 10 tid, Losartan 100.  # Hypokalemia: supplement, max ARB.  # DM w nephropathy and neuropathy: OFF Tresiba 1/8/24. DIET CONTROLLED. When renal function improves can start Farxiga (if still needed).  # Gout: allopurinol  # Anemia: no iron deficiency. Replace Folate and B12. Likely d/t CKD. Slowly improving -- if plateaus consider EPO injection.  # GERD: OFF PPI d/t NOHELIA.  # Metastatic Melanoma: Dx 12/4/23. Oncology ([facility]) 12/14/23 -- plans chemo/immunotx once stronger. recheck in 2 months after PET-CT  "and MRI.  # Left UE basilic vein thrombosis (SVT) Dx 12/14/.23-- cut eliquis to 5 mg bid x 2 weeks, then 2.5 mg bid for DVT prevention -- high risk due to immobility and cancer dx.       this am.  Weight at home 217# (up 14# since home) BP at home 111/78    Past Medical, Surgical, and Family History reviewed and updated in chart.             Patient Care Team:  Jeffry De Leon MD as PCP [facility] Springer MD as Referring Physician (Hematology and Oncology)         Objective   Vitals:  /78 (Patient Position: Sitting)   Pulse 90   Temp 36.8 °C (98.2 °F) (Oral)   Resp 18   Ht 1.88 m (6' 2\")   Wt 98.4 kg (217 lb)   BMI 27.86 kg/m²       O: Awake, alert, NAD. No intoxication, withdrawal or sedation noted per two-way audio-video feed.  Trace edema and swelling right knee.     Assessment/Plan   Problem List Items Addressed This Visit       HTN (hypertension)    Current Assessment & Plan     Stable -- Continue    AMLODIPINE 10  TOPROL 25  Hydralazine 10 TID  Losartan 100 mg         Relevant Orders    Comprehensive metabolic panel    CBC    Lymphedema    Overview     9/11/23 VEIN ( [facility]seni) Edema with venous stasis  9/11/12 B/L LE US: NEG DVT.         Current Assessment & Plan     Start lasix 20 mg daily 2/14/24.  Recheck labs next week.          Relevant Medications    furosemide (Lasix) 20 mg tablet    Type 2 diabetes mellitus with diabetic neuropathy, with long-term current use of insulin (Multi) - Primary    Overview     DX'd 12/2017  STOPPED TZD due to edema 7/2021.   Off METFORMIN d/t diarrhea.   Cannot afford Trulicity.  Sees podiatry for neuropathy  See opto Dr March at [facility] in [place].            Current Assessment & Plan     Stable off insulin with weight loss.     4/24/2024 Addendum: due to use of prednisone in treatment of his multiple myeloma, blood sugars have increase -- running above 400s which required restarting TRESIBA -- titrated up to 25 units daily.  While back on " insulin he requires CGM for management of his diabetes in the setting of ongoing chemotherapy for multiple myeloma. MP         Metastatic melanoma (Multi)    Overview     11/29/23 Left inguinal lymph node Bx   12/14/23  HEME-ONC consult Dr. Springer.         Current Assessment & Plan     Going for PET Scan 2/15/24         PMR (polymyalgia rheumatica) (Multi)    Current Assessment & Plan     Ok to STOP prednisone -- tolerated wean down to 2.5 mg daily.  MP         Arm DVT (deep venous thromboembolism), acute, left (Multi)    Overview     Dx 12/14/23 -- Continue eliquis through 3/14/24.           Other Visit Diagnoses       Overweight with body mass index (BMI) of 27 to 27.9 in adult        Never smoked cigarettes

## 2024-02-15 ENCOUNTER — HOSPITAL ENCOUNTER (OUTPATIENT)
Dept: RADIOLOGY | Facility: HOSPITAL | Age: 74
Discharge: HOME | End: 2024-02-15
Payer: MEDICARE

## 2024-02-15 DIAGNOSIS — C43.9 MELANOMA (MULTI): ICD-10-CM

## 2024-02-15 LAB — GLUCOSE BLD MANUAL STRIP-MCNC: 106 MG/DL (ref 74–99)

## 2024-02-15 PROCEDURE — 78816 PET IMAGE W/CT FULL BODY: CPT | Mod: PI

## 2024-02-15 PROCEDURE — 78816 PET IMAGE W/CT FULL BODY: CPT | Mod: PET TUMOR INIT TX STRAT | Performed by: STUDENT IN AN ORGANIZED HEALTH CARE EDUCATION/TRAINING PROGRAM

## 2024-02-15 PROCEDURE — A9552 F18 FDG: HCPCS | Mod: MUE | Performed by: INTERNAL MEDICINE

## 2024-02-15 PROCEDURE — 3430000001 HC RX 343 DIAGNOSTIC RADIOPHARMACEUTICALS: Mod: MUE | Performed by: INTERNAL MEDICINE

## 2024-02-15 PROCEDURE — 82947 ASSAY GLUCOSE BLOOD QUANT: CPT

## 2024-02-15 RX ORDER — FLUDEOXYGLUCOSE F 18 200 MCI/ML
9.7 INJECTION, SOLUTION INTRAVENOUS
Status: COMPLETED | OUTPATIENT
Start: 2024-02-15 | End: 2024-02-15

## 2024-02-15 RX ADMIN — FLUDEOXYGLUCOSE F 18 9.7 MILLICURIE: 200 INJECTION, SOLUTION INTRAVENOUS at 11:43

## 2024-02-20 ENCOUNTER — TELEMEDICINE (OUTPATIENT)
Dept: HEMATOLOGY/ONCOLOGY | Facility: CLINIC | Age: 74
End: 2024-02-20
Payer: MEDICARE

## 2024-02-20 DIAGNOSIS — C43.9 METASTATIC MELANOMA (MULTI): ICD-10-CM

## 2024-02-20 PROCEDURE — 99215 OFFICE O/P EST HI 40 MIN: CPT | Performed by: INTERNAL MEDICINE

## 2024-02-20 PROCEDURE — 1036F TOBACCO NON-USER: CPT | Performed by: INTERNAL MEDICINE

## 2024-02-20 PROCEDURE — 1125F AMNT PAIN NOTED PAIN PRSNT: CPT | Performed by: INTERNAL MEDICINE

## 2024-02-20 PROCEDURE — 4010F ACE/ARB THERAPY RXD/TAKEN: CPT | Performed by: INTERNAL MEDICINE

## 2024-02-20 PROCEDURE — 1157F ADVNC CARE PLAN IN RCRD: CPT | Performed by: INTERNAL MEDICINE

## 2024-02-20 PROCEDURE — 1159F MED LIST DOCD IN RCRD: CPT | Performed by: INTERNAL MEDICINE

## 2024-02-20 PROCEDURE — 3008F BODY MASS INDEX DOCD: CPT | Performed by: INTERNAL MEDICINE

## 2024-02-20 NOTE — PROGRESS NOTES
Patient ID: Chester Verduzco is a 74 y.o. male.  Referring Physician: Danish Springer MD  [id number]  [place] [id number]  Primary Care Provider: Jeffry De Leon MD  Visit Type:  Follow Up       Subjective    HPI  Patient was gives a history which is interesting.  In 2023 patient developed significant arthritis for which an intervention surgically was performed.  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  While his pacemaker was replaced patient was treated with IV antibiotics.  It was in the setting of weight loss that scan was performed which revealed the lymphadenopathy in the biopsy which confirmed diagnosis of metastatic melanoma in his left inguinal lymph nodes.  Patient is now status post rehabilitation and is reaching out for management of metastatic melanoma.  Review of Systems - Oncology   FINAL DIAGNOSIS   A. INGUINAL LYMPH NODE BIOPSY LEFT: --METASTATIC MELANOMA,     PET 2/15/24:   IMPRESSION:  1. Multiple FDG avid subcutaneous nodular soft tissue densities within the left gluteal region, likely representing primary melanoma.  2. FDG avid bulky left external iliac and inguinal nodes, corresponding to biopsy-proven darren metastases.  3. No PET evidence of distant metastasis.  4. Prostatomegaly and splenomegaly.    Signed by: Thanh Mendoza 2/15/2024    Objective   BSA: There is no height or weight on file to calculate BSA.  There were no vitals taken for this visit.     has a past medical history of Abnormal weight gain (10/27/2016), Achilles tendinitis, unspecified leg (08/16/2016), Acute upper respiratory infection, unspecified, NOHELIA (acute kidney injury) (CMS/HCC) (10/02/2023), Allergic contact dermatitis due to plants, except food (08/22/2013), Bell's palsy (03/21/2023), BPH with obstruction/lower urinary tract symptoms, Cellulitis of right  lower limb (07/30/2021), Cough, unspecified (03/21/2016), Diabetes (CMS/HCC), Encounter for screening for human immunodeficiency virus (HIV) (06/14/2016), Hordeolum externum unspecified eye, unspecified eyelid (08/14/2019), Hyperglycemia, unspecified (12/13/2017), Incisional hernia without obstruction or gangrene (06/15/2015), Knee joint pain, MSSA bacteremia (10/2023), Muscle spasm of calf (08/16/2016), Obstructive uropathy, Personal history of other diseases of the digestive system (06/30/2015), Personal history of other diseases of the digestive system (02/05/2016), Personal history of other diseases of the respiratory system (12/23/2014), Personal history of other diseases of the respiratory system (01/28/2016), Personal history of other infectious and parasitic diseases (12/02/2015), Personal history of other infectious and parasitic diseases, Personal history of other specified conditions (02/04/2015), Personal history of other specified conditions (08/16/2016), Personal history of other specified conditions (01/26/2015), Personal history of pneumonia (recurrent) (01/29/2016), Personal history of urinary (tract) infections (02/19/2013), Prostatitis (03/21/2023), Pruritus, unspecified (02/10/2015), Sinoatrial node dysfunction (CMS/[facility]), Strain of muscle, fascia and tendon of the posterior muscle group at thigh level, right thigh, initial encounter (05/10/2016), and Unspecified symptoms and signs involving the genitourinary system (12/20/2016).   has a past surgical history that includes Varicose vein surgery (08/22/2013); Cardiac pacemaker placement (08/22/2013); Other surgical history (06/15/2015); Ventral hernia repair (06/15/2015); Other surgical history (09/16/2019); Peripherally inserted central catheter insertion; Cardiac electrophysiology procedure (Left, 11/22/2023); US guided biopsy lymph node superficial (11/29/2023); and Cardiac electrophysiology procedure (N/A, 11/30/2023).  No family history on  file.  Oncology History    No history exists.       Chester Verduzco  reports that he has never smoked. He has never been exposed to tobacco smoke. He has never used smokeless tobacco.  He  reports that he does not currently use alcohol.  He  reports no history of drug use.    Physical Exam    WBC   Date/Time Value Ref Range Status   12/03/2023 04:57 AM 3.7 (L) 4.4 - 11.3 x10*3/uL Final   12/02/2023 08:14 AM 4.0 (L) 4.4 - 11.3 x10*3/uL Final   12/01/2023 06:53 AM 4.3 (L) 4.4 - 11.3 x10*3/uL Final     nRBC   Date Value Ref Range Status   12/03/2023 0.0 0.0 - 0.0 /100 WBCs Final   12/02/2023 0.0 0.0 - 0.0 /100 WBCs Final   12/01/2023 0.0 0.0 - 0.0 /100 WBCs Final     RBC   Date Value Ref Range Status   12/03/2023 2.53 (L) 4.50 - 5.90 x10*6/uL Final   12/02/2023 2.72 (L) 4.50 - 5.90 x10*6/uL Final   12/01/2023 2.64 (L) 4.50 - 5.90 x10*6/uL Final     Hemoglobin   Date Value Ref Range Status   12/03/2023 7.0 (L) 13.5 - 17.5 g/dL Final   12/02/2023 8.0 (L) 13.5 - 17.5 g/dL Final   12/01/2023 7.6 (L) 13.5 - 17.5 g/dL Final     Hematocrit   Date Value Ref Range Status   12/03/2023 22.4 (L) 41.0 - 52.0 % Final   12/02/2023 24.2 (L) 41.0 - 52.0 % Final   12/01/2023 23.5 (L) 41.0 - 52.0 % Final     MCV   Date/Time Value Ref Range Status   12/03/2023 04:57 AM 89 80 - 100 fL Final   12/02/2023 08:14 AM 89 80 - 100 fL Final   12/01/2023 06:53 AM 89 80 - 100 fL Final     MCH   Date/Time Value Ref Range Status   12/03/2023 04:57 AM 27.7 26.0 - 34.0 pg Final   12/02/2023 08:14 AM 29.4 26.0 - 34.0 pg Final   12/01/2023 06:53 AM 28.8 26.0 - 34.0 pg Final     MCHC   Date/Time Value Ref Range Status   12/03/2023 04:57 AM 31.3 (L) 32.0 - 36.0 g/dL Final   12/02/2023 08:14 AM 33.1 32.0 - 36.0 g/dL Final   12/01/2023 06:53 AM 32.3 32.0 - 36.0 g/dL Final     RDW   Date/Time Value Ref Range Status   12/03/2023 04:57 AM 15.2 (H) 11.5 - 14.5 % Final   12/02/2023 08:14 AM 15.4 (H) 11.5 - 14.5 % Final   12/01/2023 06:53 AM 15.3 (H) 11.5 - 14.5  % Final     Platelets   Date/Time Value Ref Range Status   12/03/2023 04:57 AM 90 (L) 150 - 450 x10*3/uL Final   12/02/2023 08:14 AM 92 (L) 150 - 450 x10*3/uL Final   12/01/2023 06:53 AM 92 (L) 150 - 450 x10*3/uL Final     MPV   Date/Time Value Ref Range Status   10/07/2023 05:38 AM 9.2 7.5 - 11.5 fL Final   10/06/2023 05:47 AM 9.7 7.5 - 11.5 fL Final   10/05/2023 05:58 AM 10.2 7.5 - 11.5 fL Final     Neutrophils %   Date/Time Value Ref Range Status   12/03/2023 04:57 AM 61.9 40.0 - 80.0 % Final   12/02/2023 08:14 AM 59.8 40.0 - 80.0 % Final   12/01/2023 06:53 AM 67.3 40.0 - 80.0 % Final     Immature Granulocytes %, Automated   Date/Time Value Ref Range Status   12/03/2023 04:57 AM 0.8 0.0 - 0.9 % Final     Comment:     Immature Granulocyte Count (IG) includes promyelocytes, myelocytes and metamyelocytes but does not include bands. Percent differential counts (%) should be interpreted in the context of the absolute cell counts (cells/UL).   12/02/2023 08:14 AM 0.5 0.0 - 0.9 % Final     Comment:     Immature Granulocyte Count (IG) includes promyelocytes, myelocytes and metamyelocytes but does not include bands. Percent differential counts (%) should be interpreted in the context of the absolute cell counts (cells/UL).   12/01/2023 06:53 AM 0.7 0.0 - 0.9 % Final     Comment:     Immature Granulocyte Count (IG) includes promyelocytes, myelocytes and metamyelocytes but does not include bands. Percent differential counts (%) should be interpreted in the context of the absolute cell counts (cells/UL).     Lymphocytes %   Date/Time Value Ref Range Status   12/03/2023 04:57 AM 24.8 13.0 - 44.0 % Final   12/02/2023 08:14 AM 27.1 13.0 - 44.0 % Final   12/01/2023 06:53 AM 20.0 13.0 - 44.0 % Final     Monocytes %   Date/Time Value Ref Range Status   12/03/2023 04:57 AM 9.5 2.0 - 10.0 % Final   12/02/2023 08:14 AM 8.8 2.0 - 10.0 % Final   12/01/2023 06:53 AM 8.7 2.0 - 10.0 % Final     Eosinophils %   Date/Time Value Ref Range  Status   12/03/2023 04:57 AM 2.5 0.0 - 6.0 % Final   12/02/2023 08:14 AM 3.3 0.0 - 6.0 % Final   12/01/2023 06:53 AM 2.8 0.0 - 6.0 % Final     Basophils %   Date/Time Value Ref Range Status   12/03/2023 04:57 AM 0.5 0.0 - 2.0 % Final   12/02/2023 08:14 AM 0.5 0.0 - 2.0 % Final   12/01/2023 06:53 AM 0.5 0.0 - 2.0 % Final     Neutrophils Absolute   Date/Time Value Ref Range Status   12/03/2023 04:57 AM 2.27 1.60 - 5.50 x10*3/uL Final     Comment:     Percent differential counts (%) should be interpreted in the context of the absolute cell counts (cells/uL).   12/02/2023 08:14 AM 2.38 1.60 - 5.50 x10*3/uL Final     Comment:     Percent differential counts (%) should be interpreted in the context of the absolute cell counts (cells/uL).   12/01/2023 06:53 AM 2.87 1.60 - 5.50 x10*3/uL Final     Comment:     Percent differential counts (%) should be interpreted in the context of the absolute cell counts (cells/uL).     Immature Granulocytes Absolute, Automated   Date/Time Value Ref Range Status   12/03/2023 04:57 AM 0.03 0.00 - 0.50 x10*3/uL Final   12/02/2023 08:14 AM 0.02 0.00 - 0.50 x10*3/uL Final   12/01/2023 06:53 AM 0.03 0.00 - 0.50 x10*3/uL Final     Lymphocytes Absolute   Date/Time Value Ref Range Status   12/03/2023 04:57 AM 0.91 0.80 - 3.00 x10*3/uL Final   12/02/2023 08:14 AM 1.08 0.80 - 3.00 x10*3/uL Final   12/01/2023 06:53 AM 0.85 0.80 - 3.00 x10*3/uL Final     Monocytes Absolute   Date/Time Value Ref Range Status   12/03/2023 04:57 AM 0.35 0.05 - 0.80 x10*3/uL Final   12/02/2023 08:14 AM 0.35 0.05 - 0.80 x10*3/uL Final   12/01/2023 06:53 AM 0.37 0.05 - 0.80 x10*3/uL Final     Eosinophils Absolute   Date/Time Value Ref Range Status   12/03/2023 04:57 AM 0.09 0.00 - 0.40 x10*3/uL Final   12/02/2023 08:14 AM 0.13 0.00 - 0.40 x10*3/uL Final   12/01/2023 06:53 AM 0.12 0.00 - 0.40 x10*3/uL Final     Basophils Absolute   Date/Time Value Ref Range Status   12/03/2023 04:57 AM 0.02 0.00 - 0.10 x10*3/uL Final  "  12/02/2023 08:14 AM 0.02 0.00 - 0.10 x10*3/uL Final   12/01/2023 06:53 AM 0.02 0.00 - 0.10 x10*3/uL Final       No components found for: \"PT\"  aPTT   Date/Time Value Ref Range Status   11/30/2023 07:14 AM 36 27 - 38 seconds Final   11/29/2023 10:10 AM 36 27 - 38 seconds Final   11/24/2023 04:53 AM 37 27 - 38 seconds Final       Assessment/Plan      Patient seen today.  PET CT scan reviewed note showing gluteal mass presumed primary tumor with left inguinal lymphadenopathy confirming stage III metastatic melanoma.  No other areas of PET avidity noted.  As per NCCN guidelines neoadjuvant immunotherapy followed by surgical excision is recommended.  Patient will be discussed at tumor board after which specific recommendations will be implemented.  Maintenance treatment with immunotherapy is contemplated.  MRI of the brain pending at this time.    Will plan immunotherapy with nivolumab in the neoadjuvant setting.  Will reach out to melanoma surgical team.     Diagnoses and all orders for this visit:  Metastatic melanoma (CMS/HCC)  -     Clinic Appointment Request Virtual Est           Danish Springer MD                         "

## 2024-02-20 NOTE — PATIENT INSTRUCTIONS
Today you had a call with Dr. Springer.  He will ask the tumor board to review the case and options.  An MD visit will be scheduled for after that discussion.  WE would like that ppt to be in person.    Please call the office for any questions or concerns.  We ask that you notify us 5-7 days before your medications need refilled.  SCOTT Martinez is strongly encouraging all patients to access their MyChart for all results and to communicate with their provider for non-urgent messages.  If an urgent/same day/sick concern response is needed, please call the office at [phone]. Thank You!

## 2024-02-22 NOTE — TUMOR BOARD NOTE
General Patient Information  Name:  Chester Verduzco  Evaluation #:  1  Conference Date:  2-  YOB: 1950  MRN:  [id number]  Program Physician(s):  Carson Tompkins  Referring Physician(s):  Danish Springer (Hem/Onc), Jeffry De Leon (PCP), Landon Hilario      Summary  Stage:  III    Assessment:  Enlarged lymph nodes seen on CT in the left external iliac chain, and bilateral groin.  A biopsy of the left inguinal lymph node with metastatic melanoma.  Negative for BRAF V600.    PET/CT with multiple FDG avid subcutaneous nodular soft tissue densities within the left gluteal region, likely representing primary melanoma.  FDG avid bulky left external iliac and inguinal nodes, corresponding to biopsy-proven darren metastases.  No PET evidence of distant metastasis.    Recommendation:  MRI brain pending.  Systemic therapy (with ipi/nivo).  Full skin exam to look for primary lesion.    Review Multidisciplinary Cutaneous Oncology Conference recommendation with patient.  Continue routine follow up and total body skin exams with Dermatology.    Follow Up:  Danish Springer, Dermatology, Ted Cameron, Landon Anderson      History and Physical Exam  Dermatologic History:   74 y.o. male with enlarged lymph nodes seen on CT on 10-2-2023 in the left external iliac chain, and bilateral groin.  A biopsy of the left inguinal lymph node on 11- showing metastatic melanoma.  The morphology and immunoprofile are consistent with metastatic melanoma.  Negative for BRAF V600.    PET/CT on 2- showed multiple FDG avid subcutaneous nodular soft tissue densities within the left gluteal region, likely representing primary melanoma.  FDG avid bulky left external iliac and inguinal nodes, corresponding to biopsy-proven darren metastases.  No PET evidence of distant metastasis.  Prostatomegaly and splenomegaly.  MRI brain pending.    Past Medical History:    Past Medical History:   Diagnosis Date     Abnormal weight gain 10/27/2016    Abnormal weight gain    Achilles tendinitis, unspecified leg 08/16/2016    Achilles tendinitis    Acute upper respiratory infection, unspecified     Acute URI    NOHELIA (acute kidney injury) (CMS/Formerly [facility]) 10/02/2023    10/2023-11/2023 Following vancomycin in setting of bacteremia.    Allergic contact dermatitis due to plants, except food 08/22/2013    Contact dermatitis due to poison ivy    Bell's palsy 03/21/2023    BPH with obstruction/lower urinary tract symptoms     Cellulitis of right lower limb 07/30/2021    Cellulitis of right lower extremity without foot    Cough, unspecified 03/21/2016    Cough    Diabetes (CMS/Formerly [facility])     Encounter for screening for human immunodeficiency virus (HIV) 06/14/2016    Screening for HIV (human immunodeficiency virus)    Hordeolum externum unspecified eye, unspecified eyelid 08/14/2019    Stye    Hyperglycemia, unspecified 12/13/2017    Chronic hyperglycemia    Incisional hernia without obstruction or gangrene 06/15/2015    Recurrent ventral hernia    Knee joint pain     Right knee- bone on bone    MSSA bacteremia 10/2023    Muscle spasm of calf 08/16/2016    Muscle spasm of calf    Obstructive uropathy     Personal history of other diseases of the digestive system 06/30/2015    History of umbilical hernia    Personal history of other diseases of the digestive system 02/05/2016    History of ventral hernia    Personal history of other diseases of the respiratory system 12/23/2014    History of acute sinusitis    Personal history of other diseases of the respiratory system 01/28/2016    History of upper respiratory infection    Personal history of other infectious and parasitic diseases 12/02/2015    History of hepatitis B virus infection    Personal history of other infectious and parasitic diseases     History of hepatitis B virus infection    Personal history of other specified conditions 02/04/2015    History of nausea and vomiting    Personal history  of other specified conditions 08/16/2016    History of edema    Personal history of other specified conditions 01/26/2015    History of jaundice    Personal history of pneumonia (recurrent) 01/29/2016    History of pneumonia    Personal history of urinary (tract) infections 02/19/2013    History of urinary tract infection    Prostatitis 03/21/2023    Pruritus, unspecified 02/10/2015    Pruritus    Sinoatrial node dysfunction (CMS/HCC)     Strain of muscle, fascia and tendon of the posterior muscle group at thigh level, right thigh, initial encounter 05/10/2016    Tear of right hamstring    Unspecified symptoms and signs involving the genitourinary system 12/20/2016    UTI symptoms       Family History of DNS/MM:  Unknown      Pathology  Surgical Pathology Exam: [id number]  Collected 11/29/2023 12:28    A. INGUINAL LYMPH NODE BIOPSY LEFT:   --METASTATIC MELANOMA, SEE COMMENT     COMMENT: Routine and immunostained sections show sheets of single and nested atypical melanocytes with pleomorphic nuclei and focal intracytoplasmic melanin pigment intermixed with focal lymphocytic inflammation.  No definitive lymph node architecture is seen.  The melanocytes are diffusely positive for Melan-A and SOX10.     The morphology and immunoprofile are consistent with metastatic melanoma.  BRAF molecular studies have been ordered and results will be reported separately.  Correlation with clinical and radiologic studies is recommended.    Electronically signed by Rhina Tim MD on 12/5/2023 at 1052    BRAF Exon 15 Results  Not Detected.     INTERPRETATION: No variants are detected in the listed gene regions.  This finding does not exclude the presence of genetic alterations present in gene regions not tested or occurring at a frequency below the limit of detection.     DISEASE RELEVANT ALTERATIONS NOT DETECTED:   Negative for BRAF V600.      Radiology  NM PET CT melanoma restaging  Interpreted By:  Thanh Mendoza,  and Rukhsana Edge  Dwayne   STUDY:  NM PET CT MELANOMA  RESTAGING;  2/15/2024 2:14 pm      INDICATION:  Signs/Symptoms:MELANOMA. Per EMR patient with the biopsy diagnosis of  the left inguinal lymph node metastatic melanoma.      COMPARISON:  None.      ACCESSION NUMBER(S):  [id number]      ORDERING CLINICIAN:  MARIELENA EDMONDS      TECHNIQUE:  DIVISION OF NUCLEAR MEDICINE  POSITRON EMISSION TOMOGRAPHY (PET-CT)      The patient received an intravenous dose of 9.7 mCi of Fluorine-18  fluorodeoxyglucose (FDG).  The patient was placed in a dark quiet  room. Positron emission tomographic (PET) images from skull vertex to  the feet were then acquired after a one hour delay. Also acquired was  a contemporaneous low dose non-contrast CT scan performed for  attenuation correction of PET images and anatomic localization.  The  PET and CT images were digitally fused for display.  All images were  acquired on a combined PET-CT scanner unit.  Some areas of FDG  accumulation may be described in standardized uptake value (SUV)  units.      CODING:  Initial Treatment Strategy (PI)      CALIBRATION:  Dose Injection-to-Scan Interval (mins): 62 min  Mediastinal bloodpool SUV (normal 1.5-2.5): 2.4  Blood glucose: 106 mg/dL      FINDINGS:  HEAD AND NECK:  * No evidence of focal hypermetabolic lesion in the brain parenchyma,  noting that evaluation is limited because of the expected physiologic  diffuse FDG uptake in the brain. * No evidence of paranasal sinus  disease *Grossly symmetric FDG uptake in bilateral palatine tonsils,  likely reactive *No FDG avid cervical lymphadenopathy is present.  *Unremarkable thyroid gland          CHEST:  * No concerning pulmonary nodule  * No FDG mediastinal, hilar or axillary lymphadenopathy.      ABDOMEN AND PELVIS:  * Bulky left inguinal lymphadenopathy measuring 4.3 cm with a max SUV  of 9.4 and a multilobulated left external iliac lymph node measuring  2.2 cm with an SUV of 8.2. * Physiologic radiotracer uptake  is  present in the liver and spleen with excretion into the bowel loops  and the genitourinary tract.Spleen is enlarged measuring 17.2 cm  craniocaudally. * Prostate gland is enlarged measuring 8.2 cm in the  transverse plane. *A 1.8 cm calculus within the bladder.  *There is a cystic lesion in the left kidney measuring 6.9 cm.  Additional hypodense lesion in the right kidney measuring 1.6 cm can  not be completely characterized on the current exam *Unremarkable  bilateral adrenal glands      MUSCULOSKELETAL/EXTREMITIES:  * No concerning FDG avid bone lesion throughout axial and  appendicular skeleton to suggest osseous metastasis. *There are  multiple left gluteal region subcutaneous nodular soft tissue  densities with FDG avidity. For example, a 2.6 cm lesion with a max  SUV of 6.7, a 1.7 cm lesion with a max SUV of 5.4 and a 0.8 cm lesion  with a max SUV of 2.4.      IMPRESSION:  1. Multiple FDG avid subcutaneous nodular soft tissue densities  within the left gluteal region, likely representing primary melanoma.  2. FDG avid bulky left external iliac and inguinal nodes,  corresponding to biopsy-proven darren metastases.  3. No PET evidence of distant metastasis.  4. Prostatomegaly and splenomegaly.    _____________________________________________________________________________________________________________    CT abdomen pelvis wo IV contrast  Interpreted By:  Javon El,   STUDY:  CT ABDOMEN PELVIS WO IV CONTRAST; 10/2/2023 2:48 pm      INDICATION:  Signs/Symptoms:2w diarrhea, abdominal distention, r/o SBO or colitis.      COMPARISON:  None.      ACCESSION NUMBER(S):  [id number]      ORDERING CLINICIAN:  GOVIND HOPKINS      TECHNIQUE:  Contiguous axial images were obtained at 3mm slice thickness through  the abdomen and pelvis without intravenous contrast administration.  Coronal and sagittal reconstructions at 3 mm slice thickness were  performed. Intravenous contrast was not given per the  referring  physician's request.      FINDINGS:  The study is limited by the lack of intravenous contrast.      LOWER CHEST:  Evaluation of the visualized lung bases demonstrates mild scarring  and/or atelectasis bilaterally. The heart is within normal limits for  size.      ABDOMEN:      LIVER:  The liver is within normal limits for appearance, without evidence of  focal masses.      BILE DUCTS:  No definite intra or extrahepatic biliary dilatation is identified.      GALLBLADDER:  The gallbladder is nondilated. No definite calcified gallstones are  seen.      PANCREAS:  The pancreas is within normal limits for appearance, without evidence  of focal masses.      SPLEEN:  The spleen is within normal limits for size. No focal splenic mass is  seen.      ADRENAL GLANDS:  No definite adrenal nodules or masses are seen bilaterally.      KIDNEYS AND URETERS:  There is no hydronephrosis, hydroureter or renal/ ureteral calculus  identified bilaterally. A rounded hypodensity is seen from the upper  pole of the left kidney, measuring at up to 7.9 cm in diameter. This  is incompletely evaluated without contrast, but most likely  represents a cyst.      PELVIS:      BLADDER:  The urinary bladder is grossly unremarkable for CT appearance.      REPRODUCTIVE ORGANS:  The prostate is enlarged, measuring at up to 8.0 x 7.6 cm in diameter.      BOWEL:  Multiple diverticuli are seen in the proximal sigmoid colon. The  colon and small bowel are within normal limits for course, caliber  and appearance, without evidence of wall thickening or obstruction.  The appendix is decompressed. No CT evidence of acute diverticulitis  or appendicitis is seen.      VESSELS:  Scattered atherosclerotic calcifications are seen throughout the  infrarenal abdominal aorta and iliac arteries. The abdominal aorta is  within normal limits for course, caliber and appearance, without  evidence of aneurysm.      PERITONEUM/RETROPERITONEUM/LYMPH NODES:  There is  no free intraperitoneal air or free fluid identified.  Enlarged lymph nodes are seen in the left external iliac chain,  measuring at up to 2.4 x 2.2 cm in diameter. No additional mesenteric  or retroperitoneal lymphadenopathy is identified.      BONE AND SOFT TISSUE:  There is no evidence of acute fracture identified. Enlarged lymph  nodes are seen in the left groin, measuring at up to 6.0 x 3.5 cm in  diameter. A prominent lymph node is seen in the right groin,  measuring at up to 2.7 x 1.6 cm no evidence of abdominal wall mass or  hernia is identified.      IMPRESSION:  1. Colonic diverticulosis, without evidence of acute diverticulitis.  2. Enlarged lymph nodes in the left external iliac chain, and  bilateral groin.  3. Enlargement of the prostate.  4. Hypodensity from the left kidney, incompletely evaluated without  contrast, but most likely representing is cyst.

## 2024-02-26 ENCOUNTER — TUMOR BOARD CONFERENCE (OUTPATIENT)
Dept: HEMATOLOGY/ONCOLOGY | Facility: HOSPITAL | Age: 74
End: 2024-02-26
Payer: COMMERCIAL

## 2024-02-27 ENCOUNTER — APPOINTMENT (OUTPATIENT)
Dept: RADIOLOGY | Facility: HOSPITAL | Age: 74
End: 2024-02-27
Payer: MEDICARE

## 2024-03-03 DIAGNOSIS — E78.00 PURE HYPERCHOLESTEROLEMIA: Primary | ICD-10-CM

## 2024-03-03 DIAGNOSIS — I10 HYPERTENSION, UNSPECIFIED TYPE: ICD-10-CM

## 2024-03-03 DIAGNOSIS — E87.6 HYPOKALEMIA: ICD-10-CM

## 2024-03-03 DIAGNOSIS — R52 GENERALIZED PAIN: ICD-10-CM

## 2024-03-03 DIAGNOSIS — I10 PRIMARY HYPERTENSION: ICD-10-CM

## 2024-03-04 RX ORDER — TRIAMTERENE/HYDROCHLOROTHIAZID 37.5-25 MG
TABLET ORAL
COMMUNITY
Start: 2017-11-10

## 2024-03-05 DIAGNOSIS — E11.40 TYPE 2 DIABETES MELLITUS WITH DIABETIC NEUROPATHY, WITH LONG-TERM CURRENT USE OF INSULIN (MULTI): Primary | ICD-10-CM

## 2024-03-05 DIAGNOSIS — Z79.4 TYPE 2 DIABETES MELLITUS WITH DIABETIC NEUROPATHY, WITH LONG-TERM CURRENT USE OF INSULIN (MULTI): Primary | ICD-10-CM

## 2024-03-05 RX ORDER — ATORVASTATIN CALCIUM 20 MG/1
20 TABLET, FILM COATED ORAL DAILY
Qty: 90 TABLET | Refills: 3 | Status: SHIPPED | OUTPATIENT
Start: 2024-03-05 | End: 2025-03-05

## 2024-03-05 RX ORDER — AMLODIPINE BESYLATE 10 MG/1
10 TABLET ORAL DAILY
Qty: 90 TABLET | Refills: 3 | Status: SHIPPED | OUTPATIENT
Start: 2024-03-05 | End: 2025-03-05

## 2024-03-05 RX ORDER — POTASSIUM CHLORIDE 750 MG/1
10 TABLET, FILM COATED, EXTENDED RELEASE ORAL DAILY
Qty: 90 TABLET | Refills: 3 | Status: SHIPPED | OUTPATIENT
Start: 2024-03-05 | End: 2025-03-05

## 2024-03-05 RX ORDER — DULOXETIN HYDROCHLORIDE 20 MG/1
20 CAPSULE, DELAYED RELEASE ORAL DAILY
Qty: 90 CAPSULE | Refills: 3 | Status: SHIPPED | OUTPATIENT
Start: 2024-03-05 | End: 2024-04-30 | Stop reason: SDUPTHER

## 2024-03-05 RX ORDER — METOPROLOL SUCCINATE 25 MG/1
25 TABLET, EXTENDED RELEASE ORAL DAILY
Qty: 90 TABLET | Refills: 3 | Status: SHIPPED | OUTPATIENT
Start: 2024-03-05 | End: 2025-03-05

## 2024-03-07 ENCOUNTER — OFFICE VISIT (OUTPATIENT)
Dept: HEMATOLOGY/ONCOLOGY | Facility: CLINIC | Age: 74
End: 2024-03-07
Payer: MEDICARE

## 2024-03-07 VITALS
SYSTOLIC BLOOD PRESSURE: 145 MMHG | TEMPERATURE: 97.2 F | HEIGHT: 73 IN | RESPIRATION RATE: 18 BRPM | HEART RATE: 70 BPM | OXYGEN SATURATION: 98 % | BODY MASS INDEX: 30.69 KG/M2 | WEIGHT: 231.59 LBS | DIASTOLIC BLOOD PRESSURE: 73 MMHG

## 2024-03-07 DIAGNOSIS — C43.9 METASTATIC MELANOMA (MULTI): Primary | ICD-10-CM

## 2024-03-07 PROCEDURE — 3078F DIAST BP <80 MM HG: CPT | Performed by: INTERNAL MEDICINE

## 2024-03-07 PROCEDURE — 1036F TOBACCO NON-USER: CPT | Performed by: INTERNAL MEDICINE

## 2024-03-07 PROCEDURE — 4010F ACE/ARB THERAPY RXD/TAKEN: CPT | Performed by: INTERNAL MEDICINE

## 2024-03-07 PROCEDURE — 99215 OFFICE O/P EST HI 40 MIN: CPT | Performed by: INTERNAL MEDICINE

## 2024-03-07 PROCEDURE — 1125F AMNT PAIN NOTED PAIN PRSNT: CPT | Performed by: INTERNAL MEDICINE

## 2024-03-07 PROCEDURE — 1157F ADVNC CARE PLAN IN RCRD: CPT | Performed by: INTERNAL MEDICINE

## 2024-03-07 PROCEDURE — 3077F SYST BP >= 140 MM HG: CPT | Performed by: INTERNAL MEDICINE

## 2024-03-07 PROCEDURE — 1159F MED LIST DOCD IN RCRD: CPT | Performed by: INTERNAL MEDICINE

## 2024-03-07 PROCEDURE — 3008F BODY MASS INDEX DOCD: CPT | Performed by: INTERNAL MEDICINE

## 2024-03-07 RX ORDER — FAMOTIDINE 10 MG/ML
20 INJECTION INTRAVENOUS ONCE AS NEEDED
Status: CANCELLED | OUTPATIENT
Start: 2024-04-08

## 2024-03-07 RX ORDER — PROCHLORPERAZINE MALEATE 5 MG
10 TABLET ORAL EVERY 6 HOURS PRN
Status: CANCELLED | OUTPATIENT
Start: 2024-04-08

## 2024-03-07 RX ORDER — DIPHENHYDRAMINE HYDROCHLORIDE 50 MG/ML
50 INJECTION INTRAMUSCULAR; INTRAVENOUS AS NEEDED
Status: CANCELLED | OUTPATIENT
Start: 2024-03-18

## 2024-03-07 RX ORDER — ALBUTEROL SULFATE 0.83 MG/ML
3 SOLUTION RESPIRATORY (INHALATION) AS NEEDED
Status: CANCELLED | OUTPATIENT
Start: 2024-04-29

## 2024-03-07 RX ORDER — FAMOTIDINE 10 MG/ML
20 INJECTION INTRAVENOUS ONCE AS NEEDED
Status: CANCELLED | OUTPATIENT
Start: 2024-03-18

## 2024-03-07 RX ORDER — EPINEPHRINE 0.3 MG/.3ML
0.3 INJECTION SUBCUTANEOUS EVERY 5 MIN PRN
Status: CANCELLED | OUTPATIENT
Start: 2024-04-29

## 2024-03-07 RX ORDER — PROCHLORPERAZINE EDISYLATE 5 MG/ML
10 INJECTION INTRAMUSCULAR; INTRAVENOUS EVERY 6 HOURS PRN
Status: CANCELLED | OUTPATIENT
Start: 2024-04-08

## 2024-03-07 RX ORDER — EPINEPHRINE 0.3 MG/.3ML
0.3 INJECTION SUBCUTANEOUS EVERY 5 MIN PRN
Status: CANCELLED | OUTPATIENT
Start: 2024-03-18

## 2024-03-07 RX ORDER — PROCHLORPERAZINE MALEATE 5 MG
10 TABLET ORAL EVERY 6 HOURS PRN
Status: CANCELLED | OUTPATIENT
Start: 2024-04-29

## 2024-03-07 RX ORDER — EPINEPHRINE 0.3 MG/.3ML
0.3 INJECTION SUBCUTANEOUS EVERY 5 MIN PRN
Status: CANCELLED | OUTPATIENT
Start: 2024-04-08

## 2024-03-07 RX ORDER — DIPHENHYDRAMINE HYDROCHLORIDE 50 MG/ML
50 INJECTION INTRAMUSCULAR; INTRAVENOUS AS NEEDED
Status: CANCELLED | OUTPATIENT
Start: 2024-04-29

## 2024-03-07 RX ORDER — PROCHLORPERAZINE EDISYLATE 5 MG/ML
10 INJECTION INTRAMUSCULAR; INTRAVENOUS EVERY 6 HOURS PRN
Status: CANCELLED | OUTPATIENT
Start: 2024-04-29

## 2024-03-07 RX ORDER — ALBUTEROL SULFATE 0.83 MG/ML
3 SOLUTION RESPIRATORY (INHALATION) AS NEEDED
Status: CANCELLED | OUTPATIENT
Start: 2024-03-18

## 2024-03-07 RX ORDER — ALBUTEROL SULFATE 0.83 MG/ML
3 SOLUTION RESPIRATORY (INHALATION) AS NEEDED
Status: CANCELLED | OUTPATIENT
Start: 2024-04-08

## 2024-03-07 RX ORDER — DIPHENHYDRAMINE HYDROCHLORIDE 50 MG/ML
50 INJECTION INTRAMUSCULAR; INTRAVENOUS AS NEEDED
Status: CANCELLED | OUTPATIENT
Start: 2024-04-08

## 2024-03-07 RX ORDER — FAMOTIDINE 10 MG/ML
20 INJECTION INTRAVENOUS ONCE AS NEEDED
Status: CANCELLED | OUTPATIENT
Start: 2024-04-29

## 2024-03-07 RX ORDER — PROCHLORPERAZINE MALEATE 10 MG
10 TABLET ORAL EVERY 6 HOURS PRN
Qty: 30 TABLET | Refills: 5 | Status: SHIPPED | OUTPATIENT
Start: 2024-03-07

## 2024-03-07 RX ORDER — PROCHLORPERAZINE EDISYLATE 5 MG/ML
10 INJECTION INTRAMUSCULAR; INTRAVENOUS EVERY 6 HOURS PRN
Status: CANCELLED | OUTPATIENT
Start: 2024-03-18

## 2024-03-07 RX ORDER — PROCHLORPERAZINE MALEATE 5 MG
10 TABLET ORAL EVERY 6 HOURS PRN
Status: CANCELLED | OUTPATIENT
Start: 2024-03-18

## 2024-03-07 ASSESSMENT — ENCOUNTER SYMPTOMS
DEPRESSION: 0
OCCASIONAL FEELINGS OF UNSTEADINESS: 0
LOSS OF SENSATION IN FEET: 0

## 2024-03-07 ASSESSMENT — PATIENT HEALTH QUESTIONNAIRE - PHQ9
2. FEELING DOWN, DEPRESSED OR HOPELESS: NOT AT ALL
1. LITTLE INTEREST OR PLEASURE IN DOING THINGS: NOT AT ALL
SUM OF ALL RESPONSES TO PHQ9 QUESTIONS 1 AND 2: 0

## 2024-03-07 ASSESSMENT — PAIN SCALES - GENERAL: PAINLEVEL: 4

## 2024-03-07 NOTE — PROGRESS NOTES
Patient ID: Chester Verduzco is a 74 y.o. male.  Referring Physician: Danish Springer MD  [id number] [place] [address]  Primary Care Provider: Jeffry De Leon MD  Visit Type:  Follow Up     Subjective    HPI  Mr. Chester Verduzco is a 73 year old male with PMHX HTN, HLD, Dual chamber PPM implanted in 11/1996 for SSS (RV is passive) with most recent generator replacement in 11/2020 ([facility]), Type 2 DM, gout, and recent hospitalization (10/2-10/7) for Staph bacteremia, diarrhea, NOHELIA , and hyponatremia 2/2 hypovolemia who presented to [facility] ED from a SNF on 11/13 for NOHELIA on labs from the SNF and oliguria. Blood cultures were positive for MSSA and previously had been positive 10/8 and 11/10 for MSSA. Blood cultures with NGTD as of 11/16.  FILIPE 11/16 showed tricuspid valve vegetation as well as right atrial lead vegetation. Patient has been afebrile without leukocytosis and hemodynamically stable. Device Interrogated 11/17 with AP 29% and  30%.  Lead extraction complete 11/22.  Patient on IV antibiotics with stop 4 weeks after lead removal (~12/20). After lead removal, on telemetry the patient had several episodes of intermittent asymptomatic high-grade AV block, NSVT,and bradycardia. EP plans to implant PPM after 14 days of negative blood culture post removal per, ID. Patient received a unit of packed red blood cells for low hemoglobin (11/25). Hemoglobin has remained stable with no signs or symptoms of bleeding. Home lasix and losartan were being held in setting of NOHELIA 2/2 to nephrotoxic antibiotic exposure which down trending but not at baseline with adequate urine output. Patient has maldonado I/s/o severe BPH. Of note, Patient noticed left groin lymphadenopathy and U/S (11/23) showed Enlarged left inguinal/external iliac lymph nodes. PET scan deferred at this time d/t active infectious process. Underwent core needle biopsy on 11/29. Patient to follow up biopsy  results with surgical oncology. Device re-implant (micra leadless pacemaker) 11/30, PICC placed 12/2. In discussing with ID on 12/2, and reviewing past ID notes, reimplant was done iso tricuspid vegetation with the assumption that patient was sterilized given Negative BC, lack of growth in vegetation size and clinical picture. Patient has outpatient follow up scheduled further and antibiotic use will be determined then.      Biopsy results confirming metastatic melanoma but because of patient's poor performance status no intervention is planned at this time.  Patient to return in 2 months for evaluation of performance status and to discuss with patient at that time if feasible immuno oncologic therapy.      Summary  Stage:  III     Assessment:  Enlarged lymph nodes seen on CT in the left external iliac chain, and bilateral groin.  A biopsy of the left inguinal lymph node with metastatic melanoma.  Negative for BRAF V600.     PET/CT with multiple FDG avid subcutaneous nodular soft tissue densities within the left gluteal region, likely representing primary melanoma.  FDG avid bulky left external iliac and inguinal nodes, corresponding to biopsy-proven darren metastases.  No PET evidence of distant metastasis.     Recommendation:  MRI brain pending.  Systemic therapy (with ipi/nivo).  Full skin exam to look for primary lesion.     Review Multidisciplinary Cutaneous Oncology Conference recommendation with patient.  Continue routine follow up and total body skin exams with Dermatology.     Follow Up:  Danish Springer, Dermatology, Landon Hilario        History and Physical Exam  Dermatologic History:   74 y.o. male with enlarged lymph nodes seen on CT on 10-2-2023 in the left external iliac chain, and bilateral groin.  A biopsy of the left inguinal lymph node on 11- showing metastatic melanoma.  The morphology and immunoprofile are consistent with metastatic melanoma.  Negative for BRAF V600.      PET/CT on 2- showed multiple FDG avid subcutaneous nodular soft tissue densities within the left gluteal region, likely representing primary melanoma.  FDG avid bulky left external iliac and inguinal nodes, corresponding to biopsy-proven darren metastases.  No PET evidence of distant metastasis.  Prostatomegaly and splenomegaly.  MRI brain pending.    Surgical Pathology Exam: [id number]  Collected 11/29/2023 12:28     A. INGUINAL LYMPH NODE BIOPSY LEFT:   --METASTATIC MELANOMA, SEE COMMENT     COMMENT: Routine and immunostained sections show sheets of single and nested atypical melanocytes with pleomorphic nuclei and focal intracytoplasmic melanin pigment intermixed with focal lymphocytic inflammation.  No definitive lymph node architecture is seen.  The melanocytes are diffusely positive for Melan-A and SOX10.     The morphology and immunoprofile are consistent with metastatic melanoma.  BRAF molecular studies have been ordered and results will be reported separately.  Correlation with clinical and radiologic studies is recommended.     Electronically signed by Rhina Tim MD on 12/5/2023 at 1052     BRAF Exon 15 Results  Not Detected.     INTERPRETATION: No variants are detected in the listed gene regions.  This finding does not exclude the presence of genetic alterations present in gene regions not tested or occurring at a frequency below the limit of detection.     DISEASE RELEVANT ALTERATIONS NOT DETECTED:   Negative for BRAF V600.    Review of Systems   Constitutional:         Patient presents in a wheelchair.   HENT:  Negative.     Respiratory: Negative.     Cardiovascular: Negative.       PET : IMPRESSION:  1. Multiple FDG avid subcutaneous nodular soft tissue densities within the left gluteal region, likely representing primary melanoma.  2. FDG avid bulky left external iliac and inguinal nodes, corresponding to biopsy-proven darren metastases.  3. No PET evidence of distant metastasis.  4.  Prostatomegaly and splenomegaly.  Objective   BSA: There is no height or weight on file to calculate BSA.  There were no vitals taken for this visit.     has a past medical history of Abnormal weight gain (10/27/2016), Achilles tendinitis, unspecified leg (08/16/2016), Acute upper respiratory infection, unspecified, NOHELIA (acute kidney injury) (CMS/HCC) (10/02/2023), Allergic contact dermatitis due to plants, except food (08/22/2013), Bell's palsy (03/21/2023), BPH with obstruction/lower urinary tract symptoms, Cellulitis of right lower limb (07/30/2021), Cough, unspecified (03/21/2016), Diabetes (CMS/[facility]), Encounter for screening for human immunodeficiency virus (HIV) (06/14/2016), Hordeolum externum unspecified eye, unspecified eyelid (08/14/2019), Hyperglycemia, unspecified (12/13/2017), Incisional hernia without obstruction or gangrene (06/15/2015), Knee joint pain, MSSA bacteremia (10/2023), Muscle spasm of calf (08/16/2016), Obstructive uropathy, Personal history of other diseases of the digestive system (06/30/2015), Personal history of other diseases of the digestive system (02/05/2016), Personal history of other diseases of the respiratory system (12/23/2014), Personal history of other diseases of the respiratory system (01/28/2016), Personal history of other infectious and parasitic diseases (12/02/2015), Personal history of other infectious and parasitic diseases, Personal history of other specified conditions (02/04/2015), Personal history of other specified conditions (08/16/2016), Personal history of other specified conditions (01/26/2015), Personal history of pneumonia (recurrent) (01/29/2016), Personal history of urinary (tract) infections (02/19/2013), Prostatitis (03/21/2023), Pruritus, unspecified (02/10/2015), Sinoatrial node dysfunction (CMS/[facility]), Strain of muscle, fascia and tendon of the posterior muscle group at thigh level, right thigh, initial encounter (05/10/2016), and Unspecified symptoms  and signs involving the genitourinary system (12/20/2016).   has a past surgical history that includes Varicose vein surgery (08/22/2013); Cardiac pacemaker placement (08/22/2013); Other surgical history (06/15/2015); Ventral hernia repair (06/15/2015); Other surgical history (09/16/2019); Peripherally inserted central catheter insertion; Cardiac electrophysiology procedure (Left, 11/22/2023); US guided biopsy lymph node superficial (11/29/2023); and Cardiac electrophysiology procedure (N/A, 11/30/2023).  No family history on file.  Oncology History    No history exists.       Chester Verduzco  reports that he has never smoked. He has never been exposed to tobacco smoke. He has never used smokeless tobacco.  He  reports that he does not currently use alcohol.  He  reports no history of drug use.    Physical Exam  Constitutional:       Appearance: Normal appearance.   HENT:      Head: Normocephalic and atraumatic.   Eyes:      Extraocular Movements: Extraocular movements intact.      Pupils: Pupils are equal, round, and reactive to light.   Neurological:      Mental Status: He is alert.         WBC   Date/Time Value Ref Range Status   12/03/2023 04:57 AM 3.7 (L) 4.4 - 11.3 x10*3/uL Final   12/02/2023 08:14 AM 4.0 (L) 4.4 - 11.3 x10*3/uL Final   12/01/2023 06:53 AM 4.3 (L) 4.4 - 11.3 x10*3/uL Final     nRBC   Date Value Ref Range Status   12/03/2023 0.0 0.0 - 0.0 /100 WBCs Final   12/02/2023 0.0 0.0 - 0.0 /100 WBCs Final   12/01/2023 0.0 0.0 - 0.0 /100 WBCs Final     RBC   Date Value Ref Range Status   12/03/2023 2.53 (L) 4.50 - 5.90 x10*6/uL Final   12/02/2023 2.72 (L) 4.50 - 5.90 x10*6/uL Final   12/01/2023 2.64 (L) 4.50 - 5.90 x10*6/uL Final     Hemoglobin   Date Value Ref Range Status   12/03/2023 7.0 (L) 13.5 - 17.5 g/dL Final   12/02/2023 8.0 (L) 13.5 - 17.5 g/dL Final   12/01/2023 7.6 (L) 13.5 - 17.5 g/dL Final     Hematocrit   Date Value Ref Range Status   12/03/2023 22.4 (L) 41.0 - 52.0 % Final   12/02/2023  24.2 (L) 41.0 - 52.0 % Final   12/01/2023 23.5 (L) 41.0 - 52.0 % Final     MCV   Date/Time Value Ref Range Status   12/03/2023 04:57 AM 89 80 - 100 fL Final   12/02/2023 08:14 AM 89 80 - 100 fL Final   12/01/2023 06:53 AM 89 80 - 100 fL Final     MCH   Date/Time Value Ref Range Status   12/03/2023 04:57 AM 27.7 26.0 - 34.0 pg Final   12/02/2023 08:14 AM 29.4 26.0 - 34.0 pg Final   12/01/2023 06:53 AM 28.8 26.0 - 34.0 pg Final     MCHC   Date/Time Value Ref Range Status   12/03/2023 04:57 AM 31.3 (L) 32.0 - 36.0 g/dL Final   12/02/2023 08:14 AM 33.1 32.0 - 36.0 g/dL Final   12/01/2023 06:53 AM 32.3 32.0 - 36.0 g/dL Final     RDW   Date/Time Value Ref Range Status   12/03/2023 04:57 AM 15.2 (H) 11.5 - 14.5 % Final   12/02/2023 08:14 AM 15.4 (H) 11.5 - 14.5 % Final   12/01/2023 06:53 AM 15.3 (H) 11.5 - 14.5 % Final     Platelets   Date/Time Value Ref Range Status   12/03/2023 04:57 AM 90 (L) 150 - 450 x10*3/uL Final   12/02/2023 08:14 AM 92 (L) 150 - 450 x10*3/uL Final   12/01/2023 06:53 AM 92 (L) 150 - 450 x10*3/uL Final     MPV   Date/Time Value Ref Range Status   10/07/2023 05:38 AM 9.2 7.5 - 11.5 fL Final   10/06/2023 05:47 AM 9.7 7.5 - 11.5 fL Final   10/05/2023 05:58 AM 10.2 7.5 - 11.5 fL Final     Neutrophils %   Date/Time Value Ref Range Status   12/03/2023 04:57 AM 61.9 40.0 - 80.0 % Final   12/02/2023 08:14 AM 59.8 40.0 - 80.0 % Final   12/01/2023 06:53 AM 67.3 40.0 - 80.0 % Final     Immature Granulocytes %, Automated   Date/Time Value Ref Range Status   12/03/2023 04:57 AM 0.8 0.0 - 0.9 % Final     Comment:     Immature Granulocyte Count (IG) includes promyelocytes, myelocytes and metamyelocytes but does not include bands. Percent differential counts (%) should be interpreted in the context of the absolute cell counts (cells/UL).   12/02/2023 08:14 AM 0.5 0.0 - 0.9 % Final     Comment:     Immature Granulocyte Count (IG) includes promyelocytes, myelocytes and metamyelocytes but does not include bands.  Percent differential counts (%) should be interpreted in the context of the absolute cell counts (cells/UL).   12/01/2023 06:53 AM 0.7 0.0 - 0.9 % Final     Comment:     Immature Granulocyte Count (IG) includes promyelocytes, myelocytes and metamyelocytes but does not include bands. Percent differential counts (%) should be interpreted in the context of the absolute cell counts (cells/UL).     Lymphocytes %   Date/Time Value Ref Range Status   12/03/2023 04:57 AM 24.8 13.0 - 44.0 % Final   12/02/2023 08:14 AM 27.1 13.0 - 44.0 % Final   12/01/2023 06:53 AM 20.0 13.0 - 44.0 % Final     Monocytes %   Date/Time Value Ref Range Status   12/03/2023 04:57 AM 9.5 2.0 - 10.0 % Final   12/02/2023 08:14 AM 8.8 2.0 - 10.0 % Final   12/01/2023 06:53 AM 8.7 2.0 - 10.0 % Final     Eosinophils %   Date/Time Value Ref Range Status   12/03/2023 04:57 AM 2.5 0.0 - 6.0 % Final   12/02/2023 08:14 AM 3.3 0.0 - 6.0 % Final   12/01/2023 06:53 AM 2.8 0.0 - 6.0 % Final     Basophils %   Date/Time Value Ref Range Status   12/03/2023 04:57 AM 0.5 0.0 - 2.0 % Final   12/02/2023 08:14 AM 0.5 0.0 - 2.0 % Final   12/01/2023 06:53 AM 0.5 0.0 - 2.0 % Final     Neutrophils Absolute   Date/Time Value Ref Range Status   12/03/2023 04:57 AM 2.27 1.60 - 5.50 x10*3/uL Final     Comment:     Percent differential counts (%) should be interpreted in the context of the absolute cell counts (cells/uL).   12/02/2023 08:14 AM 2.38 1.60 - 5.50 x10*3/uL Final     Comment:     Percent differential counts (%) should be interpreted in the context of the absolute cell counts (cells/uL).   12/01/2023 06:53 AM 2.87 1.60 - 5.50 x10*3/uL Final     Comment:     Percent differential counts (%) should be interpreted in the context of the absolute cell counts (cells/uL).     Immature Granulocytes Absolute, Automated   Date/Time Value Ref Range Status   12/03/2023 04:57 AM 0.03 0.00 - 0.50 x10*3/uL Final   12/02/2023 08:14 AM 0.02 0.00 - 0.50 x10*3/uL Final   12/01/2023 06:53  "AM 0.03 0.00 - 0.50 x10*3/uL Final     Lymphocytes Absolute   Date/Time Value Ref Range Status   12/03/2023 04:57 AM 0.91 0.80 - 3.00 x10*3/uL Final   12/02/2023 08:14 AM 1.08 0.80 - 3.00 x10*3/uL Final   12/01/2023 06:53 AM 0.85 0.80 - 3.00 x10*3/uL Final     Monocytes Absolute   Date/Time Value Ref Range Status   12/03/2023 04:57 AM 0.35 0.05 - 0.80 x10*3/uL Final   12/02/2023 08:14 AM 0.35 0.05 - 0.80 x10*3/uL Final   12/01/2023 06:53 AM 0.37 0.05 - 0.80 x10*3/uL Final     Eosinophils Absolute   Date/Time Value Ref Range Status   12/03/2023 04:57 AM 0.09 0.00 - 0.40 x10*3/uL Final   12/02/2023 08:14 AM 0.13 0.00 - 0.40 x10*3/uL Final   12/01/2023 06:53 AM 0.12 0.00 - 0.40 x10*3/uL Final     Basophils Absolute   Date/Time Value Ref Range Status   12/03/2023 04:57 AM 0.02 0.00 - 0.10 x10*3/uL Final   12/02/2023 08:14 AM 0.02 0.00 - 0.10 x10*3/uL Final   12/01/2023 06:53 AM 0.02 0.00 - 0.10 x10*3/uL Final       No components found for: \"PT\"  aPTT   Date/Time Value Ref Range Status   11/30/2023 07:14 AM 36 27 - 38 seconds Final   11/29/2023 10:10 AM 36 27 - 38 seconds Final   11/24/2023 04:53 AM 37 27 - 38 seconds Final       Assessment/Plan      Stage III\4 malignant melanoma.  Discussed the tumor board and recommendations will be followed.  Immunotherapy recommended.  Patient consented for Ipi Nivo.  Patient consented.  MRIs of the brain still pending.  Patient will be seen in 3 weeks.  Examination of his left buttock revealing 3 punctate lesions.  Most likely the source of the primary.  Will follow through with tumor board recommendations patient will be seen by Dr. Ted Cameron and Dr. Anderson.  He is consented however for ipilimumab and nivolumab therapy as per the recommendation by the tumor board as well.  Patient concurs with the management plan.    Diagnoses and all orders for this visit:  Metastatic melanoma (CMS/HCC)  -     Clinic Appointment Request Follow Up; [facility]  -     Clinic Appointment " Request; Future  -     Infusion Appointment Request; Future  -     CBC and Auto Differential; Future  -     Comprehensive metabolic panel; Future  -     Hepatitis B surface antigen; Future  -     Hepatitis B Core Antibody, Total; Future  -     Hepatitis B surface antibody; Future  -     Acth; Future  -     Amylase; Future  -     Cortisol Am; Future  -     Lactate dehydrogenase; Future  -     Lipase; Future  -     Tsh With Reflex To Free T4 If Abnormal; Future  -     Clinic Appointment Request; Future  -     Infusion Appointment Request; Future  -     CBC and Auto Differential; Future  -     Comprehensive metabolic panel; Future  -     Acth; Future  -     Amylase; Future  -     Cortisol Am; Future  -     Lactate dehydrogenase; Future  -     Lipase; Future  -     Tsh With Reflex To Free T4 If Abnormal; Future  -     Clinic Appointment Request; Future  -     Infusion Appointment Request; Future  -     CBC and Auto Differential; Future  -     Comprehensive metabolic panel; Future  -     Acth; Future  -     Amylase; Future  -     Cortisol Am; Future  -     Lactate dehydrogenase; Future  -     Lipase; Future  -     Tsh With Reflex To Free T4 If Abnormal; Future  -     prochlorperazine (Compazine) 10 mg tablet; Take 1 tablet (10 mg) by mouth every 6 hours if needed for nausea or vomiting.  Other orders  -     prochlorperazine (Compazine) tablet 10 mg  -     prochlorperazine (Compazine) injection 10 mg  -     nivolumab (Opdivo) 300 mg in sodium chloride 0.9% 130 mL IV  -     ipilimumab (Yervoy) 100 mg in sodium chloride 0.9% 67 mL IV  -     sodium chloride 0.9 % bolus 500 mL  -     dextrose 5 % in water (D5W) bolus  -     diphenhydrAMINE (BENADryl) injection 50 mg  -     methylPREDNISolone sod succinate (PF) (SOLU-Medrol) 40 mg/mL injection 40 mg  -     famotidine PF (Pepcid) injection 20 mg  -     EPINEPHrine (Epipen) injection syringe 0.3 mg  -     albuterol 2.5 mg /3 mL (0.083 %) nebulizer solution 3 mL  -      prochlorperazine (Compazine) tablet 10 mg  -     prochlorperazine (Compazine) injection 10 mg  -     nivolumab (Opdivo) 300 mg in sodium chloride 0.9% 130 mL IV  -     ipilimumab (Yervoy) 100 mg in sodium chloride 0.9% 67 mL IV  -     sodium chloride 0.9 % bolus 500 mL  -     dextrose 5 % in water (D5W) bolus  -     diphenhydrAMINE (BENADryl) injection 50 mg  -     methylPREDNISolone sod succinate (PF) (SOLU-Medrol) 40 mg/mL injection 40 mg  -     famotidine PF (Pepcid) injection 20 mg  -     EPINEPHrine (Epipen) injection syringe 0.3 mg  -     albuterol 2.5 mg /3 mL (0.083 %) nebulizer solution 3 mL  -     prochlorperazine (Compazine) tablet 10 mg  -     prochlorperazine (Compazine) injection 10 mg  -     nivolumab (Opdivo) 300 mg in sodium chloride 0.9% 130 mL IV  -     ipilimumab (Yervoy) 100 mg in sodium chloride 0.9% 67 mL IV  -     sodium chloride 0.9 % bolus 500 mL  -     dextrose 5 % in water (D5W) bolus  -     diphenhydrAMINE (BENADryl) injection 50 mg  -     methylPREDNISolone sod succinate (PF) (SOLU-Medrol) 40 mg/mL injection 40 mg  -     famotidine PF (Pepcid) injection 20 mg  -     EPINEPHrine (Epipen) injection syringe 0.3 mg  -     albuterol 2.5 mg /3 mL (0.083 %) nebulizer solution 3 mL           Danish Springer MD

## 2024-03-07 NOTE — PATIENT INSTRUCTIONS
Today you visited with your Oncologist.  Your recent labs were discussed and questions were answered.  Your current treatment regimen was discussed and the plan going forward was also discussed.  Scheduling orders were placed to reflect this conversation.  Please call our office for any questions that may arise after leaving today.   [phone]    Review your schedule upon leaving every infusion visit.  Remember you will no longer see lab visits noted separately on your schedule.    Please review all the handouts provided to you at today appointment. As well as your disease portfolio (black binder). Please watch the chemo class discussed at today's appointment, the directions for the video can be found on the handout.

## 2024-03-10 DIAGNOSIS — I10 PRIMARY HYPERTENSION: Primary | ICD-10-CM

## 2024-03-11 ENCOUNTER — LAB (OUTPATIENT)
Dept: LAB | Facility: LAB | Age: 74
End: 2024-03-11
Payer: MEDICARE

## 2024-03-11 DIAGNOSIS — E11.40 TYPE 2 DIABETES MELLITUS WITH DIABETIC NEUROPATHY, WITH LONG-TERM CURRENT USE OF INSULIN (MULTI): ICD-10-CM

## 2024-03-11 DIAGNOSIS — Z79.4 TYPE 2 DIABETES MELLITUS WITH DIABETIC NEUROPATHY, WITH LONG-TERM CURRENT USE OF INSULIN (MULTI): ICD-10-CM

## 2024-03-11 LAB
ALBUMIN SERPL BCP-MCNC: 3.9 G/DL (ref 3.4–5)
ALP SERPL-CCNC: 70 U/L (ref 33–136)
ALT SERPL W P-5'-P-CCNC: 13 U/L (ref 10–52)
ANION GAP SERPL CALC-SCNC: 16 MMOL/L (ref 10–20)
AST SERPL W P-5'-P-CCNC: 14 U/L (ref 9–39)
BILIRUB SERPL-MCNC: 0.5 MG/DL (ref 0–1.2)
BUN SERPL-MCNC: 22 MG/DL (ref 6–23)
CALCIUM SERPL-MCNC: 8.5 MG/DL (ref 8.6–10.3)
CHLORIDE SERPL-SCNC: 107 MMOL/L (ref 98–107)
CHOLEST SERPL-MCNC: 121 MG/DL (ref 0–199)
CHOLESTEROL/HDL RATIO: 3.5
CO2 SERPL-SCNC: 21 MMOL/L (ref 21–32)
CREAT SERPL-MCNC: 1.48 MG/DL (ref 0.5–1.3)
EGFRCR SERPLBLD CKD-EPI 2021: 50 ML/MIN/1.73M*2
GLUCOSE SERPL-MCNC: 80 MG/DL (ref 74–99)
HDLC SERPL-MCNC: 35 MG/DL
LDLC SERPL CALC-MCNC: 60 MG/DL
NON HDL CHOLESTEROL: 86 MG/DL (ref 0–149)
POTASSIUM SERPL-SCNC: 4 MMOL/L (ref 3.5–5.3)
PROT SERPL-MCNC: 6.4 G/DL (ref 6.4–8.2)
SODIUM SERPL-SCNC: 140 MMOL/L (ref 136–145)
TRIGL SERPL-MCNC: 128 MG/DL (ref 0–149)
VLDL: 26 MG/DL (ref 0–40)

## 2024-03-11 PROCEDURE — 80061 LIPID PANEL: CPT

## 2024-03-11 PROCEDURE — 80053 COMPREHEN METABOLIC PANEL: CPT

## 2024-03-11 PROCEDURE — 83036 HEMOGLOBIN GLYCOSYLATED A1C: CPT

## 2024-03-11 RX ORDER — HEPARIN 100 UNIT/ML
500 SYRINGE INTRAVENOUS AS NEEDED
Status: CANCELLED | OUTPATIENT
Start: 2024-03-11

## 2024-03-11 RX ORDER — HYDRALAZINE HYDROCHLORIDE 10 MG/1
10 TABLET, FILM COATED ORAL 3 TIMES DAILY
Qty: 270 TABLET | Refills: 3 | Status: SHIPPED | OUTPATIENT
Start: 2024-03-11 | End: 2024-05-24 | Stop reason: ALTCHOICE

## 2024-03-11 RX ORDER — HEPARIN SODIUM,PORCINE/PF 10 UNIT/ML
50 SYRINGE (ML) INTRAVENOUS AS NEEDED
Status: CANCELLED | OUTPATIENT
Start: 2024-03-11

## 2024-03-11 ASSESSMENT — ENCOUNTER SYMPTOMS
RESPIRATORY NEGATIVE: 1
CARDIOVASCULAR NEGATIVE: 1

## 2024-03-12 ENCOUNTER — HOSPITAL ENCOUNTER (OUTPATIENT)
Dept: RADIOLOGY | Facility: HOSPITAL | Age: 74
Discharge: HOME | End: 2024-03-12
Payer: MEDICARE

## 2024-03-12 ENCOUNTER — HOSPITAL ENCOUNTER (OUTPATIENT)
Dept: CARDIOLOGY | Facility: HOSPITAL | Age: 74
Discharge: HOME | End: 2024-03-12
Payer: MEDICARE

## 2024-03-12 VITALS
SYSTOLIC BLOOD PRESSURE: 133 MMHG | DIASTOLIC BLOOD PRESSURE: 76 MMHG | HEART RATE: 62 BPM | OXYGEN SATURATION: 100 % | RESPIRATION RATE: 17 BRPM

## 2024-03-12 DIAGNOSIS — C43.9 MALIGNANT MELANOMA OF SKIN, UNSPECIFIED (MULTI): ICD-10-CM

## 2024-03-12 DIAGNOSIS — C43.9 METASTATIC MELANOMA (MULTI): ICD-10-CM

## 2024-03-12 LAB
EST. AVERAGE GLUCOSE BLD GHB EST-MCNC: 105 MG/DL
HBA1C MFR BLD: 5.3 %

## 2024-03-12 PROCEDURE — 93286 PERI-PX EVAL PM/LDLS PM IP: CPT | Performed by: INTERNAL MEDICINE

## 2024-03-12 PROCEDURE — 2550000001 HC RX 255 CONTRASTS: Performed by: INTERNAL MEDICINE

## 2024-03-12 PROCEDURE — 93286 PERI-PX EVAL PM/LDLS PM IP: CPT

## 2024-03-12 PROCEDURE — 70553 MRI BRAIN STEM W/O & W/DYE: CPT

## 2024-03-12 PROCEDURE — 70553 MRI BRAIN STEM W/O & W/DYE: CPT | Performed by: STUDENT IN AN ORGANIZED HEALTH CARE EDUCATION/TRAINING PROGRAM

## 2024-03-12 PROCEDURE — A9575 INJ GADOTERATE MEGLUMI 0.1ML: HCPCS | Performed by: INTERNAL MEDICINE

## 2024-03-12 RX ORDER — GADOTERATE MEGLUMINE 376.9 MG/ML
20 INJECTION INTRAVENOUS
Status: COMPLETED | OUTPATIENT
Start: 2024-03-12 | End: 2024-03-12

## 2024-03-12 RX ADMIN — GADOTERATE MEGLUMINE 20 ML: 376.9 INJECTION INTRAVENOUS at 13:20

## 2024-03-12 NOTE — NURSING NOTE
The clinical device nurse arrived checked patient's pacemaker and turn pacing back on, per device nurse. LORA

## 2024-03-12 NOTE — NURSING NOTE
Patient completed MRI without any difficulties and is presently waiting on the clinical device nurse to check his pacemaker and turn pacing back on. LORA

## 2024-03-12 NOTE — NURSING NOTE
The device clinical nurse arrived checked patient's pacemaker, interrogated it and turn pacing off, per device clinical nurse. Patient voiced no c/o pain or discomfort and no signs of distress noted. LORA

## 2024-03-12 NOTE — NURSING NOTE
Patient is alert and able to make needs known; has a pacemaker and is presently waiting on the device clinical nurse to check his pacemaker and set it into MRI safe mode for MRI. LORA

## 2024-03-15 ENCOUNTER — TELEMEDICINE (OUTPATIENT)
Dept: SURGICAL ONCOLOGY | Facility: HOSPITAL | Age: 74
End: 2024-03-15
Payer: MEDICARE

## 2024-03-15 DIAGNOSIS — C43.9 METASTATIC MELANOMA (MULTI): Primary | ICD-10-CM

## 2024-03-15 DIAGNOSIS — R59.0 LYMPHADENOPATHY, INGUINAL: ICD-10-CM

## 2024-03-15 PROCEDURE — 1159F MED LIST DOCD IN RCRD: CPT | Performed by: SURGERY

## 2024-03-15 PROCEDURE — 99443 PR PHYS/QHP TELEPHONE EVALUATION 21-30 MIN: CPT | Performed by: SURGERY

## 2024-03-15 PROCEDURE — 4010F ACE/ARB THERAPY RXD/TAKEN: CPT | Performed by: SURGERY

## 2024-03-15 PROCEDURE — 3008F BODY MASS INDEX DOCD: CPT | Performed by: SURGERY

## 2024-03-15 PROCEDURE — 1036F TOBACCO NON-USER: CPT | Performed by: SURGERY

## 2024-03-15 PROCEDURE — 3048F LDL-C <100 MG/DL: CPT | Performed by: SURGERY

## 2024-03-15 PROCEDURE — 1157F ADVNC CARE PLAN IN RCRD: CPT | Performed by: SURGERY

## 2024-03-15 PROCEDURE — 3044F HG A1C LEVEL LT 7.0%: CPT | Performed by: SURGERY

## 2024-03-15 NOTE — PROGRESS NOTES
Virtual History and Physical    Referring Provider:  Wes Springer MD, Bhupendar, MD Matthew B Pawlicki, MD    Chief Complaint:  Advanced unresectable stage III melanoma    History of Present Illness:  This is a 74 y.o. male who presents with HTN, HLD, Dual chamber PPM implanted in 11/1996  Type 2 DM, gout, and recent hospitalization (10/2-10/7) for Staph bacteremia, diarrhea, NOHELIA now presenting with advanced unresectable stage III melanoma. Mobility limited outside of home because of instability. Manages at home.   Identified Inguinal mass - Biopsy revealed melanoma. Saw Dr. Springer. PET/CT showed left inguinal and iliac darren disease as well as 3 left buttock satellite lesions. No reported history of skin lesions.   Virtual call with friend Mile who helps care for him. She is a long term care provider     Past Medical History:  Past Medical History:  10/27/2016: Abnormal weight gain      Comment:  Abnormal weight gain  08/16/2016: Achilles tendinitis, unspecified leg      Comment:  Achilles tendinitis  No date: Acute upper respiratory infection, unspecified      Comment:  Acute URI  10/02/2023: NOHELIA (acute kidney injury) (CMS/[facility])      Comment:  10/2023-11/2023 Following vancomycin in setting of                bacteremia.  08/22/2013: Allergic contact dermatitis due to plants, except food      Comment:  Contact dermatitis due to poison ivy  03/21/2023: Bell's palsy  No date: BPH with obstruction/lower urinary tract symptoms  07/30/2021: Cellulitis of right lower limb      Comment:  Cellulitis of right lower extremity without foot  03/21/2016: Cough, unspecified      Comment:  Cough  No date: Diabetes (CMS/[facility])  06/14/2016: Encounter for screening for human immunodeficiency virus   (HIV)      Comment:  Screening for HIV (human immunodeficiency virus)  08/14/2019: Hordeolum externum unspecified eye, unspecified eyelid      Comment:  Stye  12/13/2017: Hyperglycemia, unspecified      Comment:  Chronic  hyperglycemia  06/15/2015: Incisional hernia without obstruction or gangrene      Comment:  Recurrent ventral hernia  No date: Knee joint pain      Comment:  Right knee- bone on bone  10/2023: MSSA bacteremia  08/16/2016: Muscle spasm of calf      Comment:  Muscle spasm of calf  No date: Obstructive uropathy  06/30/2015: Personal history of other diseases of the digestive system      Comment:  History of umbilical hernia  02/05/2016: Personal history of other diseases of the digestive system      Comment:  History of ventral hernia  12/23/2014: Personal history of other diseases of the respiratory   system      Comment:  History of acute sinusitis  01/28/2016: Personal history of other diseases of the respiratory   system      Comment:  History of upper respiratory infection  12/02/2015: Personal history of other infectious and parasitic   diseases      Comment:  History of hepatitis B virus infection  No date: Personal history of other infectious and parasitic diseases      Comment:  History of hepatitis B virus infection  02/04/2015: Personal history of other specified conditions      Comment:  History of nausea and vomiting  08/16/2016: Personal history of other specified conditions      Comment:  History of edema  01/26/2015: Personal history of other specified conditions      Comment:  History of jaundice  01/29/2016: Personal history of pneumonia (recurrent)      Comment:  History of pneumonia  02/19/2013: Personal history of urinary (tract) infections      Comment:  History of urinary tract infection  03/21/2023: Prostatitis  02/10/2015: Pruritus, unspecified      Comment:  Pruritus  No date: Sinoatrial node dysfunction (CMS/HCC)  05/10/2016: Strain of muscle, fascia and tendon of the posterior   muscle group at thigh level, right thigh, initial encounter      Comment:  Tear of right hamstring  12/20/2016: Unspecified symptoms and signs involving the   genitourinary system      Comment:  UTI symptoms     Past  Surgical History:  Past Surgical History:  11/22/2023: CARDIAC ELECTROPHYSIOLOGY PROCEDURE; Left      Comment:  Procedure: PPM Lead Extraction;  Surgeon: Etienne Aguilar MD;  [facility];  Service:                Electrophysiology;  Laterality: Left;  11/30/2023: CARDIAC ELECTROPHYSIOLOGY PROCEDURE; N/A      Comment:  Procedure: PPM Leadless Implant (MICRA AV);  Surgeon:                Etienne Aguilar MD;  Location: [facility]29 Cardiac Cath                Lab;  Service: Electrophysiology;  Laterality: N/A;  08/22/2013: CARDIAC PACEMAKER PLACEMENT      Comment:  Pacemaker Permanent Placement  06/15/2015: OTHER SURGICAL HISTORY      Comment:  Ventral Hernia Repair - Recurrent  09/16/2019: OTHER SURGICAL HISTORY      Comment:  Prostate biopsy  No date: PERIPHERALLY INSERTED CENTRAL CATHETER INSERTION  11/29/2023: US GUIDED BIOPSY LYMPH NODE SUPERFICIAL      Comment:  US GUIDED BIOPSY LYMPH NODE SUPERFICIAL 11/29/2023 Mao Dubose MD [facility]  08/22/2013: VARICOSE VEIN SURGERY      Comment:  Varicose Vein Ligation  06/15/2015: VENTRAL HERNIA REPAIR      Comment:  Ventral Hernia Repair     Medications:  Current Outpatient Medications   Medication Instructions    acetaminophen (TYLENOL) 650 mg, oral, Every 6 hours PRN    allopurinol (ZYLOPRIM) 100 mg, oral, Daily    alteplase (Cathflo Activase) 2 mg injection intra-catheter    amLODIPine (NORVASC) 10 mg, oral, Daily    atorvastatin (LIPITOR) 20 mg, oral, Daily    DULoxetine (CYMBALTA) 20 mg, oral, Daily    Eliquis 2.5 mg, oral, Daily    furosemide (LASIX) 20 mg, oral, Daily    glucagon 1 mg injection intramuscular    hydrALAZINE (APRESOLINE) 10 mg, oral, 3 times daily, Hold for SBP <120    losartan (COZAAR) 100 mg, oral, Daily    melatonin 5 mg, oral, Nightly    metoprolol succinate XL (TOPROL-XL) 25 mg, oral, Daily    polyethylene glycol (GLYCOLAX, MIRALAX) 17 g, oral, Daily    potassium chloride CR 10 mEq ER tablet  "10 mEq, oral, Daily    prochlorperazine (COMPAZINE) 10 mg, oral, Every 6 hours PRN    sennosides (SENOKOT) 8.6 mg, oral, Nightly    tamsulosin (FLOMAX) 0.8 mg, oral, Nightly    triamterene-hydrochlorothiazid (Maxzide-25) 37.5-25 mg tablet oral        Allergies:  Allergies   Allergen Reactions    Bupropion Other     Patient stated medication made him  very irritable and violent    Keflex [Cephalexin] Headache, GI Upset and Nausea/vomiting        Family History:  family history is not on file.     Social History:   reports that he has never smoked. He has never been exposed to tobacco smoke. He has never used smokeless tobacco. He reports that he does not currently use alcohol. He reports that he does not use drugs.     Review of Systems:  A complete 12 point review of systems was performed and is negative except as noted in the history of present illness.    Unable to perform Physical Exam during virtual visit    Laboratory Values:  Lab Results   Component Value Date    WBC 3.7 (L) 12/03/2023    HGB 7.0 (L) 12/03/2023    HCT 22.4 (L) 12/03/2023    MCV 89 12/03/2023    PLT 90 (L) 12/03/2023        Chemistry    Lab Results   Component Value Date/Time     03/11/2024 1000    K 4.0 03/11/2024 1000     03/11/2024 1000    CO2 21 03/11/2024 1000    BUN 22 03/11/2024 1000    CREATININE 1.48 (H) 03/11/2024 1000    Lab Results   Component Value Date/Time    CALCIUM 8.5 (L) 03/11/2024 1000    ALKPHOS 70 03/11/2024 1000    AST 14 03/11/2024 1000    ALT 13 03/11/2024 1000    BILITOT 0.5 03/11/2024 1000           No results found for: \"PR1\"      Imaging:  I have personally reviewed the images and the radiologist's report.  PET and Brain mri reviewed. Findings as noted above    Assessment:  This is a 74 y.o. male with multiple medical comorbidities presenting with clinically stage III left inguinal and iliac nodes as well as satellite subcutaneous lesions in the left buttock.     Plan:  -- Continue with Medical Oncology " for Ipi/Nivo due to advanced unresectable disease  -- Will need follow up imaging in 3 months (CT CAP) to assess response vs progression. (Can be ordered by Medical Oncology at the appropriate time after initiating systemic therapy)  -- Referral to Dermatology for total body skin exams.   -- I will see the patient after his next set of imaging.  --The patient asked very appropriate questions that were answered to the best of my ability with the current information at hand. He knows to call with any questions or concerns that arise    Ted Cameron MD, MPH

## 2024-03-17 DIAGNOSIS — E11.42 DIABETIC POLYNEUROPATHY ASSOCIATED WITH TYPE 2 DIABETES MELLITUS (MULTI): Primary | ICD-10-CM

## 2024-03-17 RX ORDER — PREGABALIN 50 MG/1
50 CAPSULE ORAL 3 TIMES DAILY
Qty: 90 CAPSULE | Refills: 5 | Status: SHIPPED | OUTPATIENT
Start: 2024-03-17 | End: 2024-03-25 | Stop reason: SDUPTHER

## 2024-03-18 ENCOUNTER — SOCIAL WORK (OUTPATIENT)
Dept: CASE MANAGEMENT | Facility: HOSPITAL | Age: 74
End: 2024-03-18

## 2024-03-18 ENCOUNTER — INFUSION (OUTPATIENT)
Dept: HEMATOLOGY/ONCOLOGY | Facility: CLINIC | Age: 74
End: 2024-03-18
Payer: MEDICARE

## 2024-03-18 ENCOUNTER — NUTRITION (OUTPATIENT)
Dept: HEMATOLOGY/ONCOLOGY | Facility: CLINIC | Age: 74
End: 2024-03-18
Payer: COMMERCIAL

## 2024-03-18 ENCOUNTER — OFFICE VISIT (OUTPATIENT)
Dept: HEMATOLOGY/ONCOLOGY | Facility: CLINIC | Age: 74
End: 2024-03-18
Payer: MEDICARE

## 2024-03-18 VITALS — BODY MASS INDEX: 30.81 KG/M2 | WEIGHT: 232.47 LBS | HEIGHT: 73 IN

## 2024-03-18 VITALS
WEIGHT: 232.47 LBS | BODY MASS INDEX: 30.48 KG/M2 | TEMPERATURE: 97.2 F | DIASTOLIC BLOOD PRESSURE: 70 MMHG | RESPIRATION RATE: 18 BRPM | SYSTOLIC BLOOD PRESSURE: 156 MMHG | OXYGEN SATURATION: 98 % | HEART RATE: 82 BPM

## 2024-03-18 DIAGNOSIS — C43.9 METASTATIC MELANOMA (MULTI): Primary | ICD-10-CM

## 2024-03-18 DIAGNOSIS — C43.9 METASTATIC MELANOMA (MULTI): ICD-10-CM

## 2024-03-18 LAB
ALBUMIN SERPL BCP-MCNC: 3.7 G/DL (ref 3.4–5)
ALP SERPL-CCNC: 70 U/L (ref 33–136)
ALT SERPL W P-5'-P-CCNC: 12 U/L (ref 10–52)
AMYLASE SERPL-CCNC: 35 U/L (ref 29–103)
ANION GAP SERPL CALC-SCNC: 14 MMOL/L (ref 10–20)
AST SERPL W P-5'-P-CCNC: 14 U/L (ref 9–39)
BASOPHILS # BLD AUTO: 0.01 X10*3/UL (ref 0–0.1)
BASOPHILS NFR BLD AUTO: 0.2 %
BILIRUB SERPL-MCNC: 0.5 MG/DL (ref 0–1.2)
BUN SERPL-MCNC: 24 MG/DL (ref 6–23)
CALCIUM SERPL-MCNC: 8.6 MG/DL (ref 8.6–10.3)
CHLORIDE SERPL-SCNC: 109 MMOL/L (ref 98–107)
CO2 SERPL-SCNC: 21 MMOL/L (ref 21–32)
CORTIS AM PEAK SERPL-MSCNC: 18.9 UG/DL (ref 5–20)
CREAT SERPL-MCNC: 1.8 MG/DL (ref 0.5–1.3)
EGFRCR SERPLBLD CKD-EPI 2021: 39 ML/MIN/1.73M*2
EOSINOPHIL # BLD AUTO: 0.17 X10*3/UL (ref 0–0.4)
EOSINOPHIL NFR BLD AUTO: 3.1 %
ERYTHROCYTE [DISTWIDTH] IN BLOOD BY AUTOMATED COUNT: 13.7 % (ref 11.5–14.5)
GLUCOSE SERPL-MCNC: 143 MG/DL (ref 74–99)
HBV CORE AB SER QL: REACTIVE
HBV SURFACE AB SER-ACNC: 6.1 MIU/ML
HBV SURFACE AG SERPL QL IA: NONREACTIVE
HCT VFR BLD AUTO: 27.8 % (ref 41–52)
HGB BLD-MCNC: 9.4 G/DL (ref 13.5–17.5)
IMM GRANULOCYTES # BLD AUTO: 0.01 X10*3/UL (ref 0–0.5)
IMM GRANULOCYTES NFR BLD AUTO: 0.2 % (ref 0–0.9)
LDH SERPL L TO P-CCNC: 172 U/L (ref 84–246)
LIPASE SERPL-CCNC: 13 U/L (ref 9–82)
LYMPHOCYTES # BLD AUTO: 1.06 X10*3/UL (ref 0.8–3)
LYMPHOCYTES NFR BLD AUTO: 19.6 %
MCH RBC QN AUTO: 30.6 PG (ref 26–34)
MCHC RBC AUTO-ENTMCNC: 33.8 G/DL (ref 32–36)
MCV RBC AUTO: 91 FL (ref 80–100)
MONOCYTES # BLD AUTO: 0.43 X10*3/UL (ref 0.05–0.8)
MONOCYTES NFR BLD AUTO: 7.9 %
NEUTROPHILS # BLD AUTO: 3.74 X10*3/UL (ref 1.6–5.5)
NEUTROPHILS NFR BLD AUTO: 69 %
PLATELET # BLD AUTO: 118 X10*3/UL (ref 150–450)
POTASSIUM SERPL-SCNC: 3.9 MMOL/L (ref 3.5–5.3)
PROT SERPL-MCNC: 6.8 G/DL (ref 6.4–8.2)
RBC # BLD AUTO: 3.07 X10*6/UL (ref 4.5–5.9)
SODIUM SERPL-SCNC: 140 MMOL/L (ref 136–145)
T4 FREE SERPL-MCNC: 0.71 NG/DL (ref 0.61–1.12)
TSH SERPL-ACNC: 4.33 MIU/L (ref 0.44–3.98)
WBC # BLD AUTO: 5.4 X10*3/UL (ref 4.4–11.3)

## 2024-03-18 PROCEDURE — 83690 ASSAY OF LIPASE: CPT | Performed by: INTERNAL MEDICINE

## 2024-03-18 PROCEDURE — 3077F SYST BP >= 140 MM HG: CPT | Performed by: INTERNAL MEDICINE

## 2024-03-18 PROCEDURE — 99214 OFFICE O/P EST MOD 30 MIN: CPT | Performed by: INTERNAL MEDICINE

## 2024-03-18 PROCEDURE — 3078F DIAST BP <80 MM HG: CPT | Performed by: INTERNAL MEDICINE

## 2024-03-18 PROCEDURE — 3008F BODY MASS INDEX DOCD: CPT | Performed by: INTERNAL MEDICINE

## 2024-03-18 PROCEDURE — 3048F LDL-C <100 MG/DL: CPT | Performed by: INTERNAL MEDICINE

## 2024-03-18 PROCEDURE — 96413 CHEMO IV INFUSION 1 HR: CPT

## 2024-03-18 PROCEDURE — 82533 TOTAL CORTISOL: CPT | Mod: GEALAB | Performed by: INTERNAL MEDICINE

## 2024-03-18 PROCEDURE — 1159F MED LIST DOCD IN RCRD: CPT | Performed by: INTERNAL MEDICINE

## 2024-03-18 PROCEDURE — 1125F AMNT PAIN NOTED PAIN PRSNT: CPT | Performed by: INTERNAL MEDICINE

## 2024-03-18 PROCEDURE — 4010F ACE/ARB THERAPY RXD/TAKEN: CPT | Performed by: INTERNAL MEDICINE

## 2024-03-18 PROCEDURE — 86706 HEP B SURFACE ANTIBODY: CPT | Mod: GEALAB | Performed by: INTERNAL MEDICINE

## 2024-03-18 PROCEDURE — 84439 ASSAY OF FREE THYROXINE: CPT | Performed by: INTERNAL MEDICINE

## 2024-03-18 PROCEDURE — 87340 HEPATITIS B SURFACE AG IA: CPT | Mod: GEALAB | Performed by: INTERNAL MEDICINE

## 2024-03-18 PROCEDURE — 99214 OFFICE O/P EST MOD 30 MIN: CPT | Mod: 25 | Performed by: INTERNAL MEDICINE

## 2024-03-18 PROCEDURE — 83615 LACTATE (LD) (LDH) ENZYME: CPT | Performed by: INTERNAL MEDICINE

## 2024-03-18 PROCEDURE — 80053 COMPREHEN METABOLIC PANEL: CPT | Performed by: INTERNAL MEDICINE

## 2024-03-18 PROCEDURE — 86704 HEP B CORE ANTIBODY TOTAL: CPT | Mod: GEALAB | Performed by: INTERNAL MEDICINE

## 2024-03-18 PROCEDURE — 2500000004 HC RX 250 GENERAL PHARMACY W/ HCPCS (ALT 636 FOR OP/ED): Mod: JG | Performed by: INTERNAL MEDICINE

## 2024-03-18 PROCEDURE — 85025 COMPLETE CBC W/AUTO DIFF WBC: CPT | Performed by: INTERNAL MEDICINE

## 2024-03-18 PROCEDURE — 3044F HG A1C LEVEL LT 7.0%: CPT | Performed by: INTERNAL MEDICINE

## 2024-03-18 PROCEDURE — 1157F ADVNC CARE PLAN IN RCRD: CPT | Performed by: INTERNAL MEDICINE

## 2024-03-18 PROCEDURE — 82150 ASSAY OF AMYLASE: CPT | Performed by: INTERNAL MEDICINE

## 2024-03-18 PROCEDURE — 96367 TX/PROPH/DG ADDL SEQ IV INF: CPT

## 2024-03-18 PROCEDURE — 1036F TOBACCO NON-USER: CPT | Performed by: INTERNAL MEDICINE

## 2024-03-18 PROCEDURE — 96417 CHEMO IV INFUS EACH ADDL SEQ: CPT

## 2024-03-18 PROCEDURE — 84443 ASSAY THYROID STIM HORMONE: CPT | Performed by: INTERNAL MEDICINE

## 2024-03-18 RX ORDER — DIPHENHYDRAMINE HYDROCHLORIDE 50 MG/ML
50 INJECTION INTRAMUSCULAR; INTRAVENOUS AS NEEDED
Status: CANCELLED | OUTPATIENT
Start: 2024-04-08

## 2024-03-18 RX ORDER — PROCHLORPERAZINE EDISYLATE 5 MG/ML
10 INJECTION INTRAMUSCULAR; INTRAVENOUS EVERY 6 HOURS PRN
Status: DISCONTINUED | OUTPATIENT
Start: 2024-03-18 | End: 2024-03-18 | Stop reason: HOSPADM

## 2024-03-18 RX ORDER — EPINEPHRINE 0.3 MG/.3ML
0.3 INJECTION SUBCUTANEOUS EVERY 5 MIN PRN
Status: CANCELLED | OUTPATIENT
Start: 2024-04-08

## 2024-03-18 RX ORDER — EPINEPHRINE 0.3 MG/.3ML
0.3 INJECTION SUBCUTANEOUS EVERY 5 MIN PRN
Status: DISCONTINUED | OUTPATIENT
Start: 2024-03-18 | End: 2024-03-18 | Stop reason: HOSPADM

## 2024-03-18 RX ORDER — EPINEPHRINE 0.3 MG/.3ML
0.3 INJECTION SUBCUTANEOUS EVERY 5 MIN PRN
Status: CANCELLED | OUTPATIENT
Start: 2024-03-18

## 2024-03-18 RX ORDER — PROCHLORPERAZINE EDISYLATE 5 MG/ML
10 INJECTION INTRAMUSCULAR; INTRAVENOUS EVERY 6 HOURS PRN
Status: CANCELLED | OUTPATIENT
Start: 2024-04-08

## 2024-03-18 RX ORDER — PROCHLORPERAZINE MALEATE 10 MG
10 TABLET ORAL EVERY 6 HOURS PRN
Status: DISCONTINUED | OUTPATIENT
Start: 2024-03-18 | End: 2024-03-18 | Stop reason: HOSPADM

## 2024-03-18 RX ORDER — DIPHENHYDRAMINE HYDROCHLORIDE 50 MG/ML
50 INJECTION INTRAMUSCULAR; INTRAVENOUS AS NEEDED
Status: CANCELLED | OUTPATIENT
Start: 2024-03-18

## 2024-03-18 RX ORDER — FAMOTIDINE 10 MG/ML
20 INJECTION INTRAVENOUS ONCE AS NEEDED
Status: DISCONTINUED | OUTPATIENT
Start: 2024-03-18 | End: 2024-03-18 | Stop reason: HOSPADM

## 2024-03-18 RX ORDER — ALBUTEROL SULFATE 0.83 MG/ML
3 SOLUTION RESPIRATORY (INHALATION) AS NEEDED
Status: CANCELLED | OUTPATIENT
Start: 2024-03-18

## 2024-03-18 RX ORDER — FAMOTIDINE 10 MG/ML
20 INJECTION INTRAVENOUS ONCE AS NEEDED
Status: CANCELLED | OUTPATIENT
Start: 2024-04-08

## 2024-03-18 RX ORDER — ALBUTEROL SULFATE 0.83 MG/ML
3 SOLUTION RESPIRATORY (INHALATION) AS NEEDED
Status: CANCELLED | OUTPATIENT
Start: 2024-05-07

## 2024-03-18 RX ORDER — DIPHENHYDRAMINE HYDROCHLORIDE 50 MG/ML
50 INJECTION INTRAMUSCULAR; INTRAVENOUS AS NEEDED
Status: CANCELLED | OUTPATIENT
Start: 2024-05-07

## 2024-03-18 RX ORDER — ALBUTEROL SULFATE 0.83 MG/ML
3 SOLUTION RESPIRATORY (INHALATION) AS NEEDED
Status: DISCONTINUED | OUTPATIENT
Start: 2024-03-18 | End: 2024-03-18 | Stop reason: HOSPADM

## 2024-03-18 RX ORDER — EPINEPHRINE 0.3 MG/.3ML
0.3 INJECTION SUBCUTANEOUS EVERY 5 MIN PRN
Status: CANCELLED | OUTPATIENT
Start: 2024-05-07

## 2024-03-18 RX ORDER — HEPARIN SODIUM,PORCINE/PF 10 UNIT/ML
50 SYRINGE (ML) INTRAVENOUS AS NEEDED
Status: CANCELLED | OUTPATIENT
Start: 2024-03-18

## 2024-03-18 RX ORDER — ALBUTEROL SULFATE 0.83 MG/ML
3 SOLUTION RESPIRATORY (INHALATION) AS NEEDED
Status: CANCELLED | OUTPATIENT
Start: 2024-04-08

## 2024-03-18 RX ORDER — DIPHENHYDRAMINE HYDROCHLORIDE 50 MG/ML
50 INJECTION INTRAMUSCULAR; INTRAVENOUS AS NEEDED
Status: DISCONTINUED | OUTPATIENT
Start: 2024-03-18 | End: 2024-03-18 | Stop reason: HOSPADM

## 2024-03-18 RX ORDER — PROCHLORPERAZINE MALEATE 5 MG
10 TABLET ORAL EVERY 6 HOURS PRN
Status: CANCELLED | OUTPATIENT
Start: 2024-04-08

## 2024-03-18 RX ORDER — PROCHLORPERAZINE MALEATE 5 MG
10 TABLET ORAL EVERY 6 HOURS PRN
Status: CANCELLED | OUTPATIENT
Start: 2024-03-18

## 2024-03-18 RX ORDER — FAMOTIDINE 10 MG/ML
20 INJECTION INTRAVENOUS ONCE AS NEEDED
Status: CANCELLED | OUTPATIENT
Start: 2024-05-07

## 2024-03-18 RX ORDER — FAMOTIDINE 10 MG/ML
20 INJECTION INTRAVENOUS ONCE AS NEEDED
Status: CANCELLED | OUTPATIENT
Start: 2024-03-18

## 2024-03-18 RX ORDER — HEPARIN 100 UNIT/ML
500 SYRINGE INTRAVENOUS AS NEEDED
Status: CANCELLED | OUTPATIENT
Start: 2024-03-18

## 2024-03-18 RX ORDER — PROCHLORPERAZINE MALEATE 5 MG
10 TABLET ORAL EVERY 6 HOURS PRN
Status: CANCELLED | OUTPATIENT
Start: 2024-05-07

## 2024-03-18 RX ORDER — PROCHLORPERAZINE EDISYLATE 5 MG/ML
10 INJECTION INTRAMUSCULAR; INTRAVENOUS EVERY 6 HOURS PRN
Status: CANCELLED | OUTPATIENT
Start: 2024-05-07

## 2024-03-18 RX ORDER — PROCHLORPERAZINE EDISYLATE 5 MG/ML
10 INJECTION INTRAMUSCULAR; INTRAVENOUS EVERY 6 HOURS PRN
Status: CANCELLED | OUTPATIENT
Start: 2024-03-18

## 2024-03-18 RX ADMIN — SODIUM CHLORIDE 100 MG: 9 INJECTION, SOLUTION INTRAVENOUS at 11:30

## 2024-03-18 RX ADMIN — SODIUM CHLORIDE 300 MG: 9 INJECTION, SOLUTION INTRAVENOUS at 12:38

## 2024-03-18 ASSESSMENT — PAIN SCALES - GENERAL: PAINLEVEL: 4

## 2024-03-18 NOTE — PROGRESS NOTES
Patient ID: Chester Verduzco is a 74 y.o. male.  Referring Physician: Danish Springer MD  [id number] [place] [address]  Primary Care Provider: Jeffry De Leon MD  Visit Type:  Follow Up       Subjective    HPI  Mr. Chester Verduzco is a 73 year old male with PMHX HTN, HLD, Dual chamber PPM implanted in 11/1996 for SSS (RV is passive) with most recent generator replacement in 11/2020 ([employer] - [employer] MRI), Type 2 DM, gout, and recent hospitalization (10/2-10/7) for Staph bacteremia, diarrhea, NOHELIA , and hyponatremia 2/2 hypovolemia who presented to [facility] ED from a SNF on 11/13 for NOHELIA on labs from the SNF and oliguria. Blood cultures were positive for MSSA and previously had been positive 10/8 and 11/10 for MSSA. Blood cultures with NGTD as of 11/16.  FILIPE 11/16 showed tricuspid valve vegetation as well as right atrial lead vegetation. Patient has been afebrile without leukocytosis and hemodynamically stable. Device Interrogated 11/17 with AP 29% and  30%.  Lead extraction complete 11/22.  Patient on IV antibiotics with stop 4 weeks after lead removal (~12/20). After lead removal, on telemetry the patient had several episodes of intermittent asymptomatic high-grade AV block, NSVT,and bradycardia. EP plans to implant PPM after 14 days of negative blood culture post removal per, ID. Patient received a unit of packed red blood cells for low hemoglobin (11/25). Hemoglobin has remained stable with no signs or symptoms of bleeding. Home lasix and losartan were being held in setting of NOHELIA 2/2 to nephrotoxic antibiotic exposure which down trending but not at baseline with adequate urine output. Patient has maldonado I/s/o severe BPH. Of note, Patient noticed left groin lymphadenopathy and U/S (11/23) showed Enlarged left inguinal/external iliac lymph nodes. PET scan deferred at this time d/t active infectious process. Underwent core needle biopsy on 11/29. Patient to follow up biopsy  results with surgical oncology. Device re-implant (micra leadless pacemaker) 11/30, PICC placed 12/2. In discussing with ID on 12/2, and reviewing past ID notes, reimplant was done iso tricuspid vegetation with the assumption that patient was sterilized given Negative BC, lack of growth in vegetation size and clinical picture. Patient has outpatient follow up scheduled further and antibiotic use will be determined then.      Biopsy results confirming metastatic melanoma but because of patient's poor performance status no intervention is planned at this time.  Patient to return in 2 months for evaluation of performance status and to discuss with patient at that time if feasible immuno oncologic therapy.        Summary  Stage:  III     Assessment:  Enlarged lymph nodes seen on CT in the left external iliac chain, and bilateral groin.  A biopsy of the left inguinal lymph node with metastatic melanoma.  Negative for BRAF V600.     PET/CT with multiple FDG avid subcutaneous nodular soft tissue densities within the left gluteal region, likely representing primary melanoma.  FDG avid bulky left external iliac and inguinal nodes, corresponding to biopsy-proven darren metastases.  No PET evidence of distant metastasis.     Recommendation:  MRI brain pending.  Systemic therapy (with ipi/nivo).  Full skin exam to look for primary lesion.     Review Multidisciplinary Cutaneous Oncology Conference recommendation with patient.  Continue routine follow up and total body skin exams with Dermatology.     Follow Up:  Danish Springer, Dermatology, Landon Hilario        Review of Systems - Oncology     Objective   BSA: 2.33 meters squared  /70 (BP Location: Right arm, Patient Position: Sitting, BP Cuff Size: Adult)   Pulse 82   Temp 36.2 °C (97.2 °F) (Temporal)   Resp 18   Wt 105 kg (232 lb 7.6 oz)   SpO2 98%   BMI 30.48 kg/m²      has a past medical history of Abnormal weight gain (10/27/2016), Achilles  tendinitis, unspecified leg (08/16/2016), Acute upper respiratory infection, unspecified, NOHELIA (acute kidney injury) (CMS/[facility]) (10/02/2023), Allergic contact dermatitis due to plants, except food (08/22/2013), Bell's palsy (03/21/2023), BPH with obstruction/lower urinary tract symptoms, Cellulitis of right lower limb (07/30/2021), Cough, unspecified (03/21/2016), Diabetes (CMS/[facility]), Encounter for screening for human immunodeficiency virus (HIV) (06/14/2016), Hordeolum externum unspecified eye, unspecified eyelid (08/14/2019), Hyperglycemia, unspecified (12/13/2017), Incisional hernia without obstruction or gangrene (06/15/2015), Knee joint pain, MSSA bacteremia (10/2023), Muscle spasm of calf (08/16/2016), Obstructive uropathy, Personal history of other diseases of the digestive system (06/30/2015), Personal history of other diseases of the digestive system (02/05/2016), Personal history of other diseases of the respiratory system (12/23/2014), Personal history of other diseases of the respiratory system (01/28/2016), Personal history of other infectious and parasitic diseases (12/02/2015), Personal history of other infectious and parasitic diseases, Personal history of other specified conditions (02/04/2015), Personal history of other specified conditions (08/16/2016), Personal history of other specified conditions (01/26/2015), Personal history of pneumonia (recurrent) (01/29/2016), Personal history of urinary (tract) infections (02/19/2013), Prostatitis (03/21/2023), Pruritus, unspecified (02/10/2015), Sinoatrial node dysfunction (CMS/[facility]), Strain of muscle, fascia and tendon of the posterior muscle group at thigh level, right thigh, initial encounter (05/10/2016), and Unspecified symptoms and signs involving the genitourinary system (12/20/2016).   has a past surgical history that includes Varicose vein surgery (08/22/2013); Cardiac pacemaker placement (08/22/2013); Other surgical history (06/15/2015); Ventral  hernia repair (06/15/2015); Other surgical history (09/16/2019); Peripherally inserted central catheter insertion; Cardiac electrophysiology procedure (Left, 11/22/2023); US guided biopsy lymph node superficial (11/29/2023); and Cardiac electrophysiology procedure (N/A, 11/30/2023).  No family history on file.  Oncology History   Metastatic melanoma (CMS/HCC)   12/12/2023 Initial Diagnosis    Metastatic melanoma (CMS/HCC)     3/18/2024 -  Chemotherapy    Nivolumab 3 mg/kg + Ipilimumab 1 mg/kg, 21 Day Cycles         Chester JOSLYN Verduzco  reports that he has never smoked. He has never been exposed to tobacco smoke. He has never used smokeless tobacco.  He  reports that he does not currently use alcohol.  He  reports no history of drug use.    Physical Exam    WBC   Date/Time Value Ref Range Status   03/18/2024 09:50 AM 5.4 4.4 - 11.3 x10*3/uL Final   12/03/2023 04:57 AM 3.7 (L) 4.4 - 11.3 x10*3/uL Final   12/02/2023 08:14 AM 4.0 (L) 4.4 - 11.3 x10*3/uL Final     nRBC   Date Value Ref Range Status   12/03/2023 0.0 0.0 - 0.0 /100 WBCs Final   12/02/2023 0.0 0.0 - 0.0 /100 WBCs Final   12/01/2023 0.0 0.0 - 0.0 /100 WBCs Final     RBC   Date Value Ref Range Status   03/18/2024 3.07 (L) 4.50 - 5.90 x10*6/uL Final   12/03/2023 2.53 (L) 4.50 - 5.90 x10*6/uL Final   12/02/2023 2.72 (L) 4.50 - 5.90 x10*6/uL Final     Hemoglobin   Date Value Ref Range Status   03/18/2024 9.4 (L) 13.5 - 17.5 g/dL Final   12/03/2023 7.0 (L) 13.5 - 17.5 g/dL Final   12/02/2023 8.0 (L) 13.5 - 17.5 g/dL Final     Hematocrit   Date Value Ref Range Status   03/18/2024 27.8 (L) 41.0 - 52.0 % Final   12/03/2023 22.4 (L) 41.0 - 52.0 % Final   12/02/2023 24.2 (L) 41.0 - 52.0 % Final     MCV   Date/Time Value Ref Range Status   03/18/2024 09:50 AM 91 80 - 100 fL Final   12/03/2023 04:57 AM 89 80 - 100 fL Final   12/02/2023 08:14 AM 89 80 - 100 fL Final     MCH   Date/Time Value Ref Range Status   03/18/2024 09:50 AM 30.6 26.0 - 34.0 pg Final   12/03/2023  04:57 AM 27.7 26.0 - 34.0 pg Final   12/02/2023 08:14 AM 29.4 26.0 - 34.0 pg Final     MCHC   Date/Time Value Ref Range Status   03/18/2024 09:50 AM 33.8 32.0 - 36.0 g/dL Final   12/03/2023 04:57 AM 31.3 (L) 32.0 - 36.0 g/dL Final   12/02/2023 08:14 AM 33.1 32.0 - 36.0 g/dL Final     RDW   Date/Time Value Ref Range Status   03/18/2024 09:50 AM 13.7 11.5 - 14.5 % Final   12/03/2023 04:57 AM 15.2 (H) 11.5 - 14.5 % Final   12/02/2023 08:14 AM 15.4 (H) 11.5 - 14.5 % Final     Platelets   Date/Time Value Ref Range Status   03/18/2024 09:50  (L) 150 - 450 x10*3/uL Final   12/03/2023 04:57 AM 90 (L) 150 - 450 x10*3/uL Final   12/02/2023 08:14 AM 92 (L) 150 - 450 x10*3/uL Final     MPV   Date/Time Value Ref Range Status   10/07/2023 05:38 AM 9.2 7.5 - 11.5 fL Final   10/06/2023 05:47 AM 9.7 7.5 - 11.5 fL Final   10/05/2023 05:58 AM 10.2 7.5 - 11.5 fL Final     Neutrophils %   Date/Time Value Ref Range Status   03/18/2024 09:50 AM 69.0 40.0 - 80.0 % Final   12/03/2023 04:57 AM 61.9 40.0 - 80.0 % Final   12/02/2023 08:14 AM 59.8 40.0 - 80.0 % Final     Immature Granulocytes %, Automated   Date/Time Value Ref Range Status   03/18/2024 09:50 AM 0.2 0.0 - 0.9 % Final     Comment:     Immature Granulocyte Count (IG) includes promyelocytes, myelocytes and metamyelocytes but does not include bands. Percent differential counts (%) should be interpreted in the context of the absolute cell counts (cells/UL).   12/03/2023 04:57 AM 0.8 0.0 - 0.9 % Final     Comment:     Immature Granulocyte Count (IG) includes promyelocytes, myelocytes and metamyelocytes but does not include bands. Percent differential counts (%) should be interpreted in the context of the absolute cell counts (cells/UL).   12/02/2023 08:14 AM 0.5 0.0 - 0.9 % Final     Comment:     Immature Granulocyte Count (IG) includes promyelocytes, myelocytes and metamyelocytes but does not include bands. Percent differential counts (%) should be interpreted in the context of  the absolute cell counts (cells/UL).     Lymphocytes %   Date/Time Value Ref Range Status   03/18/2024 09:50 AM 19.6 13.0 - 44.0 % Final   12/03/2023 04:57 AM 24.8 13.0 - 44.0 % Final   12/02/2023 08:14 AM 27.1 13.0 - 44.0 % Final     Monocytes %   Date/Time Value Ref Range Status   03/18/2024 09:50 AM 7.9 2.0 - 10.0 % Final   12/03/2023 04:57 AM 9.5 2.0 - 10.0 % Final   12/02/2023 08:14 AM 8.8 2.0 - 10.0 % Final     Eosinophils %   Date/Time Value Ref Range Status   03/18/2024 09:50 AM 3.1 0.0 - 6.0 % Final   12/03/2023 04:57 AM 2.5 0.0 - 6.0 % Final   12/02/2023 08:14 AM 3.3 0.0 - 6.0 % Final     Basophils %   Date/Time Value Ref Range Status   03/18/2024 09:50 AM 0.2 0.0 - 2.0 % Final   12/03/2023 04:57 AM 0.5 0.0 - 2.0 % Final   12/02/2023 08:14 AM 0.5 0.0 - 2.0 % Final     Neutrophils Absolute   Date/Time Value Ref Range Status   03/18/2024 09:50 AM 3.74 1.60 - 5.50 x10*3/uL Final     Comment:     Percent differential counts (%) should be interpreted in the context of the absolute cell counts (cells/uL).   12/03/2023 04:57 AM 2.27 1.60 - 5.50 x10*3/uL Final     Comment:     Percent differential counts (%) should be interpreted in the context of the absolute cell counts (cells/uL).   12/02/2023 08:14 AM 2.38 1.60 - 5.50 x10*3/uL Final     Comment:     Percent differential counts (%) should be interpreted in the context of the absolute cell counts (cells/uL).     Immature Granulocytes Absolute, Automated   Date/Time Value Ref Range Status   03/18/2024 09:50 AM 0.01 0.00 - 0.50 x10*3/uL Final   12/03/2023 04:57 AM 0.03 0.00 - 0.50 x10*3/uL Final   12/02/2023 08:14 AM 0.02 0.00 - 0.50 x10*3/uL Final     Lymphocytes Absolute   Date/Time Value Ref Range Status   03/18/2024 09:50 AM 1.06 0.80 - 3.00 x10*3/uL Final   12/03/2023 04:57 AM 0.91 0.80 - 3.00 x10*3/uL Final   12/02/2023 08:14 AM 1.08 0.80 - 3.00 x10*3/uL Final     Monocytes Absolute   Date/Time Value Ref Range Status   03/18/2024 09:50 AM 0.43 0.05 - 0.80  "x10*3/uL Final   12/03/2023 04:57 AM 0.35 0.05 - 0.80 x10*3/uL Final   12/02/2023 08:14 AM 0.35 0.05 - 0.80 x10*3/uL Final     Eosinophils Absolute   Date/Time Value Ref Range Status   03/18/2024 09:50 AM 0.17 0.00 - 0.40 x10*3/uL Final   12/03/2023 04:57 AM 0.09 0.00 - 0.40 x10*3/uL Final   12/02/2023 08:14 AM 0.13 0.00 - 0.40 x10*3/uL Final     Basophils Absolute   Date/Time Value Ref Range Status   03/18/2024 09:50 AM 0.01 0.00 - 0.10 x10*3/uL Final   12/03/2023 04:57 AM 0.02 0.00 - 0.10 x10*3/uL Final   12/02/2023 08:14 AM 0.02 0.00 - 0.10 x10*3/uL Final       No components found for: \"PT\"  aPTT   Date/Time Value Ref Range Status   11/30/2023 07:14 AM 36 27 - 38 seconds Final   11/29/2023 10:10 AM 36 27 - 38 seconds Final   11/24/2023 04:53 AM 37 27 - 38 seconds Final       Assessment/Plan      Stage III\4 malignant melanoma.  Discussed the tumor board and recommendations will be followed.  Immunotherapy recommended.  Patient consented for Ipi Nivo.  Patient consented.  MRIs of the brain still pending.  Patient will be seen in 3 weeks.  Examination of his left buttock revealing 3 punctate lesions.  Most likely the source of the primary.  Will follow through with tumor board recommendations patient will be seen by Dr. Ted Cameron and Dr. Anderson.  He is consented however for ipilimumab and nivolumab therapy as per the recommendation by the tumor board as well.  Patient concurs with the management plan.    Proceed with I/O therapy today     Diagnoses and all orders for this visit:  Metastatic melanoma (CMS/HCC)  -     Clinic Appointment Request  -     Clinic Appointment Request; Future  -     Infusion Appointment Request; Future  -     Clinic Appointment Request; Future  -     Infusion Appointment Request; Future  -     CBC and Auto Differential; Future  -     Comprehensive metabolic panel; Future  -     Amylase; Future  -     Cortisol Am; Future  -     Lactate dehydrogenase; Future  -     Lipase; Future  -     " Tsh With Reflex To Free T4 If Abnormal; Future  -     Clinic Appointment Request; Future  -     Infusion Appointment Request; Future  -     CBC and Auto Differential; Future  -     Comprehensive metabolic panel; Future  -     Amylase; Future  -     Cortisol Am; Future  -     Lactate dehydrogenase; Future  -     Lipase; Future  -     Tsh With Reflex To Free T4 If Abnormal; Future  Other orders  -     prochlorperazine (Compazine) tablet 10 mg  -     prochlorperazine (Compazine) injection 10 mg  -     nivolumab (Opdivo) 100 mg in sodium chloride 0.9% 110 mL IV  -     ipilimumab (Yervoy) 300 mg in sodium chloride 0.9% 200 mL IV  -     sodium chloride 0.9 % bolus 500 mL  -     dextrose 5 % in water (D5W) bolus  -     diphenhydrAMINE (BENADryl) injection 50 mg  -     methylPREDNISolone sod succinate (PF) (SOLU-Medrol) 40 mg/mL injection 40 mg  -     famotidine PF (Pepcid) injection 20 mg  -     EPINEPHrine (Epipen) injection syringe 0.3 mg  -     albuterol 2.5 mg /3 mL (0.083 %) nebulizer solution 3 mL  -     prochlorperazine (Compazine) tablet 10 mg  -     prochlorperazine (Compazine) injection 10 mg  -     nivolumab (Opdivo) 100 mg in sodium chloride 0.9% 110 mL IV  -     ipilimumab (Yervoy) 300 mg in sodium chloride 0.9% 200 mL IV  -     sodium chloride 0.9 % bolus 500 mL  -     dextrose 5 % in water (D5W) bolus  -     diphenhydrAMINE (BENADryl) injection 50 mg  -     methylPREDNISolone sod succinate (PF) (SOLU-Medrol) 40 mg/mL injection 40 mg  -     famotidine PF (Pepcid) injection 20 mg  -     EPINEPHrine (Epipen) injection syringe 0.3 mg  -     albuterol 2.5 mg /3 mL (0.083 %) nebulizer solution 3 mL           Danish Springer MD

## 2024-03-18 NOTE — PROGRESS NOTES
"NUTRITION Assessment NOTE    Nutrition Assessment     Reason for Visit:  Chester Verduzco is a 74 y.o. male who presents for stage III/4 metastatic melanoma.   Pt is planned for immunotherapy - Nivo/ Ipi planned to start 3/18/2024    HX: Gout, HTN, DM (managed with diet - A1c of 5.3), CKD3    Pt noted to have significant weight loss. Visited with pt today for assessment.     Pt noted to be on lasix as well.     Lab Results   Component Value Date/Time    GLUCOSE 143 (H) 03/18/2024 0950     03/18/2024 0950    K 3.9 03/18/2024 0950     (H) 03/18/2024 0950    CO2 21 03/18/2024 0950    ANIONGAP 14 03/18/2024 0950    BUN 24 (H) 03/18/2024 0950    CREATININE 1.80 (H) 03/18/2024 0950    EGFR 39 (L) 03/18/2024 0950    CALCIUM 8.6 03/18/2024 0950    ALBUMIN 3.7 03/18/2024 0950    ALKPHOS 70 03/18/2024 0950    PROT 6.8 03/18/2024 0950    AST 14 03/18/2024 0950    BILITOT 0.5 03/18/2024 0950    ALT 12 03/18/2024 0950     No results found for: \"VITD25\"    Anthropometrics:  Anthropometrics  Height: 186 cm (6' 1.23\")  Weight: 105 kg (232 lb 7.6 oz)  BMI (Calculated): 30.48  IBW/kg (Dietitian Calculated): 83.6 kg  Percent of IBW: 126 %  Adjusted Body Weight (kg): 89 kg  Weight Change  Weight History / % Weight Change: 19% weight loss in 6 mos; likely a combination of unintentional dietary changes due to taste changes from acute illnesses/ rehab stay with limited food options.  Significant Weight Loss: Yes  Interpretation of Weight Loss: >10% in 6 months        Wt Readings from Last 10 Encounters:   03/18/24 105 kg (232 lb 7.6 oz)   03/18/24 105 kg (232 lb 7.6 oz)   03/07/24 105 kg (231 lb 9.5 oz)   02/14/24 98.4 kg (217 lb)   12/14/23 109 kg (240 lb)   12/04/23 110 kg (243 lb 9.7 oz)   11/13/23 120 kg (263 lb 7.2 oz)   10/03/23 130 kg (287 lb 3.2 oz)   08/16/23 130 kg (285 lb 9.6 oz)   08/09/23 131 kg (288 lb)        Food And Nutrient Intake:  Food and Nutrient History  Food and Nutrient History: Is now home and " eating better; went into nursing home back in October- eating not great due to horrible food- Was ordering food in towards the end- was hospital to rehab from octo to feb. Is using walker and wheelchair; has nurse that comes twice a week, PT comes twice a week. Trying to be mindful of meals. BGs are better- was drinking a case of soda a day- but not drinking; now only 1 can every other day. Now not on anything for BGs. Taste is better  Energy Intake: Good > 75 %  Fluid Intake: Water (4-5 16.9oz bottles), occasional soda, seldom coffee, occasional juice, 1% milk and chocolate milk- typically 1 in the morning.  Food Allergy: nka  Food Intolerance: nka  GI Symptoms: none (miralax/ sennakot for constipation PRN)  Oral Problems: denies  Dentition: upper denture/partial     Food Intake  Amount of Food: Doesn't like ONS- cannot tolerate; has been trying to be mindful of portions; walker/ wheelchair at home; is able to get around at home- [employer], [employer], [employer]; was able to make meat loaf. Is ordering [employer] and getting delivery - able to put groceries away; feels that mushrooms flared gout- particularly right foot/ knee - allopurinol -- feels that he is a picky eater; intake has improved over the last month - 6wks.  Meal 1: B = occasional; frosted flakes (1/2c) with milk and berries/ fruit; or 2eggs with hashbrowns-  Meal 2: L = ham, salami, cheese bread; pickle, water  Meal 3: D = chili (~2cup)  Snacks: cucumbers, tomatoes with breakfast; likes apples/ oranges/ grapes; 1-2c of popcorn no added butter                                                        Nutrition Focused Physical Exam Findings:      Subcutaneous Fat Loss  Orbital Fat Pads: Well nourshed (slightly bulging fat pads)  Buccal Fat Pads: Well nourished (full, rounded cheeks)  Triceps: Defer  Ribs: Defer    Muscle Wasting  Temporalis: Well nourished (well-defined muscle)  Pectoralis (Clavicular Region): Well nourished (clavicle not  "visible)  Deltoid/Trapezius: Defer  Interosseous: Well nourished (muscle bulges)  Trapezius/Infraspinatus/Supraspinatus (Scapular Region): Defer  Quadriceps: Defer  Gastrocnemius: Defer              Energy Needs  Calculated Energy Needs Using Equations  Height: 186 cm (6' 1.23\")  Weight Used for Equation Calculations: 105 kg (231 lb 7.7 oz)  Alloy- St. Jeor Equation (Overweight or Obese Patients): 1852  Estimated Energy Needs  Total Energy Estimated Needs (kCal): 2100 kCal  Total Estimated Energy Need per Day (kCal/kg): 20 kCal/kg  Method for Estimating Needs: up to 2600kcal/day  Estimated Fluid Needs  Total Fluid Estimated Needs (mL): 2100 mL  Method for Estimating Needs: up to 2600mL/day  Estimated Protein Needs  Total Protein Estimated Needs (g): 126 g  Total Protein Estimated Needs (g/kg): 1.2 g/kg  Method for Estimating Needs: pt with gout/ CKD3, may need to be more conservation - 84g/day (0.8g/kg)        Nutrition Diagnosis   Malnutrition Diagnosis  Patient has Malnutrition Diagnosis: Yes  Diagnosis Status: New  Malnutrition Diagnosis: Severe malnutrition related to chronic disease or condition  As Evidenced by: pt with 19% weight loss in 6 months, oral intake < 75% of estimated needs for > 1 month, pt with acute illness as well as dx of metastatic melanoma but not able to start treatment.  Additional Assessment Information: Pt's appetite has improved over the last 4-6wks and weight has started to stabilize.         Nutrition Interventions/Recommendations   Nutrition Prescription  Individualized Nutrition Prescription Provided for : Nutrition during cancer treatment, management of nutrition impact symptoms.    Food and Nutrition Delivery  Food and Nutrition Delivery  Meals & Snacks: Protein-modified diet, General Healthful Diet  Goals: Encouraged intake of regular 3 meals daily and snacks. Discussed importance of maintaining weight and incorporating protein with meals/ snacks. Discussed food preferences- pt " enjoys a variety of foods. Discussed nausea mgmt and will monitor for other nutrition impact symptoms.  Medical Food Supplement: Commercial beverage  Goals: Pt does not like- cannot tolerate taste; does like chocolate milk    Nutrition Education  Nutrition Education  Nutrition Education Content: Content related nutrition education  Goals: General healthy diet, moderate protein; adequate calories to maintain weight    Coordination of Care  Coordination of Nutrition Care by a Nutrition Professional  Collaboration and referral of nutrition care: Collaboration by nutrition professional with other providers    Patient Instructions   Handouts provided and reviewed:  Nausea/ vomiting mgmt       Nutrition Monitoring and Evaluation   Food/Nutrient Related History Monitoring  Monitoring and Evaluation Plan: Energy intake, Fluid intake, Meal/snack pattern, Protein intake  Energy Intake: Estimated energy intake  Criteria: Meet at least 75% of caloric needs  Fluid Intake: Estimated fluid intake  Criteria: Maintain hydration; 70+oz daily  Meal/Snack Pattern: Estimated meal and snack pattern  Criteria: At least 3 meals daily with snacks 1-2x/day  Estimated protein intake: Estimated protein intake  Criteria: Meet at least 75% of protein needs orally  Body Composition/Growth/Weight History  Monitoring and Evaluation Plan: Weight  Weight: Measured weight  Criteria: Maintain weight          Time Spent  Prep time on day of patient encounter: 10 minutes  Time spent directly with patient, family or caregiver: 25 minutes  Additional Time Spent on Patient Care Activities: 5 minutes  Documentation Time: 20 minutes  Other Time Spent: 0 minutes  Total: 60 minutes

## 2024-03-18 NOTE — PROGRESS NOTES
Pt tolerated treatment without incident. Denies questions, concerns, or comments at this time. Discharged home in wheelchair

## 2024-03-18 NOTE — PROGRESS NOTES
"Today is pt's first day of treatment. Met with pt and his friend in infusion room to introduce self and check in. Pt reports having a rough couple months due to health concerns. Pt has been hospitalized multiple times for infections and has spent a lot of time at [facility]. Pt says he is now home with [facility]. Pt has difficulty walking due to pain and is pending a knee replacement. Pt is able to walk with a walker at home and has a wheelchair he uses when out of the home and and as needed at home. Pt reports he is working with the local area agency on aging to get a ramp installed to make it easier for him to leave home. Pt reports he lives by himself but he has supportive friends who come by daily. Pt's friend say pt is \"90% independent\" and they mainly help with laundry and outdoor chores. Pt also has a life alert at home for emergencies. Pt has not driven since October; his friends are able to transport him as needed. Pt is a  and says he is working to establish with [facility] to get some benefits through them as well. Pt denied any SW needs at this time. Provided pt with information on [facility] and encouraged him to reach out to them for additional support. Provided pt with writer's contact information and encouraged them to reach out should any additional questions or concerns arise.   Jacey Kraus MSW, LSW    "

## 2024-03-18 NOTE — PROGRESS NOTES
Patient was educated on new drugs nivolumab and ipilimumab. Patient understands why they are receiving the drug and wished to proceed with administration. Patient was educated and given information on this drug. Drug information was reviewed and patient has no further questions. Patient understands the side effects of the drug and knows to watch for possible signs of reaction to this new drug. Patient knows if they have signs at home of a drug reaction, and that they should call the provider immediately. If the reaction involves any throat discomfort or shortness of breath, they are to report to the nearest ED or call 911. Patient sts they understand the information that was taught and was able to verbally explain back what they learned. Patient has the phone number of the clinic to call if the have any further questions.

## 2024-03-18 NOTE — PATIENT INSTRUCTIONS
Today you met with Dr. Springer.  You are starting your immunotherapy today.  Specific immunotherapy information was given to you.  No change to your current treatment.  Dr. Springer will see you in 3 weeks to see how you tolerated your first treatment.      Please call the office for any questions or concerns.  We ask that you notify us 5-7 days before your medications need refilled.  SCOTT Martinez is strongly encouraging all patients to access their MyChart for all results and to communicate with their provider for non-urgent messages.  If an urgent/same day/sick concern response is needed, please call the office at [phone]. Thank You!

## 2024-03-20 LAB — ACTH PLAS-MCNC: 7.9 PG/ML (ref 7.2–63.3)

## 2024-03-22 ENCOUNTER — NURSE TRIAGE (OUTPATIENT)
Dept: HEMATOLOGY/ONCOLOGY | Facility: CLINIC | Age: 74
End: 2024-03-22
Payer: COMMERCIAL

## 2024-03-22 DIAGNOSIS — C43.9 METASTATIC MELANOMA (MULTI): ICD-10-CM

## 2024-03-22 DIAGNOSIS — R21 RASH: Primary | ICD-10-CM

## 2024-03-22 RX ORDER — TRIAMCINOLONE ACETONIDE 1 MG/G
OINTMENT TOPICAL 2 TIMES DAILY
Qty: 80 G | Refills: 1 | Status: SHIPPED | OUTPATIENT
Start: 2024-03-22

## 2024-03-22 RX ORDER — PREDNISONE 50 MG/1
50 TABLET ORAL 2 TIMES DAILY
Qty: 20 TABLET | Refills: 1 | Status: SHIPPED | OUTPATIENT
Start: 2024-03-22 | End: 2024-04-08 | Stop reason: SDUPTHER

## 2024-03-22 NOTE — TELEPHONE ENCOUNTER
Additional Information   Negative:      Pt has not used anything on rash yet.   Commented on: When and where did the rash start? How long have you had the rash?     Rash is from the top to the bottom of his back, his whole back per pt.   Commented on: Review EMR for h/o immunotherpy. If positive, ask if patient is having a rash - symptoms may be related to immune-realted dermatitis.     Pt had his first treatment 3/18/24 of Nivolumab and Ipilimumab.   Commented on: How does the rash look? What color and size is it?     Pt states it is just pink and raised. It does itch, no pain or burning.   Commented on: Are you having any of these problems?     Pt has numbness in feet, legs and right hand, but not on back with rash.   Commented on: What helps these problems?     Nothing really helps the rash except occasionally scratching the area, but he does not want to scratch area or open up raised areas.    Protocols used: Rash

## 2024-03-22 NOTE — TELEPHONE ENCOUNTER
Dr. Springer called and spoke to pt.  Called pt and he is aware of prescriptions and to fill and begin tonight. Pt taught back directives.

## 2024-03-25 DIAGNOSIS — E11.42 DIABETIC POLYNEUROPATHY ASSOCIATED WITH TYPE 2 DIABETES MELLITUS (MULTI): ICD-10-CM

## 2024-03-25 RX ORDER — PREGABALIN 25 MG/1
25 CAPSULE ORAL 3 TIMES DAILY
Qty: 90 CAPSULE | Refills: 5 | Status: SHIPPED | OUTPATIENT
Start: 2024-03-25 | End: 2024-09-21

## 2024-03-25 NOTE — PROGRESS NOTES
50 mg lyrica too strong. Cut to 25 mg bid and increase to tid only after a week.  D/W Kristine his home health nurse. JANES

## 2024-03-26 DIAGNOSIS — R59.1 LYMPHADENOPATHY: Primary | ICD-10-CM

## 2024-03-28 DIAGNOSIS — D72.829 LEUKOCYTOSIS, UNSPECIFIED TYPE: ICD-10-CM

## 2024-03-28 DIAGNOSIS — C43.9 METASTATIC MELANOMA (MULTI): ICD-10-CM

## 2024-04-02 ENCOUNTER — LAB (OUTPATIENT)
Dept: LAB | Facility: LAB | Age: 74
End: 2024-04-02
Payer: MEDICARE

## 2024-04-02 DIAGNOSIS — C43.9 METASTATIC MELANOMA (MULTI): ICD-10-CM

## 2024-04-02 DIAGNOSIS — E11.649: ICD-10-CM

## 2024-04-02 DIAGNOSIS — N30.00 ACUTE CYSTITIS WITHOUT HEMATURIA: Primary | ICD-10-CM

## 2024-04-02 LAB
ALBUMIN SERPL BCP-MCNC: 3.6 G/DL (ref 3.4–5)
ALP SERPL-CCNC: 77 U/L (ref 33–136)
ALT SERPL W P-5'-P-CCNC: 61 U/L (ref 10–52)
AMYLASE SERPL-CCNC: 90 U/L (ref 29–103)
ANION GAP SERPL CALC-SCNC: 11 MMOL/L (ref 10–20)
AST SERPL W P-5'-P-CCNC: 28 U/L (ref 9–39)
BASOPHILS # BLD AUTO: 0.03 X10*3/UL (ref 0–0.1)
BASOPHILS NFR BLD AUTO: 0.2 %
BILIRUB SERPL-MCNC: 0.6 MG/DL (ref 0–1.2)
BUN SERPL-MCNC: 36 MG/DL (ref 6–23)
CALCIUM SERPL-MCNC: 8.7 MG/DL (ref 8.6–10.3)
CHLORIDE SERPL-SCNC: 107 MMOL/L (ref 98–107)
CO2 SERPL-SCNC: 24 MMOL/L (ref 21–32)
CREAT SERPL-MCNC: 1.5 MG/DL (ref 0.5–1.3)
EGFRCR SERPLBLD CKD-EPI 2021: 49 ML/MIN/1.73M*2
EOSINOPHIL # BLD AUTO: 0.05 X10*3/UL (ref 0–0.4)
EOSINOPHIL NFR BLD AUTO: 0.4 %
ERYTHROCYTE [DISTWIDTH] IN BLOOD BY AUTOMATED COUNT: 14.3 % (ref 11.5–14.5)
GLUCOSE SERPL-MCNC: 83 MG/DL (ref 74–99)
HCT VFR BLD AUTO: 34.2 % (ref 41–52)
HGB BLD-MCNC: 11.1 G/DL (ref 13.5–17.5)
IMM GRANULOCYTES # BLD AUTO: 0.18 X10*3/UL (ref 0–0.5)
IMM GRANULOCYTES NFR BLD AUTO: 1.4 % (ref 0–0.9)
LDH SERPL L TO P-CCNC: 281 U/L (ref 84–246)
LIPASE SERPL-CCNC: 76 U/L (ref 9–82)
LYMPHOCYTES # BLD AUTO: 2.22 X10*3/UL (ref 0.8–3)
LYMPHOCYTES NFR BLD AUTO: 17.1 %
MCH RBC QN AUTO: 30.3 PG (ref 26–34)
MCHC RBC AUTO-ENTMCNC: 32.5 G/DL (ref 32–36)
MCV RBC AUTO: 93 FL (ref 80–100)
MONOCYTES # BLD AUTO: 0.94 X10*3/UL (ref 0.05–0.8)
MONOCYTES NFR BLD AUTO: 7.2 %
NEUTROPHILS # BLD AUTO: 9.6 X10*3/UL (ref 1.6–5.5)
NEUTROPHILS NFR BLD AUTO: 73.7 %
NRBC BLD-RTO: 0 /100 WBCS (ref 0–0)
PLATELET # BLD AUTO: 133 X10*3/UL (ref 150–450)
POTASSIUM SERPL-SCNC: 3.9 MMOL/L (ref 3.5–5.3)
PROT SERPL-MCNC: 6.3 G/DL (ref 6.4–8.2)
RBC # BLD AUTO: 3.66 X10*6/UL (ref 4.5–5.9)
SODIUM SERPL-SCNC: 138 MMOL/L (ref 136–145)
T4 FREE SERPL-MCNC: 0.88 NG/DL (ref 0.61–1.12)
TSH SERPL-ACNC: 6.99 MIU/L (ref 0.44–3.98)
WBC # BLD AUTO: 13 X10*3/UL (ref 4.4–11.3)

## 2024-04-02 PROCEDURE — 83690 ASSAY OF LIPASE: CPT

## 2024-04-02 PROCEDURE — 36415 COLL VENOUS BLD VENIPUNCTURE: CPT

## 2024-04-02 PROCEDURE — 80053 COMPREHEN METABOLIC PANEL: CPT

## 2024-04-02 PROCEDURE — 84439 ASSAY OF FREE THYROXINE: CPT

## 2024-04-02 PROCEDURE — 84443 ASSAY THYROID STIM HORMONE: CPT

## 2024-04-02 PROCEDURE — 82533 TOTAL CORTISOL: CPT

## 2024-04-02 PROCEDURE — 83615 LACTATE (LD) (LDH) ENZYME: CPT

## 2024-04-02 PROCEDURE — 82150 ASSAY OF AMYLASE: CPT

## 2024-04-02 PROCEDURE — 85025 COMPLETE CBC W/AUTO DIFF WBC: CPT

## 2024-04-02 RX ORDER — BLOOD-GLUCOSE SENSOR
EACH MISCELLANEOUS
Qty: 9 EACH | Refills: 3 | Status: SHIPPED | OUTPATIENT
Start: 2024-04-02

## 2024-04-03 ENCOUNTER — OFFICE VISIT (OUTPATIENT)
Dept: DERMATOLOGY | Facility: CLINIC | Age: 74
End: 2024-04-03
Payer: MEDICARE

## 2024-04-03 DIAGNOSIS — L81.4 LENTIGO: ICD-10-CM

## 2024-04-03 DIAGNOSIS — L82.1 SEBORRHEIC KERATOSIS: ICD-10-CM

## 2024-04-03 DIAGNOSIS — Z85.820 PERSONAL HISTORY OF MALIGNANT MELANOMA OF SKIN: ICD-10-CM

## 2024-04-03 DIAGNOSIS — C44.91 BASAL CELL CARCINOMA (BCC), UNSPECIFIED SITE: ICD-10-CM

## 2024-04-03 DIAGNOSIS — D18.01 ANGIOMA OF SKIN: ICD-10-CM

## 2024-04-03 DIAGNOSIS — D49.2 NEOPLASM OF SKIN: ICD-10-CM

## 2024-04-03 DIAGNOSIS — D48.5 NEOPLASM OF UNCERTAIN BEHAVIOR OF SKIN: Primary | ICD-10-CM

## 2024-04-03 DIAGNOSIS — D22.9 BENIGN NEVUS: ICD-10-CM

## 2024-04-03 LAB — CORTIS AM PEAK SERPL-MSCNC: 11.7 UG/DL (ref 5–20)

## 2024-04-03 PROCEDURE — 1036F TOBACCO NON-USER: CPT | Performed by: NURSE PRACTITIONER

## 2024-04-03 PROCEDURE — 3044F HG A1C LEVEL LT 7.0%: CPT | Performed by: NURSE PRACTITIONER

## 2024-04-03 PROCEDURE — 88305 TISSUE EXAM BY PATHOLOGIST: CPT | Performed by: DERMATOLOGY

## 2024-04-03 PROCEDURE — 11103 TANGNTL BX SKIN EA SEP/ADDL: CPT | Performed by: NURSE PRACTITIONER

## 2024-04-03 PROCEDURE — 3008F BODY MASS INDEX DOCD: CPT | Performed by: NURSE PRACTITIONER

## 2024-04-03 PROCEDURE — 1159F MED LIST DOCD IN RCRD: CPT | Performed by: NURSE PRACTITIONER

## 2024-04-03 PROCEDURE — 1160F RVW MEDS BY RX/DR IN RCRD: CPT | Performed by: NURSE PRACTITIONER

## 2024-04-03 PROCEDURE — 11102 TANGNTL BX SKIN SINGLE LES: CPT | Performed by: NURSE PRACTITIONER

## 2024-04-03 PROCEDURE — 4010F ACE/ARB THERAPY RXD/TAKEN: CPT | Performed by: NURSE PRACTITIONER

## 2024-04-03 PROCEDURE — 99203 OFFICE O/P NEW LOW 30 MIN: CPT | Performed by: NURSE PRACTITIONER

## 2024-04-03 PROCEDURE — 3048F LDL-C <100 MG/DL: CPT | Performed by: NURSE PRACTITIONER

## 2024-04-03 PROCEDURE — 1157F ADVNC CARE PLAN IN RCRD: CPT | Performed by: NURSE PRACTITIONER

## 2024-04-03 ASSESSMENT — DERMATOLOGY QUALITY OF LIFE (QOL) ASSESSMENT
RATE HOW BOTHERED YOU ARE BY EFFECTS OF YOUR SKIN PROBLEMS ON YOUR ACTIVITIES (EG, GOING OUT, ACCOMPLISHING WHAT YOU WANT, WORK ACTIVITIES OR YOUR RELATIONSHIPS WITH OTHERS): 0 - NEVER BOTHERED
WHAT SINGLE SKIN CONDITION LISTED BELOW IS THE PATIENT ANSWERING THE QUALITY-OF-LIFE ASSESSMENT QUESTIONS ABOUT: NONE OF THE ABOVE
RATE HOW EMOTIONALLY BOTHERED YOU ARE BY YOUR SKIN PROBLEM (FOR EXAMPLE, WORRY, EMBARRASSMENT, FRUSTRATION): 0 - NEVER BOTHERED
RATE HOW BOTHERED YOU ARE BY SYMPTOMS OF YOUR SKIN PROBLEM (EG, ITCHING, STINGING BURNING, HURTING OR SKIN IRRITATION): 0 - NEVER BOTHERED
RATE HOW EMOTIONALLY BOTHERED YOU ARE BY YOUR SKIN PROBLEM (FOR EXAMPLE, WORRY, EMBARRASSMENT, FRUSTRATION): 0 - NEVER BOTHERED
WHAT SINGLE SKIN CONDITION LISTED BELOW IS THE PATIENT ANSWERING THE QUALITY-OF-LIFE ASSESSMENT QUESTIONS ABOUT: NONE OF THE ABOVE
RATE HOW BOTHERED YOU ARE BY EFFECTS OF YOUR SKIN PROBLEMS ON YOUR ACTIVITIES (EG, GOING OUT, ACCOMPLISHING WHAT YOU WANT, WORK ACTIVITIES OR YOUR RELATIONSHIPS WITH OTHERS): 0 - NEVER BOTHERED
DATE THE QUALITY-OF-LIFE ASSESSMENT WAS COMPLETED: 66934
RATE HOW BOTHERED YOU ARE BY SYMPTOMS OF YOUR SKIN PROBLEM (EG, ITCHING, STINGING BURNING, HURTING OR SKIN IRRITATION): 0 - NEVER BOTHERED

## 2024-04-03 NOTE — PATIENT INSTRUCTIONS

## 2024-04-03 NOTE — PROGRESS NOTES
Subjective     Chester Verduzco is a 74 y.o. male who presents for the following: Skin Check.     Review of Systems:  No other skin or systemic complaints other than what is documented elsewhere in the note.    The following portions of the chart were reviewed this encounter and updated as appropriate:   Tobacco  Allergies  Meds  Problems  Med Hx  Surg Hx         Skin Cancer History  No skin cancer on file.      Specialty Problems          Dermatology Problems    Metastatic melanoma (CMS/HCC)     11/29/23 Left inguinal lymph node Bx   12/14/23  [facility] consult Dr. Springer.             Objective   Well appearing patient in no apparent distress; mood and affect are within normal limits.    A full examination was performed including scalp, head, eyes, ears, nose, lips, neck, chest, axillae, abdomen, back, buttocks, bilateral upper extremities, bilateral lower extremities, hands, feet, fingers, toes, fingernails, and toenails. All findings within normal limits unless otherwise noted below.      Assessment/Plan   1. Neoplasm of uncertain behavior of skin (2)  Right Lower Back  6 x 7 mm pink red patch with polymorphous vessels noted.               Lesion biopsy  Type of biopsy: tangential    Informed consent: discussed and consent obtained    Timeout: patient name, date of birth, surgical site, and procedure verified    Procedure prep:  Patient was prepped and draped  Anesthesia: the lesion was anesthetized in a standard fashion    Anesthetic:  1% lidocaine plain local infiltration  Instrument used: DermaBlade    Hemostasis achieved with: electrodesiccation    Outcome: patient tolerated procedure well    Post-procedure details: wound care instructions given    Additional details:  Cleaned area with isopropyl alcohol prior to anesthesia or biopsy. Applied thin layer of vaseline and covered with bandaid after procedure      Staff Communication: Dermatology Local Anesthesia: 1 % Plain Lidocaine - Amount:0.5 ml per  lesion    Specimen 1 - Dermatopathology- DERM LAB  Differential Diagnosis: NMSC vs AMM  Check Margins Yes/No?:    Comments:    Dermpath Lab: Routine Histopathology (formalin-fixed tissue)    Right Lower Leg - Posterior  6 mm blue grey papule              Lesion biopsy  Type of biopsy: tangential    Informed consent: discussed and consent obtained    Timeout: patient name, date of birth, surgical site, and procedure verified    Procedure prep:  Patient was prepped and draped  Anesthesia: the lesion was anesthetized in a standard fashion    Anesthetic:  1% lidocaine plain local infiltration  Instrument used: DermaBlade    Hemostasis achieved with: electrodesiccation    Outcome: patient tolerated procedure well    Post-procedure details: wound care instructions given    Additional details:  Cleaned area with isopropyl alcohol prior to anesthesia or biopsy. Applied thin layer of vaseline and covered with bandaid after procedure      Staff Communication: Dermatology Local Anesthesia: 1 % Plain Lidocaine - Amount:0.5 ml per lesion    Specimen 2 - Dermatopathology- DERM LAB  Differential Diagnosis: MM vs Blue nevus  Check Margins Yes/No?:    Comments:    Dermpath Lab: Routine Histopathology (formalin-fixed tissue)    NUB  - Given uncertainty in clinical diagnosis, shave biopsy is recommended in clinic today.  - The patient expressed understanding, is in agreement with this plan, and wishes to proceed with biopsy.  - Oral and written wound care instructions provided.  - Advised patient that the office will call within 2 weeks to discuss biopsy results.      2. Neoplasm of skin  Left Hip (side) - Posterior  12 x 15 mm pink patch with 3 areas of dark brown discoloration. Noted subcutaneous nodules in the area immediately under the lesion and surrounding area.           This is  likely the primary lesion given skin findings and PET scan results showing increased up take in the left buttock area. Patient currently undergoing  immunotherapy (nivo/ipi) prior to surgery of the left inguinal fold area. I would recommend excision of the lesion on the left buttocks at  the same time. I will send a note to Dr. Cameron regarding recommendations.     3. Angioma of skin  Scattered cherry-red papule(s).    A cherry hemangioma is a small macule (small, flat, smooth area) or papule (small, solid bump) formed from an overgrowth of tiny blood vessels in the skin. Cherry hemangiomas are characteristically red or purplish in color. They often first appear in middle adulthood and usually increase in number with age. Cherry hemangiomas are noncancerous (benign) and are common in adults.    The present appearance of the lesion is not worrisome but it should continue to be observed and testing/treatment may be warranted if change occurs.    4. Benign nevus  Scattered, uniform and benign-appearing, regular brown melanocytic papules and macules.    The present appearance of the lesion is not worrisome but it should continue to be observed and testing/treatment may be warranted if change occurs.    5. Seborrheic keratosis  Stuck on verrucous, tan-brown papules and plaques.      Seborrheic keratoses are common noncancerous (benign) growths of unknown cause seen in adults due to a thickening of an area of the top skin layer. Seborrheic keratoses may appear as if they are stuck on to the skin. They have distinct borders, and they may appear as papules (small, solid bumps) or plaques (solid, raised patches that are bigger than a thumbnail). They may be the same color as your skin, or they may be pink, light brown, darker brown, or very dark brown, or sometimes may appear black.    There is no way to prevent new seborrheic keratoses from forming. Seborrheic keratoses can be removed, but removal is considered a cosmetic issue and is usually not covered by insurance.    PLAN  No treatment is needed unless there is irritation from clothing, such as itching or  bleeding.  2.   Some lotions containing alpha hydroxy acids, salicylic acid, or urea may make the areas feel smoother with regular use but will not eliminate them.    6. Lentigo  Scattered tan macules in sun-exposed areas.    A solar lentigo (plural, solar lentigines), also known as a sun-induced freckle or senile lentigo, is a dark (hyperpigmented) lesion caused by natural or artificial ultraviolet (UV) light. Solar lentigines may be single or multiple. This type of lentigo is different from a simple lentigo (lentigo simplex) because it is caused by exposure to UV light. Solar lentigines are benign, but they do indicate excessive sun exposure, a risk factor for the development of skin cancer.    To prevent solar lentigines, avoid exposure to sunlight in midday (10 AM to 3 PM), wear sun-protective clothing (tightly woven clothes and hats), and apply sunscreen (SPF 30 UVA and UVB block).    The present appearance of the lesion is not worrisome but it should continue to be observed and testing/treatment may be warranted if change occurs.    7. Personal history of malignant melanoma of skin    ABCDEs of melanoma and atypical moles were discussed with the patient.    Patient was instructed to perform monthly self skin examination.  We recommended that the patient have regular full skin exams given an increased risk of subsequent skin cancers.    The patient was instructed to use sun protective behaviors including use of broad spectrum sunscreens and sun protective clothing to reduce risk of skin cancers.    Warning signs of non-melanoma skin cancer discussed.        Return to clinic in 3 months for skin check/follow up or sooner if needed

## 2024-04-04 DIAGNOSIS — M10.9 GOUT, UNSPECIFIED CAUSE, UNSPECIFIED CHRONICITY, UNSPECIFIED SITE: ICD-10-CM

## 2024-04-04 DIAGNOSIS — N39.0 URINARY TRACT INFECTION WITHOUT HEMATURIA, SITE UNSPECIFIED: ICD-10-CM

## 2024-04-04 RX ORDER — ALLOPURINOL 100 MG/1
100 TABLET ORAL DAILY
Qty: 90 TABLET | Refills: 3 | Status: SHIPPED | OUTPATIENT
Start: 2024-04-04

## 2024-04-05 ENCOUNTER — TELEPHONE (OUTPATIENT)
Dept: DERMATOLOGY | Facility: CLINIC | Age: 74
End: 2024-04-05

## 2024-04-05 ENCOUNTER — LAB (OUTPATIENT)
Dept: LAB | Facility: LAB | Age: 74
End: 2024-04-05
Payer: MEDICARE

## 2024-04-05 DIAGNOSIS — N39.0 URINARY TRACT INFECTION WITHOUT HEMATURIA, SITE UNSPECIFIED: ICD-10-CM

## 2024-04-05 LAB
APPEARANCE UR: ABNORMAL
BACTERIA #/AREA URNS AUTO: ABNORMAL /HPF
BILIRUB UR STRIP.AUTO-MCNC: NEGATIVE MG/DL
COLOR UR: YELLOW
GLUCOSE UR STRIP.AUTO-MCNC: ABNORMAL MG/DL
KETONES UR STRIP.AUTO-MCNC: NEGATIVE MG/DL
LABORATORY COMMENT REPORT: NORMAL
LEUKOCYTE ESTERASE UR QL STRIP.AUTO: ABNORMAL
MUCOUS THREADS #/AREA URNS AUTO: ABNORMAL /LPF
NITRITE UR QL STRIP.AUTO: NEGATIVE
PATH REPORT.FINAL DX SPEC: NORMAL
PATH REPORT.GROSS SPEC: NORMAL
PATH REPORT.MICROSCOPIC SPEC OTHER STN: NORMAL
PATH REPORT.RELEVANT HX SPEC: NORMAL
PATH REPORT.TOTAL CANCER: NORMAL
PH UR STRIP.AUTO: 5 [PH]
PROT UR STRIP.AUTO-MCNC: NEGATIVE MG/DL
RBC # UR STRIP.AUTO: ABNORMAL /UL
RBC #/AREA URNS AUTO: ABNORMAL /HPF
SP GR UR STRIP.AUTO: 1.01
UROBILINOGEN UR STRIP.AUTO-MCNC: <2 MG/DL
WBC #/AREA URNS AUTO: >50 /HPF
WBC CLUMPS #/AREA URNS AUTO: ABNORMAL /HPF

## 2024-04-05 PROCEDURE — 81001 URINALYSIS AUTO W/SCOPE: CPT

## 2024-04-05 NOTE — TELEPHONE ENCOUNTER
A - Skin cancer. Order placed for referral to Mohs surgery for treatment.     B - Benign. NO further treatment required.     Return to clinic in 6 months for recheck.

## 2024-04-08 ENCOUNTER — INFUSION (OUTPATIENT)
Dept: HEMATOLOGY/ONCOLOGY | Facility: CLINIC | Age: 74
End: 2024-04-08
Payer: MEDICARE

## 2024-04-08 ENCOUNTER — OFFICE VISIT (OUTPATIENT)
Dept: HEMATOLOGY/ONCOLOGY | Facility: CLINIC | Age: 74
End: 2024-04-08
Payer: MEDICARE

## 2024-04-08 VITALS
BODY MASS INDEX: 32.32 KG/M2 | RESPIRATION RATE: 16 BRPM | DIASTOLIC BLOOD PRESSURE: 67 MMHG | WEIGHT: 246.47 LBS | HEART RATE: 75 BPM | SYSTOLIC BLOOD PRESSURE: 153 MMHG | TEMPERATURE: 97.3 F | OXYGEN SATURATION: 97 %

## 2024-04-08 DIAGNOSIS — C43.9 METASTATIC MELANOMA (MULTI): Primary | ICD-10-CM

## 2024-04-08 DIAGNOSIS — R21 RASH: ICD-10-CM

## 2024-04-08 DIAGNOSIS — D72.829 LEUKOCYTOSIS, UNSPECIFIED TYPE: ICD-10-CM

## 2024-04-08 DIAGNOSIS — C43.9 METASTATIC MELANOMA (MULTI): ICD-10-CM

## 2024-04-08 LAB
ALBUMIN SERPL BCP-MCNC: 3.5 G/DL (ref 3.4–5)
ALP SERPL-CCNC: 94 U/L (ref 33–136)
ALT SERPL W P-5'-P-CCNC: 25 U/L (ref 10–52)
ANION GAP SERPL CALC-SCNC: 12 MMOL/L (ref 10–20)
AST SERPL W P-5'-P-CCNC: 16 U/L (ref 9–39)
BASOPHILS # BLD AUTO: 0.01 X10*3/UL (ref 0–0.1)
BASOPHILS NFR BLD AUTO: 0.2 %
BILIRUB SERPL-MCNC: 0.6 MG/DL (ref 0–1.2)
BUN SERPL-MCNC: 17 MG/DL (ref 6–23)
CALCIUM SERPL-MCNC: 8.3 MG/DL (ref 8.6–10.3)
CHLORIDE SERPL-SCNC: 106 MMOL/L (ref 98–107)
CO2 SERPL-SCNC: 25 MMOL/L (ref 21–32)
CREAT SERPL-MCNC: 1.4 MG/DL (ref 0.5–1.3)
EGFRCR SERPLBLD CKD-EPI 2021: 53 ML/MIN/1.73M*2
EOSINOPHIL # BLD AUTO: 0.16 X10*3/UL (ref 0–0.4)
EOSINOPHIL NFR BLD AUTO: 2.6 %
ERYTHROCYTE [DISTWIDTH] IN BLOOD BY AUTOMATED COUNT: 13.9 % (ref 11.5–14.5)
GLUCOSE SERPL-MCNC: 140 MG/DL (ref 74–99)
HCT VFR BLD AUTO: 26.3 % (ref 41–52)
HGB BLD-MCNC: 8.6 G/DL (ref 13.5–17.5)
IMM GRANULOCYTES # BLD AUTO: 0.01 X10*3/UL (ref 0–0.5)
IMM GRANULOCYTES NFR BLD AUTO: 0.2 % (ref 0–0.9)
LYMPHOCYTES # BLD AUTO: 0.67 X10*3/UL (ref 0.8–3)
LYMPHOCYTES NFR BLD AUTO: 10.7 %
MCH RBC QN AUTO: 30.1 PG (ref 26–34)
MCHC RBC AUTO-ENTMCNC: 32.7 G/DL (ref 32–36)
MCV RBC AUTO: 92 FL (ref 80–100)
MONOCYTES # BLD AUTO: 0.6 X10*3/UL (ref 0.05–0.8)
MONOCYTES NFR BLD AUTO: 9.6 %
NEUTROPHILS # BLD AUTO: 4.79 X10*3/UL (ref 1.6–5.5)
NEUTROPHILS NFR BLD AUTO: 76.7 %
NRBC BLD-RTO: ABNORMAL /100{WBCS}
PLATELET # BLD AUTO: 93 X10*3/UL (ref 150–450)
POTASSIUM SERPL-SCNC: 3.8 MMOL/L (ref 3.5–5.3)
PROT SERPL-MCNC: 6.4 G/DL (ref 6.4–8.2)
RBC # BLD AUTO: 2.86 X10*6/UL (ref 4.5–5.9)
RBC MORPH BLD: NORMAL
SODIUM SERPL-SCNC: 139 MMOL/L (ref 136–145)
WBC # BLD AUTO: 6.2 X10*3/UL (ref 4.4–11.3)

## 2024-04-08 PROCEDURE — 1159F MED LIST DOCD IN RCRD: CPT | Performed by: INTERNAL MEDICINE

## 2024-04-08 PROCEDURE — 88189 FLOWCYTOMETRY/READ 16 & >: CPT | Performed by: PATHOLOGY

## 2024-04-08 PROCEDURE — 4010F ACE/ARB THERAPY RXD/TAKEN: CPT | Performed by: INTERNAL MEDICINE

## 2024-04-08 PROCEDURE — 36415 COLL VENOUS BLD VENIPUNCTURE: CPT

## 2024-04-08 PROCEDURE — 85025 COMPLETE CBC W/AUTO DIFF WBC: CPT | Mod: GEALAB | Performed by: INTERNAL MEDICINE

## 2024-04-08 PROCEDURE — 1157F ADVNC CARE PLAN IN RCRD: CPT | Performed by: INTERNAL MEDICINE

## 2024-04-08 PROCEDURE — 80053 COMPREHEN METABOLIC PANEL: CPT | Performed by: INTERNAL MEDICINE

## 2024-04-08 PROCEDURE — 1160F RVW MEDS BY RX/DR IN RCRD: CPT | Performed by: INTERNAL MEDICINE

## 2024-04-08 PROCEDURE — 1126F AMNT PAIN NOTED NONE PRSNT: CPT | Performed by: INTERNAL MEDICINE

## 2024-04-08 PROCEDURE — 88185 FLOWCYTOMETRY/TC ADD-ON: CPT | Mod: TC | Performed by: INTERNAL MEDICINE

## 2024-04-08 PROCEDURE — 96413 CHEMO IV INFUSION 1 HR: CPT

## 2024-04-08 PROCEDURE — 88237 TISSUE CULTURE BONE MARROW: CPT | Performed by: INTERNAL MEDICINE

## 2024-04-08 PROCEDURE — 3044F HG A1C LEVEL LT 7.0%: CPT | Performed by: INTERNAL MEDICINE

## 2024-04-08 PROCEDURE — 3048F LDL-C <100 MG/DL: CPT | Performed by: INTERNAL MEDICINE

## 2024-04-08 PROCEDURE — 3008F BODY MASS INDEX DOCD: CPT | Performed by: INTERNAL MEDICINE

## 2024-04-08 PROCEDURE — 99214 OFFICE O/P EST MOD 30 MIN: CPT | Performed by: INTERNAL MEDICINE

## 2024-04-08 PROCEDURE — 2500000004 HC RX 250 GENERAL PHARMACY W/ HCPCS (ALT 636 FOR OP/ED): Performed by: INTERNAL MEDICINE

## 2024-04-08 PROCEDURE — 3077F SYST BP >= 140 MM HG: CPT | Performed by: INTERNAL MEDICINE

## 2024-04-08 PROCEDURE — 96367 TX/PROPH/DG ADDL SEQ IV INF: CPT

## 2024-04-08 PROCEDURE — 3078F DIAST BP <80 MM HG: CPT | Performed by: INTERNAL MEDICINE

## 2024-04-08 RX ORDER — EPINEPHRINE 0.3 MG/.3ML
0.3 INJECTION SUBCUTANEOUS EVERY 5 MIN PRN
Status: DISCONTINUED | OUTPATIENT
Start: 2024-04-08 | End: 2024-04-08 | Stop reason: HOSPADM

## 2024-04-08 RX ORDER — PROCHLORPERAZINE MALEATE 10 MG
10 TABLET ORAL EVERY 6 HOURS PRN
Status: DISCONTINUED | OUTPATIENT
Start: 2024-04-08 | End: 2024-04-08 | Stop reason: HOSPADM

## 2024-04-08 RX ORDER — HEPARIN SODIUM,PORCINE/PF 10 UNIT/ML
50 SYRINGE (ML) INTRAVENOUS AS NEEDED
Status: CANCELLED | OUTPATIENT
Start: 2024-04-08

## 2024-04-08 RX ORDER — FAMOTIDINE 10 MG/ML
20 INJECTION INTRAVENOUS ONCE AS NEEDED
Status: DISCONTINUED | OUTPATIENT
Start: 2024-04-08 | End: 2024-04-08 | Stop reason: HOSPADM

## 2024-04-08 RX ORDER — HEPARIN 100 UNIT/ML
500 SYRINGE INTRAVENOUS AS NEEDED
Status: CANCELLED | OUTPATIENT
Start: 2024-04-08

## 2024-04-08 RX ORDER — PREDNISONE 50 MG/1
50 TABLET ORAL 2 TIMES DAILY
Qty: 20 TABLET | Refills: 0 | Status: SHIPPED | OUTPATIENT
Start: 2024-04-08 | End: 2024-04-09 | Stop reason: SDUPTHER

## 2024-04-08 RX ORDER — DIPHENHYDRAMINE HYDROCHLORIDE 50 MG/ML
50 INJECTION INTRAMUSCULAR; INTRAVENOUS AS NEEDED
Status: DISCONTINUED | OUTPATIENT
Start: 2024-04-08 | End: 2024-04-08 | Stop reason: HOSPADM

## 2024-04-08 RX ORDER — ALBUTEROL SULFATE 0.83 MG/ML
3 SOLUTION RESPIRATORY (INHALATION) AS NEEDED
Status: DISCONTINUED | OUTPATIENT
Start: 2024-04-08 | End: 2024-04-08 | Stop reason: HOSPADM

## 2024-04-08 RX ORDER — PROCHLORPERAZINE EDISYLATE 5 MG/ML
10 INJECTION INTRAMUSCULAR; INTRAVENOUS EVERY 6 HOURS PRN
Status: DISCONTINUED | OUTPATIENT
Start: 2024-04-08 | End: 2024-04-08 | Stop reason: HOSPADM

## 2024-04-08 RX ADMIN — SODIUM CHLORIDE 100 MG: 9 INJECTION, SOLUTION INTRAVENOUS at 11:10

## 2024-04-08 RX ADMIN — SODIUM CHLORIDE 300 MG: 9 INJECTION, SOLUTION INTRAVENOUS at 12:05

## 2024-04-08 ASSESSMENT — ENCOUNTER SYMPTOMS
RESPIRATORY NEGATIVE: 1
CONSTITUTIONAL NEGATIVE: 1
EYES NEGATIVE: 1

## 2024-04-08 ASSESSMENT — PAIN SCALES - GENERAL: PAINLEVEL: 0-NO PAIN

## 2024-04-08 NOTE — PROGRESS NOTES
Patient ID: Chester Verduzco is a 74 y.o. male.  Referring Physician: Danish Springer MD  [id number] [place] [address]  Primary Care Provider: Jeffry De Leon MD  Visit Type:  Follow Up       Subjective    HPI  Mr. Chester Verduzco is a 73 year old male with PMHX HTN, HLD, Dual chamber PPM implanted in 11/1996 for SSS (RV is passive) with most recent generator replacement in 11/2020 ([employer] [facility] [employer] MRI), Type 2 DM, gout, and recent hospitalization (10/2-10/7) for Staph bacteremia, diarrhea, NOHELIA , and hyponatremia 2/2 hypovolemia who presented to [facility] ED from a SNF on 11/13 for NOHELIA on labs from the SNF and oliguria. Blood cultures were positive for MSSA and previously had been positive 10/8 and 11/10 for MSSA. Blood cultures with NGTD as of 11/16.  FILIPE 11/16 showed tricuspid valve vegetation as well as right atrial lead vegetation. Patient has been afebrile without leukocytosis and hemodynamically stable. Device Interrogated 11/17 with AP 29% and  30%.  Lead extraction complete 11/22.  Patient on IV antibiotics with stop 4 weeks after lead removal (~12/20). After lead removal, on telemetry the patient had several episodes of intermittent asymptomatic high-grade AV block, NSVT,and bradycardia. EP plans to implant PPM after 14 days of negative blood culture post removal per, ID. Patient received a unit of packed red blood cells for low hemoglobin (11/25). Hemoglobin has remained stable with no signs or symptoms of bleeding. Home lasix and losartan were being held in setting of NOHELIA 2/2 to nephrotoxic antibiotic exposure which down trending but not at baseline with adequate urine output. Patient has maldonado I/s/o severe BPH. Of note, Patient noticed left groin lymphadenopathy and U/S (11/23) showed Enlarged left inguinal/external iliac lymph nodes. PET scan deferred at this time d/t active infectious process. Underwent core needle biopsy on 11/29. Patient to follow up biopsy  results with surgical oncology. Device re-implant (micra leadless pacemaker) 11/30, PICC placed 12/2. In discussing with ID on 12/2, and reviewing past ID notes, reimplant was done iso tricuspid vegetation with the assumption that patient was sterilized given Negative BC, lack of growth in vegetation size and clinical picture. Patient has outpatient follow up scheduled further and antibiotic use will be determined then.      Biopsy results confirming metastatic melanoma but because of patient's poor performance status no intervention is planned at this time.  Patient to return in 2 months for evaluation of performance status and to discuss with patient at that time if feasible immuno oncologic therapy.    Summary  Stage:  III     Assessment:  Enlarged lymph nodes seen on CT in the left external iliac chain, and bilateral groin.  A biopsy of the left inguinal lymph node with metastatic melanoma.  Negative for BRAF V600.     PET/CT with multiple FDG avid subcutaneous nodular soft tissue densities within the left gluteal region, likely representing primary melanoma.  FDG avid bulky left external iliac and inguinal nodes, corresponding to biopsy-proven darren metastases.  No PET evidence of distant metastasis.     Recommendation:  MRI brain pending.  Systemic therapy (with ipi/nivo).  Full skin exam to look for primary lesion.  Review Multidisciplinary Cutaneous Oncology Conference recommendation with patient. Continue routine follow up and total body skin exams with Dermatology.     Follow Up:  Danish Springer, Dermatology, Landon Hilario    Review of Systems   Constitutional: Negative.    HENT:  Negative.     Eyes: Negative.    Respiratory: Negative.     Skin:  Positive for rash.        Objective   BSA: 2.41 meters squared  /67 (BP Location: Left arm)   Pulse 75   Temp 36.3 °C (97.3 °F)   Resp 16   Wt 112 kg (246 lb 7.6 oz)   SpO2 97%   BMI 32.32 kg/m²      has a past medical history of  Abnormal weight gain (10/27/2016), Achilles tendinitis, unspecified leg (08/16/2016), Acute upper respiratory infection, unspecified, NOHELIA (acute kidney injury) (CMS/[facility]) (10/02/2023), Allergic contact dermatitis due to plants, except food (08/22/2013), Arthritis, Bell's palsy (03/21/2023), BPH with obstruction/lower urinary tract symptoms, Cancer (CMS/[facility]), Cellulitis of right lower limb (07/30/2021), Cough, unspecified (03/21/2016), Diabetes (CMS/[facility]), Encounter for screening for human immunodeficiency virus (HIV) (06/14/2016), Hordeolum externum unspecified eye, unspecified eyelid (08/14/2019), Hyperglycemia, unspecified (12/13/2017), Hypertension, Incisional hernia without obstruction or gangrene (06/15/2015), Knee joint pain, Melanoma (CMS/[facility]), MSSA bacteremia (10/2023), Muscle spasm of calf (08/16/2016), Obstructive uropathy, Personal history of other diseases of the digestive system (06/30/2015), Personal history of other diseases of the digestive system (02/05/2016), Personal history of other diseases of the respiratory system (12/23/2014), Personal history of other diseases of the respiratory system (01/28/2016), Personal history of other infectious and parasitic diseases (12/02/2015), Personal history of other infectious and parasitic diseases, Personal history of other specified conditions (02/04/2015), Personal history of other specified conditions (08/16/2016), Personal history of other specified conditions (01/26/2015), Personal history of pneumonia (recurrent) (01/29/2016), Personal history of urinary (tract) infections (02/19/2013), Prostatitis (03/21/2023), Pruritus, unspecified (02/10/2015), Sinoatrial node dysfunction (CMS/[facility]), Strain of muscle, fascia and tendon of the posterior muscle group at thigh level, right thigh, initial encounter (05/10/2016), and Unspecified symptoms and signs involving the genitourinary system (12/20/2016).   has a past surgical history that includes Varicose vein  surgery (08/22/2013); Cardiac pacemaker placement (08/22/2013); Other surgical history (06/15/2015); Ventral hernia repair (06/15/2015); Other surgical history (09/16/2019); Peripherally inserted central catheter insertion; Cardiac electrophysiology procedure (Left, 11/22/2023); US guided biopsy lymph node superficial (11/29/2023); Cardiac electrophysiology procedure (N/A, 11/30/2023); and Skin biopsy.  Family History   Problem Relation Name Age of Onset    Cancer Mother Maye Verduzco      Oncology History   Metastatic melanoma (CMS/HCC)   12/12/2023 Initial Diagnosis    Metastatic melanoma (CMS/HCC)     3/18/2024 -  Chemotherapy    Nivolumab 1 mg/kg + Ipilimumab 3 mg/kg, 21 Day Cycles         Chester Verduzco  reports that he has never smoked. He has never been exposed to tobacco smoke. He has never used smokeless tobacco.  He  reports that he does not currently use alcohol.  He  reports no history of drug use.    Physical Exam    WBC   Date/Time Value Ref Range Status   04/02/2024 12:00 PM 13.0 (H) 4.4 - 11.3 x10*3/uL Final   03/18/2024 09:50 AM 5.4 4.4 - 11.3 x10*3/uL Final   12/03/2023 04:57 AM 3.7 (L) 4.4 - 11.3 x10*3/uL Final     nRBC   Date Value Ref Range Status   04/02/2024 0.0 0.0 - 0.0 /100 WBCs Final   12/03/2023 0.0 0.0 - 0.0 /100 WBCs Final   12/02/2023 0.0 0.0 - 0.0 /100 WBCs Final     RBC   Date Value Ref Range Status   04/02/2024 3.66 (L) 4.50 - 5.90 x10*6/uL Final   03/18/2024 3.07 (L) 4.50 - 5.90 x10*6/uL Final   12/03/2023 2.53 (L) 4.50 - 5.90 x10*6/uL Final     Hemoglobin   Date Value Ref Range Status   04/02/2024 11.1 (L) 13.5 - 17.5 g/dL Final   03/18/2024 9.4 (L) 13.5 - 17.5 g/dL Final   12/03/2023 7.0 (L) 13.5 - 17.5 g/dL Final     Hematocrit   Date Value Ref Range Status   04/02/2024 34.2 (L) 41.0 - 52.0 % Final   03/18/2024 27.8 (L) 41.0 - 52.0 % Final   12/03/2023 22.4 (L) 41.0 - 52.0 % Final     MCV   Date/Time Value Ref Range Status   04/02/2024 12:00 PM 93 80 - 100 fL Final    03/18/2024 09:50 AM 91 80 - 100 fL Final   12/03/2023 04:57 AM 89 80 - 100 fL Final     MCH   Date/Time Value Ref Range Status   04/02/2024 12:00 PM 30.3 26.0 - 34.0 pg Final   03/18/2024 09:50 AM 30.6 26.0 - 34.0 pg Final   12/03/2023 04:57 AM 27.7 26.0 - 34.0 pg Final     MCHC   Date/Time Value Ref Range Status   04/02/2024 12:00 PM 32.5 32.0 - 36.0 g/dL Final   03/18/2024 09:50 AM 33.8 32.0 - 36.0 g/dL Final   12/03/2023 04:57 AM 31.3 (L) 32.0 - 36.0 g/dL Final     RDW   Date/Time Value Ref Range Status   04/02/2024 12:00 PM 14.3 11.5 - 14.5 % Final   03/18/2024 09:50 AM 13.7 11.5 - 14.5 % Final   12/03/2023 04:57 AM 15.2 (H) 11.5 - 14.5 % Final     Platelets   Date/Time Value Ref Range Status   04/02/2024 12:00  (L) 150 - 450 x10*3/uL Final   03/18/2024 09:50  (L) 150 - 450 x10*3/uL Final   12/03/2023 04:57 AM 90 (L) 150 - 450 x10*3/uL Final     MPV   Date/Time Value Ref Range Status   10/07/2023 05:38 AM 9.2 7.5 - 11.5 fL Final   10/06/2023 05:47 AM 9.7 7.5 - 11.5 fL Final   10/05/2023 05:58 AM 10.2 7.5 - 11.5 fL Final     Neutrophils %   Date/Time Value Ref Range Status   04/02/2024 12:00 PM 73.7 40.0 - 80.0 % Final   03/18/2024 09:50 AM 69.0 40.0 - 80.0 % Final   12/03/2023 04:57 AM 61.9 40.0 - 80.0 % Final     Immature Granulocytes %, Automated   Date/Time Value Ref Range Status   04/02/2024 12:00 PM 1.4 (H) 0.0 - 0.9 % Final     Comment:     Immature Granulocyte Count (IG) includes promyelocytes, myelocytes and metamyelocytes but does not include bands. Percent differential counts (%) should be interpreted in the context of the absolute cell counts (cells/UL).   03/18/2024 09:50 AM 0.2 0.0 - 0.9 % Final     Comment:     Immature Granulocyte Count (IG) includes promyelocytes, myelocytes and metamyelocytes but does not include bands. Percent differential counts (%) should be interpreted in the context of the absolute cell counts (cells/UL).   12/03/2023 04:57 AM 0.8 0.0 - 0.9 % Final      Comment:     Immature Granulocyte Count (IG) includes promyelocytes, myelocytes and metamyelocytes but does not include bands. Percent differential counts (%) should be interpreted in the context of the absolute cell counts (cells/UL).     Lymphocytes %   Date/Time Value Ref Range Status   04/02/2024 12:00 PM 17.1 13.0 - 44.0 % Final   03/18/2024 09:50 AM 19.6 13.0 - 44.0 % Final   12/03/2023 04:57 AM 24.8 13.0 - 44.0 % Final     Monocytes %   Date/Time Value Ref Range Status   04/02/2024 12:00 PM 7.2 2.0 - 10.0 % Final   03/18/2024 09:50 AM 7.9 2.0 - 10.0 % Final   12/03/2023 04:57 AM 9.5 2.0 - 10.0 % Final     Eosinophils %   Date/Time Value Ref Range Status   04/02/2024 12:00 PM 0.4 0.0 - 6.0 % Final   03/18/2024 09:50 AM 3.1 0.0 - 6.0 % Final   12/03/2023 04:57 AM 2.5 0.0 - 6.0 % Final     Basophils %   Date/Time Value Ref Range Status   04/02/2024 12:00 PM 0.2 0.0 - 2.0 % Final   03/18/2024 09:50 AM 0.2 0.0 - 2.0 % Final   12/03/2023 04:57 AM 0.5 0.0 - 2.0 % Final     Neutrophils Absolute   Date/Time Value Ref Range Status   04/02/2024 12:00 PM 9.60 (H) 1.60 - 5.50 x10*3/uL Final     Comment:     Percent differential counts (%) should be interpreted in the context of the absolute cell counts (cells/uL).   03/18/2024 09:50 AM 3.74 1.60 - 5.50 x10*3/uL Final     Comment:     Percent differential counts (%) should be interpreted in the context of the absolute cell counts (cells/uL).   12/03/2023 04:57 AM 2.27 1.60 - 5.50 x10*3/uL Final     Comment:     Percent differential counts (%) should be interpreted in the context of the absolute cell counts (cells/uL).     Immature Granulocytes Absolute, Automated   Date/Time Value Ref Range Status   04/02/2024 12:00 PM 0.18 0.00 - 0.50 x10*3/uL Final   03/18/2024 09:50 AM 0.01 0.00 - 0.50 x10*3/uL Final   12/03/2023 04:57 AM 0.03 0.00 - 0.50 x10*3/uL Final     Lymphocytes Absolute   Date/Time Value Ref Range Status   04/02/2024 12:00 PM 2.22 0.80 - 3.00 x10*3/uL Final  "  03/18/2024 09:50 AM 1.06 0.80 - 3.00 x10*3/uL Final   12/03/2023 04:57 AM 0.91 0.80 - 3.00 x10*3/uL Final     Monocytes Absolute   Date/Time Value Ref Range Status   04/02/2024 12:00 PM 0.94 (H) 0.05 - 0.80 x10*3/uL Final   03/18/2024 09:50 AM 0.43 0.05 - 0.80 x10*3/uL Final   12/03/2023 04:57 AM 0.35 0.05 - 0.80 x10*3/uL Final     Eosinophils Absolute   Date/Time Value Ref Range Status   04/02/2024 12:00 PM 0.05 0.00 - 0.40 x10*3/uL Final   03/18/2024 09:50 AM 0.17 0.00 - 0.40 x10*3/uL Final   12/03/2023 04:57 AM 0.09 0.00 - 0.40 x10*3/uL Final     Basophils Absolute   Date/Time Value Ref Range Status   04/02/2024 12:00 PM 0.03 0.00 - 0.10 x10*3/uL Final   03/18/2024 09:50 AM 0.01 0.00 - 0.10 x10*3/uL Final   12/03/2023 04:57 AM 0.02 0.00 - 0.10 x10*3/uL Final       No components found for: \"PT\"  aPTT   Date/Time Value Ref Range Status   11/30/2023 07:14 AM 36 27 - 38 seconds Final   11/29/2023 10:10 AM 36 27 - 38 seconds Final   11/24/2023 04:53 AM 37 27 - 38 seconds Final       Assessment/Plan      Stage III\4 malignant melanoma.  Discussed the tumor board and recommendations will be followed.  Immunotherapy recommended.  Patient consented for Ipi Nivo.  Patient consented.  MRIs of the brain still pending.  Patient will be seen in 3 weeks.  Examination of his left buttock revealing 3 punctate lesions.  Most likely the source of the primary.  Will follow through with tumor board recommendations patient will be seen by Dr. Ted Cameron and Dr. Anderson.  He is consented however for ipilimumab and nivolumab therapy as per the recommendation by the tumor board as well.  Patient concurs with the management plan.    Proceed with I/O therapy today C2: After cycle 1 patient developed a rash on her back on his back which was treated with triamcinolone cream and short course of steroids.  I will repeat the short course of steroids empirically today     Diagnoses and all orders for this visit:  Metastatic melanoma " (CMS/[facility])  -     Clinic Appointment Request  -     predniSONE (Deltasone) 50 mg tablet; Take 1 tablet (50 mg) by mouth 2 times a day for 10 days.  Rash  -     predniSONE (Deltasone) 50 mg tablet; Take 1 tablet (50 mg) by mouth 2 times a day for 10 days.           Danish Springer MD

## 2024-04-08 NOTE — PROGRESS NOTES
Patient received Cycle 2 Ipilimumab, Nivolumab, tolerated well, left via w/c in stable condition.

## 2024-04-08 NOTE — SIGNIFICANT EVENT
04/08/24 1030   Prechemo Checklist   Has the patient been in the hospital, ED, or urgent care since last date of service No   Chemo/Immuno Consent Signed Yes   Protocol/Indications Verified Yes   Confirmed to previous date/time of medication Yes   Compared to previous dose Yes   All medications are dated accurately Yes   Pregnancy Test Negative Not applicable   Parameters Met (!) No  (Dr Springer wants to keep I pilimumab dose as written)   Provider Notified Yes   Provider Name Dr Springer   Is Patient Proceeding With Treatment? Yes   BSA/Weight-Height Verified Yes   Dose Calculations Verified (current, total, cumulative) Yes

## 2024-04-08 NOTE — PATIENT INSTRUCTIONS
Today you met with Dr. Springer.  He will be refilling your oral steroid to take.  He also wants to see you in 3 weeks with your next infusion to monitor your rash.  Today additional labs were drawn and those will be discussed at your next infusion also.      Please call the office for any questions or concerns.  Review your schedule prior to leaving [facility].  We ask that you notify us 5-7 days before your medications need refilled.  [facility] is strongly encouraging all patients to access their MyChart for all results and to communicate with their provider for non-urgent messages.  If an urgent/same day/sick concern response is needed, please call the office at [phone]. Thank You!

## 2024-04-09 DIAGNOSIS — C43.9 METASTATIC MELANOMA (MULTI): ICD-10-CM

## 2024-04-09 DIAGNOSIS — R21 RASH: ICD-10-CM

## 2024-04-09 RX ORDER — PREDNISONE 50 MG/1
50 TABLET ORAL 2 TIMES DAILY
Qty: 20 TABLET | Refills: 0 | Status: SHIPPED | OUTPATIENT
Start: 2024-04-09 | End: 2024-04-19

## 2024-04-15 LAB
CELL COUNT (BLOOD): 6.2 X10*3/UL
CELL POPULATIONS: NORMAL
CHROM ANALY OVERALL INTERP-IMP: NORMAL
CYTOGENETICS/MOLECULAR TEST ORDERED: NORMAL
DIAGNOSIS: NORMAL
ELECTRONICALLY SIGNED BY CYTOGENETICS: NORMAL
FLOW DIFFERENTIAL: NORMAL
FLOW TEST ORDERED: NORMAL
LAB TEST METHOD: NORMAL
NUMBER OF CELLS COLLECTED: NORMAL PER TUBE
PATH REPORT.TOTAL CANCER: NORMAL
RBC MORPH BLD: NORMAL
SIGNATURE COMMENT: NORMAL
SPECIMEN VIABILITY: NORMAL
WBC MORPH BLD: NORMAL

## 2024-04-18 ENCOUNTER — TELEPHONE (OUTPATIENT)
Dept: HEMATOLOGY/ONCOLOGY | Facility: CLINIC | Age: 74
End: 2024-04-18
Payer: COMMERCIAL

## 2024-04-18 DIAGNOSIS — Z79.4 TYPE 2 DIABETES MELLITUS WITH DIABETIC NEUROPATHY, WITH LONG-TERM CURRENT USE OF INSULIN (MULTI): ICD-10-CM

## 2024-04-18 DIAGNOSIS — E11.40 TYPE 2 DIABETES MELLITUS WITH DIABETIC NEUROPATHY, WITH LONG-TERM CURRENT USE OF INSULIN (MULTI): ICD-10-CM

## 2024-04-18 RX ORDER — INSULIN DEGLUDEC 100 U/ML
20 INJECTION, SOLUTION SUBCUTANEOUS NIGHTLY
Qty: 9 ML | Refills: 7 | Status: SHIPPED | OUTPATIENT
Start: 2024-04-18 | End: 2025-04-18

## 2024-04-18 RX ORDER — FLASH GLUCOSE SCANNING READER
EACH MISCELLANEOUS
Qty: 1 EACH | Refills: 0 | Status: SHIPPED | OUTPATIENT
Start: 2024-04-18

## 2024-04-18 NOTE — TELEPHONE ENCOUNTER
Per Dr. Springer, pt should be set up for Virtual Appt tomorrow.  We are able to schedule for 1:30 pm per CLOVIS Gonzalez.  Left message on well identified VM, but need to confirm tomorrow.

## 2024-04-18 NOTE — TELEPHONE ENCOUNTER
Called and spoke to pt.  He states he did take his Prednisone 50 mg tablets and started on April 8th at night and will be done tomorrow morning as ordered.  He now had another script that he filled and it is for the same prescription.  He is due to come to see Dr. Springer and receive Treatment (Ipi and Nivolumab) on 4/29.  He is wanting to confirm that he should wait to begin this next prescription until 4/29 treatment day.     In addition, he is working with Dr. De Leon to manage his insulin and they are considering putting him on a sliding scale if he is going to be restarting the Prednisone with the next infusion.

## 2024-04-18 NOTE — TELEPHONE ENCOUNTER
Pt wants the flex pen not the vials, also ins. wont pay for the medhat 3 but they will pay for the Medhat 2   His blood sugars are running high but still on the prednisone   WED:  192  285  305  373  252  And this morning it was 192 he is doing 20Units at night

## 2024-04-19 ENCOUNTER — TELEMEDICINE (OUTPATIENT)
Dept: HEMATOLOGY/ONCOLOGY | Facility: CLINIC | Age: 74
End: 2024-04-19
Payer: MEDICARE

## 2024-04-19 DIAGNOSIS — C43.9 METASTATIC MELANOMA (MULTI): Primary | ICD-10-CM

## 2024-04-19 PROCEDURE — 3008F BODY MASS INDEX DOCD: CPT | Performed by: INTERNAL MEDICINE

## 2024-04-19 PROCEDURE — 4010F ACE/ARB THERAPY RXD/TAKEN: CPT | Performed by: INTERNAL MEDICINE

## 2024-04-19 PROCEDURE — 99214 OFFICE O/P EST MOD 30 MIN: CPT | Performed by: INTERNAL MEDICINE

## 2024-04-19 PROCEDURE — 1160F RVW MEDS BY RX/DR IN RCRD: CPT | Performed by: INTERNAL MEDICINE

## 2024-04-19 PROCEDURE — 3044F HG A1C LEVEL LT 7.0%: CPT | Performed by: INTERNAL MEDICINE

## 2024-04-19 PROCEDURE — 1159F MED LIST DOCD IN RCRD: CPT | Performed by: INTERNAL MEDICINE

## 2024-04-19 PROCEDURE — 1157F ADVNC CARE PLAN IN RCRD: CPT | Performed by: INTERNAL MEDICINE

## 2024-04-19 PROCEDURE — 3048F LDL-C <100 MG/DL: CPT | Performed by: INTERNAL MEDICINE

## 2024-04-19 NOTE — PROGRESS NOTES
Patient ID: Chester Verduzco is a 74 y.o. male.  Referring Physician: No referring provider defined for this encounter.  Primary Care Provider: Jeffry De Leon MD  Visit Type:  Follow Up     Verbal consent was requested and obtained from patient on this date for a telehealth visit.    Subjective    HPI  Mr. 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 Blood cultures were positive for MSSA and previously had been positive 10/8 and 11/10 for MSSA. Blood cultures with NGTD as of 11/16.  FILIPE 11/16 showed tricuspid valve vegetation as well as right atrial lead vegetation. Patient has been afebrile without leukocytosis and hemodynamically stable. Device Interrogated 11/17 with AP 29% and  30%.  Lead extraction complete 11/22.  Patient on IV antibiotics with stop 4 weeks after lead removal (~12/20). After lead removal, on telemetry the patient had several episodes of intermittent asymptomatic high-grade AV block, NSVT,and bradycardia. EP plans to implant PPM after 14 days of negative blood culture post removal per, ID. Patient received a unit of packed red blood cells for low hemoglobin (11/25). Hemoglobin has remained stable with no signs or symptoms of bleeding. Home lasix and losartan were being held in setting of NOHELIA 2/2 to nephrotoxic antibiotic exposure which down trending but not at baseline with adequate urine output. Patient has maldonado I/s/o severe BPH. Of note, Patient noticed left groin lymphadenopathy and U/S (11/23) showed Enlarged left inguinal/external iliac lymph nodes. PET scan deferred at this time d/t active infectious process.  Underwent core needle biopsy on 11/29. Patient to follow up biopsy results with surgical oncology. Device re-implant (micra leadless pacemaker) 11/30, PICC placed 12/2. In discussing with ID on 12/2, and reviewing past ID notes, reimplant was done iso tricuspid vegetation with the assumption that patient was sterilized given Negative BC, lack of growth in vegetation size and clinical picture. Patient has outpatient follow up scheduled further and antibiotic use will be determined then.      Biopsy results confirming metastatic melanoma but because of patient's poor performance status no intervention is planned at this time.  Patient to return in 2 months for evaluation of performance status and to discuss with patient at that time if feasible immuno oncologic therapy.    Summary  Stage:  III  Assessment:  Enlarged lymph nodes seen on CT in the left external iliac chain, and bilateral groin.  A biopsy of the left inguinal lymph node with metastatic melanoma.  Negative for BRAF V600.     PET/CT with multiple FDG avid subcutaneous nodular soft tissue densities within the left gluteal region, likely representing primary melanoma.  FDG avid bulky left external iliac and inguinal nodes, corresponding to biopsy-proven darren metastases.  No PET evidence of distant metastasis.     Recommendation:  MRI brain pending.  Systemic therapy (with ipi/nivo).  Full skin exam to look for primary lesion.  Review Multidisciplinary Cutaneous Oncology Conference recommendation with patient. Continue routine follow up and total body skin exams with Dermatology.     Follow Up:  Danish Springer, Dermatology, Landon Hilario  Review of Systems - Oncology     Objective   BSA: There is no height or weight on file to calculate BSA.  There were no vitals taken for this visit.     has a past medical history of Abnormal weight gain (10/27/2016), Achilles tendinitis, unspecified leg (08/16/2016), Acute upper respiratory infection,  unspecified, NOHELIA (acute kidney injury) (CMS-HCC) (10/02/2023), Allergic contact dermatitis due to plants, except food (08/22/2013), Arthritis, Bell's palsy (03/21/2023), BPH with obstruction/lower urinary tract symptoms, Cancer (Multi), Cellulitis of right lower limb (07/30/2021), Cough, unspecified (03/21/2016), Diabetes (Multi), Encounter for screening for human immunodeficiency virus (HIV) (06/14/2016), Hordeolum externum unspecified eye, unspecified eyelid (08/14/2019), Hyperglycemia, unspecified (12/13/2017), Hypertension, Incisional hernia without obstruction or gangrene (06/15/2015), Knee joint pain, Melanoma (Multi), MSSA bacteremia (10/2023), Muscle spasm of calf (08/16/2016), Obstructive uropathy, Personal history of other diseases of the digestive system (06/30/2015), Personal history of other diseases of the digestive system (02/05/2016), Personal history of other diseases of the respiratory system (12/23/2014), Personal history of other diseases of the respiratory system (01/28/2016), Personal history of other infectious and parasitic diseases (12/02/2015), Personal history of other infectious and parasitic diseases, Personal history of other specified conditions (02/04/2015), Personal history of other specified conditions (08/16/2016), Personal history of other specified conditions (01/26/2015), Personal history of pneumonia (recurrent) (01/29/2016), Personal history of urinary (tract) infections (02/19/2013), Prostatitis (03/21/2023), Pruritus, unspecified (02/10/2015), Sinoatrial node dysfunction (Multi), Strain of muscle, fascia and tendon of the posterior muscle group at thigh level, right thigh, initial encounter (05/10/2016), and Unspecified symptoms and signs involving the genitourinary system (12/20/2016).   has a past surgical history that includes Varicose vein surgery (08/22/2013); Cardiac pacemaker placement (08/22/2013); Other surgical history (06/15/2015); Ventral hernia repair  (06/15/2015); Other surgical history (09/16/2019); Peripherally inserted central catheter insertion; Cardiac electrophysiology procedure (Left, 11/22/2023); US guided biopsy lymph node superficial (11/29/2023); Cardiac electrophysiology procedure (N/A, 11/30/2023); and Skin biopsy.  Family History   Problem Relation Name Age of Onset    Cancer Mother Maye Verduzco      Oncology History   Metastatic melanoma (Multi)   12/12/2023 Initial Diagnosis    Metastatic melanoma (CMS/HCC)     3/18/2024 -  Chemotherapy    Nivolumab 1 mg/kg + Ipilimumab 3 mg/kg, 21 Day Cycles         Chester Verduzco  reports that he has never smoked. He has never been exposed to tobacco smoke. He has never used smokeless tobacco.  He  reports that he does not currently use alcohol.  He  reports no history of drug use.    Physical Exam    WBC   Date/Time Value Ref Range Status   04/08/2024 09:44 AM 6.2 4.4 - 11.3 x10*3/uL Final   04/02/2024 12:00 PM 13.0 (H) 4.4 - 11.3 x10*3/uL Final   03/18/2024 09:50 AM 5.4 4.4 - 11.3 x10*3/uL Final     nRBC   Date Value Ref Range Status   04/08/2024   Final     Comment:     Not Measured   04/02/2024 0.0 0.0 - 0.0 /100 WBCs Final   12/03/2023 0.0 0.0 - 0.0 /100 WBCs Final     RBC   Date Value Ref Range Status   04/08/2024 2.86 (L) 4.50 - 5.90 x10*6/uL Final   04/02/2024 3.66 (L) 4.50 - 5.90 x10*6/uL Final   03/18/2024 3.07 (L) 4.50 - 5.90 x10*6/uL Final     Hemoglobin   Date Value Ref Range Status   04/08/2024 8.6 (L) 13.5 - 17.5 g/dL Final   04/02/2024 11.1 (L) 13.5 - 17.5 g/dL Final   03/18/2024 9.4 (L) 13.5 - 17.5 g/dL Final     Hematocrit   Date Value Ref Range Status   04/08/2024 26.3 (L) 41.0 - 52.0 % Final   04/02/2024 34.2 (L) 41.0 - 52.0 % Final   03/18/2024 27.8 (L) 41.0 - 52.0 % Final     MCV   Date/Time Value Ref Range Status   04/08/2024 09:44 AM 92 80 - 100 fL Final   04/02/2024 12:00 PM 93 80 - 100 fL Final   03/18/2024 09:50 AM 91 80 - 100 fL Final     MCH   Date/Time Value Ref Range Status    04/08/2024 09:44 AM 30.1 26.0 - 34.0 pg Final   04/02/2024 12:00 PM 30.3 26.0 - 34.0 pg Final   03/18/2024 09:50 AM 30.6 26.0 - 34.0 pg Final     MCHC   Date/Time Value Ref Range Status   04/08/2024 09:44 AM 32.7 32.0 - 36.0 g/dL Final   04/02/2024 12:00 PM 32.5 32.0 - 36.0 g/dL Final   03/18/2024 09:50 AM 33.8 32.0 - 36.0 g/dL Final     RDW   Date/Time Value Ref Range Status   04/08/2024 09:44 AM 13.9 11.5 - 14.5 % Final   04/02/2024 12:00 PM 14.3 11.5 - 14.5 % Final   03/18/2024 09:50 AM 13.7 11.5 - 14.5 % Final     Platelets   Date/Time Value Ref Range Status   04/08/2024 09:44 AM 93 (L) 150 - 450 x10*3/uL Final     Comment:     Platelet count verified by smear review.   04/02/2024 12:00  (L) 150 - 450 x10*3/uL Final   03/18/2024 09:50  (L) 150 - 450 x10*3/uL Final     MPV   Date/Time Value Ref Range Status   10/07/2023 05:38 AM 9.2 7.5 - 11.5 fL Final   10/06/2023 05:47 AM 9.7 7.5 - 11.5 fL Final   10/05/2023 05:58 AM 10.2 7.5 - 11.5 fL Final     Neutrophils %   Date/Time Value Ref Range Status   04/08/2024 09:44 AM 76.7 40.0 - 80.0 % Final   04/02/2024 12:00 PM 73.7 40.0 - 80.0 % Final   03/18/2024 09:50 AM 69.0 40.0 - 80.0 % Final     Immature Granulocytes %, Automated   Date/Time Value Ref Range Status   04/08/2024 09:44 AM 0.2 0.0 - 0.9 % Final     Comment:     Immature Granulocyte Count (IG) includes promyelocytes, myelocytes and metamyelocytes but does not include bands. Percent differential counts (%) should be interpreted in the context of the absolute cell counts (cells/UL).   04/02/2024 12:00 PM 1.4 (H) 0.0 - 0.9 % Final     Comment:     Immature Granulocyte Count (IG) includes promyelocytes, myelocytes and metamyelocytes but does not include bands. Percent differential counts (%) should be interpreted in the context of the absolute cell counts (cells/UL).   03/18/2024 09:50 AM 0.2 0.0 - 0.9 % Final     Comment:     Immature Granulocyte Count (IG) includes promyelocytes, myelocytes and  metamyelocytes but does not include bands. Percent differential counts (%) should be interpreted in the context of the absolute cell counts (cells/UL).     Lymphocytes %   Date/Time Value Ref Range Status   04/08/2024 09:44 AM 10.7 13.0 - 44.0 % Final   04/02/2024 12:00 PM 17.1 13.0 - 44.0 % Final   03/18/2024 09:50 AM 19.6 13.0 - 44.0 % Final     Monocytes %   Date/Time Value Ref Range Status   04/08/2024 09:44 AM 9.6 2.0 - 10.0 % Final   04/02/2024 12:00 PM 7.2 2.0 - 10.0 % Final   03/18/2024 09:50 AM 7.9 2.0 - 10.0 % Final     Eosinophils %   Date/Time Value Ref Range Status   04/08/2024 09:44 AM 2.6 0.0 - 6.0 % Final   04/02/2024 12:00 PM 0.4 0.0 - 6.0 % Final   03/18/2024 09:50 AM 3.1 0.0 - 6.0 % Final     Basophils %   Date/Time Value Ref Range Status   04/08/2024 09:44 AM 0.2 0.0 - 2.0 % Final   04/02/2024 12:00 PM 0.2 0.0 - 2.0 % Final   03/18/2024 09:50 AM 0.2 0.0 - 2.0 % Final     Neutrophils Absolute   Date/Time Value Ref Range Status   04/08/2024 09:44 AM 4.79 1.60 - 5.50 x10*3/uL Final     Comment:     Percent differential counts (%) should be interpreted in the context of the absolute cell counts (cells/uL).   04/02/2024 12:00 PM 9.60 (H) 1.60 - 5.50 x10*3/uL Final     Comment:     Percent differential counts (%) should be interpreted in the context of the absolute cell counts (cells/uL).   03/18/2024 09:50 AM 3.74 1.60 - 5.50 x10*3/uL Final     Comment:     Percent differential counts (%) should be interpreted in the context of the absolute cell counts (cells/uL).     Immature Granulocytes Absolute, Automated   Date/Time Value Ref Range Status   04/08/2024 09:44 AM 0.01 0.00 - 0.50 x10*3/uL Final   04/02/2024 12:00 PM 0.18 0.00 - 0.50 x10*3/uL Final   03/18/2024 09:50 AM 0.01 0.00 - 0.50 x10*3/uL Final     Lymphocytes Absolute   Date/Time Value Ref Range Status   04/08/2024 09:44 AM 0.67 (L) 0.80 - 3.00 x10*3/uL Final   04/02/2024 12:00 PM 2.22 0.80 - 3.00 x10*3/uL Final   03/18/2024 09:50 AM 1.06  "0.80 - 3.00 x10*3/uL Final     Monocytes Absolute   Date/Time Value Ref Range Status   04/08/2024 09:44 AM 0.60 0.05 - 0.80 x10*3/uL Final   04/02/2024 12:00 PM 0.94 (H) 0.05 - 0.80 x10*3/uL Final   03/18/2024 09:50 AM 0.43 0.05 - 0.80 x10*3/uL Final     Eosinophils Absolute   Date/Time Value Ref Range Status   04/08/2024 09:44 AM 0.16 0.00 - 0.40 x10*3/uL Final   04/02/2024 12:00 PM 0.05 0.00 - 0.40 x10*3/uL Final   03/18/2024 09:50 AM 0.17 0.00 - 0.40 x10*3/uL Final     Basophils Absolute   Date/Time Value Ref Range Status   04/08/2024 09:44 AM 0.01 0.00 - 0.10 x10*3/uL Final   04/02/2024 12:00 PM 0.03 0.00 - 0.10 x10*3/uL Final   03/18/2024 09:50 AM 0.01 0.00 - 0.10 x10*3/uL Final       No components found for: \"PT\"  aPTT   Date/Time Value Ref Range Status   11/30/2023 07:14 AM 36 27 - 38 seconds Final   11/29/2023 10:10 AM 36 27 - 38 seconds Final   11/24/2023 04:53 AM 37 27 - 38 seconds Final       Assessment/Plan      Stage III\4 malignant melanoma.  Discussed the tumor board and recommendations will be followed.  Immunotherapy recommended.  Patient consented for Ipi Nivo.  Patient consented.  MRIs of the brain still pending.  Patient will be seen in 3 weeks.  Examination of his left buttock revealing 3 punctate lesions.  Most likely the source of the primary.  Will follow through with tumor board recommendations patient will be seen by Dr. Ted Cameron and Dr. Anderson.  He is consented however for ipilimumab and nivolumab therapy as per the recommendation by the tumor board as well.  Patient concurs with the management plan.    Proceed with I/O therapy today C2: After cycle 1 patient developed a rash on her back on his back which was treated with triamcinolone cream and short course of steroids.  I will repeat the short course of steroids empirically today.    4/19/2024: Patient seen today virtual visit.  Looking much improved responded to short course of steroids with complete resolution of the rash.  Will " discontinue p.o. steroids and will monitor.  Metastatic melanoma status post cycle 1 ipilimumab and nivolumab dose that 3 mg/kg and 1 mg/kg body weight q. 21 days.  Return to clinic for cycle 3.     Diagnoses and all orders for this visit:  Metastatic melanoma (Multi)           Danish Springer MD

## 2024-04-19 NOTE — PATIENT INSTRUCTIONS
Today you had a call with Dr. Springer to review your steroids.  NO MORE STEROIDS at this time.  You are doing well.  Dr. Springer will see you in person with your next infusion.   Please call the office for any questions or concerns.  Review your schedule prior to leaving [facility].  We ask that you notify us 5-7 days before your medications need refilled.   Juan is strongly encouraging all patients to access their MyChart for all results and to communicate with their provider for non-urgent messages.  If an urgent/same day/sick concern response is needed, please call the office at [phone]. Thank You!

## 2024-04-20 DIAGNOSIS — I10 PRIMARY HYPERTENSION: Primary | ICD-10-CM

## 2024-04-23 ENCOUNTER — TELEPHONE (OUTPATIENT)
Dept: HEMATOLOGY/ONCOLOGY | Facility: CLINIC | Age: 74
End: 2024-04-23
Payer: MEDICARE

## 2024-04-23 RX ORDER — LOSARTAN POTASSIUM 100 MG/1
100 TABLET ORAL DAILY
Qty: 90 TABLET | Refills: 3 | Status: SHIPPED | OUTPATIENT
Start: 2024-04-23

## 2024-04-29 ENCOUNTER — OFFICE VISIT (OUTPATIENT)
Dept: HEMATOLOGY/ONCOLOGY | Facility: CLINIC | Age: 74
End: 2024-04-29
Payer: MEDICARE

## 2024-04-29 ENCOUNTER — INFUSION (OUTPATIENT)
Dept: HEMATOLOGY/ONCOLOGY | Facility: CLINIC | Age: 74
End: 2024-04-29
Payer: MEDICARE

## 2024-04-29 VITALS
TEMPERATURE: 98.2 F | OXYGEN SATURATION: 95 % | DIASTOLIC BLOOD PRESSURE: 66 MMHG | RESPIRATION RATE: 18 BRPM | SYSTOLIC BLOOD PRESSURE: 151 MMHG | HEART RATE: 78 BPM | BODY MASS INDEX: 30.65 KG/M2 | WEIGHT: 233.8 LBS

## 2024-04-29 DIAGNOSIS — R50.9 FEVER, UNSPECIFIED FEVER CAUSE: Primary | ICD-10-CM

## 2024-04-29 DIAGNOSIS — R50.9 FEVER, UNSPECIFIED FEVER CAUSE: ICD-10-CM

## 2024-04-29 DIAGNOSIS — T82.7XXA: ICD-10-CM

## 2024-04-29 DIAGNOSIS — C43.9 METASTATIC MELANOMA (MULTI): ICD-10-CM

## 2024-04-29 LAB
ALBUMIN SERPL BCP-MCNC: 3.3 G/DL (ref 3.4–5)
ALP SERPL-CCNC: 116 U/L (ref 33–136)
ALT SERPL W P-5'-P-CCNC: 24 U/L (ref 10–52)
AMYLASE SERPL-CCNC: 62 U/L (ref 29–103)
ANION GAP SERPL CALC-SCNC: 14 MMOL/L (ref 10–20)
AST SERPL W P-5'-P-CCNC: 18 U/L (ref 9–39)
BASOPHILS # BLD AUTO: 0.01 X10*3/UL (ref 0–0.1)
BASOPHILS NFR BLD AUTO: 0.2 %
BILIRUB SERPL-MCNC: 0.7 MG/DL (ref 0–1.2)
BUN SERPL-MCNC: 20 MG/DL (ref 6–23)
CALCIUM SERPL-MCNC: 8.7 MG/DL (ref 8.6–10.3)
CHLORIDE SERPL-SCNC: 102 MMOL/L (ref 98–107)
CO2 SERPL-SCNC: 23 MMOL/L (ref 21–32)
CORTIS AM PEAK SERPL-MSCNC: 20.8 UG/DL (ref 5–20)
CREAT SERPL-MCNC: 1.5 MG/DL (ref 0.5–1.3)
EGFRCR SERPLBLD CKD-EPI 2021: 49 ML/MIN/1.73M*2
EOSINOPHIL # BLD AUTO: 0.08 X10*3/UL (ref 0–0.4)
EOSINOPHIL NFR BLD AUTO: 1.7 %
ERYTHROCYTE [DISTWIDTH] IN BLOOD BY AUTOMATED COUNT: 13.3 % (ref 11.5–14.5)
GLUCOSE SERPL-MCNC: 79 MG/DL (ref 74–99)
HCT VFR BLD AUTO: 27.3 % (ref 41–52)
HGB BLD-MCNC: 9 G/DL (ref 13.5–17.5)
IMM GRANULOCYTES # BLD AUTO: 0.01 X10*3/UL (ref 0–0.5)
IMM GRANULOCYTES NFR BLD AUTO: 0.2 % (ref 0–0.9)
LDH SERPL L TO P-CCNC: 256 U/L (ref 84–246)
LIPASE SERPL-CCNC: 17 U/L (ref 9–82)
LYMPHOCYTES # BLD AUTO: 0.7 X10*3/UL (ref 0.8–3)
LYMPHOCYTES NFR BLD AUTO: 14.9 %
MCH RBC QN AUTO: 29.3 PG (ref 26–34)
MCHC RBC AUTO-ENTMCNC: 33 G/DL (ref 32–36)
MCV RBC AUTO: 89 FL (ref 80–100)
MONOCYTES # BLD AUTO: 0.36 X10*3/UL (ref 0.05–0.8)
MONOCYTES NFR BLD AUTO: 7.6 %
NEUTROPHILS # BLD AUTO: 3.55 X10*3/UL (ref 1.6–5.5)
NEUTROPHILS NFR BLD AUTO: 75.4 %
PLATELET # BLD AUTO: 92 X10*3/UL (ref 150–450)
POTASSIUM SERPL-SCNC: 3.8 MMOL/L (ref 3.5–5.3)
PROT SERPL-MCNC: 6.4 G/DL (ref 6.4–8.2)
RBC # BLD AUTO: 3.07 X10*6/UL (ref 4.5–5.9)
SODIUM SERPL-SCNC: 135 MMOL/L (ref 136–145)
WBC # BLD AUTO: 4.7 X10*3/UL (ref 4.4–11.3)

## 2024-04-29 PROCEDURE — 99214 OFFICE O/P EST MOD 30 MIN: CPT | Performed by: INTERNAL MEDICINE

## 2024-04-29 PROCEDURE — 36415 COLL VENOUS BLD VENIPUNCTURE: CPT

## 2024-04-29 PROCEDURE — 3078F DIAST BP <80 MM HG: CPT | Performed by: INTERNAL MEDICINE

## 2024-04-29 PROCEDURE — 3044F HG A1C LEVEL LT 7.0%: CPT | Performed by: INTERNAL MEDICINE

## 2024-04-29 PROCEDURE — 82150 ASSAY OF AMYLASE: CPT | Performed by: INTERNAL MEDICINE

## 2024-04-29 PROCEDURE — 3077F SYST BP >= 140 MM HG: CPT | Performed by: INTERNAL MEDICINE

## 2024-04-29 PROCEDURE — 83690 ASSAY OF LIPASE: CPT | Performed by: INTERNAL MEDICINE

## 2024-04-29 PROCEDURE — 3008F BODY MASS INDEX DOCD: CPT | Performed by: INTERNAL MEDICINE

## 2024-04-29 PROCEDURE — 80053 COMPREHEN METABOLIC PANEL: CPT | Performed by: INTERNAL MEDICINE

## 2024-04-29 PROCEDURE — 83615 LACTATE (LD) (LDH) ENZYME: CPT | Performed by: INTERNAL MEDICINE

## 2024-04-29 PROCEDURE — 3048F LDL-C <100 MG/DL: CPT | Performed by: INTERNAL MEDICINE

## 2024-04-29 PROCEDURE — 85025 COMPLETE CBC W/AUTO DIFF WBC: CPT | Performed by: INTERNAL MEDICINE

## 2024-04-29 PROCEDURE — 1126F AMNT PAIN NOTED NONE PRSNT: CPT | Performed by: INTERNAL MEDICINE

## 2024-04-29 PROCEDURE — 1159F MED LIST DOCD IN RCRD: CPT | Performed by: INTERNAL MEDICINE

## 2024-04-29 PROCEDURE — 1160F RVW MEDS BY RX/DR IN RCRD: CPT | Performed by: INTERNAL MEDICINE

## 2024-04-29 PROCEDURE — 4010F ACE/ARB THERAPY RXD/TAKEN: CPT | Performed by: INTERNAL MEDICINE

## 2024-04-29 PROCEDURE — 82533 TOTAL CORTISOL: CPT | Mod: GEALAB | Performed by: INTERNAL MEDICINE

## 2024-04-29 PROCEDURE — 1157F ADVNC CARE PLAN IN RCRD: CPT | Performed by: INTERNAL MEDICINE

## 2024-04-29 ASSESSMENT — ENCOUNTER SYMPTOMS
CONSTITUTIONAL NEGATIVE: 1
RESPIRATORY NEGATIVE: 1
EYES NEGATIVE: 1

## 2024-04-29 ASSESSMENT — PAIN SCALES - GENERAL: PAINLEVEL: 0-NO PAIN

## 2024-04-29 NOTE — PROGRESS NOTES
Patient ID: Chester Verduzco is a 74 y.o. male.  Referring Physician: Danish Springer MD  [id number] [place] [address]  Primary Care Provider: Jeffry De Leon MD  Visit Type:  Follow Up     Subjective    HPI  Mr. Chester Verduzco is a 73 year old male with PMHX HTN, HLD, Dual chamber PPM implanted in 11/1996 for SSS (RV is passive) with most recent generator replacement in 11/2020 ([facility] [employer] MRI), Type 2 DM, gout, and recent hospitalization (10/2-10/7) for Staph bacteremia, diarrhea, NOHELIA , and hyponatremia 2/2 hypovolemia who presented to [facility] ED from a SNF on 11/13 for NOHELIA on labs from the SNF and oliguria. Blood cultures were positive for MSSA and previously had been positive 10/8 and 11/10 for MSSA. Blood cultures with NGTD as of 11/16.  FILIPE 11/16 showed tricuspid valve vegetation as well as right atrial lead vegetation. Patient has been afebrile without leukocytosis and hemodynamically stable. Device Interrogated 11/17 with AP 29% and  30%.  Lead extraction complete 11/22.  Patient on IV antibiotics with stop 4 weeks after lead removal (~12/20). After lead removal, on telemetry the patient had several episodes of intermittent asymptomatic high-grade AV block, NSVT,and bradycardia. EP plans to implant PPM after 14 days of negative blood culture post removal per, ID. Patient received a unit of packed red blood cells for low hemoglobin (11/25). Hemoglobin has remained stable with no signs or symptoms of bleeding. Home lasix and losartan were being held in setting of NOHELIA 2/2 to nephrotoxic antibiotic exposure which down trending but not at baseline with adequate urine output. Patient has maldonado I/s/o severe BPH. Of note, Patient noticed left groin lymphadenopathy and U/S (11/23) showed Enlarged left inguinal/external iliac lymph nodes. PET scan deferred at this time d/t active infectious process. Underwent core needle biopsy on 11/29. Patient to follow up biopsy  results with surgical oncology. Device re-implant (micra leadless pacemaker) 11/30, PICC placed 12/2. In discussing with ID on 12/2, and reviewing past ID notes, reimplant was done iso tricuspid vegetation with the assumption that patient was sterilized given Negative BC, lack of growth in vegetation size and clinical picture. Patient has outpatient follow up scheduled further and antibiotic use will be determined then.      Biopsy results confirming metastatic melanoma but because of patient's poor performance status no intervention is planned at this time.  Patient to return in 2 months for evaluation of performance status and to discuss with patient at that time if feasible immuno oncologic therapy.    Summary  Stage:  III  Assessment:  Enlarged lymph nodes seen on CT in the left external iliac chain, and bilateral groin.  A biopsy of the left inguinal lymph node with metastatic melanoma.  Negative for BRAF V600.     PET/CT with multiple FDG avid subcutaneous nodular soft tissue densities within the left gluteal region, likely representing primary melanoma.  FDG avid bulky left external iliac and inguinal nodes, corresponding to biopsy-proven darren metastases.  No PET evidence of distant metastasis.     Recommendation:  MRI brain pending.  Systemic therapy (with ipi/nivo).  Full skin exam to look for primary lesion.  Review Multidisciplinary Cutaneous Oncology Conference recommendation with patient. Continue routine follow up and total body skin exams with Dermatology.     Follow Up:  Danish Springer, Dermatology, Landon Hilario  Review of Systems   Constitutional: Negative.    HENT:  Negative.     Eyes: Negative.    Respiratory: Negative.          Objective   BSA: 2.34 meters squared  /66 (BP Location: Left arm, Patient Position: Sitting, BP Cuff Size: Adult)   Pulse 78   Temp 36.8 °C (98.2 °F) (Temporal)   Resp 18   Wt 106 kg (233 lb 12.8 oz)   SpO2 95%   BMI 30.65 kg/m²      has a  past medical history of Abnormal weight gain (10/27/2016), Achilles tendinitis, unspecified leg (08/16/2016), Acute upper respiratory infection, unspecified, NOHELIA (acute kidney injury) (CMS-[facility]) (10/02/2023), Allergic contact dermatitis due to plants, except food (08/22/2013), Arthritis, Bell's palsy (03/21/2023), BPH with obstruction/lower urinary tract symptoms, Cancer (Multi), Cellulitis of right lower limb (07/30/2021), Cough, unspecified (03/21/2016), Diabetes (Multi), Encounter for screening for human immunodeficiency virus (HIV) (06/14/2016), Hordeolum externum unspecified eye, unspecified eyelid (08/14/2019), Hyperglycemia, unspecified (12/13/2017), Hypertension, Incisional hernia without obstruction or gangrene (06/15/2015), Knee joint pain, Melanoma (Multi), MSSA bacteremia (10/2023), Muscle spasm of calf (08/16/2016), Obstructive uropathy, Personal history of other diseases of the digestive system (06/30/2015), Personal history of other diseases of the digestive system (02/05/2016), Personal history of other diseases of the respiratory system (12/23/2014), Personal history of other diseases of the respiratory system (01/28/2016), Personal history of other infectious and parasitic diseases (12/02/2015), Personal history of other infectious and parasitic diseases, Personal history of other specified conditions (02/04/2015), Personal history of other specified conditions (08/16/2016), Personal history of other specified conditions (01/26/2015), Personal history of pneumonia (recurrent) (01/29/2016), Personal history of urinary (tract) infections (02/19/2013), Prostatitis (03/21/2023), Pruritus, unspecified (02/10/2015), Sinoatrial node dysfunction (Multi), Strain of muscle, fascia and tendon of the posterior muscle group at thigh level, right thigh, initial encounter (05/10/2016), and Unspecified symptoms and signs involving the genitourinary system (12/20/2016).   has a past surgical history that includes  Varicose vein surgery (08/22/2013); Cardiac pacemaker placement (08/22/2013); Other surgical history (06/15/2015); Ventral hernia repair (06/15/2015); Other surgical history (09/16/2019); Peripherally inserted central catheter insertion; Cardiac electrophysiology procedure (Left, 11/22/2023); US guided biopsy lymph node superficial (11/29/2023); Cardiac electrophysiology procedure (N/A, 11/30/2023); and Skin biopsy.  Family History   Problem Relation Name Age of Onset    Cancer Mother Maye Verduzco      Oncology History   Metastatic melanoma (Multi)   12/12/2023 Initial Diagnosis    Metastatic melanoma (CMS/HCC)     3/18/2024 -  Chemotherapy    Nivolumab 1 mg/kg + Ipilimumab 3 mg/kg, 21 Day Cycles         Chester Verduzco  reports that he has never smoked. He has never been exposed to tobacco smoke. He has never used smokeless tobacco.  He  reports that he does not currently use alcohol.  He  reports no history of drug use.    Physical Exam  Constitutional:       Appearance: Normal appearance.   HENT:      Head: Normocephalic and atraumatic.   Eyes:      Pupils: Pupils are equal, round, and reactive to light.   Neurological:      Mental Status: He is alert.         WBC   Date/Time Value Ref Range Status   04/08/2024 09:44 AM 6.2 4.4 - 11.3 x10*3/uL Final   04/02/2024 12:00 PM 13.0 (H) 4.4 - 11.3 x10*3/uL Final   03/18/2024 09:50 AM 5.4 4.4 - 11.3 x10*3/uL Final     nRBC   Date Value Ref Range Status   04/08/2024   Final     Comment:     Not Measured   04/02/2024 0.0 0.0 - 0.0 /100 WBCs Final   12/03/2023 0.0 0.0 - 0.0 /100 WBCs Final     RBC   Date Value Ref Range Status   04/08/2024 2.86 (L) 4.50 - 5.90 x10*6/uL Final   04/02/2024 3.66 (L) 4.50 - 5.90 x10*6/uL Final   03/18/2024 3.07 (L) 4.50 - 5.90 x10*6/uL Final     Hemoglobin   Date Value Ref Range Status   04/08/2024 8.6 (L) 13.5 - 17.5 g/dL Final   04/02/2024 11.1 (L) 13.5 - 17.5 g/dL Final   03/18/2024 9.4 (L) 13.5 - 17.5 g/dL Final     Hematocrit   Date  Value Ref Range Status   04/08/2024 26.3 (L) 41.0 - 52.0 % Final   04/02/2024 34.2 (L) 41.0 - 52.0 % Final   03/18/2024 27.8 (L) 41.0 - 52.0 % Final     MCV   Date/Time Value Ref Range Status   04/08/2024 09:44 AM 92 80 - 100 fL Final   04/02/2024 12:00 PM 93 80 - 100 fL Final   03/18/2024 09:50 AM 91 80 - 100 fL Final     MCH   Date/Time Value Ref Range Status   04/08/2024 09:44 AM 30.1 26.0 - 34.0 pg Final   04/02/2024 12:00 PM 30.3 26.0 - 34.0 pg Final   03/18/2024 09:50 AM 30.6 26.0 - 34.0 pg Final     MCHC   Date/Time Value Ref Range Status   04/08/2024 09:44 AM 32.7 32.0 - 36.0 g/dL Final   04/02/2024 12:00 PM 32.5 32.0 - 36.0 g/dL Final   03/18/2024 09:50 AM 33.8 32.0 - 36.0 g/dL Final     RDW   Date/Time Value Ref Range Status   04/08/2024 09:44 AM 13.9 11.5 - 14.5 % Final   04/02/2024 12:00 PM 14.3 11.5 - 14.5 % Final   03/18/2024 09:50 AM 13.7 11.5 - 14.5 % Final     Platelets   Date/Time Value Ref Range Status   04/08/2024 09:44 AM 93 (L) 150 - 450 x10*3/uL Final     Comment:     Platelet count verified by smear review.   04/02/2024 12:00  (L) 150 - 450 x10*3/uL Final   03/18/2024 09:50  (L) 150 - 450 x10*3/uL Final     MPV   Date/Time Value Ref Range Status   10/07/2023 05:38 AM 9.2 7.5 - 11.5 fL Final   10/06/2023 05:47 AM 9.7 7.5 - 11.5 fL Final   10/05/2023 05:58 AM 10.2 7.5 - 11.5 fL Final     Neutrophils %   Date/Time Value Ref Range Status   04/08/2024 09:44 AM 76.7 40.0 - 80.0 % Final   04/02/2024 12:00 PM 73.7 40.0 - 80.0 % Final   03/18/2024 09:50 AM 69.0 40.0 - 80.0 % Final     Immature Granulocytes %, Automated   Date/Time Value Ref Range Status   04/08/2024 09:44 AM 0.2 0.0 - 0.9 % Final     Comment:     Immature Granulocyte Count (IG) includes promyelocytes, myelocytes and metamyelocytes but does not include bands. Percent differential counts (%) should be interpreted in the context of the absolute cell counts (cells/UL).   04/02/2024 12:00 PM 1.4 (H) 0.0 - 0.9 % Final      Comment:     Immature Granulocyte Count (IG) includes promyelocytes, myelocytes and metamyelocytes but does not include bands. Percent differential counts (%) should be interpreted in the context of the absolute cell counts (cells/UL).   03/18/2024 09:50 AM 0.2 0.0 - 0.9 % Final     Comment:     Immature Granulocyte Count (IG) includes promyelocytes, myelocytes and metamyelocytes but does not include bands. Percent differential counts (%) should be interpreted in the context of the absolute cell counts (cells/UL).     Lymphocytes %   Date/Time Value Ref Range Status   04/08/2024 09:44 AM 10.7 13.0 - 44.0 % Final   04/02/2024 12:00 PM 17.1 13.0 - 44.0 % Final   03/18/2024 09:50 AM 19.6 13.0 - 44.0 % Final     Monocytes %   Date/Time Value Ref Range Status   04/08/2024 09:44 AM 9.6 2.0 - 10.0 % Final   04/02/2024 12:00 PM 7.2 2.0 - 10.0 % Final   03/18/2024 09:50 AM 7.9 2.0 - 10.0 % Final     Eosinophils %   Date/Time Value Ref Range Status   04/08/2024 09:44 AM 2.6 0.0 - 6.0 % Final   04/02/2024 12:00 PM 0.4 0.0 - 6.0 % Final   03/18/2024 09:50 AM 3.1 0.0 - 6.0 % Final     Basophils %   Date/Time Value Ref Range Status   04/08/2024 09:44 AM 0.2 0.0 - 2.0 % Final   04/02/2024 12:00 PM 0.2 0.0 - 2.0 % Final   03/18/2024 09:50 AM 0.2 0.0 - 2.0 % Final     Neutrophils Absolute   Date/Time Value Ref Range Status   04/08/2024 09:44 AM 4.79 1.60 - 5.50 x10*3/uL Final     Comment:     Percent differential counts (%) should be interpreted in the context of the absolute cell counts (cells/uL).   04/02/2024 12:00 PM 9.60 (H) 1.60 - 5.50 x10*3/uL Final     Comment:     Percent differential counts (%) should be interpreted in the context of the absolute cell counts (cells/uL).   03/18/2024 09:50 AM 3.74 1.60 - 5.50 x10*3/uL Final     Comment:     Percent differential counts (%) should be interpreted in the context of the absolute cell counts (cells/uL).     Immature Granulocytes Absolute, Automated   Date/Time Value Ref Range  "Status   04/08/2024 09:44 AM 0.01 0.00 - 0.50 x10*3/uL Final   04/02/2024 12:00 PM 0.18 0.00 - 0.50 x10*3/uL Final   03/18/2024 09:50 AM 0.01 0.00 - 0.50 x10*3/uL Final     Lymphocytes Absolute   Date/Time Value Ref Range Status   04/08/2024 09:44 AM 0.67 (L) 0.80 - 3.00 x10*3/uL Final   04/02/2024 12:00 PM 2.22 0.80 - 3.00 x10*3/uL Final   03/18/2024 09:50 AM 1.06 0.80 - 3.00 x10*3/uL Final     Monocytes Absolute   Date/Time Value Ref Range Status   04/08/2024 09:44 AM 0.60 0.05 - 0.80 x10*3/uL Final   04/02/2024 12:00 PM 0.94 (H) 0.05 - 0.80 x10*3/uL Final   03/18/2024 09:50 AM 0.43 0.05 - 0.80 x10*3/uL Final     Eosinophils Absolute   Date/Time Value Ref Range Status   04/08/2024 09:44 AM 0.16 0.00 - 0.40 x10*3/uL Final   04/02/2024 12:00 PM 0.05 0.00 - 0.40 x10*3/uL Final   03/18/2024 09:50 AM 0.17 0.00 - 0.40 x10*3/uL Final     Basophils Absolute   Date/Time Value Ref Range Status   04/08/2024 09:44 AM 0.01 0.00 - 0.10 x10*3/uL Final   04/02/2024 12:00 PM 0.03 0.00 - 0.10 x10*3/uL Final   03/18/2024 09:50 AM 0.01 0.00 - 0.10 x10*3/uL Final       No components found for: \"PT\"  aPTT   Date/Time Value Ref Range Status   11/30/2023 07:14 AM 36 27 - 38 seconds Final   11/29/2023 10:10 AM 36 27 - 38 seconds Final   11/24/2023 04:53 AM 37 27 - 38 seconds Final     Assessment/Plan      Stage III\4 malignant melanoma.  Discussed the tumor board and recommendations will be followed.  Immunotherapy recommended.  Patient consented for Ipi Nivo.  Patient consented.  MRIs of the brain still pending.  Patient will be seen in 3 weeks.  Examination of his left buttock revealing 3 punctate lesions.  Most likely the source of the primary.  Will follow through with tumor board recommendations patient will be seen by Dr. Ted Cameron and Dr. Anderson.  He is consented however for ipilimumab and nivolumab therapy as per the recommendation by the tumor board as well.  Patient concurs with the management plan.    Proceed with I/O " therapy today C3: After cycle 1 patient developed a rash on her back on his back which was treated with triamcinolone cream and short course of steroids.  I will repeat the short course of steroids empirically today.    04 29 2024: Patient presents having had fevers over the past week.  This is concerning because his initial sepsis and fever was infected pacemaker leads in his heart and patient had IV antibiotics for cardiac infection.  Given there is low-grade fevers with chills associated patient is counseled to follow-up with ID and also blood cultures have been ordered at this time.  I will delay his treatment for 7 days and the results of the blood cultures will dictate further management.    Diagnoses and all orders for this visit:  Fever, unspecified fever cause  -     Blood Culture; Future  Metastatic melanoma (Multi)  -     Clinic Appointment Request  -     Blood Culture; Future           Wes-Vern Springer MD

## 2024-04-29 NOTE — PROGRESS NOTES
Patient seen by MD, patient had fevers over night, blood cultures x 3 done, no treatment today, rescheduled in one week, follow up appts in place, left via w/c in stable condition.

## 2024-04-29 NOTE — PATIENT INSTRUCTIONS
Today you met with Dr. Springer.  After the discussion about your on and off fevers, the decision was made to cancel treatment today.  Draw 3 sets of blood cultures and postpone treatment to next week.  Please know that early conversations about concerns is best to help keep you out to the hospital.  You are improving Rich.  You have made great improvements from your first visit here.  A referral to infectious disease was placed.  Keep going.....but call us!    Please call the office for any questions or concerns.  Review your schedule prior to leaving [facility].  We ask that you notify us 5-7 days before your medications need refilled.  [facility] is strongly encouraging all patients to access their MyChart for all results and to communicate with their provider for non-urgent messages.  If an urgent/same day/sick concern response is needed, please call the office at [phone]. Thank You!

## 2024-04-30 DIAGNOSIS — R52 GENERALIZED PAIN: ICD-10-CM

## 2024-04-30 RX ORDER — DULOXETIN HYDROCHLORIDE 60 MG/1
60 CAPSULE, DELAYED RELEASE ORAL DAILY
Qty: 90 CAPSULE | Refills: 3 | Status: SHIPPED | OUTPATIENT
Start: 2024-04-30 | End: 2025-04-30

## 2024-05-03 LAB
BACTERIA BLD CULT: NORMAL

## 2024-05-07 ENCOUNTER — NUTRITION (OUTPATIENT)
Dept: HEMATOLOGY/ONCOLOGY | Facility: CLINIC | Age: 74
End: 2024-05-07
Payer: COMMERCIAL

## 2024-05-07 ENCOUNTER — SOCIAL WORK (OUTPATIENT)
Dept: CASE MANAGEMENT | Facility: HOSPITAL | Age: 74
End: 2024-05-07
Payer: COMMERCIAL

## 2024-05-07 ENCOUNTER — INFUSION (OUTPATIENT)
Dept: HEMATOLOGY/ONCOLOGY | Facility: CLINIC | Age: 74
End: 2024-05-07
Payer: MEDICARE

## 2024-05-07 ENCOUNTER — OFFICE VISIT (OUTPATIENT)
Dept: HEMATOLOGY/ONCOLOGY | Facility: CLINIC | Age: 74
End: 2024-05-07
Payer: MEDICARE

## 2024-05-07 VITALS
RESPIRATION RATE: 18 BRPM | WEIGHT: 225.53 LBS | BODY MASS INDEX: 29.57 KG/M2 | OXYGEN SATURATION: 96 % | TEMPERATURE: 97.2 F | HEART RATE: 80 BPM | SYSTOLIC BLOOD PRESSURE: 132 MMHG | DIASTOLIC BLOOD PRESSURE: 71 MMHG

## 2024-05-07 VITALS — WEIGHT: 225.53 LBS | BODY MASS INDEX: 29.89 KG/M2 | HEIGHT: 73 IN

## 2024-05-07 DIAGNOSIS — C43.9 METASTATIC MELANOMA (MULTI): Primary | ICD-10-CM

## 2024-05-07 DIAGNOSIS — C43.9 METASTATIC MELANOMA (MULTI): ICD-10-CM

## 2024-05-07 LAB
ALBUMIN SERPL BCP-MCNC: 3.3 G/DL (ref 3.4–5)
ALP SERPL-CCNC: 89 U/L (ref 33–136)
ALT SERPL W P-5'-P-CCNC: 19 U/L (ref 10–52)
AMYLASE SERPL-CCNC: 51 U/L (ref 29–103)
ANION GAP SERPL CALC-SCNC: 11 MMOL/L (ref 10–20)
AST SERPL W P-5'-P-CCNC: 18 U/L (ref 9–39)
BASOPHILS # BLD AUTO: 0.01 X10*3/UL (ref 0–0.1)
BASOPHILS NFR BLD AUTO: 0.2 %
BILIRUB SERPL-MCNC: 0.5 MG/DL (ref 0–1.2)
BUN SERPL-MCNC: 19 MG/DL (ref 6–23)
CALCIUM SERPL-MCNC: 8.7 MG/DL (ref 8.6–10.3)
CHLORIDE SERPL-SCNC: 106 MMOL/L (ref 98–107)
CO2 SERPL-SCNC: 25 MMOL/L (ref 21–32)
CORTIS AM PEAK SERPL-MSCNC: 20.9 UG/DL (ref 5–20)
CREAT SERPL-MCNC: 1.63 MG/DL (ref 0.5–1.3)
EGFRCR SERPLBLD CKD-EPI 2021: 44 ML/MIN/1.73M*2
EOSINOPHIL # BLD AUTO: 0.23 X10*3/UL (ref 0–0.4)
EOSINOPHIL NFR BLD AUTO: 4.4 %
ERYTHROCYTE [DISTWIDTH] IN BLOOD BY AUTOMATED COUNT: 13.2 % (ref 11.5–14.5)
GLUCOSE SERPL-MCNC: 79 MG/DL (ref 74–99)
HCT VFR BLD AUTO: 27.4 % (ref 41–52)
HGB BLD-MCNC: 8.9 G/DL (ref 13.5–17.5)
IMM GRANULOCYTES # BLD AUTO: 0.05 X10*3/UL (ref 0–0.5)
IMM GRANULOCYTES NFR BLD AUTO: 1 % (ref 0–0.9)
LDH SERPL L TO P-CCNC: 265 U/L (ref 84–246)
LIPASE SERPL-CCNC: 18 U/L (ref 9–82)
LYMPHOCYTES # BLD AUTO: 0.99 X10*3/UL (ref 0.8–3)
LYMPHOCYTES NFR BLD AUTO: 19 %
MCH RBC QN AUTO: 28.4 PG (ref 26–34)
MCHC RBC AUTO-ENTMCNC: 32.5 G/DL (ref 32–36)
MCV RBC AUTO: 88 FL (ref 80–100)
MONOCYTES # BLD AUTO: 0.41 X10*3/UL (ref 0.05–0.8)
MONOCYTES NFR BLD AUTO: 7.9 %
NEUTROPHILS # BLD AUTO: 3.53 X10*3/UL (ref 1.6–5.5)
NEUTROPHILS NFR BLD AUTO: 67.5 %
PLATELET # BLD AUTO: 200 X10*3/UL (ref 150–450)
POTASSIUM SERPL-SCNC: 3.8 MMOL/L (ref 3.5–5.3)
PROT SERPL-MCNC: 6.6 G/DL (ref 6.4–8.2)
RBC # BLD AUTO: 3.13 X10*6/UL (ref 4.5–5.9)
SODIUM SERPL-SCNC: 138 MMOL/L (ref 136–145)
TSH SERPL-ACNC: 2.14 MIU/L (ref 0.44–3.98)
WBC # BLD AUTO: 5.2 X10*3/UL (ref 4.4–11.3)

## 2024-05-07 PROCEDURE — 85025 COMPLETE CBC W/AUTO DIFF WBC: CPT | Performed by: INTERNAL MEDICINE

## 2024-05-07 PROCEDURE — 96413 CHEMO IV INFUSION 1 HR: CPT

## 2024-05-07 PROCEDURE — 80053 COMPREHEN METABOLIC PANEL: CPT | Performed by: INTERNAL MEDICINE

## 2024-05-07 PROCEDURE — 3048F LDL-C <100 MG/DL: CPT | Performed by: INTERNAL MEDICINE

## 2024-05-07 PROCEDURE — 3044F HG A1C LEVEL LT 7.0%: CPT | Performed by: INTERNAL MEDICINE

## 2024-05-07 PROCEDURE — 82150 ASSAY OF AMYLASE: CPT | Performed by: INTERNAL MEDICINE

## 2024-05-07 PROCEDURE — 1160F RVW MEDS BY RX/DR IN RCRD: CPT | Performed by: INTERNAL MEDICINE

## 2024-05-07 PROCEDURE — 99215 OFFICE O/P EST HI 40 MIN: CPT | Performed by: INTERNAL MEDICINE

## 2024-05-07 PROCEDURE — 82533 TOTAL CORTISOL: CPT | Mod: GEALAB | Performed by: INTERNAL MEDICINE

## 2024-05-07 PROCEDURE — 2500000004 HC RX 250 GENERAL PHARMACY W/ HCPCS (ALT 636 FOR OP/ED): Performed by: INTERNAL MEDICINE

## 2024-05-07 PROCEDURE — 3078F DIAST BP <80 MM HG: CPT | Performed by: INTERNAL MEDICINE

## 2024-05-07 PROCEDURE — 96367 TX/PROPH/DG ADDL SEQ IV INF: CPT

## 2024-05-07 PROCEDURE — 3008F BODY MASS INDEX DOCD: CPT | Performed by: INTERNAL MEDICINE

## 2024-05-07 PROCEDURE — 1159F MED LIST DOCD IN RCRD: CPT | Performed by: INTERNAL MEDICINE

## 2024-05-07 PROCEDURE — 3075F SYST BP GE 130 - 139MM HG: CPT | Performed by: INTERNAL MEDICINE

## 2024-05-07 PROCEDURE — 83615 LACTATE (LD) (LDH) ENZYME: CPT | Performed by: INTERNAL MEDICINE

## 2024-05-07 PROCEDURE — 4010F ACE/ARB THERAPY RXD/TAKEN: CPT | Performed by: INTERNAL MEDICINE

## 2024-05-07 PROCEDURE — 1126F AMNT PAIN NOTED NONE PRSNT: CPT | Performed by: INTERNAL MEDICINE

## 2024-05-07 PROCEDURE — 83690 ASSAY OF LIPASE: CPT | Performed by: INTERNAL MEDICINE

## 2024-05-07 PROCEDURE — 84443 ASSAY THYROID STIM HORMONE: CPT | Performed by: INTERNAL MEDICINE

## 2024-05-07 PROCEDURE — 1157F ADVNC CARE PLAN IN RCRD: CPT | Performed by: INTERNAL MEDICINE

## 2024-05-07 RX ORDER — PROCHLORPERAZINE EDISYLATE 5 MG/ML
10 INJECTION INTRAMUSCULAR; INTRAVENOUS EVERY 6 HOURS PRN
Status: DISCONTINUED | OUTPATIENT
Start: 2024-05-07 | End: 2024-05-07 | Stop reason: HOSPADM

## 2024-05-07 RX ORDER — EPINEPHRINE 0.3 MG/.3ML
0.3 INJECTION SUBCUTANEOUS EVERY 5 MIN PRN
Status: CANCELLED | OUTPATIENT
Start: 2024-05-30

## 2024-05-07 RX ORDER — ALBUTEROL SULFATE 0.83 MG/ML
3 SOLUTION RESPIRATORY (INHALATION) AS NEEDED
Status: CANCELLED | OUTPATIENT
Start: 2024-05-30

## 2024-05-07 RX ORDER — DIPHENHYDRAMINE HYDROCHLORIDE 50 MG/ML
50 INJECTION INTRAMUSCULAR; INTRAVENOUS AS NEEDED
Status: CANCELLED | OUTPATIENT
Start: 2024-05-30

## 2024-05-07 RX ORDER — PROCHLORPERAZINE EDISYLATE 5 MG/ML
10 INJECTION INTRAMUSCULAR; INTRAVENOUS EVERY 6 HOURS PRN
Status: CANCELLED | OUTPATIENT
Start: 2024-05-30

## 2024-05-07 RX ORDER — ALBUTEROL SULFATE 0.83 MG/ML
3 SOLUTION RESPIRATORY (INHALATION) AS NEEDED
Status: DISCONTINUED | OUTPATIENT
Start: 2024-05-07 | End: 2024-05-07 | Stop reason: HOSPADM

## 2024-05-07 RX ORDER — PROCHLORPERAZINE MALEATE 10 MG
10 TABLET ORAL EVERY 6 HOURS PRN
Status: DISCONTINUED | OUTPATIENT
Start: 2024-05-07 | End: 2024-05-07 | Stop reason: HOSPADM

## 2024-05-07 RX ORDER — DIPHENHYDRAMINE HYDROCHLORIDE 50 MG/ML
50 INJECTION INTRAMUSCULAR; INTRAVENOUS AS NEEDED
Status: DISCONTINUED | OUTPATIENT
Start: 2024-05-07 | End: 2024-05-07 | Stop reason: HOSPADM

## 2024-05-07 RX ORDER — EPINEPHRINE 0.3 MG/.3ML
0.3 INJECTION SUBCUTANEOUS EVERY 5 MIN PRN
Status: DISCONTINUED | OUTPATIENT
Start: 2024-05-07 | End: 2024-05-07 | Stop reason: HOSPADM

## 2024-05-07 RX ORDER — HEPARIN 100 UNIT/ML
500 SYRINGE INTRAVENOUS AS NEEDED
Status: CANCELLED | OUTPATIENT
Start: 2024-05-07

## 2024-05-07 RX ORDER — FAMOTIDINE 10 MG/ML
20 INJECTION INTRAVENOUS ONCE AS NEEDED
Status: CANCELLED | OUTPATIENT
Start: 2024-05-30

## 2024-05-07 RX ORDER — HEPARIN SODIUM,PORCINE/PF 10 UNIT/ML
50 SYRINGE (ML) INTRAVENOUS AS NEEDED
Status: CANCELLED | OUTPATIENT
Start: 2024-05-07

## 2024-05-07 RX ORDER — PROCHLORPERAZINE MALEATE 5 MG
10 TABLET ORAL EVERY 6 HOURS PRN
Status: CANCELLED | OUTPATIENT
Start: 2024-05-30

## 2024-05-07 RX ORDER — FAMOTIDINE 10 MG/ML
20 INJECTION INTRAVENOUS ONCE AS NEEDED
Status: DISCONTINUED | OUTPATIENT
Start: 2024-05-07 | End: 2024-05-07 | Stop reason: HOSPADM

## 2024-05-07 RX ADMIN — SODIUM CHLORIDE 100 MG: 9 INJECTION, SOLUTION INTRAVENOUS at 11:00

## 2024-05-07 RX ADMIN — SODIUM CHLORIDE 300 MG: 9 INJECTION, SOLUTION INTRAVENOUS at 11:55

## 2024-05-07 ASSESSMENT — ENCOUNTER SYMPTOMS
EYES NEGATIVE: 1
RESPIRATORY NEGATIVE: 1
GASTROINTESTINAL NEGATIVE: 1
CARDIOVASCULAR NEGATIVE: 1

## 2024-05-07 ASSESSMENT — PAIN SCALES - GENERAL: PAINLEVEL: 0-NO PAIN

## 2024-05-07 NOTE — PATIENT INSTRUCTIONS
Handouts provided/ reviewed:  Blood glucose mgmt -- encouraged protein/ fat source with lower calorie options to help meet needs. IE olives/ cheese with veggies, nut with celery, etc.   Taste/ smell changes  Food safety  Recommend adding snack before bed of carbohydrate and protein to help with Bgs. Also encouraged to add in snacks with decreased portions at meal times to help maintain Bgs and meet nutritional needs.

## 2024-05-07 NOTE — PROGRESS NOTES
Patient ID: Chester Verduzco is a 74 y.o. male.  Referring Physician: Danish Springer MD  [id number] [place] [address]  Primary Care Provider: Jeffry De Leon MD  Visit Type:  Follow Up     Subjective    HPI  Mr. Chester Verduzco is a 73 year old male with PMHX HTN, HLD, Dual chamber PPM implanted in 11/1996 for SSS (RV is passive) with most recent generator replacement in 11/2020 ([employer] [employer] MRI), Type 2 DM, gout, and recent hospitalization (10/2-10/7) for Staph bacteremia, diarrhea, NOHELIA , and hyponatremia 2/2 hypovolemia who presented to [facility] ED from a SNF on 11/13 for NOHELIA on labs from the SNF and oliguria. Blood cultures were positive for MSSA and previously had been positive 10/8 and 11/10 for MSSA. Blood cultures with NGTD as of 11/16.  FILIPE 11/16 showed tricuspid valve vegetation as well as right atrial lead vegetation. Patient has been afebrile without leukocytosis and hemodynamically stable. Device Interrogated 11/17 with AP 29% and  30%.  Lead extraction complete 11/22.  Patient on IV antibiotics with stop 4 weeks after lead removal (~12/20). After lead removal, on telemetry the patient had several episodes of intermittent asymptomatic high-grade AV block, NSVT,and bradycardia. EP plans to implant PPM after 14 days of negative blood culture post removal per, ID. Patient received a unit of packed red blood cells for low hemoglobin (11/25). Hemoglobin has remained stable with no signs or symptoms of bleeding. Home lasix and losartan were being held in setting of NOHELIA 2/2 to nephrotoxic antibiotic exposure which down trending but not at baseline with adequate urine output. Patient has maldonado I/s/o severe BPH. Of note, Patient noticed left groin lymphadenopathy and U/S (11/23) showed Enlarged left inguinal/external iliac lymph nodes. PET scan deferred at this time d/t active infectious process. Underwent core needle biopsy on 11/29. Patient to follow up biopsy  results with surgical oncology. Device re-implant (micra leadless pacemaker) 11/30, PICC placed 12/2. In discussing with ID on 12/2, and reviewing past ID notes, reimplant was done iso tricuspid vegetation with the assumption that patient was sterilized given Negative BC, lack of growth in vegetation size and clinical picture. Patient has outpatient follow up scheduled further and antibiotic use will be determined then.      Biopsy results confirming metastatic melanoma but because of patient's poor performance status no intervention is planned at this time.  Patient to return in 2 months for evaluation of performance status and to discuss with patient at that time if feasible immuno oncologic therapy.  Review of Systems   Constitutional:         Patient still in a wheelchair.  Will recommend ongoing physical therapy.  Bilateral pitting pedal edema is much resolved.   HENT:  Negative.     Eyes: Negative.    Respiratory: Negative.     Cardiovascular: Negative.    Gastrointestinal: Negative.       Summary  Stage:  III  Assessment:  Enlarged lymph nodes seen on CT in the left external iliac chain, and bilateral groin.  A biopsy of the left inguinal lymph node with metastatic melanoma.  Negative for BRAF V600.     PET/CT with multiple FDG avid subcutaneous nodular soft tissue densities within the left gluteal region, likely representing primary melanoma.  FDG avid bulky left external iliac and inguinal nodes, corresponding to biopsy-proven darren metastases.  No PET evidence of distant metastasis.     Recommendation:  MRI brain pending.  Systemic therapy (with ipi/nivo).  Full skin exam to look for primary lesion.  Review Multidisciplinary Cutaneous Oncology Conference recommendation with patient. Continue routine follow up and total body skin exams with Dermatology.     Follow Up:  Danish Springer, Dermatology, Landon Hilario  Objective   BSA: 2.3 meters squared  /71 (BP Location: Left arm, Patient  Position: Sitting, BP Cuff Size: Large adult)   Pulse 80   Temp 36.2 °C (97.2 °F) (Temporal)   Resp 18   Wt 102 kg (225 lb 8.5 oz)   SpO2 96%   BMI 29.57 kg/m²      has a past medical history of Abnormal weight gain (10/27/2016), Achilles tendinitis, unspecified leg (08/16/2016), Acute upper respiratory infection, unspecified, NOHELIA (acute kidney injury) (CMS-[facility]) (10/02/2023), Allergic contact dermatitis due to plants, except food (08/22/2013), Arthritis, Bell's palsy (03/21/2023), BPH with obstruction/lower urinary tract symptoms, Cancer (Multi), Cellulitis of right lower limb (07/30/2021), Cough, unspecified (03/21/2016), Diabetes (Multi), Encounter for screening for human immunodeficiency virus (HIV) (06/14/2016), Hordeolum externum unspecified eye, unspecified eyelid (08/14/2019), Hyperglycemia, unspecified (12/13/2017), Hypertension, Incisional hernia without obstruction or gangrene (06/15/2015), Knee joint pain, Melanoma (Multi), MSSA bacteremia (10/2023), Muscle spasm of calf (08/16/2016), Obstructive uropathy, Personal history of other diseases of the digestive system (06/30/2015), Personal history of other diseases of the digestive system (02/05/2016), Personal history of other diseases of the respiratory system (12/23/2014), Personal history of other diseases of the respiratory system (01/28/2016), Personal history of other infectious and parasitic diseases (12/02/2015), Personal history of other infectious and parasitic diseases, Personal history of other specified conditions (02/04/2015), Personal history of other specified conditions (08/16/2016), Personal history of other specified conditions (01/26/2015), Personal history of pneumonia (recurrent) (01/29/2016), Personal history of urinary (tract) infections (02/19/2013), Prostatitis (03/21/2023), Pruritus, unspecified (02/10/2015), Sinoatrial node dysfunction (Multi), Strain of muscle, fascia and tendon of the posterior muscle group at thigh  level, right thigh, initial encounter (05/10/2016), and Unspecified symptoms and signs involving the genitourinary system (12/20/2016).   has a past surgical history that includes Varicose vein surgery (08/22/2013); Cardiac pacemaker placement (08/22/2013); Other surgical history (06/15/2015); Ventral hernia repair (06/15/2015); Other surgical history (09/16/2019); Peripherally inserted central catheter insertion; Cardiac electrophysiology procedure (Left, 11/22/2023); US guided biopsy lymph node superficial (11/29/2023); Cardiac electrophysiology procedure (N/A, 11/30/2023); and Skin biopsy.  Family History   Problem Relation Name Age of Onset    Cancer Mother Maye Verduzco      Oncology History   Metastatic melanoma (Multi)   12/12/2023 Initial Diagnosis    Metastatic melanoma (CMS/HCC)     3/18/2024 -  Chemotherapy    Nivolumab 1 mg/kg + Ipilimumab 3 mg/kg, 21 Day Cycles         Chester JORGENSEN Luba  reports that he has never smoked. He has never been exposed to tobacco smoke. He has never used smokeless tobacco.  He  reports that he does not currently use alcohol.  He  reports no history of drug use.    Physical Exam  Constitutional:       Appearance: Normal appearance.   HENT:      Head: Normocephalic and atraumatic.   Eyes:      Extraocular Movements: Extraocular movements intact.      Pupils: Pupils are equal, round, and reactive to light.   Neurological:      Mental Status: He is alert.         WBC   Date/Time Value Ref Range Status   04/29/2024 10:17 AM 4.7 4.4 - 11.3 x10*3/uL Final   04/08/2024 09:44 AM 6.2 4.4 - 11.3 x10*3/uL Final   04/02/2024 12:00 PM 13.0 (H) 4.4 - 11.3 x10*3/uL Final     nRBC   Date Value Ref Range Status   04/08/2024   Final     Comment:     Not Measured   04/02/2024 0.0 0.0 - 0.0 /100 WBCs Final   12/03/2023 0.0 0.0 - 0.0 /100 WBCs Final     RBC   Date Value Ref Range Status   04/29/2024 3.07 (L) 4.50 - 5.90 x10*6/uL Final   04/08/2024 2.86 (L) 4.50 - 5.90 x10*6/uL Final    04/02/2024 3.66 (L) 4.50 - 5.90 x10*6/uL Final     Hemoglobin   Date Value Ref Range Status   04/29/2024 9.0 (L) 13.5 - 17.5 g/dL Final   04/08/2024 8.6 (L) 13.5 - 17.5 g/dL Final   04/02/2024 11.1 (L) 13.5 - 17.5 g/dL Final     Hematocrit   Date Value Ref Range Status   04/29/2024 27.3 (L) 41.0 - 52.0 % Final   04/08/2024 26.3 (L) 41.0 - 52.0 % Final   04/02/2024 34.2 (L) 41.0 - 52.0 % Final     MCV   Date/Time Value Ref Range Status   04/29/2024 10:17 AM 89 80 - 100 fL Final   04/08/2024 09:44 AM 92 80 - 100 fL Final   04/02/2024 12:00 PM 93 80 - 100 fL Final     MCH   Date/Time Value Ref Range Status   04/29/2024 10:17 AM 29.3 26.0 - 34.0 pg Final   04/08/2024 09:44 AM 30.1 26.0 - 34.0 pg Final   04/02/2024 12:00 PM 30.3 26.0 - 34.0 pg Final     MCHC   Date/Time Value Ref Range Status   04/29/2024 10:17 AM 33.0 32.0 - 36.0 g/dL Final   04/08/2024 09:44 AM 32.7 32.0 - 36.0 g/dL Final   04/02/2024 12:00 PM 32.5 32.0 - 36.0 g/dL Final     RDW   Date/Time Value Ref Range Status   04/29/2024 10:17 AM 13.3 11.5 - 14.5 % Final   04/08/2024 09:44 AM 13.9 11.5 - 14.5 % Final   04/02/2024 12:00 PM 14.3 11.5 - 14.5 % Final     Platelets   Date/Time Value Ref Range Status   04/29/2024 10:17 AM 92 (L) 150 - 450 x10*3/uL Final   04/08/2024 09:44 AM 93 (L) 150 - 450 x10*3/uL Final     Comment:     Platelet count verified by smear review.   04/02/2024 12:00  (L) 150 - 450 x10*3/uL Final     MPV   Date/Time Value Ref Range Status   10/07/2023 05:38 AM 9.2 7.5 - 11.5 fL Final   10/06/2023 05:47 AM 9.7 7.5 - 11.5 fL Final   10/05/2023 05:58 AM 10.2 7.5 - 11.5 fL Final     Neutrophils %   Date/Time Value Ref Range Status   04/29/2024 10:17 AM 75.4 40.0 - 80.0 % Final   04/08/2024 09:44 AM 76.7 40.0 - 80.0 % Final   04/02/2024 12:00 PM 73.7 40.0 - 80.0 % Final     Immature Granulocytes %, Automated   Date/Time Value Ref Range Status   04/29/2024 10:17 AM 0.2 0.0 - 0.9 % Final     Comment:     Immature Granulocyte Count (IG)  includes promyelocytes, myelocytes and metamyelocytes but does not include bands. Percent differential counts (%) should be interpreted in the context of the absolute cell counts (cells/UL).   04/08/2024 09:44 AM 0.2 0.0 - 0.9 % Final     Comment:     Immature Granulocyte Count (IG) includes promyelocytes, myelocytes and metamyelocytes but does not include bands. Percent differential counts (%) should be interpreted in the context of the absolute cell counts (cells/UL).   04/02/2024 12:00 PM 1.4 (H) 0.0 - 0.9 % Final     Comment:     Immature Granulocyte Count (IG) includes promyelocytes, myelocytes and metamyelocytes but does not include bands. Percent differential counts (%) should be interpreted in the context of the absolute cell counts (cells/UL).     Lymphocytes %   Date/Time Value Ref Range Status   04/29/2024 10:17 AM 14.9 13.0 - 44.0 % Final   04/08/2024 09:44 AM 10.7 13.0 - 44.0 % Final   04/02/2024 12:00 PM 17.1 13.0 - 44.0 % Final     Monocytes %   Date/Time Value Ref Range Status   04/29/2024 10:17 AM 7.6 2.0 - 10.0 % Final   04/08/2024 09:44 AM 9.6 2.0 - 10.0 % Final   04/02/2024 12:00 PM 7.2 2.0 - 10.0 % Final     Eosinophils %   Date/Time Value Ref Range Status   04/29/2024 10:17 AM 1.7 0.0 - 6.0 % Final   04/08/2024 09:44 AM 2.6 0.0 - 6.0 % Final   04/02/2024 12:00 PM 0.4 0.0 - 6.0 % Final     Basophils %   Date/Time Value Ref Range Status   04/29/2024 10:17 AM 0.2 0.0 - 2.0 % Final   04/08/2024 09:44 AM 0.2 0.0 - 2.0 % Final   04/02/2024 12:00 PM 0.2 0.0 - 2.0 % Final     Neutrophils Absolute   Date/Time Value Ref Range Status   04/29/2024 10:17 AM 3.55 1.60 - 5.50 x10*3/uL Final     Comment:     Percent differential counts (%) should be interpreted in the context of the absolute cell counts (cells/uL).   04/08/2024 09:44 AM 4.79 1.60 - 5.50 x10*3/uL Final     Comment:     Percent differential counts (%) should be interpreted in the context of the absolute cell counts (cells/uL).   04/02/2024 12:00  "PM 9.60 (H) 1.60 - 5.50 x10*3/uL Final     Comment:     Percent differential counts (%) should be interpreted in the context of the absolute cell counts (cells/uL).     Immature Granulocytes Absolute, Automated   Date/Time Value Ref Range Status   04/29/2024 10:17 AM 0.01 0.00 - 0.50 x10*3/uL Final   04/08/2024 09:44 AM 0.01 0.00 - 0.50 x10*3/uL Final   04/02/2024 12:00 PM 0.18 0.00 - 0.50 x10*3/uL Final     Lymphocytes Absolute   Date/Time Value Ref Range Status   04/29/2024 10:17 AM 0.70 (L) 0.80 - 3.00 x10*3/uL Final   04/08/2024 09:44 AM 0.67 (L) 0.80 - 3.00 x10*3/uL Final   04/02/2024 12:00 PM 2.22 0.80 - 3.00 x10*3/uL Final     Monocytes Absolute   Date/Time Value Ref Range Status   04/29/2024 10:17 AM 0.36 0.05 - 0.80 x10*3/uL Final   04/08/2024 09:44 AM 0.60 0.05 - 0.80 x10*3/uL Final   04/02/2024 12:00 PM 0.94 (H) 0.05 - 0.80 x10*3/uL Final     Eosinophils Absolute   Date/Time Value Ref Range Status   04/29/2024 10:17 AM 0.08 0.00 - 0.40 x10*3/uL Final   04/08/2024 09:44 AM 0.16 0.00 - 0.40 x10*3/uL Final   04/02/2024 12:00 PM 0.05 0.00 - 0.40 x10*3/uL Final     Basophils Absolute   Date/Time Value Ref Range Status   04/29/2024 10:17 AM 0.01 0.00 - 0.10 x10*3/uL Final   04/08/2024 09:44 AM 0.01 0.00 - 0.10 x10*3/uL Final   04/02/2024 12:00 PM 0.03 0.00 - 0.10 x10*3/uL Final       No components found for: \"PT\"  aPTT   Date/Time Value Ref Range Status   11/30/2023 07:14 AM 36 27 - 38 seconds Final   11/29/2023 10:10 AM 36 27 - 38 seconds Final   11/24/2023 04:53 AM 37 27 - 38 seconds Final       Assessment/Plan      Stage III\4 malignant melanoma.  Discussed the tumor board and recommendations will be followed.  Immunotherapy recommended.  Patient consented for Ipi Nivo.  Patient consented.  MRIs of the brain still pending.  Patient will be seen in 3 weeks.  Examination of his left buttock revealing 3 punctate lesions.  Most likely the source of the primary.  Will follow through with tumor board " recommendations patient will be seen by Dr. Ted Cameron and Dr. Anderson.  He is consented however for ipilimumab and nivolumab therapy as per the recommendation by the tumor board as well.  Patient concurs with the management plan.    Proceed with I/O therapy today C3: After cycle 1 patient developed a rash on her back on his back which was treated with triamcinolone cream and short course of steroids.  I will repeat the short course of steroids empirically today.    04 29 2024: Patient presents having had fevers over the past week.  This is concerning because his initial sepsis and fever was infected pacemaker leads in his heart and patient had IV antibiotics for cardiac infection.  Given there is low-grade fevers with chills associated patient is counseled to follow-up with ID and also blood cultures have been ordered at this time.  I will delay his treatment for 7 days and the results of the blood cultures will dictate further management.    05/07/2024 patient presents today blood cultures is negative.  Proceed with chemotherapy today cycle 3 nivolumab plus ipilimumab 21 q. 21-day cycles.  CT abdomen pelvis and chest ordered to evaluate disease response.  Proceed with chemotherapy.     Diagnoses and all orders for this visit:  Metastatic melanoma (Multi)  -     Clinic Appointment Request  -     CT chest abdomen pelvis w IV contrast; Future  -     Clinic Appointment Request; Future  -     Infusion Appointment Request; Future  -     CBC and Auto Differential; Future  -     Comprehensive metabolic panel; Future  -     Amylase; Future  -     Cortisol Am; Future  -     Lactate dehydrogenase; Future  -     Lipase; Future  -     Tsh With Reflex To Free T4 If Abnormal; Future  Other orders  -     prochlorperazine (Compazine) tablet 10 mg  -     prochlorperazine (Compazine) injection 10 mg  -     nivolumab (Opdivo) 100 mg in sodium chloride 0.9% 110 mL IV  -     ipilimumab (Yervoy) 300 mg in sodium chloride 0.9% 200 mL  IV  -     sodium chloride 0.9 % bolus 500 mL  -     dextrose 5 % in water (D5W) bolus  -     diphenhydrAMINE (BENADryl) injection 50 mg  -     methylPREDNISolone sod succinate (SOLU-Medrol) 40 mg/mL injection 40 mg  -     famotidine PF (Pepcid) injection 20 mg  -     EPINEPHrine (Epipen) injection syringe 0.3 mg  -     albuterol 2.5 mg /3 mL (0.083 %) nebulizer solution 3 mL           Wes-Vern Springer MD

## 2024-05-07 NOTE — PROGRESS NOTES
Pt tolerated treatment without incident. Denies questions, concerns, or comments at this time. Discharged home in wheelchair.

## 2024-05-07 NOTE — PROGRESS NOTES
"NUTRITION Follow-up NOTE    Nutrition Assessment     Reason for Visit:  Chester Verduzco is a 74 y.o. male who presents for stage III/4 metastatic melanoma.   Pt is planned for immunotherapy - Nivo/ Ipi planned to start 3/18/2024    HX: Gout, HTN, DM (managed with diet - A1c of 5.3), CKD3    Referral for weight loss.   Visited with pt today for follow-up.     Pt noted to be on lasix as well.     Lab Results   Component Value Date/Time    GLUCOSE 79 05/07/2024 0940     05/07/2024 0940    K 3.8 05/07/2024 0940     05/07/2024 0940    CO2 25 05/07/2024 0940    ANIONGAP 11 05/07/2024 0940    BUN 19 05/07/2024 0940    CREATININE 1.63 (H) 05/07/2024 0940    EGFR 44 (L) 05/07/2024 0940    CALCIUM 8.7 05/07/2024 0940    ALBUMIN 3.3 (L) 05/07/2024 0940    ALKPHOS 89 05/07/2024 0940    PROT 6.6 05/07/2024 0940    AST 18 05/07/2024 0940    BILITOT 0.5 05/07/2024 0940    ALT 19 05/07/2024 0940     No results found for: \"VITD25\"    Anthropometrics:  Anthropometrics  Height: 186 cm (6' 1.23\")  Weight: 102 kg (225 lb 8.5 oz)  BMI (Calculated): 29.57  IBW/kg (Dietitian Calculated): 83.6 kg  Weight Change  Weight History / % Weight Change: additional 3% loss in the last 1.5 months;        Wt Readings from Last 10 Encounters:   05/07/24 102 kg (225 lb 8.5 oz)   05/07/24 102 kg (225 lb 8.5 oz)   04/29/24 106 kg (233 lb 12.8 oz)   04/08/24 112 kg (246 lb 7.6 oz)   03/18/24 105 kg (232 lb 7.6 oz)   03/18/24 105 kg (232 lb 7.6 oz)   03/07/24 105 kg (231 lb 9.5 oz)   02/14/24 98.4 kg (217 lb)   12/14/23 109 kg (240 lb)   12/04/23 110 kg (243 lb 9.7 oz)        Food And Nutrient Intake:  Food and Nutrient History  Food and Nutrient History: Some changes in taste- too sweet. Soda; sometimes food. BGs have been good  Energy Intake: Fair 50-75 % (variable at times)  Fluid Intake: Water (4-5 16.9oz bottles), occasional soda, seldom coffee, occasional juice, 1% milk and chocolate milk- typically 1 in the morning; chocolate shake at " "night with ice cream and chocolate milk  GI Symptoms: anorexia, early satiety  GI Symptoms greater than 2 weeks: yes  Oral Problems: dysgeusia  Dentition: upper denture/partial     Food Intake  Amount of Food: Portions are declining some- only ate about 50% of sandwiches. Able to cook and freeze leftovers  Meal 2: L = salami, ham sandwich with pickle and lettuce.  Meal 3: D = chicken 2 strips, 1/2 c stouffers stuffing, green beans  Snacks: No snacks in the evning                                                        Nutrition Focused Physical Exam Findings:  Completed 3/18/24                          Energy Needs  Calculated Energy Needs Using Equations  Height: 186 cm (6' 1.23\")  Weight Used for Equation Calculations: 102 kg (224 lb 13.9 oz)  Yen- St. Serrano Equation (Overweight or Obese Patients): 1822  Estimated Energy Needs  Total Energy Estimated Needs (kCal): 2550 kCal  Total Estimated Energy Need per Day (kCal/kg): 25 kCal/kg  Estimated Fluid Needs  Total Fluid Estimated Needs (mL): 2040 mL  Total Fluid Estimated Needs (mL/kg): 20 mL/kg  Estimated Protein Needs  Total Protein Estimated Needs (g): 122.4 g  Total Protein Estimated Needs (g/kg): 1.2 g/kg  Method for Estimating Needs: pt with gout/ CKD3, may need to be more conservation - 82g/day (0.8g/kg)        Nutrition Diagnosis   Malnutrition Diagnosis  Patient has Malnutrition Diagnosis: Yes  Diagnosis Status: Ongoing  Malnutrition Diagnosis: Severe malnutrition related to chronic disease or condition  As Evidenced by: pt with 19% weight loss in 6 months, oral intake < 75% of estimated needs for > 1 month, pt with acute illness as well as dx of metastatic melanoma but not able to start treatment.  Additional Assessment Information: Continued weight loss likely due to dysgeusia and lower calorie food choices         Nutrition Interventions/Recommendations   Nutrition Prescription  Individualized Nutrition Prescription Provided for : Nutrition during " cancer treatment, management of nutrition impact symptoms.    Food and Nutrition Delivery  Food and Nutrition Delivery  Meals & Snacks: Protein-modified diet, General Healthful Diet, Energy-modified diet, Modify schedule of foods/fluids  Goals: Recommend to add in snacks between meals due to decreased portions at meal times. Enoucraged snacks of protein with carbohydrates for BG mgmt; recommend adding snack at bedtime to help with morning BGs. Encouraged adding in healthy fats and lean proteins to help maintain weight. Discussed dysgeusia and ways to manage.  Medical Food Supplement: Commercial beverage  Goals: Encouraged use of chocolate milk/ protein bars as able/ tolerated  Feeding Assistance: Mouth care  Goals: Baking soda swish recipe provided and reviewed; currently using salt water    Nutrition Education  Nutrition Education  Nutrition Education Content: Content related nutrition education  Goals: General healthy diet, moderate protein; adequate calories to maintain weight    Coordination of Care       Patient Instructions   Handouts provided/ reviewed:  Blood glucose mgmt -- encouraged protein/ fat source with lower calorie options to help meet needs. IE olives/ cheese with veggies, nut with celery, etc.   Taste/ smell changes  Food safety  Recommend adding snack before bed of carbohydrate and protein to help with Bgs. Also encouraged to add in snacks with decreased portions at meal times to help maintain Bgs and meet nutritional needs.      Nutrition Monitoring and Evaluation   Food/Nutrient Related History Monitoring  Monitoring and Evaluation Plan: Energy intake, Fluid intake, Meal/snack pattern, Protein intake  Energy Intake: Estimated energy intake  Criteria: Meet 100% caloric needs  Fluid Intake: Estimated fluid intake  Criteria: Maintain hydration; 70+oz daily  Meal/Snack Pattern: Estimated meal and snack pattern  Criteria: At least 3 meals daily with snacks 2-3x/day  Estimated protein intake:  Estimated protein intake  Criteria: Meet at least 75% of protein needs orally  Body Composition/Growth/Weight History  Monitoring and Evaluation Plan: Weight  Weight: Measured weight  Criteria: Maintain weight          Time Spent  Prep time on day of patient encounter: 5 minutes  Time spent directly with patient, family or caregiver: 20 minutes  Additional Time Spent on Patient Care Activities: 5 minutes  Documentation Time: 10 minutes  Other Time Spent: 0 minutes  Total: 40 minutes

## 2024-05-07 NOTE — PROGRESS NOTES
Met with pt in infusion room to check in. Pt reports he is doing well, continuing his home PT with [facility] to work on strength and balance. He is still using a walker at home and a wheelchair when out of the house. Pt says he is not yet driving but he plans to renew his drivers license soon. He says that a ramp has yet to be installed at his house but he is thinking of renting a temporary one. Pt says he is also hoping to have his deck tuned up so he can spend some time on the patio this summer. Pt reports he is trying to stay positive and take things one step at a time; provided supportive counseling. Pt denied any SW needs at this time. Encouraged pt to reach out should additional needs arise.   Jacey Kraus, MSW, LSW

## 2024-05-07 NOTE — PATIENT INSTRUCTIONS
Today you met with Dr. Springer and are cleared for treatment.  You look and sound better Rich!  Stay positive.  Dr. You will have a CT scan before Dr. Springer sees you in 3 weeks with your next infusion.  Proceed with the Dr. Raines appointment as planned.  Call or messages for concerns.    Please call the office for any questions or concerns.  Review your schedule prior to leaving [facility].  We ask that you notify us 5-7 days before your medications need refilled.  [facility] is strongly encouraging all patients to access their MyChart for all results and to communicate with their provider for non-urgent messages.  If an urgent/same day/sick concern response is needed, please call the office at [phone]. Thank You!

## 2024-05-24 NOTE — Clinical Note
Deanna -- Call Mr Verduzco -- he was requesting more eliquis.  Let him know that his home health nurse reached out to me about his eliquis but I don't think he needs to take this anymore.  It was only for his blood clot back in December.  Or has Dr Springer told him to continue taking it? MP

## 2024-05-24 NOTE — PROGRESS NOTES
Dc hydralazine due to low bp.  Requesting refills on eliquis -- to check to see if any other doctor wanted him on this -- I have him stopping after 3/14/24. MP

## 2024-05-28 ENCOUNTER — HOSPITAL ENCOUNTER (OUTPATIENT)
Dept: RADIOLOGY | Facility: HOSPITAL | Age: 74
Discharge: HOME | End: 2024-05-28
Payer: MEDICARE

## 2024-05-28 DIAGNOSIS — C43.9 METASTATIC MELANOMA (MULTI): ICD-10-CM

## 2024-05-28 PROCEDURE — 2550000001 HC RX 255 CONTRASTS: Performed by: INTERNAL MEDICINE

## 2024-05-28 PROCEDURE — 74177 CT ABD & PELVIS W/CONTRAST: CPT | Performed by: STUDENT IN AN ORGANIZED HEALTH CARE EDUCATION/TRAINING PROGRAM

## 2024-05-28 PROCEDURE — 71260 CT THORAX DX C+: CPT | Performed by: STUDENT IN AN ORGANIZED HEALTH CARE EDUCATION/TRAINING PROGRAM

## 2024-05-28 PROCEDURE — 74177 CT ABD & PELVIS W/CONTRAST: CPT

## 2024-05-28 RX ADMIN — IOHEXOL 75 ML: 350 INJECTION, SOLUTION INTRAVENOUS at 10:06

## 2024-05-30 ENCOUNTER — NUTRITION (OUTPATIENT)
Dept: HEMATOLOGY/ONCOLOGY | Facility: CLINIC | Age: 74
End: 2024-05-30

## 2024-05-30 ENCOUNTER — INFUSION (OUTPATIENT)
Dept: HEMATOLOGY/ONCOLOGY | Facility: CLINIC | Age: 74
End: 2024-05-30
Payer: MEDICARE

## 2024-05-30 ENCOUNTER — OFFICE VISIT (OUTPATIENT)
Dept: HEMATOLOGY/ONCOLOGY | Facility: CLINIC | Age: 74
End: 2024-05-30
Payer: MEDICARE

## 2024-05-30 VITALS
DIASTOLIC BLOOD PRESSURE: 66 MMHG | OXYGEN SATURATION: 96 % | BODY MASS INDEX: 29.82 KG/M2 | WEIGHT: 227.4 LBS | SYSTOLIC BLOOD PRESSURE: 130 MMHG | HEART RATE: 77 BPM | TEMPERATURE: 96.3 F | RESPIRATION RATE: 18 BRPM

## 2024-05-30 VITALS — WEIGHT: 227.4 LBS | HEIGHT: 73 IN | BODY MASS INDEX: 30.14 KG/M2

## 2024-05-30 DIAGNOSIS — C43.9 METASTATIC MELANOMA (MULTI): ICD-10-CM

## 2024-05-30 DIAGNOSIS — N17.9 ACUTE RENAL FAILURE, UNSPECIFIED ACUTE RENAL FAILURE TYPE (CMS-HCC): Primary | ICD-10-CM

## 2024-05-30 DIAGNOSIS — C43.9 METASTATIC MELANOMA (MULTI): Primary | ICD-10-CM

## 2024-05-30 LAB
ALBUMIN SERPL BCP-MCNC: 3.6 G/DL (ref 3.4–5)
ALP SERPL-CCNC: 79 U/L (ref 33–136)
ALT SERPL W P-5'-P-CCNC: 12 U/L (ref 10–52)
AMYLASE SERPL-CCNC: 39 U/L (ref 29–103)
ANION GAP SERPL CALC-SCNC: 12 MMOL/L (ref 10–20)
AST SERPL W P-5'-P-CCNC: 15 U/L (ref 9–39)
BASOPHILS # BLD AUTO: 0.02 X10*3/UL (ref 0–0.1)
BASOPHILS NFR BLD AUTO: 0.4 %
BILIRUB SERPL-MCNC: 0.7 MG/DL (ref 0–1.2)
BUN SERPL-MCNC: 16 MG/DL (ref 6–23)
CALCIUM SERPL-MCNC: 9.4 MG/DL (ref 8.6–10.3)
CHLORIDE SERPL-SCNC: 103 MMOL/L (ref 98–107)
CO2 SERPL-SCNC: 26 MMOL/L (ref 21–32)
CORTIS AM PEAK SERPL-MSCNC: 14.6 UG/DL (ref 5–20)
CREAT SERPL-MCNC: 1.95 MG/DL (ref 0.5–1.3)
EGFRCR SERPLBLD CKD-EPI 2021: 36 ML/MIN/1.73M*2
EOSINOPHIL # BLD AUTO: 0.35 X10*3/UL (ref 0–0.4)
EOSINOPHIL NFR BLD AUTO: 7 %
ERYTHROCYTE [DISTWIDTH] IN BLOOD BY AUTOMATED COUNT: 14.8 % (ref 11.5–14.5)
GLUCOSE SERPL-MCNC: 115 MG/DL (ref 74–99)
HCT VFR BLD AUTO: 28 % (ref 41–52)
HGB BLD-MCNC: 9.1 G/DL (ref 13.5–17.5)
IMM GRANULOCYTES # BLD AUTO: 0.02 X10*3/UL (ref 0–0.5)
IMM GRANULOCYTES NFR BLD AUTO: 0.4 % (ref 0–0.9)
LDH SERPL L TO P-CCNC: 225 U/L (ref 84–246)
LIPASE SERPL-CCNC: 9 U/L (ref 9–82)
LYMPHOCYTES # BLD AUTO: 1.16 X10*3/UL (ref 0.8–3)
LYMPHOCYTES NFR BLD AUTO: 23.2 %
MCH RBC QN AUTO: 28.6 PG (ref 26–34)
MCHC RBC AUTO-ENTMCNC: 32.5 G/DL (ref 32–36)
MCV RBC AUTO: 88 FL (ref 80–100)
MONOCYTES # BLD AUTO: 0.36 X10*3/UL (ref 0.05–0.8)
MONOCYTES NFR BLD AUTO: 7.2 %
NEUTROPHILS # BLD AUTO: 3.09 X10*3/UL (ref 1.6–5.5)
NEUTROPHILS NFR BLD AUTO: 61.8 %
PLATELET # BLD AUTO: 108 X10*3/UL (ref 150–450)
POTASSIUM SERPL-SCNC: 3.8 MMOL/L (ref 3.5–5.3)
PROT SERPL-MCNC: 7.2 G/DL (ref 6.4–8.2)
RBC # BLD AUTO: 3.18 X10*6/UL (ref 4.5–5.9)
SODIUM SERPL-SCNC: 137 MMOL/L (ref 136–145)
T4 FREE SERPL-MCNC: 0.83 NG/DL (ref 0.61–1.12)
TSH SERPL-ACNC: 6.66 MIU/L (ref 0.44–3.98)
WBC # BLD AUTO: 5 X10*3/UL (ref 4.4–11.3)

## 2024-05-30 PROCEDURE — 1159F MED LIST DOCD IN RCRD: CPT | Performed by: INTERNAL MEDICINE

## 2024-05-30 PROCEDURE — 3078F DIAST BP <80 MM HG: CPT | Performed by: INTERNAL MEDICINE

## 2024-05-30 PROCEDURE — 2500000004 HC RX 250 GENERAL PHARMACY W/ HCPCS (ALT 636 FOR OP/ED): Mod: JZ,JG | Performed by: INTERNAL MEDICINE

## 2024-05-30 PROCEDURE — 84439 ASSAY OF FREE THYROXINE: CPT | Performed by: INTERNAL MEDICINE

## 2024-05-30 PROCEDURE — 83690 ASSAY OF LIPASE: CPT | Performed by: INTERNAL MEDICINE

## 2024-05-30 PROCEDURE — 99215 OFFICE O/P EST HI 40 MIN: CPT | Performed by: INTERNAL MEDICINE

## 2024-05-30 PROCEDURE — 84132 ASSAY OF SERUM POTASSIUM: CPT | Mod: 91 | Performed by: INTERNAL MEDICINE

## 2024-05-30 PROCEDURE — 3008F BODY MASS INDEX DOCD: CPT | Performed by: INTERNAL MEDICINE

## 2024-05-30 PROCEDURE — 96417 CHEMO IV INFUS EACH ADDL SEQ: CPT

## 2024-05-30 PROCEDURE — 82533 TOTAL CORTISOL: CPT | Mod: GEALAB | Performed by: INTERNAL MEDICINE

## 2024-05-30 PROCEDURE — 85025 COMPLETE CBC W/AUTO DIFF WBC: CPT | Performed by: INTERNAL MEDICINE

## 2024-05-30 PROCEDURE — 4010F ACE/ARB THERAPY RXD/TAKEN: CPT | Performed by: INTERNAL MEDICINE

## 2024-05-30 PROCEDURE — 96361 HYDRATE IV INFUSION ADD-ON: CPT | Mod: INF

## 2024-05-30 PROCEDURE — 3048F LDL-C <100 MG/DL: CPT | Performed by: INTERNAL MEDICINE

## 2024-05-30 PROCEDURE — 83615 LACTATE (LD) (LDH) ENZYME: CPT | Performed by: INTERNAL MEDICINE

## 2024-05-30 PROCEDURE — 3075F SYST BP GE 130 - 139MM HG: CPT | Performed by: INTERNAL MEDICINE

## 2024-05-30 PROCEDURE — 3044F HG A1C LEVEL LT 7.0%: CPT | Performed by: INTERNAL MEDICINE

## 2024-05-30 PROCEDURE — 1126F AMNT PAIN NOTED NONE PRSNT: CPT | Performed by: INTERNAL MEDICINE

## 2024-05-30 PROCEDURE — 84443 ASSAY THYROID STIM HORMONE: CPT | Performed by: INTERNAL MEDICINE

## 2024-05-30 PROCEDURE — 1157F ADVNC CARE PLAN IN RCRD: CPT | Performed by: INTERNAL MEDICINE

## 2024-05-30 PROCEDURE — 96413 CHEMO IV INFUSION 1 HR: CPT

## 2024-05-30 PROCEDURE — 82565 ASSAY OF CREATININE: CPT | Mod: 91 | Performed by: INTERNAL MEDICINE

## 2024-05-30 PROCEDURE — 82150 ASSAY OF AMYLASE: CPT | Performed by: INTERNAL MEDICINE

## 2024-05-30 RX ORDER — DIPHENHYDRAMINE HYDROCHLORIDE 50 MG/ML
50 INJECTION INTRAMUSCULAR; INTRAVENOUS AS NEEDED
OUTPATIENT
Start: 2024-07-11

## 2024-05-30 RX ORDER — ALBUTEROL SULFATE 0.83 MG/ML
3 SOLUTION RESPIRATORY (INHALATION) AS NEEDED
Status: CANCELLED | OUTPATIENT
Start: 2024-05-30

## 2024-05-30 RX ORDER — EPINEPHRINE 0.3 MG/.3ML
0.3 INJECTION SUBCUTANEOUS EVERY 5 MIN PRN
OUTPATIENT
Start: 2024-06-20

## 2024-05-30 RX ORDER — DIPHENHYDRAMINE HYDROCHLORIDE 50 MG/ML
50 INJECTION INTRAMUSCULAR; INTRAVENOUS AS NEEDED
Status: DISCONTINUED | OUTPATIENT
Start: 2024-05-30 | End: 2024-05-30 | Stop reason: HOSPADM

## 2024-05-30 RX ORDER — PROCHLORPERAZINE MALEATE 5 MG
10 TABLET ORAL EVERY 6 HOURS PRN
OUTPATIENT
Start: 2024-08-01

## 2024-05-30 RX ORDER — PROCHLORPERAZINE MALEATE 5 MG
10 TABLET ORAL EVERY 6 HOURS PRN
OUTPATIENT
Start: 2024-06-20

## 2024-05-30 RX ORDER — FAMOTIDINE 10 MG/ML
20 INJECTION INTRAVENOUS ONCE AS NEEDED
OUTPATIENT
Start: 2024-08-01

## 2024-05-30 RX ORDER — PROCHLORPERAZINE MALEATE 5 MG
10 TABLET ORAL EVERY 6 HOURS PRN
OUTPATIENT
Start: 2024-07-11

## 2024-05-30 RX ORDER — EPINEPHRINE 0.3 MG/.3ML
0.3 INJECTION SUBCUTANEOUS EVERY 5 MIN PRN
OUTPATIENT
Start: 2024-07-11

## 2024-05-30 RX ORDER — FAMOTIDINE 10 MG/ML
20 INJECTION INTRAVENOUS ONCE AS NEEDED
Status: DISCONTINUED | OUTPATIENT
Start: 2024-05-30 | End: 2024-05-30 | Stop reason: HOSPADM

## 2024-05-30 RX ORDER — DIPHENHYDRAMINE HYDROCHLORIDE 50 MG/ML
50 INJECTION INTRAMUSCULAR; INTRAVENOUS AS NEEDED
OUTPATIENT
Start: 2024-05-30

## 2024-05-30 RX ORDER — PROCHLORPERAZINE EDISYLATE 5 MG/ML
10 INJECTION INTRAMUSCULAR; INTRAVENOUS EVERY 6 HOURS PRN
OUTPATIENT
Start: 2024-08-01

## 2024-05-30 RX ORDER — HEPARIN 100 UNIT/ML
500 SYRINGE INTRAVENOUS AS NEEDED
OUTPATIENT
Start: 2024-05-30

## 2024-05-30 RX ORDER — ALBUTEROL SULFATE 0.83 MG/ML
3 SOLUTION RESPIRATORY (INHALATION) AS NEEDED
OUTPATIENT
Start: 2024-07-11

## 2024-05-30 RX ORDER — EPINEPHRINE 0.3 MG/.3ML
0.3 INJECTION SUBCUTANEOUS EVERY 5 MIN PRN
OUTPATIENT
Start: 2024-05-30

## 2024-05-30 RX ORDER — ALBUTEROL SULFATE 0.83 MG/ML
3 SOLUTION RESPIRATORY (INHALATION) AS NEEDED
Status: DISCONTINUED | OUTPATIENT
Start: 2024-05-30 | End: 2024-05-30 | Stop reason: HOSPADM

## 2024-05-30 RX ORDER — PROCHLORPERAZINE EDISYLATE 5 MG/ML
10 INJECTION INTRAMUSCULAR; INTRAVENOUS EVERY 6 HOURS PRN
OUTPATIENT
Start: 2024-07-11

## 2024-05-30 RX ORDER — FAMOTIDINE 10 MG/ML
20 INJECTION INTRAVENOUS ONCE AS NEEDED
OUTPATIENT
Start: 2024-05-30

## 2024-05-30 RX ORDER — EPINEPHRINE 0.3 MG/.3ML
0.3 INJECTION SUBCUTANEOUS EVERY 5 MIN PRN
Status: CANCELLED | OUTPATIENT
Start: 2024-05-30

## 2024-05-30 RX ORDER — FAMOTIDINE 10 MG/ML
20 INJECTION INTRAVENOUS ONCE AS NEEDED
OUTPATIENT
Start: 2024-06-20

## 2024-05-30 RX ORDER — PROCHLORPERAZINE EDISYLATE 5 MG/ML
10 INJECTION INTRAMUSCULAR; INTRAVENOUS EVERY 6 HOURS PRN
OUTPATIENT
Start: 2024-06-20

## 2024-05-30 RX ORDER — PROCHLORPERAZINE MALEATE 10 MG
10 TABLET ORAL EVERY 6 HOURS PRN
Status: DISCONTINUED | OUTPATIENT
Start: 2024-05-30 | End: 2024-05-30 | Stop reason: HOSPADM

## 2024-05-30 RX ORDER — EPINEPHRINE 0.3 MG/.3ML
0.3 INJECTION SUBCUTANEOUS EVERY 5 MIN PRN
OUTPATIENT
Start: 2024-08-01

## 2024-05-30 RX ORDER — FAMOTIDINE 10 MG/ML
20 INJECTION INTRAVENOUS ONCE AS NEEDED
Status: CANCELLED | OUTPATIENT
Start: 2024-05-30

## 2024-05-30 RX ORDER — DIPHENHYDRAMINE HYDROCHLORIDE 50 MG/ML
50 INJECTION INTRAMUSCULAR; INTRAVENOUS AS NEEDED
Status: CANCELLED | OUTPATIENT
Start: 2024-05-30

## 2024-05-30 RX ORDER — ALBUTEROL SULFATE 0.83 MG/ML
3 SOLUTION RESPIRATORY (INHALATION) AS NEEDED
OUTPATIENT
Start: 2024-05-30

## 2024-05-30 RX ORDER — ALBUTEROL SULFATE 0.83 MG/ML
3 SOLUTION RESPIRATORY (INHALATION) AS NEEDED
OUTPATIENT
Start: 2024-08-01

## 2024-05-30 RX ORDER — DIPHENHYDRAMINE HYDROCHLORIDE 50 MG/ML
50 INJECTION INTRAMUSCULAR; INTRAVENOUS AS NEEDED
OUTPATIENT
Start: 2024-06-20

## 2024-05-30 RX ORDER — EPINEPHRINE 0.3 MG/.3ML
0.3 INJECTION SUBCUTANEOUS EVERY 5 MIN PRN
Status: DISCONTINUED | OUTPATIENT
Start: 2024-05-30 | End: 2024-05-30 | Stop reason: HOSPADM

## 2024-05-30 RX ORDER — ALBUTEROL SULFATE 0.83 MG/ML
3 SOLUTION RESPIRATORY (INHALATION) AS NEEDED
OUTPATIENT
Start: 2024-06-20

## 2024-05-30 RX ORDER — FAMOTIDINE 10 MG/ML
20 INJECTION INTRAVENOUS ONCE AS NEEDED
OUTPATIENT
Start: 2024-07-11

## 2024-05-30 RX ORDER — DIPHENHYDRAMINE HYDROCHLORIDE 50 MG/ML
50 INJECTION INTRAMUSCULAR; INTRAVENOUS AS NEEDED
OUTPATIENT
Start: 2024-08-01

## 2024-05-30 RX ORDER — PROCHLORPERAZINE EDISYLATE 5 MG/ML
10 INJECTION INTRAMUSCULAR; INTRAVENOUS EVERY 6 HOURS PRN
Status: DISCONTINUED | OUTPATIENT
Start: 2024-05-30 | End: 2024-05-30 | Stop reason: HOSPADM

## 2024-05-30 RX ORDER — HEPARIN SODIUM,PORCINE/PF 10 UNIT/ML
50 SYRINGE (ML) INTRAVENOUS AS NEEDED
OUTPATIENT
Start: 2024-05-30

## 2024-05-30 RX ADMIN — SODIUM CHLORIDE 1000 ML: 9 INJECTION, SOLUTION INTRAVENOUS at 12:48

## 2024-05-30 RX ADMIN — SODIUM CHLORIDE 100 MG: 9 INJECTION, SOLUTION INTRAVENOUS at 11:01

## 2024-05-30 RX ADMIN — SODIUM CHLORIDE 300 MG: 9 INJECTION, SOLUTION INTRAVENOUS at 12:03

## 2024-05-30 ASSESSMENT — PAIN SCALES - GENERAL: PAINLEVEL: 0-NO PAIN

## 2024-05-30 NOTE — PATIENT INSTRUCTIONS
Today you met with Dr. Springer and your recent CT scan was discussed.  You will continue with your treatment every 3 weeks.  Dr. Springer will connect with Dr. Anderson to discuss whether 2 or 1 medication will be given.  Dr. Springer will see you back in 9 weeks.  Please call the office for any questions or concerns.  Review your schedule prior to leaving [facility].  We ask that you notify us 5-7 days before your medications need refilled.   Juan is strongly encouraging all patients to access their [facility] for all results and to communicate with their provider for non-urgent messages.  If an urgent/same day/sick concern response is needed, please call the office at [phone]. Thank You!

## 2024-05-30 NOTE — PROGRESS NOTES
Pt tolerated treatment without incident. Denies questions, concerns, or comments at this time. Discharged home with wheelchair

## 2024-05-30 NOTE — PROGRESS NOTES
"NUTRITION Follow-up NOTE    Nutrition Assessment     Reason for Visit:  Chester Verduzco is a 74 y.o. male who presents for stage III/4 metastatic melanoma.   Pt is planned for immunotherapy - Nivo/ Ipi planned to start 3/18/2024    HX: Gout, HTN, DM (managed with diet - A1c of 5.3), CKD3    Referral for weight loss.   Visited with pt today for follow-up.     Pt noted to be on lasix as well.     Lab Results   Component Value Date/Time    GLUCOSE 115 (H) 05/30/2024 0937     05/30/2024 0937    K 3.8 05/30/2024 0937     05/30/2024 0937    CO2 26 05/30/2024 0937    ANIONGAP 12 05/30/2024 0937    BUN 16 05/30/2024 0937    CREATININE 1.95 (H) 05/30/2024 0937    EGFR 36 (L) 05/30/2024 0937    CALCIUM 9.4 05/30/2024 0937    ALBUMIN 3.6 05/30/2024 0937    ALKPHOS 79 05/30/2024 0937    PROT 7.2 05/30/2024 0937    AST 15 05/30/2024 0937    BILITOT 0.7 05/30/2024 0937    ALT 12 05/30/2024 0937     No results found for: \"VITD25\"    Anthropometrics:  Anthropometrics  Height: 186 cm (6' 1.23\")  Weight: 103 kg (227 lb 6.5 oz)  BMI (Calculated): 29.82  IBW/kg (Dietitian Calculated): 83.6 kg  Weight Change  Weight History / % Weight Change: weight stable over the last month; noted 2.9% loss in 3 mos, 8% in 6 mos, 21.4% in < 1 yr.  Significant Weight Loss: Yes  Interpretation of Weight Loss: >20% in 1 year        Wt Readings from Last 10 Encounters:   05/30/24 103 kg (227 lb 6.5 oz)   05/30/24 103 kg (227 lb 6.5 oz)   05/07/24 102 kg (225 lb 8.5 oz)   05/07/24 102 kg (225 lb 8.5 oz)   04/29/24 106 kg (233 lb 12.8 oz)   04/08/24 112 kg (246 lb 7.6 oz)   03/18/24 105 kg (232 lb 7.6 oz)   03/18/24 105 kg (232 lb 7.6 oz)   03/07/24 105 kg (231 lb 9.5 oz)   02/14/24 98.4 kg (217 lb)        Food And Nutrient Intake:  Food and Nutrient History  Food and Nutrient History: Taste off- minute maid was the only thing that taste good- and watermelon tasted good- now back to food tasting better. Portions are fair. Chicken/ cube " "steaks, occasional hamburger. Has been adding some olives and nut with snacks. Has made a few milkshakes at home and PBj sandwiches. Likes beverages that are very cold. had a few days were he forgot to take his insulin and morning BG was still lower ~100. was up to 20units when was on steroids, now off of steroids. Morning BGs have been 90 or lower. PBJ at night before bed.  Energy Intake: Good > 75 %, Fair 50-75 % (variable for a few days last week)  Fluid Intake: Water (4-5 16.9oz bottles), soda hasn't been tasting good; minute maid lemonade has been better 3-12oz cans with ice added; has decreased lois milk intake. Some other juice too  GI Symptoms: early satiety, nausea  GI Symptoms greater than 2 weeks: yes  Oral Problems: dysgeusia (only for a few days was pretty bad.)     Food Intake  Amount of Food: Does make salads occasional and plans to add eggs; doing berries. tunafish mac salad and hot dogs over memorial day weekend.  Meal 1: B = 2 slices of raisin cinnamon toast with butter; sometimes will do eggs.  Meal 2: L = sandwich; tosha soup  Meal 3: D = leftover bbg chicken 2 tenderloin strips, green beans, corn on cob with butter  Snacks: PBJ before bed                                                        Nutrition Focused Physical Exam Findings:  Completed 3/18/24                          Energy Needs  Calculated Energy Needs Using Equations  Height: 186 cm (6' 1.23\")  Weight Used for Equation Calculations: 102 kg (224 lb 13.9 oz)  Calvin- [place] Serrano Equation (Overweight or Obese Patients): 1822  Estimated Energy Needs  Total Energy Estimated Needs (kCal): 2550 kCal  Total Estimated Energy Need per Day (kCal/kg): 25 kCal/kg  Method for Estimating Needs: up to 2600kcal/day  Estimated Fluid Needs  Total Fluid Estimated Needs (mL): 2040 mL  Total Fluid Estimated Needs (mL/kg): 20 mL/kg  Method for Estimating Needs: up to 2600mL/day  Estimated Protein Needs  Total Protein Estimated Needs (g): 122.4 g  Total " Protein Estimated Needs (g/kg): 1.2 g/kg  Method for Estimating Needs: pt with gout/ CKD3, may need to be more conservation - 82g/day (0.8g/kg)        Nutrition Diagnosis   Malnutrition Diagnosis  Patient has Malnutrition Diagnosis: Yes  Diagnosis Status: Ongoing  Malnutrition Diagnosis: Severe malnutrition related to chronic disease or condition  As Evidenced by: pt with 19% weight loss in 6 months, oral intake < 75% of estimated needs for > 1 month, pt with acute illness as well as dx of metastatic melanoma but not able to start treatment.  Additional Assessment Information: Continued NIS, likely grade 1; has been able to stabilize weight for the last month         Nutrition Interventions/Recommendations   Nutrition Prescription  Individualized Nutrition Prescription Provided for : Nutrition during cancer treatment, management of nutrition impact symptoms.    Food and Nutrition Delivery  Food and Nutrition Delivery  Meals & Snacks: Protein-modified diet, General Healthful Diet, Energy-modified diet, Modify schedule of foods/fluids  Goals: Continue frequent meals and snacks; recommend moderate protein with meals/ snacks throughout the day. Continue with protein with CHO for balanced DM friendly meals. Follow-up with PCP regarding not taking insulin. Encouarged adequate calories to help maintain weight. Recommend adding juice to water for flavoring and help improve water taste.  Medical Food Supplement: Commercial beverage  Goals: Encouraged use of chocolate milk/ protein bars as able/ tolerated  Feeding Assistance: Mouth care  Goals: Baking soda swish recipe provided and reviewed; incorpoate ways to help manage taste    Nutrition Education  Nutrition Education  Nutrition Education Content: Content related nutrition education  Goals: General healthy diet, moderate protein; adequate calories to maintain weight    Coordination of Care       Patient Instructions   Encouraged a balanced diet; mindful of concentrated  sweets and added sugars    Nutrition Monitoring and Evaluation   Food/Nutrient Related History Monitoring  Monitoring and Evaluation Plan: Energy intake, Fluid intake, Meal/snack pattern, Protein intake  Energy Intake: Estimated energy intake  Criteria: Meet 100% caloric needs  Fluid Intake: Estimated fluid intake  Criteria: Maintain hydration; 70+oz daily  Meal/Snack Pattern: Estimated meal and snack pattern  Criteria: At least 3 meals daily with snacks 2-3x/day  Estimated protein intake: Estimated protein intake  Criteria: Meet at least 75% of protein needs orally  Body Composition/Growth/Weight History  Monitoring and Evaluation Plan: Weight  Weight: Measured weight  Criteria: Maintain weight          Time Spent  Prep time on day of patient encounter: 5 minutes  Time spent directly with patient, family or caregiver: 15 minutes  Additional Time Spent on Patient Care Activities: 0 minutes  Documentation Time: 10 minutes  Other Time Spent: 0 minutes  Total: 30 minutes

## 2024-05-30 NOTE — PROGRESS NOTES
Patient ID: Chester Verduzco is a 74 y.o. male.  Referring Physician: Danish Springer MD  [id number] [place] [address]  Primary Care Provider: Jeffry De Leon MD  Visit Type:  Follow Up     Subjective    HPI  Mr. Chester Verduzco is a 73 year old male with PMHX HTN, HLD, Dual chamber PPM implanted in 11/1996 for SSS (RV is passive) with most recent generator replacement in 11/2020 ([employer] [facility] [employer] MRI), Type 2 DM, gout, and recent hospitalization (10/2-10/7) for Staph bacteremia, diarrhea, NOHELIA , and hyponatremia 2/2 hypovolemia who presented to [facility] ED from a SNF on 11/13 for NOHELIA on labs from the SNF and oliguria. Blood cultures were positive for MSSA and previously had been positive 10/8 and 11/10 for MSSA. Blood cultures with NGTD as of 11/16.  FILIPE 11/16 showed tricuspid valve vegetation as well as right atrial lead vegetation. Patient has been afebrile without leukocytosis and hemodynamically stable. Device Interrogated 11/17 with AP 29% and  30%.  Lead extraction complete 11/22.  Patient on IV antibiotics with stop 4 weeks after lead removal (~12/20). After lead removal, on telemetry the patient had several episodes of intermittent asymptomatic high-grade AV block, NSVT,and bradycardia. EP plans to implant PPM after 14 days of negative blood culture post removal per, ID. Patient received a unit of packed red blood cells for low hemoglobin (11/25). Hemoglobin has remained stable with no signs or symptoms of bleeding. Home lasix and losartan were being held in setting of NOHELIA 2/2 to nephrotoxic antibiotic exposure which down trending but not at baseline with adequate urine output. Patient has maldonado I/s/o severe BPH. Of note, Patient noticed left groin lymphadenopathy and U/S (11/23) showed Enlarged left inguinal/external iliac lymph nodes. PET scan deferred at this time d/t active infectious process. Underwent core needle biopsy on 11/29. Patient to follow up biopsy  results with surgical oncology. Device re-implant (micra leadless pacemaker) 11/30, PICC placed 12/2. In discussing with ID on 12/2, and reviewing past ID notes, reimplant was done iso tricuspid vegetation with the assumption that patient was sterilized given Negative BC, lack of growth in vegetation size and clinical picture. Patient has outpatient follow up scheduled further and antibiotic use will be determined then.      Biopsy results confirming metastatic melanoma but because of patient's poor performance status no intervention is planned at this time.  Patient to return in 2 months for evaluation of performance status and to discuss with patient at that time if feasible immuno oncologic therapy.  Review of Systems - Oncology     Objective   BSA: 2.31 meters squared  /66 (BP Location: Right arm, Patient Position: Sitting, BP Cuff Size: Adult)   Pulse 77   Temp 35.7 °C (96.3 °F) (Temporal)   Resp 18   Wt 103 kg (227 lb 6.5 oz)   SpO2 96%   BMI 29.82 kg/m²      has a past medical history of Abnormal weight gain (10/27/2016), Achilles tendinitis, unspecified leg (08/16/2016), Acute upper respiratory infection, unspecified, NOHELIA (acute kidney injury) (CMS-HCC) (10/02/2023), Allergic contact dermatitis due to plants, except food (08/22/2013), Arthritis, Bell's palsy (03/21/2023), BPH with obstruction/lower urinary tract symptoms, Cancer (Multi), Cellulitis of right lower limb (07/30/2021), Cough, unspecified (03/21/2016), Diabetes (Multi), Encounter for screening for human immunodeficiency virus (HIV) (06/14/2016), Hordeolum externum unspecified eye, unspecified eyelid (08/14/2019), Hyperglycemia, unspecified (12/13/2017), Hypertension, Incisional hernia without obstruction or gangrene (06/15/2015), Knee joint pain, Melanoma (Multi), MSSA bacteremia (10/2023), Muscle spasm of calf (08/16/2016), Obstructive uropathy, Personal history of other diseases of the digestive system (06/30/2015), Personal history  of other diseases of the digestive system (02/05/2016), Personal history of other diseases of the respiratory system (12/23/2014), Personal history of other diseases of the respiratory system (01/28/2016), Personal history of other infectious and parasitic diseases (12/02/2015), Personal history of other infectious and parasitic diseases, Personal history of other specified conditions (02/04/2015), Personal history of other specified conditions (08/16/2016), Personal history of other specified conditions (01/26/2015), Personal history of pneumonia (recurrent) (01/29/2016), Personal history of urinary (tract) infections (02/19/2013), Prostatitis (03/21/2023), Pruritus, unspecified (02/10/2015), Sinoatrial node dysfunction (Multi), Strain of muscle, fascia and tendon of the posterior muscle group at thigh level, right thigh, initial encounter (05/10/2016), and Unspecified symptoms and signs involving the genitourinary system (12/20/2016).   has a past surgical history that includes Varicose vein surgery (08/22/2013); Cardiac pacemaker placement (08/22/2013); Other surgical history (06/15/2015); Ventral hernia repair (06/15/2015); Other surgical history (09/16/2019); Peripherally inserted central catheter insertion; Cardiac electrophysiology procedure (Left, 11/22/2023); US guided biopsy lymph node superficial (11/29/2023); Cardiac electrophysiology procedure (N/A, 11/30/2023); and Skin biopsy.  Family History   Problem Relation Name Age of Onset    Cancer Mother Maye Verduzco      Oncology History   Metastatic melanoma (Multi)   12/12/2023 Initial Diagnosis    Metastatic melanoma (CMS/HCC)     3/18/2024 -  Chemotherapy    Nivolumab 1 mg/kg + Ipilimumab 3 mg/kg, 21 Day Cycles         Chester Verduzco  reports that he has never smoked. He has never been exposed to tobacco smoke. He has never used smokeless tobacco.  He  reports that he does not currently use alcohol.  He  reports no history of drug use.    Physical  Exam    WBC   Date/Time Value Ref Range Status   05/30/2024 09:37 AM 5.0 4.4 - 11.3 x10*3/uL Final   05/07/2024 09:40 AM 5.2 4.4 - 11.3 x10*3/uL Final   04/29/2024 10:17 AM 4.7 4.4 - 11.3 x10*3/uL Final     nRBC   Date Value Ref Range Status   04/08/2024   Final     Comment:     Not Measured   04/02/2024 0.0 0.0 - 0.0 /100 WBCs Final   12/03/2023 0.0 0.0 - 0.0 /100 WBCs Final     RBC   Date Value Ref Range Status   05/30/2024 3.18 (L) 4.50 - 5.90 x10*6/uL Final   05/07/2024 3.13 (L) 4.50 - 5.90 x10*6/uL Final   04/29/2024 3.07 (L) 4.50 - 5.90 x10*6/uL Final     Hemoglobin   Date Value Ref Range Status   05/30/2024 9.1 (L) 13.5 - 17.5 g/dL Final   05/07/2024 8.9 (L) 13.5 - 17.5 g/dL Final   04/29/2024 9.0 (L) 13.5 - 17.5 g/dL Final     Hematocrit   Date Value Ref Range Status   05/30/2024 28.0 (L) 41.0 - 52.0 % Final   05/07/2024 27.4 (L) 41.0 - 52.0 % Final   04/29/2024 27.3 (L) 41.0 - 52.0 % Final     MCV   Date/Time Value Ref Range Status   05/30/2024 09:37 AM 88 80 - 100 fL Final   05/07/2024 09:40 AM 88 80 - 100 fL Final   04/29/2024 10:17 AM 89 80 - 100 fL Final     MCH   Date/Time Value Ref Range Status   05/30/2024 09:37 AM 28.6 26.0 - 34.0 pg Final   05/07/2024 09:40 AM 28.4 26.0 - 34.0 pg Final   04/29/2024 10:17 AM 29.3 26.0 - 34.0 pg Final     MCHC   Date/Time Value Ref Range Status   05/30/2024 09:37 AM 32.5 32.0 - 36.0 g/dL Final   05/07/2024 09:40 AM 32.5 32.0 - 36.0 g/dL Final   04/29/2024 10:17 AM 33.0 32.0 - 36.0 g/dL Final     RDW   Date/Time Value Ref Range Status   05/30/2024 09:37 AM 14.8 (H) 11.5 - 14.5 % Final   05/07/2024 09:40 AM 13.2 11.5 - 14.5 % Final   04/29/2024 10:17 AM 13.3 11.5 - 14.5 % Final     Platelets   Date/Time Value Ref Range Status   05/30/2024 09:37  (L) 150 - 450 x10*3/uL Final   05/07/2024 09:40  150 - 450 x10*3/uL Final   04/29/2024 10:17 AM 92 (L) 150 - 450 x10*3/uL Final     MPV   Date/Time Value Ref Range Status   10/07/2023 05:38 AM 9.2 7.5 - 11.5 fL  Final   10/06/2023 05:47 AM 9.7 7.5 - 11.5 fL Final   10/05/2023 05:58 AM 10.2 7.5 - 11.5 fL Final     Neutrophils %   Date/Time Value Ref Range Status   05/30/2024 09:37 AM 61.8 40.0 - 80.0 % Final   05/07/2024 09:40 AM 67.5 40.0 - 80.0 % Final   04/29/2024 10:17 AM 75.4 40.0 - 80.0 % Final     Immature Granulocytes %, Automated   Date/Time Value Ref Range Status   05/30/2024 09:37 AM 0.4 0.0 - 0.9 % Final     Comment:     Immature Granulocyte Count (IG) includes promyelocytes, myelocytes and metamyelocytes but does not include bands. Percent differential counts (%) should be interpreted in the context of the absolute cell counts (cells/UL).   05/07/2024 09:40 AM 1.0 (H) 0.0 - 0.9 % Final     Comment:     Immature Granulocyte Count (IG) includes promyelocytes, myelocytes and metamyelocytes but does not include bands. Percent differential counts (%) should be interpreted in the context of the absolute cell counts (cells/UL).   04/29/2024 10:17 AM 0.2 0.0 - 0.9 % Final     Comment:     Immature Granulocyte Count (IG) includes promyelocytes, myelocytes and metamyelocytes but does not include bands. Percent differential counts (%) should be interpreted in the context of the absolute cell counts (cells/UL).     Lymphocytes %   Date/Time Value Ref Range Status   05/30/2024 09:37 AM 23.2 13.0 - 44.0 % Final   05/07/2024 09:40 AM 19.0 13.0 - 44.0 % Final   04/29/2024 10:17 AM 14.9 13.0 - 44.0 % Final     Monocytes %   Date/Time Value Ref Range Status   05/30/2024 09:37 AM 7.2 2.0 - 10.0 % Final   05/07/2024 09:40 AM 7.9 2.0 - 10.0 % Final   04/29/2024 10:17 AM 7.6 2.0 - 10.0 % Final     Eosinophils %   Date/Time Value Ref Range Status   05/30/2024 09:37 AM 7.0 0.0 - 6.0 % Final   05/07/2024 09:40 AM 4.4 0.0 - 6.0 % Final   04/29/2024 10:17 AM 1.7 0.0 - 6.0 % Final     Basophils %   Date/Time Value Ref Range Status   05/30/2024 09:37 AM 0.4 0.0 - 2.0 % Final   05/07/2024 09:40 AM 0.2 0.0 - 2.0 % Final   04/29/2024 10:17 AM  "0.2 0.0 - 2.0 % Final     Neutrophils Absolute   Date/Time Value Ref Range Status   05/30/2024 09:37 AM 3.09 1.60 - 5.50 x10*3/uL Final     Comment:     Percent differential counts (%) should be interpreted in the context of the absolute cell counts (cells/uL).   05/07/2024 09:40 AM 3.53 1.60 - 5.50 x10*3/uL Final     Comment:     Percent differential counts (%) should be interpreted in the context of the absolute cell counts (cells/uL).   04/29/2024 10:17 AM 3.55 1.60 - 5.50 x10*3/uL Final     Comment:     Percent differential counts (%) should be interpreted in the context of the absolute cell counts (cells/uL).     Immature Granulocytes Absolute, Automated   Date/Time Value Ref Range Status   05/30/2024 09:37 AM 0.02 0.00 - 0.50 x10*3/uL Final   05/07/2024 09:40 AM 0.05 0.00 - 0.50 x10*3/uL Final   04/29/2024 10:17 AM 0.01 0.00 - 0.50 x10*3/uL Final     Lymphocytes Absolute   Date/Time Value Ref Range Status   05/30/2024 09:37 AM 1.16 0.80 - 3.00 x10*3/uL Final   05/07/2024 09:40 AM 0.99 0.80 - 3.00 x10*3/uL Final   04/29/2024 10:17 AM 0.70 (L) 0.80 - 3.00 x10*3/uL Final     Monocytes Absolute   Date/Time Value Ref Range Status   05/30/2024 09:37 AM 0.36 0.05 - 0.80 x10*3/uL Final   05/07/2024 09:40 AM 0.41 0.05 - 0.80 x10*3/uL Final   04/29/2024 10:17 AM 0.36 0.05 - 0.80 x10*3/uL Final     Eosinophils Absolute   Date/Time Value Ref Range Status   05/30/2024 09:37 AM 0.35 0.00 - 0.40 x10*3/uL Final   05/07/2024 09:40 AM 0.23 0.00 - 0.40 x10*3/uL Final   04/29/2024 10:17 AM 0.08 0.00 - 0.40 x10*3/uL Final     Basophils Absolute   Date/Time Value Ref Range Status   05/30/2024 09:37 AM 0.02 0.00 - 0.10 x10*3/uL Final   05/07/2024 09:40 AM 0.01 0.00 - 0.10 x10*3/uL Final   04/29/2024 10:17 AM 0.01 0.00 - 0.10 x10*3/uL Final       No components found for: \"PT\"  aPTT   Date/Time Value Ref Range Status   11/30/2023 07:14 AM 36 27 - 38 seconds Final   11/29/2023 10:10 AM 36 27 - 38 seconds Final   11/24/2023 04:53 AM 37 " 27 - 38 seconds Final   IMPRESSION:  CHEST:  1. Few left lower lobe sub 6 mm pulmonary nodules which appear more conspicuous from prior and remain concerning for metastasis (no prior dedicated chest CT scan available for comparison). For continued attention on follow-up.  2. No suspicious intrathoracic enlarged lymphadenopathy.      ABDOMEN-PELVIS:  1. Increase in the size of the left gluteal enhancing nodules in keeping with the known melanoma.  2. Worsening bulky left external iliac and left inguinal metastatic lymph nodes which shows some intrinsic necrosis increased in size since prior PET scan dated 02/15/2024 allowing for differences in techniques as well as from prior abdomen CT scan dated 10/02/2023  3. Persistent splenomegaly measuring 19.4 cm.  4. Prostatomegaly with sequelae of chronic outflow obstruction.          MACRO:  None      Signed by: Lit Cardenas 5/28/2024    Assessment/Plan      Radiographic imaging discussed with patient showing response to treatment.  Will continue current therapy with an additional 2 cycles of ipi Nivo to be followed by single agent nivolumab as per protocol for metastatic melanoma.  Repeat CT abdomen and pelvis chest after 2 more cycles.     Diagnoses and all orders for this visit:  Metastatic melanoma (Multi)  -     Clinic Appointment Request  -     Infusion Appointment Request; Future  -     CBC and Auto Differential; Future  -     Comprehensive metabolic panel; Future  -     Amylase; Future  -     Cortisol Am; Future  -     Lactate dehydrogenase; Future  -     Lipase; Future  -     Tsh With Reflex To Free T4 If Abnormal; Future  -     Infusion Appointment Request; Future  -     CBC and Auto Differential; Future  -     Comprehensive metabolic panel; Future  -     Amylase; Future  -     Cortisol Am; Future  -     Lactate dehydrogenase; Future  -     Lipase; Future  -     Tsh With Reflex To Free T4 If Abnormal; Future  -     Clinic Appointment Request; Future  -      Infusion Appointment Request; Future  -     CBC and Auto Differential; Future  -     Comprehensive metabolic panel; Future  -     Amylase; Future  -     Cortisol Am; Future  -     Lactate dehydrogenase; Future  -     Lipase; Future  -     Tsh With Reflex To Free T4 If Abnormal; Future  Other orders  -     prochlorperazine (Compazine) tablet 10 mg  -     prochlorperazine (Compazine) injection 10 mg  -     nivolumab (Opdivo) 100 mg in sodium chloride 0.9% 110 mL IV  -     ipilimumab (Yervoy) 300 mg in sodium chloride 0.9% 200 mL IV  -     sodium chloride 0.9 % bolus 500 mL  -     dextrose 5 % in water (D5W) bolus  -     diphenhydrAMINE (BENADryl) injection 50 mg  -     methylPREDNISolone sod succinate (SOLU-Medrol) 40 mg/mL injection 40 mg  -     famotidine PF (Pepcid) injection 20 mg  -     EPINEPHrine (Epipen) injection syringe 0.3 mg  -     albuterol 2.5 mg /3 mL (0.083 %) nebulizer solution 3 mL  -     prochlorperazine (Compazine) tablet 10 mg  -     prochlorperazine (Compazine) injection 10 mg  -     nivolumab (Opdivo) 100 mg in sodium chloride 0.9% 110 mL IV  -     ipilimumab (Yervoy) 300 mg in sodium chloride 0.9% 200 mL IV  -     sodium chloride 0.9 % bolus 500 mL  -     dextrose 5 % in water (D5W) bolus  -     diphenhydrAMINE (BENADryl) injection 50 mg  -     methylPREDNISolone sod succinate (SOLU-Medrol) 40 mg/mL injection 40 mg  -     famotidine PF (Pepcid) injection 20 mg  -     EPINEPHrine (Epipen) injection syringe 0.3 mg  -     albuterol 2.5 mg /3 mL (0.083 %) nebulizer solution 3 mL  -     prochlorperazine (Compazine) tablet 10 mg  -     prochlorperazine (Compazine) injection 10 mg  -     nivolumab (Opdivo) 100 mg in sodium chloride 0.9% 110 mL IV  -     ipilimumab (Yervoy) 300 mg in sodium chloride 0.9% 200 mL IV  -     sodium chloride 0.9 % bolus 500 mL  -     dextrose 5 % in water (D5W) bolus  -     diphenhydrAMINE (BENADryl) injection 50 mg  -     methylPREDNISolone sod succinate (SOLU-Medrol) 40  mg/mL injection 40 mg  -     famotidine PF (Pepcid) injection 20 mg  -     EPINEPHrine (Epipen) injection syringe 0.3 mg  -     albuterol 2.5 mg /3 mL (0.083 %) nebulizer solution 3 mL           Danish Springer MD

## 2024-05-30 NOTE — PROGRESS NOTES
Patient ID: Chester Verduzco is a 74 y.o. male.  Referring Physician: Danish Springer MD  [id number] [phone]  Primary Care Provider: Jeffry De Leon MD  Visit Type:  {Visit Type:30135}     {Telehealth Consent:67981}    Subjective    HPI    Review of Systems - Oncology     Objective   BSA: There is no height or weight on file to calculate BSA.  There were no vitals taken for this visit.     has a past medical history of Abnormal weight gain (10/27/2016), Achilles tendinitis, unspecified leg (08/16/2016), Acute upper respiratory infection, unspecified, NOHELIA (acute kidney injury) (CMS-HCC) (10/02/2023), Allergic contact dermatitis due to plants, except food (08/22/2013), Arthritis, Bell's palsy (03/21/2023), BPH with obstruction/lower urinary tract symptoms, Cancer (Multi), Cellulitis of right lower limb (07/30/2021), Cough, unspecified (03/21/2016), Diabetes (Multi), Encounter for screening for human immunodeficiency virus (HIV) (06/14/2016), Hordeolum externum unspecified eye, unspecified eyelid (08/14/2019), Hyperglycemia, unspecified (12/13/2017), Hypertension, Incisional hernia without obstruction or gangrene (06/15/2015), Knee joint pain, Melanoma (Multi), MSSA bacteremia (10/2023), Muscle spasm of calf (08/16/2016), Obstructive uropathy, Personal history of other diseases of the digestive system (06/30/2015), Personal history of other diseases of the digestive system (02/05/2016), Personal history of other diseases of the respiratory system (12/23/2014), Personal history of other diseases of the respiratory system (01/28/2016), Personal history of other infectious and parasitic diseases (12/02/2015), Personal history of other infectious and parasitic diseases, Personal history of other specified conditions (02/04/2015), Personal history of other specified conditions (08/16/2016), Personal history of other specified conditions (01/26/2015), Personal history of  pneumonia (recurrent) (01/29/2016), Personal history of urinary (tract) infections (02/19/2013), Prostatitis (03/21/2023), Pruritus, unspecified (02/10/2015), Sinoatrial node dysfunction (Multi), Strain of muscle, fascia and tendon of the posterior muscle group at thigh level, right thigh, initial encounter (05/10/2016), and Unspecified symptoms and signs involving the genitourinary system (12/20/2016).   has a past surgical history that includes Varicose vein surgery (08/22/2013); Cardiac pacemaker placement (08/22/2013); Other surgical history (06/15/2015); Ventral hernia repair (06/15/2015); Other surgical history (09/16/2019); Peripherally inserted central catheter insertion; Cardiac electrophysiology procedure (Left, 11/22/2023); US guided biopsy lymph node superficial (11/29/2023); Cardiac electrophysiology procedure (N/A, 11/30/2023); and Skin biopsy.  Family History   Problem Relation Name Age of Onset    Cancer Mother Maye Verduzco      Oncology History   Metastatic melanoma (Multi)   12/12/2023 Initial Diagnosis    Metastatic melanoma (CMS/HCC)     3/18/2024 -  Chemotherapy    Nivolumab 1 mg/kg + Ipilimumab 3 mg/kg, 21 Day Cycles         Chester JORGENSEN Nickiecoy  reports that he has never smoked. He has never been exposed to tobacco smoke. He has never used smokeless tobacco.  He  reports that he does not currently use alcohol.  He  reports no history of drug use.    Physical Exam    WBC   Date/Time Value Ref Range Status   05/30/2024 09:37 AM 5.0 4.4 - 11.3 x10*3/uL Final   05/07/2024 09:40 AM 5.2 4.4 - 11.3 x10*3/uL Final   04/29/2024 10:17 AM 4.7 4.4 - 11.3 x10*3/uL Final     nRBC   Date Value Ref Range Status   04/08/2024   Final     Comment:     Not Measured   04/02/2024 0.0 0.0 - 0.0 /100 WBCs Final   12/03/2023 0.0 0.0 - 0.0 /100 WBCs Final     RBC   Date Value Ref Range Status   05/30/2024 3.18 (L) 4.50 - 5.90 x10*6/uL Final   05/07/2024 3.13 (L) 4.50 - 5.90 x10*6/uL Final   04/29/2024 3.07 (L) 4.50 -  5.90 x10*6/uL Final     Hemoglobin   Date Value Ref Range Status   05/30/2024 9.1 (L) 13.5 - 17.5 g/dL Final   05/07/2024 8.9 (L) 13.5 - 17.5 g/dL Final   04/29/2024 9.0 (L) 13.5 - 17.5 g/dL Final     Hematocrit   Date Value Ref Range Status   05/30/2024 28.0 (L) 41.0 - 52.0 % Final   05/07/2024 27.4 (L) 41.0 - 52.0 % Final   04/29/2024 27.3 (L) 41.0 - 52.0 % Final     MCV   Date/Time Value Ref Range Status   05/30/2024 09:37 AM 88 80 - 100 fL Final   05/07/2024 09:40 AM 88 80 - 100 fL Final   04/29/2024 10:17 AM 89 80 - 100 fL Final     MCH   Date/Time Value Ref Range Status   05/30/2024 09:37 AM 28.6 26.0 - 34.0 pg Final   05/07/2024 09:40 AM 28.4 26.0 - 34.0 pg Final   04/29/2024 10:17 AM 29.3 26.0 - 34.0 pg Final     MCHC   Date/Time Value Ref Range Status   05/30/2024 09:37 AM 32.5 32.0 - 36.0 g/dL Final   05/07/2024 09:40 AM 32.5 32.0 - 36.0 g/dL Final   04/29/2024 10:17 AM 33.0 32.0 - 36.0 g/dL Final     RDW   Date/Time Value Ref Range Status   05/30/2024 09:37 AM 14.8 (H) 11.5 - 14.5 % Final   05/07/2024 09:40 AM 13.2 11.5 - 14.5 % Final   04/29/2024 10:17 AM 13.3 11.5 - 14.5 % Final     Platelets   Date/Time Value Ref Range Status   05/30/2024 09:37  (L) 150 - 450 x10*3/uL Final   05/07/2024 09:40  150 - 450 x10*3/uL Final   04/29/2024 10:17 AM 92 (L) 150 - 450 x10*3/uL Final     MPV   Date/Time Value Ref Range Status   10/07/2023 05:38 AM 9.2 7.5 - 11.5 fL Final   10/06/2023 05:47 AM 9.7 7.5 - 11.5 fL Final   10/05/2023 05:58 AM 10.2 7.5 - 11.5 fL Final     Neutrophils %   Date/Time Value Ref Range Status   05/30/2024 09:37 AM 61.8 40.0 - 80.0 % Final   05/07/2024 09:40 AM 67.5 40.0 - 80.0 % Final   04/29/2024 10:17 AM 75.4 40.0 - 80.0 % Final     Immature Granulocytes %, Automated   Date/Time Value Ref Range Status   05/30/2024 09:37 AM 0.4 0.0 - 0.9 % Final     Comment:     Immature Granulocyte Count (IG) includes promyelocytes, myelocytes and metamyelocytes but does not include bands.  Percent differential counts (%) should be interpreted in the context of the absolute cell counts (cells/UL).   05/07/2024 09:40 AM 1.0 (H) 0.0 - 0.9 % Final     Comment:     Immature Granulocyte Count (IG) includes promyelocytes, myelocytes and metamyelocytes but does not include bands. Percent differential counts (%) should be interpreted in the context of the absolute cell counts (cells/UL).   04/29/2024 10:17 AM 0.2 0.0 - 0.9 % Final     Comment:     Immature Granulocyte Count (IG) includes promyelocytes, myelocytes and metamyelocytes but does not include bands. Percent differential counts (%) should be interpreted in the context of the absolute cell counts (cells/UL).     Lymphocytes %   Date/Time Value Ref Range Status   05/30/2024 09:37 AM 23.2 13.0 - 44.0 % Final   05/07/2024 09:40 AM 19.0 13.0 - 44.0 % Final   04/29/2024 10:17 AM 14.9 13.0 - 44.0 % Final     Monocytes %   Date/Time Value Ref Range Status   05/30/2024 09:37 AM 7.2 2.0 - 10.0 % Final   05/07/2024 09:40 AM 7.9 2.0 - 10.0 % Final   04/29/2024 10:17 AM 7.6 2.0 - 10.0 % Final     Eosinophils %   Date/Time Value Ref Range Status   05/30/2024 09:37 AM 7.0 0.0 - 6.0 % Final   05/07/2024 09:40 AM 4.4 0.0 - 6.0 % Final   04/29/2024 10:17 AM 1.7 0.0 - 6.0 % Final     Basophils %   Date/Time Value Ref Range Status   05/30/2024 09:37 AM 0.4 0.0 - 2.0 % Final   05/07/2024 09:40 AM 0.2 0.0 - 2.0 % Final   04/29/2024 10:17 AM 0.2 0.0 - 2.0 % Final     Neutrophils Absolute   Date/Time Value Ref Range Status   05/30/2024 09:37 AM 3.09 1.60 - 5.50 x10*3/uL Final     Comment:     Percent differential counts (%) should be interpreted in the context of the absolute cell counts (cells/uL).   05/07/2024 09:40 AM 3.53 1.60 - 5.50 x10*3/uL Final     Comment:     Percent differential counts (%) should be interpreted in the context of the absolute cell counts (cells/uL).   04/29/2024 10:17 AM 3.55 1.60 - 5.50 x10*3/uL Final     Comment:     Percent differential counts  "(%) should be interpreted in the context of the absolute cell counts (cells/uL).     Immature Granulocytes Absolute, Automated   Date/Time Value Ref Range Status   05/30/2024 09:37 AM 0.02 0.00 - 0.50 x10*3/uL Final   05/07/2024 09:40 AM 0.05 0.00 - 0.50 x10*3/uL Final   04/29/2024 10:17 AM 0.01 0.00 - 0.50 x10*3/uL Final     Lymphocytes Absolute   Date/Time Value Ref Range Status   05/30/2024 09:37 AM 1.16 0.80 - 3.00 x10*3/uL Final   05/07/2024 09:40 AM 0.99 0.80 - 3.00 x10*3/uL Final   04/29/2024 10:17 AM 0.70 (L) 0.80 - 3.00 x10*3/uL Final     Monocytes Absolute   Date/Time Value Ref Range Status   05/30/2024 09:37 AM 0.36 0.05 - 0.80 x10*3/uL Final   05/07/2024 09:40 AM 0.41 0.05 - 0.80 x10*3/uL Final   04/29/2024 10:17 AM 0.36 0.05 - 0.80 x10*3/uL Final     Eosinophils Absolute   Date/Time Value Ref Range Status   05/30/2024 09:37 AM 0.35 0.00 - 0.40 x10*3/uL Final   05/07/2024 09:40 AM 0.23 0.00 - 0.40 x10*3/uL Final   04/29/2024 10:17 AM 0.08 0.00 - 0.40 x10*3/uL Final     Basophils Absolute   Date/Time Value Ref Range Status   05/30/2024 09:37 AM 0.02 0.00 - 0.10 x10*3/uL Final   05/07/2024 09:40 AM 0.01 0.00 - 0.10 x10*3/uL Final   04/29/2024 10:17 AM 0.01 0.00 - 0.10 x10*3/uL Final       No components found for: \"PT\"  aPTT   Date/Time Value Ref Range Status   11/30/2023 07:14 AM 36 27 - 38 seconds Final   11/29/2023 10:10 AM 36 27 - 38 seconds Final   11/24/2023 04:53 AM 37 27 - 38 seconds Final       Assessment/Plan           {Assess/PlanSmartLinks:54727}         INF 08 MAVISGA                         "

## 2024-06-04 DIAGNOSIS — N30.00 ACUTE CYSTITIS WITHOUT HEMATURIA: Primary | ICD-10-CM

## 2024-06-05 ENCOUNTER — LAB (OUTPATIENT)
Dept: LAB | Facility: LAB | Age: 74
End: 2024-06-05
Payer: COMMERCIAL

## 2024-06-05 DIAGNOSIS — N30.00 ACUTE CYSTITIS WITHOUT HEMATURIA: ICD-10-CM

## 2024-06-05 PROCEDURE — 87086 URINE CULTURE/COLONY COUNT: CPT

## 2024-06-05 PROCEDURE — 81001 URINALYSIS AUTO W/SCOPE: CPT

## 2024-06-05 PROCEDURE — 87186 SC STD MICRODIL/AGAR DIL: CPT

## 2024-06-06 DIAGNOSIS — N41.0 ACUTE PROSTATITIS: Primary | ICD-10-CM

## 2024-06-06 LAB
APPEARANCE UR: ABNORMAL
BACTERIA #/AREA URNS AUTO: ABNORMAL /HPF
BILIRUB UR STRIP.AUTO-MCNC: NEGATIVE MG/DL
COLOR UR: YELLOW
GLUCOSE UR STRIP.AUTO-MCNC: NORMAL MG/DL
KETONES UR STRIP.AUTO-MCNC: NEGATIVE MG/DL
LEUKOCYTE ESTERASE UR QL STRIP.AUTO: ABNORMAL
MUCOUS THREADS #/AREA URNS AUTO: ABNORMAL /LPF
NITRITE UR QL STRIP.AUTO: NEGATIVE
PH UR STRIP.AUTO: 5.5 [PH]
PROT UR STRIP.AUTO-MCNC: ABNORMAL MG/DL
RBC # UR STRIP.AUTO: ABNORMAL /UL
RBC #/AREA URNS AUTO: ABNORMAL /HPF
SP GR UR STRIP.AUTO: 1.01
UROBILINOGEN UR STRIP.AUTO-MCNC: NORMAL MG/DL
WBC #/AREA URNS AUTO: >50 /HPF
WBC CLUMPS #/AREA URNS AUTO: ABNORMAL /HPF

## 2024-06-06 RX ORDER — CIPROFLOXACIN 500 MG/1
500 TABLET ORAL 2 TIMES DAILY
Qty: 28 TABLET | Refills: 0 | Status: SHIPPED | OUTPATIENT
Start: 2024-06-06 | End: 2024-06-20

## 2024-06-08 LAB — BACTERIA UR CULT: ABNORMAL

## 2024-06-10 DIAGNOSIS — N30.00 ACUTE CYSTITIS WITHOUT HEMATURIA: Primary | ICD-10-CM

## 2024-06-10 DIAGNOSIS — N30.00 ACUTE CYSTITIS WITHOUT HEMATURIA: ICD-10-CM

## 2024-06-10 RX ORDER — AZTREONAM 1 G/1
1 INJECTION, POWDER, LYOPHILIZED, FOR SOLUTION INTRAMUSCULAR; INTRAVENOUS EVERY 12 HOURS
Qty: 14 G | Refills: 0 | Status: SHIPPED | OUTPATIENT
Start: 2024-06-10 | End: 2024-06-13 | Stop reason: SDUPTHER

## 2024-06-10 RX ORDER — AZTREONAM 1 G/1
1 INJECTION, POWDER, LYOPHILIZED, FOR SOLUTION INTRAMUSCULAR; INTRAVENOUS EVERY 12 HOURS
Qty: 14 G | Refills: 0 | Status: SHIPPED | OUTPATIENT
Start: 2024-06-10 | End: 2024-06-10 | Stop reason: SDUPTHER

## 2024-06-10 RX ORDER — SYRINGE, DISPOSABLE, 3 ML
SYRINGE, EMPTY DISPOSABLE MISCELLANEOUS
Qty: 14 EACH | Refills: 0 | Status: SHIPPED | OUTPATIENT
Start: 2024-06-10 | End: 2024-06-14 | Stop reason: SDUPTHER

## 2024-06-13 DIAGNOSIS — N30.00 ACUTE CYSTITIS WITHOUT HEMATURIA: ICD-10-CM

## 2024-06-13 RX ORDER — AZTREONAM 1 G/1
1 INJECTION, POWDER, LYOPHILIZED, FOR SOLUTION INTRAMUSCULAR; INTRAVENOUS EVERY 12 HOURS
Qty: 14 G | Refills: 0 | Status: SHIPPED | OUTPATIENT
Start: 2024-06-13 | End: 2024-06-20

## 2024-06-14 ENCOUNTER — SPECIALTY PHARMACY (OUTPATIENT)
Dept: PHARMACY | Facility: CLINIC | Age: 74
End: 2024-06-14

## 2024-06-14 ENCOUNTER — HOME INFUSION (OUTPATIENT)
Dept: INFUSION THERAPY | Age: 74
End: 2024-06-14
Payer: COMMERCIAL

## 2024-06-14 ENCOUNTER — DOCUMENTATION (OUTPATIENT)
Dept: PHARMACY | Facility: CLINIC | Age: 74
End: 2024-06-14

## 2024-06-14 DIAGNOSIS — N30.00 ACUTE CYSTITIS WITHOUT HEMATURIA: ICD-10-CM

## 2024-06-14 DIAGNOSIS — D72.829 LEUKOCYTOSIS, UNSPECIFIED TYPE: ICD-10-CM

## 2024-06-14 DIAGNOSIS — C43.9 METASTATIC MELANOMA (MULTI): ICD-10-CM

## 2024-06-14 RX ORDER — TOBRAMYCIN 40 MG/ML
80 INJECTION INTRAMUSCULAR; INTRAVENOUS EVERY 24 HOURS
Qty: 14 ML | Refills: 0 | Status: SHIPPED | OUTPATIENT
Start: 2024-06-14 | End: 2024-06-21

## 2024-06-14 RX ORDER — SYRINGE, DISPOSABLE, 3 ML
SYRINGE, EMPTY DISPOSABLE MISCELLANEOUS
Qty: 14 EACH | Refills: 0 | Status: SHIPPED | OUTPATIENT
Start: 2024-06-14

## 2024-06-14 RX ORDER — TOBRAMYCIN 40 MG/ML
2 INJECTION INTRAMUSCULAR; INTRAVENOUS EVERY 24 HOURS
COMMUNITY
Start: 2024-06-14 | End: 2024-06-14 | Stop reason: SDUPTHER

## 2024-06-14 NOTE — PROGRESS NOTES
"REVIEWED PT INFO AS CORRECT.   DX: UTI  REVIEWED ALLERGIES: Keflex, Cipro, Bupripion  PMH: MM, dm2  NO MEDICATION INTERACTIONS.  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

## 2024-06-20 ENCOUNTER — INFUSION (OUTPATIENT)
Dept: HEMATOLOGY/ONCOLOGY | Facility: CLINIC | Age: 74
End: 2024-06-20
Payer: MEDICARE

## 2024-06-20 VITALS
SYSTOLIC BLOOD PRESSURE: 124 MMHG | WEIGHT: 233.47 LBS | BODY MASS INDEX: 30.61 KG/M2 | OXYGEN SATURATION: 99 % | HEART RATE: 73 BPM | RESPIRATION RATE: 18 BRPM | DIASTOLIC BLOOD PRESSURE: 62 MMHG | TEMPERATURE: 97.7 F

## 2024-06-20 DIAGNOSIS — C43.9 METASTATIC MELANOMA (MULTI): ICD-10-CM

## 2024-06-20 LAB
ALBUMIN SERPL BCP-MCNC: 3.7 G/DL (ref 3.4–5)
ALP SERPL-CCNC: 71 U/L (ref 33–136)
ALT SERPL W P-5'-P-CCNC: 9 U/L (ref 10–52)
AMYLASE SERPL-CCNC: 80 U/L (ref 29–103)
ANION GAP SERPL CALC-SCNC: 12 MMOL/L (ref 10–20)
AST SERPL W P-5'-P-CCNC: 13 U/L (ref 9–39)
BASOPHILS # BLD AUTO: 0.01 X10*3/UL (ref 0–0.1)
BASOPHILS NFR BLD AUTO: 0.3 %
BILIRUB SERPL-MCNC: 0.6 MG/DL (ref 0–1.2)
BUN SERPL-MCNC: 19 MG/DL (ref 6–23)
CALCIUM SERPL-MCNC: 9.2 MG/DL (ref 8.6–10.3)
CHLORIDE SERPL-SCNC: 108 MMOL/L (ref 98–107)
CO2 SERPL-SCNC: 23 MMOL/L (ref 21–32)
CORTIS AM PEAK SERPL-MSCNC: 10.1 UG/DL (ref 5–20)
CREAT SERPL-MCNC: 1.67 MG/DL (ref 0.5–1.3)
EGFRCR SERPLBLD CKD-EPI 2021: 43 ML/MIN/1.73M*2
EOSINOPHIL # BLD AUTO: 0.29 X10*3/UL (ref 0–0.4)
EOSINOPHIL NFR BLD AUTO: 7.5 %
ERYTHROCYTE [DISTWIDTH] IN BLOOD BY AUTOMATED COUNT: 15.6 % (ref 11.5–14.5)
GLUCOSE SERPL-MCNC: 78 MG/DL (ref 74–99)
HCT VFR BLD AUTO: 27.6 % (ref 41–52)
HGB BLD-MCNC: 9 G/DL (ref 13.5–17.5)
IMM GRANULOCYTES # BLD AUTO: 0.01 X10*3/UL (ref 0–0.5)
IMM GRANULOCYTES NFR BLD AUTO: 0.3 % (ref 0–0.9)
LDH SERPL L TO P-CCNC: 200 U/L (ref 84–246)
LIPASE SERPL-CCNC: 76 U/L (ref 9–82)
LYMPHOCYTES # BLD AUTO: 1.04 X10*3/UL (ref 0.8–3)
LYMPHOCYTES NFR BLD AUTO: 26.9 %
MCH RBC QN AUTO: 28.3 PG (ref 26–34)
MCHC RBC AUTO-ENTMCNC: 32.6 G/DL (ref 32–36)
MCV RBC AUTO: 87 FL (ref 80–100)
MONOCYTES # BLD AUTO: 0.53 X10*3/UL (ref 0.05–0.8)
MONOCYTES NFR BLD AUTO: 13.7 %
NEUTROPHILS # BLD AUTO: 1.99 X10*3/UL (ref 1.6–5.5)
NEUTROPHILS NFR BLD AUTO: 51.3 %
PLATELET # BLD AUTO: 101 X10*3/UL (ref 150–450)
POTASSIUM SERPL-SCNC: 3.9 MMOL/L (ref 3.5–5.3)
PROT SERPL-MCNC: 6.8 G/DL (ref 6.4–8.2)
RBC # BLD AUTO: 3.18 X10*6/UL (ref 4.5–5.9)
SODIUM SERPL-SCNC: 139 MMOL/L (ref 136–145)
T4 FREE SERPL-MCNC: 0.91 NG/DL (ref 0.61–1.12)
TSH SERPL-ACNC: 4.59 MIU/L (ref 0.44–3.98)
WBC # BLD AUTO: 3.9 X10*3/UL (ref 4.4–11.3)

## 2024-06-20 PROCEDURE — 2500000004 HC RX 250 GENERAL PHARMACY W/ HCPCS (ALT 636 FOR OP/ED): Mod: JZ,JG | Performed by: INTERNAL MEDICINE

## 2024-06-20 PROCEDURE — 82150 ASSAY OF AMYLASE: CPT | Performed by: INTERNAL MEDICINE

## 2024-06-20 PROCEDURE — 96417 CHEMO IV INFUS EACH ADDL SEQ: CPT

## 2024-06-20 PROCEDURE — 80053 COMPREHEN METABOLIC PANEL: CPT | Performed by: INTERNAL MEDICINE

## 2024-06-20 PROCEDURE — 96413 CHEMO IV INFUSION 1 HR: CPT

## 2024-06-20 PROCEDURE — 85025 COMPLETE CBC W/AUTO DIFF WBC: CPT | Performed by: INTERNAL MEDICINE

## 2024-06-20 PROCEDURE — 82533 TOTAL CORTISOL: CPT | Mod: GEALAB | Performed by: INTERNAL MEDICINE

## 2024-06-20 PROCEDURE — 84443 ASSAY THYROID STIM HORMONE: CPT | Performed by: INTERNAL MEDICINE

## 2024-06-20 PROCEDURE — 84439 ASSAY OF FREE THYROXINE: CPT | Performed by: INTERNAL MEDICINE

## 2024-06-20 PROCEDURE — 83615 LACTATE (LD) (LDH) ENZYME: CPT | Performed by: INTERNAL MEDICINE

## 2024-06-20 PROCEDURE — 96367 TX/PROPH/DG ADDL SEQ IV INF: CPT

## 2024-06-20 PROCEDURE — 83690 ASSAY OF LIPASE: CPT | Performed by: INTERNAL MEDICINE

## 2024-06-20 RX ORDER — ALBUTEROL SULFATE 0.83 MG/ML
3 SOLUTION RESPIRATORY (INHALATION) AS NEEDED
Status: DISCONTINUED | OUTPATIENT
Start: 2024-06-20 | End: 2024-06-20 | Stop reason: HOSPADM

## 2024-06-20 RX ORDER — FAMOTIDINE 10 MG/ML
20 INJECTION INTRAVENOUS ONCE AS NEEDED
Status: DISCONTINUED | OUTPATIENT
Start: 2024-06-20 | End: 2024-06-20 | Stop reason: HOSPADM

## 2024-06-20 RX ORDER — PROCHLORPERAZINE EDISYLATE 5 MG/ML
10 INJECTION INTRAMUSCULAR; INTRAVENOUS EVERY 6 HOURS PRN
Status: DISCONTINUED | OUTPATIENT
Start: 2024-06-20 | End: 2024-06-20 | Stop reason: HOSPADM

## 2024-06-20 RX ORDER — DIPHENHYDRAMINE HYDROCHLORIDE 50 MG/ML
50 INJECTION INTRAMUSCULAR; INTRAVENOUS AS NEEDED
Status: DISCONTINUED | OUTPATIENT
Start: 2024-06-20 | End: 2024-06-20 | Stop reason: HOSPADM

## 2024-06-20 RX ORDER — HEPARIN SODIUM,PORCINE/PF 10 UNIT/ML
50 SYRINGE (ML) INTRAVENOUS AS NEEDED
OUTPATIENT
Start: 2024-06-20

## 2024-06-20 RX ORDER — HEPARIN 100 UNIT/ML
500 SYRINGE INTRAVENOUS AS NEEDED
OUTPATIENT
Start: 2024-06-20

## 2024-06-20 RX ORDER — PROCHLORPERAZINE MALEATE 10 MG
10 TABLET ORAL EVERY 6 HOURS PRN
Status: DISCONTINUED | OUTPATIENT
Start: 2024-06-20 | End: 2024-06-20 | Stop reason: HOSPADM

## 2024-06-20 RX ORDER — EPINEPHRINE 0.3 MG/.3ML
0.3 INJECTION SUBCUTANEOUS EVERY 5 MIN PRN
Status: DISCONTINUED | OUTPATIENT
Start: 2024-06-20 | End: 2024-06-20 | Stop reason: HOSPADM

## 2024-06-20 ASSESSMENT — PAIN SCALES - GENERAL: PAINLEVEL: 0-NO PAIN

## 2024-06-26 ENCOUNTER — NURSE TRIAGE (OUTPATIENT)
Dept: HEMATOLOGY/ONCOLOGY | Facility: CLINIC | Age: 74
End: 2024-06-26
Payer: COMMERCIAL

## 2024-06-26 ENCOUNTER — HOME INFUSION (OUTPATIENT)
Dept: INFUSION THERAPY | Age: 74
End: 2024-06-26
Payer: COMMERCIAL

## 2024-06-26 DIAGNOSIS — R19.7 DIARRHEA, UNSPECIFIED TYPE: Primary | ICD-10-CM

## 2024-06-26 DIAGNOSIS — C43.9 METASTATIC MELANOMA (MULTI): ICD-10-CM

## 2024-06-26 RX ORDER — METRONIDAZOLE 500 MG/1
500 TABLET ORAL 3 TIMES DAILY
Qty: 30 TABLET | Refills: 1 | Status: SHIPPED | OUTPATIENT
Start: 2024-06-26 | End: 2024-07-06

## 2024-06-26 NOTE — TELEPHONE ENCOUNTER
Called pt and had to leave VM but informed him that he should collect sample with home care RN and she can drop off tomorrow. Encouraged pt to call our office with any questions.

## 2024-06-26 NOTE — PROGRESS NOTES
Follow up with patient - completed course of IM tobramycin for UTI without incident  Denies any further UTI symptoms    Discharge from pharmacy service

## 2024-06-26 NOTE — TELEPHONE ENCOUNTER
Additional Information   Have you taken any anti-diarrhea medicines such as Imodium?     Has been taking imodium 1-2 times/day and probiotic daily.    Protocols used: Diarrhea  Called and spoke to patient, let him know that I would speak with Dr. Springer and see if he would like a stool sample collected, pt stated that he has home care RN visiting tomorrow morning so she could drop off tomorrow if needed. Also encouraged pt to increase fluids, Pedialyte/electrolyte drinks, went over BRAT diet with patient and encouraged pt to call us back if sx worsen and I will let him know for usre about the sample and if Dr. Springer recommends anything more. Patient agreed to plan and verbalized understanding using teach back method.

## 2024-06-27 ENCOUNTER — LAB (OUTPATIENT)
Dept: LAB | Facility: LAB | Age: 74
End: 2024-06-27
Payer: COMMERCIAL

## 2024-06-27 DIAGNOSIS — C43.9 METASTATIC MELANOMA (MULTI): ICD-10-CM

## 2024-06-27 DIAGNOSIS — R19.7 DIARRHEA, UNSPECIFIED TYPE: ICD-10-CM

## 2024-06-27 PROCEDURE — 87493 C DIFF AMPLIFIED PROBE: CPT

## 2024-06-28 LAB — C DIF TOX TCDA+TCDB STL QL NAA+PROBE: NOT DETECTED

## 2024-07-02 DIAGNOSIS — R19.7 DIARRHEA, UNSPECIFIED TYPE: Primary | ICD-10-CM

## 2024-07-02 DIAGNOSIS — N30.00 ACUTE CYSTITIS WITHOUT HEMATURIA: ICD-10-CM

## 2024-07-02 RX ORDER — DIPHENOXYLATE HYDROCHLORIDE AND ATROPINE SULFATE 2.5; .025 MG/1; MG/1
1 TABLET ORAL 4 TIMES DAILY PRN
Qty: 90 TABLET | Refills: 3 | Status: SHIPPED | OUTPATIENT
Start: 2024-07-02 | End: 2024-12-29

## 2024-07-02 NOTE — PROGRESS NOTES
Persistent diarrhea.  Add priobiotic.  Failed imodium.  Use lomotil prn.  Check UA C&S for recurrent UTI symptoms. JANES

## 2024-07-03 ENCOUNTER — LAB (OUTPATIENT)
Dept: LAB | Facility: LAB | Age: 74
End: 2024-07-03
Payer: COMMERCIAL

## 2024-07-03 ENCOUNTER — APPOINTMENT (OUTPATIENT)
Dept: DERMATOLOGY | Facility: CLINIC | Age: 74
End: 2024-07-03
Payer: COMMERCIAL

## 2024-07-03 DIAGNOSIS — N30.00 ACUTE CYSTITIS WITHOUT HEMATURIA: ICD-10-CM

## 2024-07-03 LAB
APPEARANCE UR: ABNORMAL
BILIRUB UR STRIP.AUTO-MCNC: NEGATIVE MG/DL
CAOX CRY #/AREA UR COMP ASSIST: ABNORMAL /HPF
COLOR UR: ABNORMAL
GLUCOSE UR STRIP.AUTO-MCNC: NORMAL MG/DL
KETONES UR STRIP.AUTO-MCNC: NEGATIVE MG/DL
LEUKOCYTE ESTERASE UR QL STRIP.AUTO: ABNORMAL
MUCOUS THREADS #/AREA URNS AUTO: ABNORMAL /LPF
NITRITE UR QL STRIP.AUTO: NEGATIVE
PH UR STRIP.AUTO: 5 [PH]
PROT UR STRIP.AUTO-MCNC: ABNORMAL MG/DL
RBC # UR STRIP.AUTO: ABNORMAL /UL
RBC #/AREA URNS AUTO: ABNORMAL /HPF
SP GR UR STRIP.AUTO: 1.01
SQUAMOUS #/AREA URNS AUTO: ABNORMAL /HPF
UROBILINOGEN UR STRIP.AUTO-MCNC: NORMAL MG/DL
WBC #/AREA URNS AUTO: >50 /HPF
YEAST BUDDING #/AREA UR COMP ASSIST: PRESENT /HPF

## 2024-07-03 PROCEDURE — 87086 URINE CULTURE/COLONY COUNT: CPT

## 2024-07-03 PROCEDURE — 81001 URINALYSIS AUTO W/SCOPE: CPT

## 2024-07-04 DIAGNOSIS — B37.41 YEAST CYSTITIS: Primary | ICD-10-CM

## 2024-07-04 RX ORDER — FLUCONAZOLE 100 MG/1
100 TABLET ORAL EVERY OTHER DAY
Qty: 3 TABLET | Refills: 0 | Status: SHIPPED | OUTPATIENT
Start: 2024-07-04 | End: 2024-07-09

## 2024-07-05 ENCOUNTER — TELEPHONE (OUTPATIENT)
Dept: HEMATOLOGY/ONCOLOGY | Facility: CLINIC | Age: 74
End: 2024-07-05
Payer: COMMERCIAL

## 2024-07-05 DIAGNOSIS — R19.7 DIARRHEA, UNSPECIFIED TYPE: Primary | ICD-10-CM

## 2024-07-05 LAB — BACTERIA UR CULT: NORMAL

## 2024-07-05 RX ORDER — COLESTIPOL HYDROCHLORIDE 5 G/5G
5 GRANULE, FOR SUSPENSION ORAL
Qty: 60 PACKET | Refills: 11 | Status: SHIPPED | OUTPATIENT
Start: 2024-07-05 | End: 2025-07-05

## 2024-07-05 NOTE — TELEPHONE ENCOUNTER
Per Dr Springer, pt will only get one more cycle of Nivo and Ipi and then with cycle 7 the pt will only get the Nivo. Dr Springer is also ordering cholestyramine to assist with the diarrhea. Pt states he will call with any signs or symptoms of dehydration or issues with cholestyramine. Pt verbalized understanding using teach back method, no further questions or concerns noted att.

## 2024-07-10 DIAGNOSIS — R19.7 DIARRHEA, UNSPECIFIED TYPE: ICD-10-CM

## 2024-07-10 RX ORDER — COLESEVELAM HYDROCHLORIDE 3.75 G/1
3.75 POWDER, FOR SUSPENSION ORAL DAILY
Qty: 30 EACH | Refills: 3 | Status: SHIPPED | OUTPATIENT
Start: 2024-07-10

## 2024-07-11 ENCOUNTER — INFUSION (OUTPATIENT)
Dept: HEMATOLOGY/ONCOLOGY | Facility: CLINIC | Age: 74
End: 2024-07-11
Payer: MEDICARE

## 2024-07-11 ENCOUNTER — NUTRITION (OUTPATIENT)
Dept: HEMATOLOGY/ONCOLOGY | Facility: CLINIC | Age: 74
End: 2024-07-11
Payer: COMMERCIAL

## 2024-07-11 VITALS
RESPIRATION RATE: 18 BRPM | BODY MASS INDEX: 28.27 KG/M2 | WEIGHT: 215.61 LBS | HEART RATE: 88 BPM | TEMPERATURE: 96.3 F | DIASTOLIC BLOOD PRESSURE: 56 MMHG | OXYGEN SATURATION: 100 % | SYSTOLIC BLOOD PRESSURE: 107 MMHG

## 2024-07-11 VITALS — BODY MASS INDEX: 28.58 KG/M2 | HEIGHT: 73 IN | WEIGHT: 215.61 LBS

## 2024-07-11 DIAGNOSIS — C43.9 METASTATIC MELANOMA (MULTI): ICD-10-CM

## 2024-07-11 LAB
ALBUMIN SERPL BCP-MCNC: 3.1 G/DL (ref 3.4–5)
ALP SERPL-CCNC: 81 U/L (ref 33–136)
ALT SERPL W P-5'-P-CCNC: 7 U/L (ref 10–52)
AMYLASE SERPL-CCNC: 27 U/L (ref 29–103)
ANION GAP SERPL CALC-SCNC: 12 MMOL/L (ref 10–20)
AST SERPL W P-5'-P-CCNC: 23 U/L (ref 9–39)
BASOPHILS # BLD AUTO: 0 X10*3/UL (ref 0–0.1)
BASOPHILS NFR BLD AUTO: 0 %
BILIRUB SERPL-MCNC: 0.4 MG/DL (ref 0–1.2)
BUN SERPL-MCNC: 25 MG/DL (ref 6–23)
CALCIUM SERPL-MCNC: 8.3 MG/DL (ref 8.6–10.3)
CHLORIDE SERPL-SCNC: 109 MMOL/L (ref 98–107)
CO2 SERPL-SCNC: 18 MMOL/L (ref 21–32)
CORTIS AM PEAK SERPL-MSCNC: 24.3 UG/DL (ref 5–20)
CREAT SERPL-MCNC: 1.94 MG/DL (ref 0.5–1.3)
EGFRCR SERPLBLD CKD-EPI 2021: 36 ML/MIN/1.73M*2
EOSINOPHIL # BLD AUTO: 0.19 X10*3/UL (ref 0–0.4)
EOSINOPHIL NFR BLD AUTO: 2.3 %
ERYTHROCYTE [DISTWIDTH] IN BLOOD BY AUTOMATED COUNT: 16.1 % (ref 11.5–14.5)
GLUCOSE SERPL-MCNC: 104 MG/DL (ref 74–99)
HCT VFR BLD AUTO: 33.4 % (ref 41–52)
HGB BLD-MCNC: 11.2 G/DL (ref 13.5–17.5)
IMM GRANULOCYTES # BLD AUTO: 0.03 X10*3/UL (ref 0–0.5)
IMM GRANULOCYTES NFR BLD AUTO: 0.4 % (ref 0–0.9)
LDH SERPL L TO P-CCNC: 337 U/L (ref 84–246)
LIPASE SERPL-CCNC: 47 U/L (ref 9–82)
LYMPHOCYTES # BLD AUTO: 1.24 X10*3/UL (ref 0.8–3)
LYMPHOCYTES NFR BLD AUTO: 15.2 %
MCH RBC QN AUTO: 28 PG (ref 26–34)
MCHC RBC AUTO-ENTMCNC: 33.5 G/DL (ref 32–36)
MCV RBC AUTO: 84 FL (ref 80–100)
MONOCYTES # BLD AUTO: 0.83 X10*3/UL (ref 0.05–0.8)
MONOCYTES NFR BLD AUTO: 10.2 %
NEUTROPHILS # BLD AUTO: 5.85 X10*3/UL (ref 1.6–5.5)
NEUTROPHILS NFR BLD AUTO: 71.9 %
PLATELET # BLD AUTO: 110 X10*3/UL (ref 150–450)
POTASSIUM SERPL-SCNC: 3.4 MMOL/L (ref 3.5–5.3)
PROT SERPL-MCNC: 6.4 G/DL (ref 6.4–8.2)
RBC # BLD AUTO: 4 X10*6/UL (ref 4.5–5.9)
SODIUM SERPL-SCNC: 136 MMOL/L (ref 136–145)
TSH SERPL-ACNC: 3.53 MIU/L (ref 0.44–3.98)
WBC # BLD AUTO: 8.1 X10*3/UL (ref 4.4–11.3)

## 2024-07-11 PROCEDURE — 83690 ASSAY OF LIPASE: CPT | Performed by: INTERNAL MEDICINE

## 2024-07-11 PROCEDURE — 82533 TOTAL CORTISOL: CPT | Mod: GEALAB | Performed by: INTERNAL MEDICINE

## 2024-07-11 PROCEDURE — 82150 ASSAY OF AMYLASE: CPT | Performed by: INTERNAL MEDICINE

## 2024-07-11 PROCEDURE — 83615 LACTATE (LD) (LDH) ENZYME: CPT | Performed by: INTERNAL MEDICINE

## 2024-07-11 PROCEDURE — 96413 CHEMO IV INFUSION 1 HR: CPT

## 2024-07-11 PROCEDURE — 2500000004 HC RX 250 GENERAL PHARMACY W/ HCPCS (ALT 636 FOR OP/ED): Mod: JZ,JG | Performed by: INTERNAL MEDICINE

## 2024-07-11 PROCEDURE — 80053 COMPREHEN METABOLIC PANEL: CPT | Performed by: INTERNAL MEDICINE

## 2024-07-11 PROCEDURE — 85025 COMPLETE CBC W/AUTO DIFF WBC: CPT | Performed by: INTERNAL MEDICINE

## 2024-07-11 PROCEDURE — 96367 TX/PROPH/DG ADDL SEQ IV INF: CPT

## 2024-07-11 PROCEDURE — 84443 ASSAY THYROID STIM HORMONE: CPT | Performed by: INTERNAL MEDICINE

## 2024-07-11 PROCEDURE — 96417 CHEMO IV INFUS EACH ADDL SEQ: CPT

## 2024-07-11 RX ORDER — HEPARIN SODIUM,PORCINE/PF 10 UNIT/ML
50 SYRINGE (ML) INTRAVENOUS AS NEEDED
OUTPATIENT
Start: 2024-07-11

## 2024-07-11 RX ORDER — EPINEPHRINE 0.3 MG/.3ML
0.3 INJECTION SUBCUTANEOUS EVERY 5 MIN PRN
Status: DISCONTINUED | OUTPATIENT
Start: 2024-07-11 | End: 2024-07-11 | Stop reason: HOSPADM

## 2024-07-11 RX ORDER — FAMOTIDINE 10 MG/ML
20 INJECTION INTRAVENOUS ONCE AS NEEDED
Status: DISCONTINUED | OUTPATIENT
Start: 2024-07-11 | End: 2024-07-11 | Stop reason: HOSPADM

## 2024-07-11 RX ORDER — PROCHLORPERAZINE MALEATE 10 MG
10 TABLET ORAL EVERY 6 HOURS PRN
Status: DISCONTINUED | OUTPATIENT
Start: 2024-07-11 | End: 2024-07-11 | Stop reason: HOSPADM

## 2024-07-11 RX ORDER — HEPARIN 100 UNIT/ML
500 SYRINGE INTRAVENOUS AS NEEDED
OUTPATIENT
Start: 2024-07-11

## 2024-07-11 RX ORDER — ALBUTEROL SULFATE 0.83 MG/ML
3 SOLUTION RESPIRATORY (INHALATION) AS NEEDED
Status: DISCONTINUED | OUTPATIENT
Start: 2024-07-11 | End: 2024-07-11 | Stop reason: HOSPADM

## 2024-07-11 RX ORDER — DIPHENHYDRAMINE HYDROCHLORIDE 50 MG/ML
50 INJECTION INTRAMUSCULAR; INTRAVENOUS AS NEEDED
Status: DISCONTINUED | OUTPATIENT
Start: 2024-07-11 | End: 2024-07-11 | Stop reason: HOSPADM

## 2024-07-11 RX ORDER — PROCHLORPERAZINE EDISYLATE 5 MG/ML
10 INJECTION INTRAMUSCULAR; INTRAVENOUS EVERY 6 HOURS PRN
Status: DISCONTINUED | OUTPATIENT
Start: 2024-07-11 | End: 2024-07-11 | Stop reason: HOSPADM

## 2024-07-11 RX ORDER — HEPARIN SODIUM,PORCINE/PF 10 UNIT/ML
50 SYRINGE (ML) INTRAVENOUS AS NEEDED
Status: DISCONTINUED | OUTPATIENT
Start: 2024-07-11 | End: 2024-07-11 | Stop reason: HOSPADM

## 2024-07-11 ASSESSMENT — PAIN SCALES - GENERAL: PAINLEVEL: 0-NO PAIN

## 2024-07-11 NOTE — PROGRESS NOTES
"NUTRITION Follow-up NOTE    Nutrition Assessment     Reason for Visit:  Chester Verduzco is a 74 y.o. male who presents for stage III/4 metastatic melanoma.   Pt is planned for immunotherapy - Nivo/ Ipi planned to start 3/18/2024    HX: Gout, HTN, DM (managed with diet - A1c of 5.3), CKD3    Referral for weight loss.   Visited with pt today for follow-up.     Pt noted to be on lasix as well.     Lab Results   Component Value Date/Time    GLUCOSE 104 (H) 07/11/2024 0948     07/11/2024 0948    K 3.4 (L) 07/11/2024 0948     (H) 07/11/2024 0948    CO2 18 (L) 07/11/2024 0948    ANIONGAP 12 07/11/2024 0948    BUN 25 (H) 07/11/2024 0948    CREATININE 1.94 (H) 07/11/2024 0948    EGFR 36 (L) 07/11/2024 0948    CALCIUM 8.3 (L) 07/11/2024 0948    ALBUMIN 3.1 (L) 07/11/2024 0948    ALKPHOS 81 07/11/2024 0948    PROT 6.4 07/11/2024 0948    AST 23 07/11/2024 0948    BILITOT 0.4 07/11/2024 0948    ALT 7 (L) 07/11/2024 0948     No results found for: \"VITD25\"    Anthropometrics:  Anthropometrics  Height: 186 cm (6' 1.23\")  Weight: 97.8 kg (215 lb 9.8 oz)  BMI (Calculated): 28.27  IBW/kg (Dietitian Calculated): 83.6 kg  Percent of IBW: 117 %  Adjusted Body Weight (kg): 87 kg  Weight Change  Weight History / % Weight Change: 7.6% loss in < 1 month.  Significant Weight Loss: Yes  Interpretation of Weight Loss: >5% in 1 month        Wt Readings from Last 10 Encounters:   07/11/24 97.8 kg (215 lb 9.8 oz)   07/11/24 97.8 kg (215 lb 9.8 oz)   06/20/24 106 kg (233 lb 7.5 oz)   05/30/24 103 kg (227 lb 6.5 oz)   05/30/24 103 kg (227 lb 6.5 oz)   05/07/24 102 kg (225 lb 8.5 oz)   05/07/24 102 kg (225 lb 8.5 oz)   04/29/24 106 kg (233 lb 12.8 oz)   04/08/24 112 kg (246 lb 7.6 oz)   03/18/24 105 kg (232 lb 7.6 oz)        Food And Nutrient Intake:  Food and Nutrient History  Food and Nutrient History: Diarrhea after last treatment; some improvement; probiotics, increased per RN to 4x/day. Pt also failed Imodium and was started on " "lomotil; prescribed cholestyramine as well but hasn't started yet because he doesn't have it yet. Pt reports did have a few times when BG dropped to 65; would eat a candy bar.  Energy Intake: Fair 50-75 %, Poor < 50 %  Fluid Intake: sips of water - at least 1 16.9oz bottle daily, some gatorade, and electolyte gatorade. about 32oz daily; broth  GI Symptoms: diarrhea, nausea, early satiety (bad day is 4 loose bouts now down to 2-3)  GI Symptoms greater than 2 weeks: yes  Oral Problems: denies (back to normal this time)     Food Intake  Amount of Food: Tried mashed potatoes with gravy and got abdominal pain/ cramping. Trying to do a BRAT diet; rice/ chicken/ bananas for the last few days.                                                 Medication and Complementary/Alternative Medicine Use  Prescription Medication Use: Started lomotil; also to start bile acid sequestran (has not received from pharmacy yet)  OTC Medication Use: Imodium failed  Enterade QI Survey: Pre  Diarrhea: Grade 2  Taking supportive medication?:  Yes  Nausea: Grade 3  Taking supportive medication?:  Yes  Vomiting:   Taking supportive medication?:    Mucositis:   Taking supportive medication?:   Dehydration associated with GI toxicity: Yes  Weight loss: Yes  Did the patient require any of the following interventions as a result of their GI toxicity prior to using Enterade? None     Nutrition Focused Physical Exam Findings:  Completed 3/18/24                          Energy Needs  Calculated Energy Needs Using Equations  Height: 186 cm (6' 1.23\")  Weight Used for Equation Calculations: 97.8 kg (215 lb 9.8 oz)  Yen- [place]ano Equation (Overweight or Obese Patients): 1780  Estimated Energy Needs  Total Energy Estimated Needs (kCal): 2440 kCal  Total Estimated Energy Need per Day (kCal/kg): 25 kCal/kg  Estimated Fluid Needs  Total Fluid Estimated Needs (mL): 2440 mL  Total Fluid Estimated Needs (mL/kg): 25 mL/kg  Method for Estimating Needs: " additional fluids as needed for GI losses; 1 cup per bout of loose stool  Estimated Protein Needs  Total Protein Estimated Needs (g): 117 g  Total Protein Estimated Needs (g/kg): 1.2 g/kg        Nutrition Diagnosis   Malnutrition Diagnosis  Patient has Malnutrition Diagnosis: Yes  Diagnosis Status: Ongoing  Malnutrition Diagnosis: Severe malnutrition related to chronic disease or condition  As Evidenced by: pt with 19% weight loss in 6 months, oral intake < 75% of estimated needs for > 1 month, pt with acute illness as well as dx of metastatic melanoma but not able to start treatment.  Additional Assessment Information: weight loss increased with increase in NIS; grade 2 and 3 diarrhea/ nausea         Nutrition Interventions/Recommendations   Nutrition Prescription  Individualized Nutrition Prescription Provided for : Nutrition during cancer treatment, management of nutrition impact symptoms.    Food and Nutrition Delivery  Food and Nutrition Delivery  Meals & Snacks: Modify schedule of foods/fluids, Fiber-modified diet, Fluid-modified diet  Goals: Lower fiber foods with diarrhea; discussed adequate hydration with fluids, increasing by 1 cup per bout of loose stool daily. Encouraged to continue with bowel regiment to help. Recommend frequent meals and snacks, keep something in stomach at all times. Sip on liquids throughout the day- discussed that popsicles, broth also count towards liquid. Discussed with pt hypoglycemic protocol; pt understood was eating PBj and Milk night to prevent hypoglycemia when feeling better- plans to go back to this.  Medical Food Supplement: Commercial beverage  Goals: Increase intake as tolerated with decreased intake of other foods.  Other:: enterade  Goals: discussed how to take and how to obtain. Recommend 1 bottle, 2x/day for 7 days    Nutrition Education  Nutrition Education  Nutrition Education Content: Content related nutrition education  Goals: Low fiber diet with diarrhea,  moderate protein; adequate calories to maintain weight    Coordination of Care  Coordination of Nutrition Care by a Nutrition Professional  Collaboration and referral of nutrition care: Collaboration by nutrition professional with other providers    There are no Patient Instructions on file for this visit.    Nutrition Monitoring and Evaluation   Food/Nutrient Related History Monitoring  Monitoring and Evaluation Plan: Energy intake, Fluid intake, Meal/snack pattern, Protein intake, Fiber intake  Energy Intake: Estimated energy intake  Criteria: Meet 100% caloric needs  Fluid Intake: Estimated fluid intake  Criteria: Maintain hydration; 70+oz daily  Meal/Snack Pattern: Estimated meal and snack pattern  Criteria: At least 3 meals daily with snacks 2-3x/day  Estimated protein intake: Estimated protein intake  Criteria: Meet at least 75% of protein needs orally  Estimated fiber intake: Estimated fiber intake  Criteria: low fiber < 13 g per day with diarrhea  Body Composition/Growth/Weight History  Monitoring and Evaluation Plan: Weight  Weight: Measured weight  Criteria: Maintain weight  Nutrition Focused Physical Findings  Monitoring and Evaluation Plan: Digestive System  Digestive System: Diarrhea, Nausea  Criteria: compliance with bowel/ nausea regiment. trial of enterade          Time Spent  Prep time on day of patient encounter: 5 minutes  Time spent directly with patient, family or caregiver: 20 minutes  Additional Time Spent on Patient Care Activities: 5 minutes  Documentation Time: 20 minutes  Other Time Spent: 0 minutes  Total: 50 minutes

## 2024-07-12 DIAGNOSIS — N39.0 RECURRENT UTI: Primary | ICD-10-CM

## 2024-07-15 ENCOUNTER — NURSE TRIAGE (OUTPATIENT)
Dept: HEMATOLOGY/ONCOLOGY | Facility: CLINIC | Age: 74
End: 2024-07-15
Payer: COMMERCIAL

## 2024-07-15 ENCOUNTER — HOSPITAL ENCOUNTER (INPATIENT)
Facility: HOSPITAL | Age: 74
DRG: 394 | End: 2024-07-15
Attending: STUDENT IN AN ORGANIZED HEALTH CARE EDUCATION/TRAINING PROGRAM | Admitting: FAMILY MEDICINE
Payer: MEDICARE

## 2024-07-15 ENCOUNTER — APPOINTMENT (OUTPATIENT)
Dept: RADIOLOGY | Facility: HOSPITAL | Age: 74
DRG: 394 | End: 2024-07-15
Payer: MEDICARE

## 2024-07-15 DIAGNOSIS — R11.2 INTRACTABLE NAUSEA AND VOMITING: ICD-10-CM

## 2024-07-15 DIAGNOSIS — R97.20 ELEVATED PROSTATE SPECIFIC ANTIGEN (PSA): ICD-10-CM

## 2024-07-15 DIAGNOSIS — N18.32 STAGE 3B CHRONIC KIDNEY DISEASE (CKD) (MULTI): ICD-10-CM

## 2024-07-15 DIAGNOSIS — N40.1 BENIGN PROSTATIC HYPERPLASIA WITH LOWER URINARY TRACT SYMPTOMS, SYMPTOM DETAILS UNSPECIFIED: ICD-10-CM

## 2024-07-15 DIAGNOSIS — K51.919 ULCERATIVE COLITIS WITH COMPLICATION, UNSPECIFIED LOCATION (MULTI): ICD-10-CM

## 2024-07-15 DIAGNOSIS — R11.2 NAUSEA AND VOMITING, UNSPECIFIED VOMITING TYPE: Primary | ICD-10-CM

## 2024-07-15 DIAGNOSIS — I82.4Y1 ACUTE DEEP VEIN THROMBOSIS (DVT) OF PROXIMAL VEIN OF RIGHT LOWER EXTREMITY (MULTI): ICD-10-CM

## 2024-07-15 DIAGNOSIS — Z79.4 TYPE 2 DIABETES MELLITUS WITH DIABETIC NEUROPATHY, WITH LONG-TERM CURRENT USE OF INSULIN (MULTI): ICD-10-CM

## 2024-07-15 DIAGNOSIS — R60.0 EDEMA OF LOWER EXTREMITY: ICD-10-CM

## 2024-07-15 DIAGNOSIS — R19.7 DIARRHEA, UNSPECIFIED TYPE: ICD-10-CM

## 2024-07-15 DIAGNOSIS — E87.6 HYPOKALEMIA: ICD-10-CM

## 2024-07-15 DIAGNOSIS — Z97.8 FOLEY CATHETER IN PLACE: ICD-10-CM

## 2024-07-15 DIAGNOSIS — N17.9 AKI (ACUTE KIDNEY INJURY) (CMS-HCC): ICD-10-CM

## 2024-07-15 DIAGNOSIS — C43.9 METASTATIC MELANOMA (MULTI): ICD-10-CM

## 2024-07-15 DIAGNOSIS — E11.40 TYPE 2 DIABETES MELLITUS WITH DIABETIC NEUROPATHY, WITH LONG-TERM CURRENT USE OF INSULIN (MULTI): ICD-10-CM

## 2024-07-15 DIAGNOSIS — K59.1 FUNCTIONAL DIARRHEA: ICD-10-CM

## 2024-07-15 DIAGNOSIS — E86.0 DEHYDRATION: ICD-10-CM

## 2024-07-15 LAB
ALBUMIN SERPL BCP-MCNC: 3 G/DL (ref 3.4–5)
ALP SERPL-CCNC: 92 U/L (ref 33–136)
ALT SERPL W P-5'-P-CCNC: 7 U/L (ref 10–52)
ANION GAP SERPL CALC-SCNC: 16 MMOL/L (ref 10–20)
AST SERPL W P-5'-P-CCNC: 15 U/L (ref 9–39)
BASOPHILS # BLD AUTO: 0.03 X10*3/UL (ref 0–0.1)
BASOPHILS NFR BLD AUTO: 0.3 %
BILIRUB SERPL-MCNC: 0.6 MG/DL (ref 0–1.2)
BUN SERPL-MCNC: 33 MG/DL (ref 6–23)
CALCIUM SERPL-MCNC: 8.2 MG/DL (ref 8.6–10.3)
CHLORIDE SERPL-SCNC: 107 MMOL/L (ref 98–107)
CO2 SERPL-SCNC: 18 MMOL/L (ref 21–32)
CREAT SERPL-MCNC: 2.55 MG/DL (ref 0.5–1.3)
EGFRCR SERPLBLD CKD-EPI 2021: 26 ML/MIN/1.73M*2
EOSINOPHIL # BLD AUTO: 0.06 X10*3/UL (ref 0–0.4)
EOSINOPHIL NFR BLD AUTO: 0.7 %
ERYTHROCYTE [DISTWIDTH] IN BLOOD BY AUTOMATED COUNT: 16.7 % (ref 11.5–14.5)
GLUCOSE BLD MANUAL STRIP-MCNC: 90 MG/DL (ref 74–99)
GLUCOSE SERPL-MCNC: 102 MG/DL (ref 74–99)
HCT VFR BLD AUTO: 37.4 % (ref 41–52)
HGB BLD-MCNC: 12.8 G/DL (ref 13.5–17.5)
IMM GRANULOCYTES # BLD AUTO: 0.04 X10*3/UL (ref 0–0.5)
IMM GRANULOCYTES NFR BLD AUTO: 0.5 % (ref 0–0.9)
LACTATE SERPL-SCNC: 1.2 MMOL/L (ref 0.4–2)
LIPASE SERPL-CCNC: 27 U/L (ref 9–82)
LYMPHOCYTES # BLD AUTO: 1.24 X10*3/UL (ref 0.8–3)
LYMPHOCYTES NFR BLD AUTO: 14.1 %
MAGNESIUM SERPL-MCNC: 1.6 MG/DL (ref 1.6–2.4)
MCH RBC QN AUTO: 28.1 PG (ref 26–34)
MCHC RBC AUTO-ENTMCNC: 34.2 G/DL (ref 32–36)
MCV RBC AUTO: 82 FL (ref 80–100)
MONOCYTES # BLD AUTO: 1.16 X10*3/UL (ref 0.05–0.8)
MONOCYTES NFR BLD AUTO: 13.2 %
NEUTROPHILS # BLD AUTO: 6.28 X10*3/UL (ref 1.6–5.5)
NEUTROPHILS NFR BLD AUTO: 71.2 %
NRBC BLD-RTO: 0 /100 WBCS (ref 0–0)
PLATELET # BLD AUTO: 141 X10*3/UL (ref 150–450)
POTASSIUM SERPL-SCNC: 2.6 MMOL/L (ref 3.5–5.3)
PROT SERPL-MCNC: 6 G/DL (ref 6.4–8.2)
RBC # BLD AUTO: 4.55 X10*6/UL (ref 4.5–5.9)
RBC MORPH BLD: NORMAL
SODIUM SERPL-SCNC: 138 MMOL/L (ref 136–145)
WBC # BLD AUTO: 8.8 X10*3/UL (ref 4.4–11.3)

## 2024-07-15 PROCEDURE — 2500000004 HC RX 250 GENERAL PHARMACY W/ HCPCS (ALT 636 FOR OP/ED): Performed by: INTERNAL MEDICINE

## 2024-07-15 PROCEDURE — 2500000002 HC RX 250 W HCPCS SELF ADMINISTERED DRUGS (ALT 637 FOR MEDICARE OP, ALT 636 FOR OP/ED): Performed by: PHYSICIAN ASSISTANT

## 2024-07-15 PROCEDURE — 84075 ASSAY ALKALINE PHOSPHATASE: CPT | Performed by: PHYSICIAN ASSISTANT

## 2024-07-15 PROCEDURE — 83735 ASSAY OF MAGNESIUM: CPT | Performed by: PHYSICIAN ASSISTANT

## 2024-07-15 PROCEDURE — 74176 CT ABD & PELVIS W/O CONTRAST: CPT

## 2024-07-15 PROCEDURE — 96375 TX/PRO/DX INJ NEW DRUG ADDON: CPT

## 2024-07-15 PROCEDURE — G0378 HOSPITAL OBSERVATION PER HR: HCPCS

## 2024-07-15 PROCEDURE — 82947 ASSAY GLUCOSE BLOOD QUANT: CPT

## 2024-07-15 PROCEDURE — 74176 CT ABD & PELVIS W/O CONTRAST: CPT | Performed by: RADIOLOGY

## 2024-07-15 PROCEDURE — 83690 ASSAY OF LIPASE: CPT | Performed by: PHYSICIAN ASSISTANT

## 2024-07-15 PROCEDURE — 96361 HYDRATE IV INFUSION ADD-ON: CPT

## 2024-07-15 PROCEDURE — 96372 THER/PROPH/DIAG INJ SC/IM: CPT | Performed by: INTERNAL MEDICINE

## 2024-07-15 PROCEDURE — 94760 N-INVAS EAR/PLS OXIMETRY 1: CPT

## 2024-07-15 PROCEDURE — 96374 THER/PROPH/DIAG INJ IV PUSH: CPT | Mod: 59

## 2024-07-15 PROCEDURE — 36415 COLL VENOUS BLD VENIPUNCTURE: CPT | Performed by: PHYSICIAN ASSISTANT

## 2024-07-15 PROCEDURE — 99223 1ST HOSP IP/OBS HIGH 75: CPT | Performed by: INTERNAL MEDICINE

## 2024-07-15 PROCEDURE — 2500000004 HC RX 250 GENERAL PHARMACY W/ HCPCS (ALT 636 FOR OP/ED): Performed by: PHYSICIAN ASSISTANT

## 2024-07-15 PROCEDURE — 80053 COMPREHEN METABOLIC PANEL: CPT | Performed by: PHYSICIAN ASSISTANT

## 2024-07-15 PROCEDURE — 99285 EMERGENCY DEPT VISIT HI MDM: CPT

## 2024-07-15 PROCEDURE — 2500000002 HC RX 250 W HCPCS SELF ADMINISTERED DRUGS (ALT 637 FOR MEDICARE OP, ALT 636 FOR OP/ED): Performed by: INTERNAL MEDICINE

## 2024-07-15 PROCEDURE — 84132 ASSAY OF SERUM POTASSIUM: CPT | Performed by: INTERNAL MEDICINE

## 2024-07-15 PROCEDURE — 85025 COMPLETE CBC W/AUTO DIFF WBC: CPT | Performed by: PHYSICIAN ASSISTANT

## 2024-07-15 PROCEDURE — 83605 ASSAY OF LACTIC ACID: CPT | Performed by: PHYSICIAN ASSISTANT

## 2024-07-15 PROCEDURE — 36415 COLL VENOUS BLD VENIPUNCTURE: CPT | Performed by: INTERNAL MEDICINE

## 2024-07-15 RX ORDER — ONDANSETRON HYDROCHLORIDE 2 MG/ML
4 INJECTION, SOLUTION INTRAVENOUS ONCE
Status: COMPLETED | OUTPATIENT
Start: 2024-07-15 | End: 2024-07-15

## 2024-07-15 RX ORDER — DEXTROSE 50 % IN WATER (D50W) INTRAVENOUS SYRINGE
25
Status: DISCONTINUED | OUTPATIENT
Start: 2024-07-15 | End: 2024-07-24 | Stop reason: HOSPADM

## 2024-07-15 RX ORDER — GUAIFENESIN/DEXTROMETHORPHAN 100-10MG/5
5 SYRUP ORAL EVERY 4 HOURS PRN
Status: DISCONTINUED | OUTPATIENT
Start: 2024-07-15 | End: 2024-07-24 | Stop reason: HOSPADM

## 2024-07-15 RX ORDER — ACETAMINOPHEN 325 MG/1
650 TABLET ORAL EVERY 6 HOURS PRN
Status: DISCONTINUED | OUTPATIENT
Start: 2024-07-15 | End: 2024-07-15

## 2024-07-15 RX ORDER — ACETAMINOPHEN 325 MG/1
650 TABLET ORAL EVERY 4 HOURS PRN
Status: DISCONTINUED | OUTPATIENT
Start: 2024-07-15 | End: 2024-07-24 | Stop reason: HOSPADM

## 2024-07-15 RX ORDER — GUAIFENESIN 600 MG/1
600 TABLET, EXTENDED RELEASE ORAL EVERY 12 HOURS PRN
Status: DISCONTINUED | OUTPATIENT
Start: 2024-07-15 | End: 2024-07-24 | Stop reason: HOSPADM

## 2024-07-15 RX ORDER — POTASSIUM CHLORIDE 20 MEQ/1
40 TABLET, EXTENDED RELEASE ORAL ONCE
Status: COMPLETED | OUTPATIENT
Start: 2024-07-15 | End: 2024-07-15

## 2024-07-15 RX ORDER — ACETAMINOPHEN 160 MG/5ML
650 SOLUTION ORAL EVERY 4 HOURS PRN
Status: DISCONTINUED | OUTPATIENT
Start: 2024-07-15 | End: 2024-07-24 | Stop reason: HOSPADM

## 2024-07-15 RX ORDER — ALUMINUM HYDROXIDE, MAGNESIUM HYDROXIDE, AND SIMETHICONE 1200; 120; 1200 MG/30ML; MG/30ML; MG/30ML
30 SUSPENSION ORAL EVERY 6 HOURS PRN
Status: DISCONTINUED | OUTPATIENT
Start: 2024-07-15 | End: 2024-07-24 | Stop reason: HOSPADM

## 2024-07-15 RX ORDER — ONDANSETRON HYDROCHLORIDE 2 MG/ML
4 INJECTION, SOLUTION INTRAVENOUS EVERY 8 HOURS PRN
Status: DISCONTINUED | OUTPATIENT
Start: 2024-07-15 | End: 2024-07-24 | Stop reason: HOSPADM

## 2024-07-15 RX ORDER — TAMSULOSIN HYDROCHLORIDE 0.4 MG/1
0.8 CAPSULE ORAL NIGHTLY
Status: DISCONTINUED | OUTPATIENT
Start: 2024-07-15 | End: 2024-07-24 | Stop reason: HOSPADM

## 2024-07-15 RX ORDER — DOCUSATE SODIUM 100 MG/1
100 CAPSULE, LIQUID FILLED ORAL 2 TIMES DAILY
Status: DISCONTINUED | OUTPATIENT
Start: 2024-07-15 | End: 2024-07-21

## 2024-07-15 RX ORDER — INSULIN LISPRO 100 [IU]/ML
0-10 INJECTION, SOLUTION INTRAVENOUS; SUBCUTANEOUS EVERY 6 HOURS
Status: DISCONTINUED | OUTPATIENT
Start: 2024-07-15 | End: 2024-07-21

## 2024-07-15 RX ORDER — ATORVASTATIN CALCIUM 20 MG/1
20 TABLET, FILM COATED ORAL DAILY
Status: DISCONTINUED | OUTPATIENT
Start: 2024-07-16 | End: 2024-07-24 | Stop reason: HOSPADM

## 2024-07-15 RX ORDER — COLESEVELAM 180 1/1
3750 TABLET ORAL DAILY
Status: DISCONTINUED | OUTPATIENT
Start: 2024-07-16 | End: 2024-07-24 | Stop reason: HOSPADM

## 2024-07-15 RX ORDER — ACETAMINOPHEN 650 MG/1
650 SUPPOSITORY RECTAL EVERY 4 HOURS PRN
Status: DISCONTINUED | OUTPATIENT
Start: 2024-07-15 | End: 2024-07-24 | Stop reason: HOSPADM

## 2024-07-15 RX ORDER — DULOXETIN HYDROCHLORIDE 60 MG/1
60 CAPSULE, DELAYED RELEASE ORAL DAILY
Status: DISCONTINUED | OUTPATIENT
Start: 2024-07-16 | End: 2024-07-24 | Stop reason: HOSPADM

## 2024-07-15 RX ORDER — POTASSIUM CHLORIDE 14.9 MG/ML
20 INJECTION INTRAVENOUS ONCE
Status: COMPLETED | OUTPATIENT
Start: 2024-07-15 | End: 2024-07-15

## 2024-07-15 RX ORDER — ONDANSETRON 4 MG/1
4 TABLET, FILM COATED ORAL EVERY 8 HOURS PRN
Status: DISCONTINUED | OUTPATIENT
Start: 2024-07-15 | End: 2024-07-24 | Stop reason: HOSPADM

## 2024-07-15 RX ORDER — INSULIN DEGLUDEC 100 U/ML
10 INJECTION, SOLUTION SUBCUTANEOUS NIGHTLY
Status: DISCONTINUED | OUTPATIENT
Start: 2024-07-15 | End: 2024-07-24

## 2024-07-15 RX ORDER — METOPROLOL SUCCINATE 25 MG/1
25 TABLET, EXTENDED RELEASE ORAL DAILY
Status: DISCONTINUED | OUTPATIENT
Start: 2024-07-16 | End: 2024-07-24 | Stop reason: HOSPADM

## 2024-07-15 RX ORDER — AMLODIPINE BESYLATE 10 MG/1
10 TABLET ORAL DAILY
Status: DISCONTINUED | OUTPATIENT
Start: 2024-07-16 | End: 2024-07-20

## 2024-07-15 RX ORDER — CALCIUM CARBONATE 200(500)MG
500 TABLET,CHEWABLE ORAL 4 TIMES DAILY PRN
Status: DISCONTINUED | OUTPATIENT
Start: 2024-07-15 | End: 2024-07-24 | Stop reason: HOSPADM

## 2024-07-15 RX ORDER — POLYETHYLENE GLYCOL 3350 17 G/17G
17 POWDER, FOR SOLUTION ORAL DAILY
Status: DISCONTINUED | OUTPATIENT
Start: 2024-07-16 | End: 2024-07-22

## 2024-07-15 RX ORDER — DEXTROSE 50 % IN WATER (D50W) INTRAVENOUS SYRINGE
12.5
Status: DISCONTINUED | OUTPATIENT
Start: 2024-07-15 | End: 2024-07-24 | Stop reason: HOSPADM

## 2024-07-15 RX ORDER — SODIUM CHLORIDE AND POTASSIUM CHLORIDE 150; 900 MG/100ML; MG/100ML
100 INJECTION, SOLUTION INTRAVENOUS CONTINUOUS
Status: DISCONTINUED | OUTPATIENT
Start: 2024-07-15 | End: 2024-07-17

## 2024-07-15 RX ORDER — HEPARIN SODIUM 5000 [USP'U]/ML
5000 INJECTION, SOLUTION INTRAVENOUS; SUBCUTANEOUS EVERY 8 HOURS
Status: DISCONTINUED | OUTPATIENT
Start: 2024-07-15 | End: 2024-07-21

## 2024-07-15 SDOH — SOCIAL STABILITY: SOCIAL INSECURITY: HAS ANYONE EVER THREATENED TO HURT YOUR FAMILY OR YOUR PETS?: NO

## 2024-07-15 SDOH — SOCIAL STABILITY: SOCIAL INSECURITY: HAVE YOU HAD THOUGHTS OF HARMING ANYONE ELSE?: NO

## 2024-07-15 SDOH — SOCIAL STABILITY: SOCIAL INSECURITY: DO YOU FEEL UNSAFE GOING BACK TO THE PLACE WHERE YOU ARE LIVING?: NO

## 2024-07-15 SDOH — SOCIAL STABILITY: SOCIAL INSECURITY: ARE YOU OR HAVE YOU BEEN THREATENED OR ABUSED PHYSICALLY, EMOTIONALLY, OR SEXUALLY BY ANYONE?: NO

## 2024-07-15 SDOH — SOCIAL STABILITY: SOCIAL INSECURITY: ABUSE: ADULT

## 2024-07-15 SDOH — SOCIAL STABILITY: SOCIAL INSECURITY: HAVE YOU HAD ANY THOUGHTS OF HARMING ANYONE ELSE?: NO

## 2024-07-15 SDOH — SOCIAL STABILITY: SOCIAL INSECURITY: DOES ANYONE TRY TO KEEP YOU FROM HAVING/CONTACTING OTHER FRIENDS OR DOING THINGS OUTSIDE YOUR HOME?: NO

## 2024-07-15 SDOH — SOCIAL STABILITY: SOCIAL INSECURITY: ARE THERE ANY APPARENT SIGNS OF INJURIES/BEHAVIORS THAT COULD BE RELATED TO ABUSE/NEGLECT?: NO

## 2024-07-15 SDOH — SOCIAL STABILITY: SOCIAL INSECURITY: DO YOU FEEL ANYONE HAS EXPLOITED OR TAKEN ADVANTAGE OF YOU FINANCIALLY OR OF YOUR PERSONAL PROPERTY?: NO

## 2024-07-15 SDOH — SOCIAL STABILITY: SOCIAL INSECURITY: WERE YOU ABLE TO COMPLETE ALL THE BEHAVIORAL HEALTH SCREENINGS?: YES

## 2024-07-15 ASSESSMENT — COLUMBIA-SUICIDE SEVERITY RATING SCALE - C-SSRS
1. IN THE PAST MONTH, HAVE YOU WISHED YOU WERE DEAD OR WISHED YOU COULD GO TO SLEEP AND NOT WAKE UP?: NO
6. HAVE YOU EVER DONE ANYTHING, STARTED TO DO ANYTHING, OR PREPARED TO DO ANYTHING TO END YOUR LIFE?: NO
6. HAVE YOU EVER DONE ANYTHING, STARTED TO DO ANYTHING, OR PREPARED TO DO ANYTHING TO END YOUR LIFE?: NO
2. HAVE YOU ACTUALLY HAD ANY THOUGHTS OF KILLING YOURSELF?: NO
1. IN THE PAST MONTH, HAVE YOU WISHED YOU WERE DEAD OR WISHED YOU COULD GO TO SLEEP AND NOT WAKE UP?: NO
2. HAVE YOU ACTUALLY HAD ANY THOUGHTS OF KILLING YOURSELF?: NO

## 2024-07-15 ASSESSMENT — LIFESTYLE VARIABLES
HAVE PEOPLE ANNOYED YOU BY CRITICIZING YOUR DRINKING: NO
EVER HAD A DRINK FIRST THING IN THE MORNING TO STEADY YOUR NERVES TO GET RID OF A HANGOVER: NO
AUDIT-C TOTAL SCORE: 1
EVER FELT BAD OR GUILTY ABOUT YOUR DRINKING: NO
HOW OFTEN DO YOU HAVE A DRINK CONTAINING ALCOHOL: MONTHLY OR LESS
SKIP TO QUESTIONS 9-10: 1
HAVE YOU EVER FELT YOU SHOULD CUT DOWN ON YOUR DRINKING: NO
HOW OFTEN DO YOU HAVE 6 OR MORE DRINKS ON ONE OCCASION: NEVER
TOTAL SCORE: 0
AUDIT-C TOTAL SCORE: 1
HOW MANY STANDARD DRINKS CONTAINING ALCOHOL DO YOU HAVE ON A TYPICAL DAY: 1 OR 2

## 2024-07-15 ASSESSMENT — COGNITIVE AND FUNCTIONAL STATUS - GENERAL
PATIENT BASELINE BEDBOUND: NO
CLIMB 3 TO 5 STEPS WITH RAILING: TOTAL
DRESSING REGULAR UPPER BODY CLOTHING: A LITTLE
MOVING TO AND FROM BED TO CHAIR: A LITTLE
TURNING FROM BACK TO SIDE WHILE IN FLAT BAD: A LITTLE
HELP NEEDED FOR BATHING: A LITTLE
DAILY ACTIVITIY SCORE: 20
MOBILITY SCORE: 15
STANDING UP FROM CHAIR USING ARMS: A LITTLE
TOILETING: A LITTLE
DRESSING REGULAR LOWER BODY CLOTHING: A LITTLE
WALKING IN HOSPITAL ROOM: TOTAL

## 2024-07-15 ASSESSMENT — ACTIVITIES OF DAILY LIVING (ADL)
FEEDING YOURSELF: INDEPENDENT
PATIENT'S MEMORY ADEQUATE TO SAFELY COMPLETE DAILY ACTIVITIES?: YES
LACK_OF_TRANSPORTATION: NO
HEARING - RIGHT EAR: FUNCTIONAL
TOILETING: NEEDS ASSISTANCE
JUDGMENT_ADEQUATE_SAFELY_COMPLETE_DAILY_ACTIVITIES: YES
BATHING: NEEDS ASSISTANCE
ADEQUATE_TO_COMPLETE_ADL: YES
HEARING - LEFT EAR: FUNCTIONAL
WALKS IN HOME: DEPENDENT
GROOMING: INDEPENDENT
DRESSING YOURSELF: NEEDS ASSISTANCE

## 2024-07-15 ASSESSMENT — PATIENT HEALTH QUESTIONNAIRE - PHQ9
1. LITTLE INTEREST OR PLEASURE IN DOING THINGS: NOT AT ALL
2. FEELING DOWN, DEPRESSED OR HOPELESS: NOT AT ALL
SUM OF ALL RESPONSES TO PHQ9 QUESTIONS 1 & 2: 0

## 2024-07-15 ASSESSMENT — PAIN SCALES - GENERAL
PAINLEVEL_OUTOF10: 3
PAINLEVEL_OUTOF10: 4
PAINLEVEL_OUTOF10: 8
PAINLEVEL_OUTOF10: 0 - NO PAIN

## 2024-07-15 ASSESSMENT — PAIN DESCRIPTION - ORIENTATION: ORIENTATION: RIGHT

## 2024-07-15 ASSESSMENT — PAIN DESCRIPTION - LOCATION
LOCATION: HEAD
LOCATION: KNEE

## 2024-07-15 ASSESSMENT — PAIN DESCRIPTION - PAIN TYPE: TYPE: CHRONIC PAIN

## 2024-07-15 ASSESSMENT — PAIN - FUNCTIONAL ASSESSMENT
PAIN_FUNCTIONAL_ASSESSMENT: 0-10

## 2024-07-15 NOTE — TELEPHONE ENCOUNTER
Additional Information   Have you taken any anti-diarrhea medicines such as Imodium?     Lomotile   Are there daily activities that you're having trouble with such as getting dressed or making meals?     Pt can get dressed, but taking very long. Prior to this, he was able to shower etc without difficulty but now it is a chore to do anything, taking very long, very exhuasted.   Commented on: How many bowel movements have you had in the last 24 hours?     Pt has had diarrhea every 2 hours since Thursday night.  Pt states he urinates first then liquid stool.   Commented on: How often are you passing urine and what color is it?     Pt states he can't tell what color it is.   Commented on: When did these problems start?     Symptoms began Thursday night after treatment.   Commented on: What helps these problems?     Nothing is helping pt.  He has bee taking 3-4 Lomotile per day   Commented on: What makes these problems worse?     If he drinks or eats, diarrhea will begin again, even with small sips.   Commented on: What have you eaten in the last 2 days?     Pt has not eaten much since Friday because he is vomiting when he does.   Commented on: What have you been drinking in the last 24 hours?     Has had a little juice or popsicle    Protocols used: Diarrhea

## 2024-07-15 NOTE — ED TRIAGE NOTES
Pt has been having nausea, vomiting and diarrhea since his last treatment of Ipilimumab and nivolumab last Thursday.

## 2024-07-15 NOTE — ED PROVIDER NOTES
HPI   Chief Complaint   Patient presents with    Vomiting     Pt has been having nausea, vomiting and diarrhea since his last treatment of Ipilimumab and nivolumab last Thursday.        This is a 74-year-old male with PMH melanoma presenting for evaluation of nausea vomiting diarrhea poor oral intake.  He did Ipilimumab and nivolumab last Thursda and since then has had the symptoms.  Denies melena, hematochezia, fevers, chills.  Does report abdominal pain cramping distention.y denies obstipation.      History provided by:  Patient   used: No                        Normal Coma Scale Score: 15                     Patient History   Past Medical History:   Diagnosis Date    Abnormal weight gain 10/27/2016    Abnormal weight gain    Achilles tendinitis, unspecified leg 08/16/2016    Achilles tendinitis    Acute upper respiratory infection, unspecified     Acute URI    NOHELIA (acute kidney injury) (CMS-[facility]) 10/02/2023    10/2023-11/2023 Following vancomycin in setting of bacteremia.    Allergic contact dermatitis due to plants, except food 08/22/2013    Contact dermatitis due to poison ivy    Arthritis     Bell's palsy 03/21/2023    BPH with obstruction/lower urinary tract symptoms     Cancer (Multi)     Cellulitis of right lower limb 07/30/2021    Cellulitis of right lower extremity without foot    Cough, unspecified 03/21/2016    Cough    Diabetes (Multi)     Encounter for screening for human immunodeficiency virus (HIV) 06/14/2016    Screening for HIV (human immunodeficiency virus)    Hordeolum externum unspecified eye, unspecified eyelid 08/14/2019    Stye    Hyperglycemia, unspecified 12/13/2017    Chronic hyperglycemia    Hypertension     Incisional hernia without obstruction or gangrene 06/15/2015    Recurrent ventral hernia    Knee joint pain     Right knee- bone on bone    Melanoma (Multi)     MSSA bacteremia 10/2023    Muscle spasm of calf 08/16/2016    Muscle spasm of calf    Obstructive  uropathy     Personal history of other diseases of the digestive system 06/30/2015    History of umbilical hernia    Personal history of other diseases of the digestive system 02/05/2016    History of ventral hernia    Personal history of other diseases of the respiratory system 12/23/2014    History of acute sinusitis    Personal history of other diseases of the respiratory system 01/28/2016    History of upper respiratory infection    Personal history of other infectious and parasitic diseases 12/02/2015    History of hepatitis B virus infection    Personal history of other infectious and parasitic diseases     History of hepatitis B virus infection    Personal history of other specified conditions 02/04/2015    History of nausea and vomiting    Personal history of other specified conditions 08/16/2016    History of edema    Personal history of other specified conditions 01/26/2015    History of jaundice    Personal history of pneumonia (recurrent) 01/29/2016    History of pneumonia    Personal history of urinary (tract) infections 02/19/2013    History of urinary tract infection    Prostatitis 03/21/2023    Pruritus, unspecified 02/10/2015    Pruritus    Sinoatrial node dysfunction (Multi)     Strain of muscle, fascia and tendon of the posterior muscle group at thigh level, right thigh, initial encounter 05/10/2016    Tear of right hamstring    Unspecified symptoms and signs involving the genitourinary system 12/20/2016    UTI symptoms     Past Surgical History:   Procedure Laterality Date    CARDIAC ELECTROPHYSIOLOGY PROCEDURE Left 11/22/2023    Procedure: PPM Lead Extraction;  Surgeon: Etienne Aguilar MD;  Location: [address] Cardiac Cath Lab;  Service: Electrophysiology;  Laterality: Left;    CARDIAC ELECTROPHYSIOLOGY PROCEDURE N/A 11/30/2023    Procedure: PPM Leadless Implant (MICRA AV);  Surgeon: Etienne Aguilar MD;  Location: [place] [address] Cardiac Cath Lab;  Service: Electrophysiology;  Laterality: N/A;     CARDIAC PACEMAKER PLACEMENT  08/22/2013    Pacemaker Permanent Placement    OTHER SURGICAL HISTORY  06/15/2015    Ventral Hernia Repair - Recurrent    OTHER SURGICAL HISTORY  09/16/2019    Prostate biopsy    PERIPHERALLY INSERTED CENTRAL CATHETER INSERTION      SKIN BIOPSY      US GUIDED BIOPSY LYMPH NODE SUPERFICIAL  11/29/2023    US GUIDED BIOPSY LYMPH NODE SUPERFICIAL 11/29/2023 Mao Dubose MD [facility]    VARICOSE VEIN SURGERY  08/22/2013    Varicose Vein Ligation    VENTRAL HERNIA REPAIR  06/15/2015    Ventral Hernia Repair     Family History   Problem Relation Name Age of Onset    Cancer Mother Maye Verduzco      Social History     Tobacco Use    Smoking status: Never     Passive exposure: Never    Smokeless tobacco: Never   Vaping Use    Vaping status: Never Used   Substance Use Topics    Alcohol use: Not Currently     Comment: very infrequent    Drug use: Never       Physical Exam   ED Triage Vitals [07/15/24 1538]   Temperature Heart Rate Respirations BP   36.5 °C (97.7 °F) 84 18 122/74      Pulse Ox Temp src Heart Rate Source Patient Position   97 % -- -- --      BP Location FiO2 (%)     -- --       Physical Exam    General: Vitals noted. Afebrile. No apparent distress  EENT: Sclerae anicteric  Cardiac: Regular rate and rhythm. No murmur  Pulmonary: Lungs clear bilaterally with good aeration  Abdomen: Soft.  Generalized TTP. No rebound. No guarding  : No CVA tenderness. exam deferred  Extremities: JOHNS normally  Skin: No rash on abdomen  Neuro: Alert and oriented    ED Course & MDM   Diagnoses as of 07/15/24 1851   Nausea and vomiting, unspecified vomiting type   Diarrhea, unspecified type   Hypokalemia   Dehydration   NOHELIA (acute kidney injury) (CMS-[facility])       Medical Decision Making  DDx: Dehydration, electrolyte derangement, NOHELIA, obstruction, colitis    Patient is stable hemodynamically has nonsurgical abdomen and is found to have potassium of 2.6 and a creatinine of 2.55 worsened compared to prior.   GFR was 36 4 days ago and is now 26.  He was given IV fluids oral potassium IV potassium IV Zofran.  CT scan showed no evidence of obstruction but did show fluid-filled loops of small large bowel concerning for enteritis or diarrhea.  Plan to hospitalize for rehydration, replenish potassium, symptom treatment.  Discussed with hospitalist team who accepted the patient.  This visit was staffed with the attending physician Dr. Peterson.      Disclaimer: This note was dictated using speech recognition software. An attempt at proofreading was made to minimize errors. Minor errors in transcription may be present. Please call if questions.    Amount and/or Complexity of Data Reviewed  Labs: ordered.  Radiology: ordered.        Procedure  Procedures     Agustin Sherman PA-C  07/15/24 0586

## 2024-07-15 NOTE — TELEPHONE ENCOUNTER
Pt called and told, per Dr. Springer, that he should go to ER immediately.  Pt states he will call a friend to bring him to ER and if they can't help, he will call 911.  Warned not to drive himself and he states with his neuropathy, he would not be able to drive.

## 2024-07-16 LAB
ANION GAP SERPL CALC-SCNC: 12 MMOL/L (ref 10–20)
APPEARANCE UR: ABNORMAL
BACTERIA #/AREA URNS AUTO: ABNORMAL /HPF
BILIRUB UR STRIP.AUTO-MCNC: NEGATIVE MG/DL
BUN SERPL-MCNC: 34 MG/DL (ref 6–23)
C COLI+JEJ+UPSA DNA STL QL NAA+PROBE: NOT DETECTED
C DIF TOX TCDA+TCDB STL QL NAA+PROBE: NOT DETECTED
CALCIUM SERPL-MCNC: 7.3 MG/DL (ref 8.6–10.3)
CHLORIDE SERPL-SCNC: 117 MMOL/L (ref 98–107)
CO2 SERPL-SCNC: 15 MMOL/L (ref 21–32)
COLOR UR: ABNORMAL
CREAT SERPL-MCNC: 2.54 MG/DL (ref 0.5–1.3)
EC STX1 GENE STL QL NAA+PROBE: NOT DETECTED
EC STX2 GENE STL QL NAA+PROBE: NOT DETECTED
EGFRCR SERPLBLD CKD-EPI 2021: 26 ML/MIN/1.73M*2
ERYTHROCYTE [DISTWIDTH] IN BLOOD BY AUTOMATED COUNT: 17.1 % (ref 11.5–14.5)
GLUCOSE BLD MANUAL STRIP-MCNC: 83 MG/DL (ref 74–99)
GLUCOSE BLD MANUAL STRIP-MCNC: 91 MG/DL (ref 74–99)
GLUCOSE BLD MANUAL STRIP-MCNC: 96 MG/DL (ref 74–99)
GLUCOSE SERPL-MCNC: 86 MG/DL (ref 74–99)
GLUCOSE UR STRIP.AUTO-MCNC: NORMAL MG/DL
GRAN CASTS #/AREA UR COMP ASSIST: ABNORMAL /LPF
HCT VFR BLD AUTO: 34.8 % (ref 41–52)
HGB BLD-MCNC: 11.3 G/DL (ref 13.5–17.5)
HOLD SPECIMEN: NORMAL
HYALINE CASTS #/AREA URNS AUTO: ABNORMAL /LPF
KETONES UR STRIP.AUTO-MCNC: NEGATIVE MG/DL
LEUKOCYTE ESTERASE UR QL STRIP.AUTO: ABNORMAL
MAGNESIUM SERPL-MCNC: 1.52 MG/DL (ref 1.6–2.4)
MCH RBC QN AUTO: 27.6 PG (ref 26–34)
MCHC RBC AUTO-ENTMCNC: 32.5 G/DL (ref 32–36)
MCV RBC AUTO: 85 FL (ref 80–100)
MUCOUS THREADS #/AREA URNS AUTO: ABNORMAL /LPF
NITRITE UR QL STRIP.AUTO: ABNORMAL
NOROVIRUS GI + GII RNA STL NAA+PROBE: NOT DETECTED
NRBC BLD-RTO: 0 /100 WBCS (ref 0–0)
PH UR STRIP.AUTO: 6 [PH]
PLATELET # BLD AUTO: 112 X10*3/UL (ref 150–450)
POTASSIUM SERPL-SCNC: 2.7 MMOL/L (ref 3.5–5.3)
POTASSIUM SERPL-SCNC: 3.2 MMOL/L (ref 3.5–5.3)
PROT UR STRIP.AUTO-MCNC: ABNORMAL MG/DL
RBC # BLD AUTO: 4.09 X10*6/UL (ref 4.5–5.9)
RBC # UR STRIP.AUTO: ABNORMAL /UL
RBC #/AREA URNS AUTO: ABNORMAL /HPF
RV RNA STL NAA+PROBE: NOT DETECTED
SALMONELLA DNA STL QL NAA+PROBE: NOT DETECTED
SHIGELLA DNA SPEC QL NAA+PROBE: NOT DETECTED
SODIUM SERPL-SCNC: 141 MMOL/L (ref 136–145)
SP GR UR STRIP.AUTO: 1.02
UROBILINOGEN UR STRIP.AUTO-MCNC: NORMAL MG/DL
V CHOLERAE DNA STL QL NAA+PROBE: NOT DETECTED
WBC # BLD AUTO: 6.9 X10*3/UL (ref 4.4–11.3)
WBC #/AREA URNS AUTO: >50 /HPF
WBC CLUMPS #/AREA URNS AUTO: ABNORMAL /HPF
Y ENTEROCOL DNA STL QL NAA+PROBE: NOT DETECTED

## 2024-07-16 PROCEDURE — 80048 BASIC METABOLIC PNL TOTAL CA: CPT | Performed by: INTERNAL MEDICINE

## 2024-07-16 PROCEDURE — 2500000004 HC RX 250 GENERAL PHARMACY W/ HCPCS (ALT 636 FOR OP/ED): Performed by: INTERNAL MEDICINE

## 2024-07-16 PROCEDURE — G0378 HOSPITAL OBSERVATION PER HR: HCPCS

## 2024-07-16 PROCEDURE — 82043 UR ALBUMIN QUANTITATIVE: CPT | Mod: GEALAB | Performed by: INTERNAL MEDICINE

## 2024-07-16 PROCEDURE — 99232 SBSQ HOSP IP/OBS MODERATE 35: CPT | Performed by: FAMILY MEDICINE

## 2024-07-16 PROCEDURE — 2500000001 HC RX 250 WO HCPCS SELF ADMINISTERED DRUGS (ALT 637 FOR MEDICARE OP): Performed by: INTERNAL MEDICINE

## 2024-07-16 PROCEDURE — 83630 LACTOFERRIN FECAL (QUAL): CPT | Performed by: INTERNAL MEDICINE

## 2024-07-16 PROCEDURE — 87493 C DIFF AMPLIFIED PROBE: CPT | Mod: GEALAB | Performed by: INTERNAL MEDICINE

## 2024-07-16 PROCEDURE — 83735 ASSAY OF MAGNESIUM: CPT | Performed by: INTERNAL MEDICINE

## 2024-07-16 PROCEDURE — 82436 ASSAY OF URINE CHLORIDE: CPT | Mod: GEALAB | Performed by: FAMILY MEDICINE

## 2024-07-16 PROCEDURE — 94760 N-INVAS EAR/PLS OXIMETRY 1: CPT

## 2024-07-16 PROCEDURE — 87086 URINE CULTURE/COLONY COUNT: CPT | Mod: GEALAB | Performed by: PHYSICIAN ASSISTANT

## 2024-07-16 PROCEDURE — 36415 COLL VENOUS BLD VENIPUNCTURE: CPT | Performed by: INTERNAL MEDICINE

## 2024-07-16 PROCEDURE — 87506 IADNA-DNA/RNA PROBE TQ 6-11: CPT | Mod: GEALAB | Performed by: INTERNAL MEDICINE

## 2024-07-16 PROCEDURE — 84156 ASSAY OF PROTEIN URINE: CPT | Performed by: INTERNAL MEDICINE

## 2024-07-16 PROCEDURE — 82947 ASSAY GLUCOSE BLOOD QUANT: CPT

## 2024-07-16 PROCEDURE — 82570 ASSAY OF URINE CREATININE: CPT | Performed by: INTERNAL MEDICINE

## 2024-07-16 PROCEDURE — 81001 URINALYSIS AUTO W/SCOPE: CPT | Performed by: PHYSICIAN ASSISTANT

## 2024-07-16 PROCEDURE — 85027 COMPLETE CBC AUTOMATED: CPT | Performed by: INTERNAL MEDICINE

## 2024-07-16 PROCEDURE — 2500000002 HC RX 250 W HCPCS SELF ADMINISTERED DRUGS (ALT 637 FOR MEDICARE OP, ALT 636 FOR OP/ED): Performed by: INTERNAL MEDICINE

## 2024-07-16 PROCEDURE — 2500000002 HC RX 250 W HCPCS SELF ADMINISTERED DRUGS (ALT 637 FOR MEDICARE OP, ALT 636 FOR OP/ED): Performed by: FAMILY MEDICINE

## 2024-07-16 PROCEDURE — 96372 THER/PROPH/DIAG INJ SC/IM: CPT | Performed by: INTERNAL MEDICINE

## 2024-07-16 RX ORDER — POTASSIUM CHLORIDE 20 MEQ/1
40 TABLET, EXTENDED RELEASE ORAL ONCE
Status: COMPLETED | OUTPATIENT
Start: 2024-07-16 | End: 2024-07-16

## 2024-07-16 RX ORDER — PREGABALIN 25 MG/1
25 CAPSULE ORAL 2 TIMES DAILY
COMMUNITY

## 2024-07-16 RX ORDER — POTASSIUM CHLORIDE 20 MEQ/1
60 TABLET, EXTENDED RELEASE ORAL ONCE
Status: COMPLETED | OUTPATIENT
Start: 2024-07-16 | End: 2024-07-16

## 2024-07-16 RX ORDER — CALCIUM CARBONATE 200(500)MG
500 TABLET,CHEWABLE ORAL ONCE
Status: COMPLETED | OUTPATIENT
Start: 2024-07-16 | End: 2024-07-16

## 2024-07-16 RX ORDER — MAGNESIUM SULFATE HEPTAHYDRATE 40 MG/ML
2 INJECTION, SOLUTION INTRAVENOUS ONCE
Status: COMPLETED | OUTPATIENT
Start: 2024-07-16 | End: 2024-07-16

## 2024-07-16 ASSESSMENT — COGNITIVE AND FUNCTIONAL STATUS - GENERAL
HELP NEEDED FOR BATHING: A LITTLE
DRESSING REGULAR LOWER BODY CLOTHING: A LITTLE
TOILETING: A LITTLE
TURNING FROM BACK TO SIDE WHILE IN FLAT BAD: A LITTLE
MOVING TO AND FROM BED TO CHAIR: A LITTLE
STANDING UP FROM CHAIR USING ARMS: A LITTLE
MOBILITY SCORE: 17
CLIMB 3 TO 5 STEPS WITH RAILING: TOTAL
DAILY ACTIVITIY SCORE: 20
WALKING IN HOSPITAL ROOM: A LITTLE
DRESSING REGULAR UPPER BODY CLOTHING: A LITTLE

## 2024-07-16 ASSESSMENT — ACTIVITIES OF DAILY LIVING (ADL): LACK_OF_TRANSPORTATION: NO

## 2024-07-16 ASSESSMENT — PAIN - FUNCTIONAL ASSESSMENT
PAIN_FUNCTIONAL_ASSESSMENT: 0-10
PAIN_FUNCTIONAL_ASSESSMENT: 0-10

## 2024-07-16 ASSESSMENT — ENCOUNTER SYMPTOMS
FATIGUE: 1
ALLERGIC/IMMUNOLOGIC NEGATIVE: 1
EYES NEGATIVE: 1
MUSCULOSKELETAL NEGATIVE: 1
CARDIOVASCULAR NEGATIVE: 1
PSYCHIATRIC NEGATIVE: 1
RESPIRATORY NEGATIVE: 1
NEUROLOGICAL NEGATIVE: 1
ENDOCRINE COMMENTS: PT IS DIABETIC
ROS GI COMMENTS: SEE HPI
HEMATOLOGIC/LYMPHATIC NEGATIVE: 1

## 2024-07-16 ASSESSMENT — PAIN SCALES - GENERAL
PAINLEVEL_OUTOF10: 0 - NO PAIN
PAINLEVEL_OUTOF10: 3
PAINLEVEL_OUTOF10: 0 - NO PAIN

## 2024-07-16 ASSESSMENT — PAIN DESCRIPTION - LOCATION: LOCATION: GENERALIZED

## 2024-07-16 NOTE — PROGRESS NOTES
"Chester Verduzco is a 74 y.o. male on day 0 of admission presenting with Intractable nausea and vomiting.      Subjective   Patient resting in bed, reports diarrhea is still present but becoming less frequent. He started to tolerate diet without vomiting but still \"goes straight through me.\" Denies any new complaints.       Objective     Last Recorded Vitals  /62   Pulse 77   Temp 36.2 °C (97.1 °F) (Temporal)   Resp 19   Wt 94.4 kg (208 lb 1.6 oz)   SpO2 97%   Intake/Output last 3 Shifts:    Intake/Output Summary (Last 24 hours) at 7/16/2024 1814  Last data filed at 7/16/2024 1124  Gross per 24 hour   Intake 2235 ml   Output 400 ml   Net 1835 ml       Admission Weight  Weight: 99.8 kg (220 lb) (07/15/24 1538)    Daily Weight  07/15/24 : 94.4 kg (208 lb 1.6 oz)          Physical Exam  Constitutional: alert and oriented x 3, awake, cooperative, no acute distress  Skin: warm and dry  Head/Neck: Normocephalic, atraumatic  Eyes: clear sclera  ENMT: mucous membranes dry  Cardio: Regular rate and rhythm  Resp: CTA bilaterally, good respiratory effort  Gastrointestinal: Soft, nontender, distended  Musculoskeletal: ROM intact, no joint swelling  Extremities: No edema, cyanosis, or clubbing  Neuro: alert and oriented x 3  Psychological: Appropriate mood and behavior    Relevant Results  Scheduled medications  amLODIPine, 10 mg, oral, Daily  atorvastatin, 20 mg, oral, Daily  colesevelam, 3,750 mg, oral, Daily  docusate sodium, 100 mg, oral, BID  DULoxetine, 60 mg, oral, Daily  heparin (porcine), 5,000 Units, subcutaneous, q8h  insulin degludec, 10 Units, subcutaneous, Nightly  insulin lispro, 0-10 Units, subcutaneous, q6h  metoprolol succinate XL, 25 mg, oral, Daily  polyethylene glycol, 17 g, oral, Daily  tamsulosin, 0.8 mg, oral, Nightly      Continuous medications  potassium chloride in 0.9%NaCl, 100 mL/hr, Last Rate: 100 mL/hr (07/16/24 1815)      PRN medications  PRN medications: acetaminophen **OR** " acetaminophen **OR** acetaminophen, alum-mag hydroxide-simeth, calcium carbonate, dextromethorphan-guaifenesin, dextrose, dextrose, glucagon, glucagon, guaiFENesin, ondansetron **OR** ondansetron    Results for orders placed or performed during the hospital encounter of 07/15/24 (from the past 24 hour(s))   POCT GLUCOSE   Result Value Ref Range    POCT Glucose 90 74 - 99 mg/dL   Potassium   Result Value Ref Range    Potassium 2.7 (LL) 3.5 - 5.3 mmol/L   POCT GLUCOSE   Result Value Ref Range    POCT Glucose 83 74 - 99 mg/dL   Basic metabolic panel   Result Value Ref Range    Glucose 86 74 - 99 mg/dL    Sodium 141 136 - 145 mmol/L    Potassium 3.2 (L) 3.5 - 5.3 mmol/L    Chloride 117 (H) 98 - 107 mmol/L    Bicarbonate 15 (L) 21 - 32 mmol/L    Anion Gap 12 10 - 20 mmol/L    Urea Nitrogen 34 (H) 6 - 23 mg/dL    Creatinine 2.54 (H) 0.50 - 1.30 mg/dL    eGFR 26 (L) >60 mL/min/1.73m*2    Calcium 7.3 (L) 8.6 - 10.3 mg/dL   CBC   Result Value Ref Range    WBC 6.9 4.4 - 11.3 x10*3/uL    nRBC 0.0 0.0 - 0.0 /100 WBCs    RBC 4.09 (L) 4.50 - 5.90 x10*6/uL    Hemoglobin 11.3 (L) 13.5 - 17.5 g/dL    Hematocrit 34.8 (L) 41.0 - 52.0 %    MCV 85 80 - 100 fL    MCH 27.6 26.0 - 34.0 pg    MCHC 32.5 32.0 - 36.0 g/dL    RDW 17.1 (H) 11.5 - 14.5 %    Platelets 112 (L) 150 - 450 x10*3/uL   Magnesium   Result Value Ref Range    Magnesium 1.52 (L) 1.60 - 2.40 mg/dL   Urinalysis with Reflex Culture and Microscopic   Result Value Ref Range    Color, Urine Light-Orange (N) Light-Yellow, Yellow, Dark-Yellow    Appearance, Urine Turbid (N) Clear    Specific Gravity, Urine 1.018 1.005 - 1.035    pH, Urine 6.0 5.0, 5.5, 6.0, 6.5, 7.0, 7.5, 8.0    Protein, Urine 100 (2+) (A) NEGATIVE, 10 (TRACE), 20 (TRACE) mg/dL    Glucose, Urine Normal Normal mg/dL    Blood, Urine 0.1 (1+) (A) NEGATIVE    Ketones, Urine NEGATIVE NEGATIVE mg/dL    Bilirubin, Urine NEGATIVE NEGATIVE    Urobilinogen, Urine Normal Normal mg/dL    Nitrite, Urine 1+ (A) NEGATIVE     Leukocyte Esterase, Urine 500 Manpreet/µL (A) NEGATIVE   Microscopic Only, Urine   Result Value Ref Range    WBC, Urine >50 (A) 1-5, NONE /HPF    WBC Clumps, Urine FEW Reference range not established. /HPF    RBC, Urine 11-20 (A) NONE, 1-2, 3-5 /HPF    Bacteria, Urine 1+ (A) NONE SEEN /HPF    Mucus, Urine FEW Reference range not established. /LPF    Hyaline Casts, Urine 3+ (A) NONE /LPF    Fine Granular Casts, Urine 3+ (A) NONE /LPF   POCT GLUCOSE   Result Value Ref Range    POCT Glucose 91 74 - 99 mg/dL   POCT GLUCOSE   Result Value Ref Range    POCT Glucose 96 74 - 99 mg/dL     CT abdomen pelvis wo IV contrast    Result Date: 7/15/2024  Interpreted By:  Roberto Carlos Ludwig, STUDY: CT ABDOMEN PELVIS WO IV CONTRAST;  7/15/2024 5:26 pm   INDICATION: Signs/Symptoms:abd pain, n/v/d.   COMPARISON: May 28, 2024   ACCESSION NUMBER(S): [id number]   ORDERING CLINICIAN: JOANA PICKETT   TECHNIQUE: CT of the abdomen and pelvis was performed. Contiguous axial images were obtained at 3 mm slice thickness through the abdomen and pelvis. Coronal and sagittal reconstructions at 3 mm slice thickness were performed.  No intravenous contrast was administered; positive oral contrast was given.   FINDINGS: Please note that the evaluation of vessels, lymph nodes and organs is limited without intravenous contrast.   Bronchial thickening. There is a small hiatal hernia. There is hepatic steatosis. Unremarkable adrenal glands   Unchanged renal cyst on the left measuring up to 6.3 cm. The spleen remains enlarged.   Prostate gland is markedly enlarged at 8.1 x 8.1 cm. Urinary bladder has a thick wall suggesting bladder outlet obstruction. Trace air seen in the bladder. Bladder calcification measuring 1.4 cm.   Prominent loops of small bowel seen suggesting some component of enteritis. No discrete bowel obstruction. There is fluid in the colon which can be seen the setting of diarrhea. Diverticulosis. No overt stigmata of diverticulitis   Bulky left  pelvic and inguinal lymph nodes appear to be decreasing. Reference on the left measuring 3.5 cm previously measuring 4.2 cm. Dominant mass seen in the left inguinal region previously measured about 6.4 x 4.7 cm now measures 5.2 x 3 cm.   Abdominal hernia changes are noted.   Mild degenerative disc disease detected in the lower lumbar spine.         1.  Markedly enlarged prostate gland with thick-walled urinary bladder and bladder calcification. Correlation with PSA is advised. 2. Decreasing malignant adenopathy seen in the left pelvis and inguinal regions suggesting some component of response to therapy 3. Severe vascular calcification 4. Hepatic steatosis. Splenomegaly. 5. Fluid-filled loops of small and large bowel without discrete transition or obstruction. Query any component of enteritis or diarrhea. There is diverticulosis. No evidence of diverticulitis     MACRO: None   Signed by: Roberto Carlos Ludwig 7/15/2024 5:59 PM Dictation workstation:   [id number]       Assessment/Plan   74yo CM with PMH of malignant melanoma on immunotherapy, IDDM-type II, CKD, HTN, SSS s/p PPM complicated by infected leads    Acute diarrhea and vomiting  - likely secondary to immunotherapy  - Cdiff and stool pathogen obtained and pending  - consider antidiarrheals if cultures negative  - continue supportive care    Hypokalemia  - likely secondary to above  - replete and recheck    NOHELIA on CKD  - likely secondary to above  - continue IV fluids    Hypertension  - continue home amlodipine, metoprolol statin,   - hold home lasix, losartan in setting of above    Malignant melanoma  - on immunotherapy  - consider onc consult if no improvement and if cultures negative    DVT Proph: heparin subQ    Dispo: Patient requires observation for acute gastroenteritis, discharge planning pending stool cultures and PO tolerance likely within next 24 hours.           Amberly Philip DO

## 2024-07-16 NOTE — PROGRESS NOTES
07/16/24 1236   Discharge Planning   Living Arrangements Alone   Support Systems Friends/neighbors   Assistance Needed Alert and oriented x 3, Independent with ADL's, Doesn't drive, (friends transport to appointments), Shower chair, Walker, Wheel chair, BSC, Ramp, Grab bars. Active with [facility] for SNand PT   Type of Residence Private residence   Number of Stairs to Enter Residence 2   Number of Stairs Within Residence 0   Do you have animals or pets at home? Yes   Type of Animals or Pets 1 dog and 2 cats   Who is requesting discharge planning? Provider   Home or Post Acute Services In home services   Type of Home Care Services Home nursing visits;Home PT   Expected Discharge Disposition HH Services  (Resumption of [facility] for SN and PT)   Does the patient need discharge transport arranged? No   Financial Resource Strain   How hard is it for you to pay for the very basics like food, housing, medical care, and heating? Not hard   Housing Stability   In the last 12 months, was there a time when you were not able to pay the mortgage or rent on time? N   In the past 12 months, how many times have you moved where you were living? 1   At any time in the past 12 months, were you homeless or living in a shelter (including now)? N   Transportation Needs   In the past 12 months, has lack of transportation kept you from medical appointments or from getting medications? no   In the past 12 months, has lack of transportation kept you from meetings, work, or from getting things needed for daily living? No   Patient Choice   Provider Choice list and CMS website (https://medicare.gov/care-compare#search) for post-acute Quality and Resource Measure Data were provided and reviewed with: Patient   Patient / Family choosing to utilize agency / facility established prior to hospitalization Yes

## 2024-07-16 NOTE — CARE PLAN
The patient's goals for the shift include  no nausea    The clinical goals for the shift include less than 3 stools this shift    Over the shift, the patient did not make progress toward the following goals. Barriers to progression include incontinence. Recommendations to address these barriers include none.

## 2024-07-16 NOTE — H&P
History Of Present Illness  Chester Verduzco is a 74 y.o. male with history of malignant melanoma currently on immunotherapy, diabetes mellitus, and hypertension presenting with vomiting and diarrhea.  He states he received his sixth round of therapy 4 days ago and after he got home developed diarrhea, nausea and dry heaving.  He says he has been going to the bathroom every 2 hours.  He says he is not able to keep anything down.  He reports having slight lower abdominal discomfort.  He denied hematochezia, melena, hematemesis, fever, chills or sweats.  He also denied recent travel or exposure to sick contacts.  He does admit to recently being on antimicrobial therapy and having a prior history of C. difficile colitis.     Past Medical History  Past Medical History:   Diagnosis Date    Abnormal weight gain 10/27/2016    Abnormal weight gain    Achilles tendinitis, unspecified leg 08/16/2016    Achilles tendinitis    Acute upper respiratory infection, unspecified     Acute URI    NOHELIA (acute kidney injury) (CMS-[facility]) 10/02/2023    10/2023-11/2023 Following vancomycin in setting of bacteremia.    Allergic contact dermatitis due to plants, except food 08/22/2013    Contact dermatitis due to poison ivy    Arthritis     Bell's palsy 03/21/2023    BPH with obstruction/lower urinary tract symptoms     Cancer (Multi)     Cellulitis of right lower limb 07/30/2021    Cellulitis of right lower extremity without foot    Cough, unspecified 03/21/2016    Cough    Diabetes (Multi)     Encounter for screening for human immunodeficiency virus (HIV) 06/14/2016    Screening for HIV (human immunodeficiency virus)    Hordeolum externum unspecified eye, unspecified eyelid 08/14/2019    Stye    Hyperglycemia, unspecified 12/13/2017    Chronic hyperglycemia    Hypertension     Incisional hernia without obstruction or gangrene 06/15/2015    Recurrent ventral hernia    Knee joint pain     Right knee- bone on bone    Melanoma (Multi)     MSSA  bacteremia 10/2023    Muscle spasm of calf 08/16/2016    Muscle spasm of calf    Obstructive uropathy     Personal history of other diseases of the digestive system 06/30/2015    History of umbilical hernia    Personal history of other diseases of the digestive system 02/05/2016    History of ventral hernia    Personal history of other diseases of the respiratory system 12/23/2014    History of acute sinusitis    Personal history of other diseases of the respiratory system 01/28/2016    History of upper respiratory infection    Personal history of other infectious and parasitic diseases 12/02/2015    History of hepatitis B virus infection    Personal history of other infectious and parasitic diseases     History of hepatitis B virus infection    Personal history of other specified conditions 02/04/2015    History of nausea and vomiting    Personal history of other specified conditions 08/16/2016    History of edema    Personal history of other specified conditions 01/26/2015    History of jaundice    Personal history of pneumonia (recurrent) 01/29/2016    History of pneumonia    Personal history of urinary (tract) infections 02/19/2013    History of urinary tract infection    Prostatitis 03/21/2023    Pruritus, unspecified 02/10/2015    Pruritus    Sinoatrial node dysfunction (Multi)     Strain of muscle, fascia and tendon of the posterior muscle group at thigh level, right thigh, initial encounter 05/10/2016    Tear of right hamstring    Unspecified symptoms and signs involving the genitourinary system 12/20/2016    UTI symptoms       Past Surgical History  Past Surgical History:   Procedure Laterality Date    CARDIAC ELECTROPHYSIOLOGY PROCEDURE Left 11/22/2023    Procedure: PPM Lead Extraction;  Surgeon: Etienne Aguilar MD;  Location: [address] Cardiac Cath Lab;  Service: Electrophysiology;  Laterality: Left;    CARDIAC ELECTROPHYSIOLOGY PROCEDURE N/A 11/30/2023    Procedure: PPM Leadless Implant (MICRA AV);   Surgeon: Etienne Aguilar MD;  Location: [facility] Cardiac Cath Lab;  Service: Electrophysiology;  Laterality: N/A;    CARDIAC PACEMAKER PLACEMENT  08/22/2013    Pacemaker Permanent Placement    OTHER SURGICAL HISTORY  06/15/2015    Ventral Hernia Repair - Recurrent    OTHER SURGICAL HISTORY  09/16/2019    Prostate biopsy    PERIPHERALLY INSERTED CENTRAL CATHETER INSERTION      SKIN BIOPSY      US GUIDED BIOPSY LYMPH NODE SUPERFICIAL  11/29/2023    US GUIDED BIOPSY LYMPH NODE SUPERFICIAL 11/29/2023 Mao Dubose MD [facility]    VARICOSE VEIN SURGERY  08/22/2013    Varicose Vein Ligation    VENTRAL HERNIA REPAIR  06/15/2015    Ventral Hernia Repair        Social History  He reports that he has never smoked. He has never been exposed to tobacco smoke. He has never used smokeless tobacco. He reports that he does not currently use alcohol. He reports that he does not use drugs.    Family History  Family History   Problem Relation Name Age of Onset    Cancer Mother Maye eVrduzco         Allergies  Bupropion, Keflex [cephalexin], and Ciprofloxacin    Review of Systems   Constitutional:  Positive for fatigue.        See HPI   HENT: Negative.     Eyes: Negative.    Respiratory: Negative.     Cardiovascular: Negative.    Gastrointestinal:         See HPI   Endocrine:        Pt is diabetic    Genitourinary: Negative.    Musculoskeletal: Negative.    Skin: Negative.    Allergic/Immunologic: Negative.    Neurological: Negative.    Hematological: Negative.    Psychiatric/Behavioral: Negative.          Physical Exam  Constitutional:       General: He is not in acute distress.     Appearance: He is ill-appearing. He is not toxic-appearing or diaphoretic.   HENT:      Head: Normocephalic and atraumatic.      Nose: Nose normal.      Mouth/Throat:      Mouth: Mucous membranes are dry.      Pharynx: Oropharynx is clear. No oropharyngeal exudate or posterior oropharyngeal erythema.   Eyes:      General: No scleral icterus.         "Right eye: No discharge.         Left eye: No discharge.      Conjunctiva/sclera: Conjunctivae normal.   Cardiovascular:      Rate and Rhythm: Normal rate and regular rhythm.      Heart sounds: No murmur heard.  Pulmonary:      Breath sounds: No wheezing, rhonchi or rales.   Abdominal:      General: There is distension.      Palpations: Abdomen is soft. There is no mass.      Tenderness: There is no abdominal tenderness. There is no right CVA tenderness, left CVA tenderness, guarding or rebound.   Musculoskeletal:      Cervical back: Neck supple.      Right lower leg: No edema.      Left lower leg: No edema.   Lymphadenopathy:      Cervical: No cervical adenopathy.   Skin:     General: Skin is warm and dry.      Comments: Has chronic venous stasis skin changes   Neurological:      General: No focal deficit present.      Mental Status: He is alert and oriented to person, place, and time.   Psychiatric:         Mood and Affect: Mood normal.         Behavior: Behavior normal.          Last Recorded Vitals  Blood pressure 155/77, pulse 95, temperature 35.6 °C (96.1 °F), temperature source Temporal, resp. rate 18, height 1.88 m (6' 2\"), weight 94.4 kg (208 lb 1.6 oz), SpO2 97%.    Relevant Results    Latest Reference Range & Units 07/15/24 15:58 07/15/24 21:34   GLUCOSE 74 - 99 mg/dL 102 (H)    SODIUM 136 - 145 mmol/L 138    POTASSIUM 3.5 - 5.3 mmol/L 2.6 (LL)    CHLORIDE 98 - 107 mmol/L 107    Bicarbonate 21 - 32 mmol/L 18 (L)    Anion Gap 10 - 20 mmol/L 16    Blood Urea Nitrogen 6 - 23 mg/dL 33 (H)    Creatinine 0.50 - 1.30 mg/dL 2.55 (H)    EGFR >60 mL/min/1.73m*2 26 (L)    Calcium 8.6 - 10.3 mg/dL 8.2 (L)    Albumin 3.4 - 5.0 g/dL 3.0 (L)    Alkaline Phosphatase 33 - 136 U/L 92    ALT 10 - 52 U/L 7 (L)    AST 9 - 39 U/L 15    Bilirubin Total 0.0 - 1.2 mg/dL 0.6    Total Protein 6.4 - 8.2 g/dL 6.0 (L)    MAGNESIUM 1.60 - 2.40 mg/dL 1.60    Lactate 0.4 - 2.0 mmol/L 1.2    LIPASE 9 - 82 U/L 27    LEUKOCYTES (10*3/UL) IN " BLOOD BY AUTOMATED COUNT, [facility] 4.4 - 11.3 x10*3/uL 8.8    nRBC 0.0 - 0.0 /100 WBCs 0.0    ERYTHROCYTES (10*6/UL) IN BLOOD BY AUTOMATED COUNT, [facility] 4.50 - 5.90 x10*6/uL 4.55    HEMOGLOBIN 13.5 - 17.5 g/dL 12.8 (L)    HEMATOCRIT 41.0 - 52.0 % 37.4 (L)    MCV 80 - 100 fL 82    MCH 26.0 - 34.0 pg 28.1    MCHC 32.0 - 36.0 g/dL 34.2    RED CELL DISTRIBUTION WIDTH 11.5 - 14.5 % 16.7 (H)    PLATELETS (10*3/UL) IN BLOOD AUTOMATED COUNT, [facility] 150 - 450 x10*3/uL 141 (L)    NEUTROPHILS/100 LEUKOCYTES IN BLOOD BY AUTOMATED COUNT, [facility] 40.0 - 80.0 % 71.2    Immature Granulocytes %, Automated 0.0 - 0.9 % 0.5    Lymphocytes % 13.0 - 44.0 % 14.1    Monocytes % 2.0 - 10.0 % 13.2    Eosinophils % 0.0 - 6.0 % 0.7    Basophils % 0.0 - 2.0 % 0.3    NEUTROPHILS (10*3/UL) IN BLOOD BY AUTOMATED COUNT, [facility] 1.60 - 5.50 x10*3/uL 6.28 (H)    Immature Granulocytes Absolute, Automated 0.00 - 0.50 x10*3/uL 0.04    Lymphocytes Absolute 0.80 - 3.00 x10*3/uL 1.24    Monocytes Absolute 0.05 - 0.80 x10*3/uL 1.16 (H)    Eosinophils Absolute 0.00 - 0.40 x10*3/uL 0.06    Basophils Absolute 0.00 - 0.10 x10*3/uL 0.03    RBC Morphology  No significant RBC morphology present    POCT Glucose 74 - 99 mg/dL  90   (LL): Data is critically low  (H): Data is abnormally high  (L): Data is abnormally low    Assessment/Plan   Vomiting and diarrhea  Likely reaction to immunotherapy  Need to rule out C diff due to strong foul odor and appearance of stool  Will also check for PCR pathogens   Supportive care    Hypokalemia  K 2.6  Most likely secondary to GI losses  Replace and recheck level  Check Mg    NOHELIA on CKD  Creat 2.55 (up from 1.94)  Most likely prerenal related to #1  Volume expansion    Type II DM  Accu checks and will add ISS coverage as needed    Malignant melanoma  On immunotherapy  Outpatient follow up with primary oncologist       I spent 70 minutes in the professional and overall care of this patient.      Eber Burch MD

## 2024-07-17 ENCOUNTER — SPECIALTY PHARMACY (OUTPATIENT)
Dept: PHARMACY | Facility: CLINIC | Age: 74
End: 2024-07-17

## 2024-07-17 ENCOUNTER — APPOINTMENT (OUTPATIENT)
Dept: RADIOLOGY | Facility: HOSPITAL | Age: 74
DRG: 394 | End: 2024-07-17
Payer: MEDICARE

## 2024-07-17 PROBLEM — R11.2 NAUSEA AND VOMITING, UNSPECIFIED VOMITING TYPE: Status: ACTIVE | Noted: 2024-07-17

## 2024-07-17 LAB
ALBUMIN SERPL BCP-MCNC: 2.3 G/DL (ref 3.4–5)
ALP SERPL-CCNC: 79 U/L (ref 33–136)
ALT SERPL W P-5'-P-CCNC: 7 U/L (ref 10–52)
ANION GAP SERPL CALC-SCNC: 13 MMOL/L (ref 10–20)
AST SERPL W P-5'-P-CCNC: 13 U/L (ref 9–39)
BACTERIA UR CULT: ABNORMAL
BILIRUB SERPL-MCNC: 0.4 MG/DL (ref 0–1.2)
BUN SERPL-MCNC: 34 MG/DL (ref 6–23)
CALCIUM SERPL-MCNC: 7.4 MG/DL (ref 8.6–10.3)
CHLORIDE SERPL-SCNC: 122 MMOL/L (ref 98–107)
CHLORIDE UR-SCNC: 56 MMOL/L
CHLORIDE/CREATININE (MMOL/G) IN URINE: 32 MMOL/G CREAT (ref 23–275)
CO2 SERPL-SCNC: 12 MMOL/L (ref 21–32)
CREAT SERPL-MCNC: 2.76 MG/DL (ref 0.5–1.3)
CREAT UR-MCNC: 174.4 MG/DL (ref 20–370)
CREAT UR-MCNC: 180.5 MG/DL (ref 20–370)
CREAT UR-MCNC: 180.5 MG/DL (ref 20–370)
EGFRCR SERPLBLD CKD-EPI 2021: 24 ML/MIN/1.73M*2
ERYTHROCYTE [DISTWIDTH] IN BLOOD BY AUTOMATED COUNT: 17.7 % (ref 11.5–14.5)
GLUCOSE BLD MANUAL STRIP-MCNC: 101 MG/DL (ref 74–99)
GLUCOSE BLD MANUAL STRIP-MCNC: 104 MG/DL (ref 74–99)
GLUCOSE BLD MANUAL STRIP-MCNC: 116 MG/DL (ref 74–99)
GLUCOSE BLD MANUAL STRIP-MCNC: 130 MG/DL (ref 74–99)
GLUCOSE BLD MANUAL STRIP-MCNC: 141 MG/DL (ref 74–99)
GLUCOSE SERPL-MCNC: 108 MG/DL (ref 74–99)
HCT VFR BLD AUTO: 34.9 % (ref 41–52)
HGB BLD-MCNC: 11.1 G/DL (ref 13.5–17.5)
MAGNESIUM SERPL-MCNC: 2.09 MG/DL (ref 1.6–2.4)
MCH RBC QN AUTO: 27.5 PG (ref 26–34)
MCHC RBC AUTO-ENTMCNC: 31.8 G/DL (ref 32–36)
MCV RBC AUTO: 87 FL (ref 80–100)
MICROALBUMIN UR-MCNC: 122 MG/L
MICROALBUMIN/CREAT UR: 67.6 UG/MG CREAT
NRBC BLD-RTO: 0 /100 WBCS (ref 0–0)
PLATELET # BLD AUTO: 113 X10*3/UL (ref 150–450)
POTASSIUM SERPL-SCNC: 3.2 MMOL/L (ref 3.5–5.3)
POTASSIUM UR-SCNC: 22 MMOL/L
POTASSIUM/CREAT UR-RTO: 13 MMOL/G CREAT
PROT SERPL-MCNC: 4.6 G/DL (ref 6.4–8.2)
PROT UR-ACNC: 180 MG/DL (ref 5–25)
PROT/CREAT UR: 1 MG/MG CREAT (ref 0–0.17)
RBC # BLD AUTO: 4.03 X10*6/UL (ref 4.5–5.9)
SODIUM SERPL-SCNC: 144 MMOL/L (ref 136–145)
SODIUM UR-SCNC: 23 MMOL/L
SODIUM/CREAT UR-RTO: 13 MMOL/G CREAT
WBC # BLD AUTO: 7.6 X10*3/UL (ref 4.4–11.3)

## 2024-07-17 PROCEDURE — 94760 N-INVAS EAR/PLS OXIMETRY 1: CPT

## 2024-07-17 PROCEDURE — 2500000001 HC RX 250 WO HCPCS SELF ADMINISTERED DRUGS (ALT 637 FOR MEDICARE OP): Performed by: INTERNAL MEDICINE

## 2024-07-17 PROCEDURE — 80053 COMPREHEN METABOLIC PANEL: CPT | Performed by: FAMILY MEDICINE

## 2024-07-17 PROCEDURE — 96372 THER/PROPH/DIAG INJ SC/IM: CPT | Performed by: INTERNAL MEDICINE

## 2024-07-17 PROCEDURE — 83735 ASSAY OF MAGNESIUM: CPT | Performed by: INTERNAL MEDICINE

## 2024-07-17 PROCEDURE — 99223 1ST HOSP IP/OBS HIGH 75: CPT | Performed by: INTERNAL MEDICINE

## 2024-07-17 PROCEDURE — 2500000004 HC RX 250 GENERAL PHARMACY W/ HCPCS (ALT 636 FOR OP/ED): Performed by: FAMILY MEDICINE

## 2024-07-17 PROCEDURE — 76770 US EXAM ABDO BACK WALL COMP: CPT | Performed by: STUDENT IN AN ORGANIZED HEALTH CARE EDUCATION/TRAINING PROGRAM

## 2024-07-17 PROCEDURE — 2500000004 HC RX 250 GENERAL PHARMACY W/ HCPCS (ALT 636 FOR OP/ED): Performed by: INTERNAL MEDICINE

## 2024-07-17 PROCEDURE — 85027 COMPLETE CBC AUTOMATED: CPT | Performed by: FAMILY MEDICINE

## 2024-07-17 PROCEDURE — 36415 COLL VENOUS BLD VENIPUNCTURE: CPT | Performed by: FAMILY MEDICINE

## 2024-07-17 PROCEDURE — 2500000002 HC RX 250 W HCPCS SELF ADMINISTERED DRUGS (ALT 637 FOR MEDICARE OP, ALT 636 FOR OP/ED): Performed by: INTERNAL MEDICINE

## 2024-07-17 PROCEDURE — 1200000002 HC GENERAL ROOM WITH TELEMETRY DAILY

## 2024-07-17 PROCEDURE — 99232 SBSQ HOSP IP/OBS MODERATE 35: CPT | Performed by: FAMILY MEDICINE

## 2024-07-17 PROCEDURE — 76770 US EXAM ABDO BACK WALL COMP: CPT

## 2024-07-17 PROCEDURE — 2500000002 HC RX 250 W HCPCS SELF ADMINISTERED DRUGS (ALT 637 FOR MEDICARE OP, ALT 636 FOR OP/ED): Performed by: FAMILY MEDICINE

## 2024-07-17 PROCEDURE — 2500000005 HC RX 250 GENERAL PHARMACY W/O HCPCS: Performed by: FAMILY MEDICINE

## 2024-07-17 PROCEDURE — 82947 ASSAY GLUCOSE BLOOD QUANT: CPT

## 2024-07-17 RX ORDER — POTASSIUM CHLORIDE 20 MEQ/1
40 TABLET, EXTENDED RELEASE ORAL 3 TIMES DAILY
Status: DISCONTINUED | OUTPATIENT
Start: 2024-07-17 | End: 2024-07-22

## 2024-07-17 RX ORDER — DIPHENOXYLATE HYDROCHLORIDE AND ATROPINE SULFATE 2.5; .025 MG/1; MG/1
1 TABLET ORAL 4 TIMES DAILY PRN
Status: DISCONTINUED | OUTPATIENT
Start: 2024-07-17 | End: 2024-07-24 | Stop reason: HOSPADM

## 2024-07-17 RX ORDER — PROCHLORPERAZINE EDISYLATE 5 MG/ML
5 INJECTION INTRAMUSCULAR; INTRAVENOUS ONCE
Status: COMPLETED | OUTPATIENT
Start: 2024-07-17 | End: 2024-07-17

## 2024-07-17 ASSESSMENT — COGNITIVE AND FUNCTIONAL STATUS - GENERAL
MOBILITY SCORE: 17
TOILETING: A LITTLE
DRESSING REGULAR UPPER BODY CLOTHING: A LITTLE
STANDING UP FROM CHAIR USING ARMS: A LITTLE
DRESSING REGULAR LOWER BODY CLOTHING: A LITTLE
TURNING FROM BACK TO SIDE WHILE IN FLAT BAD: A LITTLE
DAILY ACTIVITIY SCORE: 20
MOVING TO AND FROM BED TO CHAIR: A LITTLE
MOVING TO AND FROM BED TO CHAIR: A LITTLE
HELP NEEDED FOR BATHING: A LITTLE
DAILY ACTIVITIY SCORE: 20
HELP NEEDED FOR BATHING: A LITTLE
MOVING FROM LYING ON BACK TO SITTING ON SIDE OF FLAT BED WITH BEDRAILS: A LITTLE
STANDING UP FROM CHAIR USING ARMS: A LITTLE
CLIMB 3 TO 5 STEPS WITH RAILING: A LOT
DRESSING REGULAR UPPER BODY CLOTHING: A LITTLE
MOBILITY SCORE: 17
TURNING FROM BACK TO SIDE WHILE IN FLAT BAD: A LITTLE
DRESSING REGULAR LOWER BODY CLOTHING: A LITTLE
CLIMB 3 TO 5 STEPS WITH RAILING: A LOT
TOILETING: A LITTLE
WALKING IN HOSPITAL ROOM: A LITTLE
MOVING FROM LYING ON BACK TO SITTING ON SIDE OF FLAT BED WITH BEDRAILS: A LITTLE
WALKING IN HOSPITAL ROOM: A LITTLE

## 2024-07-17 ASSESSMENT — PAIN SCALES - GENERAL
PAINLEVEL_OUTOF10: 0 - NO PAIN
PAINLEVEL_OUTOF10: 0 - NO PAIN

## 2024-07-17 ASSESSMENT — PAIN - FUNCTIONAL ASSESSMENT: PAIN_FUNCTIONAL_ASSESSMENT: 0-10

## 2024-07-17 ASSESSMENT — ACTIVITIES OF DAILY LIVING (ADL): LACK_OF_TRANSPORTATION: NO

## 2024-07-17 NOTE — CARE PLAN
The clinical goals for the shift include Less than 3 BMs this shift    Still having nausea and diarrhea. US renal completed. Continue to monitor electrolytes.

## 2024-07-17 NOTE — PROGRESS NOTES
Chester Verduzco is a 74 y.o. male on day 0 of admission presenting with Intractable nausea and vomiting.      Subjective   Patient resting in bed, states he feels a little better but still having loose bowel movements and an episode of emesis late yesterday. Denies any current complaints except fatigue.      Objective     Last Recorded Vitals  /59   Pulse 71   Temp 36.1 °C (97 °F) (Temporal)   Resp 18   Wt 94.4 kg (208 lb 1.6 oz)   SpO2 98%   Intake/Output last 3 Shifts:    Intake/Output Summary (Last 24 hours) at 7/17/2024 1627  Last data filed at 7/17/2024 1058  Gross per 24 hour   Intake 3256.66 ml   Output 500 ml   Net 2756.66 ml       Admission Weight  Weight: 99.8 kg (220 lb) (07/15/24 1538)    Daily Weight  07/15/24 : 94.4 kg (208 lb 1.6 oz)          Physical Exam  Constitutional: alert and oriented x 3, awake, cooperative, no acute distress  Skin: warm and dry  Head/Neck: Normocephalic, atraumatic  Eyes: clear sclera  ENMT: mucous membranes dry  Cardio: Regular rate and rhythm  Resp: CTA bilaterally, good respiratory effort  Gastrointestinal: Soft, nontender, distended  Musculoskeletal: generalized weakness  Neuro: alert and oriented x 3  Psychological: Appropriate mood and behavior    Relevant Results  Scheduled medications  amLODIPine, 10 mg, oral, Daily  atorvastatin, 20 mg, oral, Daily  colesevelam, 3,750 mg, oral, Daily  docusate sodium, 100 mg, oral, BID  DULoxetine, 60 mg, oral, Daily  heparin (porcine), 5,000 Units, subcutaneous, q8h  insulin degludec, 10 Units, subcutaneous, Nightly  insulin lispro, 0-10 Units, subcutaneous, q6h  metoprolol succinate XL, 25 mg, oral, Daily  polyethylene glycol, 17 g, oral, Daily  potassium chloride CR, 40 mEq, oral, TID  tamsulosin, 0.8 mg, oral, Nightly      Continuous medications  sodium bicarbonate 150 mEq in dextrose 5% 1,150 mL infusion, 75 mL/hr, Last Rate: 75 mL/hr (07/17/24 1416)      PRN medications  PRN medications: acetaminophen **OR**  acetaminophen **OR** acetaminophen, alum-mag hydroxide-simeth, calcium carbonate, dextromethorphan-guaifenesin, dextrose, dextrose, diphenoxylate-atropine, glucagon, glucagon, guaiFENesin, ondansetron **OR** ondansetron    Results for orders placed or performed during the hospital encounter of 07/15/24 (from the past 24 hour(s))   POCT GLUCOSE   Result Value Ref Range    POCT Glucose 96 74 - 99 mg/dL   POCT GLUCOSE   Result Value Ref Range    POCT Glucose 104 (H) 74 - 99 mg/dL   POCT GLUCOSE   Result Value Ref Range    POCT Glucose 101 (H) 74 - 99 mg/dL   Magnesium   Result Value Ref Range    Magnesium 2.09 1.60 - 2.40 mg/dL   CBC   Result Value Ref Range    WBC 7.6 4.4 - 11.3 x10*3/uL    nRBC 0.0 0.0 - 0.0 /100 WBCs    RBC 4.03 (L) 4.50 - 5.90 x10*6/uL    Hemoglobin 11.1 (L) 13.5 - 17.5 g/dL    Hematocrit 34.9 (L) 41.0 - 52.0 %    MCV 87 80 - 100 fL    MCH 27.5 26.0 - 34.0 pg    MCHC 31.8 (L) 32.0 - 36.0 g/dL    RDW 17.7 (H) 11.5 - 14.5 %    Platelets 113 (L) 150 - 450 x10*3/uL   Comprehensive Metabolic Panel   Result Value Ref Range    Glucose 108 (H) 74 - 99 mg/dL    Sodium 144 136 - 145 mmol/L    Potassium 3.2 (L) 3.5 - 5.3 mmol/L    Chloride 122 (H) 98 - 107 mmol/L    Bicarbonate 12 (L) 21 - 32 mmol/L    Anion Gap 13 10 - 20 mmol/L    Urea Nitrogen 34 (H) 6 - 23 mg/dL    Creatinine 2.76 (H) 0.50 - 1.30 mg/dL    eGFR 24 (L) >60 mL/min/1.73m*2    Calcium 7.4 (L) 8.6 - 10.3 mg/dL    Albumin 2.3 (L) 3.4 - 5.0 g/dL    Alkaline Phosphatase 79 33 - 136 U/L    Total Protein 4.6 (L) 6.4 - 8.2 g/dL    AST 13 9 - 39 U/L    Bilirubin, Total 0.4 0.0 - 1.2 mg/dL    ALT 7 (L) 10 - 52 U/L   POCT GLUCOSE   Result Value Ref Range    POCT Glucose 116 (H) 74 - 99 mg/dL     US renal complete    Result Date: 7/17/2024  Interpreted By:  Connie Victoria, STUDY:  RENAL COMPLETE;  7/17/2024 2:06 pm   INDICATION: Signs/Symptoms:worsening kidney function.   COMPARISON: 11/13/2023   ACCESSION NUMBER(S): [id number]   ORDERING  CLINICIAN: JACQUELIN WALTER   TECHNIQUE: Multiple images of the kidneys were obtained  with grayscale and color Doppler imaging.   FINDINGS: RIGHT KIDNEY: The right kidney measures 10.4 cm in length. There is suggestion of mildly increased renal cortical echogenicity with mild cortical thinning measuring up to 9 mm. No hydronephrosis is present; no evidence of nephrolithiasis.   LEFT KIDNEY: The left kidney measures 11.7 cm in length. Suggestion of mild thinning of renal cortex with mildly increased renal cortical echogenicity. There is persistent evidence of an exophytic hypodense lesion in the superior pole of left kidney measuring up to 7.3 cm with through transmission and without internal complexity or vascularity. This is suggestive of a simple cyst. No hydronephrosis is present; no evidence of nephrolithiasis.   BLADDER: Urinary bladder is mildly distended. There is circumferential wall thickening with trabeculated morphology of the urinary bladder. Prostate gland is enlarged with median lobe hypertrophy indenting on the base of the bladder.       1. Mild bilateral renal cortical thinning with increased renal cortical echogenicity, which can be associated with medical renal disease in appropriate clinical setting. 2. Stable left renal cyst. No hydronephrosis. 3. Prostatomegaly. 4. Circumferential wall thickening with trabeculated morphology of the urinary bladder, which could be related to chronic bladder outlet obstruction in appropriate clinical setting.   MACRO: None   Signed by: Connie Victoria 7/17/2024 2:57 PM Dictation workstation:   [id number]    CT abdomen pelvis wo IV contrast    Result Date: 7/15/2024  Interpreted By:  Roberto Carlos Ludwig, STUDY: CT ABDOMEN PELVIS WO IV CONTRAST;  7/15/2024 5:26 pm   INDICATION: Signs/Symptoms:abd pain, n/v/d.   COMPARISON: May 28, 2024   ACCESSION NUMBER(S): [id number]   ORDERING CLINICIAN: JOANA PICKETT   TECHNIQUE: CT of the abdomen and pelvis was performed.  Contiguous axial images were obtained at 3 mm slice thickness through the abdomen and pelvis. Coronal and sagittal reconstructions at 3 mm slice thickness were performed.  No intravenous contrast was administered; positive oral contrast was given.   FINDINGS: Please note that the evaluation of vessels, lymph nodes and organs is limited without intravenous contrast.   Bronchial thickening. There is a small hiatal hernia. There is hepatic steatosis. Unremarkable adrenal glands   Unchanged renal cyst on the left measuring up to 6.3 cm. The spleen remains enlarged.   Prostate gland is markedly enlarged at 8.1 x 8.1 cm. Urinary bladder has a thick wall suggesting bladder outlet obstruction. Trace air seen in the bladder. Bladder calcification measuring 1.4 cm.   Prominent loops of small bowel seen suggesting some component of enteritis. No discrete bowel obstruction. There is fluid in the colon which can be seen the setting of diarrhea. Diverticulosis. No overt stigmata of diverticulitis   Bulky left pelvic and inguinal lymph nodes appear to be decreasing. Reference on the left measuring 3.5 cm previously measuring 4.2 cm. Dominant mass seen in the left inguinal region previously measured about 6.4 x 4.7 cm now measures 5.2 x 3 cm.   Abdominal hernia changes are noted.   Mild degenerative disc disease detected in the lower lumbar spine.         1.  Markedly enlarged prostate gland with thick-walled urinary bladder and bladder calcification. Correlation with PSA is advised. 2. Decreasing malignant adenopathy seen in the left pelvis and inguinal regions suggesting some component of response to therapy 3. Severe vascular calcification 4. Hepatic steatosis. Splenomegaly. 5. Fluid-filled loops of small and large bowel without discrete transition or obstruction. Query any component of enteritis or diarrhea. There is diverticulosis. No evidence of diverticulitis     MACRO: None   Signed by: Roberto Carlos Ludwig 7/15/2024 5:59 PM  Dictation workstation:   [id number]       Assessment/Plan   76yo CM with PMH of malignant melanoma on immunotherapy, IDDM-type II, CKD, HTN, SSS s/p PPM complicated by infected leads      NOHELIA on CKD  - initially thought to be prerenal but now concerned of AIN  - change fluids to bicarb drip  - renal ultrasound  - urine studies  - nephro consulted, appreciate recs    Acute diarrhea and vomiting, improved  - likely secondary to immunotherapy  - Cdiff and stool pathogen both negative  - start lomotil/imodium PRN  - continue supportive care    Hypokalemia  - likely secondary to above  - replete and recheck    Hypertension  - continue home amlodipine, metoprolol, statin   - hold home lasix, losartan in setting of above    Malignant melanoma  - on immunotherapy  - consider onc consult if no improvement and if cultures negative    DVT Proph: heparin subQ    Dispo: Patient requires close inpatient monitoring in setting of worsening NOHELIA on CKD requiring further workup and persistent diarrhea, no plans for discharge at this time.           Amberly Philip, DO

## 2024-07-17 NOTE — PROGRESS NOTES
07/17/24 1021   Discharge Planning   Living Arrangements Alone   Support Systems Friends/neighbors   Assistance Needed Alert and oriented x 3, Independent with ADL's, Doesn't drive, (friends transport to appointments), Shower chair, Walker, Wheel chair, BSC, Ramp, Grab bars. Active with [facility] for SNand PT   Type of Residence Private residence   Number of Stairs to Enter Residence 2   Number of Stairs Within Residence 0   Do you have animals or pets at home? Yes   Type of Animals or Pets 1 dog and 2 cats   Who is requesting discharge planning? Provider   Home or Post Acute Services In home services   Type of Home Care Services Home nursing visits;Home PT   Expected Discharge Disposition HH Services  (Resumption of [facility] for SN and PT)   Financial Resource Strain   How hard is it for you to pay for the very basics like food, housing, medical care, and heating? Not hard   Housing Stability   In the last 12 months, was there a time when you were not able to pay the mortgage or rent on time? N   In the past 12 months, how many times have you moved where you were living? 1   At any time in the past 12 months, were you homeless or living in a shelter (including now)? N   Transportation Needs   In the past 12 months, has lack of transportation kept you from medical appointments or from getting medications? no   In the past 12 months, has lack of transportation kept you from meetings, work, or from getting things needed for daily living? No

## 2024-07-17 NOTE — CONSULTS
Reason For Consult  Acute kidney injury/chronic kidney disease    History Of Present Illness  Chester Verduzco is a 74 y.o. male with history of malignant melanoma currently on immunotherapy-last dose a week ago, diabetes mellitus, and hypertension presenting with vomiting and diarrhea.  Nephrology was consulted to manage worsening kidney function and responding to IV fluid    Patient was seen and examined in his room today.  He is awake and alert.  No distress.  On room air.  No significant hypervolemia on exam.  He reports history of chronic kidney disease.  He reports history of BPH/urinary retention and used to self cath himself up until a year ago.  He follows with urology with possible plans for surgery.  He reports having acute kidney injury in November 2023 when he was admitted with sepsis and he was close to get dialysis but he turned around.  Today, no specific kidney disease complaints.  He feels dry.  He thinks he might be dehydrated.  He reports multiple adverse events after each time he gets immunotherapy infusion.  He reports decreased p.o. intake/funny taste of the diet    He reports decreased urine output.  He denied hematuria, foamy urine, worsening leg swelling, shortness of breath or chest pain hematochezia, melena, hematemesis, fever, chills or sweats.  He also denied recent travel or exposure to sick contacts.  He does admit to recently being on antimicrobial therapy and having a prior history of C. difficile colitis.        Past Medical History  He has a past medical history of Abnormal weight gain (10/27/2016), Achilles tendinitis, unspecified leg (08/16/2016), Acute upper respiratory infection, unspecified, NOHELIA (acute kidney injury) (CMS-HCC) (10/02/2023), Allergic contact dermatitis due to plants, except food (08/22/2013), Arthritis, Bell's palsy (03/21/2023), BPH with obstruction/lower urinary tract symptoms, Cancer (Multi), Cellulitis of right lower limb (07/30/2021), Cough, unspecified  (03/21/2016), Diabetes (Multi), Encounter for screening for human immunodeficiency virus (HIV) (06/14/2016), Hordeolum externum unspecified eye, unspecified eyelid (08/14/2019), Hyperglycemia, unspecified (12/13/2017), Hypertension, Incisional hernia without obstruction or gangrene (06/15/2015), Knee joint pain, Melanoma (Multi), MSSA bacteremia (10/2023), Muscle spasm of calf (08/16/2016), Obstructive uropathy, Personal history of other diseases of the digestive system (06/30/2015), Personal history of other diseases of the digestive system (02/05/2016), Personal history of other diseases of the respiratory system (12/23/2014), Personal history of other diseases of the respiratory system (01/28/2016), Personal history of other infectious and parasitic diseases (12/02/2015), Personal history of other infectious and parasitic diseases, Personal history of other specified conditions (02/04/2015), Personal history of other specified conditions (08/16/2016), Personal history of other specified conditions (01/26/2015), Personal history of pneumonia (recurrent) (01/29/2016), Personal history of urinary (tract) infections (02/19/2013), Prostatitis (03/21/2023), Pruritus, unspecified (02/10/2015), Sinoatrial node dysfunction (Multi), Strain of muscle, fascia and tendon of the posterior muscle group at thigh level, right thigh, initial encounter (05/10/2016), and Unspecified symptoms and signs involving the genitourinary system (12/20/2016).    Surgical History  He has a past surgical history that includes Varicose vein surgery (08/22/2013); Cardiac pacemaker placement (08/22/2013); Other surgical history (06/15/2015); Ventral hernia repair (06/15/2015); Other surgical history (09/16/2019); Peripherally inserted central catheter insertion; Cardiac electrophysiology procedure (Left, 11/22/2023); US guided biopsy lymph node superficial (11/29/2023); Cardiac electrophysiology procedure (N/A, 11/30/2023); and Skin biopsy.      Social History  He reports that he has never smoked. He has never been exposed to tobacco smoke. He has never used smokeless tobacco. He reports that he does not currently use alcohol. He reports that he does not use drugs.    Family History  Family History   Problem Relation Name Age of Onset    Cancer Mother Maye Verduzco         Allergies  Bupropion, Keflex [cephalexin], and Ciprofloxacin    Review of Systems  As PHI     Physical Exam      General appearance: no distress awake and alert on room air, no significant hypervolemia exam  Eyes: non-icteric  HEENT: atrumatic head, PEERLA, dry mucosa  Skin: no apparent rash  Heart: NSR, S1, S2 normal, no murmur or gallop  Lungs: Symmetrical expansion,CTA bilat no wheezing/crackles  Abdomen: Obese, distended  Extremities: 1+ of pitting edema bilateral-chronic  Neuro: No FND,asterixis, no focal deficits noticed           I&O 24HR    Intake/Output Summary (Last 24 hours) at 7/17/2024 1144  Last data filed at 7/17/2024 1058  Gross per 24 hour   Intake 3256.66 ml   Output 500 ml   Net 2756.66 ml       Vitals 24HR  Heart Rate:  [75-80]   Temp:  [35.9 °C (96.6 °F)-36.3 °C (97.3 °F)]   Resp:  [17-19]   BP: ()/(59-62)   SpO2:  [97 %]         Relevant Results    RFP  Recent Labs     07/17/24  0625 07/16/24  0611 07/15/24  2336 07/15/24  1558 07/11/24  0948 06/20/24  0944 05/30/24  0937 05/07/24  0940 04/29/24  1017 03/11/24  1000 12/03/23  0457 12/02/23  0814 12/01/23  0653 11/30/23  0634 11/29/23  1010 11/28/23  0807 11/27/23  0713    141  --  138 136 139 137 138 135*   < > 138 138 139 140 140 138 138   K 3.2* 3.2* 2.7* 2.6* 3.4* 3.9 3.8 3.8 3.8   < > 3.5 3.4* 3.6 3.7 4.0 3.9 4.3   * 117*  --  107 109* 108* 103 106 102   < > 106 105 106 106 107 107 106   CO2 12* 15*  --  18* 18* 23 26 25 23   < > 26 23 23 23 24 21 22   BUN 34* 34*  --  33* 25* 19 16 19 20   < > 20 20 22 25* 25* 27* 30*   CREATININE 2.76* 2.54*  --  2.55* 1.94* 1.67* 1.95* 1.63* 1.50*   < >  2.37* 2.45* 2.71* 2.69* 2.84* 3.10* 3.26*   GLUCOSE 108* 86  --  102* 104* 78 115* 79 79   < > 73* 78 83 86 78 77 74   CALCIUM 7.4* 7.3*  --  8.2* 8.3* 9.2 9.4 8.7 8.7   < > 8.2* 8.2* 8.1* 8.3* 8.3* 8.2* 8.3*   ALBUMIN 2.3*  --   --  3.0* 3.1* 3.7 3.6 3.3* 3.3*   < > 3.0* 3.1* 3.0* 2.9* 2.7* 2.8* 2.9*   PHOS  --   --   --   --   --   --   --   --   --   --  4.4 4.2 4.7 4.6 4.7 4.6 4.8   EGFR 24* 26*  --  26* 36* 43* 36* 44* 49*   < > 28* 27* 24* 24* 23* 21* 19*   ANIONGAP 13 12  --  16 12 12 12 11 14   < > 10 13 14 15 13 14 14    < > = values in this interval not displayed.        Urineanalysis  Recent Labs     07/16/24  0848 07/03/24  1600 06/05/24  1610 04/05/24  1200 11/13/23  1940 10/02/23  1414 09/18/23  1946 04/27/18  1445   COLORU Light-Orange* Light-Yellow Yellow Yellow Yellow Iban* IBAN IBAN   APPEARANCEU Turbid* Turbid* Turbid* Hazy* Hazy* Hazy* HAZY HAZY   SPECGRAVU 1.018 1.015 1.015 1.014 1.011 1.014 1.024 1.024   SHAHID 6.0 5.0 5.5 5.0 5.0 5.0 5.0 5.0   PROTUR 100 (2+)* 30 (1+)* 20 (TRACE) NEGATIVE 100 (2+)* 30 (1+)* 100(2+)* 30 (1+)*   GLUCOSEU Normal Normal Normal 150 (2+)* NEGATIVE NEGATIVE 50(1+)* NEGATIVE   BLOODU 0.1 (1+)* 0.03 (TRACE)* 0.06 (1+)* MODERATE (2+)* LARGE (3+)* LARGE (3+)* SMALL(1+)* SMALL (1+)*   KETONESU NEGATIVE NEGATIVE NEGATIVE NEGATIVE NEGATIVE NEGATIVE NEGATIVE NEGATIVE   BILIRUBINU NEGATIVE NEGATIVE NEGATIVE NEGATIVE NEGATIVE NEGATIVE NEGATIVE NEGATIVE   NITRITEU 1+* NEGATIVE NEGATIVE NEGATIVE NEGATIVE NEGATIVE NEGATIVE POSITIVE*   LEUKOCYTESU 500 [place]/µL* 500 [place]/µL* 500 [place]/µL* MODERATE (2+)* MODERATE (2+)* SMALL (1+)* TRACE* LARGE (3+)*       Urine Electrolytes  Recent Labs     07/16/24  0848 07/03/24  1600 06/05/24  1610 04/05/24  1200 11/13/23  1940 10/04/23  1249 10/03/23  1812 10/02/23  1414 09/18/23  1946 10/04/22  1338 08/14/19  1221 04/27/18  1445   SODIUMURR  --   --   --   --  38  --  11  --   --   --   --   --    NACREATUR  --   --   --   --  60  --  10  --   --    "--   --   --    KUR  --   --   --   --  18  --  18  --   --   --   --   --    KCREATUR  --   --   --   --  29  --  16  --   --   --   --   --    CREATU  --   --   --   --  63.1 73.8 112.4  --   --  184.0 137.0  --    PROTUR 100 (2+)* 30 (1+)* 20 (TRACE) NEGATIVE 100 (2+)*  --   --  30 (1+)* 100(2+)*  --   --  30 (1+)*   ALBUMINUR  --   --   --   --   --  105.8  --   --   --   --   --   --    MICROALBCREA  --   --   --   --   --  143.4*  --   --   --  52.0* 20.0  --         Urine Micro  Recent Labs     07/16/24  0848 07/03/24  1600 06/05/24  1610 04/05/24  1200 11/13/23  1940 10/02/23  1414 09/18/23  1946 10/31/22  1534 04/27/18  1445   WBCU >50* >50* >50* >50* 11-20* >50* 25* 178* >182*   RBCU 11-20* 6-10* 11-20* 6-10* >20* >20* 3 3 5   HYALCASTU 3+*  --   --   --   --   --  3+*  --   --    SQUAMEPIU  --  1-9 (SPARSE)  --   --   --   --  <1  --  1   BACTERIAU 1+*  --  1+* 1+* 1+* 1+* 1+* 3+* 1+*   MUCUSU FEW FEW FEW FEW  --   --  2+ 4+ 4+        Iron  Recent Labs     10/04/23  0622   IRON 19*   TIBC 129*   IRONSAT 15*       @Mg@    Lab Results   Component Value Date    WBC 7.6 07/17/2024    HGB 11.1 (L) 07/17/2024    HCT 34.9 (L) 07/17/2024    MCV 87 07/17/2024     (L) 07/17/2024       No results found for: \"CKTOTAL\", \"CKMB\", \"CKMBINDEX\", \"TROPONINI\"    Albumin/Creatinine Ratio   Date Value Ref Range Status   10/04/2023 143.4 (H) <30.0 ug/mg Creat Final     Albumin/Creatine Ratio   Date Value Ref Range Status   10/04/2022 52.0 (H) 0.0 - 30.0 ug/mg crt Final   08/14/2019 20.0 0.0 - 30.0 ug/mg crt Final          Assessment/Plan     Chester JORGENSEN Hayliedomitila is a 74 y.o. male with history of malignant melanoma currently on immunotherapy-last dose a week ago, diabetes mellitus, BPH, chronic urinary tension and hypertension presenting with vomiting and diarrhea.  Nephrology was consulted to manage worsening kidney function and responding to IV fluid    Principal Problem:    Intractable nausea and vomiting  Active " Problems:    Nausea and vomiting, unspecified vomiting type    Impression  # Chronic kidney disease stage III baseline serum creatinine 1.5-2.  Possible atherosclerotic cardiovascular disease.  Patient had history of acute kidney injury in November 2024 thought to be infection related however workup was negative.  Serum creatinine peaked at that time up to 4.  Later on serum creatinine improved down to baseline    # Nonoliguric acute kidney injury with serum creatinine on admission 2.5 worsened to 2.7 on the day of consult.  Differential diagnosis broad at this time.  Per history, this is more likely prerenal injury.  However, given the fact that he is on immunotherapy, urine analysis significant for 2+ albumin, RBCs, WBCs-acute interstitial nephritis cannot be ruled out.  Other differential diagnoses include UTI, urinary retention given his prior history of self-catheterization    # Malignant melanoma-on immunotherapy with reported different adverse events including this presentation was diarrhea    # None anion gap metabolic acidosis possibly due to kidney failure    # Iron deficiency anemia    # Hypoalbuminemia?  Malnutrition versus nephrotic    # Severe hypokalemia possibly due to diarrhea    # Hypertension-blood pressures running on the lower side-currently on amlodipine 10 mg    Recommendation plan  -Agree to continue IV fluid resuscitation.  Switch fluid to sodium bicarb and to address metabolic acidosis.  Given the fact that potassium is low-is expected to get lower with sodium bicarbonate  -Aggressive potassium repletion-suggested 40 mg p.o. 3 times daily for now with close monitor to serum potassium  -Please send urine electrolytes, kidney ultrasound with postvoid scan, spot test ACR/PCR, urine culture.  If sterile pyuria-will consider biopsy for acute interstitial nephritis rule out and possible steroid induction  -While he is on immunosuppression-I recommend against PPI use if any due to increased risk  of kidney toxicity with the combined therapy  -Start p.o. iron daily  -Continue tamsulosin/Flomax    Plan was discussed with primary team    Miguelangel Isaac MD

## 2024-07-18 LAB
ALBUMIN SERPL BCP-MCNC: 2.2 G/DL (ref 3.4–5)
ANION GAP SERPL CALC-SCNC: 13 MMOL/L (ref 10–20)
BUN SERPL-MCNC: 36 MG/DL (ref 6–23)
CALCIUM SERPL-MCNC: 7.4 MG/DL (ref 8.6–10.3)
CHLORIDE SERPL-SCNC: 121 MMOL/L (ref 98–107)
CHLORIDE UR-SCNC: 45 MMOL/L
CHLORIDE/CREATININE (MMOL/G) IN URINE: 27 MMOL/G CREAT (ref 23–275)
CO2 SERPL-SCNC: 14 MMOL/L (ref 21–32)
CREAT SERPL-MCNC: 3.1 MG/DL (ref 0.5–1.3)
CREAT UR-MCNC: 164.5 MG/DL (ref 20–370)
EGFRCR SERPLBLD CKD-EPI 2021: 20 ML/MIN/1.73M*2
ERYTHROCYTE [DISTWIDTH] IN BLOOD BY AUTOMATED COUNT: 17.8 % (ref 11.5–14.5)
GLUCOSE BLD MANUAL STRIP-MCNC: 104 MG/DL (ref 74–99)
GLUCOSE BLD MANUAL STRIP-MCNC: 110 MG/DL (ref 74–99)
GLUCOSE BLD MANUAL STRIP-MCNC: 114 MG/DL (ref 74–99)
GLUCOSE BLD MANUAL STRIP-MCNC: 134 MG/DL (ref 74–99)
GLUCOSE BLD MANUAL STRIP-MCNC: 84 MG/DL (ref 74–99)
GLUCOSE SERPL-MCNC: 106 MG/DL (ref 74–99)
HCT VFR BLD AUTO: 34.7 % (ref 41–52)
HGB BLD-MCNC: 11.5 G/DL (ref 13.5–17.5)
LACTOFERRIN STL QL IA: POSITIVE
MCH RBC QN AUTO: 27.8 PG (ref 26–34)
MCHC RBC AUTO-ENTMCNC: 33.1 G/DL (ref 32–36)
MCV RBC AUTO: 84 FL (ref 80–100)
NRBC BLD-RTO: 0 /100 WBCS (ref 0–0)
PHOSPHATE SERPL-MCNC: 2.6 MG/DL (ref 2.5–4.9)
PLATELET # BLD AUTO: 108 X10*3/UL (ref 150–450)
POTASSIUM SERPL-SCNC: 2.8 MMOL/L (ref 3.5–5.3)
POTASSIUM UR-SCNC: 19 MMOL/L
POTASSIUM/CREAT UR-RTO: 12 MMOL/G CREAT
RBC # BLD AUTO: 4.14 X10*6/UL (ref 4.5–5.9)
SODIUM SERPL-SCNC: 145 MMOL/L (ref 136–145)
SODIUM UR-SCNC: 16 MMOL/L
SODIUM/CREAT UR-RTO: 10 MMOL/G CREAT
WBC # BLD AUTO: 8.4 X10*3/UL (ref 4.4–11.3)

## 2024-07-18 PROCEDURE — 82947 ASSAY GLUCOSE BLOOD QUANT: CPT

## 2024-07-18 PROCEDURE — 2500000002 HC RX 250 W HCPCS SELF ADMINISTERED DRUGS (ALT 637 FOR MEDICARE OP, ALT 636 FOR OP/ED): Performed by: INTERNAL MEDICINE

## 2024-07-18 PROCEDURE — 97530 THERAPEUTIC ACTIVITIES: CPT | Mod: GO

## 2024-07-18 PROCEDURE — 99232 SBSQ HOSP IP/OBS MODERATE 35: CPT | Performed by: FAMILY MEDICINE

## 2024-07-18 PROCEDURE — 97530 THERAPEUTIC ACTIVITIES: CPT | Mod: GP

## 2024-07-18 PROCEDURE — 99221 1ST HOSP IP/OBS SF/LOW 40: CPT | Performed by: INTERNAL MEDICINE

## 2024-07-18 PROCEDURE — 84100 ASSAY OF PHOSPHORUS: CPT | Performed by: FAMILY MEDICINE

## 2024-07-18 PROCEDURE — 82570 ASSAY OF URINE CREATININE: CPT | Mod: GEALAB | Performed by: INTERNAL MEDICINE

## 2024-07-18 PROCEDURE — 1200000002 HC GENERAL ROOM WITH TELEMETRY DAILY

## 2024-07-18 PROCEDURE — 2500000004 HC RX 250 GENERAL PHARMACY W/ HCPCS (ALT 636 FOR OP/ED): Performed by: FAMILY MEDICINE

## 2024-07-18 PROCEDURE — 97161 PT EVAL LOW COMPLEX 20 MIN: CPT | Mod: GP

## 2024-07-18 PROCEDURE — 2500000005 HC RX 250 GENERAL PHARMACY W/O HCPCS: Performed by: FAMILY MEDICINE

## 2024-07-18 PROCEDURE — 83993 ASSAY FOR CALPROTECTIN FECAL: CPT

## 2024-07-18 PROCEDURE — 87086 URINE CULTURE/COLONY COUNT: CPT | Mod: GEALAB | Performed by: FAMILY MEDICINE

## 2024-07-18 PROCEDURE — 2500000004 HC RX 250 GENERAL PHARMACY W/ HCPCS (ALT 636 FOR OP/ED): Performed by: INTERNAL MEDICINE

## 2024-07-18 PROCEDURE — 99233 SBSQ HOSP IP/OBS HIGH 50: CPT | Performed by: INTERNAL MEDICINE

## 2024-07-18 PROCEDURE — 99221 1ST HOSP IP/OBS SF/LOW 40: CPT

## 2024-07-18 PROCEDURE — 2500000002 HC RX 250 W HCPCS SELF ADMINISTERED DRUGS (ALT 637 FOR MEDICARE OP, ALT 636 FOR OP/ED): Performed by: FAMILY MEDICINE

## 2024-07-18 PROCEDURE — 97165 OT EVAL LOW COMPLEX 30 MIN: CPT | Mod: GO

## 2024-07-18 PROCEDURE — 36415 COLL VENOUS BLD VENIPUNCTURE: CPT | Performed by: FAMILY MEDICINE

## 2024-07-18 PROCEDURE — 2500000001 HC RX 250 WO HCPCS SELF ADMINISTERED DRUGS (ALT 637 FOR MEDICARE OP): Performed by: INTERNAL MEDICINE

## 2024-07-18 PROCEDURE — 85027 COMPLETE CBC AUTOMATED: CPT | Performed by: FAMILY MEDICINE

## 2024-07-18 RX ORDER — POTASSIUM CHLORIDE 14.9 MG/ML
20 INJECTION INTRAVENOUS
Status: COMPLETED | OUTPATIENT
Start: 2024-07-18 | End: 2024-07-18

## 2024-07-18 ASSESSMENT — COGNITIVE AND FUNCTIONAL STATUS - GENERAL
DAILY ACTIVITIY SCORE: 15
WALKING IN HOSPITAL ROOM: A LOT
MOVING FROM LYING ON BACK TO SITTING ON SIDE OF FLAT BED WITH BEDRAILS: A LITTLE
HELP NEEDED FOR BATHING: A LOT
MOVING TO AND FROM BED TO CHAIR: TOTAL
MOVING FROM LYING ON BACK TO SITTING ON SIDE OF FLAT BED WITH BEDRAILS: A LITTLE
DAILY ACTIVITIY SCORE: 19
MOBILITY SCORE: 12
MOVING TO AND FROM BED TO CHAIR: A LOT
WALKING IN HOSPITAL ROOM: A LITTLE
DRESSING REGULAR LOWER BODY CLOTHING: A LOT
PERSONAL GROOMING: A LITTLE
HELP NEEDED FOR BATHING: A LOT
MOVING FROM LYING ON BACK TO SITTING ON SIDE OF FLAT BED WITH BEDRAILS: A LITTLE
MOBILITY SCORE: 12
TOILETING: A LOT
DRESSING REGULAR LOWER BODY CLOTHING: A LITTLE
STANDING UP FROM CHAIR USING ARMS: TOTAL
STANDING UP FROM CHAIR USING ARMS: A LOT
MOVING TO AND FROM BED TO CHAIR: TOTAL
TURNING FROM BACK TO SIDE WHILE IN FLAT BAD: A LITTLE
WALKING IN HOSPITAL ROOM: A LITTLE
HELP NEEDED FOR BATHING: A LITTLE
CLIMB 3 TO 5 STEPS WITH RAILING: TOTAL
MOBILITY SCORE: 13
TURNING FROM BACK TO SIDE WHILE IN FLAT BAD: A LITTLE
STANDING UP FROM CHAIR USING ARMS: TOTAL
PERSONAL GROOMING: A LITTLE
DRESSING REGULAR UPPER BODY CLOTHING: A LITTLE
DRESSING REGULAR LOWER BODY CLOTHING: A LOT
DRESSING REGULAR UPPER BODY CLOTHING: A LITTLE
DAILY ACTIVITIY SCORE: 15
DRESSING REGULAR UPPER BODY CLOTHING: A LITTLE
CLIMB 3 TO 5 STEPS WITH RAILING: TOTAL
TOILETING: TOTAL
TURNING FROM BACK TO SIDE WHILE IN FLAT BAD: A LITTLE
TOILETING: TOTAL
CLIMB 3 TO 5 STEPS WITH RAILING: TOTAL

## 2024-07-18 ASSESSMENT — PAIN SCALES - GENERAL
PAINLEVEL_OUTOF10: 0 - NO PAIN

## 2024-07-18 ASSESSMENT — PAIN - FUNCTIONAL ASSESSMENT
PAIN_FUNCTIONAL_ASSESSMENT: 0-10

## 2024-07-18 ASSESSMENT — ACTIVITIES OF DAILY LIVING (ADL)
ADL_ASSISTANCE: INDEPENDENT
BATHING_ASSISTANCE: MODERATE
ADL_ASSISTANCE: INDEPENDENT

## 2024-07-18 NOTE — CONSULTS
Inpatient consult to Gastroenterology  Consult performed by: Elizabeth Carl DO  Consult ordered by: Amberly Philip DO  Reason for consult: persistent diarrhea on chemotherapy      Reason For Consult  Persistent diarrhea on chemotherapy    History Of Present Illness  Chester Verduzco is a 74 y.o. male medical history of stage IV malignant melanoma on immunotherapy (Ipi-Jose Martin, last cycle of chemo was about one week ago), IDDM-type II, CKD, HTN, SSS s/p PPM complicated by infected leads, BPH with obstructive uropathy requiring self catheterizations who is admitted for intractable vomiting and multiple episodes of diarrhea.  Patient states that his symptoms began after his chemoinfusion last Thursday.  He has been having multiple episodes of loose bowel movements, stating they occur every 2 hours even waking him up during the nighttime.  Bowel movements are nonbloody, however he does endorse some abdominal discomfort and reduced appetite.  He denies any recent illness, shortness of breath, cough, chest pain, fevers, chills or other acute symptoms.    Pt had colonoscopy performed in 2023 with removal of 3 polyps and recommendations to repeat in 3 years.      Past Medical History  He has a past medical history of Abnormal weight gain (10/27/2016), Achilles tendinitis, unspecified leg (08/16/2016), Acute upper respiratory infection, unspecified, NOHELIA (acute kidney injury) (CMS-HCC) (10/02/2023), Allergic contact dermatitis due to plants, except food (08/22/2013), Arthritis, Bell's palsy (03/21/2023), BPH with obstruction/lower urinary tract symptoms, Cancer (Multi), Cellulitis of right lower limb (07/30/2021), Cough, unspecified (03/21/2016), Diabetes (Multi), Encounter for screening for human immunodeficiency virus (HIV) (06/14/2016), Hordeolum externum unspecified eye, unspecified eyelid (08/14/2019), Hyperglycemia, unspecified (12/13/2017), Hypertension, Incisional hernia without obstruction or gangrene  (06/15/2015), Knee joint pain, Melanoma (Multi), MSSA bacteremia (10/2023), Muscle spasm of calf (08/16/2016), Obstructive uropathy, Personal history of other diseases of the digestive system (06/30/2015), Personal history of other diseases of the digestive system (02/05/2016), Personal history of other diseases of the respiratory system (12/23/2014), Personal history of other diseases of the respiratory system (01/28/2016), Personal history of other infectious and parasitic diseases (12/02/2015), Personal history of other infectious and parasitic diseases, Personal history of other specified conditions (02/04/2015), Personal history of other specified conditions (08/16/2016), Personal history of other specified conditions (01/26/2015), Personal history of pneumonia (recurrent) (01/29/2016), Personal history of urinary (tract) infections (02/19/2013), Prostatitis (03/21/2023), Pruritus, unspecified (02/10/2015), Sinoatrial node dysfunction (Multi), Strain of muscle, fascia and tendon of the posterior muscle group at thigh level, right thigh, initial encounter (05/10/2016), and Unspecified symptoms and signs involving the genitourinary system (12/20/2016).    Surgical History  He has a past surgical history that includes Varicose vein surgery (08/22/2013); Cardiac pacemaker placement (08/22/2013); Other surgical history (06/15/2015); Ventral hernia repair (06/15/2015); Other surgical history (09/16/2019); Peripherally inserted central catheter insertion; Cardiac electrophysiology procedure (Left, 11/22/2023); US guided biopsy lymph node superficial (11/29/2023); Cardiac electrophysiology procedure (N/A, 11/30/2023); and Skin biopsy.     Social History  He reports that he has never smoked. He has never been exposed to tobacco smoke. He has never used smokeless tobacco. He reports that he does not currently use alcohol. He reports that he does not use drugs.    Family History  Family History   Problem Relation Name Age  of Onset    Cancer Mother Maye Verduzco         Allergies  Bupropion, Keflex [cephalexin], and Ciprofloxacin    Review of Systems  12 point ROS otherwise negative     Physical Exam  --Vital signs reviewed in nursing triage note, EMR flow sheets, and at patient's bedside  Constitutional: Appears stated age. In NAD.   HEENT: NC/AT, EOMI, clear sclera, moist mucous membranes  CV: RRR  PULM: CTAB  ABDOMEN: Soft, non tender, abdomen is distended, no rigidity   SKIN: Normal Color, Warm and dry  NEURO: A&O x 4, nonfocal neurological exam.  PSYCH: Normal Mood & Behavior     Last Recorded Vitals  /67   Pulse 68   Temp 35.7 °C (96.3 °F)   Resp 18   Wt 94.4 kg (208 lb 1.6 oz)   SpO2 97%     Relevant Results  Results for orders placed or performed during the hospital encounter of 07/15/24 (from the past 24 hour(s))   POCT GLUCOSE   Result Value Ref Range    POCT Glucose 130 (H) 74 - 99 mg/dL   POCT GLUCOSE   Result Value Ref Range    POCT Glucose 141 (H) 74 - 99 mg/dL   POCT GLUCOSE   Result Value Ref Range    POCT Glucose 134 (H) 74 - 99 mg/dL   Electrolyte Panel, Urine   Result Value Ref Range    Sodium, Urine Random 16 mmol/L    Sodium/Creatinine Ratio 10 Not established. mmol/g Creat    Potassium, Urine Random 19 mmol/L    Potassium/Creatinine Ratio 12 Not established mmol/g Creat    Chloride, Urine Random 45 mmol/L    Chloride/Creatinine Ratio 27 23 - 275 mmol/g creat    Creatinine, Urine Random 164.5 20.0 - 370.0 mg/dL   POCT GLUCOSE   Result Value Ref Range    POCT Glucose 104 (H) 74 - 99 mg/dL   CBC   Result Value Ref Range    WBC 8.4 4.4 - 11.3 x10*3/uL    nRBC 0.0 0.0 - 0.0 /100 WBCs    RBC 4.14 (L) 4.50 - 5.90 x10*6/uL    Hemoglobin 11.5 (L) 13.5 - 17.5 g/dL    Hematocrit 34.7 (L) 41.0 - 52.0 %    MCV 84 80 - 100 fL    MCH 27.8 26.0 - 34.0 pg    MCHC 33.1 32.0 - 36.0 g/dL    RDW 17.8 (H) 11.5 - 14.5 %    Platelets 108 (L) 150 - 450 x10*3/uL   Renal Function Panel   Result Value Ref Range    Glucose 106  (H) 74 - 99 mg/dL    Sodium 145 136 - 145 mmol/L    Potassium 2.8 (LL) 3.5 - 5.3 mmol/L    Chloride 121 (H) 98 - 107 mmol/L    Bicarbonate 14 (L) 21 - 32 mmol/L    Anion Gap 13 10 - 20 mmol/L    Urea Nitrogen 36 (H) 6 - 23 mg/dL    Creatinine 3.10 (H) 0.50 - 1.30 mg/dL    eGFR 20 (L) >60 mL/min/1.73m*2    Calcium 7.4 (L) 8.6 - 10.3 mg/dL    Phosphorus 2.6 2.5 - 4.9 mg/dL    Albumin 2.2 (L) 3.4 - 5.0 g/dL   POCT GLUCOSE   Result Value Ref Range    POCT Glucose 84 74 - 99 mg/dL         Assessment/Plan   Chester Verduzco is a 74 y.o. male medical history of stage IV malignant melanoma on immunotherapy (Ipi-Jose Martin, last cycle of chemo was about one week ago), IDDM-type II, CKD, HTN, SSS s/p PPM complicated by infected leads, BPH with obstructive uropathy requiring self catheterizations who is admitted for intractable vomiting and multiple episodes of diarrhea.     # Diarrhea, suspected chemotherapy induced  - Patient currently on ipi-Jose Martin for stage IV malignant melanoma. Last infusion on thrusday 7/11 with symptoms starting soon thereafter  - C. Diff negative, stool studies negative, less likely infectious etiology  - Hold chemotherapy given adverse effect of diarrhea and vomiting  - Recommend fecal caclprotectin and stool electrolytes to calculate osmolar gap and rule out inflammatory disease  - Monitor and replete electrolytes as needed  - Can give loperamide PRN for diarrhea  - Continue supportive management per primary team    Thank for consulting Gastroenterology, we will continue to follow. The above plan was discussed with the attending, Dr Sloan, who is in agreement.     Elizabeth Carl DO  PGY-2  I saw and evaluated the patient. I personally obtained the key and critical portions of the history and physical exam or was physically present for key and critical portions performed by the resident. I reviewed the resident's documentation and discussed the patient with the resident. I agree with the resident's  medical decision making as documented in the note.

## 2024-07-18 NOTE — PROGRESS NOTES
Occupational Therapy    Evaluation/Treatment    Patient Name: Chester Verduzco  MRN: [id number]  : 1950  Today's Date: 24  Time Calculation  Start Time: 1418  Stop Time: 1447  Time Calculation (min): 29 min       Assessment:  OT Assessment: Pt presents with decreased functional mobility, decreased ADL performance, decreased endurance/activity tolerance. Continued skilled OT recommended to maximize pt safety and indpendence prior to returning home.  Prognosis: Good  Barriers to Discharge: None  Evaluation/Treatment Tolerance: Patient limited by fatigue  Medical Staff Made Aware: Yes  End of Session Communication: Bedside nurse  End of Session Patient Position: Alarm on (seated EOB, RN notified)  OT Assessment Results: Decreased ADL status, Decreased upper extremity strength, Decreased endurance, Decreased functional mobility  Prognosis: Good  Barriers to Discharge: None  Evaluation/Treatment Tolerance: Patient limited by fatigue  Medical Staff Made Aware: Yes  Strengths: Rehab experience, Ability to acquire knowledge, Access to adaptive/assistive products, Support of Caregivers  Barriers to Participation: Comorbidities  Plan:  Treatment Interventions: ADL retraining, UE strengthening/ROM, Endurance training, Equipment evaluation/education, Compensatory technique education  OT Frequency: 3 times per week  OT Discharge Recommendations: Moderate intensity level of continued care  Equipment Recommended upon Discharge: Wheeled walker  OT Recommended Transfer Status: Assist of 2  OT - OK to Discharge: Yes (per OT POC)  Treatment Interventions: ADL retraining, UE strengthening/ROM, Endurance training, Equipment evaluation/education, Compensatory technique education    Subjective   Current Problem:  1. Nausea and vomiting, unspecified vomiting type        2. Diarrhea, unspecified type        3. Hypokalemia        4. Dehydration        5. NOHELIA (acute kidney injury) (CMS-[facility])          General:   OT Received On:  07/18/24  General  Reason for Referral: 75 yo male referred to OT for generalized weakness, impaired ADL/mobility  Referred By: CONNIE Philip DO  Past Medical History Relevant to Rehab: history of malignant melanoma currently on immunotherapy, diabetes mellitus, and hypertension  Co-Treatment: PT  Co-Treatment Reason: To maximize pt safety and outcomes  Prior to Session Communication: Bedside nurse  Patient Position Received: Bed, 3 rail up, Alarm off, not on at start of session  General Comment: Pt pleasant, cooperative with therapy evaluations  Precautions:  Medical Precautions: Fall precautions (IV, tele)    Pain:  Pain Assessment  Pain Assessment: 0-10  0-10 (Numeric) Pain Score: 0 - No pain    Objective   Cognition:  Overall Cognitive Status: Within Functional Limits  Orientation Level: Oriented X4  Insight: Within function limits  Impulsive: Within functional limits     Home Living:  Type of Home: House  Lives With: Alone  Home Adaptive Equipment: Walker rolling or standard, Wheelchair-manual, Reacher, Long-handled shoehorn  Home Layout: One level  Home Access: Ramped entrance (+ entryway threshold)  Bathroom Shower/Tub: Walk-in shower  Bathroom Toilet: Adaptive toilet seating  Bathroom Equipment: Grab bars in shower, Shower chair with back, Bedside commode  Prior Function:  Level of [place]: Independent with ADLs and functional transfers, Needs assistance with homemaking  Receives Help From: Friends, Neighbor  ADL Assistance: Independent (with AE for dressing)  Homemaking Assistance: Needs assistance ((-) driving)  Ambulatory Assistance: Independent (with FWW)     ADL:  Eating Assistance: Independent  Grooming Assistance: Independent  Bathing Assistance: Moderate  UE Dressing Assistance: Stand by  LE Dressing Assistance: Maximal  Toileting Assistance with Device: Total  Functional Assistance: Minimal  ADL Comments: Unassessed ADL performance anticipated d/t current clinica presentation  Activities of Daily  Living: LE Dressing  LE Dressing: Yes  Sock Level of Assistance: Dependent  Adult Briefs Level of Assistance: Minimum assistance (x2)  LE Dressing Where Assessed: Edge of bed  LE Dressing Comments: Pt states he typically does not don socks. Pt able to thread feet into socks using figure 4 and forward flexion, assist required for balance when standing to arrange over hips.  Activity Tolerance:  Endurance: Tolerates less than 10 min exercise, no significant change in vital signs       Bed Mobility/Transfers: Bed Mobility  Bed Mobility: Yes  Bed Mobility 1  Bed Mobility 1: Supine to sitting  Level of Assistance 1: Distant supervision  Bed Mobility Comments 1: HOB slightly elevated to simulate home, use of bed rail, increased time and effort d/t overall fatigue and weakness    Transfers  Transfer: Yes  Transfer 1  Transfer From 1: Sit to  Transfer to 1: Stand  Technique 1: Sit to stand, Stand to sit  Transfer Device 1: Walker  Transfer Level of Assistance 1: Moderate assistance (x2)      Functional Mobility:  Functional Mobility  Functional Mobility Performed: Yes  Functional Mobility 1  Surface 1: Level tile  Device 1: Rolling walker  Assistance 1: Minimum assistance  Comments 1: Ambulated short household distance with fair balance, apparent LE strength deficits but good safety awareness  Sitting Balance:  Static Sitting Balance  Static Sitting-Balance Support: Feet supported  Static Sitting-Level of Assistance: Independent  Static Sitting-Comment/Number of Minutes: No deficits with balance observed during ADL  Standing Balance:  Static Standing Balance  Static Standing-Balance Support: Bilateral upper extremity supported  Static Standing-Level of Assistance: Minimum assistance    Strength:  Strength Comments: RUE 2+/5, LUE 3+/5    Coordination:  Movements are Fluid and Coordinated: Yes   Hand Function:  Hand Function  Gross Grasp: Functional  Coordination: Functional  Extremities: RUE   RUE : Exceptions to WFL  RUE  AROM (degrees)  RUE AROM Comment: Shoulder flexion grossly 100 degrees, IR/ER WFL, elbow to distal WFL and LUE   LUE: Within Functional Limits    Outcome Measures: [facility] Daily Activity  Putting on and taking off regular lower body clothing: A lot  Bathing (including washing, rinsing, drying): A lot  Putting on and taking off regular upper body clothing: A little  Toileting, which includes using toilet, bedpan or urinal: Total  Taking care of personal grooming such as brushing teeth: A little  Eating Meals: None  Daily Activity - Total Score: 15      Education Documentation  Precautions, taught by Efra Chung OT at 7/18/2024  2:56 PM.  Learner: Patient  Readiness: Acceptance  Method: Explanation  Response: Verbalizes Understanding    Body Mechanics, taught by Efra Chung OT at 7/18/2024  2:56 PM.  Learner: Patient  Readiness: Acceptance  Method: Explanation  Response: Verbalizes Understanding    ADL Training, taught by Efra Chung OT at 7/18/2024  2:56 PM.  Learner: Patient  Readiness: Acceptance  Method: Explanation  Response: Verbalizes Understanding    Education Comments  No comments found.        Goals:  Encounter Problems       Encounter Problems (Active)       OT Goals       Pt will demo LE ADL completion with supervision using AE if needed.        Start:  07/18/24    Expected End:  08/01/24            Pt will complete [id number] transfers using LRD in preparation for ADLs with supervision        Start:  07/18/24    Expected End:  08/01/24            Pt will increase endurance to tolerate 15min of OOB activity with no more than 1 rest break in order to increase ability to engage in ADL completion.        Start:  07/18/24    Expected End:  08/01/24            Pt will tolerate 10min stand during functional task completion with no more than 1 rest break in order to increase endurance for functional task completion.        Start:  07/18/24    Expected End:  08/01/24            Pt will  increase BU strength to 4+/5 through various therapeutic exercises and activities       Start:  07/18/24    Expected End:  08/01/24

## 2024-07-18 NOTE — CONSULTS
Reason For Consult  Persistent diarrhea in the setting of recent immunotherapy usage.     History Of Present Illness  Chester Verduzco is a 74 y.o. male with a medical history of stage IV malignant melanoma on immunotherapy (Ipi-Jose Martin, last cycle of chemo was about one week ago), IDDM-type II, CKD, HTN, SSS s/p PPM complicated by infected leads, BPH with obstructive uropathy requiring self catheterizations. Who presented on account of intractable vomiting and repeated vomiting.     The patient was in his usual state of health until after his sixth cycle of chemo last Thursday when he started having continued nausea, vomiting and diarrhea. He describes vomitus as recently ingested meals, nonbloodly non bilious. He also says that he was having loose BM every 2-3 hours. Also reports that Bms are nonbloodly. He has no fevers, melana, hematochezia. He denies recent travels or sick contacts. There is no pyrexia, SOB, recent falls, edema,  cough, or changes in urinary symptoms. There is an Associated history of nonspecific abdominal discomfort, and reduced appetite, There is concern for electrolyte derangement, NOHELIA on CKD, acute interstitial nephritis, nephrology currently following.     Past Medical History  He has a past medical history of Abnormal weight gain (10/27/2016), Achilles tendinitis, unspecified leg (08/16/2016), Acute upper respiratory infection, unspecified, NOHELIA (acute kidney injury) (CMS-[facility]) (10/02/2023), Allergic contact dermatitis due to plants, except food (08/22/2013), Arthritis, Bell's palsy (03/21/2023), BPH with obstruction/lower urinary tract symptoms, Cancer (Multi), Cellulitis of right lower limb (07/30/2021), Cough, unspecified (03/21/2016), Diabetes (Multi), Encounter for screening for human immunodeficiency virus (HIV) (06/14/2016), Hordeolum externum unspecified eye, unspecified eyelid (08/14/2019), Hyperglycemia, unspecified (12/13/2017), Hypertension, Incisional hernia without obstruction  or gangrene (06/15/2015), Knee joint pain, Melanoma (Multi), MSSA bacteremia (10/2023), Muscle spasm of calf (08/16/2016), Obstructive uropathy, Personal history of other diseases of the digestive system (06/30/2015), Personal history of other diseases of the digestive system (02/05/2016), Personal history of other diseases of the respiratory system (12/23/2014), Personal history of other diseases of the respiratory system (01/28/2016), Personal history of other infectious and parasitic diseases (12/02/2015), Personal history of other infectious and parasitic diseases, Personal history of other specified conditions (02/04/2015), Personal history of other specified conditions (08/16/2016), Personal history of other specified conditions (01/26/2015), Personal history of pneumonia (recurrent) (01/29/2016), Personal history of urinary (tract) infections (02/19/2013), Prostatitis (03/21/2023), Pruritus, unspecified (02/10/2015), Sinoatrial node dysfunction (Multi), Strain of muscle, fascia and tendon of the posterior muscle group at thigh level, right thigh, initial encounter (05/10/2016), and Unspecified symptoms and signs involving the genitourinary system (12/20/2016).    Surgical History  He has a past surgical history that includes Varicose vein surgery (08/22/2013); Cardiac pacemaker placement (08/22/2013); Other surgical history (06/15/2015); Ventral hernia repair (06/15/2015); Other surgical history (09/16/2019); Peripherally inserted central catheter insertion; Cardiac electrophysiology procedure (Left, 11/22/2023); US guided biopsy lymph node superficial (11/29/2023); Cardiac electrophysiology procedure (N/A, 11/30/2023); and Skin biopsy.     Social History  He reports that he has never smoked. He has never been exposed to tobacco smoke. He has never used smokeless tobacco. He reports that he does not currently use alcohol. He reports that he does not use drugs.    Family History  Family History   Problem  "Relation Name Age of Onset    Cancer Mother Maye Verduzco         Allergies  Bupropion, Keflex [cephalexin], and Ciprofloxacin    Review of Systems  Negative except as stated in the HPI above.      Physical Exam  GE: lying comfortable in bed, in NAD  HEENT; AT/NC, EOMI, no conjunctival pallor, anicteric sclera, dry mucous membrane  Neck; supple neck, no LAD/JVD  Chest; Good air entry b/l, no adventitious sounds heard, normal rise and fall of thoracis cavity during each respiratory cycle.  CVS; s1 and s2, no MGR, RRR  Abd; protuberant, soft, NT/ND, +BS, no organomegaly, No guarding/rebound tenderness  Ext; no pedal edema/cyanosis/clubbing, pulses intact  Neuro; Aox3, Cn 2-12 intact, sensation intact, Motor 5/5 globally  Psych; normal mood/affect.         Last Recorded Vitals  Blood pressure 116/67, pulse 68, temperature 35.7 °C (96.3 °F), resp. rate 18, height 1.88 m (6' 2\"), weight 94.4 kg (208 lb 1.6 oz), SpO2 97%.    Relevant Results  Results for orders placed or performed during the hospital encounter of 07/15/24 (from the past 24 hour(s))   POCT GLUCOSE   Result Value Ref Range    POCT Glucose 116 (H) 74 - 99 mg/dL   POCT GLUCOSE   Result Value Ref Range    POCT Glucose 130 (H) 74 - 99 mg/dL   POCT GLUCOSE   Result Value Ref Range    POCT Glucose 141 (H) 74 - 99 mg/dL   POCT GLUCOSE   Result Value Ref Range    POCT Glucose 134 (H) 74 - 99 mg/dL   Electrolyte Panel, Urine   Result Value Ref Range    Sodium, Urine Random 16 mmol/L    Sodium/Creatinine Ratio 10 Not established. mmol/g Creat    Potassium, Urine Random 19 mmol/L    Potassium/Creatinine Ratio 12 Not established mmol/g Creat    Chloride, Urine Random 45 mmol/L    Chloride/Creatinine Ratio 27 23 - 275 mmol/g creat    Creatinine, Urine Random 164.5 20.0 - 370.0 mg/dL   POCT GLUCOSE   Result Value Ref Range    POCT Glucose 104 (H) 74 - 99 mg/dL   CBC   Result Value Ref Range    WBC 8.4 4.4 - 11.3 x10*3/uL    nRBC 0.0 0.0 - 0.0 /100 WBCs    RBC 4.14 (L) " 4.50 - 5.90 x10*6/uL    Hemoglobin 11.5 (L) 13.5 - 17.5 g/dL    Hematocrit 34.7 (L) 41.0 - 52.0 %    MCV 84 80 - 100 fL    MCH 27.8 26.0 - 34.0 pg    MCHC 33.1 32.0 - 36.0 g/dL    RDW 17.8 (H) 11.5 - 14.5 %    Platelets 108 (L) 150 - 450 x10*3/uL   Renal Function Panel   Result Value Ref Range    Glucose 106 (H) 74 - 99 mg/dL    Sodium 145 136 - 145 mmol/L    Potassium 2.8 (LL) 3.5 - 5.3 mmol/L    Chloride 121 (H) 98 - 107 mmol/L    Bicarbonate 14 (L) 21 - 32 mmol/L    Anion Gap 13 10 - 20 mmol/L    Urea Nitrogen 36 (H) 6 - 23 mg/dL    Creatinine 3.10 (H) 0.50 - 1.30 mg/dL    eGFR 20 (L) >60 mL/min/1.73m*2    Calcium 7.4 (L) 8.6 - 10.3 mg/dL    Phosphorus 2.6 2.5 - 4.9 mg/dL    Albumin 2.2 (L) 3.4 - 5.0 g/dL         Assessment/Plan     76yo CM with medical history of stage IV malignant melanoma on immunotherapy (Ipi-Jose Martin, last cycle of chemo was about one week ago), IDDM-type II, CKD, HTN, SSS s/p PPM complicated by infected leads, BPH with obstructive uropathy requiring self catheterizations. Who presented on account of intractable vomiting and repeated vomiting.     #ICI induced diarrhea  irAE  :: patient currently on ipi-Jose Martin for stage IV malignant melanoma,   - grade 3/4 diarrhea  - stool pathogen, cdiff negative  - CT not indicative of diverticulitis or SBO  PLAN  hold chemo in the setting of recent intractible nausea, vomiting and diarrhea  continue supportive managment with IVFs and entiemetic  fecal calprotectin  continue Loperamide 2 to 4 mg every four hours as needed, not to exceed 16 mg in 24 hours  consider starting methylprednisolon 1-2mg/kg  daily x 5 days with goal is to have <4stools per day with resolution of abdominal pain,and tolerance of oral intake.   Continue to replete electrolytes    -Thank for consulting heme onc, we will continue to follow. The above plan was discussed with fadia Onc Attending Dr Springer who is in agreement.   -Please epic chat for any concerns/questions.       Zhen ESPINOSA  MD [facility]  Internal Medicine  PGY 3

## 2024-07-18 NOTE — CARE PLAN
The patient's goals for the shift include      The clinical goals for the shift include patient remain hemodynamically stable during shift      Problem: Skin  Goal: Prevent/manage excess moisture  Outcome: Progressing  Flowsheets (Taken 7/17/2024 2255)  Prevent/manage excess moisture: Cleanse incontinence/protect with barrier cream     Problem: Skin  Goal: Prevent/minimize sheer/friction injuries  Outcome: Progressing  Flowsheets (Taken 7/17/2024 2255)  Prevent/minimize sheer/friction injuries: Turn/reposition every 2 hours/use positioning/transfer devices

## 2024-07-18 NOTE — PROGRESS NOTES
Physical Therapy    Physical Therapy Evaluation    Patient Name: Chester Verduzco  MRN: [id number]  Today's Date: 7/18/2024   Time Calculation  Start Time: 1420  Stop Time: 1447  Time Calculation (min): 27 min    Assessment/Plan   PT Assessment  PT Assessment Results: Decreased strength, Decreased range of motion, Decreased endurance, Impaired balance, Decreased mobility  Rehab Prognosis: Good  Evaluation/Treatment Tolerance: Patient tolerated treatment well  Medical Staff Made Aware: Yes  Strengths: Ability to acquire knowledge, Attitude of self, Coping skills, Housing layout, Insight into problems, Premorbid level of function, Support of Caregivers, Rehab experience  Barriers to Participation: Comorbidities  End of Session Communication: Bedside nurse  Assessment Comment: Pt. presents with generalized weaknes which is affecting his balance and endurance for activity and his need for 2 person assist to move sit/stand safely.  Pt. is at increased risk for falls at this time.  Recommend MODERATE INTENSITY PT INTERVENTION at discharge and PT intervention during his acute LOS.  End of Session Patient Position:  (Seated EOB with tray table, call bell, and phone in reach.  Alarm on.  RN aware and in agreement.)  IP OR SWING BED PT PLAN  Inpatient or Swing Bed: Inpatient  PT Plan  Treatment/Interventions: Bed mobility, Transfer training, Gait training, Stair training, Strengthening, Endurance training  PT Plan: Ongoing PT  PT Frequency: 3 times per week  PT Discharge Recommendations: Moderate intensity level of continued care  Equipment Recommended upon Discharge: Wheeled walker (pt. owns)  PT Recommended Transfer Status: Assist x2 (for all transfers)  PT - OK to Discharge: Yes      Subjective   General Visit Information:  General  Reason for Referral: 73 yo male admit vomiting/diarrhea; possibly d/t immunotherapy tx.  Referred to PT for impaired mobility  Past Medical History Relevant to Rehab: PMH:history of malignant  melanoma currently on immunotherapy, diabetes mellitus, and hypertension  Family/Caregiver Present: No  Co-Treatment: OT  Co-Treatment Reason: to maximize safety and mobility  Prior to Session Communication: Bedside nurse  Patient Position Received: Bed, 3 rail up, Alarm on  General Comment: Pleasant, cooperative, very informative on past hospital/rehab/home therapies.  Aware of abilities/limitations.  Agreeable to participate in therapy.  IV, tele, room air  Home Living:  Home Living  Type of Home: House  Lives With: Alone  Home Adaptive Equipment: Walker rolling or standard, Wheelchair-manual (adjustable twin bed, ramp for transport to MD appts, BS)  Home Layout: One level  Home Access: Stairs to enter without rails  Entrance Stairs-Rails: None  Entrance Stairs-Number of Steps: 2 curb type steps, uses walker to access  Home Living Comments: Pt. reports having 2 curb type steps to a ramp.  Prior Level of Function:  Prior Function Per Pt/Caregiver Report  Level of Weld: Independent with ADLs and functional transfers (Independent with household ambulation via walker.  Assist with some IADL tasks (lawn care))  Receives Help From: Friends  ADL Assistance: Independent  Homemaking Assistance: Independent  Ambulatory Assistance: Independent (with FWW household distances; wheelchair for accessing vehicle to MD rodriguezts)  Vocational: Retired (RN)  Prior Function Comments: Pt. reports ambulating up to 50 ft in his home via FWW; mainly homebound  Precautions:  Precautions  Medical Precautions: Fall precautions (h/o 2 falls at home since returning home in February 2024 from rehab stay)      Objective   Pain:  Pain Assessment  Pain Assessment: 0-10  0-10 (Numeric) Pain Score: 0 - No pain  Cognition:  Cognition  Overall Cognitive Status: Within Functional Limits  Orientation Level: Oriented X4  Attention: Within Functional Limits  Memory: Within Funtional Limits  Problem Solving: Within Functional Limits  Safety/Judgement:  Within Functional Limits  Insight: Within function limits  Processing Speed: Within funtional limits    General Assessments:     Activity Tolerance  Endurance: Tolerates 10 - 20 min exercise with multiple rests  Early Mobility/Exercise Safety Screen: Proceed with mobilization - No exclusion criteria met    Sensation  Sensation Comment: reports neuropathy in BLE and B hands    Strength  Strength Comments: BLE generalized weakness grossly 4-/5 hip/knees - observed during mobility, fatigued with ambulation, unable to move sit/stand without assist x2 persons  Static Sitting Balance  Static Sitting-Balance Support: Feet supported, No upper extremity supported  Static Sitting-Level of Assistance: Independent  Static Sitting-Comment/Number of Minutes: >10 minutes  Dynamic Sitting Balance  Dynamic Sitting-Balance Support: No upper extremity supported, Feet supported  Dynamic Sitting-Balance: Reaching for objects, Reaching across midline (reaching to don socks)  Dynamic Sitting-Comments: independent    Static Standing Balance  Static Standing-Balance Support: Bilateral upper extremity supported  Static Standing-Level of Assistance: Contact guard  Static Standing-Comment/Number of Minutes: using FWW  Dynamic Standing Balance  Dynamic Standing-Balance Support: Bilateral upper extremity supported  Dynamic Standing-Balance: Turning  Dynamic Standing-Comments: with FWW and CGA  Functional Assessments:  Bed Mobility  Bed Mobility: Yes  Bed Mobility 1  Bed Mobility 1: Supine to sitting  Level of Assistance 1: Distant supervision  Bed Mobility Comments 1: HOB slightly elevated to simulate home, use of bed rail, increased time and effort d/t overall fatigue and weakness    Transfers  Transfer: Yes  Transfer 1  Transfer From 1: Bed to  Transfer to 1: Stand  Technique 1: Sit to stand  Transfer Device 1: Walker  Transfer Level of Assistance 1:  (attempt with MOD Assist x 1 - pt. unable to achieve fully upright standing position, with  uncontrolled descent to sitting)  Transfers 2  Transfer From 2: Bed to  Transfer to 2: Stand  Technique 2: Sit to stand, Stand to sit  Transfer Device 2: Walker  Transfer Level of Assistance 2: Moderate assistance (x 2 person assist to achieve fully standing position)  Trials/Comments 2: mildly unsteady upon standing needing CGA    Ambulation/Gait Training  Ambulation/Gait Training Performed: Yes  Ambulation/Gait Training 1  Surface 1: Level tile  Device 1: Rolling walker  Assistance 1: Minimum assistance (x1 person assist with assist of 2nd person to manage IV pole)  Quality of Gait 1: Narrow base of support, Diminished heel strike, Inconsistent stride length, Decreased step length (heavily reliant on walker for stability, soft knees, fatigued with activity)  Comments/Distance (ft) 1: 15 ft with complete 180 degree turn in room and turn to approach bed    Stairs  Stairs: No  Extremity/Trunk Assessments:  RUE   RUE :  (limited overhead shld motion)  LUE   LUE: Within Functional Limits  RLE   RLE : Within Functional Limits  LLE   LLE : Within Functional Limits  Outcome Measures:  [facility] Basic Mobility  Turning from your back to your side while in a flat bed without using bedrails: A little  Moving from lying on your back to sitting on the side of a flat bed without using bedrails: A little  Moving to and from bed to chair (including a wheelchair): Total  Standing up from a chair using your arms (e.g. wheelchair or bedside chair): Total  To walk in hospital room: A little  Climbing 3-5 steps with railing: Total  Basic Mobility - Total Score: 12    Encounter Problems       Encounter Problems (Active)       Balance       STG - Maintains static standing balance with upper extremity support using FWW x 3 min INDEPENDENTLY       Start:  07/18/24    Expected End:  08/01/24       INTERVENTIONS:  1. Practice standing with minimal support.  2. Educate patient about standing tolerance.  3. Educate patient about independence with  gait, transfers, and ADL's.  4. Educate patient about use of assistive device.  5. Educate patient about self-directed care.            Mobility       STG - Patient will ambulate >30 ft via FWW managing directional changes INDEPENDENTLY       Start:  07/18/24    Expected End:  08/01/24               PT Transfers       STG - Transfer from bed to chair INDEPENDENTLY via FWW       Start:  07/18/24    Expected End:  08/01/24            STG - Patient to transfer to and from sit to supine INDEPENDENTLY with adjustable bed       Start:  07/18/24    Expected End:  08/01/24            STG - Patient will transfer sit to and from stand INDEPENDENTLY via FWW       Start:  07/18/24    Expected End:  08/01/24                   Education Documentation  Mobility Training, taught by Mara Walker, PT at 7/18/2024  3:14 PM.  Learner: Patient  Readiness: Acceptance  Method: Explanation  Response: Verbalizes Understanding    Education Comments  No comments found.

## 2024-07-18 NOTE — PROGRESS NOTES
Chester Verduzco is a 74 y.o. male on day 1 of admission presenting with Intractable nausea and vomiting.      Subjective   Patient was seen and examined today.  Continues to have severe diarrhea.  Did not like rectal tube and discontinued.  Continues to be on sodium bicarb infusion.  Worsening kidney function.  Declining urine output.  Patient is aware of worsening kidney function and he is open for dialysis as needed       Objective          Vitals 24HR  Heart Rate:  [68-76]   Temp:  [35.7 °C (96.3 °F)-36.3 °C (97.3 °F)]   Resp:  [18-20]   BP: ()/(52-67)   SpO2:  [93 %-98 %]     General appearance: no distress awake and alert on room air, no significant hypervolemia exam  Eyes: non-icteric  HEENT: atrumatic head, PEERLA, dry mucosa  Skin: no apparent rash  Heart: NSR, S1, S2 normal, no murmur or gallop  Lungs: Symmetrical expansion,CTA bilat no wheezing/crackles  Abdomen: Obese, distended  Extremities: 1+ of pitting edema bilateral-chronic  Neuro: No FND,asterixis, no focal deficits noticed         Intake/Output last 3 Shifts:    Intake/Output Summary (Last 24 hours) at 7/18/2024 1141  Last data filed at 7/18/2024 0443  Gross per 24 hour   Intake 938.33 ml   Output 400 ml   Net 538.33 ml       RFP  Recent Labs     07/18/24  0645 07/17/24  0625 07/16/24  0611 07/15/24  2336 07/15/24  1558 07/11/24  0948 06/20/24  0944 05/30/24  0937 05/07/24  0940 03/11/24  1000 12/03/23  0457 12/02/23  0814 12/01/23  0653 11/30/23  0634 11/29/23  1010 11/28/23  0807    144 141  --  138 136 139 137 138   < > 138 138 139 140 140 138   K 2.8* 3.2* 3.2* 2.7* 2.6* 3.4* 3.9 3.8 3.8   < > 3.5 3.4* 3.6 3.7 4.0 3.9   * 122* 117*  --  107 109* 108* 103 106   < > 106 105 106 106 107 107   CO2 14* 12* 15*  --  18* 18* 23 26 25   < > 26 23 23 23 24 21   BUN 36* 34* 34*  --  33* 25* 19 16 19   < > 20 20 22 25* 25* 27*   CREATININE 3.10* 2.76* 2.54*  --  2.55* 1.94* 1.67* 1.95* 1.63*   < > 2.37* 2.45* 2.71* 2.69* 2.84* 3.10*    GLUCOSE 106* 108* 86  --  102* 104* 78 115* 79   < > 73* 78 83 86 78 77   CALCIUM 7.4* 7.4* 7.3*  --  8.2* 8.3* 9.2 9.4 8.7   < > 8.2* 8.2* 8.1* 8.3* 8.3* 8.2*   ALBUMIN 2.2* 2.3*  --   --  3.0* 3.1* 3.7 3.6 3.3*   < > 3.0* 3.1* 3.0* 2.9* 2.7* 2.8*   PHOS 2.6  --   --   --   --   --   --   --   --   --  4.4 4.2 4.7 4.6 4.7 4.6   EGFR 20* 24* 26*  --  26* 36* 43* 36* 44*   < > 28* 27* 24* 24* 23* 21*   ANIONGAP 13 13 12  --  16 12 12 12 11   < > 10 13 14 15 13 14    < > = values in this interval not displayed.        Urineanalysis  Recent Labs     07/16/24  0848 07/03/24  1600 06/05/24  1610 04/05/24  1200 11/13/23  1940 10/02/23  1414 09/18/23  1946 04/27/18  1445   COLORU Light-Orange* Light-Yellow Yellow Yellow Yellow Iban* IBAN IBAN   APPEARANCEU Turbid* Turbid* Turbid* Hazy* Hazy* Hazy* HAZY HAZY   SPECGRAVU 1.018 1.015 1.015 1.014 1.011 1.014 1.024 1.024   SHAHID 6.0 5.0 5.5 5.0 5.0 5.0 5.0 5.0   PROTUR 100 (2+)* 30 (1+)* 20 (TRACE) NEGATIVE 100 (2+)* 30 (1+)* 100(2+)* 30 (1+)*   GLUCOSEU Normal Normal Normal 150 (2+)* NEGATIVE NEGATIVE 50(1+)* NEGATIVE   BLOODU 0.1 (1+)* 0.03 (TRACE)* 0.06 (1+)* MODERATE (2+)* LARGE (3+)* LARGE (3+)* SMALL(1+)* SMALL (1+)*   KETONESU NEGATIVE NEGATIVE NEGATIVE NEGATIVE NEGATIVE NEGATIVE NEGATIVE NEGATIVE   BILIRUBINU NEGATIVE NEGATIVE NEGATIVE NEGATIVE NEGATIVE NEGATIVE NEGATIVE NEGATIVE   NITRITEU 1+* NEGATIVE NEGATIVE NEGATIVE NEGATIVE NEGATIVE NEGATIVE POSITIVE*   LEUKOCYTESU 500 [place]/µL* 500 [place]/µL* 500 [place]/µL* MODERATE (2+)* MODERATE (2+)* SMALL (1+)* TRACE* LARGE (3+)*       Urine Electrolytes  Recent Labs     07/18/24  0033 07/16/24  0848 07/03/24  1600 06/05/24  1610 04/05/24  1200 11/13/23  1940 10/04/23  1249 10/03/23  1812 10/02/23  1414 09/18/23  1946 10/04/22  1338 08/14/19  1221 04/27/18  1445   SODIUMURR 16 23  --   --   --  38  --  11  --   --   --   --   --    NACREATUR 10 13  --   --   --  60  --  10  --   --   --   --   --    KUR 19 22  --   --   --  18  --  " 18  --   --   --   --   --    KCREATUR 12 13  --   --   --  29  --  16  --   --   --   --   --    CREATU 164.5 180.5  174.4  180.5  --   --   --  63.1 73.8 112.4  --   --  184.0 137.0  --    PROTUR  --  100 (2+)* 30 (1+)* 20 (TRACE) NEGATIVE 100 (2+)*  --   --  30 (1+)* 100(2+)*  --   --  30 (1+)*   UTPCR  --  1.00*  --   --   --   --   --   --   --   --   --   --   --    ALBUMINUR  --  122.0  --   --   --   --  105.8  --   --   --   --   --   --    MICROALBCREA  --  67.6  --   --   --   --  143.4*  --   --   --  52.0* 20.0  --         Urine Micro  Recent Labs     07/16/24  0848 07/03/24  1600 06/05/24  1610 04/05/24  1200 11/13/23  1940 10/02/23  1414 09/18/23  1946 10/31/22  1534 04/27/18  1445   WBCU >50* >50* >50* >50* 11-20* >50* 25* 178* >182*   RBCU 11-20* 6-10* 11-20* 6-10* >20* >20* 3 3 5   HYALCASTU 3+*  --   --   --   --   --  3+*  --   --    SQUAMEPIU  --  1-9 (SPARSE)  --   --   --   --  <1  --  1   BACTERIAU 1+*  --  1+* 1+* 1+* 1+* 1+* 3+* 1+*   MUCUSU FEW FEW FEW FEW  --   --  2+ 4+ 4+        Iron  Recent Labs     10/04/23  0622   IRON 19*   TIBC 129*   IRONSAT 15*       @Mg@    Lab Results   Component Value Date    WBC 8.4 07/18/2024    HGB 11.5 (L) 07/18/2024    HCT 34.7 (L) 07/18/2024    MCV 84 07/18/2024     (L) 07/18/2024       No results found for: \"CKTOTAL\", \"CKMB\", \"CKMBINDEX\", \"TROPONINI\"    Albumin/Creatinine Ratio   Date Value Ref Range Status   07/16/2024 67.6 ug/mg Creat Final   10/04/2023 143.4 (H) <30.0 ug/mg Creat Final     Albumin/Creatine Ratio   Date Value Ref Range Status   10/04/2022 52.0 (H) 0.0 - 30.0 ug/mg crt Final     amLODIPine, 10 mg, oral, Daily  atorvastatin, 20 mg, oral, Daily  colesevelam, 3,750 mg, oral, Daily  docusate sodium, 100 mg, oral, BID  DULoxetine, 60 mg, oral, Daily  heparin (porcine), 5,000 Units, subcutaneous, q8h  insulin degludec, 10 Units, subcutaneous, Nightly  insulin lispro, 0-10 Units, subcutaneous, q6h  methylPREDNISolone sodium " succinate (PF), 93.75 mg, intravenous, q24h  metoprolol succinate XL, 25 mg, oral, Daily  polyethylene glycol, 17 g, oral, Daily  potassium chloride, 20 mEq, intravenous, q2h  potassium chloride CR, 40 mEq, oral, TID  tamsulosin, 0.8 mg, oral, Nightly             Assessment/Plan        Chester Verduzco is a 74 y.o. male with history of malignant melanoma currently on immunotherapy-last dose a week ago, diabetes mellitus, BPH, chronic urinary tension and hypertension presenting with vomiting and diarrhea with concern about ICP induced colitis.  Nephrology was consulted to manage worsening kidney function and not responding to IV fluid     Principal Problem:    Intractable nausea and vomiting  Active Problems:    Nausea and vomiting, unspecified vomiting type     Impression  # Chronic kidney disease stage III baseline serum creatinine 1.5-2.  Possible atherosclerotic cardiovascular disease.  Patient had history of acute kidney injury in November 2024 thought to be infection related however workup was negative.  Serum creatinine peaked at that time up to 4.  Later on serum creatinine improved down to baseline     # Nonoliguric acute kidney injury with serum creatinine on admission 2.5 worsened to 2.7 on the day of consult-continuously worsening and now oliguric.  -Differential diagnosis broad at this time.  Per history, this is more likely prerenal injury.  Your electrolytes are consistent with prerenal.  However, given the fact that he is on immunotherapy, urine analysis significant for 2+ albumin, RBCs, WBCs-acute interstitial nephritis cannot be ruled out without biopsy.  Other differential diagnoses include UTI, urinary retention given his prior history of self-catheterization-ruled out urine retention by kidney image however signs of chronic outlet obstruction where noticed     # Malignant melanoma-on immunotherapy with reported different adverse events including this presentation was diarrhea     # None anion  gap metabolic acidosis possibly due to kidney failure currently on sodium bicarbonate     # Iron deficiency anemia     # Hypoalbuminemia?  Malnutrition versus nephrotic     # Severe hypokalemia possibly due to diarrhea     # Hypertension-blood pressures running on the lower side-currently on amlodipine 10 mg     Recommendation plan  -Continue IV fluid resuscitation with sodium bicarb and to address metabolic acidosis.  -Continue aggressive repletion of potassium as you are doing  -GI team/oncology team recommend starting steroid.  No objection from kidney standpoint.  Will defer kidney biopsy at this time as most likely will not   -While he is on immunosuppression-I recommend against PPI use if any due to increased risk of kidney toxicity with the combined therapy  -Start p.o. iron daily  -Continue tamsulosin/Flomax  -Patient is agreeable to start dialysis as needed     Plan was discussed with primary team     Miguelangel Isaac MD      Principal Problem:    Intractable nausea and vomiting  Active Problems:    Nausea and vomiting, unspecified vomiting type        Miguelangel Isaac MD

## 2024-07-18 NOTE — PROGRESS NOTES
Chester Verduzco is a 74 y.o. male on day 1 of admission presenting with Intractable nausea and vomiting.      Subjective   Patient resting in bed, still with nausea and diarrhea. Attempted FMS overnight but patient requested removal. Patient reports he just feels sick today and denies any improvement.       Objective     Last Recorded Vitals  /62   Pulse 65   Temp (!) 5.9 °C (42.6 °F) (Temporal)   Resp 18   Wt 94.4 kg (208 lb 1.6 oz)   SpO2 95%   Intake/Output last 3 Shifts:    Intake/Output Summary (Last 24 hours) at 7/18/2024 1746  Last data filed at 7/18/2024 1343  Gross per 24 hour   Intake 2147.08 ml   Output 400 ml   Net 1747.08 ml       Admission Weight  Weight: 99.8 kg (220 lb) (07/15/24 1538)    Daily Weight  07/15/24 : 94.4 kg (208 lb 1.6 oz)          Physical Exam  Constitutional: alert and oriented x 3, awake, cooperative, no acute distress but acutely ill appearing  Skin: warm and dry  Head/Neck: Normocephalic, atraumatic  Eyes: clear sclera  ENMT: mucous membranes dry  Cardio: Regular rate and rhythm  Resp: CTA bilaterally, good respiratory effort  Gastrointestinal: Soft, nontender, distended at baseline  Musculoskeletal: generalized weakness  Neuro: alert and oriented x 3  Psychological: Appropriate mood and behavior    Relevant Results  Scheduled medications  amLODIPine, 10 mg, oral, Daily  atorvastatin, 20 mg, oral, Daily  colesevelam, 3,750 mg, oral, Daily  docusate sodium, 100 mg, oral, BID  DULoxetine, 60 mg, oral, Daily  heparin (porcine), 5,000 Units, subcutaneous, q8h  insulin degludec, 10 Units, subcutaneous, Nightly  insulin lispro, 0-10 Units, subcutaneous, q6h  methylPREDNISolone sodium succinate (PF), 93.75 mg, intravenous, q24h  metoprolol succinate XL, 25 mg, oral, Daily  polyethylene glycol, 17 g, oral, Daily  potassium chloride CR, 40 mEq, oral, TID  tamsulosin, 0.8 mg, oral, Nightly      Continuous medications  sodium bicarbonate 150 mEq in dextrose 5% 1,150 mL  infusion, 75 mL/hr, Last Rate: 75 mL/hr (07/18/24 1343)      PRN medications  PRN medications: acetaminophen **OR** acetaminophen **OR** acetaminophen, alum-mag hydroxide-simeth, calcium carbonate, dextromethorphan-guaifenesin, dextrose, dextrose, diphenoxylate-atropine, glucagon, glucagon, guaiFENesin, ondansetron **OR** ondansetron    Results for orders placed or performed during the hospital encounter of 07/15/24 (from the past 24 hour(s))   POCT GLUCOSE   Result Value Ref Range    POCT Glucose 130 (H) 74 - 99 mg/dL   POCT GLUCOSE   Result Value Ref Range    POCT Glucose 141 (H) 74 - 99 mg/dL   POCT GLUCOSE   Result Value Ref Range    POCT Glucose 134 (H) 74 - 99 mg/dL   Electrolyte Panel, Urine   Result Value Ref Range    Sodium, Urine Random 16 mmol/L    Sodium/Creatinine Ratio 10 Not established. mmol/g Creat    Potassium, Urine Random 19 mmol/L    Potassium/Creatinine Ratio 12 Not established mmol/g Creat    Chloride, Urine Random 45 mmol/L    Chloride/Creatinine Ratio 27 23 - 275 mmol/g creat    Creatinine, Urine Random 164.5 20.0 - 370.0 mg/dL   POCT GLUCOSE   Result Value Ref Range    POCT Glucose 104 (H) 74 - 99 mg/dL   CBC   Result Value Ref Range    WBC 8.4 4.4 - 11.3 x10*3/uL    nRBC 0.0 0.0 - 0.0 /100 WBCs    RBC 4.14 (L) 4.50 - 5.90 x10*6/uL    Hemoglobin 11.5 (L) 13.5 - 17.5 g/dL    Hematocrit 34.7 (L) 41.0 - 52.0 %    MCV 84 80 - 100 fL    MCH 27.8 26.0 - 34.0 pg    MCHC 33.1 32.0 - 36.0 g/dL    RDW 17.8 (H) 11.5 - 14.5 %    Platelets 108 (L) 150 - 450 x10*3/uL   Renal Function Panel   Result Value Ref Range    Glucose 106 (H) 74 - 99 mg/dL    Sodium 145 136 - 145 mmol/L    Potassium 2.8 (LL) 3.5 - 5.3 mmol/L    Chloride 121 (H) 98 - 107 mmol/L    Bicarbonate 14 (L) 21 - 32 mmol/L    Anion Gap 13 10 - 20 mmol/L    Urea Nitrogen 36 (H) 6 - 23 mg/dL    Creatinine 3.10 (H) 0.50 - 1.30 mg/dL    eGFR 20 (L) >60 mL/min/1.73m*2    Calcium 7.4 (L) 8.6 - 10.3 mg/dL    Phosphorus 2.6 2.5 - 4.9 mg/dL     Albumin 2.2 (L) 3.4 - 5.0 g/dL   POCT GLUCOSE   Result Value Ref Range    POCT Glucose 84 74 - 99 mg/dL     US renal complete    Result Date: 7/17/2024  Interpreted By:  Connie Victoria, STUDY: US RENAL COMPLETE;  7/17/2024 2:06 pm   INDICATION: Signs/Symptoms:worsening kidney function.   COMPARISON: 11/13/2023   ACCESSION NUMBER(S): [id number]   ORDERING CLINICIAN: JACQUELIN WALTER   TECHNIQUE: Multiple images of the kidneys were obtained  with grayscale and color Doppler imaging.   FINDINGS: RIGHT KIDNEY: The right kidney measures 10.4 cm in length. There is suggestion of mildly increased renal cortical echogenicity with mild cortical thinning measuring up to 9 mm. No hydronephrosis is present; no evidence of nephrolithiasis.   LEFT KIDNEY: The left kidney measures 11.7 cm in length. Suggestion of mild thinning of renal cortex with mildly increased renal cortical echogenicity. There is persistent evidence of an exophytic hypodense lesion in the superior pole of left kidney measuring up to 7.3 cm with through transmission and without internal complexity or vascularity. This is suggestive of a simple cyst. No hydronephrosis is present; no evidence of nephrolithiasis.   BLADDER: Urinary bladder is mildly distended. There is circumferential wall thickening with trabeculated morphology of the urinary bladder. Prostate gland is enlarged with median lobe hypertrophy indenting on the base of the bladder.       1. Mild bilateral renal cortical thinning with increased renal cortical echogenicity, which can be associated with medical renal disease in appropriate clinical setting. 2. Stable left renal cyst. No hydronephrosis. 3. Prostatomegaly. 4. Circumferential wall thickening with trabeculated morphology of the urinary bladder, which could be related to chronic bladder outlet obstruction in appropriate clinical setting.   MACRO: None   Signed by: Connie Victoria 7/17/2024 2:57 PM Dictation workstation:   [id number]        Assessment/Plan   74yo CM with PMH of malignant melanoma on immunotherapy, IDDM-type II, CKD, HTN, SSS s/p PPM complicated by infected leads      NOHELIA on CKD, worsened  - initially thought to be prerenal but now concerned of AIN as well  - renal ultrasound showed mild bilateral cortical thickening but no hydronephrosis  - nephro following, continue bicarb drip, aggressive potassium repletion(pt not able to tolerate PO)  - continue to check daily     Acute diarrhea and vomiting  - likely secondary to immunotherapy  - Cdiff and stool pathogen both negative  - GI consulted, rec calprotectin and fecal electrolytes  - onc consulted, rec high dose steroids x5days  - continue lomotil/imodium PRN  - continue supportive care    Hypokalemia  - likely secondary to above  - replete and recheck    Hypertension  - continue home amlodipine, metoprolol, statin   - hold home lasix, losartan in setting of above    Malignant melanoma  - on immunotherapy  - consider onc consult if no improvement and if cultures negative    DVT Proph: heparin subQ    Dispo: Patient requires close inpatient monitoring in setting of worsening NOHELIA on CKD requiring further workup and persistent diarrhea, no plans for discharge at this time.           Amberly Philip DO

## 2024-07-18 NOTE — CARE PLAN
The patient's goals for the shift include  pt will remain safe and free of falls.    The clinical goals for the shift include pt will tolerate FMS throughout shift    Over the shift, the patient was not able to tolerate the FMS. Pt requested it to be removed. Pt up with PT, walking in room and sitting at edge of bed. Tolerated well. Pt remained safe throughout shift with bed in lowest position and call light within reach. Pt in pleasant mood.    Problem: Skin  Goal: Prevent/manage excess moisture  Outcome: Progressing  Goal: Prevent/minimize sheer/friction injuries  Outcome: Progressing  Goal: Promote/optimize nutrition  Outcome: Progressing

## 2024-07-19 LAB
ALBUMIN SERPL BCP-MCNC: 2.2 G/DL (ref 3.4–5)
ALP SERPL-CCNC: 93 U/L (ref 33–136)
ALT SERPL W P-5'-P-CCNC: 9 U/L (ref 10–52)
ANION GAP SERPL CALC-SCNC: 12 MMOL/L (ref 10–20)
AST SERPL W P-5'-P-CCNC: 16 U/L (ref 9–39)
BACTERIA UR CULT: ABNORMAL
BILIRUB DIRECT SERPL-MCNC: 0.1 MG/DL (ref 0–0.3)
BILIRUB SERPL-MCNC: 0.4 MG/DL (ref 0–1.2)
BUN SERPL-MCNC: 44 MG/DL (ref 6–23)
CALCIUM SERPL-MCNC: 7.4 MG/DL (ref 8.6–10.3)
CHLORIDE SERPL-SCNC: 119 MMOL/L (ref 98–107)
CO2 SERPL-SCNC: 17 MMOL/L (ref 21–32)
CREAT SERPL-MCNC: 3.85 MG/DL (ref 0.5–1.3)
EGFRCR SERPLBLD CKD-EPI 2021: 16 ML/MIN/1.73M*2
ERYTHROCYTE [DISTWIDTH] IN BLOOD BY AUTOMATED COUNT: 17.9 % (ref 11.5–14.5)
GLUCOSE BLD MANUAL STRIP-MCNC: 114 MG/DL (ref 74–99)
GLUCOSE BLD MANUAL STRIP-MCNC: 126 MG/DL (ref 74–99)
GLUCOSE BLD MANUAL STRIP-MCNC: 162 MG/DL (ref 74–99)
GLUCOSE BLD MANUAL STRIP-MCNC: 75 MG/DL (ref 74–99)
GLUCOSE BLD MANUAL STRIP-MCNC: 79 MG/DL (ref 74–99)
GLUCOSE BLD MANUAL STRIP-MCNC: 93 MG/DL (ref 74–99)
GLUCOSE BLD MANUAL STRIP-MCNC: 94 MG/DL (ref 74–99)
GLUCOSE BLD MANUAL STRIP-MCNC: 96 MG/DL (ref 74–99)
GLUCOSE SERPL-MCNC: 98 MG/DL (ref 74–99)
HCT VFR BLD AUTO: 34 % (ref 41–52)
HGB BLD-MCNC: 11.3 G/DL (ref 13.5–17.5)
MCH RBC QN AUTO: 27.8 PG (ref 26–34)
MCHC RBC AUTO-ENTMCNC: 33.2 G/DL (ref 32–36)
MCV RBC AUTO: 84 FL (ref 80–100)
NRBC BLD-RTO: 0.3 /100 WBCS (ref 0–0)
PHOSPHATE SERPL-MCNC: 2.3 MG/DL (ref 2.5–4.9)
PLATELET # BLD AUTO: 94 X10*3/UL (ref 150–450)
POTASSIUM SERPL-SCNC: 3.1 MMOL/L (ref 3.5–5.3)
PROT SERPL-MCNC: 4.5 G/DL (ref 6.4–8.2)
RBC # BLD AUTO: 4.06 X10*6/UL (ref 4.5–5.9)
SODIUM SERPL-SCNC: 145 MMOL/L (ref 136–145)
WBC # BLD AUTO: 9 X10*3/UL (ref 4.4–11.3)

## 2024-07-19 PROCEDURE — 80076 HEPATIC FUNCTION PANEL: CPT | Performed by: FAMILY MEDICINE

## 2024-07-19 PROCEDURE — 84100 ASSAY OF PHOSPHORUS: CPT | Performed by: FAMILY MEDICINE

## 2024-07-19 PROCEDURE — 2500000004 HC RX 250 GENERAL PHARMACY W/ HCPCS (ALT 636 FOR OP/ED): Performed by: FAMILY MEDICINE

## 2024-07-19 PROCEDURE — 85027 COMPLETE CBC AUTOMATED: CPT | Performed by: FAMILY MEDICINE

## 2024-07-19 PROCEDURE — 2500000005 HC RX 250 GENERAL PHARMACY W/O HCPCS: Performed by: FAMILY MEDICINE

## 2024-07-19 PROCEDURE — 2500000002 HC RX 250 W HCPCS SELF ADMINISTERED DRUGS (ALT 637 FOR MEDICARE OP, ALT 636 FOR OP/ED): Performed by: INTERNAL MEDICINE

## 2024-07-19 PROCEDURE — 87086 URINE CULTURE/COLONY COUNT: CPT | Mod: GEALAB | Performed by: FAMILY MEDICINE

## 2024-07-19 PROCEDURE — 36415 COLL VENOUS BLD VENIPUNCTURE: CPT | Performed by: FAMILY MEDICINE

## 2024-07-19 PROCEDURE — 1200000002 HC GENERAL ROOM WITH TELEMETRY DAILY

## 2024-07-19 PROCEDURE — 2500000004 HC RX 250 GENERAL PHARMACY W/ HCPCS (ALT 636 FOR OP/ED): Performed by: INTERNAL MEDICINE

## 2024-07-19 PROCEDURE — 99232 SBSQ HOSP IP/OBS MODERATE 35: CPT | Performed by: FAMILY MEDICINE

## 2024-07-19 PROCEDURE — 2500000002 HC RX 250 W HCPCS SELF ADMINISTERED DRUGS (ALT 637 FOR MEDICARE OP, ALT 636 FOR OP/ED): Performed by: FAMILY MEDICINE

## 2024-07-19 PROCEDURE — 82310 ASSAY OF CALCIUM: CPT | Performed by: FAMILY MEDICINE

## 2024-07-19 PROCEDURE — 2500000001 HC RX 250 WO HCPCS SELF ADMINISTERED DRUGS (ALT 637 FOR MEDICARE OP): Performed by: INTERNAL MEDICINE

## 2024-07-19 PROCEDURE — 99232 SBSQ HOSP IP/OBS MODERATE 35: CPT | Performed by: INTERNAL MEDICINE

## 2024-07-19 PROCEDURE — 82947 ASSAY GLUCOSE BLOOD QUANT: CPT

## 2024-07-19 PROCEDURE — 80053 COMPREHEN METABOLIC PANEL: CPT | Performed by: FAMILY MEDICINE

## 2024-07-19 ASSESSMENT — COGNITIVE AND FUNCTIONAL STATUS - GENERAL
PERSONAL GROOMING: A LITTLE
TURNING FROM BACK TO SIDE WHILE IN FLAT BAD: A LITTLE
MOBILITY SCORE: 17
MOVING TO AND FROM BED TO CHAIR: A LITTLE
MOBILITY SCORE: 17
DRESSING REGULAR LOWER BODY CLOTHING: A LITTLE
WALKING IN HOSPITAL ROOM: A LITTLE
DAILY ACTIVITIY SCORE: 19
DAILY ACTIVITIY SCORE: 19
TOILETING: A LITTLE
CLIMB 3 TO 5 STEPS WITH RAILING: A LOT
DRESSING REGULAR UPPER BODY CLOTHING: A LITTLE
MOVING FROM LYING ON BACK TO SITTING ON SIDE OF FLAT BED WITH BEDRAILS: A LITTLE
TOILETING: A LITTLE
CLIMB 3 TO 5 STEPS WITH RAILING: A LOT
PERSONAL GROOMING: A LITTLE
HELP NEEDED FOR BATHING: A LITTLE
MOVING TO AND FROM BED TO CHAIR: A LITTLE
STANDING UP FROM CHAIR USING ARMS: A LITTLE
TURNING FROM BACK TO SIDE WHILE IN FLAT BAD: A LITTLE
STANDING UP FROM CHAIR USING ARMS: A LITTLE
WALKING IN HOSPITAL ROOM: A LITTLE
DRESSING REGULAR LOWER BODY CLOTHING: A LITTLE
DRESSING REGULAR UPPER BODY CLOTHING: A LITTLE
MOVING FROM LYING ON BACK TO SITTING ON SIDE OF FLAT BED WITH BEDRAILS: A LITTLE
HELP NEEDED FOR BATHING: A LITTLE

## 2024-07-19 ASSESSMENT — PAIN SCALES - GENERAL
PAINLEVEL_OUTOF10: 0 - NO PAIN

## 2024-07-19 ASSESSMENT — PAIN - FUNCTIONAL ASSESSMENT
PAIN_FUNCTIONAL_ASSESSMENT: 0-10
PAIN_FUNCTIONAL_ASSESSMENT: 0-10

## 2024-07-19 NOTE — CARE PLAN
The patient's goals for the shift include  safety, decrease bm     The clinical goals for the shift include pt will tolerate FMS throughout shift    Problem: Skin  Goal: Prevent/manage excess moisture  Outcome: Progressing  Goal: Prevent/minimize sheer/friction injuries  7/18/2024 2307 by aGbriela Kellogg RN  Outcome: Progressing  7/18/2024 2307 by Gabriela Kellogg RN  Flowsheets (Taken 7/18/2024 2307)  Prevent/minimize sheer/friction injuries:   Use pull sheet   HOB 30 degrees or less   Turn/reposition every 2 hours/use positioning/transfer devices  Goal: Promote/optimize nutrition  Outcome: Progressing

## 2024-07-19 NOTE — PROGRESS NOTES
"  Subjective    Pt seen and examined at bedside. He still endorses loose stools but denies any abdominal pain. No other acute concerns or complaints.    ROS: 12 points review of system is negative except as stated in the HPI above.     Objective    Vitals  Visit Vitals  /58   Pulse 73   Temp 36 °C (96.8 °F) (Temporal)   Resp 18   Ht 1.88 m (6' 2\")   Wt 94.4 kg (208 lb 1.6 oz)   SpO2 99%   BMI 26.72 kg/m²   Smoking Status Never   BSA 2.22 m²       Physical Exam    --Vital signs reviewed  Constitutional: Appears stated age. In NAD.   HEENT: NC/AT, EOMI, clear sclera, moist mucous membranes  CV: RRR  PULM: CTAB  ABDOMEN: Soft, non tender, abdomen is distended, no rigidity   SKIN: Normal Color, Warm and dry  NEURO: A&O x 4, nonfocal neurological exam.  PSYCH: Normal Mood & Behavior      IOs    Intake/Output Summary (Last 24 hours) at 7/19/2024 1114  Last data filed at 7/19/2024 0613  Gross per 24 hour   Intake 2646.25 ml   Output 200 ml   Net 2446.25 ml       Labs:   Results from last 72 hours   Lab Units 07/19/24  0802 07/18/24  0645 07/17/24  0625   SODIUM mmol/L 145 145 144   POTASSIUM mmol/L 3.1* 2.8* 3.2*   CHLORIDE mmol/L 119* 121* 122*   CO2 mmol/L 17* 14* 12*   BUN mg/dL 44* 36* 34*   CREATININE mg/dL 3.85* 3.10* 2.76*   GLUCOSE mg/dL 98 106* 108*   CALCIUM mg/dL 7.4* 7.4* 7.4*   ANION GAP mmol/L 12 13 13   EGFR mL/min/1.73m*2 16* 20* 24*   PHOSPHORUS mg/dL 2.3* 2.6  --       Results from last 72 hours   Lab Units 07/19/24  0802 07/18/24  0645 07/17/24  0625   WBC AUTO x10*3/uL 9.0 8.4 7.6   HEMOGLOBIN g/dL 11.3* 11.5* 11.1*   HEMATOCRIT % 34.0* 34.7* 34.9*   PLATELETS AUTO x10*3/uL 94* 108* 113*      Lab Results   Component Value Date    CALCIUM 7.4 (L) 07/19/2024    PHOS 2.3 (L) 07/19/2024      Lab Results   Component Value Date    CRP 9.21 (H) 11/14/2023      [unfilled]     Micro/ID:   Susceptibility data from last 90 days.  Collected Specimen Info Organism Aztreonam Cefepime Ceftazidime " "Ciprofloxacin Levofloxacin Piperacillin/Tazobactam Tobramycin   06/05/24 Urine from Clean Catch/Voided Pseudomonas aeruginosa  S  S  S  S  S  S  S                    No lab exists for component: \"AGALPCRNB\"   .ID  Lab Results   Component Value Date    URINECULTURE (A) 07/18/2024     Multiple organisms present, probable contamination. Repeat culture if clinically indicated.    BLOODCULT No growth at 4 days -  FINAL REPORT 04/29/2024    BLOODCULT No growth at 4 days -  FINAL REPORT 04/29/2024       Images  US renal complete  Narrative: Interpreted By:  Connie Victoria,   STUDY:  US RENAL COMPLETE;  7/17/2024 2:06 pm      INDICATION:  Signs/Symptoms:worsening kidney function.      COMPARISON:  11/13/2023      ACCESSION NUMBER(S):  [id number]      ORDERING CLINICIAN:  JACQUELIN WALTER      TECHNIQUE:  Multiple images of the kidneys were obtained  with grayscale and  color Doppler imaging.      FINDINGS:  RIGHT KIDNEY:  The right kidney measures 10.4 cm in length. There is suggestion of  mildly increased renal cortical echogenicity with mild cortical  thinning measuring up to 9 mm. No hydronephrosis is present; no  evidence of nephrolithiasis.      LEFT KIDNEY:  The left kidney measures 11.7 cm in length. Suggestion of mild  thinning of renal cortex with mildly increased renal cortical  echogenicity. There is persistent evidence of an exophytic hypodense  lesion in the superior pole of left kidney measuring up to 7.3 cm  with through transmission and without internal complexity or  vascularity. This is suggestive of a simple cyst. No hydronephrosis  is present; no evidence of nephrolithiasis.      BLADDER:  Urinary bladder is mildly distended. There is circumferential wall  thickening with trabeculated morphology of the urinary bladder.  Prostate gland is enlarged with median lobe hypertrophy indenting on  the base of the bladder.      Impression: 1. Mild bilateral renal cortical thinning with increased " renal  cortical echogenicity, which can be associated with medical renal  disease in appropriate clinical setting.  2. Stable left renal cyst. No hydronephrosis.  3. Prostatomegaly.  4. Circumferential wall thickening with trabeculated morphology of  the urinary bladder, which could be related to chronic bladder outlet  obstruction in appropriate clinical setting.      MACRO:  None      Signed by: Connie Victoria 7/17/2024 2:57 PM  Dictation workstation:   [id number]      Meds  Scheduled medications  amLODIPine, 10 mg, oral, Daily  atorvastatin, 20 mg, oral, Daily  colesevelam, 3,750 mg, oral, Daily  docusate sodium, 100 mg, oral, BID  DULoxetine, 60 mg, oral, Daily  heparin (porcine), 5,000 Units, subcutaneous, q8h  insulin degludec, 10 Units, subcutaneous, Nightly  insulin lispro, 0-10 Units, subcutaneous, q6h  methylPREDNISolone sodium succinate (PF), 93.75 mg, intravenous, q24h  metoprolol succinate XL, 25 mg, oral, Daily  polyethylene glycol, 17 g, oral, Daily  potassium chloride CR, 40 mEq, oral, TID  tamsulosin, 0.8 mg, oral, Nightly      Continuous medications  sodium bicarbonate 150 mEq in dextrose 5% 1,150 mL infusion, 75 mL/hr, Last Rate: 75 mL/hr (07/19/24 0613)      PRN medications  PRN medications: acetaminophen **OR** acetaminophen **OR** acetaminophen, alum-mag hydroxide-simeth, calcium carbonate, dextromethorphan-guaifenesin, dextrose, dextrose, diphenoxylate-atropine, glucagon, glucagon, guaiFENesin, ondansetron **OR** ondansetron     Assessment and Plan    Chester Verduzco is a 74 y.o. male medical history of stage IV malignant melanoma on immunotherapy (Ipi-Jose Martin, last cycle of chemo was about one week ago), IDDM-type II, CKD, HTN, SSS s/p PPM complicated by infected leads, BPH with obstructive uropathy requiring self catheterizations who is admitted for intractable vomiting and multiple episodes of diarrhea.      # Diarrhea, suspected chemotherapy induced  - Patient currently on ipi-Jose Martin for  stage IV malignant melanoma. Last infusion on thrusday 7/11 with symptoms starting soon thereafter  - C. Diff negative, stool studies negative, less likely infectious etiology  - Hold chemotherapy given adverse effect of diarrhea and vomiting  - fecal caclprotectin pending  - Monitor and replete electrolytes as needed  - Can give loperamide PRN for diarrhea  - Continue supportive management per primary team     Thank for consulting Gastroenterology, we will sign off at this time. Please reach back out with any further questions. The above plan was discussed with the attending, Dr Sloan, who is in agreement.      Elizabeth Carl,   PGY-2    I saw and evaluated the patient. I personally obtained the key and critical portions of the history and physical exam or was physically present for key and critical portions performed by the resident. I reviewed the resident's documentation and discussed the patient with the resident. I agree with the resident's medical decision making as documented in the note.      Diarrhea likely related to immunotherapy.  So far no evidence of infection.  Fecal calprotectin still pending.  Solu-Medrol started as far heme-onc recommendation.  Slowly improving.  On Lomotil as needed.

## 2024-07-19 NOTE — PROGRESS NOTES
Rojo cath order placed in order to measure urine output secondary to low urine output.  Clay Lopez DO, 1:29 PM, 7/19/2024

## 2024-07-19 NOTE — PROGRESS NOTES
Chester Verduzco is a 74 y.o. male on day 2 of admission presenting with Intractable nausea and vomiting.      Subjective   Patient resting in bed, states he was able to keep liquids down overnight but otherwise not feeling any better still with diarrhea.       Objective     Last Recorded Vitals  /58   Pulse 73   Temp 36 °C (96.8 °F) (Temporal)   Resp 18   Wt 94.4 kg (208 lb 1.6 oz)   SpO2 99%   Intake/Output last 3 Shifts:    Intake/Output Summary (Last 24 hours) at 7/19/2024 1009  Last data filed at 7/19/2024 0613  Gross per 24 hour   Intake 2746.25 ml   Output 200 ml   Net 2546.25 ml       Admission Weight  Weight: 99.8 kg (220 lb) (07/15/24 1538)    Daily Weight  07/15/24 : 94.4 kg (208 lb 1.6 oz)          Physical Exam  Constitutional: alert and oriented x 3, awake, cooperative, no acute distress but acutely ill appearing  Skin: warm and dry  Head/Neck: Normocephalic, atraumatic  Eyes: clear sclera  ENMT: mucous membranes dry  Cardio: Regular rate and rhythm  Resp: CTA bilaterally, good respiratory effort  Gastrointestinal: Soft, nontender, distended at baseline  Musculoskeletal: generalized weakness  Neuro: alert and oriented x 3  Psychological: Appropriate mood and behavior    Relevant Results  Scheduled medications  amLODIPine, 10 mg, oral, Daily  atorvastatin, 20 mg, oral, Daily  colesevelam, 3,750 mg, oral, Daily  docusate sodium, 100 mg, oral, BID  DULoxetine, 60 mg, oral, Daily  heparin (porcine), 5,000 Units, subcutaneous, q8h  insulin degludec, 10 Units, subcutaneous, Nightly  insulin lispro, 0-10 Units, subcutaneous, q6h  methylPREDNISolone sodium succinate (PF), 93.75 mg, intravenous, q24h  metoprolol succinate XL, 25 mg, oral, Daily  polyethylene glycol, 17 g, oral, Daily  potassium chloride CR, 40 mEq, oral, TID  tamsulosin, 0.8 mg, oral, Nightly      Continuous medications  sodium bicarbonate 150 mEq in dextrose 5% 1,150 mL infusion, 75 mL/hr, Last Rate: 75 mL/hr (07/19/24  0613)      PRN medications  PRN medications: acetaminophen **OR** acetaminophen **OR** acetaminophen, alum-mag hydroxide-simeth, calcium carbonate, dextromethorphan-guaifenesin, dextrose, dextrose, diphenoxylate-atropine, glucagon, glucagon, guaiFENesin, ondansetron **OR** ondansetron    Results for orders placed or performed during the hospital encounter of 07/15/24 (from the past 24 hour(s))   POCT GLUCOSE   Result Value Ref Range    POCT Glucose 84 74 - 99 mg/dL   POCT GLUCOSE   Result Value Ref Range    POCT Glucose 114 (H) 74 - 99 mg/dL   POCT GLUCOSE   Result Value Ref Range    POCT Glucose 110 (H) 74 - 99 mg/dL   POCT GLUCOSE   Result Value Ref Range    POCT Glucose 114 (H) 74 - 99 mg/dL   POCT GLUCOSE   Result Value Ref Range    POCT Glucose 93 74 - 99 mg/dL   Hepatic Function Panel   Result Value Ref Range    Albumin 2.2 (L) 3.4 - 5.0 g/dL    Bilirubin, Total 0.4 0.0 - 1.2 mg/dL    Bilirubin, Direct 0.1 0.0 - 0.3 mg/dL    Alkaline Phosphatase 93 33 - 136 U/L    ALT 9 (L) 10 - 52 U/L    AST 16 9 - 39 U/L    Total Protein 4.5 (L) 6.4 - 8.2 g/dL   CBC   Result Value Ref Range    WBC 9.0 4.4 - 11.3 x10*3/uL    nRBC 0.3 (H) 0.0 - 0.0 /100 WBCs    RBC 4.06 (L) 4.50 - 5.90 x10*6/uL    Hemoglobin 11.3 (L) 13.5 - 17.5 g/dL    Hematocrit 34.0 (L) 41.0 - 52.0 %    MCV 84 80 - 100 fL    MCH 27.8 26.0 - 34.0 pg    MCHC 33.2 32.0 - 36.0 g/dL    RDW 17.9 (H) 11.5 - 14.5 %    Platelets 94 (L) 150 - 450 x10*3/uL   Phosphorus   Result Value Ref Range    Phosphorus 2.3 (L) 2.5 - 4.9 mg/dL   Basic Metabolic Panel   Result Value Ref Range    Glucose 98 74 - 99 mg/dL    Sodium 145 136 - 145 mmol/L    Potassium 3.1 (L) 3.5 - 5.3 mmol/L    Chloride 119 (H) 98 - 107 mmol/L    Bicarbonate 17 (L) 21 - 32 mmol/L    Anion Gap 12 10 - 20 mmol/L    Urea Nitrogen 44 (H) 6 - 23 mg/dL    Creatinine 3.85 (H) 0.50 - 1.30 mg/dL    eGFR 16 (L) >60 mL/min/1.73m*2    Calcium 7.4 (L) 8.6 - 10.3 mg/dL   POCT GLUCOSE   Result Value Ref Range    POCT  Glucose 94 74 - 99 mg/dL     US renal complete    Result Date: 7/17/2024  Interpreted By:  Connie Victoria, STUDY: US RENAL COMPLETE;  7/17/2024 2:06 pm   INDICATION: Signs/Symptoms:worsening kidney function.   COMPARISON: 11/13/2023   ACCESSION NUMBER(S): [id number]   ORDERING CLINICIAN: JACQUELIN WALTER   TECHNIQUE: Multiple images of the kidneys were obtained  with grayscale and color Doppler imaging.   FINDINGS: RIGHT KIDNEY: The right kidney measures 10.4 cm in length. There is suggestion of mildly increased renal cortical echogenicity with mild cortical thinning measuring up to 9 mm. No hydronephrosis is present; no evidence of nephrolithiasis.   LEFT KIDNEY: The left kidney measures 11.7 cm in length. Suggestion of mild thinning of renal cortex with mildly increased renal cortical echogenicity. There is persistent evidence of an exophytic hypodense lesion in the superior pole of left kidney measuring up to 7.3 cm with through transmission and without internal complexity or vascularity. This is suggestive of a simple cyst. No hydronephrosis is present; no evidence of nephrolithiasis.   BLADDER: Urinary bladder is mildly distended. There is circumferential wall thickening with trabeculated morphology of the urinary bladder. Prostate gland is enlarged with median lobe hypertrophy indenting on the base of the bladder.       1. Mild bilateral renal cortical thinning with increased renal cortical echogenicity, which can be associated with medical renal disease in appropriate clinical setting. 2. Stable left renal cyst. No hydronephrosis. 3. Prostatomegaly. 4. Circumferential wall thickening with trabeculated morphology of the urinary bladder, which could be related to chronic bladder outlet obstruction in appropriate clinical setting.   MACRO: None   Signed by: Connie Victoria 7/17/2024 2:57 PM Dictation workstation:   [id number]       Assessment/Plan   74yo CM with PMH of malignant melanoma on immunotherapy,  IDDM-type II, CKD, HTN, SSS s/p PPM complicated by infected leads      NOHELIA on CKD, worsened but rate of rise ok  - initially thought to be prerenal but now concerned of AIN as well  - renal ultrasound showed mild bilateral cortical thickening but no hydronephrosis  - nephro following, continue bicarb drip, aggressive potassium repletion(pt not able to tolerate PO well)  - continue to check daily     Acute diarrhea and vomiting, improved  - likely secondary to immunotherapy  - Cdiff and stool pathogen both negative  - GI consulted, rec calprotectin and fecal electrolytes  - onc consulted, rec high dose steroids x5days (day 2 today)  - continue lomotil/imodium PRN  - continue supportive care    Hypokalemia  - likely secondary to above  - replete and recheck    Hypertension  - continue home amlodipine, metoprolol, statin   - hold home lasix, losartan in setting of above    Malignant melanoma  - on immunotherapy  - onc consulted, rec high dose steroids x5days (day 2 today)    DVT Proph: heparin subQ    Dispo: Patient requires close inpatient monitoring in setting of worsening NOHELIA on CKD requiring further workup and persistent diarrhea, no plans for discharge at this time.           Amberly Philip DO

## 2024-07-19 NOTE — PROGRESS NOTES
07/19/24 1549   Discharge Planning   Living Arrangements Alone   Support Systems Friends/neighbors   Assistance Needed Alert and oriented x 3, Independent with ADL's, Doesn't drive, (friends transport to appointments), Shower chair, Walker, Wheel chair, BSC, Ramp, Grab bars. Active with [facility] for SNand PT   Type of Residence Private residence   Number of Stairs to Enter Residence 2   Number of Stairs Within Residence 0   Do you have animals or pets at home? Yes   Type of Animals or Pets 1 dog and 2 cats   Who is requesting discharge planning? Provider   Home or Post Acute Services Post acute facilities (Rehab/SNF/etc)   Type of Post Acute Facility Services Skilled nursing   Expected Discharge Disposition SNF  (Patient is wanting to go to [facility] upon discharge, patient is not medically ready for discharge at this time)   Does the patient need discharge transport arranged? Yes   RoundTrip coordination needed? Yes   Has discharge transport been arranged? No   Patient Choice   Provider Choice list and CMS website (https://medicare.gov/care-compare#search) for post-acute Quality and Resource Measure Data were provided and reviewed with: Patient   Patient / Family choosing to utilize agency / facility established prior to hospitalization No

## 2024-07-19 NOTE — PROGRESS NOTES
"Physical Therapy                 Therapy Communication Note    Patient Name: Chester Verduzco  MRN: [id number]  Today's Date: 7/19/2024     Discipline: Physical Therapy    Missed Visit Reason: Missed Visit Reason: Patient refused (p.t. stated\"I have had a really bad day & I really want to rest today so maybe I can do something tomorrow. I know what I need to do, just cant do it today\" RN in room & aware, no tx. encouraged EOB sitting & up to bathroom with assist if feeling better.)    Missed Time: [id number]    Comment:  "

## 2024-07-19 NOTE — CARE PLAN
The patient's goals for the shift include      The clinical goals for the shift include Pt will remain safe throughout shift.      Problem: Skin  Goal: Prevent/manage excess moisture  Outcome: Progressing  Goal: Prevent/minimize sheer/friction injuries  Outcome: Progressing  Goal: Promote/optimize nutrition  Outcome: Progressing     Problem: Pain - Adult  Goal: Verbalizes/displays adequate comfort level or baseline comfort level  Outcome: Progressing     Problem: Safety - Adult  Goal: Free from fall injury  Outcome: Progressing     Problem: Discharge Planning  Goal: Discharge to home or other facility with appropriate resources  Outcome: Progressing     Problem: Chronic Conditions and Co-morbidities  Goal: Patient's chronic conditions and co-morbidity symptoms are monitored and maintained or improved  Outcome: Progressing     Problem: Diabetes  Goal: Achieve decreasing blood glucose levels by end of shift  Outcome: Progressing  Goal: Increase stability of blood glucose readings by end of shift  Outcome: Progressing  Goal: Decrease in ketones present in urine by end of shift  Outcome: Progressing  Goal: Maintain electrolyte levels within acceptable range throughout shift  Outcome: Progressing  Goal: Maintain glucose levels >70mg/dl to <250mg/dl throughout shift  Outcome: Progressing  Goal: No changes in neurological exam by end of shift  Outcome: Progressing  Goal: Learn about and adhere to nutrition recommendations by end of shift  Outcome: Progressing  Goal: Vital signs within normal range for age by end of shift  Outcome: Progressing  Goal: Increase self care and/or family involovement by end of shift  Outcome: Progressing  Goal: Receive DSME education by end of shift  Outcome: Progressing

## 2024-07-20 LAB
ALBUMIN SERPL BCP-MCNC: 2.2 G/DL (ref 3.4–5)
ALP SERPL-CCNC: 103 U/L (ref 33–136)
ALT SERPL W P-5'-P-CCNC: 12 U/L (ref 10–52)
ANION GAP SERPL CALC-SCNC: 11 MMOL/L (ref 10–20)
AST SERPL W P-5'-P-CCNC: 18 U/L (ref 9–39)
BILIRUB DIRECT SERPL-MCNC: 0.1 MG/DL (ref 0–0.3)
BILIRUB SERPL-MCNC: 0.4 MG/DL (ref 0–1.2)
BUN SERPL-MCNC: 48 MG/DL (ref 6–23)
CALCIUM SERPL-MCNC: 7.4 MG/DL (ref 8.6–10.3)
CHLORIDE SERPL-SCNC: 117 MMOL/L (ref 98–107)
CO2 SERPL-SCNC: 21 MMOL/L (ref 21–32)
CREAT SERPL-MCNC: 4.24 MG/DL (ref 0.5–1.3)
EGFRCR SERPLBLD CKD-EPI 2021: 14 ML/MIN/1.73M*2
ERYTHROCYTE [DISTWIDTH] IN BLOOD BY AUTOMATED COUNT: 18.1 % (ref 11.5–14.5)
GLUCOSE BLD MANUAL STRIP-MCNC: 105 MG/DL (ref 74–99)
GLUCOSE BLD MANUAL STRIP-MCNC: 107 MG/DL (ref 74–99)
GLUCOSE BLD MANUAL STRIP-MCNC: 88 MG/DL (ref 74–99)
GLUCOSE BLD MANUAL STRIP-MCNC: 89 MG/DL (ref 74–99)
GLUCOSE SERPL-MCNC: 104 MG/DL (ref 74–99)
HCT VFR BLD AUTO: 34.6 % (ref 41–52)
HGB BLD-MCNC: 11.4 G/DL (ref 13.5–17.5)
MCH RBC QN AUTO: 27.2 PG (ref 26–34)
MCHC RBC AUTO-ENTMCNC: 32.9 G/DL (ref 32–36)
MCV RBC AUTO: 83 FL (ref 80–100)
NRBC BLD-RTO: 0.4 /100 WBCS (ref 0–0)
PHOSPHATE SERPL-MCNC: 2.8 MG/DL (ref 2.5–4.9)
PLATELET # BLD AUTO: 94 X10*3/UL (ref 150–450)
POTASSIUM SERPL-SCNC: 3.3 MMOL/L (ref 3.5–5.3)
PROT SERPL-MCNC: 4.7 G/DL (ref 6.4–8.2)
RBC # BLD AUTO: 4.19 X10*6/UL (ref 4.5–5.9)
SODIUM SERPL-SCNC: 146 MMOL/L (ref 136–145)
WBC # BLD AUTO: 8.4 X10*3/UL (ref 4.4–11.3)

## 2024-07-20 PROCEDURE — 99232 SBSQ HOSP IP/OBS MODERATE 35: CPT | Performed by: NURSE PRACTITIONER

## 2024-07-20 PROCEDURE — 2500000002 HC RX 250 W HCPCS SELF ADMINISTERED DRUGS (ALT 637 FOR MEDICARE OP, ALT 636 FOR OP/ED): Performed by: FAMILY MEDICINE

## 2024-07-20 PROCEDURE — 2500000002 HC RX 250 W HCPCS SELF ADMINISTERED DRUGS (ALT 637 FOR MEDICARE OP, ALT 636 FOR OP/ED): Performed by: INTERNAL MEDICINE

## 2024-07-20 PROCEDURE — 97530 THERAPEUTIC ACTIVITIES: CPT | Mod: GO,CO

## 2024-07-20 PROCEDURE — 36415 COLL VENOUS BLD VENIPUNCTURE: CPT | Performed by: FAMILY MEDICINE

## 2024-07-20 PROCEDURE — 2500000004 HC RX 250 GENERAL PHARMACY W/ HCPCS (ALT 636 FOR OP/ED): Performed by: NURSE PRACTITIONER

## 2024-07-20 PROCEDURE — 2500000002 HC RX 250 W HCPCS SELF ADMINISTERED DRUGS (ALT 637 FOR MEDICARE OP, ALT 636 FOR OP/ED): Performed by: NURSE PRACTITIONER

## 2024-07-20 PROCEDURE — 84100 ASSAY OF PHOSPHORUS: CPT | Performed by: FAMILY MEDICINE

## 2024-07-20 PROCEDURE — 2500000004 HC RX 250 GENERAL PHARMACY W/ HCPCS (ALT 636 FOR OP/ED): Performed by: FAMILY MEDICINE

## 2024-07-20 PROCEDURE — 2500000004 HC RX 250 GENERAL PHARMACY W/ HCPCS (ALT 636 FOR OP/ED): Performed by: INTERNAL MEDICINE

## 2024-07-20 PROCEDURE — 85027 COMPLETE CBC AUTOMATED: CPT | Performed by: FAMILY MEDICINE

## 2024-07-20 PROCEDURE — 82947 ASSAY GLUCOSE BLOOD QUANT: CPT

## 2024-07-20 PROCEDURE — 2500000001 HC RX 250 WO HCPCS SELF ADMINISTERED DRUGS (ALT 637 FOR MEDICARE OP): Performed by: INTERNAL MEDICINE

## 2024-07-20 PROCEDURE — 2500000005 HC RX 250 GENERAL PHARMACY W/O HCPCS: Performed by: FAMILY MEDICINE

## 2024-07-20 PROCEDURE — 82248 BILIRUBIN DIRECT: CPT | Performed by: FAMILY MEDICINE

## 2024-07-20 PROCEDURE — 1200000002 HC GENERAL ROOM WITH TELEMETRY DAILY

## 2024-07-20 RX ORDER — POTASSIUM CHLORIDE 20 MEQ/1
20 TABLET, EXTENDED RELEASE ORAL ONCE
Status: COMPLETED | OUTPATIENT
Start: 2024-07-20 | End: 2024-07-20

## 2024-07-20 RX ORDER — DEXTROSE MONOHYDRATE AND SODIUM CHLORIDE 5; .45 G/100ML; G/100ML
125 INJECTION, SOLUTION INTRAVENOUS CONTINUOUS
Status: DISCONTINUED | OUTPATIENT
Start: 2024-07-20 | End: 2024-07-22

## 2024-07-20 ASSESSMENT — COGNITIVE AND FUNCTIONAL STATUS - GENERAL
MOVING FROM LYING ON BACK TO SITTING ON SIDE OF FLAT BED WITH BEDRAILS: A LITTLE
DRESSING REGULAR LOWER BODY CLOTHING: A LITTLE
MOBILITY SCORE: 17
DRESSING REGULAR UPPER BODY CLOTHING: A LITTLE
DRESSING REGULAR UPPER BODY CLOTHING: A LITTLE
PERSONAL GROOMING: A LITTLE
DRESSING REGULAR UPPER BODY CLOTHING: A LITTLE
MOBILITY SCORE: 17
CLIMB 3 TO 5 STEPS WITH RAILING: A LOT
TURNING FROM BACK TO SIDE WHILE IN FLAT BAD: A LITTLE
WALKING IN HOSPITAL ROOM: A LITTLE
TOILETING: A LITTLE
PERSONAL GROOMING: A LITTLE
STANDING UP FROM CHAIR USING ARMS: A LITTLE
DAILY ACTIVITIY SCORE: 19
DAILY ACTIVITIY SCORE: 17
DAILY ACTIVITIY SCORE: 17
STANDING UP FROM CHAIR USING ARMS: A LITTLE
MOVING TO AND FROM BED TO CHAIR: A LITTLE
MOVING TO AND FROM BED TO CHAIR: A LITTLE
HELP NEEDED FOR BATHING: A LOT
DRESSING REGULAR LOWER BODY CLOTHING: A LITTLE
DRESSING REGULAR LOWER BODY CLOTHING: A LITTLE
WALKING IN HOSPITAL ROOM: A LITTLE
MOVING FROM LYING ON BACK TO SITTING ON SIDE OF FLAT BED WITH BEDRAILS: A LITTLE
TOILETING: A LOT
CLIMB 3 TO 5 STEPS WITH RAILING: A LOT
HELP NEEDED FOR BATHING: A LITTLE
TURNING FROM BACK TO SIDE WHILE IN FLAT BAD: A LITTLE
PERSONAL GROOMING: A LITTLE
HELP NEEDED FOR BATHING: A LOT
TOILETING: A LOT

## 2024-07-20 ASSESSMENT — PAIN SCALES - GENERAL
PAINLEVEL_OUTOF10: 0 - NO PAIN

## 2024-07-20 ASSESSMENT — PAIN - FUNCTIONAL ASSESSMENT
PAIN_FUNCTIONAL_ASSESSMENT: 0-10

## 2024-07-20 NOTE — CARE PLAN
The patient's goals for the shift include  safety, decrease BM    The clinical goals for the shift include Pt will remain safe throughout shift.    Problem: Skin  Goal: Prevent/manage excess moisture  Outcome: Progressing  Goal: Prevent/minimize sheer/friction injuries  Outcome: Progressing  Goal: Promote/optimize nutrition  Outcome: Progressing     Problem: Pain - Adult  Goal: Verbalizes/displays adequate comfort level or baseline comfort level  Outcome: Progressing     Problem: Safety - Adult  Goal: Free from fall injury  Outcome: Progressing     Problem: Discharge Planning  Goal: Discharge to home or other facility with appropriate resources  Outcome: Progressing     Problem: Chronic Conditions and Co-morbidities  Goal: Patient's chronic conditions and co-morbidity symptoms are monitored and maintained or improved  Outcome: Progressing     Problem: Diabetes  Goal: Achieve decreasing blood glucose levels by end of shift  Outcome: Progressing  Goal: Increase stability of blood glucose readings by end of shift  Outcome: Progressing  Goal: Decrease in ketones present in urine by end of shift  Outcome: Progressing  Goal: Maintain electrolyte levels within acceptable range throughout shift  Outcome: Progressing  Goal: Maintain glucose levels >70mg/dl to <250mg/dl throughout shift  Outcome: Progressing  Goal: No changes in neurological exam by end of shift  Outcome: Progressing  Goal: Learn about and adhere to nutrition recommendations by end of shift  Outcome: Progressing  Goal: Vital signs within normal range for age by end of shift  Outcome: Progressing  Goal: Increase self care and/or family involovement by end of shift  Outcome: Progressing  Goal: Receive DSME education by end of shift  Outcome: Progressing

## 2024-07-20 NOTE — PROGRESS NOTES
"Occupational Therapy    OT Treatment    Patient Name: Chester Verduzco  MRN: [id number]  Today's Date: 7/20/2024  Time Calculation  Start Time: 0955  Stop Time: 1018  Time Calculation (min): 23 min        Assessment:  Prognosis: Good  Barriers to Discharge: None  Evaluation/Treatment Tolerance: Patient limited by fatigue  Medical Staff Made Aware: Yes  End of Session Communication: Bedside nurse, PCT/NA/CTA  End of Session Patient Position:  (Pt in chair position in bed at end of session. All 4 rails raised per pt's request. bed alarm not on at start of session. All needs met and NSG in room at end of session, call light in reach.)  Prognosis: Good  Barriers to Discharge: None  Evaluation/Treatment Tolerance: Patient limited by fatigue  Medical Staff Made Aware: Yes  Plan:  Treatment Interventions: ADL retraining, Functional transfer training, Endurance training  OT Frequency: 3 times per week  OT Discharge Recommendations: Moderate intensity level of continued care  Equipment Recommended upon Discharge: Wheeled walker  OT Recommended Transfer Status: Assist of 2  OT - OK to Discharge: Yes  Treatment Interventions: ADL retraining, Functional transfer training, Endurance training    Subjective   Previous Visit Info:  OT Last Visit  OT Received On: 07/18/24  General:  General  Reason for Referral: 73 yo male admit vomiting/diarrhea; possibly d/t immunotherapy tx.  Referred to PT for impaired mobility  Referred By: CONNIE Philip DO  Past Medical History Relevant to Rehab: PMH:history of malignant melanoma currently on immunotherapy, diabetes mellitus, and hypertension  Family/Caregiver Present: No  Prior to Session Communication:  (NSG cleared pt for session who reports \"he's lethargic, but see what you can do\" Pt in bed at arrival, emesis bag in hand, soiled at arrival however, actively calling staff for assist. Pt pleasant, cooperative, but limited at times d/t fatigue and nausea)  General Comment: Pts maldonado with bloody " clots at end of session. NSG notified of same.  Precautions:  Precautions Comment: fall precautions, IV, tele, maldonado  Vital Signs:     Pain:  Pain Assessment  Pain Assessment: 0-10  0-10 (Numeric) Pain Score: 0 - No pain    Objective    Cognition:  Cognition  Orientation Level: Oriented X4    Functional Standing Tolerance:  Functional Standing Tolerance Comments: Pt engaged in X2 stand trials from EOB using FWW in prep for ADL tasks. Pt tolerates ~40sec first trial and ~30sec on second trial. CGA for both d/t instability with weight shifting. slight LOB with unilateral support during hair combing task. Overall F stand balance observed.  Bed Mobility/Transfers: Bed Mobility  Bed Mobility:  (supine to sitting, pt demo's SBA, increased time, and cues for hand placement to ease transition into upright position. sitting to supine, pt requires CGA and increased time, as well as line management.)  Bed Mobility 1  Bed Mobility Comments 1: Pt engaged in rolling task supine in bed during shireen care (following loose BM). Pt required CGA to roll left to right and CGA to hold safe sidelying poisiton. pt tolerates ~4-5min sidelying each side without need for rest breaks.    Transfer 1  Trials/Comments 1: X2 sit<>stand trials from elevated EOB in prep for OOB tasks. Min A required for 1st trial and CGA for 2nd d/t fatigue. CGA stand to sit.      Functional Mobility:  Functional Mobility  Functional Mobility Performed:  (Pt completed X5 side steps toward HOB using FWW following standing task. pt requires CGA throughout for safety with task and min cues for proper body position prior to sitting.)             Outcome Measures:[facility] Daily Activity  Putting on and taking off regular lower body clothing: A little  Bathing (including washing, rinsing, drying): A lot  Putting on and taking off regular upper body clothing: A little  Toileting, which includes using toilet, bedpan or urinal: A lot  Taking care of personal grooming such as  brushing teeth: A little  Eating Meals: None  Daily Activity - Total Score: 17            OP EDUCATION:  Education  Individual(s) Educated: Patient  Education Comment: Pt educated in OT services, bed mobility, standing tolerance tasks, TRF training, safety with FWW in standing. G unerstanding and G carry over observed. Limited by fatigue and need for rest periods.    Goals:  Encounter Problems       Encounter Problems (Active)       OT Goals       Pt will demo LE ADL completion with supervision using AE if needed.  (Progressing)       Start:  07/18/24    Expected End:  08/01/24            Pt will complete pili-sn-utyy transfers using LRD in preparation for ADLs with supervision  (Progressing)       Start:  07/18/24    Expected End:  08/01/24            Pt will increase endurance to tolerate 15min of OOB activity with no more than 1 rest break in order to increase ability to engage in ADL completion.  (Progressing)       Start:  07/18/24    Expected End:  08/01/24            Pt will tolerate 10min stand during functional task completion with no more than 1 rest break in order to increase endurance for functional task completion.  (Progressing)       Start:  07/18/24    Expected End:  08/01/24            Pt will increase BU strength to 4+/5 through various therapeutic exercises and activities (Progressing)       Start:  07/18/24    Expected End:  08/01/24

## 2024-07-20 NOTE — PROGRESS NOTES
"Chester Verduzco is a 74 y.o. male on day 3 of admission presenting with Intractable nausea and vomiting.      Subjective   \"My stomach isn't feeling very good\".        Objective   Awake/interactive, very uncomfortable, with s/sx N/V.  Denies any CP/SOB       Vitals 24HR  Heart Rate:  [74-89]   Temp:  [35.6 °C (96.1 °F)-36.5 °C (97.7 °F)]   Resp:  [16-18]   BP: ()/(55-64)   SpO2:  [94 %-99 %]     Not a candidate for transplant    Intake/Output last 3 Shifts:    Intake/Output Summary (Last 24 hours) at 7/20/2024 1706  Last data filed at 7/20/2024 1524  Gross per 24 hour   Intake 2371.25 ml   Output 430 ml   Net 1941.25 ml     amLODIPine, 10 mg, oral, Daily  atorvastatin, 20 mg, oral, Daily  colesevelam, 3,750 mg, oral, Daily  docusate sodium, 100 mg, oral, BID  DULoxetine, 60 mg, oral, Daily  heparin (porcine), 5,000 Units, subcutaneous, q8h  insulin degludec, 10 Units, subcutaneous, Nightly  insulin lispro, 0-10 Units, subcutaneous, q6h  methylPREDNISolone sodium succinate (PF), 93.75 mg, intravenous, q24h  metoprolol succinate XL, 25 mg, oral, Daily  polyethylene glycol, 17 g, oral, Daily  potassium chloride CR, 40 mEq, oral, TID  tamsulosin, 0.8 mg, oral, Nightly      Results for orders placed or performed during the hospital encounter of 07/15/24 (from the past 24 hour(s))   POCT GLUCOSE   Result Value Ref Range    POCT Glucose 96 74 - 99 mg/dL   POCT GLUCOSE   Result Value Ref Range    POCT Glucose 162 (H) 74 - 99 mg/dL   POCT GLUCOSE   Result Value Ref Range    POCT Glucose 126 (H) 74 - 99 mg/dL   Hepatic Function Panel   Result Value Ref Range    Albumin 2.2 (L) 3.4 - 5.0 g/dL    Bilirubin, Total 0.4 0.0 - 1.2 mg/dL    Bilirubin, Direct 0.1 0.0 - 0.3 mg/dL    Alkaline Phosphatase 103 33 - 136 U/L    ALT 12 10 - 52 U/L    AST 18 9 - 39 U/L    Total Protein 4.7 (L) 6.4 - 8.2 g/dL   CBC   Result Value Ref Range    WBC 8.4 4.4 - 11.3 x10*3/uL    nRBC 0.4 (H) 0.0 - 0.0 /100 WBCs    RBC 4.19 (L) 4.50 - 5.90 " x10*6/uL    Hemoglobin 11.4 (L) 13.5 - 17.5 g/dL    Hematocrit 34.6 (L) 41.0 - 52.0 %    MCV 83 80 - 100 fL    MCH 27.2 26.0 - 34.0 pg    MCHC 32.9 32.0 - 36.0 g/dL    RDW 18.1 (H) 11.5 - 14.5 %    Platelets 94 (L) 150 - 450 x10*3/uL   Phosphorus   Result Value Ref Range    Phosphorus 2.8 2.5 - 4.9 mg/dL   Basic Metabolic Panel   Result Value Ref Range    Glucose 104 (H) 74 - 99 mg/dL    Sodium 146 (H) 136 - 145 mmol/L    Potassium 3.3 (L) 3.5 - 5.3 mmol/L    Chloride 117 (H) 98 - 107 mmol/L    Bicarbonate 21 21 - 32 mmol/L    Anion Gap 11 10 - 20 mmol/L    Urea Nitrogen 48 (H) 6 - 23 mg/dL    Creatinine 4.24 (H) 0.50 - 1.30 mg/dL    eGFR 14 (L) >60 mL/min/1.73m*2    Calcium 7.4 (L) 8.6 - 10.3 mg/dL   POCT GLUCOSE   Result Value Ref Range    POCT Glucose 107 (H) 74 - 99 mg/dL   POCT GLUCOSE   Result Value Ref Range    POCT Glucose 88 74 - 99 mg/dL             Physical Exam  Mouth and MM a bit dry  No JVD  Lungs diminished, no wheeze/crackles  Heart mildly irregular rate/rhythm  Abd - generalized discomfort, but no point tenderness.  + BS  Extremities: + 1/4 bilateral leg edema  : Rojo to CD, draining small amount of dark urine             Assessment/Plan      CKD stage 3B/4 r/t RVD/DN with baseline Scr 1.5 to 2.0.  NOHELIA continues to worsen over the last few days.  Presented Scr 2.55, up to 3.55 yesterday and 4.24 today.  Due to hypotension, SBP dropped into 's, and poor oral intake and N/V/D.  Currently K+ 3.3, with CO2 21, on a Bicarb drip.  K+ dropping due to GI losses and resolving acidosis.  With Na+ up to 146 will adjust IVF to hypotonic D5 1/2 NS.   With NOHELIA much worse will add parameter to KCL supplementation (40 mEq TID) to avoid hyperkalemia.  Monitor volume status/labs, but expect NOHELIA to improve one more euvolemic and stable BP.  Would avoid any diuretics or RAAS Rx at this time.     This patient has a urinary catheter   Reason for the urinary catheter remaining today? urinary retention/bladder  outlet obstruction, acute or chronic    Blood pressure LOW now, 's SBP.  HOLD Amlodipine, and add parameters to Toprol XL.  No s/sx of acute HF.    Principal Problem:    Intractable nausea and vomiting  Active Problems:    Nausea and vomiting, unspecified vomiting type    Loose stool diarrhea as well as N/V he says.  Would benefit to holding/stopping oral cathartics - defer to GI team.        I spent 20 minutes in the professional and overall care of this patient.      Omero Mena, APRN-CNP

## 2024-07-20 NOTE — PROGRESS NOTES
"Patient: Chester Verduzco  Room/bed: 235/235-A  Admitted on: 7/15/2024    Age: 74 y.o.   Gender: male  Code Status:  DNR   Admitting Dx: Dehydration [E86.0]  Hypokalemia [E87.6]  NOHELIA (acute kidney injury) (CMS-HCC) [N17.9]  Diarrhea, unspecified type [R19.7]  Nausea and vomiting, unspecified vomiting type [R11.2]  Intractable nausea and vomiting [R11.2]    MRN: [id number]  PCP: Jeffry De Leon MD       Subjective   Seen and examined in his room this AM. States feels \"like hell\". Diarrhea frequency has improved. Intermittent nausea with dry heaves, especially after taking his meds. He denies chest pain, breathing difficulties, abdominal pain, fever, or chills.      Objective    Physical Exam   Constitutional: A&O x 3; NAD; calm and cooperative  Eyes: EOM's intact  HEENT: Normocephalic, Atraumatic. Oral mucosa moist.   Neck: Supple. No JVD, lymphadenopathy.   Lungs: CTAB with fair air movement. Respirations even and unlabored on room air.   Heart: Regularly irregular; + murmur  Abdomen: Softly distended, +BS; mild tenderness.   MS/Extremities: JOHNS equally x 4. BLE edema, R>L with venous dermatological changes. Peripheral pulses intact bilaterally.   Neuro: A&O x3; no focal deficits; gross motor and sensation intact.   Skin: Warm and dry. No rashes or lesions  Psych: Normal affect.      Temp:  [35.6 °C (96.1 °F)-36.5 °C (97.7 °F)] 35.6 °C (96.1 °F)  Heart Rate:  [74-89] 74  Resp:  [16-18] 18  BP: ()/(55-64) 106/64    Vitals:    07/15/24 2007   Weight: 94.4 kg (208 lb 1.6 oz)             I/Os    Intake/Output Summary (Last 24 hours) at 7/20/2024 1408  Last data filed at 7/20/2024 0726  Gross per 24 hour   Intake 2371.25 ml   Output 250 ml   Net 2121.25 ml       Labs:   Results from last 72 hours   Lab Units 07/20/24  0608 07/19/24  0802 07/18/24  0645   SODIUM mmol/L 146* 145 145   POTASSIUM mmol/L 3.3* 3.1* 2.8*   CHLORIDE mmol/L 117* 119* 121*   CO2 mmol/L 21 17* 14*   BUN mg/dL 48* 44* 36*   CREATININE " "mg/dL 4.24* 3.85* 3.10*   GLUCOSE mg/dL 104* 98 106*   CALCIUM mg/dL 7.4* 7.4* 7.4*   ANION GAP mmol/L 11 12 13   EGFR mL/min/1.73m*2 14* 16* 20*   PHOSPHORUS mg/dL 2.8 2.3* 2.6      Results from last 72 hours   Lab Units 07/20/24  0608 07/19/24  0802 07/18/24  0645   WBC AUTO x10*3/uL 8.4 9.0 8.4   HEMOGLOBIN g/dL 11.4* 11.3* 11.5*   HEMATOCRIT % 34.6* 34.0* 34.7*   PLATELETS AUTO x10*3/uL 94* 94* 108*      Lab Results   Component Value Date    CALCIUM 7.4 (L) 07/20/2024    PHOS 2.8 07/20/2024      Lab Results   Component Value Date    CRP 9.21 (H) 11/14/2023      Micro/ID:   Susceptibility data from last 90 days.  Collected Specimen Info Organism Aztreonam Cefepime Ceftazidime Ciprofloxacin Levofloxacin Piperacillin/Tazobactam Tobramycin   06/05/24 Urine from Clean Catch/Voided Pseudomonas aeruginosa  S  S  S  S  S  S  S                    No lab exists for component: \"AGALPCRNB\"   .ID  Lab Results   Component Value Date    URINECULTURE (A) 07/18/2024     Multiple organisms present, probable contamination. Repeat culture if clinically indicated.    BLOODCULT No growth at 4 days -  FINAL REPORT 04/29/2024    BLOODCULT No growth at 4 days -  FINAL REPORT 04/29/2024       Images:  US Renal:  Impression: 1. Mild bilateral renal cortical thinning with increased renal  cortical echogenicity, which can be associated with medical renal  disease in appropriate clinical setting.  2. Stable left renal cyst. No hydronephrosis.  3. Prostatomegaly.  4. Circumferential wall thickening with trabeculated morphology of  the urinary bladder, which could be related to chronic bladder outlet  obstruction in appropriate clinical setting.       Meds    Scheduled medications  amLODIPine, 10 mg, oral, Daily  atorvastatin, 20 mg, oral, Daily  colesevelam, 3,750 mg, oral, Daily  docusate sodium, 100 mg, oral, BID  DULoxetine, 60 mg, oral, Daily  heparin (porcine), 5,000 Units, subcutaneous, q8h  insulin degludec, 10 Units, subcutaneous, " Nightly  insulin lispro, 0-10 Units, subcutaneous, q6h  methylPREDNISolone sodium succinate (PF), 93.75 mg, intravenous, q24h  metoprolol succinate XL, 25 mg, oral, Daily  polyethylene glycol, 17 g, oral, Daily  potassium chloride CR, 40 mEq, oral, TID  tamsulosin, 0.8 mg, oral, Nightly      Continuous medications  sodium bicarbonate 150 mEq in dextrose 5% 1,150 mL infusion, 75 mL/hr, Last Rate: 75 mL/hr (07/20/24 0726)      PRN medications  PRN medications: acetaminophen **OR** acetaminophen **OR** acetaminophen, alum-mag hydroxide-simeth, calcium carbonate, dextromethorphan-guaifenesin, dextrose, dextrose, diphenoxylate-atropine, glucagon, glucagon, guaiFENesin, ondansetron **OR** ondansetron     Assessment and Plan    Chester Verduzco is a 74 y.o. male with PMH of malignant melanoma on immunotherapy, IDDM-type II, CKD, HTN, SSS s/p PPM complicated by infected leads, who presented with N/V, and diarrhea. Found to have Acute on Chronic Renal Failure.      Acute on Chronic Kidney Disease, Stage III  -Baseline creatinine 1.5-2.0  -Nephrology is following - continue bicarb drip, aggressive potassium repletion(pt not able to tolerate PO well)  -Initially thought to be prerenal but now concerned of AIN as well  -Renal ultrasound showed mild bilateral cortical thickening but no hydronephrosis  -Creatinine worsening today - 4.24  -Message sent to on call nephrologist - await response  -Will continue with IVF  -Avoid nephrotoxins, PPI  -Per nephrology, patient is agreeable to dialysis if needed.   -BMP in AM.      Acute diarrhea and vomiting - mild improvement  -Likely secondary to immunotherapy  -Cdiff and stool pathogen both negative  -GI consulted, rec calprotectin and fecal electrolytes  -Oncology consulted, rec high dose steroids x5days (day #3)  -Continue lomotil/imodium PRN  -Continue supportive care  -Monitor and replete electrolytes as needed.      Hypertension  -Continue home amlodipine, metoprolol, statin    -Hold home lasix, losartan in setting of above     Malignant melanoma  -On immunotherapy  -Oncology consulted, rec high dose steroids x5days (day #3)    RLE Edema  -Has chronic venous stasis.   -States is baseline, but will get US to r/o DVT     DVT Prophylaxis  -Heparin subQ  -Monitor plts closely and hold if <75,000    Fluids/Electrolytes/Nutrition  -Laboratory data reviewed.   -Electrolytes: Hypokalemia - replaced; hyponatremia; renal insufficiency.  -No nutritional concerns at this time.       Disposition   -Plan of care discussed with medicine, Dr. Lopez.   -Patient requires close inpatient monitoring in setting of worsening NOHELIA on CKD requiring further workup and persistent diarrhea, no plans for discharge at this time.         Carmen Larsen, APRN-CNP

## 2024-07-20 NOTE — SIGNIFICANT EVENT
Patient has developed gross hematuria with intermittent urinary obstruction  50 cc flush every 6 hours ordered along with urology consult  Repeat CBC ordered for a.m.

## 2024-07-20 NOTE — CARE PLAN
Problem: Skin  Goal: Prevent/manage excess moisture  Flowsheets (Taken 7/20/2024 1086)  Prevent/manage excess moisture: Cleanse incontinence/protect with barrier cream   The patient's goals for the shift include  patient will have decreased frequency in bowel movements during shift    The clinical goals for the shift include safety, pt will have decrease BMs    Over the shift, the patient did not progress towards goals. Patient had frequent loose bowel movements however they slowly became slightly more formed.

## 2024-07-21 ENCOUNTER — APPOINTMENT (OUTPATIENT)
Dept: RADIOLOGY | Facility: HOSPITAL | Age: 74
DRG: 394 | End: 2024-07-21
Payer: MEDICARE

## 2024-07-21 VITALS
HEIGHT: 74 IN | TEMPERATURE: 96.3 F | RESPIRATION RATE: 16 BRPM | HEART RATE: 50 BPM | BODY MASS INDEX: 26.71 KG/M2 | WEIGHT: 208.1 LBS | SYSTOLIC BLOOD PRESSURE: 109 MMHG | DIASTOLIC BLOOD PRESSURE: 63 MMHG | OXYGEN SATURATION: 99 %

## 2024-07-21 LAB
ALBUMIN SERPL BCP-MCNC: 2.1 G/DL (ref 3.4–5)
ALBUMIN SERPL BCP-MCNC: 2.2 G/DL (ref 3.4–5)
ALP SERPL-CCNC: 109 U/L (ref 33–136)
ALT SERPL W P-5'-P-CCNC: 13 U/L (ref 10–52)
ANION GAP SERPL CALC-SCNC: 11 MMOL/L (ref 10–20)
ANION GAP SERPL CALC-SCNC: 11 MMOL/L (ref 10–20)
APTT PPP: 34 SECONDS (ref 27–38)
AST SERPL W P-5'-P-CCNC: 19 U/L (ref 9–39)
BACTERIA UR CULT: ABNORMAL
BILIRUB DIRECT SERPL-MCNC: 0.1 MG/DL (ref 0–0.3)
BILIRUB SERPL-MCNC: 0.4 MG/DL (ref 0–1.2)
BUN SERPL-MCNC: 52 MG/DL (ref 6–23)
BUN SERPL-MCNC: 53 MG/DL (ref 6–23)
CALCIUM SERPL-MCNC: 7.2 MG/DL (ref 8.6–10.3)
CALCIUM SERPL-MCNC: 7.3 MG/DL (ref 8.6–10.3)
CHLORIDE SERPL-SCNC: 117 MMOL/L (ref 98–107)
CHLORIDE SERPL-SCNC: 118 MMOL/L (ref 98–107)
CO2 SERPL-SCNC: 20 MMOL/L (ref 21–32)
CO2 SERPL-SCNC: 20 MMOL/L (ref 21–32)
CREAT SERPL-MCNC: 4.4 MG/DL (ref 0.5–1.3)
CREAT SERPL-MCNC: 4.67 MG/DL (ref 0.5–1.3)
EGFRCR SERPLBLD CKD-EPI 2021: 13 ML/MIN/1.73M*2
EGFRCR SERPLBLD CKD-EPI 2021: 13 ML/MIN/1.73M*2
ERYTHROCYTE [DISTWIDTH] IN BLOOD BY AUTOMATED COUNT: 18 % (ref 11.5–14.5)
ERYTHROCYTE [DISTWIDTH] IN BLOOD BY AUTOMATED COUNT: 18.2 % (ref 11.5–14.5)
GLUCOSE BLD MANUAL STRIP-MCNC: 105 MG/DL (ref 74–99)
GLUCOSE BLD MANUAL STRIP-MCNC: 107 MG/DL (ref 74–99)
GLUCOSE BLD MANUAL STRIP-MCNC: 124 MG/DL (ref 74–99)
GLUCOSE BLD MANUAL STRIP-MCNC: 162 MG/DL (ref 74–99)
GLUCOSE BLD MANUAL STRIP-MCNC: 188 MG/DL (ref 74–99)
GLUCOSE SERPL-MCNC: 109 MG/DL (ref 74–99)
GLUCOSE SERPL-MCNC: 186 MG/DL (ref 74–99)
HCT VFR BLD AUTO: 31.8 % (ref 41–52)
HCT VFR BLD AUTO: 33.3 % (ref 41–52)
HGB BLD-MCNC: 10.4 G/DL (ref 13.5–17.5)
HGB BLD-MCNC: 10.9 G/DL (ref 13.5–17.5)
INR PPP: 1 (ref 0.9–1.1)
LDH SERPL L TO P-CCNC: 257 U/L (ref 84–246)
MAGNESIUM SERPL-MCNC: 2.12 MG/DL (ref 1.6–2.4)
MCH RBC QN AUTO: 27.5 PG (ref 26–34)
MCH RBC QN AUTO: 27.6 PG (ref 26–34)
MCHC RBC AUTO-ENTMCNC: 32.7 G/DL (ref 32–36)
MCHC RBC AUTO-ENTMCNC: 32.7 G/DL (ref 32–36)
MCV RBC AUTO: 84 FL (ref 80–100)
MCV RBC AUTO: 84 FL (ref 80–100)
NRBC BLD-RTO: 0.4 /100 WBCS (ref 0–0)
NRBC BLD-RTO: 0.5 /100 WBCS (ref 0–0)
PHOSPHATE SERPL-MCNC: 2.2 MG/DL (ref 2.5–4.9)
PHOSPHATE SERPL-MCNC: 2.6 MG/DL (ref 2.5–4.9)
PLATELET # BLD AUTO: 78 X10*3/UL (ref 150–450)
PLATELET # BLD AUTO: 95 X10*3/UL (ref 150–450)
POTASSIUM SERPL-SCNC: 3.6 MMOL/L (ref 3.5–5.3)
POTASSIUM SERPL-SCNC: 4.1 MMOL/L (ref 3.5–5.3)
PROT SERPL-MCNC: 4.5 G/DL (ref 6.4–8.2)
PROTHROMBIN TIME: 11.7 SECONDS (ref 9.8–12.8)
RBC # BLD AUTO: 3.77 X10*6/UL (ref 4.5–5.9)
RBC # BLD AUTO: 3.96 X10*6/UL (ref 4.5–5.9)
SODIUM SERPL-SCNC: 144 MMOL/L (ref 136–145)
SODIUM SERPL-SCNC: 145 MMOL/L (ref 136–145)
UFH PPP CHRO-ACNC: 0.5 IU/ML
UFH PPP CHRO-ACNC: 0.8 IU/ML
URATE SERPL-MCNC: 15.9 MG/DL (ref 4–7.5)
WBC # BLD AUTO: 6.9 X10*3/UL (ref 4.4–11.3)
WBC # BLD AUTO: 8.6 X10*3/UL (ref 4.4–11.3)

## 2024-07-21 PROCEDURE — 80069 RENAL FUNCTION PANEL: CPT | Mod: CCI | Performed by: FAMILY MEDICINE

## 2024-07-21 PROCEDURE — 2500000002 HC RX 250 W HCPCS SELF ADMINISTERED DRUGS (ALT 637 FOR MEDICARE OP, ALT 636 FOR OP/ED): Performed by: INTERNAL MEDICINE

## 2024-07-21 PROCEDURE — 2500000004 HC RX 250 GENERAL PHARMACY W/ HCPCS (ALT 636 FOR OP/ED): Performed by: INTERNAL MEDICINE

## 2024-07-21 PROCEDURE — 99221 1ST HOSP IP/OBS SF/LOW 40: CPT | Performed by: STUDENT IN AN ORGANIZED HEALTH CARE EDUCATION/TRAINING PROGRAM

## 2024-07-21 PROCEDURE — 85027 COMPLETE CBC AUTOMATED: CPT | Performed by: FAMILY MEDICINE

## 2024-07-21 PROCEDURE — 99233 SBSQ HOSP IP/OBS HIGH 50: CPT | Performed by: FAMILY MEDICINE

## 2024-07-21 PROCEDURE — 85520 HEPARIN ASSAY: CPT | Performed by: INTERNAL MEDICINE

## 2024-07-21 PROCEDURE — 82947 ASSAY GLUCOSE BLOOD QUANT: CPT

## 2024-07-21 PROCEDURE — 93971 EXTREMITY STUDY: CPT | Performed by: STUDENT IN AN ORGANIZED HEALTH CARE EDUCATION/TRAINING PROGRAM

## 2024-07-21 PROCEDURE — 94760 N-INVAS EAR/PLS OXIMETRY 1: CPT

## 2024-07-21 PROCEDURE — 83615 LACTATE (LD) (LDH) ENZYME: CPT | Performed by: NURSE PRACTITIONER

## 2024-07-21 PROCEDURE — 36415 COLL VENOUS BLD VENIPUNCTURE: CPT | Performed by: FAMILY MEDICINE

## 2024-07-21 PROCEDURE — 2500000002 HC RX 250 W HCPCS SELF ADMINISTERED DRUGS (ALT 637 FOR MEDICARE OP, ALT 636 FOR OP/ED): Performed by: FAMILY MEDICINE

## 2024-07-21 PROCEDURE — 80048 BASIC METABOLIC PNL TOTAL CA: CPT | Performed by: FAMILY MEDICINE

## 2024-07-21 PROCEDURE — 93971 EXTREMITY STUDY: CPT

## 2024-07-21 PROCEDURE — 1200000002 HC GENERAL ROOM WITH TELEMETRY DAILY

## 2024-07-21 PROCEDURE — 85730 THROMBOPLASTIN TIME PARTIAL: CPT | Performed by: FAMILY MEDICINE

## 2024-07-21 PROCEDURE — 2500000002 HC RX 250 W HCPCS SELF ADMINISTERED DRUGS (ALT 637 FOR MEDICARE OP, ALT 636 FOR OP/ED): Performed by: NURSE PRACTITIONER

## 2024-07-21 PROCEDURE — 84075 ASSAY ALKALINE PHOSPHATASE: CPT | Performed by: FAMILY MEDICINE

## 2024-07-21 PROCEDURE — 85610 PROTHROMBIN TIME: CPT | Performed by: FAMILY MEDICINE

## 2024-07-21 PROCEDURE — 83735 ASSAY OF MAGNESIUM: CPT | Performed by: NURSE PRACTITIONER

## 2024-07-21 PROCEDURE — 2500000004 HC RX 250 GENERAL PHARMACY W/ HCPCS (ALT 636 FOR OP/ED): Performed by: NURSE PRACTITIONER

## 2024-07-21 PROCEDURE — 84100 ASSAY OF PHOSPHORUS: CPT | Performed by: FAMILY MEDICINE

## 2024-07-21 PROCEDURE — 84550 ASSAY OF BLOOD/URIC ACID: CPT | Performed by: NURSE PRACTITIONER

## 2024-07-21 PROCEDURE — 2500000001 HC RX 250 WO HCPCS SELF ADMINISTERED DRUGS (ALT 637 FOR MEDICARE OP): Performed by: NURSE PRACTITIONER

## 2024-07-21 PROCEDURE — 85520 HEPARIN ASSAY: CPT | Performed by: FAMILY MEDICINE

## 2024-07-21 PROCEDURE — 2500000001 HC RX 250 WO HCPCS SELF ADMINISTERED DRUGS (ALT 637 FOR MEDICARE OP): Performed by: INTERNAL MEDICINE

## 2024-07-21 PROCEDURE — 2500000004 HC RX 250 GENERAL PHARMACY W/ HCPCS (ALT 636 FOR OP/ED): Performed by: FAMILY MEDICINE

## 2024-07-21 RX ORDER — INSULIN LISPRO 100 [IU]/ML
0-10 INJECTION, SOLUTION INTRAVENOUS; SUBCUTANEOUS
Status: DISCONTINUED | OUTPATIENT
Start: 2024-07-22 | End: 2024-07-24 | Stop reason: HOSPADM

## 2024-07-21 RX ORDER — SODIUM BICARBONATE 650 MG/1
1300 TABLET ORAL 2 TIMES DAILY
Status: DISCONTINUED | OUTPATIENT
Start: 2024-07-21 | End: 2024-07-22

## 2024-07-21 RX ORDER — HEPARIN SODIUM 10000 [USP'U]/100ML
0-4500 INJECTION, SOLUTION INTRAVENOUS CONTINUOUS
Status: DISCONTINUED | OUTPATIENT
Start: 2024-07-21 | End: 2024-07-24 | Stop reason: HOSPADM

## 2024-07-21 RX ORDER — SODIUM,POTASSIUM PHOSPHATES 280-250MG
1 POWDER IN PACKET (EA) ORAL 4 TIMES DAILY
Status: COMPLETED | OUTPATIENT
Start: 2024-07-21 | End: 2024-07-22

## 2024-07-21 RX ORDER — INSULIN LISPRO 100 [IU]/ML
0-10 INJECTION, SOLUTION INTRAVENOUS; SUBCUTANEOUS 4 TIMES DAILY
Status: DISCONTINUED | OUTPATIENT
Start: 2024-07-22 | End: 2024-07-21

## 2024-07-21 ASSESSMENT — COGNITIVE AND FUNCTIONAL STATUS - GENERAL
STANDING UP FROM CHAIR USING ARMS: A LITTLE
PERSONAL GROOMING: A LITTLE
HELP NEEDED FOR BATHING: A LOT
WALKING IN HOSPITAL ROOM: A LITTLE
CLIMB 3 TO 5 STEPS WITH RAILING: A LOT
HELP NEEDED FOR BATHING: A LOT
TOILETING: A LOT
WALKING IN HOSPITAL ROOM: A LITTLE
MOVING FROM LYING ON BACK TO SITTING ON SIDE OF FLAT BED WITH BEDRAILS: A LITTLE
MOBILITY SCORE: 17
TURNING FROM BACK TO SIDE WHILE IN FLAT BAD: A LITTLE
MOVING FROM LYING ON BACK TO SITTING ON SIDE OF FLAT BED WITH BEDRAILS: A LITTLE
MOBILITY SCORE: 18
MOVING TO AND FROM BED TO CHAIR: A LITTLE
CLIMB 3 TO 5 STEPS WITH RAILING: A LITTLE
TURNING FROM BACK TO SIDE WHILE IN FLAT BAD: A LITTLE
PERSONAL GROOMING: A LITTLE
DRESSING REGULAR LOWER BODY CLOTHING: A LITTLE
DAILY ACTIVITIY SCORE: 17
DRESSING REGULAR UPPER BODY CLOTHING: A LITTLE
DRESSING REGULAR UPPER BODY CLOTHING: A LITTLE
TOILETING: A LOT
DRESSING REGULAR LOWER BODY CLOTHING: A LITTLE
MOVING TO AND FROM BED TO CHAIR: A LITTLE
DAILY ACTIVITIY SCORE: 17
STANDING UP FROM CHAIR USING ARMS: A LITTLE

## 2024-07-21 ASSESSMENT — PAIN SCALES - GENERAL: PAINLEVEL_OUTOF10: 0 - NO PAIN

## 2024-07-21 NOTE — CARE PLAN
The patient's goals for the shift include  pt will remain safe and free of injury    The clinical goals for the shift include pt will have decrease in BMs throughout shift    Over the shift, the patient did not make progress toward decreasing bowel movements. Pt given anti-diarrheal interventions per PRN orders. Tolerating those interventions well. Pt remained safe and free of injury with bed maintained in lowest position and call light within reach. Pt remains optimistic about care.     Problem: Skin  Goal: Prevent/manage excess moisture  Outcome: Progressing  Flowsheets (Taken 7/21/2024 4336)  Prevent/manage excess moisture: Cleanse incontinence/protect with barrier cream  Note: Cleansed incontinence episodes per need.  Goal: Prevent/minimize sheer/friction injuries  Outcome: Progressing  Goal: Promote/optimize nutrition  Outcome: Progressing     Problem: Pain - Adult  Goal: Verbalizes/displays adequate comfort level or baseline comfort level  Outcome: Progressing     Problem: Safety - Adult  Goal: Free from fall injury  Outcome: Progressing     Problem: Discharge Planning  Goal: Discharge to home or other facility with appropriate resources  Outcome: Progressing     Problem: Chronic Conditions and Co-morbidities  Goal: Patient's chronic conditions and co-morbidity symptoms are monitored and maintained or improved  Outcome: Progressing

## 2024-07-21 NOTE — CONSULTS
HISTORY OF PRESENT ILLNESS:  This is a  74 y.o. y.o. who presents with NOHELIA/diarrhea had maldonado placed recently, and developed gross hematuria.  He has a known history of BPH, he was supposed to undergo a robotic prostatectomy but this was canceled due to his ongoing medical issues.  He does not report any symptoms of UTI.  He does have decreased urine output due to the NOHELIA.  Maldonado is draining with regular flushes.         Past Medical History  He has a past medical history of Abnormal weight gain (10/27/2016), Achilles tendinitis, unspecified leg (08/16/2016), Acute upper respiratory infection, unspecified, NOHELIA (acute kidney injury) ([facility]) (10/02/2023), Allergic contact dermatitis due to plants, except food (08/22/2013), Arthritis, Bell's palsy (03/21/2023), BPH with obstruction/lower urinary tract symptoms, Cancer (Multi), Cellulitis of right lower limb (07/30/2021), Cough, unspecified (03/21/2016), Diabetes (Multi), Encounter for screening for human immunodeficiency virus (HIV) (06/14/2016), Hordeolum externum unspecified eye, unspecified eyelid (08/14/2019), Hyperglycemia, unspecified (12/13/2017), Hypertension, Incisional hernia without obstruction or gangrene (06/15/2015), Knee joint pain, Melanoma (Multi), MSSA bacteremia (10/2023), Muscle spasm of calf (08/16/2016), Obstructive uropathy, Personal history of other diseases of the digestive system (06/30/2015), Personal history of other diseases of the digestive system (02/05/2016), Personal history of other diseases of the respiratory system (12/23/2014), Personal history of other diseases of the respiratory system (01/28/2016), Personal history of other infectious and parasitic diseases (12/02/2015), Personal history of other infectious and parasitic diseases, Personal history of other specified conditions (02/04/2015), Personal history of other specified conditions (08/16/2016), Personal history of other specified conditions (01/26/2015), Personal  "history of pneumonia (recurrent) (01/29/2016), Personal history of urinary (tract) infections (02/19/2013), Prostatitis (03/21/2023), Pruritus, unspecified (02/10/2015), Sinoatrial node dysfunction (Multi), Strain of muscle, fascia and tendon of the posterior muscle group at thigh level, right thigh, initial encounter (05/10/2016), and Unspecified symptoms and signs involving the genitourinary system (12/20/2016).    Surgical History  He has a past surgical history that includes Varicose vein surgery (08/22/2013); Cardiac pacemaker placement (08/22/2013); Other surgical history (06/15/2015); Ventral hernia repair (06/15/2015); Other surgical history (09/16/2019); Peripherally inserted central catheter insertion; Cardiac electrophysiology procedure (Left, 11/22/2023); US guided biopsy lymph node superficial (11/29/2023); Cardiac electrophysiology procedure (N/A, 11/30/2023); and Skin biopsy.     Social History  He reports that he has never smoked. He has never been exposed to tobacco smoke. He has never used smokeless tobacco. He reports that he does not currently use alcohol. He reports that he does not use drugs.    Family History  Family History   Problem Relation Name Age of Onset    Cancer Mother Maye Verduzco         Allergies  Bupropion, Keflex [cephalexin], and Ciprofloxacin        A comprehensive 10+ review of systems was negative except for: see hpi                    PHYSICAL EXAMINATION:  BP Readings from Last 3 Encounters:   07/21/24 102/62   07/11/24 107/56   06/20/24 124/62      Wt Readings from Last 3 Encounters:   07/15/24 94.4 kg (208 lb 1.6 oz)   07/11/24 97.8 kg (215 lb 9.8 oz)   07/11/24 97.8 kg (215 lb 9.8 oz)      BMI: Estimated body mass index is 26.72 kg/m² as calculated from the following:    Height as of this encounter: 1.88 m (6' 2\").    Weight as of this encounter: 94.4 kg (208 lb 1.6 oz).  BSA: Estimated body surface area is 2.22 meters squared as calculated from the following:    " "Height as of this encounter: 1.88 m (6' 2\").    Weight as of this encounter: 94.4 kg (208 lb 1.6 oz).  HEENT: Normocephalic, atraumatic, PER EOMI, nonicteric, trachea normal, thyroid normal, oropharynx normal.  CARDIAC: regular rate & rhythm, S1 & S2 normal.  No heaves, thrills, gallops or murmurs.  LUNGS: Clear to auscultation, no spinal or CV tenderness.  EXTREMITIES: No evidence of cyanosis, clubbing or edema.    Gross blood noted within the Rojo tubing and the Rojo bag      IMPRESSION AND PLAN:  Chester JORGENSEN Luba is a 74 y.o. who presents with  metastatic melanoma, BPH admitted with acute on chronic kidney disease and diarrhea with decreased urine output    Now with hematuria  Likely due to traumatic insertion of the Rojo  Stopped Eliquis  Continue flushes  If issue continues will need CBI       "

## 2024-07-21 NOTE — PROGRESS NOTES
"Chester Verduzco is a 74 y.o. male on day 4 of admission presenting with Intractable nausea and vomiting.      Subjective   \"I feel better today\", but still \"very weak\", with occasional nausea/diarrhea.       Objective   Alert/interactive, very weak.  Says he is feeling \"a bit better\".  Still with nausea/diarrhea.  No CP/SOB.  Denies any pain.       Vitals 24HR  Heart Rate:  [66-82]   Temp:  [35.7 °C (96.3 °F)-36.6 °C (97.9 °F)]   Resp:  [16-18]   BP: (100-115)/(62-70)   SpO2:  [97 %-99 %]     Not a candidate for transplant    Intake/Output last 3 Shifts:    Intake/Output Summary (Last 24 hours) at 7/21/2024 1450  Last data filed at 7/21/2024 0643  Gross per 24 hour   Intake 2298.75 ml   Output 680 ml   Net 1618.75 ml       Physical Exam  Mouth and MM slightly dry  No JVD  Lungs diminished, no rhonchi/wheeze  Heart mildly irregular rate/rhythm  Abdomen:  generalized discomfort, some bloating still, no point tenderness, hyperactive BS  Generalized anasarca in extremities, especially lower legs  :  maldonado to CD with very dark urine      Relevant Results  atorvastatin, 20 mg, oral, Daily  colesevelam, 3,750 mg, oral, Daily  docusate sodium, 100 mg, oral, BID  DULoxetine, 60 mg, oral, Daily  insulin degludec, 10 Units, subcutaneous, Nightly  insulin lispro, 0-10 Units, subcutaneous, q6h  methylPREDNISolone sodium succinate (PF), 93.75 mg, intravenous, q24h  metoprolol succinate XL, 25 mg, oral, Daily  polyethylene glycol, 17 g, oral, Daily  potassium chloride CR, 40 mEq, oral, TID  potassium, sodium phosphates, 1 packet, oral, 4x daily  sodium bicarbonate, 1,300 mg, oral, BID  tamsulosin, 0.8 mg, oral, Nightly       dextrose 5%-0.45 % sodium chloride, 100 mL/hr, Last Rate: 100 mL/hr (07/21/24 0643)  heparin, 0-4,500 Units/hr       Results for orders placed or performed during the hospital encounter of 07/15/24 (from the past 24 hour(s))   POCT GLUCOSE   Result Value Ref Range    POCT Glucose 89 74 - 99 mg/dL   POCT " GLUCOSE   Result Value Ref Range    POCT Glucose 105 (H) 74 - 99 mg/dL   Hepatic Function Panel   Result Value Ref Range    Albumin 2.2 (L) 3.4 - 5.0 g/dL    Bilirubin, Total 0.4 0.0 - 1.2 mg/dL    Bilirubin, Direct 0.1 0.0 - 0.3 mg/dL    Alkaline Phosphatase 109 33 - 136 U/L    ALT 13 10 - 52 U/L    AST 19 9 - 39 U/L    Total Protein 4.5 (L) 6.4 - 8.2 g/dL   CBC   Result Value Ref Range    WBC 8.6 4.4 - 11.3 x10*3/uL    nRBC 0.5 (H) 0.0 - 0.0 /100 WBCs    RBC 3.96 (L) 4.50 - 5.90 x10*6/uL    Hemoglobin 10.9 (L) 13.5 - 17.5 g/dL    Hematocrit 33.3 (L) 41.0 - 52.0 %    MCV 84 80 - 100 fL    MCH 27.5 26.0 - 34.0 pg    MCHC 32.7 32.0 - 36.0 g/dL    RDW 18.2 (H) 11.5 - 14.5 %    Platelets 95 (L) 150 - 450 x10*3/uL   Magnesium   Result Value Ref Range    Magnesium 2.12 1.60 - 2.40 mg/dL   Phosphorus   Result Value Ref Range    Phosphorus 2.2 (L) 2.5 - 4.9 mg/dL   Basic Metabolic Panel   Result Value Ref Range    Glucose 109 (H) 74 - 99 mg/dL    Sodium 144 136 - 145 mmol/L    Potassium 3.6 3.5 - 5.3 mmol/L    Chloride 117 (H) 98 - 107 mmol/L    Bicarbonate 20 (L) 21 - 32 mmol/L    Anion Gap 11 10 - 20 mmol/L    Urea Nitrogen 52 (H) 6 - 23 mg/dL    Creatinine 4.67 (H) 0.50 - 1.30 mg/dL    eGFR 13 (L) >60 mL/min/1.73m*2    Calcium 7.3 (L) 8.6 - 10.3 mg/dL   POCT GLUCOSE   Result Value Ref Range    POCT Glucose 105 (H) 74 - 99 mg/dL   POCT GLUCOSE   Result Value Ref Range    POCT Glucose 107 (H) 74 - 99 mg/dL   POCT GLUCOSE   Result Value Ref Range    POCT Glucose 124 (H) 74 - 99 mg/dL   aPTT - baseline   Result Value Ref Range    aPTT 34 27 - 38 seconds   Protime-INR   Result Value Ref Range    Protime 11.7 9.8 - 12.8 seconds    INR 1.0 0.9 - 1.1       Assessment/Plan       CKD stage 3B/4 r/t RVD/DN with baseline Scr 1.5 to 2.0. NOHELIA continues to worsen over the last few days. Presented Scr 2.55, up to 4.24 yesterday and 4.67 today.  Likely related to recent hypovolemia, persistent GI losses.  K+ better (3.6), however  requiring signficant oral supplementation (40 mEq TID) given GI losses/diarrhea.  Now off the bicarb drip due to rise in serum sodium, and on hypotonic IVF instead.  With serum CO2 dropping again will add oral Bicarb 1300 mg BID.   Urine (via maldonado) very dark - likely combo of hematuria/hypovolemia I suspect.  No way to get any accurate urine lytes.  Given multiple DVT sites one possibility is also for renal thrombosis as well, will check LDH, though already being treated with Anticoagulation.  Will also check uric acid to make sure no uric acid nephropathy.  Ultimately however I suspect need to push hydration more, given his GI losses/diarrhea - so will increase IVF rate to 125 ml/hr.   No acute need for RRT/dialysis - though may need to d/w him about that if NOHELIA continues to worsen.      Maldonado in place due to retention, obstruction - with  following.   Repeat DARRYL today per  and if hydronephrosis persists would need IR input for neph tube.      BP low/stable, now only on low dose BB with parameters.  No diuretics.  Pulse ox 99% room air.  IVF as above.    Hypophosphatemia (2.2) given poor oral intake and GI losses.  Adding oral K-Phos for now.  No Phos binders.    GI losses persist, with nausea.  GI following.    New DVT, now on Heparin drip.  Hx of CA, so suspect hypercoagulable.     Discussed with Dr. Moon (Attending_     I spent 30 minutes in the professional and overall care of this patient.      Omero Mena, APRN-CNP

## 2024-07-21 NOTE — CARE PLAN
Problem: Skin  Goal: Prevent/manage excess moisture  Outcome: Progressing     Problem: Skin  Goal: Prevent/minimize sheer/friction injuries  Outcome: Progressing   The patient's goals for the shift include  Patient will participate in care to help prevent skin breakdown    The clinical goals for the shift include pt will have decrease in BMs throughout shift    Over the shift, the patient helped with turns. A waffle pad and waffle boots were placed to help prevent breakdown. Patient will remain safe and stable

## 2024-07-21 NOTE — PROGRESS NOTES
"Chester Verduzco is a 74 y.o. male on day 4 of admission presenting with Intractable nausea and vomiting.      Subjective   Patient seen and evaluated this morning, reports his stool has slowed down and bulked up \"just a tad\" and he feels a little better but not completely back to normal. Started having hematuria overnight that still looks very dark, although may be due to limited urine output.    Objective     Last Recorded Vitals  /62 (BP Location: Left arm, Patient Position: Lying)   Pulse 66   Temp 35.7 °C (96.3 °F) (Temporal)   Resp 16   Wt 94.4 kg (208 lb 1.6 oz)   SpO2 99%   Intake/Output last 3 Shifts:    Intake/Output Summary (Last 24 hours) at 7/21/2024 1247  Last data filed at 7/21/2024 0643  Gross per 24 hour   Intake 2298.75 ml   Output 680 ml   Net 1618.75 ml       Admission Weight  Weight: 99.8 kg (220 lb) (07/15/24 1538)    Daily Weight  07/15/24 : 94.4 kg (208 lb 1.6 oz)    Image Results  Lower extremity venous duplex right  Narrative: Interpreted By:  Lit Cardenas,   STUDY:  [facility] US LOWER EXTREMITY VENOUS DUPLEX RIGHT  7/21/2024 11:34 am      INDICATION:  73 y/o   M with  Signs/Symptoms:right leg edema. LMP:  Unknown.      COMPARISON:  None.      ACCESSION NUMBER(S):  [id number]      ORDERING CLINICIAN:  JACQUELIN WALTER      TECHNIQUE:  Routine ultrasound of the  right lower extremity was performed with  duplex Doppler (color and spectral) evaluation.   Static images were  obtained for remote interpretation.      FINDINGS:  THIGH VEINS: The right common femoral vein is patent and  compressible. Nonocclusive thrombus involving the proximal right  superficial femoral vein. Occlusive thrombi involving the mid/distal  right superior fissural femoral vein as well as the right popliteal  vein. Below-knee and calvarium are not properly visualized.  Contralateral left common femoral vein is compressible.      Impression: 1. Acute deep venous thrombosis manifested by multiple occlusive " and  nonocclusive thrombi involving the right femoral and popliteal veins.  Below-knee veins are not properly visualized.      Significant results of the study were relayed by me to and  acknowledged by Amberly guajardo DO on 07/21/2024 at 11:40 a.m.  through [employer] secure chat.      MACRO:  None      Signed by: Lit Cardenas 7/21/2024 11:40 AM  Dictation workstation:   [id number]      Physical Exam  Constitutional: alert and oriented x 3, awake, cooperative, no acute distress but chronically ill-appearing  Skin: warm and dry  Head/Neck: Normocephalic, atraumatic  Eyes: clear sclera  ENMT: mucous membranes moist  Cardio: Regular rate and rhythm  Resp: CTA bilaterally, good respiratory effort  Gastrointestinal: Soft, nontender, distended at baseline but soft  Musculoskeletal: generalized weakness  Neuro: alert and oriented x 3  Psychological: Appropriate mood and behavior    Relevant Results  Scheduled medications  atorvastatin, 20 mg, oral, Daily  colesevelam, 3,750 mg, oral, Daily  docusate sodium, 100 mg, oral, BID  DULoxetine, 60 mg, oral, Daily  insulin degludec, 10 Units, subcutaneous, Nightly  insulin lispro, 0-10 Units, subcutaneous, q6h  methylPREDNISolone sodium succinate (PF), 93.75 mg, intravenous, q24h  metoprolol succinate XL, 25 mg, oral, Daily  polyethylene glycol, 17 g, oral, Daily  potassium chloride CR, 40 mEq, oral, TID  potassium, sodium phosphates, 1 packet, oral, 4x daily  sodium bicarbonate, 1,300 mg, oral, BID  tamsulosin, 0.8 mg, oral, Nightly      Continuous medications  dextrose 5%-0.45 % sodium chloride, 100 mL/hr, Last Rate: 100 mL/hr (07/21/24 0643)  heparin, 0-4,500 Units/hr      PRN medications  PRN medications: acetaminophen **OR** acetaminophen **OR** acetaminophen, alum-mag hydroxide-simeth, calcium carbonate, dextromethorphan-guaifenesin, dextrose, dextrose, diphenoxylate-atropine, glucagon, glucagon, guaiFENesin, ondansetron **OR** ondansetron    Results for orders placed or  performed during the hospital encounter of 07/15/24 (from the past 24 hour(s))   POCT GLUCOSE   Result Value Ref Range    POCT Glucose 89 74 - 99 mg/dL   POCT GLUCOSE   Result Value Ref Range    POCT Glucose 105 (H) 74 - 99 mg/dL   Hepatic Function Panel   Result Value Ref Range    Albumin 2.2 (L) 3.4 - 5.0 g/dL    Bilirubin, Total 0.4 0.0 - 1.2 mg/dL    Bilirubin, Direct 0.1 0.0 - 0.3 mg/dL    Alkaline Phosphatase 109 33 - 136 U/L    ALT 13 10 - 52 U/L    AST 19 9 - 39 U/L    Total Protein 4.5 (L) 6.4 - 8.2 g/dL   CBC   Result Value Ref Range    WBC 8.6 4.4 - 11.3 x10*3/uL    nRBC 0.5 (H) 0.0 - 0.0 /100 WBCs    RBC 3.96 (L) 4.50 - 5.90 x10*6/uL    Hemoglobin 10.9 (L) 13.5 - 17.5 g/dL    Hematocrit 33.3 (L) 41.0 - 52.0 %    MCV 84 80 - 100 fL    MCH 27.5 26.0 - 34.0 pg    MCHC 32.7 32.0 - 36.0 g/dL    RDW 18.2 (H) 11.5 - 14.5 %    Platelets 95 (L) 150 - 450 x10*3/uL   Magnesium   Result Value Ref Range    Magnesium 2.12 1.60 - 2.40 mg/dL   Phosphorus   Result Value Ref Range    Phosphorus 2.2 (L) 2.5 - 4.9 mg/dL   Basic Metabolic Panel   Result Value Ref Range    Glucose 109 (H) 74 - 99 mg/dL    Sodium 144 136 - 145 mmol/L    Potassium 3.6 3.5 - 5.3 mmol/L    Chloride 117 (H) 98 - 107 mmol/L    Bicarbonate 20 (L) 21 - 32 mmol/L    Anion Gap 11 10 - 20 mmol/L    Urea Nitrogen 52 (H) 6 - 23 mg/dL    Creatinine 4.67 (H) 0.50 - 1.30 mg/dL    eGFR 13 (L) >60 mL/min/1.73m*2    Calcium 7.3 (L) 8.6 - 10.3 mg/dL   POCT GLUCOSE   Result Value Ref Range    POCT Glucose 105 (H) 74 - 99 mg/dL   POCT GLUCOSE   Result Value Ref Range    POCT Glucose 107 (H) 74 - 99 mg/dL   POCT GLUCOSE   Result Value Ref Range    POCT Glucose 124 (H) 74 - 99 mg/dL   aPTT - baseline   Result Value Ref Range    aPTT 34 27 - 38 seconds   Protime-INR   Result Value Ref Range    Protime 11.7 9.8 - 12.8 seconds    INR 1.0 0.9 - 1.1     Lower extremity venous duplex right    Result Date: 7/21/2024  Interpreted By:  Lit Cardenas, STUDY: [facility] US LOWER  EXTREMITY VENOUS DUPLEX RIGHT  7/21/2024 11:34 am   INDICATION: 75 y/o   M with  Signs/Symptoms:right leg edema. LMP:  Unknown.   COMPARISON: None.   ACCESSION NUMBER(S): [id number]   ORDERING CLINICIAN: JACQUELIN WALTER   TECHNIQUE: Routine ultrasound of the  right lower extremity was performed with duplex Doppler (color and spectral) evaluation.   Static images were obtained for remote interpretation.   FINDINGS: THIGH VEINS: The right common femoral vein is patent and compressible. Nonocclusive thrombus involving the proximal right superficial femoral vein. Occlusive thrombi involving the mid/distal right superior fissural femoral vein as well as the right popliteal vein. Below-knee and calvarium are not properly visualized. Contralateral left common femoral vein is compressible.       1. Acute deep venous thrombosis manifested by multiple occlusive and nonocclusive thrombi involving the right femoral and popliteal veins. Below-knee veins are not properly visualized.   Significant results of the study were relayed by me to and acknowledged by Jacquelin walter DO on 07/21/2024 at 11:40 a.m. through [employer] secure chat.   MACRO: None   Signed by: Lit Cardenas 7/21/2024 11:40 AM Dictation workstation:   [id number]       Assessment/Plan   74yo CM with PMH of malignant melanoma on immunotherapy, IDDM-type II, CKD, HTN, SSS s/p PPM complicated by infected leads, and hx of DVT on eliquis but not anymore that presented to the ED with diarrhea and was admitted for NOHELIA on CKD.    NOHELIA on CKD, worsened  - initially thought to be prerenal but now concerned of AIN as well  - renal ultrasound showed mild bilateral cortical thickening but no hydronephrosis  - nephro following, continue IV fluids  - maintain maldonado for strict I/Os  - continue to check RFP BID    Acute RLE DVT  - in setting of malignancy and hx of DVT previously on eliquis  - start heparin drip, no bolus due to hematuria  - check CBC this afternoon  - will need  thinner at discharge pending resolution of symptoms    Hematuria  - felt to be in setting of maldonado insertion trauma  - urology consulted, rec continue with PRN flushes  - discussed starting heparin drip due to DVT, ok with starting but if decompensates overnight significantly would need transfer to [facility] otherwise will be seen by Dr. Bosch tomorrow     Acute diarrhea and vomiting, improved  - likely secondary to immunotherapy  - Cdiff and stool pathogen both negative  - GI consulted, feel this is noninfectious so treat per heme/onc  - onc consulted, rec high dose steroids x5days (day 4 today)  - continue lomotil/imodium PRN  - continue supportive care     Hypokalemia  - likely secondary to above  - replete and recheck     Hypertension  - continue homemetoprolol, statin   - hold home amlodipine, lasix, losartan in setting of above     Malignant melanoma  - on immunotherapy  - onc consulted, rec high dose steroids x5days (day 4 today)     DVT Proph: heparin drip     Dispo: Patient requires close inpatient monitoring in setting of worsening NOHELIA on CKD requiring further workup and persistent diarrhea, no plans for discharge at this time.           Principal Problem:    Intractable nausea and vomiting  Active Problems:    Nausea and vomiting, unspecified vomiting type        Amberly Philip DO

## 2024-07-22 LAB
ALBUMIN SERPL BCP-MCNC: 2.1 G/DL (ref 3.4–5)
ANION GAP SERPL CALC-SCNC: 10 MMOL/L (ref 10–20)
BUN SERPL-MCNC: 48 MG/DL (ref 6–23)
CALCIUM SERPL-MCNC: 6.9 MG/DL (ref 8.6–10.3)
CHLORIDE SERPL-SCNC: 119 MMOL/L (ref 98–107)
CO2 SERPL-SCNC: 18 MMOL/L (ref 21–32)
CREAT SERPL-MCNC: 3.98 MG/DL (ref 0.5–1.3)
CREAT UR-MCNC: 98.1 MG/DL (ref 20–370)
EGFRCR SERPLBLD CKD-EPI 2021: 15 ML/MIN/1.73M*2
ERYTHROCYTE [DISTWIDTH] IN BLOOD BY AUTOMATED COUNT: 18.3 % (ref 11.5–14.5)
GLUCOSE BLD MANUAL STRIP-MCNC: 130 MG/DL (ref 74–99)
GLUCOSE BLD MANUAL STRIP-MCNC: 143 MG/DL (ref 74–99)
GLUCOSE BLD MANUAL STRIP-MCNC: 149 MG/DL (ref 74–99)
GLUCOSE BLD MANUAL STRIP-MCNC: 179 MG/DL (ref 74–99)
GLUCOSE SERPL-MCNC: 158 MG/DL (ref 74–99)
HCT VFR BLD AUTO: 32.1 % (ref 41–52)
HGB BLD-MCNC: 10.3 G/DL (ref 13.5–17.5)
MAGNESIUM SERPL-MCNC: 2.05 MG/DL (ref 1.6–2.4)
MCH RBC QN AUTO: 27.5 PG (ref 26–34)
MCHC RBC AUTO-ENTMCNC: 32.1 G/DL (ref 32–36)
MCV RBC AUTO: 86 FL (ref 80–100)
NRBC BLD-RTO: 0.3 /100 WBCS (ref 0–0)
PHOSPHATE SERPL-MCNC: 2.4 MG/DL (ref 2.5–4.9)
PLATELET # BLD AUTO: 79 X10*3/UL (ref 150–450)
POTASSIUM SERPL-SCNC: 4 MMOL/L (ref 3.5–5.3)
RBC # BLD AUTO: 3.75 X10*6/UL (ref 4.5–5.9)
SODIUM SERPL-SCNC: 143 MMOL/L (ref 136–145)
UFH PPP CHRO-ACNC: 0.6 IU/ML
UFH PPP CHRO-ACNC: 0.7 IU/ML
UFH PPP CHRO-ACNC: 0.7 IU/ML
UFH PPP CHRO-ACNC: 0.8 IU/ML
UFH PPP CHRO-ACNC: 1.2 IU/ML
URATE UR-MCNC: 81 MG/DL
URIC ACID/CREATININE (MG/G CREAT) IN URINE: 826 MG/G CREAT
WBC # BLD AUTO: 9.5 X10*3/UL (ref 4.4–11.3)

## 2024-07-22 PROCEDURE — 2500000004 HC RX 250 GENERAL PHARMACY W/ HCPCS (ALT 636 FOR OP/ED): Performed by: NURSE PRACTITIONER

## 2024-07-22 PROCEDURE — 2500000001 HC RX 250 WO HCPCS SELF ADMINISTERED DRUGS (ALT 637 FOR MEDICARE OP): Performed by: INTERNAL MEDICINE

## 2024-07-22 PROCEDURE — 2500000001 HC RX 250 WO HCPCS SELF ADMINISTERED DRUGS (ALT 637 FOR MEDICARE OP)

## 2024-07-22 PROCEDURE — 82947 ASSAY GLUCOSE BLOOD QUANT: CPT

## 2024-07-22 PROCEDURE — 2500000002 HC RX 250 W HCPCS SELF ADMINISTERED DRUGS (ALT 637 FOR MEDICARE OP, ALT 636 FOR OP/ED): Performed by: NURSE PRACTITIONER

## 2024-07-22 PROCEDURE — 85027 COMPLETE CBC AUTOMATED: CPT | Performed by: INTERNAL MEDICINE

## 2024-07-22 PROCEDURE — 83735 ASSAY OF MAGNESIUM: CPT | Performed by: NURSE PRACTITIONER

## 2024-07-22 PROCEDURE — 84560 ASSAY OF URINE/URIC ACID: CPT | Mod: GEALAB | Performed by: NURSE PRACTITIONER

## 2024-07-22 PROCEDURE — 2500000002 HC RX 250 W HCPCS SELF ADMINISTERED DRUGS (ALT 637 FOR MEDICARE OP, ALT 636 FOR OP/ED): Performed by: FAMILY MEDICINE

## 2024-07-22 PROCEDURE — 2500000001 HC RX 250 WO HCPCS SELF ADMINISTERED DRUGS (ALT 637 FOR MEDICARE OP): Performed by: NURSE PRACTITIONER

## 2024-07-22 PROCEDURE — 1200000002 HC GENERAL ROOM WITH TELEMETRY DAILY

## 2024-07-22 PROCEDURE — 97530 THERAPEUTIC ACTIVITIES: CPT | Mod: GP,CQ

## 2024-07-22 PROCEDURE — 2500000002 HC RX 250 W HCPCS SELF ADMINISTERED DRUGS (ALT 637 FOR MEDICARE OP, ALT 636 FOR OP/ED)

## 2024-07-22 PROCEDURE — 80069 RENAL FUNCTION PANEL: CPT | Performed by: FAMILY MEDICINE

## 2024-07-22 PROCEDURE — 99233 SBSQ HOSP IP/OBS HIGH 50: CPT

## 2024-07-22 PROCEDURE — 85520 HEPARIN ASSAY: CPT | Performed by: INTERNAL MEDICINE

## 2024-07-22 PROCEDURE — 36415 COLL VENOUS BLD VENIPUNCTURE: CPT | Performed by: FAMILY MEDICINE

## 2024-07-22 PROCEDURE — 97116 GAIT TRAINING THERAPY: CPT | Mod: GP,CQ

## 2024-07-22 PROCEDURE — 94760 N-INVAS EAR/PLS OXIMETRY 1: CPT

## 2024-07-22 PROCEDURE — 85520 HEPARIN ASSAY: CPT | Performed by: FAMILY MEDICINE

## 2024-07-22 PROCEDURE — 36415 COLL VENOUS BLD VENIPUNCTURE: CPT | Performed by: NURSE PRACTITIONER

## 2024-07-22 PROCEDURE — 2500000002 HC RX 250 W HCPCS SELF ADMINISTERED DRUGS (ALT 637 FOR MEDICARE OP, ALT 636 FOR OP/ED): Performed by: INTERNAL MEDICINE

## 2024-07-22 PROCEDURE — 2500000004 HC RX 250 GENERAL PHARMACY W/ HCPCS (ALT 636 FOR OP/ED): Performed by: INTERNAL MEDICINE

## 2024-07-22 PROCEDURE — 2500000004 HC RX 250 GENERAL PHARMACY W/ HCPCS (ALT 636 FOR OP/ED): Performed by: FAMILY MEDICINE

## 2024-07-22 RX ORDER — DEXTROSE MONOHYDRATE AND SODIUM CHLORIDE 5; .225 G/100ML; G/100ML
125 INJECTION, SOLUTION INTRAVENOUS CONTINUOUS
Status: DISCONTINUED | OUTPATIENT
Start: 2024-07-22 | End: 2024-07-23

## 2024-07-22 RX ORDER — SODIUM BICARBONATE 650 MG/1
1300 TABLET ORAL 3 TIMES DAILY
Status: DISCONTINUED | OUTPATIENT
Start: 2024-07-22 | End: 2024-07-24 | Stop reason: HOSPADM

## 2024-07-22 RX ORDER — POTASSIUM CHLORIDE 1.5 G/1.58G
40 POWDER, FOR SOLUTION ORAL 3 TIMES DAILY
Status: DISCONTINUED | OUTPATIENT
Start: 2024-07-22 | End: 2024-07-24 | Stop reason: HOSPADM

## 2024-07-22 RX ORDER — WARFARIN SODIUM 5 MG/1
5 TABLET ORAL DAILY
Status: DISCONTINUED | OUTPATIENT
Start: 2024-07-22 | End: 2024-07-23

## 2024-07-22 RX ORDER — ALLOPURINOL 100 MG/1
100 TABLET ORAL DAILY
Status: DISCONTINUED | OUTPATIENT
Start: 2024-07-22 | End: 2024-07-24 | Stop reason: HOSPADM

## 2024-07-22 ASSESSMENT — COGNITIVE AND FUNCTIONAL STATUS - GENERAL
TURNING FROM BACK TO SIDE WHILE IN FLAT BAD: A LITTLE
CLIMB 3 TO 5 STEPS WITH RAILING: A LITTLE
PERSONAL GROOMING: A LITTLE
STANDING UP FROM CHAIR USING ARMS: A LITTLE
DAILY ACTIVITIY SCORE: 18
DRESSING REGULAR UPPER BODY CLOTHING: A LITTLE
STANDING UP FROM CHAIR USING ARMS: A LITTLE
MOVING TO AND FROM BED TO CHAIR: A LITTLE
WALKING IN HOSPITAL ROOM: A LITTLE
DRESSING REGULAR UPPER BODY CLOTHING: A LITTLE
DRESSING REGULAR LOWER BODY CLOTHING: A LITTLE
MOBILITY SCORE: 18
TOILETING: A LOT
WALKING IN HOSPITAL ROOM: A LITTLE
TURNING FROM BACK TO SIDE WHILE IN FLAT BAD: A LITTLE
MOBILITY SCORE: 18
MOVING FROM LYING ON BACK TO SITTING ON SIDE OF FLAT BED WITH BEDRAILS: A LITTLE
MOVING TO AND FROM BED TO CHAIR: A LITTLE
HELP NEEDED FOR BATHING: A LITTLE
MOVING FROM LYING ON BACK TO SITTING ON SIDE OF FLAT BED WITH BEDRAILS: A LITTLE
TOILETING: A LOT
HELP NEEDED FOR BATHING: A LITTLE
MOVING FROM LYING ON BACK TO SITTING ON SIDE OF FLAT BED WITH BEDRAILS: A LITTLE
TURNING FROM BACK TO SIDE WHILE IN FLAT BAD: A LITTLE
WALKING IN HOSPITAL ROOM: A LITTLE
DAILY ACTIVITIY SCORE: 19
STANDING UP FROM CHAIR USING ARMS: A LITTLE
DRESSING REGULAR LOWER BODY CLOTHING: A LITTLE
MOVING TO AND FROM BED TO CHAIR: A LITTLE
MOBILITY SCORE: 18

## 2024-07-22 ASSESSMENT — PAIN SCALES - GENERAL
PAINLEVEL_OUTOF10: 0 - NO PAIN
PAINLEVEL_OUTOF10: 0 - NO PAIN

## 2024-07-22 ASSESSMENT — PAIN - FUNCTIONAL ASSESSMENT: PAIN_FUNCTIONAL_ASSESSMENT: 0-10

## 2024-07-22 NOTE — PROGRESS NOTES
07/22/24 1059   Discharge Planning   Living Arrangements Alone   Support Systems Friends/neighbors   Assistance Needed Alert and oriented x 3, Independent with ADL's, Doesn't drive, (friends transport to appointments), Shower chair, Walker, Wheel chair, BSC, Ramp, Grab bars. Active with [facility] for SNand PT   Type of Residence Private residence   Number of Stairs to Enter Residence 2   Number of Stairs Within Residence 0   Do you have animals or pets at home? Yes   Type of Animals or Pets 1 dog; 2 cats   Who is requesting discharge planning? Provider   Home or Post Acute Services Post acute facilities (Rehab/SNF/etc)   Type of Post Acute Facility Services Skilled nursing  (PT/ OT recommending mod. Discussed with patient, agreeable to SNF. Choices provided. Selected [facility]. Referral sent in [facility]. Facility willing to accept. Not a precert.)   Expected Discharge Disposition SNF  (PT/ OT recommending mod. Discussed with patient, agreeable to SNF. Choices provided. Selected [facility]. Referral sent in [facility]. Facility willing to accept. Not a precert. Not medically ready.)   Does the patient need discharge transport arranged? Yes   RoundTrip coordination needed? Yes   Has discharge transport been arranged? No   Patient Choice   Provider Choice list and CMS website (https://medicare.gov/care-compare#search) for post-acute Quality and Resource Measure Data were provided and reviewed with: Patient

## 2024-07-22 NOTE — PROGRESS NOTES
"Chester Verduzco is a 74 y.o. male on day 5 of admission presenting with Intractable nausea and vomiting.    Subjective   On evaluation this morning, patient reports episodes of diarrhea that \"come and go\", although decreasing in frequency. Pt also reports nausea this AM but no vomiting. Furthermore, pt reports feeling general fatigue, denies dizziness or lightheadedness. Is still having hematuria with limited urine output. Denies any F/C, chest pain, palpitations, SOB, or abdominal pain.        Objective     Physical Exam  Constitutional:       General: He is not in acute distress.     Appearance: He is ill-appearing.   HENT:      Head: Normocephalic and atraumatic.   Eyes:      Extraocular Movements: Extraocular movements intact.      Conjunctiva/sclera: Conjunctivae normal.      Pupils: Pupils are equal, round, and reactive to light.   Cardiovascular:      Rate and Rhythm: Normal rate and regular rhythm.      Pulses: Normal pulses.      Heart sounds: No murmur heard.  Pulmonary:      Effort: Pulmonary effort is normal. No respiratory distress.      Breath sounds: Normal breath sounds.   Abdominal:      General: Bowel sounds are normal. There is distension.      Palpations: Abdomen is soft.      Tenderness: There is no abdominal tenderness.   Genitourinary:     Comments: Very dark urine with red tinge noted in drainage bag   Musculoskeletal:      Comments: BLE venous stasis edema    Neurological:      Mental Status: He is alert and oriented to person, place, and time.      Motor: Weakness present.   Psychiatric:         Mood and Affect: Mood normal.         Behavior: Behavior normal.         Last Recorded Vitals  Blood pressure 102/60, pulse 74, temperature 35.4 °C (95.7 °F), temperature source Temporal, resp. rate 18, height 1.88 m (6' 2\"), weight 94.4 kg (208 lb 1.6 oz), SpO2 99%.  Intake/Output last 3 Shifts:  I/O last 3 completed shifts:  In: 3595.5 (38.1 mL/kg) [P.O.:240; I.V.:3355.5 (35.5 mL/kg)]  Out: " 1025 (10.9 mL/kg) [Urine:1025 (0.3 mL/kg/hr)]  Weight: 94.4 kg     Relevant Results  Scheduled medications  allopurinol, 100 mg, oral, Daily  atorvastatin, 20 mg, oral, Daily  colesevelam, 3,750 mg, oral, Daily  DULoxetine, 60 mg, oral, Daily  insulin degludec, 10 Units, subcutaneous, Nightly  insulin lispro, 0-10 Units, subcutaneous, TID  [START ON 7/23/2024] methylPREDNISolone sodium succinate (PF), 93.75 mg, intravenous, q24h  metoprolol succinate XL, 25 mg, oral, Daily  potassium chloride, 40 mEq, oral, TID  sodium bicarbonate, 1,300 mg, oral, TID  tamsulosin, 0.8 mg, oral, Nightly  warfarin, 5 mg, oral, Daily      Continuous medications  dextrose 5%-0.2 % sod chloride, 125 mL/hr, Last Rate: 125 mL/hr (07/22/24 1219)  heparin, 0-4,500 Units/hr, Last Rate: 1,200 Units/hr (07/22/24 1124)      PRN medications  PRN medications: acetaminophen **OR** acetaminophen **OR** acetaminophen, alum-mag hydroxide-simeth, calcium carbonate, dextromethorphan-guaifenesin, dextrose, dextrose, diphenoxylate-atropine, glucagon, glucagon, guaiFENesin, ondansetron **OR** ondansetron      Results for orders placed or performed during the hospital encounter of 07/15/24 (from the past 24 hour(s))   CBC   Result Value Ref Range    WBC 6.9 4.4 - 11.3 x10*3/uL    nRBC 0.4 (H) 0.0 - 0.0 /100 WBCs    RBC 3.77 (L) 4.50 - 5.90 x10*6/uL    Hemoglobin 10.4 (L) 13.5 - 17.5 g/dL    Hematocrit 31.8 (L) 41.0 - 52.0 %    MCV 84 80 - 100 fL    MCH 27.6 26.0 - 34.0 pg    MCHC 32.7 32.0 - 36.0 g/dL    RDW 18.0 (H) 11.5 - 14.5 %    Platelets 78 (L) 150 - 450 x10*3/uL   Renal function panel   Result Value Ref Range    Glucose 186 (H) 74 - 99 mg/dL    Sodium 145 136 - 145 mmol/L    Potassium 4.1 3.5 - 5.3 mmol/L    Chloride 118 (H) 98 - 107 mmol/L    Bicarbonate 20 (L) 21 - 32 mmol/L    Anion Gap 11 10 - 20 mmol/L    Urea Nitrogen 53 (H) 6 - 23 mg/dL    Creatinine 4.40 (H) 0.50 - 1.30 mg/dL    eGFR 13 (L) >60 mL/min/1.73m*2    Calcium 7.2 (L) 8.6 - 10.3  mg/dL    Phosphorus 2.6 2.5 - 4.9 mg/dL    Albumin 2.1 (L) 3.4 - 5.0 g/dL   POCT GLUCOSE   Result Value Ref Range    POCT Glucose 162 (H) 74 - 99 mg/dL   Heparin Assay, [facility]   Result Value Ref Range    Heparin Unfractionated 0.5 See Comment Below for Therapeutic Ranges IU/mL   Heparin Assay, [facility]   Result Value Ref Range    Heparin Unfractionated 0.8 See Comment Below for Therapeutic Ranges IU/mL   POCT GLUCOSE   Result Value Ref Range    POCT Glucose 188 (H) 74 - 99 mg/dL   Renal Function Panel   Result Value Ref Range    Glucose 158 (H) 74 - 99 mg/dL    Sodium 143 136 - 145 mmol/L    Potassium 4.0 3.5 - 5.3 mmol/L    Chloride 119 (H) 98 - 107 mmol/L    Bicarbonate 18 (L) 21 - 32 mmol/L    Anion Gap 10 10 - 20 mmol/L    Urea Nitrogen 48 (H) 6 - 23 mg/dL    Creatinine 3.98 (H) 0.50 - 1.30 mg/dL    eGFR 15 (L) >60 mL/min/1.73m*2    Calcium 6.9 (L) 8.6 - 10.3 mg/dL    Phosphorus 2.4 (L) 2.5 - 4.9 mg/dL    Albumin 2.1 (L) 3.4 - 5.0 g/dL   Magnesium   Result Value Ref Range    Magnesium 2.05 1.60 - 2.40 mg/dL   Heparin Assay, [facility]   Result Value Ref Range    Heparin Unfractionated 1.2 (HH) See Comment Below for Therapeutic Ranges IU/mL   CBC   Result Value Ref Range    WBC 9.5 4.4 - 11.3 x10*3/uL    nRBC 0.3 (H) 0.0 - 0.0 /100 WBCs    RBC 3.75 (L) 4.50 - 5.90 x10*6/uL    Hemoglobin 10.3 (L) 13.5 - 17.5 g/dL    Hematocrit 32.1 (L) 41.0 - 52.0 %    MCV 86 80 - 100 fL    MCH 27.5 26.0 - 34.0 pg    MCHC 32.1 32.0 - 36.0 g/dL    RDW 18.3 (H) 11.5 - 14.5 %    Platelets 79 (L) 150 - 450 x10*3/uL   POCT GLUCOSE   Result Value Ref Range    POCT Glucose 149 (H) 74 - 99 mg/dL   Heparin Assay, [facility]   Result Value Ref Range    Heparin Unfractionated 0.8 See Comment Below for Therapeutic Ranges IU/mL   Heparin Assay   Result Value Ref Range    Heparin Unfractionated 0.6 See Comment Below for Therapeutic Ranges IU/mL   POCT GLUCOSE   Result Value Ref Range    POCT Glucose 130 (H) 74 - 99 mg/dL      Lower extremity venous duplex  right    Result Date: 7/21/2024  Interpreted By:  Lit Cardenas, STUDY: VASC US LOWER EXTREMITY VENOUS DUPLEX RIGHT  7/21/2024 11:34 am   INDICATION: 75 y/o   M with  Signs/Symptoms:right leg edema. LMP:  Unknown.   COMPARISON: None.   ACCESSION NUMBER(S): [id number]   ORDERING CLINICIAN: JACQUELIN WALTER   TECHNIQUE: Routine ultrasound of the  right lower extremity was performed with duplex Doppler (color and spectral) evaluation.   Static images were obtained for remote interpretation.   FINDINGS: THIGH VEINS: The right common femoral vein is patent and compressible. Nonocclusive thrombus involving the proximal right superficial femoral vein. Occlusive thrombi involving the mid/distal right superior fissural femoral vein as well as the right popliteal vein. Below-knee and calvarium are not properly visualized. Contralateral left common femoral vein is compressible.       1. Acute deep venous thrombosis manifested by multiple occlusive and nonocclusive thrombi involving the right femoral and popliteal veins. Below-knee veins are not properly visualized.   Significant results of the study were relayed by me to and acknowledged by Jacquelin walter DO on 07/21/2024 at 11:40 a.m. through [employer] secure chat.   MACRO: None   Signed by: Lit Cardenas 7/21/2024 11:40 AM Dictation workstation:   [id number]    US renal complete    Result Date: 7/17/2024  Interpreted By:  Connie Victoria, STUDY: US RENAL COMPLETE;  7/17/2024 2:06 pm   INDICATION: Signs/Symptoms:worsening kidney function.   COMPARISON: 11/13/2023   ACCESSION NUMBER(S): [id number]   ORDERING CLINICIAN: JACQUELIN WALTER   TECHNIQUE: Multiple images of the kidneys were obtained  with grayscale and color Doppler imaging.   FINDINGS: RIGHT KIDNEY: The right kidney measures 10.4 cm in length. There is suggestion of mildly increased renal cortical echogenicity with mild cortical thinning measuring up to 9 mm. No hydronephrosis is present; no evidence of  nephrolithiasis.   LEFT KIDNEY: The left kidney measures 11.7 cm in length. Suggestion of mild thinning of renal cortex with mildly increased renal cortical echogenicity. There is persistent evidence of an exophytic hypodense lesion in the superior pole of left kidney measuring up to 7.3 cm with through transmission and without internal complexity or vascularity. This is suggestive of a simple cyst. No hydronephrosis is present; no evidence of nephrolithiasis.   BLADDER: Urinary bladder is mildly distended. There is circumferential wall thickening with trabeculated morphology of the urinary bladder. Prostate gland is enlarged with median lobe hypertrophy indenting on the base of the bladder.       1. Mild bilateral renal cortical thinning with increased renal cortical echogenicity, which can be associated with medical renal disease in appropriate clinical setting. 2. Stable left renal cyst. No hydronephrosis. 3. Prostatomegaly. 4. Circumferential wall thickening with trabeculated morphology of the urinary bladder, which could be related to chronic bladder outlet obstruction in appropriate clinical setting.   MACRO: None   Signed by: Connie Victoria 7/17/2024 2:57 PM Dictation workstation:   [id number]    CT abdomen pelvis wo IV contrast    Result Date: 7/15/2024  Interpreted By:  Roberto Carlos Ludwig, STUDY: CT ABDOMEN PELVIS WO IV CONTRAST;  7/15/2024 5:26 pm   INDICATION: Signs/Symptoms:abd pain, n/v/d.   COMPARISON: May 28, 2024   ACCESSION NUMBER(S): [id number]   ORDERING CLINICIAN: JOANA PICKETT   TECHNIQUE: CT of the abdomen and pelvis was performed. Contiguous axial images were obtained at 3 mm slice thickness through the abdomen and pelvis. Coronal and sagittal reconstructions at 3 mm slice thickness were performed.  No intravenous contrast was administered; positive oral contrast was given.   FINDINGS: Please note that the evaluation of vessels, lymph nodes and organs is limited without intravenous contrast.    Bronchial thickening. There is a small hiatal hernia. There is hepatic steatosis. Unremarkable adrenal glands   Unchanged renal cyst on the left measuring up to 6.3 cm. The spleen remains enlarged.   Prostate gland is markedly enlarged at 8.1 x 8.1 cm. Urinary bladder has a thick wall suggesting bladder outlet obstruction. Trace air seen in the bladder. Bladder calcification measuring 1.4 cm.   Prominent loops of small bowel seen suggesting some component of enteritis. No discrete bowel obstruction. There is fluid in the colon which can be seen the setting of diarrhea. Diverticulosis. No overt stigmata of diverticulitis   Bulky left pelvic and inguinal lymph nodes appear to be decreasing. Reference on the left measuring 3.5 cm previously measuring 4.2 cm. Dominant mass seen in the left inguinal region previously measured about 6.4 x 4.7 cm now measures 5.2 x 3 cm.   Abdominal hernia changes are noted.   Mild degenerative disc disease detected in the lower lumbar spine.         1.  Markedly enlarged prostate gland with thick-walled urinary bladder and bladder calcification. Correlation with PSA is advised. 2. Decreasing malignant adenopathy seen in the left pelvis and inguinal regions suggesting some component of response to therapy 3. Severe vascular calcification 4. Hepatic steatosis. Splenomegaly. 5. Fluid-filled loops of small and large bowel without discrete transition or obstruction. Query any component of enteritis or diarrhea. There is diverticulosis. No evidence of diverticulitis     MACRO: None   Signed by: Roberto Carlos Ludwig 7/15/2024 5:59 PM Dictation workstation:   [id number]      This patient has a urinary catheter       Assessment/Plan   Principal Problem:    Intractable nausea and vomiting  Active Problems:    Nausea and vomiting, unspecified vomiting type    74 yo M with PMHx of malignant melanoma on immunotherapy, IDDM-type II, CKD, HTN, SSS s/p PPM complicated by infected leads, and hx of DVT (put  on eliquis thereafter, not currently on it) that presented to the ED with diarrhea and was subsequently admitted for NOHELIA on CKD.     NOHELIA on CKD   - Creatinine trending down today at 3.98, 4.40 yesterday   - Strong suspicion for pre-renal/hypovolemia due to GI losses/diarrhea and poor oral intake vs. AIN   - Renal ultrasound showed mild bilateral cortical thickening but no hydronephrosis  - Nephro following, recommended switching to D5 1/4 NS today due to hyperchloremia   - Maintain maldonado for strict I/Os  - Continue checking renal function panels BID and monitoring volume status     Acute unprovoked RLE DVT  - In setting of malignancy and hx of DVT, previously on eliquis for that  - Continue heparin drip as bridge to warfarin therapy   - Per heme/onc recs, will start pt on 5 mg warfarin with daily INRs, not a suitable candidate for IVC filter due to prior infections with implantable devices    - Daily CBCs, continue monitoring     Hematuria 2/2 maldonado insertion trauma    - Urology following, recommended continuing PRN flushes     Immunotherapy-induced gastroenteritis   - C diff and stool pathogen resulted negative on 7/16  - Heme/onc following, today recommended 5 more days of high dose steroids due to continued diarrhea in addition to the 5/5 days of IV solumedrol pt had completed this AM   - GI consulted, defers to heme/onc as they feel diarrhea is noninfectious in nature  - Continue lomotil/imodium PRN     Hypokalemia likely 2/2 GI losses/diarrhea    - Continue 40 mEq Klor Con TID - switched to powder today per pt preference  - Continue monitoring with AM labs     HTN  - /60 this AM  - Continue home metoprolol, statin  - Continue holding home amlodipine, lasix, losartan    Malignant melanoma   - On immunotherapy  - Onc following, pt will receive 5 additional days of high dose steroids for immunotherapy-induced gastroenteritis     Diet: Regular  DVT ppx: Heparin bridge to warfarin   Dispo: continue close  inpatient monitoring in setting of worsening NOHELIA on CKD, persistent diarrhea - continue high dose steroids      Abdoulaye A Ulises,

## 2024-07-22 NOTE — CARE PLAN
The patient's goals for the shift include  remain comfortable throughout shift.    The clinical goals for the shift include Pt will remain safe throughout shift.

## 2024-07-22 NOTE — PROGRESS NOTES
"Chester Verduzco is a 74 y.o. male on day 5 of admission presenting with Intractable nausea and vomiting.      Subjective   Feeling \"better today\".   I got a bit lightheaded when I stood up to the chair.  Still some nausea, and diarrhea.       Objective   Alert/interactive.  No SOB/CP.  Slight orthostasis as above, with persistent nausea/diarrhea.  No more vomiting.         Vitals 24HR  Heart Rate:  [50-73]   Temp:  [35.7 °C (96.3 °F)-36.3 °C (97.3 °F)]   Resp:  [16-18]   BP: (102-110)/(62-63)   SpO2:  [95 %-99 %]     Not a candidate for transplant    Intake/Output last 3 Shifts:    Intake/Output Summary (Last 24 hours) at 7/22/2024 1158  Last data filed at 7/22/2024 0528  Gross per 24 hour   Intake 2137.2 ml   Output 525 ml   Net 1612.2 ml       Physical Exam  Mouth and MM dry  No JVD  Lungs CTA  Heart mildly irregular RR  Abd, soft, hyperactive BS, with generalized discomfort, but no point tenderness  Ext:  anasarca, with early venous stasis edema in lower legs.  :  Rojo to CD, with very dark urine, minimal      Relevant Results  allopurinol, 100 mg, oral, Daily  atorvastatin, 20 mg, oral, Daily  colesevelam, 3,750 mg, oral, Daily  DULoxetine, 60 mg, oral, Daily  insulin degludec, 10 Units, subcutaneous, Nightly  insulin lispro, 0-10 Units, subcutaneous, TID  metoprolol succinate XL, 25 mg, oral, Daily  potassium chloride CR, 40 mEq, oral, TID  potassium, sodium phosphates, 1 packet, oral, 4x daily  sodium bicarbonate, 1,300 mg, oral, TID  tamsulosin, 0.8 mg, oral, Nightly       Results for orders placed or performed during the hospital encounter of 07/15/24 (from the past 96 hour(s))   POCT GLUCOSE   Result Value Ref Range    POCT Glucose 114 (H) 74 - 99 mg/dL   POCT GLUCOSE   Result Value Ref Range    POCT Glucose 110 (H) 74 - 99 mg/dL   POCT GLUCOSE   Result Value Ref Range    POCT Glucose 114 (H) 74 - 99 mg/dL   POCT GLUCOSE   Result Value Ref Range    POCT Glucose 93 74 - 99 mg/dL   Hepatic Function " Panel   Result Value Ref Range    Albumin 2.2 (L) 3.4 - 5.0 g/dL    Bilirubin, Total 0.4 0.0 - 1.2 mg/dL    Bilirubin, Direct 0.1 0.0 - 0.3 mg/dL    Alkaline Phosphatase 93 33 - 136 U/L    ALT 9 (L) 10 - 52 U/L    AST 16 9 - 39 U/L    Total Protein 4.5 (L) 6.4 - 8.2 g/dL   CBC   Result Value Ref Range    WBC 9.0 4.4 - 11.3 x10*3/uL    nRBC 0.3 (H) 0.0 - 0.0 /100 WBCs    RBC 4.06 (L) 4.50 - 5.90 x10*6/uL    Hemoglobin 11.3 (L) 13.5 - 17.5 g/dL    Hematocrit 34.0 (L) 41.0 - 52.0 %    MCV 84 80 - 100 fL    MCH 27.8 26.0 - 34.0 pg    MCHC 33.2 32.0 - 36.0 g/dL    RDW 17.9 (H) 11.5 - 14.5 %    Platelets 94 (L) 150 - 450 x10*3/uL   Phosphorus   Result Value Ref Range    Phosphorus 2.3 (L) 2.5 - 4.9 mg/dL   Basic Metabolic Panel   Result Value Ref Range    Glucose 98 74 - 99 mg/dL    Sodium 145 136 - 145 mmol/L    Potassium 3.1 (L) 3.5 - 5.3 mmol/L    Chloride 119 (H) 98 - 107 mmol/L    Bicarbonate 17 (L) 21 - 32 mmol/L    Anion Gap 12 10 - 20 mmol/L    Urea Nitrogen 44 (H) 6 - 23 mg/dL    Creatinine 3.85 (H) 0.50 - 1.30 mg/dL    eGFR 16 (L) >60 mL/min/1.73m*2    Calcium 7.4 (L) 8.6 - 10.3 mg/dL   POCT GLUCOSE   Result Value Ref Range    POCT Glucose 94 74 - 99 mg/dL   POCT GLUCOSE   Result Value Ref Range    POCT Glucose 75 74 - 99 mg/dL   POCT GLUCOSE   Result Value Ref Range    POCT Glucose 79 74 - 99 mg/dL   Urine culture    Specimen: Indwelling (Rojo) Catheter; Urine   Result Value Ref Range    Urine Culture 20,000 - 80,000 Gram Negative Bacilli (A)    POCT GLUCOSE   Result Value Ref Range    POCT Glucose 96 74 - 99 mg/dL   POCT GLUCOSE   Result Value Ref Range    POCT Glucose 162 (H) 74 - 99 mg/dL   POCT GLUCOSE   Result Value Ref Range    POCT Glucose 126 (H) 74 - 99 mg/dL   Hepatic Function Panel   Result Value Ref Range    Albumin 2.2 (L) 3.4 - 5.0 g/dL    Bilirubin, Total 0.4 0.0 - 1.2 mg/dL    Bilirubin, Direct 0.1 0.0 - 0.3 mg/dL    Alkaline Phosphatase 103 33 - 136 U/L    ALT 12 10 - 52 U/L    AST 18 9 - 39  U/L    Total Protein 4.7 (L) 6.4 - 8.2 g/dL   CBC   Result Value Ref Range    WBC 8.4 4.4 - 11.3 x10*3/uL    nRBC 0.4 (H) 0.0 - 0.0 /100 WBCs    RBC 4.19 (L) 4.50 - 5.90 x10*6/uL    Hemoglobin 11.4 (L) 13.5 - 17.5 g/dL    Hematocrit 34.6 (L) 41.0 - 52.0 %    MCV 83 80 - 100 fL    MCH 27.2 26.0 - 34.0 pg    MCHC 32.9 32.0 - 36.0 g/dL    RDW 18.1 (H) 11.5 - 14.5 %    Platelets 94 (L) 150 - 450 x10*3/uL   Phosphorus   Result Value Ref Range    Phosphorus 2.8 2.5 - 4.9 mg/dL   Basic Metabolic Panel   Result Value Ref Range    Glucose 104 (H) 74 - 99 mg/dL    Sodium 146 (H) 136 - 145 mmol/L    Potassium 3.3 (L) 3.5 - 5.3 mmol/L    Chloride 117 (H) 98 - 107 mmol/L    Bicarbonate 21 21 - 32 mmol/L    Anion Gap 11 10 - 20 mmol/L    Urea Nitrogen 48 (H) 6 - 23 mg/dL    Creatinine 4.24 (H) 0.50 - 1.30 mg/dL    eGFR 14 (L) >60 mL/min/1.73m*2    Calcium 7.4 (L) 8.6 - 10.3 mg/dL   POCT GLUCOSE   Result Value Ref Range    POCT Glucose 107 (H) 74 - 99 mg/dL   POCT GLUCOSE   Result Value Ref Range    POCT Glucose 88 74 - 99 mg/dL   POCT GLUCOSE   Result Value Ref Range    POCT Glucose 89 74 - 99 mg/dL   POCT GLUCOSE   Result Value Ref Range    POCT Glucose 105 (H) 74 - 99 mg/dL   Hepatic Function Panel   Result Value Ref Range    Albumin 2.2 (L) 3.4 - 5.0 g/dL    Bilirubin, Total 0.4 0.0 - 1.2 mg/dL    Bilirubin, Direct 0.1 0.0 - 0.3 mg/dL    Alkaline Phosphatase 109 33 - 136 U/L    ALT 13 10 - 52 U/L    AST 19 9 - 39 U/L    Total Protein 4.5 (L) 6.4 - 8.2 g/dL   CBC   Result Value Ref Range    WBC 8.6 4.4 - 11.3 x10*3/uL    nRBC 0.5 (H) 0.0 - 0.0 /100 WBCs    RBC 3.96 (L) 4.50 - 5.90 x10*6/uL    Hemoglobin 10.9 (L) 13.5 - 17.5 g/dL    Hematocrit 33.3 (L) 41.0 - 52.0 %    MCV 84 80 - 100 fL    MCH 27.5 26.0 - 34.0 pg    MCHC 32.7 32.0 - 36.0 g/dL    RDW 18.2 (H) 11.5 - 14.5 %    Platelets 95 (L) 150 - 450 x10*3/uL   Magnesium   Result Value Ref Range    Magnesium 2.12 1.60 - 2.40 mg/dL   Phosphorus   Result Value Ref Range     Phosphorus 2.2 (L) 2.5 - 4.9 mg/dL   Basic Metabolic Panel   Result Value Ref Range    Glucose 109 (H) 74 - 99 mg/dL    Sodium 144 136 - 145 mmol/L    Potassium 3.6 3.5 - 5.3 mmol/L    Chloride 117 (H) 98 - 107 mmol/L    Bicarbonate 20 (L) 21 - 32 mmol/L    Anion Gap 11 10 - 20 mmol/L    Urea Nitrogen 52 (H) 6 - 23 mg/dL    Creatinine 4.67 (H) 0.50 - 1.30 mg/dL    eGFR 13 (L) >60 mL/min/1.73m*2    Calcium 7.3 (L) 8.6 - 10.3 mg/dL   Lactate dehydrogenase   Result Value Ref Range     (H) 84 - 246 U/L   Uric Acid   Result Value Ref Range    Uric Acid 15.9 (H) 4.0 - 7.5 mg/dL   POCT GLUCOSE   Result Value Ref Range    POCT Glucose 105 (H) 74 - 99 mg/dL   POCT GLUCOSE   Result Value Ref Range    POCT Glucose 107 (H) 74 - 99 mg/dL   POCT GLUCOSE   Result Value Ref Range    POCT Glucose 124 (H) 74 - 99 mg/dL   aPTT - baseline   Result Value Ref Range    aPTT 34 27 - 38 seconds   Protime-INR   Result Value Ref Range    Protime 11.7 9.8 - 12.8 seconds    INR 1.0 0.9 - 1.1   CBC   Result Value Ref Range    WBC 6.9 4.4 - 11.3 x10*3/uL    nRBC 0.4 (H) 0.0 - 0.0 /100 WBCs    RBC 3.77 (L) 4.50 - 5.90 x10*6/uL    Hemoglobin 10.4 (L) 13.5 - 17.5 g/dL    Hematocrit 31.8 (L) 41.0 - 52.0 %    MCV 84 80 - 100 fL    MCH 27.6 26.0 - 34.0 pg    MCHC 32.7 32.0 - 36.0 g/dL    RDW 18.0 (H) 11.5 - 14.5 %    Platelets 78 (L) 150 - 450 x10*3/uL   Renal function panel   Result Value Ref Range    Glucose 186 (H) 74 - 99 mg/dL    Sodium 145 136 - 145 mmol/L    Potassium 4.1 3.5 - 5.3 mmol/L    Chloride 118 (H) 98 - 107 mmol/L    Bicarbonate 20 (L) 21 - 32 mmol/L    Anion Gap 11 10 - 20 mmol/L    Urea Nitrogen 53 (H) 6 - 23 mg/dL    Creatinine 4.40 (H) 0.50 - 1.30 mg/dL    eGFR 13 (L) >60 mL/min/1.73m*2    Calcium 7.2 (L) 8.6 - 10.3 mg/dL    Phosphorus 2.6 2.5 - 4.9 mg/dL    Albumin 2.1 (L) 3.4 - 5.0 g/dL   POCT GLUCOSE   Result Value Ref Range    POCT Glucose 162 (H) 74 - 99 mg/dL   Heparin Assay, UFH   Result Value Ref Range    Heparin  Unfractionated 0.5 See Comment Below for Therapeutic Ranges IU/mL   Heparin Assay, UFH   Result Value Ref Range    Heparin Unfractionated 0.8 See Comment Below for Therapeutic Ranges IU/mL   POCT GLUCOSE   Result Value Ref Range    POCT Glucose 188 (H) 74 - 99 mg/dL   Renal Function Panel   Result Value Ref Range    Glucose 158 (H) 74 - 99 mg/dL    Sodium 143 136 - 145 mmol/L    Potassium 4.0 3.5 - 5.3 mmol/L    Chloride 119 (H) 98 - 107 mmol/L    Bicarbonate 18 (L) 21 - 32 mmol/L    Anion Gap 10 10 - 20 mmol/L    Urea Nitrogen 48 (H) 6 - 23 mg/dL    Creatinine 3.98 (H) 0.50 - 1.30 mg/dL    eGFR 15 (L) >60 mL/min/1.73m*2    Calcium 6.9 (L) 8.6 - 10.3 mg/dL    Phosphorus 2.4 (L) 2.5 - 4.9 mg/dL    Albumin 2.1 (L) 3.4 - 5.0 g/dL   Magnesium   Result Value Ref Range    Magnesium 2.05 1.60 - 2.40 mg/dL   Heparin Assay, UFH   Result Value Ref Range    Heparin Unfractionated 1.2 (HH) See Comment Below for Therapeutic Ranges IU/mL   CBC   Result Value Ref Range    WBC 9.5 4.4 - 11.3 x10*3/uL    nRBC 0.3 (H) 0.0 - 0.0 /100 WBCs    RBC 3.75 (L) 4.50 - 5.90 x10*6/uL    Hemoglobin 10.3 (L) 13.5 - 17.5 g/dL    Hematocrit 32.1 (L) 41.0 - 52.0 %    MCV 86 80 - 100 fL    MCH 27.5 26.0 - 34.0 pg    MCHC 32.1 32.0 - 36.0 g/dL    RDW 18.3 (H) 11.5 - 14.5 %    Platelets 79 (L) 150 - 450 x10*3/uL   POCT GLUCOSE   Result Value Ref Range    POCT Glucose 149 (H) 74 - 99 mg/dL   Heparin Assay, UFH   Result Value Ref Range    Heparin Unfractionated 0.8 See Comment Below for Therapeutic Ranges IU/mL   Heparin Assay   Result Value Ref Range    Heparin Unfractionated 0.6 See Comment Below for Therapeutic Ranges IU/mL   POCT GLUCOSE   Result Value Ref Range    POCT Glucose 130 (H) 74 - 99 mg/dL          Assessment/Plan       CKD stage 3B/4 r/t RVD/DN with baseline Scr 1.5 to 2.0. NOHELIA continues to worsen over the last few days. Presented Scr 2.55, up to 4.67 yesterday and 3.98 today.  Strong suspicion for pre-renal/hypovolemia due to GI  "losses/diarrhea and poor oral intake.  NOHELIA better as above, continue to push fluid removal as Pul status allows.  Currently on D 5 1/2 NS , but with hyperchloremia will cut to D5 1/4 NS, and if persists would go with straight D5W or LR.    CO2 down again - will increase oral Bicarb.  Uric acid HIGH (15.9), likely hemoconcentrated.   Doubt tumor lysis syndrome, but given CA hx will check uric acid/creatinine spot urine test to see if any urate nephropathy.  Restarting Allopurinol for now.  Pending above may need Rasburicase Rx.   LDH ok so doubt any renal thrombosis (given DVT's).  Monitor volume status/labs.    Rojo still in place, with  following r/t retention.   Gross hematuria persist.    BP low/stable, on low dose BB only.  Breathing fine room air.  Mild orthostasis as above - continue IVF.    GI issues persist with nausea/diarrhea.  GI following.    New DVT, on Heparin drip.  Given CA hx suspect hypercoag.    Hgb 10.3, stable.  Still with hemauric losses, but no other bleeding.  No SOPHIA needed, especially in light of new DVT.    Still hypoPhos (2.4).  Related to GI losses and poor oral intake.  Now on oral K-phos.  Monitor.       Severe hyperurasemic, with uric acid 15.9.  Hx of gout, and he says \"I've been on Allopurinol for years\".   Will restart Allopurinol 100 mg/day - dose adjusted for eGFR.  Checking for urate nephropathy as above.  NO NSAID or Colchcine tx given eGFR.   Not symptomatic at this point.  Monitor.      Discussed above information with Dr. Adhikari       I spent 40 minutes in the professional and overall care of this patient.      Omero Mena, APRN-CNP      "

## 2024-07-22 NOTE — CARE PLAN
The patient's goals for the shift include  remain comfortable throughout the night.    The clinical goals for the shift include pt will have decrease in BMs throughout shift         CONTINUE AUGMENTIN AS PRESCRIBED. TYLENOL FOR PAIN.    PLEASE FOLLOW-UP WITH ENT AS SCHEDULED ON WEDNESDAY, 9/28/22. MAY CALL IN THE MORNING FOR A SOONER APPOINTMENT IF PREFER.    ANY IMAGING RESULTS GIVEN IN THE EMERGENCY DEPARTMENT ARE PRELIMINARY UNTIL FORMALLY READ BY A RADIOLOGIST. YOU WILL BE CONTACTED IF THERE ARE ANY SIGNIFICANT CHANGES IN THE FINAL READ.    PLEASE RETURN TO THE EMERGENCY DEPARTMENT IF WORSENING JAW PAIN, RASH, FACE SWELLING, FEVER, CHEST PAIN, SHORTNESS OF BREATH, ABDOMINAL PAIN, VOMITING, OTHER CONCERNING SYMPTOMS.    PLEASE CONTACT DAMI BOOTH ([facility] EMERGENCY DEPARTMENT CLINICAL REFERRAL COORDINATOR) TO ASSIST IN SCHEDULING YOUR FOLLOW-UP APPOINTMENT.    Monday - Friday 11am-7pm  [phone]  [email or web address]

## 2024-07-22 NOTE — PROGRESS NOTES
Physical Therapy    Physical Therapy Treatment    Patient Name: Chester Verduzco  MRN: [id number]  Today's Date: 7/22/2024  Time Calculation  Start Time: 1107  Stop Time: 1135  Time Calculation (min): 28 min    Assessment/Plan   PT Assessment  PT Assessment Results: Decreased strength, Decreased endurance, Impaired balance, Decreased mobility  Rehab Prognosis: Good  Evaluation/Treatment Tolerance: Patient tolerated treatment well, Patient limited by fatigue  Medical Staff Made Aware: Yes  Strengths: Ability to acquire knowledge, Attitude of self  Barriers to Participation: Comorbidities  End of Session Communication: Bedside nurse  Assessment Comment: Patient presents with generalized weakness which affects his balance and endurance for activity. Patient requires one person assist to move sit/stand safely.  Patient is at increased risk for falls at this time. Continue to recommend MODERATE INTENSITY PT INTERVENTION at discharge and PT intervention during his acute LOS.  End of Session Patient Position: Up in chair, Alarm off, caregiver present     PT Plan  Treatment/Interventions: Bed mobility, Transfer training, Gait training, Stair training, Strengthening, Endurance training  PT Plan: Ongoing PT  PT Frequency: 3 times per week  PT Discharge Recommendations: Moderate intensity level of continued care  Equipment Recommended upon Discharge: Wheeled walker  PT Recommended Transfer Status: Assist x2 (for all transfers)  PT - OK to Discharge: Yes      General Visit Information:   PT  Visit  PT Received On: 07/22/24  Response to Previous Treatment: Patient with no complaints from previous session.  General  Reason for Referral: 73 yo male admit vomiting/diarrhea; possibly d/t immunotherapy tx.  Referred to PT for impaired mobility  Referred By: CONNIE Philip DO  Past Medical History Relevant to Rehab: PMH:history of malignant melanoma currently on immunotherapy, diabetes mellitus, and hypertension  Family/Caregiver Present:  No  Patient Position Received: Bed, 3 rail up  Preferred Learning Style: visual, verbal  General Comment: Patient's maldonado presents with blood during session. Nursing aware and present at EOS.    Subjective   Precautions:  Precautions  Medical Precautions: Fall precautions  Precautions Comment: IV, Tele, Maldonado  Vital Signs:       Objective   Pain:     Cognition:  Cognition  Overall Cognitive Status: Within Functional Limits  Orientation Level: Oriented X4  Coordination:     Postural Control:     Extremity/Trunk Assessments:    Activity Tolerance:  Activity Tolerance  Endurance: Tolerates 10 - 20 min exercise with multiple rests  Early Mobility/Exercise Safety Screen: Proceed with mobilization - No exclusion criteria met  Treatments:  Therapeutic Exercise  Therapeutic Exercise Performed: No    Therapeutic Activity  Therapeutic Activity Performed: Yes  Therapeutic Activity 1: Static sitting EOB x 3 minutes (No c/o of dizziness or nauseau)         Bed Mobility  Bed Mobility: Yes  Bed Mobility 1  Bed Mobility 1: Supine to sitting, Sitting to supine (Patient w/ SBA and extended time to complete. This PTA managing IV pole for transfer.)  Level of Assistance 1: Close supervision    Ambulation/Gait Training  Ambulation/Gait Training Performed: Yes  Ambulation/Gait Training 1  Surface 1: Level tile  Device 1: Rolling walker  Assistance 1: Contact guard  Quality of Gait 1: Narrow base of support, Diminished heel strike, Inconsistent stride length, Decreased step length  Comments/Distance (ft) 1: 15' x1 with 180 degree turns, 15' x 1 around bed to chair navigating obstacles and turns  Transfers  Transfer: Yes  Transfer 1  Transfer From 1: Bed to, Sit to, Stand to  Transfer to 1: Sit, Stand  Technique 1: Sit to stand, Stand to sit  Transfer Device 1: Walker (Bed raised for patient's height)  Transfer Level of Assistance 1: Contact guard  Trials/Comments 1: x 3 trials. VC for hand placement and controlling descent (Patient with  "good technique and understanding of instructions.)  Transfers 2  Transfer From 2: Chair with arms to  Transfer to 2: Sit  Technique 2: Stand to sit  Transfer Device 2: Walker  Transfer Level of Assistance 2: Minimum assistance  Trials/Comments 2: Ryan to assist control on descent to low chair. (Patient with good hand placement but continues to \"plop\" down initially and requires VC/Ryan to control transfer.)         Outcome Measures:  [facility] Basic Mobility  Turning from your back to your side while in a flat bed without using bedrails: A little  Moving from lying on your back to sitting on the side of a flat bed without using bedrails: A little  Moving to and from bed to chair (including a wheelchair): A little  Standing up from a chair using your arms (e.g. wheelchair or bedside chair): A little  To walk in hospital room: A little  Climbing 3-5 steps with railing: A little  Basic Mobility - Total Score: 18    Education Documentation  Precautions, taught by Aline Ansari PTA at 7/22/2024 12:08 PM.  Learner: Patient  Readiness: Acceptance  Method: Explanation, Demonstration  Response: Verbalizes Understanding, Demonstrated Understanding    Body Mechanics, taught by Aline Ansari PTA at 7/22/2024 12:08 PM.  Learner: Patient  Readiness: Acceptance  Method: Explanation, Demonstration  Response: Verbalizes Understanding, Demonstrated Understanding    Mobility Training, taught by Aline Ansari PTA at 7/22/2024 12:08 PM.  Learner: Patient  Readiness: Acceptance  Method: Explanation, Demonstration  Response: Verbalizes Understanding, Demonstrated Understanding    Education Comments  No comments found.        OP EDUCATION:       Encounter Problems       Encounter Problems (Active)       Balance       STG - Maintains static standing balance with upper extremity support using FWW x 3 min INDEPENDENTLY (Progressing)       Start:  07/18/24    Expected End:  08/01/24       INTERVENTIONS:  1. Practice standing with " minimal support.  2. Educate patient about standing tolerance.  3. Educate patient about independence with gait, transfers, and ADL's.  4. Educate patient about use of assistive device.  5. Educate patient about self-directed care.            Mobility       STG - Patient will ambulate >30 ft via FWW managing directional changes INDEPENDENTLY (Progressing)       Start:  07/18/24    Expected End:  08/01/24               PT Transfers       STG - Transfer from bed to chair INDEPENDENTLY via FWW (Progressing)       Start:  07/18/24    Expected End:  08/01/24            STG - Patient to transfer to and from sit to supine INDEPENDENTLY with adjustable bed (Progressing)       Start:  07/18/24    Expected End:  08/01/24            STG - Patient will transfer sit to and from stand INDEPENDENTLY via FWW (Progressing)       Start:  07/18/24    Expected End:  08/01/24               Pain - Adult

## 2024-07-22 NOTE — CARE PLAN
Problem: Skin  Goal: Prevent/manage excess moisture  Outcome: Progressing     Problem: Skin  Goal: Prevent/minimize sheer/friction injuries  Outcome: Progressing   The patient's goals for the shift include  Patient will have less frequent bowel movements     The clinical goals for the shift include Patient will be turned q2 hours during shift    Over the shift, the patient only had one incontinent bowel movement. Patient had less frequent Bm's than prior 3 shifts. Patient remained safe stable and comfortable.

## 2024-07-23 LAB
ALBUMIN SERPL BCP-MCNC: 2.1 G/DL (ref 3.4–5)
ANION GAP SERPL CALC-SCNC: 9 MMOL/L (ref 10–20)
BACTERIA UR CULT: ABNORMAL
BACTERIA UR CULT: ABNORMAL
BUN SERPL-MCNC: 42 MG/DL (ref 6–23)
CALCIUM SERPL-MCNC: 6.7 MG/DL (ref 8.6–10.3)
CHLORIDE SERPL-SCNC: 115 MMOL/L (ref 98–107)
CO2 SERPL-SCNC: 19 MMOL/L (ref 21–32)
CREAT SERPL-MCNC: 3.21 MG/DL (ref 0.5–1.3)
EGFRCR SERPLBLD CKD-EPI 2021: 20 ML/MIN/1.73M*2
ERYTHROCYTE [DISTWIDTH] IN BLOOD BY AUTOMATED COUNT: 18.4 % (ref 11.5–14.5)
GLUCOSE BLD MANUAL STRIP-MCNC: 100 MG/DL (ref 74–99)
GLUCOSE BLD MANUAL STRIP-MCNC: 108 MG/DL (ref 74–99)
GLUCOSE BLD MANUAL STRIP-MCNC: 125 MG/DL (ref 74–99)
GLUCOSE BLD MANUAL STRIP-MCNC: 130 MG/DL (ref 74–99)
GLUCOSE SERPL-MCNC: 137 MG/DL (ref 74–99)
HCT VFR BLD AUTO: 32 % (ref 41–52)
HGB BLD-MCNC: 10.1 G/DL (ref 13.5–17.5)
INR PPP: 1.1 (ref 0.9–1.1)
LABORATORY COMMENT REPORT: ABNORMAL
MAGNESIUM SERPL-MCNC: 1.79 MG/DL (ref 1.6–2.4)
MCH RBC QN AUTO: 26.9 PG (ref 26–34)
MCHC RBC AUTO-ENTMCNC: 31.6 G/DL (ref 32–36)
MCV RBC AUTO: 85 FL (ref 80–100)
NRBC BLD-RTO: 0.2 /100 WBCS (ref 0–0)
PHOSPHATE SERPL-MCNC: 2.3 MG/DL (ref 2.5–4.9)
PLATELET # BLD AUTO: 76 X10*3/UL (ref 150–450)
POTASSIUM SERPL-SCNC: 4 MMOL/L (ref 3.5–5.3)
PROTHROMBIN TIME: 11.9 SECONDS (ref 9.8–12.8)
RBC # BLD AUTO: 3.75 X10*6/UL (ref 4.5–5.9)
SODIUM SERPL-SCNC: 139 MMOL/L (ref 136–145)
UFH PPP CHRO-ACNC: 0.6 IU/ML
UFH PPP CHRO-ACNC: 0.6 IU/ML
UFH PPP CHRO-ACNC: 0.8 IU/ML
UFH PPP CHRO-ACNC: 0.9 IU/ML
WBC # BLD AUTO: 10 X10*3/UL (ref 4.4–11.3)

## 2024-07-23 PROCEDURE — 85610 PROTHROMBIN TIME: CPT

## 2024-07-23 PROCEDURE — 2500000004 HC RX 250 GENERAL PHARMACY W/ HCPCS (ALT 636 FOR OP/ED): Performed by: FAMILY MEDICINE

## 2024-07-23 PROCEDURE — 36415 COLL VENOUS BLD VENIPUNCTURE: CPT | Performed by: FAMILY MEDICINE

## 2024-07-23 PROCEDURE — 2500000001 HC RX 250 WO HCPCS SELF ADMINISTERED DRUGS (ALT 637 FOR MEDICARE OP): Performed by: NURSE PRACTITIONER

## 2024-07-23 PROCEDURE — 2500000004 HC RX 250 GENERAL PHARMACY W/ HCPCS (ALT 636 FOR OP/ED): Performed by: NURSE PRACTITIONER

## 2024-07-23 PROCEDURE — 2500000004 HC RX 250 GENERAL PHARMACY W/ HCPCS (ALT 636 FOR OP/ED)

## 2024-07-23 PROCEDURE — 36415 COLL VENOUS BLD VENIPUNCTURE: CPT | Performed by: STUDENT IN AN ORGANIZED HEALTH CARE EDUCATION/TRAINING PROGRAM

## 2024-07-23 PROCEDURE — 2500000002 HC RX 250 W HCPCS SELF ADMINISTERED DRUGS (ALT 637 FOR MEDICARE OP, ALT 636 FOR OP/ED): Performed by: INTERNAL MEDICINE

## 2024-07-23 PROCEDURE — 85520 HEPARIN ASSAY: CPT | Performed by: FAMILY MEDICINE

## 2024-07-23 PROCEDURE — 83735 ASSAY OF MAGNESIUM: CPT | Performed by: NURSE PRACTITIONER

## 2024-07-23 PROCEDURE — 2500000002 HC RX 250 W HCPCS SELF ADMINISTERED DRUGS (ALT 637 FOR MEDICARE OP, ALT 636 FOR OP/ED): Performed by: STUDENT IN AN ORGANIZED HEALTH CARE EDUCATION/TRAINING PROGRAM

## 2024-07-23 PROCEDURE — 1200000002 HC GENERAL ROOM WITH TELEMETRY DAILY

## 2024-07-23 PROCEDURE — 2500000001 HC RX 250 WO HCPCS SELF ADMINISTERED DRUGS (ALT 637 FOR MEDICARE OP): Performed by: INTERNAL MEDICINE

## 2024-07-23 PROCEDURE — 82947 ASSAY GLUCOSE BLOOD QUANT: CPT

## 2024-07-23 PROCEDURE — 85520 HEPARIN ASSAY: CPT | Performed by: STUDENT IN AN ORGANIZED HEALTH CARE EDUCATION/TRAINING PROGRAM

## 2024-07-23 PROCEDURE — 80069 RENAL FUNCTION PANEL: CPT | Performed by: NURSE PRACTITIONER

## 2024-07-23 PROCEDURE — 99233 SBSQ HOSP IP/OBS HIGH 50: CPT | Performed by: STUDENT IN AN ORGANIZED HEALTH CARE EDUCATION/TRAINING PROGRAM

## 2024-07-23 PROCEDURE — 85027 COMPLETE CBC AUTOMATED: CPT | Performed by: FAMILY MEDICINE

## 2024-07-23 RX ORDER — WARFARIN SODIUM 5 MG/1
7.5 TABLET ORAL DAILY
Status: DISCONTINUED | OUTPATIENT
Start: 2024-07-23 | End: 2024-07-24 | Stop reason: HOSPADM

## 2024-07-23 ASSESSMENT — PAIN - FUNCTIONAL ASSESSMENT
PAIN_FUNCTIONAL_ASSESSMENT: 0-10
PAIN_FUNCTIONAL_ASSESSMENT: 0-10

## 2024-07-23 ASSESSMENT — COGNITIVE AND FUNCTIONAL STATUS - GENERAL
MOVING FROM LYING ON BACK TO SITTING ON SIDE OF FLAT BED WITH BEDRAILS: A LITTLE
MOVING TO AND FROM BED TO CHAIR: A LOT
DAILY ACTIVITIY SCORE: 17
MOBILITY SCORE: 12
TURNING FROM BACK TO SIDE WHILE IN FLAT BAD: A LITTLE
MOVING TO AND FROM BED TO CHAIR: A LOT
TOILETING: A LOT
CLIMB 3 TO 5 STEPS WITH RAILING: TOTAL
HELP NEEDED FOR BATHING: A LOT
STANDING UP FROM CHAIR USING ARMS: A LOT
WALKING IN HOSPITAL ROOM: TOTAL
WALKING IN HOSPITAL ROOM: TOTAL
MOBILITY SCORE: 12
DRESSING REGULAR UPPER BODY CLOTHING: A LITTLE
CLIMB 3 TO 5 STEPS WITH RAILING: TOTAL
TURNING FROM BACK TO SIDE WHILE IN FLAT BAD: A LITTLE
HELP NEEDED FOR BATHING: A LOT
DRESSING REGULAR UPPER BODY CLOTHING: A LITTLE
MOVING FROM LYING ON BACK TO SITTING ON SIDE OF FLAT BED WITH BEDRAILS: A LITTLE
DRESSING REGULAR LOWER BODY CLOTHING: A LOT
TOILETING: A LOT
DRESSING REGULAR LOWER BODY CLOTHING: A LOT
STANDING UP FROM CHAIR USING ARMS: A LOT
DAILY ACTIVITIY SCORE: 17

## 2024-07-23 ASSESSMENT — PAIN SCALES - GENERAL
PAINLEVEL_OUTOF10: 0 - NO PAIN
PAINLEVEL_OUTOF10: 0 - NO PAIN

## 2024-07-23 NOTE — PROGRESS NOTES
"NOHELIA improving. Suspect obstruction as causes.  Not at goal which is a creatinine of 1.5  Hypernatremia presumably related to postobstructive diuresis that has resolved  Bladder outlet obstruction.  Hydronephrosis resolved on sonogram      - Stop IV fluids  - This patient is at low risk of decompensation   - Discussed with Dr. Garnett    SUBJECTIVE  Pt interviewed. Chart reviewed  Interval events - Cr improved.  yesterday. Baseline Cr 1.5.  Currently on IV fluids hypotonic 0.2%  Complaints -feeling better.  Diarrhea better    OBJECTIVE  /66 (BP Location: Left arm, Patient Position: Lying)   Pulse 78   Temp 36.3 °C (97.3 °F) (Temporal)   Resp 16   Ht 1.88 m (6' 2\")   Wt 94.4 kg (208 lb 1.6 oz)   SpO2 99%   BMI 26.72 kg/m²    GEN -awake and alert  RESP -no respiratory distress  EXT -normal edema      Sodium   Date/Time Value Ref Range Status   07/23/2024 05:42  136 - 145 mmol/L Final   07/22/2024 06:31  136 - 145 mmol/L Final   07/21/2024 04:26  136 - 145 mmol/L Final     Chloride   Date/Time Value Ref Range Status   07/23/2024 05:42  (H) 98 - 107 mmol/L Final   07/22/2024 06:31  (H) 98 - 107 mmol/L Final   07/21/2024 04:26  (H) 98 - 107 mmol/L Final     Urea Nitrogen   Date/Time Value Ref Range Status   07/23/2024 05:42 AM 42 (H) 6 - 23 mg/dL Final   07/22/2024 06:31 AM 48 (H) 6 - 23 mg/dL Final   07/21/2024 04:26 PM 53 (H) 6 - 23 mg/dL Final     Creatinine   Date/Time Value Ref Range Status   07/23/2024 05:42 AM 3.21 (H) 0.50 - 1.30 mg/dL Final     Comment:     Confirmed by repeat analysis   07/22/2024 06:31 AM 3.98 (H) 0.50 - 1.30 mg/dL Final   07/21/2024 04:26 PM 4.40 (H) 0.50 - 1.30 mg/dL Final     Potassium   Date/Time Value Ref Range Status   07/23/2024 05:42 AM 4.0 3.5 - 5.3 mmol/L Final   07/22/2024 06:31 AM 4.0 3.5 - 5.3 mmol/L Final   07/21/2024 04:26 PM 4.1 3.5 - 5.3 mmol/L Final     Bicarbonate   Date/Time Value Ref Range Status   07/23/2024 05:42 AM 19 " (L) 21 - 32 mmol/L Final   07/22/2024 06:31 AM 18 (L) 21 - 32 mmol/L Final   07/21/2024 04:26 PM 20 (L) 21 - 32 mmol/L Final        Scheduled medications  allopurinol, 100 mg, oral, Daily  atorvastatin, 20 mg, oral, Daily  colesevelam, 3,750 mg, oral, Daily  DULoxetine, 60 mg, oral, Daily  insulin degludec, 10 Units, subcutaneous, Nightly  insulin lispro, 0-10 Units, subcutaneous, TID  methylPREDNISolone sodium succinate (PF), 93.75 mg, intravenous, q24h  metoprolol succinate XL, 25 mg, oral, Daily  potassium chloride, 40 mEq, oral, TID  sodium bicarbonate, 1,300 mg, oral, TID  tamsulosin, 0.8 mg, oral, Nightly  warfarin, 5 mg, oral, Daily      Continuous medications  dextrose 5%-0.2 % sod chloride, 125 mL/hr, Last Rate: 125 mL/hr (07/23/24 0430)  heparin, 0-4,500 Units/hr, Last Rate: 1,000 Units/hr (07/23/24 0709)      PRN medications  PRN medications: acetaminophen **OR** acetaminophen **OR** acetaminophen, alum-mag hydroxide-simeth, calcium carbonate, dextromethorphan-guaifenesin, dextrose, dextrose, diphenoxylate-atropine, glucagon, glucagon, guaiFENesin, ondansetron **OR** ondansetron

## 2024-07-23 NOTE — PROGRESS NOTES
Occupational Therapy                 Therapy Communication Note    Patient Name: Chester Verduzco  MRN: [id number]  Today's Date: 7/23/2024     Discipline: Occupational Therapy    Missed Visit Reason: Patient refused (Pt agreeable to resume treatment tomorrow. Communicated with nurse.)    Missed Time: Attempt @ 1241

## 2024-07-23 NOTE — CARE PLAN
Problem: Skin  Goal: Prevent/manage excess moisture  Outcome: Progressing  Goal: Prevent/minimize sheer/friction injuries  Outcome: Progressing  Goal: Promote/optimize nutrition  Outcome: Progressing     Problem: Safety - Adult  Goal: Free from fall injury  Outcome: Progressing     Problem: Discharge Planning  Goal: Discharge to home or other facility with appropriate resources  Outcome: Progressing     Problem: Diabetes  Goal: Achieve decreasing blood glucose levels by end of shift  Outcome: Met     Patient did not eat much today and did not get out bed. Patient remains on a heparin drip.

## 2024-07-23 NOTE — PROGRESS NOTES
"Physical Therapy                 Therapy Communication Note    Patient Name: Chester Verduzco  MRN: [id number]  Today's Date: 7/23/2024     Discipline: Physical Therapy    Missed Visit Reason: Missed Visit Reason: Patient refused (p.t. states \"not today, N&V, I need to rest, maybe tomorrow, If I do get up will need a recliner (PTA found one and prepped it and left it in room, unable to encourage any actiivty, recommended AROM and sitting EOB with nursing assist if he felt better.)    Missed Time: [id number]    Comment:  "

## 2024-07-23 NOTE — PROGRESS NOTES
"Chester Verduzco is a 74 y.o. male on day 6 of admission presenting with Intractable nausea and vomiting.    Subjective   Pt is still having nausea. He says diarrhea has improved. No overnight events.        Objective     Physical Exam  Vitals and nursing note reviewed.   Constitutional:       General: He is not in acute distress.     Appearance: Normal appearance. He is not ill-appearing or toxic-appearing.   HENT:      Head: Normocephalic and atraumatic.      Nose: Nose normal.      Mouth/Throat:      Mouth: Mucous membranes are moist.   Eyes:      Extraocular Movements: Extraocular movements intact.      Conjunctiva/sclera: Conjunctivae normal.      Pupils: Pupils are equal, round, and reactive to light.   Cardiovascular:      Rate and Rhythm: Normal rate and regular rhythm.      Heart sounds: No murmur heard.     No gallop.   Pulmonary:      Effort: Pulmonary effort is normal. No respiratory distress.      Breath sounds: Normal breath sounds. No wheezing, rhonchi or rales.   Abdominal:      General: Abdomen is flat. Bowel sounds are normal. There is distension.      Palpations: Abdomen is soft. There is no mass.      Tenderness: There is no abdominal tenderness.   Musculoskeletal:         General: No swelling or tenderness. Normal range of motion.      Cervical back: Normal range of motion and neck supple.      Comments: B/l venous stasis in legs   Skin:     General: Skin is warm and dry.   Neurological:      Mental Status: He is alert and oriented to person, place, and time.      Motor: Weakness present.   Psychiatric:         Mood and Affect: Mood normal.         Behavior: Behavior normal.         Thought Content: Thought content normal.         Judgment: Judgment normal.         Last Recorded Vitals:  /66 (BP Location: Left arm, Patient Position: Lying)   Pulse 78   Temp 36.3 °C (97.3 °F) (Temporal)   Resp 16   Ht 1.88 m (6' 2\")   Wt 94.4 kg (208 lb 1.6 oz)   SpO2 99%   BMI 26.72 kg/m²  "     Scheduled medications:  allopurinol, 100 mg, oral, Daily  atorvastatin, 20 mg, oral, Daily  colesevelam, 3,750 mg, oral, Daily  DULoxetine, 60 mg, oral, Daily  insulin degludec, 10 Units, subcutaneous, Nightly  insulin lispro, 0-10 Units, subcutaneous, TID  methylPREDNISolone sodium succinate (PF), 93.75 mg, intravenous, q24h  metoprolol succinate XL, 25 mg, oral, Daily  potassium chloride, 40 mEq, oral, TID  sodium bicarbonate, 1,300 mg, oral, TID  tamsulosin, 0.8 mg, oral, Nightly  warfarin, 5 mg, oral, Daily      Continuous medications:  dextrose 5%-0.2 % sod chloride, 125 mL/hr, Last Rate: 125 mL/hr (07/23/24 0430)  heparin, 0-4,500 Units/hr, Last Rate: 1,000 Units/hr (07/23/24 0709)      PRN medications:  PRN medications: acetaminophen **OR** acetaminophen **OR** acetaminophen, alum-mag hydroxide-simeth, calcium carbonate, dextromethorphan-guaifenesin, dextrose, dextrose, diphenoxylate-atropine, glucagon, glucagon, guaiFENesin, ondansetron **OR** ondansetron     Relevant Results:  Results for orders placed or performed during the hospital encounter of 07/15/24 (from the past 24 hour(s))   Heparin Assay   Result Value Ref Range    Heparin Unfractionated 0.6 See Comment Below for Therapeutic Ranges IU/mL   POCT GLUCOSE   Result Value Ref Range    POCT Glucose 130 (H) 74 - 99 mg/dL   Uric Acid, Urine Random   Result Value Ref Range    Uric Acid, Urine Random 81 mg/dL    Creatinine, Urine Random 98.1 20.0 - 370.0 mg/dL    Uric Acid/Creatinine Ratio 826 Not established. mg/g creat   Heparin Assay   Result Value Ref Range    Heparin Unfractionated 0.7 See Comment Below for Therapeutic Ranges IU/mL   POCT GLUCOSE   Result Value Ref Range    POCT Glucose 143 (H) 74 - 99 mg/dL   Heparin Assay   Result Value Ref Range    Heparin Unfractionated 0.7 See Comment Below for Therapeutic Ranges IU/mL   POCT GLUCOSE   Result Value Ref Range    POCT Glucose 179 (H) 74 - 99 mg/dL   Magnesium   Result Value Ref Range     Magnesium 1.79 1.60 - 2.40 mg/dL   Renal Function Panel   Result Value Ref Range    Glucose 137 (H) 74 - 99 mg/dL    Sodium 139 136 - 145 mmol/L    Potassium 4.0 3.5 - 5.3 mmol/L    Chloride 115 (H) 98 - 107 mmol/L    Bicarbonate 19 (L) 21 - 32 mmol/L    Anion Gap 9 (L) 10 - 20 mmol/L    Urea Nitrogen 42 (H) 6 - 23 mg/dL    Creatinine 3.21 (H) 0.50 - 1.30 mg/dL    eGFR 20 (L) >60 mL/min/1.73m*2    Calcium 6.7 (L) 8.6 - 10.3 mg/dL    Phosphorus 2.3 (L) 2.5 - 4.9 mg/dL    Albumin 2.1 (L) 3.4 - 5.0 g/dL   Protime-INR   Result Value Ref Range    Protime 11.9 9.8 - 12.8 seconds    INR 1.1 0.9 - 1.1   Heparin Assay   Result Value Ref Range    Heparin Unfractionated 0.9 See Comment Below for Therapeutic Ranges IU/mL   CBC   Result Value Ref Range    WBC 10.0 4.4 - 11.3 x10*3/uL    nRBC 0.2 (H) 0.0 - 0.0 /100 WBCs    RBC 3.75 (L) 4.50 - 5.90 x10*6/uL    Hemoglobin 10.1 (L) 13.5 - 17.5 g/dL    Hematocrit 32.0 (L) 41.0 - 52.0 %    MCV 85 80 - 100 fL    MCH 26.9 26.0 - 34.0 pg    MCHC 31.6 (L) 32.0 - 36.0 g/dL    RDW 18.4 (H) 11.5 - 14.5 %    Platelets 76 (L) 150 - 450 x10*3/uL   POCT GLUCOSE   Result Value Ref Range    POCT Glucose 130 (H) 74 - 99 mg/dL       Lower extremity venous duplex right    Result Date: 7/21/2024  Interpreted By:  Lit Cardenas, STUDY: [facility] LOWER EXTREMITY VENOUS DUPLEX RIGHT  7/21/2024 11:34 am   INDICATION: 75 y/o   M with  Signs/Symptoms:right leg edema. LMP:  Unknown.   COMPARISON: None.   ACCESSION NUMBER(S): [id number]   ORDERING CLINICIAN: JACQUELIN WALTER   TECHNIQUE: Routine ultrasound of the  right lower extremity was performed with duplex Doppler (color and spectral) evaluation.   Static images were obtained for remote interpretation.   FINDINGS: THIGH VEINS: The right common femoral vein is patent and compressible. Nonocclusive thrombus involving the proximal right superficial femoral vein. Occlusive thrombi involving the mid/distal right superior fissural femoral vein as well as the  right popliteal vein. Below-knee and calvarium are not properly visualized. Contralateral left common femoral vein is compressible.       1. Acute deep venous thrombosis manifested by multiple occlusive and nonocclusive thrombi involving the right femoral and popliteal veins. Below-knee veins are not properly visualized.   Significant results of the study were relayed by me to and acknowledged by Amberly guajardo DO on 07/21/2024 at 11:40 a.m. through [employer] secure chat.   MACRO: None   Signed by: Lit Cardenas 7/21/2024 11:40 AM Dictation workstation:   [id number]           Assessment/Plan   Principal Problem:    Intractable nausea and vomiting  Active Problems:    Nausea and vomiting, unspecified vomiting type    NOHELIA/CKD  - improving  - nephro following  - IVF  - maldonado    RLE DVT  - hep gtt  - coumadin  - daily INR  - heme/onc following    Hematuria 2/2 traumatic maldonado insertion  - urology following  - maldonado  - flomax    Hypophos  - replace    Gout  - allopurinol    Normocytic anemia  - monitor    Low bicarb  - sodium bicarb    Immunotherapy-induced gastroenteritis   - Heme/onc following  - solumedrol  - GI consulted, defers to heme/onc as they feel diarrhea is noninfectious in nature  - lomotil/imodium PRN     HypoK  Resolved    HTN  - metoprolol  - statin  - hold norvasc, lasix, losartan    DM  - tresiba  - ISS    Malignant melanoma  - on immunotherapy  - heme/onc following           Angely Garnett MD  Hospitalist

## 2024-07-24 VITALS
RESPIRATION RATE: 19 BRPM | SYSTOLIC BLOOD PRESSURE: 123 MMHG | BODY MASS INDEX: 26.71 KG/M2 | TEMPERATURE: 97.5 F | DIASTOLIC BLOOD PRESSURE: 68 MMHG | OXYGEN SATURATION: 98 % | HEART RATE: 64 BPM | WEIGHT: 208.1 LBS | HEIGHT: 74 IN

## 2024-07-24 PROBLEM — R11.2 NAUSEA AND VOMITING, UNSPECIFIED VOMITING TYPE: Status: RESOLVED | Noted: 2024-07-17 | Resolved: 2024-07-24

## 2024-07-24 PROBLEM — R11.2 INTRACTABLE NAUSEA AND VOMITING: Status: RESOLVED | Noted: 2024-07-15 | Resolved: 2024-07-24

## 2024-07-24 LAB
ALBUMIN SERPL BCP-MCNC: 2.1 G/DL (ref 3.4–5)
ALP SERPL-CCNC: 124 U/L (ref 33–136)
ALT SERPL W P-5'-P-CCNC: 15 U/L (ref 10–52)
ANION GAP SERPL CALC-SCNC: 10 MMOL/L (ref 10–20)
AST SERPL W P-5'-P-CCNC: 21 U/L (ref 9–39)
BASOPHILS # BLD AUTO: 0.01 X10*3/UL (ref 0–0.1)
BASOPHILS NFR BLD AUTO: 0.1 %
BILIRUB SERPL-MCNC: 0.4 MG/DL (ref 0–1.2)
BUN SERPL-MCNC: 42 MG/DL (ref 6–23)
CALCIUM SERPL-MCNC: 6.7 MG/DL (ref 8.6–10.3)
CALPROTECTIN STL-MCNT: 632 UG/G
CHLORIDE SERPL-SCNC: 114 MMOL/L (ref 98–107)
CO2 SERPL-SCNC: 19 MMOL/L (ref 21–32)
CREAT SERPL-MCNC: 2.6 MG/DL (ref 0.5–1.3)
EGFRCR SERPLBLD CKD-EPI 2021: 25 ML/MIN/1.73M*2
EOSINOPHIL # BLD AUTO: 0 X10*3/UL (ref 0–0.4)
EOSINOPHIL NFR BLD AUTO: 0 %
ERYTHROCYTE [DISTWIDTH] IN BLOOD BY AUTOMATED COUNT: 18.2 % (ref 11.5–14.5)
GLUCOSE BLD MANUAL STRIP-MCNC: 105 MG/DL (ref 74–99)
GLUCOSE BLD MANUAL STRIP-MCNC: 71 MG/DL (ref 74–99)
GLUCOSE BLD MANUAL STRIP-MCNC: 86 MG/DL (ref 74–99)
GLUCOSE SERPL-MCNC: 69 MG/DL (ref 74–99)
HCT VFR BLD AUTO: 31.2 % (ref 41–52)
HGB BLD-MCNC: 10.3 G/DL (ref 13.5–17.5)
IMM GRANULOCYTES # BLD AUTO: 0.2 X10*3/UL (ref 0–0.5)
IMM GRANULOCYTES NFR BLD AUTO: 1.7 % (ref 0–0.9)
INR PPP: 1.4 (ref 0.9–1.1)
LYMPHOCYTES # BLD AUTO: 0.96 X10*3/UL (ref 0.8–3)
LYMPHOCYTES NFR BLD AUTO: 8 %
MCH RBC QN AUTO: 27.9 PG (ref 26–34)
MCHC RBC AUTO-ENTMCNC: 33 G/DL (ref 32–36)
MCV RBC AUTO: 85 FL (ref 80–100)
MONOCYTES # BLD AUTO: 0.75 X10*3/UL (ref 0.05–0.8)
MONOCYTES NFR BLD AUTO: 6.2 %
NEUTROPHILS # BLD AUTO: 10.12 X10*3/UL (ref 1.6–5.5)
NEUTROPHILS NFR BLD AUTO: 84 %
NRBC BLD-RTO: 0.2 /100 WBCS (ref 0–0)
PLATELET # BLD AUTO: 83 X10*3/UL (ref 150–450)
POTASSIUM SERPL-SCNC: 4 MMOL/L (ref 3.5–5.3)
PROT SERPL-MCNC: 4.2 G/DL (ref 6.4–8.2)
PROTHROMBIN TIME: 15.8 SECONDS (ref 9.8–12.8)
RBC # BLD AUTO: 3.69 X10*6/UL (ref 4.5–5.9)
SODIUM SERPL-SCNC: 139 MMOL/L (ref 136–145)
UFH PPP CHRO-ACNC: 0.5 IU/ML
WBC # BLD AUTO: 12 X10*3/UL (ref 4.4–11.3)

## 2024-07-24 PROCEDURE — 85520 HEPARIN ASSAY: CPT | Performed by: STUDENT IN AN ORGANIZED HEALTH CARE EDUCATION/TRAINING PROGRAM

## 2024-07-24 PROCEDURE — 85610 PROTHROMBIN TIME: CPT

## 2024-07-24 PROCEDURE — 2500000004 HC RX 250 GENERAL PHARMACY W/ HCPCS (ALT 636 FOR OP/ED)

## 2024-07-24 PROCEDURE — 99239 HOSP IP/OBS DSCHRG MGMT >30: CPT

## 2024-07-24 PROCEDURE — 80053 COMPREHEN METABOLIC PANEL: CPT | Performed by: STUDENT IN AN ORGANIZED HEALTH CARE EDUCATION/TRAINING PROGRAM

## 2024-07-24 PROCEDURE — 2500000004 HC RX 250 GENERAL PHARMACY W/ HCPCS (ALT 636 FOR OP/ED): Performed by: FAMILY MEDICINE

## 2024-07-24 PROCEDURE — 82947 ASSAY GLUCOSE BLOOD QUANT: CPT

## 2024-07-24 PROCEDURE — 36415 COLL VENOUS BLD VENIPUNCTURE: CPT | Performed by: STUDENT IN AN ORGANIZED HEALTH CARE EDUCATION/TRAINING PROGRAM

## 2024-07-24 PROCEDURE — 2500000001 HC RX 250 WO HCPCS SELF ADMINISTERED DRUGS (ALT 637 FOR MEDICARE OP): Performed by: NURSE PRACTITIONER

## 2024-07-24 PROCEDURE — 2500000004 HC RX 250 GENERAL PHARMACY W/ HCPCS (ALT 636 FOR OP/ED): Performed by: STUDENT IN AN ORGANIZED HEALTH CARE EDUCATION/TRAINING PROGRAM

## 2024-07-24 PROCEDURE — 2500000001 HC RX 250 WO HCPCS SELF ADMINISTERED DRUGS (ALT 637 FOR MEDICARE OP): Performed by: INTERNAL MEDICINE

## 2024-07-24 PROCEDURE — 2500000004 HC RX 250 GENERAL PHARMACY W/ HCPCS (ALT 636 FOR OP/ED): Performed by: NURSE PRACTITIONER

## 2024-07-24 PROCEDURE — 85025 COMPLETE CBC W/AUTO DIFF WBC: CPT | Performed by: STUDENT IN AN ORGANIZED HEALTH CARE EDUCATION/TRAINING PROGRAM

## 2024-07-24 PROCEDURE — 2500000002 HC RX 250 W HCPCS SELF ADMINISTERED DRUGS (ALT 637 FOR MEDICARE OP, ALT 636 FOR OP/ED): Performed by: INTERNAL MEDICINE

## 2024-07-24 RX ORDER — INSULIN DEGLUDEC 100 U/ML
5 INJECTION, SOLUTION SUBCUTANEOUS NIGHTLY
Qty: 3 ML | Refills: 0 | Status: SHIPPED | OUTPATIENT
Start: 2024-07-24

## 2024-07-24 RX ORDER — WARFARIN 7.5 MG/1
TABLET ORAL
Qty: 30 TABLET | Refills: 1 | Status: SHIPPED | OUTPATIENT
Start: 2024-07-24 | End: 2024-07-24

## 2024-07-24 RX ORDER — SODIUM BICARBONATE 650 MG/1
1300 TABLET ORAL 3 TIMES DAILY
Qty: 30 TABLET | Refills: 0 | Status: SHIPPED | OUTPATIENT
Start: 2024-07-24 | End: 2024-07-29

## 2024-07-24 RX ORDER — ENOXAPARIN SODIUM 100 MG/ML
1 INJECTION SUBCUTANEOUS 2 TIMES DAILY
Status: DISCONTINUED | OUTPATIENT
Start: 2024-07-24 | End: 2024-07-24 | Stop reason: HOSPADM

## 2024-07-24 RX ORDER — ENOXAPARIN SODIUM 100 MG/ML
1 INJECTION SUBCUTANEOUS EVERY 12 HOURS
Start: 2024-07-24 | End: 2024-07-27

## 2024-07-24 RX ORDER — INSULIN DEGLUDEC 100 U/ML
5 INJECTION, SOLUTION SUBCUTANEOUS NIGHTLY
Status: DISCONTINUED | OUTPATIENT
Start: 2024-07-24 | End: 2024-07-24 | Stop reason: HOSPADM

## 2024-07-24 RX ORDER — ONDANSETRON 4 MG/1
4 TABLET, FILM COATED ORAL EVERY 8 HOURS PRN
Qty: 60 TABLET | Refills: 1 | Status: SHIPPED | OUTPATIENT
Start: 2024-07-24

## 2024-07-24 RX ORDER — PREDNISONE 20 MG/1
TABLET ORAL
Qty: 24 TABLET | Refills: 0 | Status: SHIPPED | OUTPATIENT
Start: 2024-07-24 | End: 2024-08-05

## 2024-07-24 RX ORDER — TAMSULOSIN HYDROCHLORIDE 0.4 MG/1
0.8 CAPSULE ORAL NIGHTLY
Qty: 60 CAPSULE | Refills: 0 | Status: SHIPPED | OUTPATIENT
Start: 2024-07-24 | End: 2024-08-23

## 2024-07-24 RX ORDER — CALCIUM CARBONATE 200(500)MG
1 TABLET,CHEWABLE ORAL 4 TIMES DAILY PRN
Qty: 90 TABLET | Refills: 0 | Status: SHIPPED | OUTPATIENT
Start: 2024-07-24

## 2024-07-24 RX ORDER — WARFARIN SODIUM 5 MG/1
TABLET ORAL
Qty: 30 TABLET | Refills: 1 | Status: SHIPPED | OUTPATIENT
Start: 2024-07-24

## 2024-07-24 ASSESSMENT — COGNITIVE AND FUNCTIONAL STATUS - GENERAL
STANDING UP FROM CHAIR USING ARMS: A LOT
HELP NEEDED FOR BATHING: A LOT
DAILY ACTIVITIY SCORE: 17
MOVING FROM LYING ON BACK TO SITTING ON SIDE OF FLAT BED WITH BEDRAILS: A LITTLE
DRESSING REGULAR LOWER BODY CLOTHING: A LOT
CLIMB 3 TO 5 STEPS WITH RAILING: TOTAL
WALKING IN HOSPITAL ROOM: TOTAL
MOBILITY SCORE: 12
TOILETING: A LOT
MOVING TO AND FROM BED TO CHAIR: A LOT
DRESSING REGULAR UPPER BODY CLOTHING: A LITTLE
TURNING FROM BACK TO SIDE WHILE IN FLAT BAD: A LITTLE

## 2024-07-24 ASSESSMENT — PAIN SCALES - GENERAL
PAINLEVEL_OUTOF10: 0 - NO PAIN
PAINLEVEL_OUTOF10: 0 - NO PAIN

## 2024-07-24 ASSESSMENT — PAIN - FUNCTIONAL ASSESSMENT
PAIN_FUNCTIONAL_ASSESSMENT: 0-10
PAIN_FUNCTIONAL_ASSESSMENT: 0-10

## 2024-07-24 NOTE — DISCHARGE SUMMARY
Discharge Diagnosis  NOHELIA on CKD   Acute unprovoked RLE DVT  Gross hematuria   Immunotherapy-induced gastroenteritis     Issues Requiring Follow-Up  Follow up with Heme/Onc for malignant melanoma, acute unprovoked DVT of RLE   Follow up with PCP for NOHELIA on CKD, will need creatinine levels rechecked in 1 week     Test Results Pending At Discharge  Pending Labs       No current pending labs.            Hospital Course   Chester is a 75 yo M with PMHx of malignant melanoma currently on immunotherapy, diabetes mellitus, and HTN who presented to the ED on 7/15/24 with intractable nausea, vomiting and diarrhea, which started shortly after receiving his sixth round of immunotherapy. Labs done in the ED showed potassium of 2.6, creatinine of 2.55, worsened compared to prior. GFR dropped to 26, was 36 four days prior. He was given IVF, oral potassium, IV potassium, and IV Zofran. CT scan showed fluid-filled loops of small bowel concerning for enteritis. Patient was subsequently admitted to the hospital service for management of electrolyte derangement with NOHELIA on CKD.     Was started on a five day course of IV methylprednisolone for immunotherapy-induced gastroenteritis per heme/onc recommendations. Additional 5 day course of high dose steroids added after patient's diarrhea continued throughout admission, will require 3 days of oral prednisone following discharge today.     During stay at hospital, patient developed gross hematuria with intermittent urinary obstruction. Urology was consulted and recommended PRN flushes for hematuria likely secondary to maldonado insertion trauma.    Also during admission, a DVT was found in patient's RLE, and was subsequently started on a heparin drip. Heme/onc was following and recommended a heparin bridge to warfarin, subsequently started on 7.5 mg warfarin with daily INR checks. Last INR today was 1.4, will continue patient on Lovenox for the next three days. Order sent in for repeat PT-INR  expected on 7/26/24. Will continue warfarin outpatient following up with coumadin clinic.     NOHELIA showed improvement throughout duration of hospital stay, creatinine trended down to 2.60, GFR improved as well. Was given IVFs throughout hospital stay. Repeat CMP to check creatinine level in two days outpatient, follow up with PCP.      Patient's home norvasc, lasix and losartan were held upon admission. Bps consistently in the 110/60s, occasionally in the 90s/50s. HR also consistently in the low 70s/mid 60s throughout admission. Subsequently stopped metoprolol upon discharge from hospital as well, follow up with PCP with vitals check.      Patient is hemodynamically stable for discharge at this time. Rojo remains in place at time of discharge.    Pertinent Physical Exam At Time of Discharge  Physical Exam  Constitutional:       General: He is not in acute distress.     Appearance: He is not toxic-appearing or diaphoretic.   HENT:      Head: Normocephalic and atraumatic.   Eyes:      Extraocular Movements: Extraocular movements intact.      Conjunctiva/sclera: Conjunctivae normal.      Pupils: Pupils are equal, round, and reactive to light.   Cardiovascular:      Rate and Rhythm: Normal rate and regular rhythm.      Pulses: Normal pulses.      Heart sounds: No murmur heard.  Pulmonary:      Effort: Pulmonary effort is normal. No respiratory distress.      Breath sounds: Normal breath sounds. No wheezing.   Abdominal:      General: Bowel sounds are normal. There is distension.      Palpations: Abdomen is soft.      Tenderness: There is no abdominal tenderness.   Genitourinary:     Comments: Urine remains very dark with red tinge   Musculoskeletal:      Comments: BLE venous stasis edema   Skin:     General: Skin is warm and dry.   Neurological:      Mental Status: He is alert and oriented to person, place, and time.      Motor: Weakness present.   Psychiatric:         Mood and Affect: Mood normal.         Behavior:  Behavior normal.     Home Medications     Medication List      START taking these medications     calcium carbonate 200 mg calcium chewable tablet; Commonly known as:   Tums; Chew 1 tablet (500 mg) 4 times a day as needed for heartburn or   indigestion.   enoxaparin 80 mg/0.8 mL syringe; Commonly known as: Lovenox; Inject 0.94   mL (94 mg) under the skin every 12 hours for 3 days.   ondansetron 4 mg tablet; Commonly known as: Zofran; Take 1 tablet (4 mg)   by mouth every 8 hours if needed for nausea.   predniSONE 20 mg tablet; Commonly known as: Deltasone; Take 4.5 tablets   (90 mg) by mouth once daily for 3 days, THEN 2 tablets (40 mg) once daily   for 3 days, THEN 1 tablet (20 mg) once daily for 3 days, THEN 0.5 tablets   (10 mg) once daily for 3 days.; Start taking on: July 24, 2024   sodium bicarbonate 650 mg tablet; Take 2 tablets (1,300 mg) by mouth 3   times a day for 5 days.   warfarin 7.5 mg tablet; Commonly known as: Coumadin; Take as directed   per After Visit Summary.     CHANGE how you take these medications     insulin degludec 100 unit/mL (3 mL) injection; Commonly known as:   Tresiba FlexTouch U-100; Inject 5 Units under the skin once daily at   bedtime. Take as directed per insulin instructions.; What changed: how   much to take     CONTINUE taking these medications     acetaminophen 325 mg tablet; Commonly known as: Tylenol; Take 2 tablets   (650 mg) by mouth every 6 hours if needed for moderate pain (4 - 6).   allopurinol 100 mg tablet; Commonly known as: Zyloprim; Take 1 tablet   (100 mg) by mouth once daily.   atorvastatin 20 mg tablet; Commonly known as: Lipitor; Take 1 tablet (20   mg) by mouth once daily.   colesevelam 3.75 gram powder in packet; Take 3.75 g by mouth once daily.   diphenoxylate-atropine 2.5-0.025 mg tablet; Commonly known as: LomotiL;   Take 1 tablet by mouth 4 times a day as needed for diarrhea.   DULoxetine 60 mg DR capsule; Commonly known as: Cymbalta; Take 1 capsule   (60  "mg) by mouth once daily.   FreeStyle Medhat 2 [place] misc; Generic drug: flash glucose scanning   reader; Use as instructed   FreeStyle Medhat 3 Sensor device; Generic drug: blood-glucose sensor;   Change sensor every 10 days   glucagon 1 mg injection; Commonly known as: Glucagen   miscellaneous medical supply misc; 1 Application every 14 (fourteen)   days. FreeStyle Medhat 2 Sensor Device, change sensor every 14 days   needle (disp) 25 gauge 25 gauge x 1\" needle; Use for q12 hour aztreonam   IM injection.   pregabalin 25 mg capsule; Commonly known as: Lyrica   prochlorperazine 10 mg tablet; Commonly known as: Compazine; Take 1   tablet (10 mg) by mouth every 6 hours if needed for nausea or vomiting.   syringe (disposable) 3 mL syringe; Commonly known as: Monoject Regular   Luer; Use for q12 hour aztreonam IM injection.   tamsulosin 0.4 mg 24 hr capsule; Commonly known as: Flomax; Take 2   capsules (0.8 mg) by mouth once daily at bedtime.     STOP taking these medications     amLODIPine 10 mg tablet; Commonly known as: Norvasc   fluconazole 100 mg tablet; Commonly known as: Diflucan   furosemide 20 mg tablet; Commonly known as: Lasix   losartan 100 mg tablet; Commonly known as: Cozaar   metoprolol succinate XL 25 mg 24 hr tablet; Commonly known as: Toprol-XL   polyethylene glycol 17 gram packet; Commonly known as: Glycolax, Miralax   potassium chloride CR 10 mEq ER tablet; Commonly known as: Klor-Con       Outpatient Follow-Up  Future Appointments   Date Time Provider Department [place]   8/1/2024  8:30 AM Danish Springer MD [id number]   8/1/2024  8:30 AM INF 08 GEAUGA [facility]   8/1/2024  9:30 AM Lotus Pascal RD, LD [id number] [facility]   8/30/2024 10:40 AM Jeffry De Leon MD [id number] [facility]   9/23/2024  9:20 AM Yaz Salinas, APRN-CNP [id number] [facility]       Abdoulaye Montgomery, DO"

## 2024-07-24 NOTE — PROGRESS NOTES
"Chester Verduzco is a 74 y.o. male on day 7 of admission presenting with Intractable nausea and vomiting.    Subjective   Patient continues to report intermittent episodes of diarrhea, but states these episodes are occurring with less frequency. Reports continued nausea but no vomiting. Fatigue persists, states he does occasionally get dizzy/lightheaded but that this \"comes and gos.\" Reports he has no appetite, is not eating much. Working towards ambulating to and sitting in the chair. Denies any F/C, chest pain, palpitations, SOB, or abdominal pain.        Objective     Physical Exam  Constitutional:       General: He is not in acute distress.     Appearance: He is not toxic-appearing or diaphoretic.   HENT:      Head: Normocephalic and atraumatic.   Eyes:      Extraocular Movements: Extraocular movements intact.      Conjunctiva/sclera: Conjunctivae normal.      Pupils: Pupils are equal, round, and reactive to light.   Cardiovascular:      Rate and Rhythm: Normal rate and regular rhythm.      Pulses: Normal pulses.      Heart sounds: No murmur heard.  Pulmonary:      Effort: Pulmonary effort is normal. No respiratory distress.      Breath sounds: Normal breath sounds. No wheezing.   Abdominal:      General: Bowel sounds are normal. There is distension.      Palpations: Abdomen is soft.      Tenderness: There is no abdominal tenderness.   Genitourinary:     Comments: Urine remains very dark with red tinge   Musculoskeletal:      Comments: BLE venous stasis edema   Skin:     General: Skin is warm and dry.   Neurological:      Mental Status: He is alert and oriented to person, place, and time.      Motor: Weakness present.   Psychiatric:         Mood and Affect: Mood normal.         Behavior: Behavior normal.         Last Recorded Vitals  Blood pressure 110/64, pulse 78, temperature 36.2 °C (97.2 °F), temperature source Temporal, resp. rate 18, height 1.88 m (6' 2\"), weight 94.4 kg (208 lb 1.6 oz), SpO2 " 95%.  Intake/Output last 3 Shifts:  I/O last 3 completed shifts:  In: 2168.4 (23 mL/kg) [P.O.:180; I.V.:1988.4 (21.1 mL/kg)]  Out: 1425 (15.1 mL/kg) [Urine:1425 (0.4 mL/kg/hr)]  Weight: 94.4 kg     Relevant Results  Scheduled medications  allopurinol, 100 mg, oral, Daily  atorvastatin, 20 mg, oral, Daily  colesevelam, 3,750 mg, oral, Daily  DULoxetine, 60 mg, oral, Daily  insulin degludec, 10 Units, subcutaneous, Nightly  insulin lispro, 0-10 Units, subcutaneous, TID  methylPREDNISolone sodium succinate (PF), 93.75 mg, intravenous, q24h  metoprolol succinate XL, 25 mg, oral, Daily  [Held by provider] potassium chloride, 40 mEq, oral, TID  sodium bicarbonate, 1,300 mg, oral, TID  tamsulosin, 0.8 mg, oral, Nightly  warfarin, 7.5 mg, oral, Daily      Continuous medications  heparin, 0-4,500 Units/hr, Last Rate: 800 Units/hr (07/24/24 0930)      PRN medications  PRN medications: acetaminophen **OR** acetaminophen **OR** acetaminophen, alum-mag hydroxide-simeth, calcium carbonate, dextromethorphan-guaifenesin, dextrose, dextrose, diphenoxylate-atropine, glucagon, glucagon, guaiFENesin, ondansetron **OR** ondansetron         Results for orders placed or performed during the hospital encounter of 07/15/24 (from the past 24 hour(s))   POCT GLUCOSE   Result Value Ref Range    POCT Glucose 125 (H) 74 - 99 mg/dL   Heparin Assay   Result Value Ref Range    Heparin Unfractionated 0.8 See Comment Below for Therapeutic Ranges IU/mL   POCT GLUCOSE   Result Value Ref Range    POCT Glucose 100 (H) 74 - 99 mg/dL   Heparin Assay, [facility]   Result Value Ref Range    Heparin Unfractionated 0.6 See Comment Below for Therapeutic Ranges IU/mL   POCT GLUCOSE   Result Value Ref Range    POCT Glucose 108 (H) 74 - 99 mg/dL   Heparin Assay, U[facility]   Result Value Ref Range    Heparin Unfractionated 0.6 See Comment Below for Therapeutic Ranges IU/mL   Protime-INR   Result Value Ref Range    Protime 15.8 (H) 9.8 - 12.8 seconds    INR 1.4 (H) 0.9 - 1.1    Comprehensive Metabolic Panel   Result Value Ref Range    Glucose 69 (L) 74 - 99 mg/dL    Sodium 139 136 - 145 mmol/L    Potassium 4.0 3.5 - 5.3 mmol/L    Chloride 114 (H) 98 - 107 mmol/L    Bicarbonate 19 (L) 21 - 32 mmol/L    Anion Gap 10 10 - 20 mmol/L    Urea Nitrogen 42 (H) 6 - 23 mg/dL    Creatinine 2.60 (H) 0.50 - 1.30 mg/dL    eGFR 25 (L) >60 mL/min/1.73m*2    Calcium 6.7 (L) 8.6 - 10.3 mg/dL    Albumin 2.1 (L) 3.4 - 5.0 g/dL    Alkaline Phosphatase 124 33 - 136 U/L    Total Protein 4.2 (L) 6.4 - 8.2 g/dL    AST 21 9 - 39 U/L    Bilirubin, Total 0.4 0.0 - 1.2 mg/dL    ALT 15 10 - 52 U/L   CBC and Auto Differential   Result Value Ref Range    WBC 12.0 (H) 4.4 - 11.3 x10*3/uL    nRBC 0.2 (H) 0.0 - 0.0 /100 WBCs    RBC 3.69 (L) 4.50 - 5.90 x10*6/uL    Hemoglobin 10.3 (L) 13.5 - 17.5 g/dL    Hematocrit 31.2 (L) 41.0 - 52.0 %    MCV 85 80 - 100 fL    MCH 27.9 26.0 - 34.0 pg    MCHC 33.0 32.0 - 36.0 g/dL    RDW 18.2 (H) 11.5 - 14.5 %    Platelets 83 (L) 150 - 450 x10*3/uL    Neutrophils % 84.0 40.0 - 80.0 %    Immature Granulocytes %, Automated 1.7 (H) 0.0 - 0.9 %    Lymphocytes % 8.0 13.0 - 44.0 %    Monocytes % 6.2 2.0 - 10.0 %    Eosinophils % 0.0 0.0 - 6.0 %    Basophils % 0.1 0.0 - 2.0 %    Neutrophils Absolute 10.12 (H) 1.60 - 5.50 x10*3/uL    Immature Granulocytes Absolute, Automated 0.20 0.00 - 0.50 x10*3/uL    Lymphocytes Absolute 0.96 0.80 - 3.00 x10*3/uL    Monocytes Absolute 0.75 0.05 - 0.80 x10*3/uL    Eosinophils Absolute 0.00 0.00 - 0.40 x10*3/uL    Basophils Absolute 0.01 0.00 - 0.10 x10*3/uL   Heparin Assay   Result Value Ref Range    Heparin Unfractionated 0.5 See Comment Below for Therapeutic Ranges IU/mL   POCT GLUCOSE   Result Value Ref Range    POCT Glucose 71 (L) 74 - 99 mg/dL      Lower extremity venous duplex right    Result Date: 7/21/2024  Interpreted By:  Lit Cardenas, STUDY: [facility] LOWER EXTREMITY VENOUS DUPLEX RIGHT  7/21/2024 11:34 am   INDICATION: 73 y/o   M with   Signs/Symptoms:right leg edema. LMP:  Unknown.   COMPARISON: None.   ACCESSION NUMBER(S): [id number]   ORDERING CLINICIAN: JACQUELIN WALTER   TECHNIQUE: Routine ultrasound of the  right lower extremity was performed with duplex Doppler (color and spectral) evaluation.   Static images were obtained for remote interpretation.   FINDINGS: THIGH VEINS: The right common femoral vein is patent and compressible. Nonocclusive thrombus involving the proximal right superficial femoral vein. Occlusive thrombi involving the mid/distal right superior fissural femoral vein as well as the right popliteal vein. Below-knee and calvarium are not properly visualized. Contralateral left common femoral vein is compressible.       1. Acute deep venous thrombosis manifested by multiple occlusive and nonocclusive thrombi involving the right femoral and popliteal veins. Below-knee veins are not properly visualized.   Significant results of the study were relayed by me to and acknowledged by Jacquelin walter DO on 07/21/2024 at 11:40 a.m. through [employer] secure chat.   MACRO: None   Signed by: Lit Cardenas 7/21/2024 11:40 AM Dictation workstation:   [id number]    US renal complete    Result Date: 7/17/2024  Interpreted By:  Connie Victoria, STUDY: US RENAL COMPLETE;  7/17/2024 2:06 pm   INDICATION: Signs/Symptoms:worsening kidney function.   COMPARISON: 11/13/2023   ACCESSION NUMBER(S): [id number]   ORDERING CLINICIAN: JACQUELIN WALTER   TECHNIQUE: Multiple images of the kidneys were obtained  with grayscale and color Doppler imaging.   FINDINGS: RIGHT KIDNEY: The right kidney measures 10.4 cm in length. There is suggestion of mildly increased renal cortical echogenicity with mild cortical thinning measuring up to 9 mm. No hydronephrosis is present; no evidence of nephrolithiasis.   LEFT KIDNEY: The left kidney measures 11.7 cm in length. Suggestion of mild thinning of renal cortex with mildly increased renal cortical echogenicity. There  is persistent evidence of an exophytic hypodense lesion in the superior pole of left kidney measuring up to 7.3 cm with through transmission and without internal complexity or vascularity. This is suggestive of a simple cyst. No hydronephrosis is present; no evidence of nephrolithiasis.   BLADDER: Urinary bladder is mildly distended. There is circumferential wall thickening with trabeculated morphology of the urinary bladder. Prostate gland is enlarged with median lobe hypertrophy indenting on the base of the bladder.       1. Mild bilateral renal cortical thinning with increased renal cortical echogenicity, which can be associated with medical renal disease in appropriate clinical setting. 2. Stable left renal cyst. No hydronephrosis. 3. Prostatomegaly. 4. Circumferential wall thickening with trabeculated morphology of the urinary bladder, which could be related to chronic bladder outlet obstruction in appropriate clinical setting.   MACRO: None   Signed by: Connie Victoria 7/17/2024 2:57 PM Dictation workstation:   [id number]    CT abdomen pelvis wo IV contrast    Result Date: 7/15/2024  Interpreted By:  Roberto Carlos Ludwig, STUDY: CT ABDOMEN PELVIS WO IV CONTRAST;  7/15/2024 5:26 pm   INDICATION: Signs/Symptoms:abd pain, n/v/d.   COMPARISON: May 28, 2024   ACCESSION NUMBER(S): [id number]   ORDERING CLINICIAN: JOANA PICKETT   TECHNIQUE: CT of the abdomen and pelvis was performed. Contiguous axial images were obtained at 3 mm slice thickness through the abdomen and pelvis. Coronal and sagittal reconstructions at 3 mm slice thickness were performed.  No intravenous contrast was administered; positive oral contrast was given.   FINDINGS: Please note that the evaluation of vessels, lymph nodes and organs is limited without intravenous contrast.   Bronchial thickening. There is a small hiatal hernia. There is hepatic steatosis. Unremarkable adrenal glands   Unchanged renal cyst on the left measuring up to 6.3 cm. The  spleen remains enlarged.   Prostate gland is markedly enlarged at 8.1 x 8.1 cm. Urinary bladder has a thick wall suggesting bladder outlet obstruction. Trace air seen in the bladder. Bladder calcification measuring 1.4 cm.   Prominent loops of small bowel seen suggesting some component of enteritis. No discrete bowel obstruction. There is fluid in the colon which can be seen the setting of diarrhea. Diverticulosis. No overt stigmata of diverticulitis   Bulky left pelvic and inguinal lymph nodes appear to be decreasing. Reference on the left measuring 3.5 cm previously measuring 4.2 cm. Dominant mass seen in the left inguinal region previously measured about 6.4 x 4.7 cm now measures 5.2 x 3 cm.   Abdominal hernia changes are noted.   Mild degenerative disc disease detected in the lower lumbar spine.         1.  Markedly enlarged prostate gland with thick-walled urinary bladder and bladder calcification. Correlation with PSA is advised. 2. Decreasing malignant adenopathy seen in the left pelvis and inguinal regions suggesting some component of response to therapy 3. Severe vascular calcification 4. Hepatic steatosis. Splenomegaly. 5. Fluid-filled loops of small and large bowel without discrete transition or obstruction. Query any component of enteritis or diarrhea. There is diverticulosis. No evidence of diverticulitis     MACRO: None   Signed by: Roberto Carlos Ludwig 7/15/2024 5:59 PM Dictation workstation:   [id number]    This patient has a urinary catheter        Assessment/Plan   Principal Problem:    Intractable nausea and vomiting  Active Problems:    Nausea and vomiting, unspecified vomiting type    76 yo M with PMHx of malignant melanoma on immunotherapy, IDDM-type II, CKD, HTN, SSS s/p PPM complicated by infected leads, and hx of DVT (put on eliquis thereafter, not currently on it) that presented to the ED with diarrhea and was subsequently admitted for NOHELIA on CKD.      NOHELIA on CKD   - Improving   - Creatinine  continues trending down, at 2.60 today, 3.21 yesterday  - Nephrology following, recommended stopping IVF yesterday  - Maintain maldonado for strict I/Os   - Continue daily CMP    Acute unprovoked RLE DVT  - In setting of malignancy and hx of DVT, previously on eliquis for that   - Continue heparin drip   - Coumadin increased from 5 mg to 7.5 mg yesterday   - INR 1.4 this AM   - Continue daily INRs   - Heme/onc following     Hematuria 2/2 maldonado insertion trauma  - Urology following, rec continue PRN flushes    - Maldonado in place  - Continue flomax 0.8 mg nightly      Immunotherapy-induced gastroenteritis   - Heme/onc following, recommended additional 5 day course of high dose steroids in addition to completed 5 days of IV Solumedrol   - On day 7/10 IV Solumedrol   - GI consulted, deferred to heme/onc as they felt diarrhea is noninfectious in nature (neg c diff and stool pathogens)   - Lomotil/imodium PRN     Hypokalemia likely 2/2 GI losses/diarrhea  - Resolved     Hypophosphatemia   - Stable  - Continue replacing     Gout   - Continue allopurinol     Normocytic anemia  - Stable    - Continue monitoring     Low bicarbonate levels  - Stable  - Continue sodium bicarb 1,300 mg TID      HTN   - Normotensive at 110/64 today  - Continue home metoprolol, statin  - Holding home norvasc, lasix, losartan     DM  - POCT Glucose 71 this AM   - Tresiba nightly   - ISS     Malignant Melanoma   - On immunotherapy   - Heme/Onc following      Diet: Regular  DVT ppx: Heparin bridge to warfarin   Dispo: continue close inpatient monitoring in setting of NOHELIA on CKD, persistent diarrhea - continue high dose steroids      Abdoulaye Montgomery DO

## 2024-07-24 NOTE — CARE PLAN
The patient's goals for the shift include  pt will have decreased BMs    The clinical goals for the shift include pain control    Over the shift, the patient had no complaints of pain. Pt had 1 bowel movement during shift. Pt being discharged to SNF. Pt in pleasant mood. Bed maintained in lowest position with call light within reach.      Problem: Skin  Goal: Prevent/minimize sheer/friction injuries  Outcome: Progressing  Goal: Promote/optimize nutrition  Outcome: Progressing     Problem: Pain - Adult  Goal: Verbalizes/displays adequate comfort level or baseline comfort level  Outcome: Progressing     Problem: Skin  Goal: Prevent/manage excess moisture  Outcome: Met  Flowsheets (Taken 7/24/2024 4040)  Prevent/manage excess moisture: Cleanse incontinence/protect with barrier cream  Note: Incontinence episodes cleansed per need.     Problem: Safety - Adult  Goal: Free from fall injury  Outcome: Met     Problem: Discharge Planning  Goal: Discharge to home or other facility with appropriate resources  Outcome: Met     Problem: Chronic Conditions and Co-morbidities  Goal: Patient's chronic conditions and co-morbidity symptoms are monitored and maintained or improved  Outcome: Met     Problem: Diabetes  Goal: Increase stability of blood glucose readings by end of shift  Outcome: Met  Goal: Maintain glucose levels >70mg/dl to <250mg/dl throughout shift  Outcome: Met  Goal: No changes in neurological exam by end of shift  Outcome: Met  Goal: Vital signs within normal range for age by end of shift  Outcome: Met

## 2024-07-24 NOTE — PROGRESS NOTES
07/24/24 1326   Discharge Planning   Living Arrangements Alone   Support Systems Friends/neighbors   Assistance Needed Alert and oriented x 3, Independent with ADL's, Doesn't drive, (friends transport to appointments), Shower chair, Walker, Wheel chair, BSC, Ramp, Grab bars. Active with [facility] for SN and PT   Type of Residence Private residence   Number of Stairs to Enter Residence 2   Number of Stairs Within Residence 0   Do you have animals or pets at home? Yes   Type of Animals or Pets 1 dog and 2 cats   Who is requesting discharge planning? Provider   Home or Post Acute Services Post acute facilities (Rehab/SNF/etc)   Type of Post Acute Facility Services Skilled nursing   Expected Discharge Disposition SNF  (Patient's discharge plan is to discharge today 7/24/24 to [facility] for skilled rehab. Patient is aware and is agreeable)   Does the patient need discharge transport arranged? Yes   RoundTrip coordination needed? Yes   Has discharge transport been arranged? No   Patient Choice   Provider Choice list and CMS website (https://medicare.gov/care-compare#search) for post-acute Quality and Resource Measure Data were provided and reviewed with: Patient   Patient / Family choosing to utilize agency / facility established prior to hospitalization No

## 2024-07-24 NOTE — PROGRESS NOTES
"Occupational Therapy                 Therapy Communication Note    Patient Name: Chester Verduzco  MRN: [id number]  Today's Date: 7/24/2024     Discipline: Occupational Therapy    Missed Visit Reason: Missed Visit Reason: Patient refused (Pt declined any OOB participation, stating \"I've already been sitting up all morning, I'm nauseous and want to lay down. I will try again tomorrow\". Max encouragement, education provided on benefits of participation, pt continues to decline. No OT today.)    Missed Time: Attempt    Comment: [id number]  "

## 2024-07-24 NOTE — CARE PLAN
The patient's goals for the shift include  will have adequate UOP, safety     The clinical goals for the shift include pt will have decreased hematuria during shift    Problem: Skin  Goal: Prevent/manage excess moisture  Outcome: Progressing  Goal: Prevent/minimize sheer/friction injuries  Outcome: Progressing  Flowsheets (Taken 7/24/2024 0236)  Prevent/minimize sheer/friction injuries:   Use pull sheet   HOB 30 degrees or less  Goal: Promote/optimize nutrition  Outcome: Progressing     Problem: Pain - Adult  Goal: Verbalizes/displays adequate comfort level or baseline comfort level  Outcome: Progressing     Problem: Safety - Adult  Goal: Free from fall injury  Outcome: Progressing     Problem: Discharge Planning  Goal: Discharge to home or other facility with appropriate resources  Outcome: Progressing     Problem: Chronic Conditions and Co-morbidities  Goal: Patient's chronic conditions and co-morbidity symptoms are monitored and maintained or improved  Outcome: Progressing     Problem: Diabetes  Goal: Increase stability of blood glucose readings by end of shift  Outcome: Progressing  Goal: Decrease in ketones present in urine by end of shift  Outcome: Progressing  Goal: Maintain electrolyte levels within acceptable range throughout shift  Outcome: Progressing  Goal: Maintain glucose levels >70mg/dl to <250mg/dl throughout shift  Outcome: Progressing  Goal: No changes in neurological exam by end of shift  Outcome: Progressing  Goal: Learn about and adhere to nutrition recommendations by end of shift  Outcome: Progressing  Goal: Vital signs within normal range for age by end of shift  Outcome: Progressing  Goal: Increase self care and/or family involovement by end of shift  Outcome: Progressing  Goal: Receive DSME education by end of shift  Outcome: Progressing

## 2024-07-24 NOTE — CARE PLAN
The patient's goals for the shift include      The clinical goals for the shift include safety, adequate uop      Problem: Skin  Goal: Prevent/manage excess moisture  Outcome: Progressing  Flowsheets (Taken 7/24/2024 0928)  Prevent/manage excess moisture: Cleanse incontinence/protect with barrier cream     Problem: Skin  Goal: Prevent/minimize sheer/friction injuries  Outcome: Progressing  Flowsheets (Taken 7/24/2024 0928)  Prevent/minimize sheer/friction injuries: HOB 30 degrees or less     Problem: Skin  Goal: Promote/optimize nutrition  Outcome: Progressing  Flowsheets (Taken 7/24/2024 0928)  Promote/optimize nutrition: Monitor/record intake including meals     Problem: Pain - Adult  Goal: Verbalizes/displays adequate comfort level or baseline comfort level  Outcome: Progressing

## 2024-07-25 ENCOUNTER — DOCUMENTATION (OUTPATIENT)
Dept: PRIMARY CARE | Facility: CLINIC | Age: 74
End: 2024-07-25
Payer: COMMERCIAL

## 2024-07-25 ENCOUNTER — TELEPHONE (OUTPATIENT)
Dept: PRIMARY CARE | Facility: CLINIC | Age: 74
End: 2024-07-25
Payer: COMMERCIAL

## 2024-07-25 NOTE — NURSING NOTE
Call placed to [facility] - made aware that patient belongings including upper dentures and wallet was left behind. [facility] staff are making patient aware to make arrangements for . Information about unit/number given.

## 2024-07-25 NOTE — TELEPHONE ENCOUNTER
----- Message from Nurse Abdoul ROWELL sent at 7/25/2024  9:28 AM EDT -----    ----- Message -----  From: Jeffry De Leon MD  Sent: 7/25/2024   8:26 AM EDT  To: BETO Miller -- Chester sent home yesterday from [facility].  Please call today or tomorrow to schedule TCM -- virtual is fine, I don't think he's driving. MP

## 2024-07-26 ENCOUNTER — LAB REQUISITION (OUTPATIENT)
Dept: LAB | Facility: HOSPITAL | Age: 74
End: 2024-07-26

## 2024-07-26 ENCOUNTER — NURSING HOME VISIT (OUTPATIENT)
Dept: POST ACUTE CARE | Facility: EXTERNAL LOCATION | Age: 74
End: 2024-07-26
Payer: MEDICARE

## 2024-07-26 DIAGNOSIS — N18.32 STAGE 3B CHRONIC KIDNEY DISEASE (CKD) (MULTI): ICD-10-CM

## 2024-07-26 DIAGNOSIS — I44.2 COMPLETE ATRIOVENTRICULAR BLOCK (MULTI): ICD-10-CM

## 2024-07-26 DIAGNOSIS — M10.9 GOUT, UNSPECIFIED CAUSE, UNSPECIFIED CHRONICITY, UNSPECIFIED SITE: ICD-10-CM

## 2024-07-26 DIAGNOSIS — C43.9 METASTATIC MELANOMA (MULTI): ICD-10-CM

## 2024-07-26 DIAGNOSIS — I10 ESSENTIAL (PRIMARY) HYPERTENSION: ICD-10-CM

## 2024-07-26 DIAGNOSIS — E78.5 DYSLIPIDEMIA: ICD-10-CM

## 2024-07-26 DIAGNOSIS — Z79.01 LONG TERM (CURRENT) USE OF ANTICOAGULANTS: ICD-10-CM

## 2024-07-26 DIAGNOSIS — K52.9 ACUTE GASTROENTERITIS: ICD-10-CM

## 2024-07-26 DIAGNOSIS — K82.8 GALLBLADDER SLUDGE: ICD-10-CM

## 2024-07-26 DIAGNOSIS — F32.A DEPRESSION, UNSPECIFIED DEPRESSION TYPE: ICD-10-CM

## 2024-07-26 DIAGNOSIS — I10 ESSENTIAL HYPERTENSION: ICD-10-CM

## 2024-07-26 DIAGNOSIS — N40.1 BPH WITH OBSTRUCTION/LOWER URINARY TRACT SYMPTOMS: ICD-10-CM

## 2024-07-26 DIAGNOSIS — K21.9 GASTROESOPHAGEAL REFLUX DISEASE, UNSPECIFIED WHETHER ESOPHAGITIS PRESENT: ICD-10-CM

## 2024-07-26 DIAGNOSIS — I33.0 TRICUSPID VALVE VEGETATION (HHS-HCC): ICD-10-CM

## 2024-07-26 DIAGNOSIS — M13.0 POLYARTHRITIS: ICD-10-CM

## 2024-07-26 DIAGNOSIS — E11.21 TYPE 2 DIABETES MELLITUS WITH DIABETIC NEPHROPATHY, WITHOUT LONG-TERM CURRENT USE OF INSULIN (MULTI): ICD-10-CM

## 2024-07-26 DIAGNOSIS — I82.401 ACUTE DEEP VEIN THROMBOSIS (DVT) OF RIGHT LOWER EXTREMITY, UNSPECIFIED VEIN (MULTI): ICD-10-CM

## 2024-07-26 DIAGNOSIS — N13.8 BPH WITH OBSTRUCTION/LOWER URINARY TRACT SYMPTOMS: ICD-10-CM

## 2024-07-26 DIAGNOSIS — M62.81 MUSCLE WEAKNESS (GENERALIZED): Primary | ICD-10-CM

## 2024-07-26 LAB
ALBUMIN SERPL BCP-MCNC: 2.2 G/DL (ref 3.4–5)
ALP SERPL-CCNC: 141 U/L (ref 33–136)
ALT SERPL W P-5'-P-CCNC: 17 U/L (ref 10–52)
ANION GAP SERPL CALC-SCNC: 12 MMOL/L (ref 10–20)
AST SERPL W P-5'-P-CCNC: 24 U/L (ref 9–39)
BILIRUB SERPL-MCNC: 0.4 MG/DL (ref 0–1.2)
BUN SERPL-MCNC: 39 MG/DL (ref 6–23)
CALCIUM SERPL-MCNC: 6.9 MG/DL (ref 8.6–10.3)
CHLORIDE SERPL-SCNC: 115 MMOL/L (ref 98–107)
CO2 SERPL-SCNC: 20 MMOL/L (ref 21–32)
CREAT SERPL-MCNC: 2.23 MG/DL (ref 0.5–1.3)
EGFRCR SERPLBLD CKD-EPI 2021: 30 ML/MIN/1.73M*2
ERYTHROCYTE [DISTWIDTH] IN BLOOD BY AUTOMATED COUNT: 18.4 % (ref 11.5–14.5)
GLUCOSE SERPL-MCNC: 60 MG/DL (ref 74–99)
HCT VFR BLD AUTO: 31.6 % (ref 41–52)
HGB BLD-MCNC: 9.9 G/DL (ref 13.5–17.5)
INR PPP: 3.7 (ref 0.9–1.1)
MCH RBC QN AUTO: 27.5 PG (ref 26–34)
MCHC RBC AUTO-ENTMCNC: 31.3 G/DL (ref 32–36)
MCV RBC AUTO: 88 FL (ref 80–100)
NRBC BLD-RTO: 0 /100 WBCS (ref 0–0)
PLATELET # BLD AUTO: 80 X10*3/UL (ref 150–450)
POTASSIUM SERPL-SCNC: 3.7 MMOL/L (ref 3.5–5.3)
PROT SERPL-MCNC: 4.1 G/DL (ref 6.4–8.2)
PROTHROMBIN TIME: 42.1 SECONDS (ref 9.8–12.8)
RBC # BLD AUTO: 3.6 X10*6/UL (ref 4.5–5.9)
SODIUM SERPL-SCNC: 143 MMOL/L (ref 136–145)
WBC # BLD AUTO: 8.3 X10*3/UL (ref 4.4–11.3)

## 2024-07-26 PROCEDURE — 99310 SBSQ NF CARE HIGH MDM 45: CPT | Performed by: NURSE PRACTITIONER

## 2024-07-26 PROCEDURE — 85610 PROTHROMBIN TIME: CPT | Mod: OUT | Performed by: INTERNAL MEDICINE

## 2024-07-26 PROCEDURE — 85027 COMPLETE CBC AUTOMATED: CPT | Mod: OUT | Performed by: INTERNAL MEDICINE

## 2024-07-26 PROCEDURE — 84075 ASSAY ALKALINE PHOSPHATASE: CPT | Mod: OUT | Performed by: INTERNAL MEDICINE

## 2024-07-29 ENCOUNTER — NURSING HOME VISIT (OUTPATIENT)
Dept: POST ACUTE CARE | Facility: EXTERNAL LOCATION | Age: 74
End: 2024-07-29
Payer: MEDICARE

## 2024-07-29 DIAGNOSIS — I82.401 ACUTE DEEP VEIN THROMBOSIS (DVT) OF RIGHT LOWER EXTREMITY, UNSPECIFIED VEIN (MULTI): ICD-10-CM

## 2024-07-29 DIAGNOSIS — Z79.4 TYPE 2 DIABETES MELLITUS WITH DIABETIC NEUROPATHY, WITH LONG-TERM CURRENT USE OF INSULIN (MULTI): Primary | ICD-10-CM

## 2024-07-29 DIAGNOSIS — E11.40 TYPE 2 DIABETES MELLITUS WITH DIABETIC NEUROPATHY, WITH LONG-TERM CURRENT USE OF INSULIN (MULTI): Primary | ICD-10-CM

## 2024-07-29 PROCEDURE — 99305 1ST NF CARE MODERATE MDM 35: CPT | Performed by: FAMILY MEDICINE

## 2024-07-29 NOTE — PROGRESS NOTES
*Provider Impression*    Patient is a 74 year old male who is seen today for management of multiple medical problems     #Weakness / OA / Gout - PT/OT, allopurinol 100mg daily, acetaminophen 650mg q6h PRN  #AV block / TV vegetation / HTN / HLD / CKD -  s/p PM explant and re-implant; atorvastatin 20mg daily, sodium bicarb 1300mg TID until 7/29;  f/u w/ cardiology, f/u w/ ID  #GERD / Immunotherapy-induced gastroenteritis / Gallbladder sludge - zofran 4mg q6h PRN, Tums 500mg q6h PRN, lomotil 2.5mg/0.025mg q6h PRN, prednisone taper until 8/4  #Melanoma / RLE DVT - d/c lovenox, hold coumadin x1 then 4mg daily, f/u w/ oncology, check PT/INR  #BPH w/ obstruction - tamsulosin 0.8mg daily, f/u w/ urology Dr. Delaney  #Depression - duloxetine 60mg daily  #T2DM w/ neuropathy - Tresiba 5units QHS, d/c colesevalam d/t refusal; lyrica 25mg BID  #ACP - Full Code  Follow up as needed      *Chief Complaint*     weakness    *History of Present Illness*    Patient is a 73 y/o male w/ PMH as below who presented to the ED on 7/15/24 with intractable nausea, vomiting and diarrhea, which started shortly after receiving his sixth round of immunotherapy. Labs done in the ED showed potassium of 2.6, creatinine of 2.55, worsened compared to prior. GFR dropped to 26, was 36 four days prior. He was given IVF, oral potassium, IV potassium, and IV Zofran. CT scan showed fluid-filled loops of small bowel concerning for enteritis. Patient was subsequently admitted to the hospital service for management of electrolyte derangement with NOHELIA on CKD. He was started on a five day course of IV methylprednisolone for immunotherapy-induced gastroenteritis per heme/onc recommendations. Additional 5 day course of high dose steroids added after patient's diarrhea continued throughout admission, with recommendations for 3 days of oral prednisone following discharge. During stay he developed gross hematuria with intermittent urinary obstruction. Urology was  consulted and recommended PRN flushes for hematuria likely secondary to maldonado insertion trauma. Also during admission, a DVT was found in patient's RLE, and was subsequently started on a heparin drip. Heme/onc was following and recommended a heparin bridge to warfarin, subsequently started on 7.5 mg warfarin with daily INR checks. NOHELIA showed improvement throughout duration of hospital stay, creatinine trended down to 2.60, GFR improved as well. Was given IVFs throughout hospital stay. His home norvasc, lasix and losartan were held upon admission. Bps consistently in the 110/60s, occasionally in the 90s/50s. HR also consistently in the low 70s/mid 60s throughout admission. Subsequently stopped metoprolol upon discharge from hospital as well. He was then d/[facility] to [facility] on 7/24.    Labs appreciated today as below, INR up.    He is seen sitting up in his room today reports still poor appetite, has had n/v earlier this am, no hematemesis or bilious emesis, LBM 10am, no f/c, sweats, abd pain, CP, SOB, has maldonado, no edema, no hematuria, lomotil is helping with the diarrhea, or any other new c/o presently.    [place] - buPROPion, Cephalexin   PMH - arthritis, obesity, lymphedema, ventral hernia repair, hypertension, hyperlipidemia, BPH, PVD, T2DM, Bell's palsy  PSH - pacemaker, ventral hernia repair, prostate biopsy, varicose vein ligation  FH - Brother had heart disease; Mother had lung cancer  SocHx - Never smoker, Social EtOH    *Review of Systems*  All other systems reviewed are negative except as noted in the HPI     *Vital Signs*   Date: 7/26/24  - T: 97.4  P: 86  R: 16  BP: 103/59  SpO2: 97% on RA ; Date: 7/24/24  Wt: 214    *Results / Data*  CBC - Date: 7/26/24  WBC: 8.3  HGB: 9.9  HCT: 31.6  PLT: 80 ;   BMP - Date: 7/26/24  Na: 143  K: 3.7  Cl: 115  CO2: 20  BUN: 39  Cr: 2.23  Glu: 60  Ca: 6.9  ;   LFT - Date: 7/26/24  AST: 24  ALT: 17  ALP: 141  Tbili: 0.4  ;   Coags - Date: 7/26/24  INR: 3.7  PT:  42.1  Other - Date: 10/16/23  CRP: 3.8  ESR: 69  ; 10/23/23  CRP: 2.9  ESR: 79 ; 10/30/23  CRP: 1.9  ; 12/18/23  CRP: 1.9 ;  12/20/23  ESR: 60; 1/15/24  ESR: 25  CRP: <0.3  ; 1/29/24  B12: 338  Folate: <2.0  ESR: 31    *Physical Exam*  Gen: (+) NAD, (+) well-appearing  HEENT: (+) normocephalic, (+) MMM  Neck: (+) supple  Lungs: (+) CTAB, (-) wheezes, (-) rales, (-) rhonchi  Heart: (+) RRR, (+) S1 S2, (-) murmurs  Pulses: (+) palpable  Abd: (+) soft, (+) NT, (+) ND, (+) BS+  : (+) maldonado  Ext: (+) edema, (-) deformity  MSK: (-) joint swelling  Skin: (+) warm, (+) dry, (-) rash  Neuro: (+) follows commands, (-) tremor, (+) alert

## 2024-07-29 NOTE — LETTER
Patient: Chester Verduzco  : 1950    Encounter Date: 2024    Chester Verduzco is a 74 y.o. 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 No CP/SOB. Med LIst & Problem list reviewed.    O: VSS AFEB 214# (up 15# from 2024) Awake, alert, NAD. Chest cta. Heart rrr, 2/6 RONNIE, PPM. Ext right 1+ edema. HUNG.    LAB (24) Hgb 9.9, Alb 2.2, Cr 2.23, GFR 30, Na 143, K 3.7  (1/3/24)TSH 3.350, Blood Cx NEG  (23) Iron 21, TIBC 134, Ferritin 849    A/P:  # Weakness: SNF PT/OT    # OA: tylenol. F/U Ortho ([facility]) to schedule Rt TKA. Trial knee immobilizer to help with pain control.  # HTN: stable OFF BMP meds.  # BPH w/ LUTS & Obstructive Uropathy: HUNG, continue. Attempt voiding trial only when we have coude cath in house. Flomax 0.8 mg qhs. OFF Finasteride 1/15/24 d/t weakness. F/U Dr Delaney as outpatient.  # CKD3b: OFF ARB. NaHCO3. F/U Nephro/ [facility].  # DM w nephropathy and neuropathy: Tresiba 5 units daily. When renal function improves can start Farxiga (if still needed). Lyrica 25 bid. Duloxetine 60.  # Gout: allopurinol  # Anemia: no iron deficiency. Replace Folate and B12. Likely d/t CKD. Slowly improving -- if plateaus consider EPO injection.  # GERD: OFF PPI d/t NOHELIA.  # Metastatic Melanoma: Dx 23. Oncology ([facility]) 23 -- plans chemo/immunotx once stronger. recheck in 2 months after PET-CT and MRI.  # Recurrent unprovoked DVT: coumadin 4 mg daily.  # Diarrhea: lomotil.  # Hx Endocarditis: PPM replaced, Completed Ancef 23. F/U blood cx NEG 23. Will need follow up Echo.  # Hx morbid  obesity: Intentional weight loss with dietary/diabetic intervention -- above previous goal weight of 200# -- no further weight loss recommended at this time. Off Restrictive diets. Adding magic cup to keep protein due.  # Hx PMR: off prednisone, off finasteride.      Past Surgical History:   Procedure Laterality Date   • CARDIAC ELECTROPHYSIOLOGY PROCEDURE Left 11/22/2023    Procedure: PPM Lead Extraction;  Surgeon: Etienne Aguilar MD;  [facility] 2F Cardiac Cath Lab;  Service: Electrophysiology;  Laterality: Left;   • CARDIAC ELECTROPHYSIOLOGY PROCEDURE N/A 11/30/2023    Procedure: PPM Leadless Implant (MICRA AV);  Surgeon: Etienne Aguilar MD;  Location: [facility] 3529 Cardiac Cath Lab;  Service: Electrophysiology;  Laterality: N/A;   • CARDIAC PACEMAKER PLACEMENT  08/22/2013    Pacemaker Permanent Placement   • OTHER SURGICAL HISTORY  06/15/2015    Ventral Hernia Repair - Recurrent   • OTHER SURGICAL HISTORY  09/16/2019    Prostate biopsy   • PERIPHERALLY INSERTED CENTRAL CATHETER INSERTION     • SKIN BIOPSY     • US GUIDED BIOPSY LYMPH NODE SUPERFICIAL  11/29/2023    US GUIDED BIOPSY LYMPH NODE SUPERFICIAL 11/29/2023 Mao Dubose MD [facility]   • VARICOSE VEIN SURGERY  08/22/2013    Varicose Vein Ligation   • VENTRAL HERNIA REPAIR  06/15/2015    Ventral Hernia Repair      Social History     Socioeconomic History   • Marital status: Single     Spouse name: Not on file   • Number of children: Not on file   • Years of education: Not on file   • Highest education level: Not on file   Occupational History   • Not on file   Tobacco Use   • Smoking status: Never     Passive exposure: Never   • Smokeless tobacco: Never   Vaping Use   • Vaping status: Never Used   Substance and Sexual Activity   • Alcohol use: Not Currently     Comment: very infrequent   • Drug use: Never   • Sexual activity: Not Currently   Other Topics Concern   • Not on file   Social History Narrative   • Not on file     Social Determinants of  Health     Financial Resource Strain: Low Risk  (7/17/2024)    Overall Financial Resource Strain (CARDIA)    • Difficulty of Paying Living Expenses: Not hard at all   Food Insecurity: No Food Insecurity (10/3/2023)    Hunger Vital Sign    • Worried About Running Out of Food in the Last Year: Never true    • Ran Out of Food in the Last Year: Never true   Transportation Needs: No Transportation Needs (7/17/2024)    PRAPARE - Transportation    • Lack of Transportation (Medical): No    • Lack of Transportation (Non-Medical): No   Physical Activity: Inactive (10/2/2023)    Exercise Vital Sign    • Days of Exercise per Week: 0 days    • Minutes of Exercise per Session: 0 min   Stress: Not on file   Social Connections: Not on file   Intimate Partner Violence: Not on file   Housing Stability: Low Risk  (7/17/2024)    Housing Stability Vital Sign    • Unable to Pay for Housing in the Last Year: No    • Number of Times Moved in the Last Year: 1    • Homeless in the Last Year: No     Past Medical History:   Diagnosis Date   • Abnormal weight gain 10/27/2016    Abnormal weight gain   • Achilles tendinitis, unspecified leg 08/16/2016    Achilles tendinitis   • Acute upper respiratory infection, unspecified     Acute URI   • NOHELIA (acute kidney injury) (CMS-HCC) 10/02/2023    10/2023-11/2023 Following vancomycin in setting of bacteremia.   • Allergic contact dermatitis due to plants, except food 08/22/2013    Contact dermatitis due to poison ivy   • Arthritis    • Bell's palsy 03/21/2023   • BPH with obstruction/lower urinary tract symptoms    • Cancer (Multi)    • Cellulitis of right lower limb 07/30/2021    Cellulitis of right lower extremity without foot   • Cough, unspecified 03/21/2016    Cough   • Diabetes (Multi)    • Encounter for screening for human immunodeficiency virus (HIV) 06/14/2016    Screening for HIV (human immunodeficiency virus)   • Hordeolum externum unspecified eye, unspecified eyelid 08/14/2019    Stye   •  Hyperglycemia, unspecified 12/13/2017    Chronic hyperglycemia   • Hypertension    • Incisional hernia without obstruction or gangrene 06/15/2015    Recurrent ventral hernia   • Knee joint pain     Right knee- bone on bone   • Melanoma (Multi)    • MSSA bacteremia 10/2023   • Muscle spasm of calf 08/16/2016    Muscle spasm of calf   • Obstructive uropathy    • Personal history of other diseases of the digestive system 06/30/2015    History of umbilical hernia   • Personal history of other diseases of the digestive system 02/05/2016    History of ventral hernia   • Personal history of other diseases of the respiratory system 12/23/2014    History of acute sinusitis   • Personal history of other diseases of the respiratory system 01/28/2016    History of upper respiratory infection   • Personal history of other infectious and parasitic diseases 12/02/2015    History of hepatitis B virus infection   • Personal history of other infectious and parasitic diseases     History of hepatitis B virus infection   • Personal history of other specified conditions 02/04/2015    History of nausea and vomiting   • Personal history of other specified conditions 08/16/2016    History of edema   • Personal history of other specified conditions 01/26/2015    History of jaundice   • Personal history of pneumonia (recurrent) 01/29/2016    History of pneumonia   • Personal history of urinary (tract) infections 02/19/2013    History of urinary tract infection   • Prostatitis 03/21/2023   • Pruritus, unspecified 02/10/2015    Pruritus   • Sinoatrial node dysfunction (Multi)    • Strain of muscle, fascia and tendon of the posterior muscle group at thigh level, right thigh, initial encounter 05/10/2016    Tear of right hamstring   • Unspecified symptoms and signs involving the genitourinary system 12/20/2016    UTI symptoms      Family History   Problem Relation Name Age of Onset   • Cancer Mother Maye Fucoy         Review of Systems    Constitutional:  Negative for chills, fatigue and fever.   HENT:  Negative for rhinorrhea and sore throat.    Eyes:  Negative for pain and redness.   Respiratory:  Negative for cough and shortness of breath.    Cardiovascular:  Negative for chest pain and palpitations.   Gastrointestinal:  Negative for abdominal pain and blood in stool.   Endocrine: Negative for polydipsia and polyuria.   Genitourinary:  Negative for dysuria and hematuria.   Musculoskeletal:  Negative for back pain and neck stiffness.   Skin:  Negative for rash and wound.   Allergic/Immunologic: Negative for environmental allergies and food allergies.   Neurological:  Positive for weakness. Negative for headaches.   Hematological:  Negative for adenopathy. Does not bruise/bleed easily.   Psychiatric/Behavioral:  Negative for hallucinations and suicidal ideas.       There were no vitals taken for this visit.  Physical Exam  Vitals reviewed.   Constitutional:       General: He is not in acute distress.     Appearance: He is not ill-appearing.   HENT:      Head: Normocephalic and atraumatic.      Right Ear: Tympanic membrane normal.      Left Ear: Tympanic membrane normal.      Nose: No congestion or rhinorrhea.      Mouth/Throat:      Pharynx: No oropharyngeal exudate or posterior oropharyngeal erythema.   Eyes:      Extraocular Movements: Extraocular movements intact.      Conjunctiva/sclera: Conjunctivae normal.      Pupils: Pupils are equal, round, and reactive to light.   Cardiovascular:      Rate and Rhythm: Normal rate and regular rhythm.      Heart sounds: Murmur heard.      No friction rub. No gallop.      Comments: PPM  Pulmonary:      Effort: Pulmonary effort is normal.      Breath sounds: Normal breath sounds. No wheezing, rhonchi or rales.   Abdominal:      General: There is no distension.      Palpations: Abdomen is soft.      Tenderness: There is no abdominal tenderness. There is no guarding or rebound.   Genitourinary:     Comments:  "ANABELL  Musculoskeletal:         General: No swelling or deformity.      Cervical back: Normal range of motion and neck supple.      Right lower leg: Edema present.      Left lower leg: No edema.   Skin:     Capillary Refill: Capillary refill takes less than 2 seconds.      Coloration: Skin is not jaundiced.      Findings: No rash.   Neurological:      General: No focal deficit present.      Mental Status: He is alert.      Motor: Weakness present.   Psychiatric:         Mood and Affect: Mood normal.         Behavior: Behavior normal.       Lab Results   Component Value Date    WBC 4.4 08/01/2024    HGB 8.6 (L) 08/01/2024    HCT 27.1 (L) 08/01/2024    MCV 88 08/01/2024    PLT 71 (L) 08/01/2024     Lab Results   Component Value Date    CHOL 121 03/11/2024    CHOL 109 08/11/2023    CHOL 120 08/10/2022     Lab Results   Component Value Date    HDL 35.0 03/11/2024    HDL 27.7 (A) 08/11/2023    HDL 30.7 (A) 08/10/2022     Lab Results   Component Value Date    LDLCALC 60 03/11/2024     Lab Results   Component Value Date    TRIG 128 03/11/2024    TRIG 283 (H) 08/11/2023    TRIG 163 (H) 08/10/2022     No components found for: \"CHOLHDL\"  Lab Results   Component Value Date    HGBA1C 5.3 03/11/2024       Assessment/Plan  Problem List Items Addressed This Visit       Type 2 diabetes mellitus with diabetic neuropathy, with long-term current use of insulin (Multi) - Primary    Overview     DX'd 12/2017  STOPPED TZD due to edema 7/2021.   Off METFORMIN d/t diarrhea.   Cannot afford Trulicity.  Sees podiatry for neuropathy  See opto Dr March at [facility] in [place].             Other Visit Diagnoses       Acute deep vein thrombosis (DVT) of right lower extremity, unspecified vein (Multi)                Electronically Signed By: Jeffry De Leon MD   8/1/24  8:39 PM  "

## 2024-07-31 ENCOUNTER — NURSING HOME VISIT (OUTPATIENT)
Dept: POST ACUTE CARE | Facility: EXTERNAL LOCATION | Age: 74
End: 2024-07-31
Payer: MEDICARE

## 2024-07-31 DIAGNOSIS — E11.21 TYPE 2 DIABETES MELLITUS WITH DIABETIC NEPHROPATHY, WITHOUT LONG-TERM CURRENT USE OF INSULIN (MULTI): ICD-10-CM

## 2024-07-31 DIAGNOSIS — I44.2 COMPLETE ATRIOVENTRICULAR BLOCK (MULTI): ICD-10-CM

## 2024-07-31 DIAGNOSIS — I10 ESSENTIAL HYPERTENSION: ICD-10-CM

## 2024-07-31 DIAGNOSIS — I82.401 ACUTE DEEP VEIN THROMBOSIS (DVT) OF RIGHT LOWER EXTREMITY, UNSPECIFIED VEIN (MULTI): ICD-10-CM

## 2024-07-31 DIAGNOSIS — N13.8 BPH WITH OBSTRUCTION/LOWER URINARY TRACT SYMPTOMS: ICD-10-CM

## 2024-07-31 DIAGNOSIS — K82.8 GALLBLADDER SLUDGE: ICD-10-CM

## 2024-07-31 DIAGNOSIS — I33.0 TRICUSPID VALVE VEGETATION (HHS-HCC): ICD-10-CM

## 2024-07-31 DIAGNOSIS — E78.5 DYSLIPIDEMIA: ICD-10-CM

## 2024-07-31 DIAGNOSIS — M62.81 MUSCLE WEAKNESS (GENERALIZED): Primary | ICD-10-CM

## 2024-07-31 DIAGNOSIS — N18.32 STAGE 3B CHRONIC KIDNEY DISEASE (CKD) (MULTI): ICD-10-CM

## 2024-07-31 DIAGNOSIS — K21.9 GASTROESOPHAGEAL REFLUX DISEASE, UNSPECIFIED WHETHER ESOPHAGITIS PRESENT: ICD-10-CM

## 2024-07-31 DIAGNOSIS — C43.9 METASTATIC MELANOMA (MULTI): ICD-10-CM

## 2024-07-31 DIAGNOSIS — K52.9 ACUTE GASTROENTERITIS: ICD-10-CM

## 2024-07-31 DIAGNOSIS — N40.1 BPH WITH OBSTRUCTION/LOWER URINARY TRACT SYMPTOMS: ICD-10-CM

## 2024-07-31 DIAGNOSIS — M13.0 POLYARTHRITIS: ICD-10-CM

## 2024-07-31 DIAGNOSIS — M10.9 GOUT, UNSPECIFIED CAUSE, UNSPECIFIED CHRONICITY, UNSPECIFIED SITE: ICD-10-CM

## 2024-07-31 DIAGNOSIS — F32.A DEPRESSION, UNSPECIFIED DEPRESSION TYPE: ICD-10-CM

## 2024-07-31 NOTE — PROGRESS NOTES
Chester Verduzco is a 74 y.o. male with Chief Complaint of SNF ([facility]) H&P    Resident seen 24 -- MP    CC: [facility] (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 No CP/SOB. Med LIst & Problem list reviewed.    O: VSS AFEB 214# (up 15# from 2024) Awake, alert, NAD. Chest cta. Heart rrr, 2/6 RONNIE, PPM. Ext right 1+ edema. HUNG.    LAB (24) Hgb 9.9, Alb 2.2, Cr 2.23, GFR 30, Na 143, K 3.7  (1/3/24)TSH 3.350, Blood Cx NEG  (23) Iron 21, TIBC 134, Ferritin 849    A/P:  # Weakness: SNF PT/OT    # OA: tylenol. F/U Ortho ([facility]) to schedule Rt TKA. Trial knee immobilizer to help with pain control.  # HTN: stable OFF BMP meds.  # BPH w/ LUTS & Obstructive Uropathy: HUNG, continue. Attempt voiding trial only when we have coude cath in house. Flomax 0.8 mg qhs. OFF Finasteride 1/15/24 d/t weakness. F/U Dr Delaney as outpatient.  # CKD3b: OFF ARB. NaHCO3. F/U Nephro/ [facility].  # DM w nephropathy and neuropathy: Tresiba 5 units daily. When renal function improves can start Farxiga (if still needed). Lyrica 25 bid. Duloxetine 60.  # Gout: allopurinol  # Anemia: no iron deficiency. Replace Folate and B12. Likely d/t CKD. Slowly improving -- if plateaus consider EPO injection.  # GERD: OFF PPI d/t NOHELIA.  # Metastatic Melanoma: Dx 23. Oncology ([facility]) 23 -- plans chemo/immunotx once stronger. recheck in 2 months after PET-CT and MRI.  # Recurrent unprovoked DVT: coumadin 4 mg daily.  # Diarrhea: lomotil.  # Hx Endocarditis: PPM replaced, Completed Ancef 23. F/U blood cx NEG 23. Will need follow up Echo.  # Hx morbid obesity: Intentional weight loss with dietary/diabetic intervention --  above previous goal weight of 200# -- no further weight loss recommended at this time. Off Restrictive diets. Adding magic cup to keep protein due.  # Hx PMR: off prednisone, off finasteride.      Past Surgical History:   Procedure Laterality Date    CARDIAC ELECTROPHYSIOLOGY PROCEDURE Left 11/22/2023    Procedure: PPM Lead Extraction;  Surgeon: Etienne Aguilar MD;  [facility] [facility];  Service: Electrophysiology;  Laterality: Left;    CARDIAC ELECTROPHYSIOLOGY PROCEDURE N/A 11/30/2023    Procedure: PPM Leadless Implant (MICRA AV);  Surgeon: Etienne Aguilar MD;  Location: [facility] 3529 Cardiac Cath Lab;  Service: Electrophysiology;  Laterality: N/A;    CARDIAC PACEMAKER PLACEMENT  08/22/2013    Pacemaker Permanent Placement    OTHER SURGICAL HISTORY  06/15/2015    Ventral Hernia Repair - Recurrent    OTHER SURGICAL HISTORY  09/16/2019    Prostate biopsy    PERIPHERALLY INSERTED CENTRAL CATHETER INSERTION      SKIN BIOPSY      US GUIDED BIOPSY LYMPH NODE SUPERFICIAL  11/29/2023    US GUIDED BIOPSY LYMPH NODE SUPERFICIAL 11/29/2023 Mao Dubose MD [facility]    VARICOSE VEIN SURGERY  08/22/2013    Varicose Vein Ligation    VENTRAL HERNIA REPAIR  06/15/2015    Ventral Hernia Repair      Social History     Socioeconomic History    Marital status: Single     Spouse name: Not on file    Number of children: Not on file    Years of education: Not on file    Highest education level: Not on file   Occupational History    Not on file   Tobacco Use    Smoking status: Never     Passive exposure: Never    Smokeless tobacco: Never   Vaping Use    Vaping status: Never Used   Substance and Sexual Activity    Alcohol use: Not Currently     Comment: very infrequent    Drug use: Never    Sexual activity: Not Currently   Other Topics Concern    Not on file   Social History Narrative    Not on file     Social Determinants of Health     Financial Resource Strain: Low Risk  (7/17/2024)    Overall Financial Resource Strain  (CARDIA)     Difficulty of Paying Living Expenses: Not hard at all   Food Insecurity: No Food Insecurity (10/3/2023)    Hunger Vital Sign     Worried About Running Out of Food in the Last Year: Never true     Ran Out of Food in the Last Year: Never true   Transportation Needs: No Transportation Needs (7/17/2024)    PRAPARE - Transportation     Lack of Transportation (Medical): No     Lack of Transportation (Non-Medical): No   Physical Activity: Inactive (10/2/2023)    Exercise Vital Sign     Days of Exercise per Week: 0 days     Minutes of Exercise per Session: 0 min   Stress: Not on file   Social Connections: Not on file   Intimate Partner Violence: Not on file   Housing Stability: Low Risk  (7/17/2024)    Housing Stability Vital Sign     Unable to Pay for Housing in the Last Year: No     Number of Times Moved in the Last Year: 1     Homeless in the Last Year: No     Past Medical History:   Diagnosis Date    Abnormal weight gain 10/27/2016    Abnormal weight gain    Achilles tendinitis, unspecified leg 08/16/2016    Achilles tendinitis    Acute upper respiratory infection, unspecified     Acute URI    NOHELIA (acute kidney injury) (CMS-HCC) 10/02/2023    10/2023-11/2023 Following vancomycin in setting of bacteremia.    Allergic contact dermatitis due to plants, except food 08/22/2013    Contact dermatitis due to poison ivy    Arthritis     Bell's palsy 03/21/2023    BPH with obstruction/lower urinary tract symptoms     Cancer (Multi)     Cellulitis of right lower limb 07/30/2021    Cellulitis of right lower extremity without foot    Cough, unspecified 03/21/2016    Cough    Diabetes (Multi)     Encounter for screening for human immunodeficiency virus (HIV) 06/14/2016    Screening for HIV (human immunodeficiency virus)    Hordeolum externum unspecified eye, unspecified eyelid 08/14/2019    Stye    Hyperglycemia, unspecified 12/13/2017    Chronic hyperglycemia    Hypertension     Incisional hernia without obstruction or  gangrene 06/15/2015    Recurrent ventral hernia    Knee joint pain     Right knee- bone on bone    Melanoma (Multi)     MSSA bacteremia 10/2023    Muscle spasm of calf 08/16/2016    Muscle spasm of calf    Obstructive uropathy     Personal history of other diseases of the digestive system 06/30/2015    History of umbilical hernia    Personal history of other diseases of the digestive system 02/05/2016    History of ventral hernia    Personal history of other diseases of the respiratory system 12/23/2014    History of acute sinusitis    Personal history of other diseases of the respiratory system 01/28/2016    History of upper respiratory infection    Personal history of other infectious and parasitic diseases 12/02/2015    History of hepatitis B virus infection    Personal history of other infectious and parasitic diseases     History of hepatitis B virus infection    Personal history of other specified conditions 02/04/2015    History of nausea and vomiting    Personal history of other specified conditions 08/16/2016    History of edema    Personal history of other specified conditions 01/26/2015    History of jaundice    Personal history of pneumonia (recurrent) 01/29/2016    History of pneumonia    Personal history of urinary (tract) infections 02/19/2013    History of urinary tract infection    Prostatitis 03/21/2023    Pruritus, unspecified 02/10/2015    Pruritus    Sinoatrial node dysfunction (Multi)     Strain of muscle, fascia and tendon of the posterior muscle group at thigh level, right thigh, initial encounter 05/10/2016    Tear of right hamstring    Unspecified symptoms and signs involving the genitourinary system 12/20/2016    UTI symptoms      Family History   Problem Relation Name Age of Onset    Cancer Mother Maye Verduzco         Review of Systems   Constitutional:  Negative for chills, fatigue and fever.   HENT:  Negative for rhinorrhea and sore throat.    Eyes:  Negative for pain and redness.    Respiratory:  Negative for cough and shortness of breath.    Cardiovascular:  Negative for chest pain and palpitations.   Gastrointestinal:  Negative for abdominal pain and blood in stool.   Endocrine: Negative for polydipsia and polyuria.   Genitourinary:  Negative for dysuria and hematuria.   Musculoskeletal:  Negative for back pain and neck stiffness.   Skin:  Negative for rash and wound.   Allergic/Immunologic: Negative for environmental allergies and food allergies.   Neurological:  Positive for weakness. Negative for headaches.   Hematological:  Negative for adenopathy. Does not bruise/bleed easily.   Psychiatric/Behavioral:  Negative for hallucinations and suicidal ideas.       There were no vitals taken for this visit.  Physical Exam  Vitals reviewed.   Constitutional:       General: He is not in acute distress.     Appearance: He is not ill-appearing.   HENT:      Head: Normocephalic and atraumatic.      Right Ear: Tympanic membrane normal.      Left Ear: Tympanic membrane normal.      Nose: No congestion or rhinorrhea.      Mouth/Throat:      Pharynx: No oropharyngeal exudate or posterior oropharyngeal erythema.   Eyes:      Extraocular Movements: Extraocular movements intact.      Conjunctiva/sclera: Conjunctivae normal.      Pupils: Pupils are equal, round, and reactive to light.   Cardiovascular:      Rate and Rhythm: Normal rate and regular rhythm.      Heart sounds: Murmur heard.      No friction rub. No gallop.      Comments: PPM  Pulmonary:      Effort: Pulmonary effort is normal.      Breath sounds: Normal breath sounds. No wheezing, rhonchi or rales.   Abdominal:      General: There is no distension.      Palpations: Abdomen is soft.      Tenderness: There is no abdominal tenderness. There is no guarding or rebound.   Genitourinary:     Comments: HUNG  Musculoskeletal:         General: No swelling or deformity.      Cervical back: Normal range of motion and neck supple.      Right lower leg:  "Edema present.      Left lower leg: No edema.   Skin:     Capillary Refill: Capillary refill takes less than 2 seconds.      Coloration: Skin is not jaundiced.      Findings: No rash.   Neurological:      General: No focal deficit present.      Mental Status: He is alert.      Motor: Weakness present.   Psychiatric:         Mood and Affect: Mood normal.         Behavior: Behavior normal.       Lab Results   Component Value Date    WBC 4.4 08/01/2024    HGB 8.6 (L) 08/01/2024    HCT 27.1 (L) 08/01/2024    MCV 88 08/01/2024    PLT 71 (L) 08/01/2024     Lab Results   Component Value Date    CHOL 121 03/11/2024    CHOL 109 08/11/2023    CHOL 120 08/10/2022     Lab Results   Component Value Date    HDL 35.0 03/11/2024    HDL 27.7 (A) 08/11/2023    HDL 30.7 (A) 08/10/2022     Lab Results   Component Value Date    LDLCALC 60 03/11/2024     Lab Results   Component Value Date    TRIG 128 03/11/2024    TRIG 283 (H) 08/11/2023    TRIG 163 (H) 08/10/2022     No components found for: \"CHOLHDL\"  Lab Results   Component Value Date    HGBA1C 5.3 03/11/2024       Assessment/Plan   Problem List Items Addressed This Visit       Type 2 diabetes mellitus with diabetic neuropathy, with long-term current use of insulin (Multi) - Primary    Overview     DX'd 12/2017  STOPPED TZD due to edema 7/2021.   Off METFORMIN d/t diarrhea.   Cannot afford Trulicity.  Sees podiatry for neuropathy  See opto Dr March at [facility] in [place].             Other Visit Diagnoses       Acute deep vein thrombosis (DVT) of right lower extremity, unspecified vein (Multi)                "

## 2024-08-01 ENCOUNTER — APPOINTMENT (OUTPATIENT)
Dept: HEMATOLOGY/ONCOLOGY | Facility: CLINIC | Age: 74
End: 2024-08-01
Payer: MEDICARE

## 2024-08-01 ENCOUNTER — LAB REQUISITION (OUTPATIENT)
Dept: LAB | Facility: HOSPITAL | Age: 74
End: 2024-08-01

## 2024-08-01 DIAGNOSIS — Z79.01 LONG TERM (CURRENT) USE OF ANTICOAGULANTS: ICD-10-CM

## 2024-08-01 DIAGNOSIS — R31.0 GROSS HEMATURIA: ICD-10-CM

## 2024-08-01 LAB
ALBUMIN SERPL BCP-MCNC: 2.2 G/DL (ref 3.4–5)
ALP SERPL-CCNC: 158 U/L (ref 33–136)
ALT SERPL W P-5'-P-CCNC: 35 U/L (ref 10–52)
ANION GAP SERPL CALC-SCNC: 8 MMOL/L (ref 10–20)
AST SERPL W P-5'-P-CCNC: 35 U/L (ref 9–39)
BILIRUB SERPL-MCNC: 0.4 MG/DL (ref 0–1.2)
BUN SERPL-MCNC: 22 MG/DL (ref 6–23)
CALCIUM SERPL-MCNC: 6.7 MG/DL (ref 8.6–10.3)
CHLORIDE SERPL-SCNC: 109 MMOL/L (ref 98–107)
CO2 SERPL-SCNC: 25 MMOL/L (ref 21–32)
CREAT SERPL-MCNC: 1.43 MG/DL (ref 0.5–1.3)
EGFRCR SERPLBLD CKD-EPI 2021: 52 ML/MIN/1.73M*2
ERYTHROCYTE [DISTWIDTH] IN BLOOD BY AUTOMATED COUNT: 18.5 % (ref 11.5–14.5)
GLUCOSE SERPL-MCNC: 89 MG/DL (ref 74–99)
HCT VFR BLD AUTO: 27.1 % (ref 41–52)
HGB BLD-MCNC: 8.6 G/DL (ref 13.5–17.5)
INR PPP: ABNORMAL
MCH RBC QN AUTO: 28 PG (ref 26–34)
MCHC RBC AUTO-ENTMCNC: 31.7 G/DL (ref 32–36)
MCV RBC AUTO: 88 FL (ref 80–100)
NRBC BLD-RTO: 0 /100 WBCS (ref 0–0)
PLATELET # BLD AUTO: 71 X10*3/UL (ref 150–450)
POTASSIUM SERPL-SCNC: 3.3 MMOL/L (ref 3.5–5.3)
PROT SERPL-MCNC: 3.8 G/DL (ref 6.4–8.2)
PROTHROMBIN TIME: >100 SECONDS (ref 9.8–12.8)
RBC # BLD AUTO: 3.07 X10*6/UL (ref 4.5–5.9)
SODIUM SERPL-SCNC: 139 MMOL/L (ref 136–145)
WBC # BLD AUTO: 4.4 X10*3/UL (ref 4.4–11.3)

## 2024-08-01 PROCEDURE — 84075 ASSAY ALKALINE PHOSPHATASE: CPT | Mod: OUT | Performed by: NURSE PRACTITIONER

## 2024-08-01 PROCEDURE — 85027 COMPLETE CBC AUTOMATED: CPT | Mod: OUT | Performed by: NURSE PRACTITIONER

## 2024-08-01 PROCEDURE — 85610 PROTHROMBIN TIME: CPT | Mod: OUT | Performed by: NURSE PRACTITIONER

## 2024-08-01 ASSESSMENT — ENCOUNTER SYMPTOMS
NECK STIFFNESS: 0
EYE REDNESS: 0
CHILLS: 0
HEADACHES: 0
BLOOD IN STOOL: 0
WEAKNESS: 1
POLYDIPSIA: 0
SHORTNESS OF BREATH: 0
HEMATURIA: 0
RHINORRHEA: 0
WOUND: 0
EYE PAIN: 0
BRUISES/BLEEDS EASILY: 0
FEVER: 0
FATIGUE: 0
BACK PAIN: 0
SORE THROAT: 0
PALPITATIONS: 0
COUGH: 0
HALLUCINATIONS: 0
DYSURIA: 0
ADENOPATHY: 0
ABDOMINAL PAIN: 0

## 2024-08-02 ENCOUNTER — LAB REQUISITION (OUTPATIENT)
Dept: LAB | Facility: HOSPITAL | Age: 74
End: 2024-08-02

## 2024-08-02 DIAGNOSIS — Z79.01 LONG TERM (CURRENT) USE OF ANTICOAGULANTS: ICD-10-CM

## 2024-08-02 DIAGNOSIS — R31.0 GROSS HEMATURIA: ICD-10-CM

## 2024-08-02 DIAGNOSIS — R79.1 ABNORMAL COAGULATION PROFILE: ICD-10-CM

## 2024-08-02 LAB
ALBUMIN SERPL BCP-MCNC: 2.3 G/DL (ref 3.4–5)
ALP SERPL-CCNC: 147 U/L (ref 33–136)
ALT SERPL W P-5'-P-CCNC: 36 U/L (ref 10–52)
ANION GAP SERPL CALC-SCNC: 9 MMOL/L (ref 10–20)
AST SERPL W P-5'-P-CCNC: 32 U/L (ref 9–39)
BILIRUB SERPL-MCNC: 0.5 MG/DL (ref 0–1.2)
BUN SERPL-MCNC: 21 MG/DL (ref 6–23)
CALCIUM SERPL-MCNC: 6.7 MG/DL (ref 8.6–10.3)
CHLORIDE SERPL-SCNC: 109 MMOL/L (ref 98–107)
CO2 SERPL-SCNC: 21 MMOL/L (ref 21–32)
CREAT SERPL-MCNC: 1.48 MG/DL (ref 0.5–1.3)
EGFRCR SERPLBLD CKD-EPI 2021: 50 ML/MIN/1.73M*2
ERYTHROCYTE [DISTWIDTH] IN BLOOD BY AUTOMATED COUNT: 18.5 % (ref 11.5–14.5)
GLUCOSE SERPL-MCNC: 73 MG/DL (ref 74–99)
HCT VFR BLD AUTO: 28 % (ref 41–52)
HGB BLD-MCNC: 8.7 G/DL (ref 13.5–17.5)
INR PPP: 4 (ref 0.9–1.1)
MCH RBC QN AUTO: 27.6 PG (ref 26–34)
MCHC RBC AUTO-ENTMCNC: 31.1 G/DL (ref 32–36)
MCV RBC AUTO: 89 FL (ref 80–100)
NRBC BLD-RTO: 0 /100 WBCS (ref 0–0)
PLATELET # BLD AUTO: 88 X10*3/UL (ref 150–450)
POTASSIUM SERPL-SCNC: 2.7 MMOL/L (ref 3.5–5.3)
PROT SERPL-MCNC: 4 G/DL (ref 6.4–8.2)
PROTHROMBIN TIME: 46 SECONDS (ref 9.8–12.8)
RBC # BLD AUTO: 3.15 X10*6/UL (ref 4.5–5.9)
SODIUM SERPL-SCNC: 136 MMOL/L (ref 136–145)
WBC # BLD AUTO: 3.7 X10*3/UL (ref 4.4–11.3)

## 2024-08-02 PROCEDURE — 85027 COMPLETE CBC AUTOMATED: CPT | Mod: OUT | Performed by: FAMILY MEDICINE

## 2024-08-02 PROCEDURE — 85610 PROTHROMBIN TIME: CPT | Mod: OUT | Performed by: FAMILY MEDICINE

## 2024-08-02 PROCEDURE — 80053 COMPREHEN METABOLIC PANEL: CPT | Mod: OUT | Performed by: FAMILY MEDICINE

## 2024-08-05 ENCOUNTER — LAB REQUISITION (OUTPATIENT)
Dept: LAB | Facility: HOSPITAL | Age: 74
End: 2024-08-05

## 2024-08-05 ENCOUNTER — NURSING HOME VISIT (OUTPATIENT)
Dept: POST ACUTE CARE | Facility: EXTERNAL LOCATION | Age: 74
End: 2024-08-05
Payer: COMMERCIAL

## 2024-08-05 DIAGNOSIS — R31.0 GROSS HEMATURIA: ICD-10-CM

## 2024-08-05 DIAGNOSIS — R79.1 ABNORMAL COAGULATION PROFILE: ICD-10-CM

## 2024-08-05 DIAGNOSIS — M19.90 OSTEOARTHRITIS, UNSPECIFIED OSTEOARTHRITIS TYPE, UNSPECIFIED SITE: ICD-10-CM

## 2024-08-05 DIAGNOSIS — R31.9 HEMATURIA, UNSPECIFIED TYPE: ICD-10-CM

## 2024-08-05 DIAGNOSIS — R19.7 DIARRHEA, UNSPECIFIED TYPE: ICD-10-CM

## 2024-08-05 DIAGNOSIS — I48.91 UNSPECIFIED ATRIAL FIBRILLATION (MULTI): ICD-10-CM

## 2024-08-05 LAB
ERYTHROCYTE [DISTWIDTH] IN BLOOD BY AUTOMATED COUNT: 19.4 % (ref 11.5–14.5)
HCT VFR BLD AUTO: 23.3 % (ref 41–52)
HGB BLD-MCNC: 7.3 G/DL (ref 13.5–17.5)
INR PPP: 3 (ref 0.9–1.1)
MCH RBC QN AUTO: 28 PG (ref 26–34)
MCHC RBC AUTO-ENTMCNC: 31.3 G/DL (ref 32–36)
MCV RBC AUTO: 89 FL (ref 80–100)
NRBC BLD-RTO: 0 /100 WBCS (ref 0–0)
PLATELET # BLD AUTO: 102 X10*3/UL (ref 150–450)
PROTHROMBIN TIME: 34.6 SECONDS (ref 9.8–12.8)
RBC # BLD AUTO: 2.61 X10*6/UL (ref 4.5–5.9)
WBC # BLD AUTO: 4.1 X10*3/UL (ref 4.4–11.3)

## 2024-08-05 PROCEDURE — 85610 PROTHROMBIN TIME: CPT | Mod: OUT | Performed by: FAMILY MEDICINE

## 2024-08-05 PROCEDURE — 85027 COMPLETE CBC AUTOMATED: CPT | Mod: OUT | Performed by: FAMILY MEDICINE

## 2024-08-05 NOTE — LETTER
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 No CP/SOB. C/O bladder spasms. Med List & Problem list reviewed.    O: VSS AFEB 204# (down 10#) Awake, alert, NAD. MMM. Chest cta. Heart rrr, 2/6 RONNIE, PPM. Ext trace edema. HUNG draining grossly bloody urine.    LAB (24) Hgb 9.9, Alb 2.2, Cr 2.23, GFR 30, Na 143, K 3.7  (1/3/24)TSH 3.350, Blood Cx NEG  (23) Iron 21, TIBC 134, Ferritin 849    A/P:  # Weakness: SNF PT/OT  # OA: tylenol. F/U Ortho ([facility]) to schedule Rt TKA. Trial knee immobilizer to help with pain control. For chronic pain and bladder spasms start morphine -- MS Contin 15 bid.  # HTN: stable OFF BP meds.  # BPH w/ LUTS & Obstructive Uropathy: HUNG, continue. Attempt voiding trial only when we have coude cath in house. Flomax 0.8 mg qhs. OFF Finasteride 1/15/24 d/t weakness. F/U Dr Delaney as outpatient.  # CKD3b: OFF ARB. NaHCO3. F/U Nephro/ [facility].  # DM w nephropathy and neuropathy: Tresiba 5 units daily. When renal function improves can start Farxiga (if still needed). Lyrica 25 bid. Duloxetine 60.  # Gout: allopurinol  # Anemia: no iron deficiency. Replace Folate and B12. Likely d/t CKD. Slowly improving -- if plateaus consider EPO injection.  # GERD: OFF PPI d/t NOHELIA.  # Metastatic Melanoma: Dx 23. Oncology ([facility]) continue chemo/immunotherapy.  # Recurrent unprovoked DVT: coumadin 4 mg daily.  # Diarrhea: encouraged lomotil up to 4 times a day.  # Hx Endocarditis: PPM replaced, Completed Ancef 23. F/U blood cx NEG 23. Will need follow up Echo.  #  Hx morbid obesity: Intentional weight loss with dietary/diabetic intervention -- above previous goal weight of 200# -- no further weight loss recommended at this time. Off Restrictive diets. Adding magic cup to keep protein due.  # Hx PMR: off prednisone, off finasteride.      Electronically Signed By: Jeffry De Leon MD   8/16/24 10:14 PM

## 2024-08-06 ENCOUNTER — LAB REQUISITION (OUTPATIENT)
Dept: LAB | Facility: HOSPITAL | Age: 74
End: 2024-08-06

## 2024-08-06 DIAGNOSIS — D64.9 ANEMIA, UNSPECIFIED: ICD-10-CM

## 2024-08-06 DIAGNOSIS — Z95.0 PRESENCE OF CARDIAC PACEMAKER: ICD-10-CM

## 2024-08-06 LAB
INR PPP: 2.9 (ref 0.9–1.1)
PROTHROMBIN TIME: 33 SECONDS (ref 9.8–12.8)

## 2024-08-06 PROCEDURE — 85610 PROTHROMBIN TIME: CPT | Mod: OUT | Performed by: FAMILY MEDICINE

## 2024-08-06 NOTE — PROGRESS NOTES
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 No CP/SOB. C/O bladder spasms. Med List & Problem list reviewed.    O: VSS AFEB 204# (down 10#) Awake, alert, NAD. MMM. Chest cta. Heart rrr, 2/6 RONNIE, PPM. Ext trace edema. HUNG draining grossly bloody urine.    LAB (24) Hgb 9.9, Alb 2.2, Cr 2.23, GFR 30, Na 143, K 3.7  (1/3/24)TSH 3.350, Blood Cx NEG  (23) Iron 21, TIBC 134, Ferritin 849    A/P:  # Weakness: SNF PT/OT  # OA: tylenol. F/U Ortho ([facility]) to schedule Rt TKA. Trial knee immobilizer to help with pain control. For chronic pain and bladder spasms start morphine -- MS Contin 15 bid.  # HTN: stable OFF BP meds.  # BPH w/ LUTS & Obstructive Uropathy: HUNG, continue. Attempt voiding trial only when we have coude cath in house. Flomax 0.8 mg qhs. OFF Finasteride 1/15/24 d/t weakness. F/U Dr Delaney as outpatient.  # CKD3b: OFF ARB. NaHCO3. F/U Nephro/ [facility].  # DM w nephropathy and neuropathy: Tresiba 5 units daily. When renal function improves can start Farxiga (if still needed). Lyrica 25 bid. Duloxetine 60.  # Gout: allopurinol  # Anemia: no iron deficiency. Replace Folate and B12. Likely d/t CKD. Slowly improving -- if plateaus consider EPO injection.  # GERD: OFF PPI d/t NOHELIA.  # Metastatic Melanoma: Dx 23. Oncology ([facility]) continue chemo/immunotherapy.  # Recurrent unprovoked DVT: coumadin 4 mg daily.  # Diarrhea: encouraged lomotil up to 4 times a day.  # Hx Endocarditis: PPM replaced, Completed Ancef 23. F/U blood cx NEG 23. Will need follow up Echo.  # Hx morbid obesity: Intentional weight loss with dietary/diabetic  intervention -- above previous goal weight of 200# -- no further weight loss recommended at this time. Off Restrictive diets. Adding magic cup to keep protein due.  # Hx PMR: off prednisone, off finasteride.

## 2024-08-07 ENCOUNTER — NURSING HOME VISIT (OUTPATIENT)
Dept: POST ACUTE CARE | Facility: EXTERNAL LOCATION | Age: 74
End: 2024-08-07
Payer: MEDICARE

## 2024-08-07 DIAGNOSIS — I10 ESSENTIAL HYPERTENSION: ICD-10-CM

## 2024-08-07 DIAGNOSIS — K21.9 GASTROESOPHAGEAL REFLUX DISEASE, UNSPECIFIED WHETHER ESOPHAGITIS PRESENT: ICD-10-CM

## 2024-08-07 DIAGNOSIS — C43.9 METASTATIC MELANOMA (MULTI): ICD-10-CM

## 2024-08-07 DIAGNOSIS — M10.9 GOUT, UNSPECIFIED CAUSE, UNSPECIFIED CHRONICITY, UNSPECIFIED SITE: ICD-10-CM

## 2024-08-07 DIAGNOSIS — K82.8 GALLBLADDER SLUDGE: ICD-10-CM

## 2024-08-07 DIAGNOSIS — N40.1 BPH WITH OBSTRUCTION/LOWER URINARY TRACT SYMPTOMS: ICD-10-CM

## 2024-08-07 DIAGNOSIS — K52.9 ACUTE GASTROENTERITIS: ICD-10-CM

## 2024-08-07 DIAGNOSIS — E11.21 TYPE 2 DIABETES MELLITUS WITH DIABETIC NEPHROPATHY, WITHOUT LONG-TERM CURRENT USE OF INSULIN (MULTI): ICD-10-CM

## 2024-08-07 DIAGNOSIS — D64.9 ANEMIA, UNSPECIFIED TYPE: ICD-10-CM

## 2024-08-07 DIAGNOSIS — I82.401 ACUTE DEEP VEIN THROMBOSIS (DVT) OF RIGHT LOWER EXTREMITY, UNSPECIFIED VEIN (MULTI): ICD-10-CM

## 2024-08-07 DIAGNOSIS — I33.0 TRICUSPID VALVE VEGETATION (HHS-HCC): ICD-10-CM

## 2024-08-07 DIAGNOSIS — F32.A DEPRESSION, UNSPECIFIED DEPRESSION TYPE: ICD-10-CM

## 2024-08-07 DIAGNOSIS — N13.8 BPH WITH OBSTRUCTION/LOWER URINARY TRACT SYMPTOMS: ICD-10-CM

## 2024-08-07 DIAGNOSIS — E78.5 DYSLIPIDEMIA: ICD-10-CM

## 2024-08-07 DIAGNOSIS — M62.81 MUSCLE WEAKNESS (GENERALIZED): Primary | ICD-10-CM

## 2024-08-07 DIAGNOSIS — M13.0 POLYARTHRITIS: ICD-10-CM

## 2024-08-07 DIAGNOSIS — I44.2 COMPLETE ATRIOVENTRICULAR BLOCK (MULTI): ICD-10-CM

## 2024-08-07 DIAGNOSIS — N18.32 STAGE 3B CHRONIC KIDNEY DISEASE (CKD) (MULTI): ICD-10-CM

## 2024-08-07 PROCEDURE — 99309 SBSQ NF CARE MODERATE MDM 30: CPT | Performed by: NURSE PRACTITIONER

## 2024-08-07 NOTE — PROGRESS NOTES
*Provider Impression*    Patient is a 74 year old male who is seen today for management of multiple medical problems     #Weakness / OA / Gout - PT/OT, allopurinol 100mg daily, acetaminophen 650mg q6h PRN  #AV block / TV vegetation / HTN / HLD / CKD -  s/p PM explant and re-implant; atorvastatin 20mg daily, sodium bicarb 1300mg TID until 7/29;  f/u w/ cardiology, f/u w/ ID  #GERD / Immunotherapy-induced gastroenteritis / Gallbladder sludge - zofran 4mg q6h PRN, Tums 500mg q6h PRN, lomotil 2.5mg/0.025mg q6h PRN, prednisone taper until 8/4  #Melanoma / RLE DVT - coumadin 4mg daily, f/u w/ oncology, check CBC, CMP, PT/INR in am (addendum 8/1: d/c coumadin, give vitamin K 5mg x1; 8/2: hold coumadin x2d, resume coumadin at 2mg daily on 8/4, check CBC, CMP, INR on 8/5)  #BPH w/ obstruction - tamsulosin 0.8mg daily, f/u w/ urology Dr. Delaney  #Depression - duloxetine 60mg daily  #T2DM w/ neuropathy - Tresiba 5units QHS, lyrica 25mg BID  #ACP - Full Code  Follow up as needed      *Chief Complaint*     weakness    *History of Present Illness*    Patient is a 75 y/o male w/ PMH as below who presented to the ED on 7/15/24 with intractable nausea, vomiting and diarrhea, which started shortly after receiving his sixth round of immunotherapy. Labs done in the ED showed potassium of 2.6, creatinine of 2.55, worsened compared to prior. GFR dropped to 26, was 36 four days prior. He was given IVF, oral potassium, IV potassium, and IV Zofran. CT scan showed fluid-filled loops of small bowel concerning for enteritis. Patient was subsequently admitted to the hospital service for management of electrolyte derangement with NOHELIA on CKD. He was started on a five day course of IV methylprednisolone for immunotherapy-induced gastroenteritis per heme/onc recommendations. Additional 5 day course of high dose steroids added after patient's diarrhea continued throughout admission, with recommendations for 3 days of oral prednisone following  discharge. During stay he developed gross hematuria with intermittent urinary obstruction. Urology was consulted and recommended PRN flushes for hematuria likely secondary to maldonado insertion trauma. Also during admission, a DVT was found in patient's RLE, and was subsequently started on a heparin drip. Heme/onc was following and recommended a heparin bridge to warfarin, subsequently started on 7.5 mg warfarin with daily INR checks. NOHELIA showed improvement throughout duration of hospital stay, creatinine trended down to 2.60, GFR improved as well. Was given IVFs throughout hospital stay. His home norvasc, lasix and losartan were held upon admission. Bps consistently in the 110/60s, occasionally in the 90s/50s. HR also consistently in the low 70s/mid 60s throughout admission. Subsequently stopped metoprolol upon discharge from hospital as well. He was then d/c to [facility] on 7/24.    His PT/INR was scheduled for 7/29 but not drawn. Per nursing staff he developed some hematuria.    He is seen sitting up in his room today and reports eating better, diarrhea better, drinking fluids, nausea improved, no f/c, sweats, CP, SOB, cough, some hematuria, no abd pain or flank pain, or any other new c/o presently.    (Addendum 8/1: PT/INR critical    [place] - buPROPion, Cephalexin   PMH - arthritis, obesity, lymphedema, ventral hernia repair, hypertension, hyperlipidemia, BPH, PVD, T2DM, Bell's palsy  PSH - pacemaker, ventral hernia repair, prostate biopsy, varicose vein ligation  FH - Brother had heart disease; Mother had lung cancer  SocHx - Never smoker, Social EtOH    *Review of Systems*  All other systems reviewed are negative except as noted in the HPI     *Vital Signs*   Date: 7/31/24  - T: 97.4  P: 83  R: 18  BP: 102/56  SpO2: 99% on RA ; Date: 7/29/24  Wt: 205.6    *Results / Data*  CBC - Date: 7/26/24  WBC: 8.3  HGB: 9.9  HCT: 31.6  PLT: 80 ;   BMP - Date: 7/26/24  Na: 143  K: 3.7  Cl: 115  CO2: 20  BUN: 39  Cr:  2.23  Glu: 60  Ca: 6.9  ;   LFT - Date: 7/26/24  AST: 24  ALT: 17  ALP: 141  Tbili: 0.4  ;   Coags - Date: 7/26/24  INR: 3.7  PT: 42.1  Other - Date: 10/16/23  CRP: 3.8  ESR: 69  ; 10/23/23  CRP: 2.9  ESR: 79 ; 10/30/23  CRP: 1.9  ; 12/18/23  CRP: 1.9 ;  12/20/23  ESR: 60; 1/15/24  ESR: 25  CRP: <0.3  ; 1/29/24  B12: 338  Folate: <2.0  ESR: 31    *Physical Exam*  Gen: (+) NAD, (+) well-appearing  HEENT: (+) normocephalic, (+) MMM  Neck: (+) supple  Lungs: (+) CTAB, (-) wheezes, (-) rales, (-) rhonchi  Heart: (+) RRR, (+) S1 S2, (-) murmurs  Pulses: (+) palpable  Abd: (+) soft, (+) NT, (+) ND, (+) BS+  : (+) maldonado  Ext: (+) edema, (-) deformity  MSK: (-) joint swelling  Skin: (+) warm, (+) dry, (-) rash  Neuro: (+) follows commands, (-) tremor, (+) alert

## 2024-08-09 ENCOUNTER — LAB REQUISITION (OUTPATIENT)
Dept: LAB | Facility: HOSPITAL | Age: 74
End: 2024-08-09

## 2024-08-09 DIAGNOSIS — Z79.01 LONG TERM (CURRENT) USE OF ANTICOAGULANTS: ICD-10-CM

## 2024-08-09 LAB
ALBUMIN SERPL BCP-MCNC: 2.2 G/DL (ref 3.4–5)
ANION GAP SERPL CALC-SCNC: 10 MMOL/L (ref 10–20)
BUN SERPL-MCNC: 33 MG/DL (ref 6–23)
CALCIUM SERPL-MCNC: 6.8 MG/DL (ref 8.6–10.3)
CHLORIDE SERPL-SCNC: 111 MMOL/L (ref 98–107)
CO2 SERPL-SCNC: 18 MMOL/L (ref 21–32)
CREAT SERPL-MCNC: 2.2 MG/DL (ref 0.5–1.3)
EGFRCR SERPLBLD CKD-EPI 2021: 31 ML/MIN/1.73M*2
ERYTHROCYTE [DISTWIDTH] IN BLOOD BY AUTOMATED COUNT: 19.2 % (ref 11.5–14.5)
GLUCOSE SERPL-MCNC: 57 MG/DL (ref 74–99)
HCT VFR BLD AUTO: 25.1 % (ref 41–52)
HGB BLD-MCNC: 7.8 G/DL (ref 13.5–17.5)
INR PPP: 3.8 (ref 0.9–1.1)
MCH RBC QN AUTO: 27.6 PG (ref 26–34)
MCHC RBC AUTO-ENTMCNC: 31.1 G/DL (ref 32–36)
MCV RBC AUTO: 89 FL (ref 80–100)
NRBC BLD-RTO: 0 /100 WBCS (ref 0–0)
PHOSPHATE SERPL-MCNC: 4.4 MG/DL (ref 2.5–4.9)
PLATELET # BLD AUTO: 103 X10*3/UL (ref 150–450)
POTASSIUM SERPL-SCNC: 3 MMOL/L (ref 3.5–5.3)
PROTHROMBIN TIME: 42.9 SECONDS (ref 9.8–12.8)
RBC # BLD AUTO: 2.83 X10*6/UL (ref 4.5–5.9)
SODIUM SERPL-SCNC: 136 MMOL/L (ref 136–145)
WBC # BLD AUTO: 3 X10*3/UL (ref 4.4–11.3)

## 2024-08-09 PROCEDURE — 80069 RENAL FUNCTION PANEL: CPT | Mod: OUT | Performed by: FAMILY MEDICINE

## 2024-08-09 PROCEDURE — 85027 COMPLETE CBC AUTOMATED: CPT | Mod: OUT | Performed by: FAMILY MEDICINE

## 2024-08-09 PROCEDURE — 85610 PROTHROMBIN TIME: CPT | Mod: OUT | Performed by: FAMILY MEDICINE

## 2024-08-12 ENCOUNTER — NURSING HOME VISIT (OUTPATIENT)
Dept: POST ACUTE CARE | Facility: EXTERNAL LOCATION | Age: 74
End: 2024-08-12
Payer: COMMERCIAL

## 2024-08-12 ENCOUNTER — LAB REQUISITION (OUTPATIENT)
Dept: LAB | Facility: HOSPITAL | Age: 74
End: 2024-08-12

## 2024-08-12 DIAGNOSIS — Z79.01 LONG TERM (CURRENT) USE OF ANTICOAGULANTS: ICD-10-CM

## 2024-08-12 DIAGNOSIS — M19.90 OSTEOARTHRITIS, UNSPECIFIED OSTEOARTHRITIS TYPE, UNSPECIFIED SITE: ICD-10-CM

## 2024-08-12 DIAGNOSIS — Z79.4 TYPE 2 DIABETES MELLITUS WITH DIABETIC NEUROPATHY, WITH LONG-TERM CURRENT USE OF INSULIN (MULTI): ICD-10-CM

## 2024-08-12 DIAGNOSIS — E11.40 TYPE 2 DIABETES MELLITUS WITH DIABETIC NEUROPATHY, WITH LONG-TERM CURRENT USE OF INSULIN (MULTI): ICD-10-CM

## 2024-08-12 LAB
ALBUMIN SERPL BCP-MCNC: 2.2 G/DL (ref 3.4–5)
ALP SERPL-CCNC: 197 U/L (ref 33–136)
ALT SERPL W P-5'-P-CCNC: 18 U/L (ref 10–52)
ANION GAP SERPL CALC-SCNC: 11 MMOL/L (ref 10–20)
AST SERPL W P-5'-P-CCNC: 21 U/L (ref 9–39)
BILIRUB SERPL-MCNC: 0.4 MG/DL (ref 0–1.2)
BUN SERPL-MCNC: 41 MG/DL (ref 6–23)
CALCIUM SERPL-MCNC: 6.8 MG/DL (ref 8.6–10.3)
CHLORIDE SERPL-SCNC: 111 MMOL/L (ref 98–107)
CO2 SERPL-SCNC: 18 MMOL/L (ref 21–32)
CREAT SERPL-MCNC: 2.2 MG/DL (ref 0.5–1.3)
EGFRCR SERPLBLD CKD-EPI 2021: 31 ML/MIN/1.73M*2
ERYTHROCYTE [DISTWIDTH] IN BLOOD BY AUTOMATED COUNT: 19.8 % (ref 11.5–14.5)
GLUCOSE SERPL-MCNC: 33 MG/DL (ref 74–99)
HCT VFR BLD AUTO: 28 % (ref 41–52)
HGB BLD-MCNC: 8.8 G/DL (ref 13.5–17.5)
INR PPP: 6 (ref 0.9–1.1)
MCH RBC QN AUTO: 27.8 PG (ref 26–34)
MCHC RBC AUTO-ENTMCNC: 31.4 G/DL (ref 32–36)
MCV RBC AUTO: 89 FL (ref 80–100)
NRBC BLD-RTO: 0 /100 WBCS (ref 0–0)
PLATELET # BLD AUTO: 113 X10*3/UL (ref 150–450)
POTASSIUM SERPL-SCNC: 3.2 MMOL/L (ref 3.5–5.3)
PROT SERPL-MCNC: 3.9 G/DL (ref 6.4–8.2)
PROTHROMBIN TIME: 68.5 SECONDS (ref 9.8–12.8)
RBC # BLD AUTO: 3.16 X10*6/UL (ref 4.5–5.9)
SODIUM SERPL-SCNC: 137 MMOL/L (ref 136–145)
WBC # BLD AUTO: 3 X10*3/UL (ref 4.4–11.3)

## 2024-08-12 PROCEDURE — 85027 COMPLETE CBC AUTOMATED: CPT | Mod: OUT | Performed by: NURSE PRACTITIONER

## 2024-08-12 PROCEDURE — 85610 PROTHROMBIN TIME: CPT | Mod: OUT | Performed by: NURSE PRACTITIONER

## 2024-08-12 PROCEDURE — 80053 COMPREHEN METABOLIC PANEL: CPT | Mod: OUT | Performed by: NURSE PRACTITIONER

## 2024-08-12 NOTE — LETTER
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 C/O low sugars, vision changes and nausea. No complaint of pain even when refused oxycontin for 4 doses. No CP/SOB. C/O bladder spasms. Med List & Problem list reviewed.    O: VSS AFEB 202# (down 2#) Awake, alert, NAD. MMM. Chest cta. Heart rrr, 2/6 RONNIE, PPM. Ext trace edema. HUNG.    LAB (24) Pending  (24) Hgb 9.9, Alb 2.2, Cr 2.23, GFR 30, Na 143, K 3.7  (1/3/24)TSH 3.350, Blood Cx NEG  (23) Iron 21, TIBC 134, Ferritin 849    A/P:  # Weakness: SNF PT/OT  # OA: tylenol. F/U Ortho ([facility]) to schedule Rt TKA. Trial knee immobilizer to help with pain control. For chronic pain and bladder spasms started morphine -- MS Contin 15 bid 24 but caused nausea and double vision. Disontinue this.  # HTN: stable OFF BP meds.  # BPH w/ LUTS & Obstructive Uropathy: HUNG, continue. Attempt voiding trial only when we have coude cath in house. Flomax 0.8 mg qhs. OFF Finasteride 1/15/24 d/t weakness. F/U Dr Delaney as outpatient.  # CKD3b: OFF ARB. NaHCO3. F/U Nephro/ [facility].  # DM w nephropathy and neuropathy: DC Tresiba. When renal function improves can start Farxiga (if still needed). Lyrica 25 bid. Duloxetine 60.  # Gout: allopurinol  # Anemia: no iron deficiency. Replace Folate and B12. Likely d/t CKD. Slowly improving -- if plateaus consider EPO injection.  # GERD: OFF PPI d/t NOHELIA.  # Metastatic Melanoma: Dx 23. Oncology ([facility]) continue chemo/immunotherapy.  # Recurrent unprovoked DVT: coumadin 4 mg daily.  # Diarrhea: encouraged lomotil up to 4 times a day.  # Hx Endocarditis: PPM replaced,  Completed Ancef 12/22/23. F/U blood cx NEG 12/29/23. Will need follow up Echo.  # Hx morbid obesity: Intentional weight loss with dietary/diabetic intervention -- just above previous goal weight of 200# -- no further weight loss recommended at this time. Off Restrictive diets. Magic cup.  # Hx PMR: off prednisone, off finasteride.      Electronically Signed By: Jeffry De Leon MD   8/16/24 10:14 PM

## 2024-08-12 NOTE — PROGRESS NOTES
*Provider Impression*    Patient is a 74 year old male who is seen today for management of multiple medical problems     #Weakness / OA / Gout - PT/OT, allopurinol 100mg daily, acetaminophen 650mg q6h PRN, MS Contin 15mg BID  #AV block / TV vegetation / HTN / HLD / CKD -  s/p PM explant and re-implant; atorvastatin 20mg daily, completed KCl;  f/u w/ cardiology, f/u w/ ID  #Anemia - Epogen 3000units MWF, check CBC  #GERD / Immunotherapy-induced gastroenteritis / Gallbladder sludge - zofran 4mg q6h PRN, Tums 500mg q6h PRN, lomotil 2.5mg/0.025mg q6h PRN  #Melanoma / RLE DVT - coumadin 4mg daily, f/u w/ oncology, coumadin 2mg daily, check CBC, CMP, INR on 8/9  #BPH w/ obstruction - maldonado, tamsulosin 0.8mg daily, f/u w/ urology Dr. Delaney  #Depression - duloxetine 60mg daily  #T2DM w/ neuropathy - Tresiba 5units QHS, lyrica 25mg BID  #ACP - Full Code  Follow up as needed      *Chief Complaint*     weakness    *History of Present Illness*    Patient is a 75 y/o male w/ PMH as below who presented to the ED on 7/15/24 with intractable nausea, vomiting and diarrhea, which started shortly after receiving his sixth round of immunotherapy. Labs done in the ED showed potassium of 2.6, creatinine of 2.55, worsened compared to prior. GFR dropped to 26, was 36 four days prior. He was given IVF, oral potassium, IV potassium, and IV Zofran. CT scan showed fluid-filled loops of small bowel concerning for enteritis. Patient was subsequently admitted to the hospital service for management of electrolyte derangement with NOHELIA on CKD. He was started on a five day course of IV methylprednisolone for immunotherapy-induced gastroenteritis per heme/onc recommendations. Additional 5 day course of high dose steroids added after patient's diarrhea continued throughout admission, with recommendations for 3 days of oral prednisone following discharge. During stay he developed gross hematuria with intermittent urinary obstruction. Urology was  consulted and recommended PRN flushes for hematuria likely secondary to maldonado insertion trauma. Also during admission, a DVT was found in patient's RLE, and was subsequently started on a heparin drip. Heme/onc was following and recommended a heparin bridge to warfarin, subsequently started on 7.5 mg warfarin with daily INR checks. NOHELIA showed improvement throughout duration of hospital stay, creatinine trended down to 2.60, GFR improved as well. Was given IVFs throughout hospital stay. His home norvasc, lasix and losartan were held upon admission. Bps consistently in the 110/60s, occasionally in the 90s/50s. HR also consistently in the low 70s/mid 60s throughout admission. Subsequently stopped metoprolol upon discharge from hospital as well. He was then d/c to [facility] on 7/24.    He was started on retacrit. Per nursing staff he has still had some hemauria.    He is seen lying in bed today and reports feeling fatigued, still having some diarrhea but better, treatments on hold for now, the neuropathy today is horrible, feeling weak, no f/c, sweats, CP, SOB, dizziness - little lightheaded w/ pallor, no blood in diarrhea, no abd pain, n/v, low appetite, but no other new c/o presently.    (Addendum: 8/9 - called by nursing staff who reported elevated INR of 3.9, however upon her review of the records it appears there was a med error where he received 4mg instead of 2mg, so ordered to hold x1 and resume the 2mg dose w/ repeat labs on 8/12)    [place] - buPROPion, Cephalexin   PMH - arthritis, obesity, lymphedema, ventral hernia repair, hypertension, hyperlipidemia, BPH, PVD, T2DM, Bell's palsy  PSH - pacemaker, ventral hernia repair, prostate biopsy, varicose vein ligation  FH - Brother had heart disease; Mother had lung cancer  SocHx - Never smoker, Social EtOH    *Review of Systems*  All other systems reviewed are negative except as noted in the HPI     *Vital Signs*   Date: 8/6/24  - T: 97.7  P: 79  R:   BP:  126/52  SpO2: % on RA ; Date: 8/5/24  Wt: 196.4    *Results / Data*  CBC - Date: 8/5/24  WBC: 4.1  HGB: 7.3  HCT: 23.3  PLT: 102 ;   BMP - Date: 8/5/24  Na: 136  K: 2.7  Cl: 109  Bicarb: 21  BUN: 21  Cr: 1.48  Glu: 73  Ca: 6.7  ;   LFT - Date: 8/5/24  AST: 32  ALT: 36  ALP: 147  Tbili: 0.5  ;   Coags - Date: 7/26/24  INR: 3.7  PT: 42.1  Other - Date: 10/16/23  CRP: 3.8  ESR: 69  ; 10/23/23  CRP: 2.9  ESR: 79 ; 10/30/23  CRP: 1.9  ; 12/18/23  CRP: 1.9 ;  12/20/23  ESR: 60; 1/15/24  ESR: 25  CRP: <0.3  ; 1/29/24  B12: 338  Folate: <2.0  ESR: 31    *Physical Exam*  Gen: (+) NAD, (+) well-appearing  HEENT: (+) normocephalic, (+) MMM  Neck: (+) supple  Lungs: (+) CTAB, (-) wheezes, (-) rales, (-) rhonchi  Heart: (+) RRR, (+) S1 S2, (-) murmurs  Pulses: (+) palpable  Abd: (+) soft, (+) NT, (+) ND, (+) BS+  : (+) maldonado  Ext: (+) edema, (-) deformity  MSK: (-) joint swelling  Skin: (+) warm, (+) dry, (-) rash  Neuro: (+) follows commands, (-) tremor, (+) alert

## 2024-08-12 NOTE — PROGRESS NOTES
Resident seen 24 -- MP    CC: [facility] ([facility]) Recheck    : 1950  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 C/O low sugars, vision changes and nausea. No complaint of pain even when refused oxycontin for 4 doses. No CP/SOB. C/O bladder spasms. Med List & Problem list reviewed.    O: VSS AFEB 202# (down 2#) Awake, alert, NAD. MMM. Chest cta. Heart rrr, 2/6 RONNIE, PPM. Ext trace edema. HUNG.    LAB (24) Pending  (24) Hgb 9.9, Alb 2.2, Cr 2.23, GFR 30, Na 143, K 3.7  (1/3/24)TSH 3.350, Blood Cx NEG  (23) Iron 21, TIBC 134, Ferritin 849    A/P:  # Weakness: [facility] PT/OT  # OA: tylenol. F/U Ortho (Taye) to schedule Rt TKA. Trial knee immobilizer to help with pain control. For chronic pain and bladder spasms started morphine -- MS Contin 15 bid 24 but caused nausea and double vision. Disontinue this.  # HTN: stable OFF BP meds.  # BPH w/ LUTS & Obstructive Uropathy: HUNG, continue. Attempt voiding trial only when we have coude cath in house. Flomax 0.8 mg qhs. OFF Finasteride 1/15/24 d/t weakness. F/U Dr Delaney as outpatient.  # CKD3b: OFF ARB. NaHCO3. F/U Nephro/ [facility].  # DM w nephropathy and neuropathy: DC Tresiba. When renal function improves can start Farxiga (if still needed). Lyrica 25 bid. Duloxetine 60.  # Gout: allopurinol  # Anemia: no iron deficiency. Replace Folate and B12. Likely d/t CKD. Slowly improving -- if plateaus consider EPO injection.  # GERD: OFF PPI d/t NOHELIA.  # Metastatic Melanoma: Dx 23. Oncology (Gian) continue chemo/immunotherapy.  # Recurrent unprovoked DVT: coumadin 4 mg daily.  # Diarrhea: encouraged lomotil up to 4 times a day.  # Hx Endocarditis: PPM replaced, Completed Ancef 23. F/U blood cx NEG 23. Will need follow up Echo.  #  Hx morbid obesity: Intentional weight loss with dietary/diabetic intervention -- just above previous goal weight of 200# -- no further weight loss recommended at this time. Off Restrictive diets. Magic cup.  # Hx PMR: off prednisone, off finasteride.

## 2024-08-14 ENCOUNTER — LAB REQUISITION (OUTPATIENT)
Dept: LAB | Facility: HOSPITAL | Age: 74
End: 2024-08-14

## 2024-08-14 DIAGNOSIS — R79.1 ABNORMAL COAGULATION PROFILE: ICD-10-CM

## 2024-08-14 DIAGNOSIS — I10 ESSENTIAL (PRIMARY) HYPERTENSION: ICD-10-CM

## 2024-08-14 DIAGNOSIS — E16.2 HYPOGLYCEMIA, UNSPECIFIED: ICD-10-CM

## 2024-08-14 DIAGNOSIS — Z79.01 LONG TERM (CURRENT) USE OF ANTICOAGULANTS: ICD-10-CM

## 2024-08-14 LAB
ANION GAP SERPL CALC-SCNC: 13 MMOL/L (ref 10–20)
BUN SERPL-MCNC: 52 MG/DL (ref 6–23)
CALCIUM SERPL-MCNC: 7 MG/DL (ref 8.6–10.3)
CHLORIDE SERPL-SCNC: 116 MMOL/L (ref 98–107)
CO2 SERPL-SCNC: 16 MMOL/L (ref 21–32)
CREAT SERPL-MCNC: 2.73 MG/DL (ref 0.5–1.3)
EGFRCR SERPLBLD CKD-EPI 2021: 24 ML/MIN/1.73M*2
GLUCOSE SERPL-MCNC: 57 MG/DL (ref 74–99)
INR PPP: 6.5 (ref 0.9–1.1)
POTASSIUM SERPL-SCNC: 4.7 MMOL/L (ref 3.5–5.3)
PROTHROMBIN TIME: 74.6 SECONDS (ref 9.8–12.8)
SODIUM SERPL-SCNC: 140 MMOL/L (ref 136–145)

## 2024-08-14 PROCEDURE — 80048 BASIC METABOLIC PNL TOTAL CA: CPT | Mod: OUT | Performed by: FAMILY MEDICINE

## 2024-08-14 PROCEDURE — 85610 PROTHROMBIN TIME: CPT | Mod: OUT | Performed by: FAMILY MEDICINE

## 2024-08-15 ENCOUNTER — LAB REQUISITION (OUTPATIENT)
Dept: LAB | Facility: HOSPITAL | Age: 74
End: 2024-08-15

## 2024-08-15 DIAGNOSIS — E86.0 DEHYDRATION: ICD-10-CM

## 2024-08-15 DIAGNOSIS — Z79.01 LONG TERM (CURRENT) USE OF ANTICOAGULANTS: ICD-10-CM

## 2024-08-15 LAB
ALBUMIN SERPL BCP-MCNC: 2.3 G/DL (ref 3.4–5)
ALP SERPL-CCNC: 303 U/L (ref 33–136)
ALT SERPL W P-5'-P-CCNC: 17 U/L (ref 10–52)
ANION GAP SERPL CALC-SCNC: 13 MMOL/L (ref 10–20)
AST SERPL W P-5'-P-CCNC: 22 U/L (ref 9–39)
BASOPHILS # BLD AUTO: 0 X10*3/UL (ref 0–0.1)
BASOPHILS NFR BLD AUTO: 0 %
BILIRUB SERPL-MCNC: 0.5 MG/DL (ref 0–1.2)
BUN SERPL-MCNC: 51 MG/DL (ref 6–23)
CALCIUM SERPL-MCNC: 7 MG/DL (ref 8.6–10.3)
CHLORIDE SERPL-SCNC: 117 MMOL/L (ref 98–107)
CO2 SERPL-SCNC: 17 MMOL/L (ref 21–32)
CREAT SERPL-MCNC: 2.64 MG/DL (ref 0.5–1.3)
EGFRCR SERPLBLD CKD-EPI 2021: 25 ML/MIN/1.73M*2
EOSINOPHIL # BLD AUTO: 0.03 X10*3/UL (ref 0–0.4)
EOSINOPHIL NFR BLD AUTO: 0.7 %
ERYTHROCYTE [DISTWIDTH] IN BLOOD BY AUTOMATED COUNT: 20.8 % (ref 11.5–14.5)
GLUCOSE SERPL-MCNC: 58 MG/DL (ref 74–99)
HCT VFR BLD AUTO: 28 % (ref 41–52)
HGB BLD-MCNC: 8.7 G/DL (ref 13.5–17.5)
IMM GRANULOCYTES # BLD AUTO: 0.04 X10*3/UL (ref 0–0.5)
IMM GRANULOCYTES NFR BLD AUTO: 0.9 % (ref 0–0.9)
INR PPP: 5.4 (ref 0.9–1.1)
LYMPHOCYTES # BLD AUTO: 1.39 X10*3/UL (ref 0.8–3)
LYMPHOCYTES NFR BLD AUTO: 31.2 %
MAGNESIUM SERPL-MCNC: 2.05 MG/DL (ref 1.6–2.4)
MCH RBC QN AUTO: 28.1 PG (ref 26–34)
MCHC RBC AUTO-ENTMCNC: 31.1 G/DL (ref 32–36)
MCV RBC AUTO: 90 FL (ref 80–100)
MONOCYTES # BLD AUTO: 0.25 X10*3/UL (ref 0.05–0.8)
MONOCYTES NFR BLD AUTO: 5.6 %
NEUTROPHILS # BLD AUTO: 2.75 X10*3/UL (ref 1.6–5.5)
NEUTROPHILS NFR BLD AUTO: 61.6 %
NRBC BLD-RTO: 0 /100 WBCS (ref 0–0)
OVALOCYTES BLD QL SMEAR: NORMAL
PHOSPHATE SERPL-MCNC: 4 MG/DL (ref 2.5–4.9)
PLATELET # BLD AUTO: 134 X10*3/UL (ref 150–450)
POTASSIUM SERPL-SCNC: 4.2 MMOL/L (ref 3.5–5.3)
PROT SERPL-MCNC: 4 G/DL (ref 6.4–8.2)
PROTHROMBIN TIME: 62.1 SECONDS (ref 9.8–12.8)
RBC # BLD AUTO: 3.1 X10*6/UL (ref 4.5–5.9)
RBC MORPH BLD: NORMAL
SODIUM SERPL-SCNC: 143 MMOL/L (ref 136–145)
WBC # BLD AUTO: 4.5 X10*3/UL (ref 4.4–11.3)

## 2024-08-15 PROCEDURE — 83735 ASSAY OF MAGNESIUM: CPT | Mod: OUT | Performed by: NURSE PRACTITIONER

## 2024-08-15 PROCEDURE — 84100 ASSAY OF PHOSPHORUS: CPT | Mod: OUT | Performed by: NURSE PRACTITIONER

## 2024-08-15 PROCEDURE — 86337 INSULIN ANTIBODIES: CPT | Mod: OUT | Performed by: NURSE PRACTITIONER

## 2024-08-15 PROCEDURE — 85610 PROTHROMBIN TIME: CPT | Mod: OUT | Performed by: NURSE PRACTITIONER

## 2024-08-15 PROCEDURE — 85025 COMPLETE CBC W/AUTO DIFF WBC: CPT | Mod: OUT | Performed by: NURSE PRACTITIONER

## 2024-08-15 PROCEDURE — 80053 COMPREHEN METABOLIC PANEL: CPT | Mod: OUT | Performed by: NURSE PRACTITIONER

## 2024-08-15 NOTE — TELEPHONE ENCOUNTER
Correction:  Pt has no appointments setup with us.     Patient called requesting earlier appointment with provider. Currently scheduled 11/15/2024 Patient saw other provider for painful intercourse and nothing came back abnormal on ultrasound. Patient stated she feels a bump in pelvis.Please advise.

## 2024-08-16 ENCOUNTER — LAB REQUISITION (OUTPATIENT)
Dept: LAB | Facility: HOSPITAL | Age: 74
End: 2024-08-16

## 2024-08-16 DIAGNOSIS — I50.9 HEART FAILURE, UNSPECIFIED (MULTI): ICD-10-CM

## 2024-08-16 LAB
ALBUMIN SERPL BCP-MCNC: 2.1 G/DL (ref 3.4–5)
ALP SERPL-CCNC: 310 U/L (ref 33–136)
ALT SERPL W P-5'-P-CCNC: 16 U/L (ref 10–52)
ANION GAP SERPL CALC-SCNC: 11 MMOL/L (ref 10–20)
AST SERPL W P-5'-P-CCNC: 24 U/L (ref 9–39)
BILIRUB SERPL-MCNC: 0.5 MG/DL (ref 0–1.2)
BUN SERPL-MCNC: 45 MG/DL (ref 6–23)
CALCIUM SERPL-MCNC: 6.8 MG/DL (ref 8.6–10.3)
CHLORIDE SERPL-SCNC: 118 MMOL/L (ref 98–107)
CO2 SERPL-SCNC: 16 MMOL/L (ref 21–32)
CREAT SERPL-MCNC: 2.19 MG/DL (ref 0.5–1.3)
EGFRCR SERPLBLD CKD-EPI 2021: 31 ML/MIN/1.73M*2
ERYTHROCYTE [DISTWIDTH] IN BLOOD BY AUTOMATED COUNT: 20.8 % (ref 11.5–14.5)
GLUCOSE SERPL-MCNC: 74 MG/DL (ref 74–99)
HCT VFR BLD AUTO: 25.2 % (ref 41–52)
HGB BLD-MCNC: 7.8 G/DL (ref 13.5–17.5)
MCH RBC QN AUTO: 27.9 PG (ref 26–34)
MCHC RBC AUTO-ENTMCNC: 31 G/DL (ref 32–36)
MCV RBC AUTO: 90 FL (ref 80–100)
NRBC BLD-RTO: 0 /100 WBCS (ref 0–0)
PLATELET # BLD AUTO: 109 X10*3/UL (ref 150–450)
POTASSIUM SERPL-SCNC: 3.7 MMOL/L (ref 3.5–5.3)
PROT SERPL-MCNC: 3.7 G/DL (ref 6.4–8.2)
PSA SERPL-MCNC: 29.7 NG/ML
RBC # BLD AUTO: 2.8 X10*6/UL (ref 4.5–5.9)
SODIUM SERPL-SCNC: 141 MMOL/L (ref 136–145)
WBC # BLD AUTO: 4.3 X10*3/UL (ref 4.4–11.3)

## 2024-08-16 PROCEDURE — 80053 COMPREHEN METABOLIC PANEL: CPT | Mod: OUT | Performed by: FAMILY MEDICINE

## 2024-08-16 PROCEDURE — 84153 ASSAY OF PSA TOTAL: CPT | Mod: OUT,GEALAB | Performed by: FAMILY MEDICINE

## 2024-08-16 PROCEDURE — 85027 COMPLETE CBC AUTOMATED: CPT | Mod: OUT | Performed by: FAMILY MEDICINE

## 2024-08-19 LAB — INSULIN AB SER IA-ACNC: <0.4 U/ML (ref 0–0.4)

## 2024-08-19 NOTE — LETTER
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 No CP/SOB. + Nausea with meals. Med List & Problem list reviewed.    O: VSS AFEB 189# (down 13#) Awake, alert, NAD. Dry mm. Chest cta. Heart rrr, 2/6 RONNIE, PPM. Ext trace edema. HUNG. Sacral decub.    LAB (24) na 142, K 3.4, BiCarb 19, Cr 2.73-> 1.79, GFR 40, Phos 2.6, Ca 6.3, Hgb 9.9->8.3  24 INR 4.5  (1/3/24)TSH 3.350, Blood Cx NEG  (23) Iron 21, TIBC 134, Ferritin 849    A/P:  # Weakness: SNF PT/OT  # OA: tylenol. F/U Ortho (Taye) to schedule Rt TKA. Trial knee immobilizer to help with pain control. For chronic pain and bladder spasms started morphine -- MS Contin 15 bid 24 but caused nausea and double vision. Disontinue this.  # HTN: stable OFF BP meds.  # BPH w/ LUTS & Obstructive Uropathy: HUNG, continue. Attempt voiding trial only when we have coude cath in house. Flomax 0.8 mg qhs. OFF Finasteride 1/15/24 d/t weakness. F/U Dr Delaney as outpatient.  # CKD3b: OFF ARB. Resume NaHCO3. F/U Nephro [facility].  # DM w nephropathy and neuropathy: DC Tresiba. When renal function improves can start Farxiga (if still needed). Lyrica 25 bid. Duloxetine 60.  # Gout: allopurinol  # Anemia: no iron deficiency. Replace Folate and B12. Likely d/t CKD. Slowly improving -- if plateaus consider EPO injection.  # GERD: OFF PPI d/t NOHELIA. Zofran and tums.  # Metastatic Melanoma: Dx 23. Oncology (Gian) continue chemo/immunotherapy.  # Recurrent unprovoked DVT: coumadin held for supertherapeutic INR.  # Hx Diarrhea: resolved.  # Hx Endocarditis: PPM replaced, Completed Ancef 23. F/U blood cx NEG  12/29/23. Will need follow up Echo.  # PCM: losing past his goal weight of 200# (previously obese). DC Restrictive diets. Continue supplement and Magic cup.  # Hx PMR: off prednisone, off finasteride.      Electronically Signed By: Jeffry De Leon MD   8/24/24  3:40 PM

## 2024-08-20 NOTE — DOCUMENTATION CLARIFICATION NOTE
PATIENT:               MOE HO  ACCT #:                  [id number]  MRN:                       [id number]  :                       1950  ADMIT DATE:       7/15/2024 3:18 PM  DISCH DATE:        2024 4:49 PM  RESPONDING PROVIDER #:        [id number]          PROVIDER RESPONSE TEXT:    Acute kidney injury with acute tubular necrosis    CDI QUERY TEXT:    Clarification    Instruction:    Based on your assessment of the patient and the clinical information, please provide the requested documentation by clicking on the appropriate radio button and enter any additional information if prompted.    Question: Please further clarify the diagnosis of acute kidney injury as    When answering this query, please exercise your independent professional judgment. The fact that a question is being asked, does not imply that any particular answer is desired or expected.    The patient's clinical indicators include:  Clinical Information: 74 y.o. male with history of malignant melanoma currently on immunotherapy-last dose a week ago, diabetes mellitus, and hypertension presenting with vomiting and diarrhea.    Clinical Indicators:  Cr: 2.55/2.54/2.76/3.10/3.85/4.24/4.67/4.4/3.98/3.21/2.6; baseline serum creatinine 1.5-2    UA: hyaline casts, turbid, lt orange, protein/Cr ratio: 1.00  concern for AIN noted    Renal US: 1. Mild bilateral renal cortical thinning with increased renal  cortical echogenicity, which can be associated with medical renal  disease in appropriate clinical setting.  2. Stable left renal cyst. No hydronephrosis.  3. Prostatomegaly.  4. Circumferential wall thickening with trabeculated morphology of the urinary bladder, which could be related to chronic bladder outlet obstruction in appropriate clinical setting.    Treatment: NaCl 1000ml bolus x1, continue tamsulosin/Flomax, change fluids to bicarb drip, renal ultrasound, urine studies    Risk Factors: CKD3, HTN, BPH, immunotherapy  Options  provided:  -- Acute kidney injury with acute tubular necrosis  -- Acute kidney injury without acute tubular necrosis  -- Other - I will add my own diagnosis  -- Refer to Clinical Documentation Reviewer    Query created by: Mariajose Echevarria on 7/18/2024 9:00 AM      Electronically signed by:  MUSA NOLAND MD 8/20/2024 9:06 AM

## 2024-08-21 NOTE — PROGRESS NOTES
*Provider Impression*    Patient is a 74 year old male who is seen today for management of multiple medical problems     #Weakness / OA / Gout - PT/OT, allopurinol 100mg daily, acetaminophen 650mg q6h PRN, MS Contin 15mg BID  #AV block / TV vegetation / HTN / HLD / CKD -  s/p PM explant and re-implant; atorvastatin 20mg daily, KCl 40mEq BID,  f/u w/ cardiology, f/u w/ ID, start IV, infuse NS @ 100ml/hr x1L then D5W @ 75mL/hr x1L, check CBC, CMP, Mag phos, INR in AM  #Anemia - Epogen 3000units MWF  #GERD / Immunotherapy-induced gastroenteritis / Gallbladder sludge - zofran 4mg q6h PRN, Tums 500mg q6h PRN, lomotil 2.5mg/0.025mg q6h PRN  #Melanoma / RLE DVT - f/u w/ oncology, coumadin on hold  #BPH w/ obstruction - maldonado, tamsulosin 0.8mg daily, f/u w/ urology Dr. Delaney  #Depression / Insomnia - duloxetine 60mg daily, add melatonin 3mg QHS  #T2DM w/ neuropathy - Tresiba 5units QHS, lyrica 25mg BID  #ACP - Full Code  Follow up as needed      *Chief Complaint*     weakness    *History of Present Illness*    Patient is a 73 y/o male w/ PMH as below who presented to the ED on 7/15/24 with intractable nausea, vomiting and diarrhea, which started shortly after receiving his sixth round of immunotherapy. Labs done in the ED showed potassium of 2.6, creatinine of 2.55, worsened compared to prior. GFR dropped to 26, was 36 four days prior. He was given IVF, oral potassium, IV potassium, and IV Zofran. CT scan showed fluid-filled loops of small bowel concerning for enteritis. Patient was subsequently admitted to the hospital service for management of electrolyte derangement with NOHELIA on CKD. He was started on a five day course of IV methylprednisolone for immunotherapy-induced gastroenteritis per heme/onc recommendations. Additional 5 day course of high dose steroids added after patient's diarrhea continued throughout admission, with recommendations for 3 days of oral prednisone following discharge. During stay he developed  gross hematuria with intermittent urinary obstruction. Urology was consulted and recommended PRN flushes for hematuria likely secondary to maldonado insertion trauma. Also during admission, a DVT was found in patient's RLE, and was subsequently started on a heparin drip. Heme/onc was following and recommended a heparin bridge to warfarin, subsequently started on 7.5 mg warfarin with daily INR checks. NOHELIA showed improvement throughout duration of hospital stay, creatinine trended down to 2.60, GFR improved as well. Was given IVFs throughout hospital stay. His home norvasc, lasix and losartan were held upon admission. Bps consistently in the 110/60s, occasionally in the 90s/50s. HR also consistently in the low 70s/mid 60s throughout admission. Subsequently stopped metoprolol upon discharge from hospital as well. He was then d/c to [facility] on 7/24.    His INR was up, renal function low.    He is seen sitting up in his room today and reports not eating well, just tired, not sleeping at night, some nausea, no emesis, zofran helps, low appetite, no bleeding, no f/c, sweats, n/v, constipation, diarrhea, LBM 2 days ago, no CP, SOB, or any other new c/o presently.     [place] - buPROPion, Cephalexin   PMH - arthritis, obesity, lymphedema, ventral hernia repair, hypertension, hyperlipidemia, BPH, PVD, T2DM, Bell's palsy  PSH - pacemaker, ventral hernia repair, prostate biopsy, varicose vein ligation  FH - Brother had heart disease; Mother had lung cancer  SocHx - Never smoker, Social EtOH    *Review of Systems*  All other systems reviewed are negative except as noted in the HPI     *Vital Signs*   Date: 8/14/24  - T: 97.9  P: 90  R: 20  BP: 81/46  SpO2: 98% on RA ; Date: 8/5/24  Wt: 196.4    *Results / Data*  CBC - Date: 8/12/24  WBC: 3.0  HGB: 8.8  HCT: 28.0  PLT: 113 ;   BMP - Date: 8/14/24  Na: 140  K: 4.7  Cl: 116  Bicarb: 16  BUN: 52  Cr: 2.73  Glu: 57  Ca: 7.0  ;   LFT - Date: 8/5/24  AST: 32  ALT: 36  ALP: 147   Tbili: 0.5  ;   Coags - Date: 7/26/24  INR: 3.7  PT: 42.1  Other - Date: 10/16/23  CRP: 3.8  ESR: 69  ; 10/23/23  CRP: 2.9  ESR: 79 ; 10/30/23  CRP: 1.9  ; 12/18/23  CRP: 1.9 ;  12/20/23  ESR: 60; 1/15/24  ESR: 25  CRP: <0.3  ; 1/29/24  B12: 338  Folate: <2.0  ESR: 31    *Physical Exam*  Gen: (+) NAD, (+) well-appearing  HEENT: (+) normocephalic, (+) MMM  Neck: (+) supple  Lungs: (+) CTAB, (-) wheezes, (-) rales, (-) rhonchi  Heart: (+) RRR, (+) S1 S2, (-) murmurs  Pulses: (+) palpable  Abd: (+) soft, (+) NT, (+) ND, (+) BS+  : (+) maldonado  Ext: (+) edema, (-) deformity  MSK: (-) joint swelling  Skin: (+) warm, (+) dry, (-) rash  Neuro: (+) follows commands, (-) tremor, (+) alert

## 2024-08-22 PROBLEM — E66.01 SEVERE OBESITY (MULTI): Status: RESOLVED | Noted: 2024-01-01 | Resolved: 2024-01-01

## 2024-08-22 NOTE — TELEPHONE ENCOUNTER
Called and spoke to Anna at [facility].  She states pt has had some decline, was dehydrated last week, had IVF which were helpful. He also had some Hematuria, coumadin adjusted, doing better.  Pt not eating, has had a 30 pound weight loss in 30 days, considering placing a PEG tube as pt wants to continue treatment.     Pt missed 8/1 treatment and they are wondering if he should be scheduled for this.  Pt had lengthy hospitalization in July and then placed in skilled care.  Request for appointments and update referred to Dr. Springer.

## 2024-08-22 NOTE — PROGRESS NOTES
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 No CP/SOB. + Nausea with meals. Med List & Problem list reviewed.    O: VSS AFEB 189# (down 13#) Awake, alert, NAD. Dry mm. Chest cta. Heart rrr, / RONNIE, PPM. Ext trace edema. HUNG. Sacral decub.    LAB (24) na 142, K 3.4, BiCarb 19, Cr 2.73-> 1.79, GFR 40, Phos 2.6, Ca 6.3, Hgb 9.9->8.3  24 INR 4.5  (1/3/24)TSH 3.350, Blood Cx NEG  (23) Iron 21, TIBC 134, Ferritin 849    A/P:  # Weakness: SNF PT/OT  # OA: tylenol. F/U Ortho (Taye) to schedule Rt TKA. Trial knee immobilizer to help with pain control. For chronic pain and bladder spasms started morphine -- MS Contin 15 bid 24 but caused nausea and double vision. Disontinue this.  # HTN: stable OFF BP meds.  # BPH w/ LUTS & Obstructive Uropathy: HUNG, continue. Attempt voiding trial only when we have coude cath in house. Flomax 0.8 mg qhs. OFF Finasteride 1/15/24 d/t weakness. F/U Dr Delaney as outpatient.  # CKD3b: OFF ARB. Resume NaHCO3. F/U Nephro [facility].  # DM w nephropathy and neuropathy: DC Tresiba. When renal function improves can start Farxiga (if still needed). Lyrica 25 bid. Duloxetine 60.  # Gout: allopurinol  # Anemia: no iron deficiency. Replace Folate and B12. Likely d/t CKD. Slowly improving -- if plateaus consider EPO injection.  # GERD: OFF PPI d/t NOHELIA. Zofran and tums.  # Metastatic Melanoma: Dx 23. Oncology (Gian) continue chemo/immunotherapy.  # Recurrent unprovoked DVT: coumadin held for supertherapeutic INR.  # Hx Diarrhea: resolved.  # Hx Endocarditis: PPM replaced, Completed Ancef 23. F/U blood cx NEG 23. Will need follow up Echo.  # PCM: losing past his goal weight of 200#  (previously obese). DC Restrictive diets. Continue supplement and Magic cup.  # Hx PMR: off prednisone, off finasteride.

## 2024-08-25 NOTE — SIGNIFICANT EVENT
Respiratory responded to ER for patient coming in from nursing home.  Pulse oximetry was 98% on 100% NRB at rest.   Acknowledged respiratory not needed anymore at this time.

## 2024-08-25 NOTE — ED PROVIDER NOTES
HPI   Chief Complaint   Patient presents with    unresponcive     Near arrest         Patient is a 74-year-old male presenting for unresponsiveness.  The patient carries a DO NOT RESUSCITATE Comfort Care arrest order.  Was checked in by nursing facility earlier this morning and found to be unarousable.  Normal baseline mental status is awake and alert but pleasantly confused.  The patient had an initial blood pressure of 50/30 and when EMS arrived they were doing diluted epinephrine.    When friend who is his POA arrived did confirm the DNR CCA, DNI status.  She also had fear with regards to recovery and stated that he was on a steady decline over the past several weeks.  She wanted to reach out to additional family and friends prior to making any final decisions about additional care at this time.              Patient History   Past Medical History:   Diagnosis Date    Abnormal weight gain 10/27/2016    Abnormal weight gain    Achilles tendinitis, unspecified leg 08/16/2016    Achilles tendinitis    Acute upper respiratory infection, unspecified     Acute URI    NOHELIA (acute kidney injury) (CMS-[facility]) 10/02/2023    10/2023-11/2023 Following vancomycin in setting of bacteremia.    Allergic contact dermatitis due to plants, except food 08/22/2013    Contact dermatitis due to poison ivy    Arthritis     Bell's palsy 03/21/2023    BPH with obstruction/lower urinary tract symptoms     Cancer (Multi)     Cellulitis of right lower limb 07/30/2021    Cellulitis of right lower extremity without foot    Cough, unspecified 03/21/2016    Cough    Diabetes (Multi)     Encounter for screening for human immunodeficiency virus (HIV) 06/14/2016    Screening for HIV (human immunodeficiency virus)    Hordeolum externum unspecified eye, unspecified eyelid 08/14/2019    Stye    Hyperglycemia, unspecified 12/13/2017    Chronic hyperglycemia    Hypertension     Incisional hernia without obstruction or gangrene 06/15/2015    Recurrent ventral  hernia    Knee joint pain     Right knee- bone on bone    Melanoma (Multi)     MSSA bacteremia 10/2023    Muscle spasm of calf 08/16/2016    Muscle spasm of calf    Obstructive uropathy     Personal history of other diseases of the digestive system 06/30/2015    History of umbilical hernia    Personal history of other diseases of the digestive system 02/05/2016    History of ventral hernia    Personal history of other diseases of the respiratory system 12/23/2014    History of acute sinusitis    Personal history of other diseases of the respiratory system 01/28/2016    History of upper respiratory infection    Personal history of other infectious and parasitic diseases 12/02/2015    History of hepatitis B virus infection    Personal history of other infectious and parasitic diseases     History of hepatitis B virus infection    Personal history of other specified conditions 02/04/2015    History of nausea and vomiting    Personal history of other specified conditions 08/16/2016    History of edema    Personal history of other specified conditions 01/26/2015    History of jaundice    Personal history of pneumonia (recurrent) 01/29/2016    History of pneumonia    Personal history of urinary (tract) infections 02/19/2013    History of urinary tract infection    Prostatitis 03/21/2023    Pruritus, unspecified 02/10/2015    Pruritus    Severe obesity (Multi) 01/16/2024    Sinoatrial node dysfunction (Multi)     Strain of muscle, fascia and tendon of the posterior muscle group at thigh level, right thigh, initial encounter 05/10/2016    Tear of right hamstring    Unspecified symptoms and signs involving the genitourinary system 12/20/2016    UTI symptoms     Past Surgical History:   Procedure Laterality Date    CARDIAC ELECTROPHYSIOLOGY PROCEDURE Left 11/22/2023    Procedure: PPM Lead Extraction;  Surgeon: Etienne Aguilar MD;  Location: [address] Cardiac Cath Lab;  Service: Electrophysiology;  Laterality: Left;     CARDIAC ELECTROPHYSIOLOGY PROCEDURE N/A 11/30/2023    Procedure: PPM Leadless Implant (MICRA AV);  Surgeon: Etienne Aguilar MD;  Location: [place] [address] Cardiac Cath Lab;  Service: Electrophysiology;  Laterality: N/A;    CARDIAC PACEMAKER PLACEMENT  08/22/2013    Pacemaker Permanent Placement    OTHER SURGICAL HISTORY  06/15/2015    Ventral Hernia Repair - Recurrent    OTHER SURGICAL HISTORY  09/16/2019    Prostate biopsy    PERIPHERALLY INSERTED CENTRAL CATHETER INSERTION      SKIN BIOPSY      US GUIDED BIOPSY LYMPH NODE SUPERFICIAL  11/29/2023    US GUIDED BIOPSY LYMPH NODE SUPERFICIAL 11/29/2023 Mao Dubose MD [facility]    VARICOSE VEIN SURGERY  08/22/2013    Varicose Vein Ligation    VENTRAL HERNIA REPAIR  06/15/2015    Ventral Hernia Repair     Family History   Problem Relation Name Age of Onset    Cancer Mother Maye Verduzco      Social History     Tobacco Use    Smoking status: Never     Passive exposure: Never    Smokeless tobacco: Never   Vaping Use    Vaping status: Never Used   Substance Use Topics    Alcohol use: Not Currently     Comment: very infrequent    Drug use: Never       Physical Exam   ED Triage Vitals [08/25/24 0945]   Temp Heart Rate Respirations BP   -- (!) 117 (!) 25 119/63      Pulse Ox Temp src Heart Rate Source Patient Position   98 % -- -- --      BP Location FiO2 (%)     -- --       Physical Exam  Constitutional:       Comments: Nonresponsive, acute distress, comatose   Cardiovascular:      Rate and Rhythm: Tachycardia present.   Pulmonary:      Comments: Tachypneic, shallow respirations, coarse breath sounds heard  Abdominal:      Comments: Significant ecchymosis across the abdomen but soft without tenderness no focality   Neurological:      Comments: GCS of 3           ED Course & MDM   ED Course as of 08/25/24 1832   Sun Aug 25, 2024   0941 EKG interpreted as sinus rhythm at a rate of 124 bpm, normal axis, no ST elevations or depressions or T wave inversion/ischemia, QTc of 422 [AV]       ED Course User Index  [AV] Luis Armando Peterson MD         Diagnoses as of 08/25/24 1832   Cardiac arrest (Multi)   Anemia, unspecified type                 No data recorded     [place] Coma Scale Score: 4 (08/25/24 1038 : Roberto Carlos Etienne RN)                           Medical Decision Making  Patient is a 74-year-old male presenting for altered mental status and coma.  History physical examination was concerning for periarrest.  The patient did receive additional boluses of as needed epinephrine for a total of 200 mcg.  Was transitioned to Levophed with stabilization of blood pressure.  Given the DNI status was placed on a nonrebreather and transitioned over to Ventimask.    Laboratory values were notable for hyperkalemia and severe anemia.  Outside of the abdominal ecchymosis no overt signs of bleeding at this time.  He was extremely difficult access and EMS did have a IO in place however the 2 IVs we were able to get were not able to draw blood and so I did require arterial stick placement for blood draw to the right groin.  Pressure was held by the nurse 10 minutes after to control bleeding.    Patient's CT and x-ray did not demonstrate any acute intervenable abnormality at this time.  I discussed with the patient's POA about CT scan and blood products with regards to the anemia and she was concerned about potential futility.  We did discuss about extremely poor outcome and that even if there is recovery there is still unknown affect with regards to brain function given the unknown hypotensive duration.  At this time she wished to transition to full comfort measures and withdraw critical intervention including norepinephrine.  Comfort measures were initiated and norepinephrine was discontinued and after brief period of time the patient transition to asystole.    I was called in the room and the patient had fixed and dilated pupils without spontaneous respiration or heartbeat.  Patient remained pulseless and time  of death was called at 1442    POA and friends were at bedside and informed and provided comfort measures.    A total of 30 minutes was performed talking about palliative measures, CODE STATUS and goals of care during the encounter        Procedure  Critical Care    Performed by: Luis Armando Peterson MD  Authorized by: Luis Armando Peterson MD    Critical care provider statement:     Critical care time (minutes):  90    Critical care time was exclusive of:  Separately billable procedures and treating other patients and teaching time    Critical care was necessary to treat or prevent imminent or life-threatening deterioration of the following conditions:  Shock, respiratory failure and CNS failure or compromise    Critical care was time spent personally by me on the following activities:  Development of treatment plan with patient or surrogate, evaluation of patient's response to treatment, examination of patient, re-evaluation of patient's condition, pulse oximetry, ordering and review of radiographic studies, ordering and review of laboratory studies, ordering and performing treatments and interventions and obtaining history from patient or surrogate       Luis Armando Peterson MD  08/25/24 8176

## 2024-08-26 NOTE — PROGRESS NOTES
*Provider Impression*    Patient is a 74 year old male who is seen today for management of multiple medical problems     #Weakness / OA / Gout - PT/OT, allopurinol 100mg daily, acetaminophen 650mg q6h PRN, MS Contin 15mg BID  #AV block / TV vegetation / HTN / HLD / CKD -  s/p PM explant and re-implant; atorvastatin 20mg daily, KCl 40mEq BID,  f/u w/ cardiology, f/u w/ ID, sodium bicarb 650mg BID, d/c IV  #Anemia / Vitamin deficiency - Epogen 3000units MWF, vitamin D 5000units daily  #GERD / Immunotherapy-induced gastroenteritis / Gallbladder sludge - zofran 4mg q8h PRN, Tums 500mg q6h PRN, lomotil 2.5mg/0.025mg q6h PRN  #Melanoma / RLE DVT - f/u w/ oncology, resume coumadin 1mg daily, check PT/INR on 8/23  #BPH w/ obstruction - maldonado, tamsulosin 0.8mg daily, f/u w/ urology Dr. Delaney  #Depression / Insomnia - duloxetine 60mg daily, melatonin 3mg QHS  #T2DM w/ neuropathy - Tresiba 5units QHS, lyrica 25mg BID  #ACP - Full Code  Follow up as needed      *Chief Complaint*     weakness    *History of Present Illness*    Patient is a 75 y/o male w/ PMH as below who presented to the ED on 7/15/24 with intractable nausea, vomiting and diarrhea, which started shortly after receiving his sixth round of immunotherapy. Labs done in the ED showed potassium of 2.6, creatinine of 2.55, worsened compared to prior. GFR dropped to 26, was 36 four days prior. He was given IVF, oral potassium, IV potassium, and IV Zofran. CT scan showed fluid-filled loops of small bowel concerning for enteritis. Patient was subsequently admitted to the hospital service for management of electrolyte derangement with NOHELIA on CKD. He was started on a five day course of IV methylprednisolone for immunotherapy-induced gastroenteritis per heme/onc recommendations. Additional 5 day course of high dose steroids added after patient's diarrhea continued throughout admission, with recommendations for 3 days of oral prednisone following discharge. During stay he  developed gross hematuria with intermittent urinary obstruction. Urology was consulted and recommended PRN flushes for hematuria likely secondary to maldonado insertion trauma. Also during admission, a DVT was found in patient's RLE, and was subsequently started on a heparin drip. Heme/onc was following and recommended a heparin bridge to warfarin, subsequently started on 7.5 mg warfarin with daily INR checks. NOHELIA showed improvement throughout duration of hospital stay, creatinine trended down to 2.60, GFR improved as well. Was given IVFs throughout hospital stay. His home norvasc, lasix and losartan were held upon admission. Bps consistently in the 110/60s, occasionally in the 90s/50s. HR also consistently in the low 70s/mid 60s throughout admission. Subsequently stopped metoprolol upon discharge from hospital as well. He was then d/c to [facility] on 7/24.    His labs appreciated as below. Lyrica increased.    He is seen sitting up in his room today and reports he has a sore on his mouth - which he discussed with the dentist today; no more bleeding, some bladder spasms, no diarrhea, no f/c, sweats, CP, saw psych today w/ no changes, still not sleeping well, no n/v, appetite is beter, or any other new c/o presently.     [place] - buPROPion, Cephalexin   PMH - arthritis, obesity, lymphedema, ventral hernia repair, hypertension, hyperlipidemia, BPH, PVD, T2DM, Bell's palsy  PSH - pacemaker, ventral hernia repair, prostate biopsy, varicose vein ligation  FH - Brother had heart disease; Mother had lung cancer  SocHx - Never smoker, Social EtOH    *Review of Systems*  All other systems reviewed are negative except as noted in the HPI     *Vital Signs*   Date: 8/21/24  - T: 97.5  P: 86  R: 19  BP: 141/72  SpO2: 99% on RA ; Date: 8/21/24  Wt: 187    *Results / Data*  CBC - Date: 8/21/24  WBC: 5.3  HGB: 8.2  HCT: 26.3  PLT: 100 ;   BMP - Date: 8/21/24  Na: 141  K: 4.0  Cl: 116  Bicarb: 19  BUN: 32  Cr: 1.65  Glu: 106  Ca:  6.7  ;   LFT - Date: 8/21/24  AST: 39  ALT: 27  ALP: 521  Tbili: 0.6  ;   Coags - Date: 8/21/24  INR: 1.7  PT: 19.6  Other - Date: 10/16/23  CRP: 3.8  ESR: 69  ; 10/23/23  CRP: 2.9  ESR: 79 ; 10/30/23  CRP: 1.9  ; 12/18/23  CRP: 1.9 ;  12/20/23  ESR: 60; 1/15/24  ESR: 25  CRP: <0.3  ; 1/29/24  B12: 338  Folate: <2.0  ESR: 31    *Physical Exam*  Gen: (+) NAD, (+) well-appearing  HEENT: (+) normocephalic, (+) MMM  Neck: (+) supple  Lungs: (+) CTAB, (-) wheezes, (-) rales, (-) rhonchi  Heart: (+) RRR, (+) S1 S2, (-) murmurs  Pulses: (+) palpable  Abd: (+) soft, (+) NT, (+) ND, (+) BS+  : (+) maldonado  Ext: (+) edema, (-) deformity  MSK: (-) joint swelling  Skin: (+) warm, (+) dry, (-) rash  Neuro: (+) follows commands, (-) tremor, (+) alert

## 2024-08-27 LAB — BACTERIA UR CULT: ABNORMAL

## 2024-08-30 ENCOUNTER — APPOINTMENT (OUTPATIENT)
Dept: PRIMARY CARE | Facility: CLINIC | Age: 74
End: 2024-08-30
Payer: MEDICARE

## 2024-08-30 LAB
BACTERIA BLD AEROBE CULT: ABNORMAL
BACTERIA BLD CULT: ABNORMAL
GRAM STN SPEC: ABNORMAL
GRAM STN SPEC: ABNORMAL

## 2024-09-23 ENCOUNTER — APPOINTMENT (OUTPATIENT)
Dept: CARDIOLOGY | Facility: HOSPITAL | Age: 74
End: 2024-09-23
Payer: COMMERCIAL

## (undated) DEVICE — GUIDEWIRE, J-TIP, AMPLATZ SUPER STIFF, 0.035 IN X 180 CM

## (undated) DEVICE — GUIDEWIRE, ANGLE TIP,  .035 DIA, 260 CM, 3 CM TIP"

## (undated) DEVICE — Device

## (undated) DEVICE — SHEATH, INTRODUCER, DRYSEAL FLEX, 14 FR X 33CM

## (undated) DEVICE — SHEATH, GLIDELIGHT LASER, 14FR X 50CM

## (undated) DEVICE — SHEATH, INTRODUCER, MICRA, 23FR  X 55.7CM

## (undated) DEVICE — NEEDLE, PERCUTANEOUS ENTRY, THINWALL, W/O BASEPLATE, 18 G X 7 CM

## (undated) DEVICE — CATHETER, ANGIO, SOFT-VU, 0.038 IN, 5 FR X 65 CM, KUMPE

## (undated) DEVICE — GUIDEWIRE, STIFF SHAFT, ANGLE TIP, .035 DIA, 260 CM,  3 CM TIP"

## (undated) DEVICE — GUIDEWIRE, STRAIGHT, AMPLATZ SUPER STIFF ST-1, 0.035 IN X 260 CM

## (undated) DEVICE — GUIDEWIRE, STANDARD, J TIP,  .035 DIA, 150 CM, 3 CM TIP"

## (undated) DEVICE — CATHETER, 5 FR. 65CM, ANGLE TAPER AT TIP

## (undated) DEVICE — DEVICE, LOCKING LLD, EZ

## (undated) DEVICE — ACCESS KIT, S-MAK MINI, 4FR 10CM 0.018IN 40CM, NT/PT, ECHO ENHANCE NEEDLE

## (undated) DEVICE — ELECTRODE, QUICK-COMBO, EDGE SYSTEM, REDI PACK

## (undated) DEVICE — DILATOR, VESSEL, COONS, 20FR X 20CM

## (undated) DEVICE — CATHETER, BRIDGE OCCLUSION, BALLOON, 80CM

## (undated) DEVICE — SHEATH, DILATOR, VISISHEATH SPEC, 14FR X 43CM, SZ MED

## (undated) DEVICE — GUIDEWIRE, J-TIP, SUPER STIFF, AMPLATZ, 0.035 IN X 260 CM

## (undated) DEVICE — INTRODUCER, SHEATH, FAST-CATH, 8FR X 12CM, C-LOCK

## (undated) DEVICE — CLOSURE SYSTEM, VASCULAR, MVP 6-12FR, VENOUS

## (undated) DEVICE — CATHETER, ANGIO, IMPULSE, MPA1, 6 FR X 100 CM

## (undated) DEVICE — INTRODUCER, CATHETER, HEMOSTASIS, FAST-CATH, 12 FR X 12 CM